# Patient Record
Sex: FEMALE | Race: WHITE | NOT HISPANIC OR LATINO | Employment: OTHER | URBAN - METROPOLITAN AREA
[De-identification: names, ages, dates, MRNs, and addresses within clinical notes are randomized per-mention and may not be internally consistent; named-entity substitution may affect disease eponyms.]

---

## 2017-01-05 ENCOUNTER — OFFICE VISIT (OUTPATIENT)
Dept: OPTOMETRY | Facility: CLINIC | Age: 77
End: 2017-01-05
Payer: MEDICARE

## 2017-01-05 DIAGNOSIS — H54.50: ICD-10-CM

## 2017-01-05 DIAGNOSIS — H52.201 ASTIGMATISM, RIGHT: ICD-10-CM

## 2017-01-05 DIAGNOSIS — Z98.41 S/P CATARACT SURGERY, RIGHT: ICD-10-CM

## 2017-01-05 DIAGNOSIS — H26.491 POSTERIOR CAPSULAR OPACIFICATION NON VISUALLY SIGNIFICANT, RIGHT EYE: ICD-10-CM

## 2017-01-05 DIAGNOSIS — H25.012 CORTICAL SENILE CATARACT, LEFT: ICD-10-CM

## 2017-01-05 DIAGNOSIS — H25.12 NUCLEAR SCLEROTIC CATARACT OF LEFT EYE: Primary | ICD-10-CM

## 2017-01-05 DIAGNOSIS — Z96.1 PSEUDOPHAKIA OF RIGHT EYE: ICD-10-CM

## 2017-01-05 PROCEDURE — 99499 UNLISTED E&M SERVICE: CPT | Mod: S$GLB,,, | Performed by: OPTOMETRIST

## 2017-01-05 PROCEDURE — 99999 PR PBB SHADOW E&M-EST. PATIENT-LVL III: CPT | Mod: PBBFAC,,, | Performed by: OPTOMETRIST

## 2017-01-05 PROCEDURE — 92015 DETERMINE REFRACTIVE STATE: CPT | Mod: S$GLB,,, | Performed by: OPTOMETRIST

## 2017-01-05 PROCEDURE — 92012 INTRM OPH EXAM EST PATIENT: CPT | Mod: S$GLB,,, | Performed by: OPTOMETRIST

## 2017-01-05 NOTE — MR AVS SNAPSHOT
"    [facility]  [address]  [facility] [address]  Phone: [phone]  Fax: [phone]                  Radha Degroot   2017 12:45 PM   Office Visit    Description:  Female : 1940   Provider:  Genaro Johansen OD   Department:  [facility]           Reason for Visit     Concerns About Ocular Health                To Do List           Future Appointments        Provider Department Dept Phone    2017 2:40 PM Iveth Greene MD [facility] [place] [facility] [phone]    2017 3:00 PM Katherine Merida MD [facility] - Internal Medicine [phone]    3/23/2017 8:00 AM Loan Mcnally MD [facility] - Endo/Diab/Metab [phone]      Goals (5 Years of Data)     None      [facility]     [facility] On Call Nurse [facility] -  Assistance  Registered nurses in the [facility] Center provide clinical advisement, health education, appointment booking, and other advisory services.  Call for this free service at [phone].             Medications           Message regarding Medications     Verify the changes and/or additions to your medication regime listed below are the same as discussed with your clinician today.  If any of these changes or additions are incorrect, please notify your healthcare provider.             Verify that the below list of medications is an accurate representation of the medications you are currently taking.  If none reported, the list may be blank. If incorrect, please contact your healthcare provider. Carry this list with you in case of emergency.           Current Medications     albuterol 90 mcg/actuation inhaler Inhale 2 puffs into the lungs every 6 (six) hours as needed for Wheezing or Shortness of Breath.    aspirin 81 MG Chew Take 81 mg by mouth once daily.      BD ULTRA-FINE TIM PEN NEEDLES 32 gauge x 5/32" Ndle USE FOUR TIMES DAILY FOR BLOOD SUGAR TESTING    benzonatate (TESSALON) 100 MG capsule Take 1 capsule (100 mg " total) by mouth 3 (three) times daily as needed for Cough.    blood-glucose meter (FREESTYLE FREEDOM LITE) kit 1 each by Other route 4 (four) times daily. Use as instructed    Ca cmb no.1-O3-G-3-ZF-U63-aloe (VITAMIN D-3 WITH ALOE) 120-1,000-10 mg-unit-mg Tab Take by mouth.    carvedilol (COREG) 12.5 MG tablet Take 0.5 tablets (6.25 mg total) by mouth 2 (two) times daily.    CRESTOR 5 mg tablet TAKE 1 TABLET BY MOUTH EVERY DAY    fluticasone (FLONASE) 50 mcg/actuation nasal spray 2 SPRAYS IN EACH NOSTRIL ONCE DAILY    FREESTYLE LANCETS 28 gauge lancets USE FOUR TIMES DAILY    FREESTYLE LITE STRIPS Strp TEST FOUR TIMES DAILY    furosemide (LASIX) 40 MG tablet Take 1 tablet (40 mg total) by mouth once daily.    insulin glargine (LANTUS SOLOSTAR) 100 unit/mL (3 mL) InPn pen Inject 42 units at bedtime    insulin lispro (HUMALOG KWIKPEN) 100 unit/mL InPn pen Inject 15 units with breakfast and  dinner , 13 units with lunch plus correction scale 150-200 +1, 201-250 +2, 251-300 +3, 301-350 +4, > 350 +5.    ketoconazole (NIZORAL) 2 % shampoo APPLY TO AFFECTED AREA EVERY OTHER DAY AND LET SIT 5 MINUTES PRIOR TO RINSING AS DIRECTED    lisinopril (PRINIVIL,ZESTRIL) 40 MG tablet Take 1 tablet (40 mg total) by mouth once daily.    loratadine (CLARITIN) 10 mg tablet Take 1 tablet (10 mg total) by mouth daily as needed for Allergies (or runny nose).    mirabegron 50 mg Tb24 Take 1 tablet (50 mg total) by mouth once daily.    MULTIVIT-MIN/IRON/FOLIC/LUTEIN (CENTRUM SILVER WOMEN ORAL) Take by mouth.    pantoprazole (PROTONIX) 40 MG tablet Take 1 tablet (40 mg total) by mouth once daily.           Clinical Reference Information           Allergies as of 1/5/2017     Heparin Analogues    Ancef [Cefazolin]    Keflex [Cephalexin]    Oxybutynin      Immunizations Administered on Date of Encounter - 1/5/2017     None      Instructions    S/p cataract surgery in the right eye.  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  Followed by Dr. Cohen in retina clinic.  Mild/moderate fibrosis of intact posterior lens capsule in the right eye.  Monitor.  Brunescent nuclear sclerotic/cortical cataract in the left eye.  Ms. Degroot notes that Dr. Cohen has recommended against cataract surgery in the left eye.  Residual astigmatic distance refractive error in the right eye, with satisfactory correctable vision in that eye.  New spectacle lens Rx issued for use as desired.  Recommend full-time wear.  Follow up with Dr. Cohen as he recommends, and return for recheck of refraction when advised to do so by Dr. Cohen.

## 2017-01-05 NOTE — PROGRESS NOTES
"HPI     Concerns About Ocular Health    Additional comments: Refraction for glasses.   Needs updated spectacle   lens.   Interested in resuming CL wore.  Wore CLs previously, but   discontinued CL wear (why = ?).   Reports "lazy eye" in left eye.  States   that Dr. Cohen did not recommend cataract surgery in the left eye.            Comments   Patient in for progress check.  Patient is in for a refraction for eye glasses per Dr. Cohen     Being followed for (diagnosis):   Macula Edema     Date last seen:  Dr. Cohen     Doctor last seen:  10/20/2016    Prescribed eye medications(s) using:  None     OTC eye medication(s) using:  None    Signs/symptoms of condition resolved/better/stable/worse?:      Allergies/Medications reviewed today?  Yes     PD  68/64  Reading distance = 13"        Last edited by Genaro Johansen, OD on 1/5/2017  1:31 PM. (History)            Assessment /Plan     For exam results, see Encounter Report.    1. Nuclear sclerotic cataract of left eye     2. Cortical senile cataract, left     3. Posterior capsular opacification non visually significant, right eye     4. S/P cataract surgery, right     5. Pseudophakia of right eye     6. Low vision, left eye, normal vision right eye     7. Astigmatism, right                    S/p cataract surgery in the right eye.  Insulin dependant diabetes with history of non-proliferative diabetic retinopathy.  Followed by Dr. Cohen in retina clinic.  Mild/moderate fibrosis of intact posterior lens capsule in the right eye.  Monitor.  Brunescent nuclear sclerotic/cortical cataract in the left eye.  Ms. Degroot notes that Dr. Cohen has recommended against cataract surgery in the left eye.  Residual astigmatic distance refractive error in the right eye, with satisfactory correctable vision in that eye.  New spectacle lens Rx issued for use as desired.  Recommend full-time wear.  Follow up with Dr. Cohen as he recommends, and return for recheck of refraction when " advised to do so by Dr. Cohen.

## 2017-01-05 NOTE — PATIENT INSTRUCTIONS
S/p cataract surgery in the right eye.  Insulin dependant diabetes with history of non-proliferative diabetic retinopathy.  Followed by Dr. Cohen in retina clinic.  Mild/moderate fibrosis of intact posterior lens capsule in the right eye.  Monitor.  Brunescent nuclear sclerotic/cortical cataract in the left eye.  Ms. Degroot notes that Dr. Cohen has recommended against cataract surgery in the left eye.  Residual astigmatic distance refractive error in the right eye, with satisfactory correctable vision in that eye.  New spectacle lens Rx issued for use as desired.  Recommend full-time wear.  Follow up with Dr. Cohen as he recommends, and return for recheck of refraction when advised to do so by Dr. Cohen.

## 2017-01-07 ENCOUNTER — NURSE TRIAGE (OUTPATIENT)
Dept: ADMINISTRATIVE | Facility: CLINIC | Age: 77
End: 2017-01-07

## 2017-01-07 NOTE — TELEPHONE ENCOUNTER
Caller states glucose meter is broken and pharmacy states she needs new prescription for glucose meter.  She is on insulin and needs to measure her glucose this weekend.  Contacted [facility] pharmacy with the prescription for Freestyle glucose meter.

## 2017-01-16 ENCOUNTER — OFFICE VISIT (OUTPATIENT)
Dept: INTERNAL MEDICINE | Facility: CLINIC | Age: 77
End: 2017-01-16
Payer: MEDICARE

## 2017-01-16 VITALS
WEIGHT: 219.56 LBS | HEIGHT: 68 IN | DIASTOLIC BLOOD PRESSURE: 78 MMHG | HEART RATE: 68 BPM | BODY MASS INDEX: 33.28 KG/M2 | SYSTOLIC BLOOD PRESSURE: 135 MMHG

## 2017-01-16 DIAGNOSIS — N25.81 SECONDARY HYPERPARATHYROIDISM (OF RENAL ORIGIN): ICD-10-CM

## 2017-01-16 DIAGNOSIS — N18.4 CHRONIC KIDNEY DISEASE, STAGE IV (SEVERE): Chronic | ICD-10-CM

## 2017-01-16 DIAGNOSIS — Z79.4 TYPE 2 DIABETES MELLITUS WITH CHRONIC KIDNEY DISEASE, WITH LONG-TERM CURRENT USE OF INSULIN, UNSPECIFIED CKD STAGE: ICD-10-CM

## 2017-01-16 DIAGNOSIS — E11.22 TYPE 2 DIABETES MELLITUS WITH CHRONIC KIDNEY DISEASE, WITH LONG-TERM CURRENT USE OF INSULIN, UNSPECIFIED CKD STAGE: ICD-10-CM

## 2017-01-16 DIAGNOSIS — I50.22 CHRONIC SYSTOLIC CHF (CONGESTIVE HEART FAILURE): ICD-10-CM

## 2017-01-16 DIAGNOSIS — E78.5 HYPERLIPIDEMIA, UNSPECIFIED HYPERLIPIDEMIA TYPE: Chronic | ICD-10-CM

## 2017-01-16 DIAGNOSIS — I10 ESSENTIAL HYPERTENSION: Primary | Chronic | ICD-10-CM

## 2017-01-16 PROCEDURE — 99214 OFFICE O/P EST MOD 30 MIN: CPT | Mod: S$GLB,,, | Performed by: INTERNAL MEDICINE

## 2017-01-16 PROCEDURE — 3078F DIAST BP <80 MM HG: CPT | Mod: S$GLB,,, | Performed by: INTERNAL MEDICINE

## 2017-01-16 PROCEDURE — 99499 UNLISTED E&M SERVICE: CPT | Mod: S$GLB,,, | Performed by: INTERNAL MEDICINE

## 2017-01-16 PROCEDURE — 1159F MED LIST DOCD IN RCRD: CPT | Mod: S$GLB,,, | Performed by: INTERNAL MEDICINE

## 2017-01-16 PROCEDURE — 1157F ADVNC CARE PLAN IN RCRD: CPT | Mod: S$GLB,,, | Performed by: INTERNAL MEDICINE

## 2017-01-16 PROCEDURE — 99999 PR PBB SHADOW E&M-EST. PATIENT-LVL IV: CPT | Mod: PBBFAC,,, | Performed by: INTERNAL MEDICINE

## 2017-01-16 PROCEDURE — 3075F SYST BP GE 130 - 139MM HG: CPT | Mod: S$GLB,,, | Performed by: INTERNAL MEDICINE

## 2017-01-16 PROCEDURE — 1160F RVW MEDS BY RX/DR IN RCRD: CPT | Mod: S$GLB,,, | Performed by: INTERNAL MEDICINE

## 2017-01-16 NOTE — MR AVS SNAPSHOT
[facility] - Internal Medicine  [address]  [facility] [address]  Phone: [phone]  Fax: [phone]                  Radha Degroot   2017 3:00 PM   Office Visit    Description:  Female : 1940   Provider:  Katherine Merida MD   Department:  [facility] - Internal Medicine           Reason for Visit     Follow-up           Diagnoses this Visit        Comments    Essential hypertension    -  Primary     Hyperlipidemia, unspecified hyperlipidemia type         Type 2 diabetes mellitus with chronic kidney disease, with long-term current use of insulin, unspecified CKD stage         Chronic systolic CHF (congestive heart failure)         Chronic kidney disease, stage IV (severe)         Secondary hyperparathyroidism (of renal origin)                To Do List           Future Appointments        Provider Department Dept Phone    2017 2:00 PM Iveth Greene MD [facility] - Urology [phone]    3/23/2017 8:00 AM Loan Mcnally MD [facility] - Endo/Diab/Metab [phone]    2017 10:00 AM Katherine Merida MD [facility] - Internal Medicine [phone]      Goals (5 Years of Data)     None      [facility]     [facility] On Call Nurse Care Line -  Assistance  Registered nurses in the [facility] On Call Center provide clinical advisement, health education, appointment booking, and other advisory services.  Call for this free service at [phone].             Medications           Message regarding Medications     Verify the changes and/or additions to your medication regime listed below are the same as discussed with your clinician today.  If any of these changes or additions are incorrect, please notify your healthcare provider.        STOP taking these medications     benzonatate (TESSALON) 100 MG capsule Take 1 capsule (100 mg total) by mouth 3 (three) times daily as needed for Cough.           Verify that the below list of medications is an accurate  "representation of the medications you are currently taking.  If none reported, the list may be blank. If incorrect, please contact your healthcare provider. Carry this list with you in case of emergency.           Current Medications     albuterol 90 mcg/actuation inhaler Inhale 2 puffs into the lungs every 6 (six) hours as needed for Wheezing or Shortness of Breath.    aspirin 81 MG Chew Take 81 mg by mouth once daily.      BD ULTRA-FINE TIM PEN NEEDLES 32 gauge x 5/32" Ndle USE FOUR TIMES DAILY FOR BLOOD SUGAR TESTING    blood-glucose meter (FREESTYLE FREEDOM LITE) kit 1 each by Other route 4 (four) times daily. Use as instructed    Ca cmb no.3-Q4-J-1-CF-L11-aloe (VITAMIN D-3 WITH ALOE) 120-1,000-10 mg-unit-mg Tab Take by mouth.    carvedilol (COREG) 12.5 MG tablet Take 0.5 tablets (6.25 mg total) by mouth 2 (two) times daily.    CRESTOR 5 mg tablet TAKE 1 TABLET BY MOUTH EVERY DAY    fluticasone (FLONASE) 50 mcg/actuation nasal spray 2 SPRAYS IN EACH NOSTRIL ONCE DAILY    FREESTYLE LANCETS 28 gauge lancets USE FOUR TIMES DAILY    FREESTYLE LITE STRIPS Strp TEST FOUR TIMES DAILY    furosemide (LASIX) 40 MG tablet Take 1 tablet (40 mg total) by mouth once daily.    insulin glargine (LANTUS SOLOSTAR) 100 unit/mL (3 mL) InPn pen Inject 42 units at bedtime    insulin lispro (HUMALOG KWIKPEN) 100 unit/mL InPn pen Inject 15 units with breakfast and  dinner , 13 units with lunch plus correction scale 150-200 +1, 201-250 +2, 251-300 +3, 301-350 +4, > 350 +5.    ketoconazole (NIZORAL) 2 % shampoo APPLY TO AFFECTED AREA EVERY OTHER DAY AND LET SIT 5 MINUTES PRIOR TO RINSING AS DIRECTED    lisinopril (PRINIVIL,ZESTRIL) 40 MG tablet Take 1 tablet (40 mg total) by mouth once daily.    loratadine (CLARITIN) 10 mg tablet Take 1 tablet (10 mg total) by mouth daily as needed for Allergies (or runny nose).    mirabegron 50 mg Tb24 Take 1 tablet (50 mg total) by mouth once daily.    MULTIVIT-MIN/IRON/FOLIC/LUTEIN (CENTRUM SILVER " "WOMEN ORAL) Take by mouth.    pantoprazole (PROTONIX) 40 MG tablet Take 1 tablet (40 mg total) by mouth once daily.           Clinical Reference Information           Vital Signs - Last Recorded  Most recent update: 1/16/2017  3:13 PM by Lorraine Bowens MA    BP Pulse Ht Wt BMI    (!) 142/62 68 5' 8" (1.727 m) 99.6 kg (219 lb 9.3 oz) 33.39 kg/m2      Blood Pressure          Most Recent Value    BP  (!)  142/62      Allergies as of 1/16/2017     Heparin Analogues    Ancef [Cefazolin]    Keflex [Cephalexin]    Oxybutynin      Immunizations Administered on Date of Encounter - 1/16/2017     None      Orders Placed During Today's Visit     Future Labs/Procedures Expected by Expires    Lipid panel  1/16/2017 (Approximate) 12/17/2017    TSH  1/16/2017 (Approximate) 12/17/2017      "

## 2017-01-16 NOTE — PROGRESS NOTES
"Subjective:       Patient ID: Radha Degroot is a 76 y.o. female.    Chief Complaint: Follow-up   this is a 76-year-old who presents today for follow-up multiple medical problems.  She has had stable fluid retention overall continues take her medications and denies any increased fluid retention.  She continues to keep baseline fatigue .  She went to see urology due to abnormality on her MRI had some additional testing she continues to follow with urology and also went into see her gynecologist no new concerns were found. she has trouble with her back when she stands for long periods seeing outlying chiropractor did not go to spine enter but has been no worse.  She is following with her endocrinologist and overall is had improvement in her blood sugar readings she is cautious to avoid hypoglycemia  She had a gyn evaluation and urology     HPI  Review of Systems   Respiratory: Negative for chest tightness.    Cardiovascular:        Fluid retension stable    Musculoskeletal: Positive for back pain.        Back pain stable        Objective:     Blood pressure 135/78, pulse 68, height 5' 8" (1.727 m), weight 99.6 kg (219 lb 9.3 oz).    Physical Exam   Constitutional: No distress.   HENT:   Head: Normocephalic.   Mouth/Throat: Oropharynx is clear and moist.   Eyes: No scleral icterus.   Neck: Neck supple.   Cardiovascular: Normal rate, regular rhythm and normal heart sounds.    Pulmonary/Chest: Effort normal and breath sounds normal. No respiratory distress.   Abdominal: Soft. Bowel sounds are normal. She exhibits no mass. There is no tenderness.   Musculoskeletal:   Trace edema    Neurological: She is alert.   Skin: No erythema.   Psychiatric: She has a normal mood and affect.   Vitals reviewed.      Assessment:       1. Essential hypertension    2. Hyperlipidemia, unspecified hyperlipidemia type    3. Type 2 diabetes mellitus with chronic kidney disease, with long-term current use of insulin, unspecified CKD stage  "   4. Chronic systolic CHF (congestive heart failure)    5. Chronic kidney disease, stage IV (severe)    6. Secondary hyperparathyroidism (of renal origin)        Plan:       Radha was seen today for follow-up.    Diagnoses and all orders for this visit:    Essential hypertension  bp acceptable continue current regimin   -     Lipid panel; Future  -     TSH; Future    Hyperlipidemia, unspecified hyperlipidemia type  -     Lipid panel; Future    Type 2 diabetes mellitus with chronic kidney disease, with long-term current use of insulin, unspecified CKD stage  She continues to follow with endocrinology and watches her diet  She just started weight watchers     Chronic systolic CHF (congestive heart failure)  Stable she follows with cardiology   Fluid stable     Chronic kidney disease, stage IV (severe)  Secondary hyperparathyroidism (of renal origin)  neprhology stable recent labs reviwed    Discussed adacel and shingles vaccine rx provided she can take to pharmacy     Add lipids and tsh with next blood draw she will call to add to her endocrine labs when she schedules     Follow up 4 month sooner if concern

## 2017-02-01 ENCOUNTER — TELEPHONE (OUTPATIENT)
Dept: ENDOCRINOLOGY | Facility: CLINIC | Age: 77
End: 2017-02-01

## 2017-02-01 DIAGNOSIS — E11.65 TYPE 2 DIABETES MELLITUS WITH HYPERGLYCEMIA, WITH LONG-TERM CURRENT USE OF INSULIN: Primary | ICD-10-CM

## 2017-02-01 DIAGNOSIS — Z79.4 TYPE 2 DIABETES MELLITUS WITH HYPERGLYCEMIA, WITH LONG-TERM CURRENT USE OF INSULIN: Primary | ICD-10-CM

## 2017-02-01 NOTE — TELEPHONE ENCOUNTER
----- Message from Uyen Bautista LPN sent at 2/1/2017  8:14 AM CST -----  Contact: Patient      ----- Message -----     From: Jailene Lindsey     Sent: 2/1/2017   8:07 AM       To: Uli Cates Staff    Patient is requesting to complete labs at [facility] to 2/2/2017 follow up appointment.    Please call patient once orders are in to provide appointment details at [phone]

## 2017-02-02 NOTE — TELEPHONE ENCOUNTER
----- Message from Jacqueline Calzada sent at 2/1/2017  4:02 PM CST -----  Contact: patient  [phone]  Yajaira   -   Patient called requesting labs for A1C and Cholesterol testing done before seeing the Dr.  Call back number [phone]  Thanks,

## 2017-02-06 RX ORDER — ROSUVASTATIN CALCIUM 5 MG/1
TABLET, COATED ORAL
Qty: 30 TABLET | Refills: 0 | Status: SHIPPED | OUTPATIENT
Start: 2017-02-06 | End: 2017-02-23 | Stop reason: SDUPTHER

## 2017-02-16 ENCOUNTER — TELEPHONE (OUTPATIENT)
Dept: UROLOGY | Facility: CLINIC | Age: 77
End: 2017-02-16

## 2017-02-16 ENCOUNTER — LAB VISIT (OUTPATIENT)
Dept: LAB | Facility: HOSPITAL | Age: 77
End: 2017-02-16
Attending: INTERNAL MEDICINE
Payer: MEDICARE

## 2017-02-16 DIAGNOSIS — Z79.4 TYPE 2 DIABETES MELLITUS WITH HYPERGLYCEMIA, WITH LONG-TERM CURRENT USE OF INSULIN: ICD-10-CM

## 2017-02-16 DIAGNOSIS — E11.65 TYPE 2 DIABETES MELLITUS WITH HYPERGLYCEMIA, WITH LONG-TERM CURRENT USE OF INSULIN: ICD-10-CM

## 2017-02-16 LAB
CHOLEST/HDLC SERPL: 3.1 {RATIO}
HDL/CHOLESTEROL RATIO: 32 %
HDLC SERPL-MCNC: 147 MG/DL
HDLC SERPL-MCNC: 47 MG/DL
LDLC SERPL CALC-MCNC: 64.2 MG/DL
NONHDLC SERPL-MCNC: 100 MG/DL
TRIGL SERPL-MCNC: 179 MG/DL

## 2017-02-16 PROCEDURE — 80061 LIPID PANEL: CPT

## 2017-02-16 PROCEDURE — 83036 HEMOGLOBIN GLYCOSYLATED A1C: CPT

## 2017-02-16 PROCEDURE — 36415 COLL VENOUS BLD VENIPUNCTURE: CPT | Mod: PO

## 2017-02-16 NOTE — TELEPHONE ENCOUNTER
----- Message from Mandi Billings sent at 2/16/2017 12:55 PM CST -----  Contact: Self   Pt called to inform the doctor that she will not be able to make her appt     Pt can be contacted at [phone]

## 2017-02-17 LAB
ESTIMATED AVG GLUCOSE: 177 MG/DL
HBA1C MFR BLD HPLC: 7.8 %

## 2017-02-20 ENCOUNTER — OFFICE VISIT (OUTPATIENT)
Dept: ENDOCRINOLOGY | Facility: CLINIC | Age: 77
End: 2017-02-20
Payer: MEDICARE

## 2017-02-20 VITALS
BODY MASS INDEX: 32.98 KG/M2 | WEIGHT: 217.63 LBS | HEIGHT: 68 IN | SYSTOLIC BLOOD PRESSURE: 140 MMHG | DIASTOLIC BLOOD PRESSURE: 60 MMHG | HEART RATE: 86 BPM

## 2017-02-20 DIAGNOSIS — E11.22 TYPE 2 DIABETES MELLITUS WITH STAGE 4 CHRONIC KIDNEY DISEASE, WITH LONG-TERM CURRENT USE OF INSULIN: ICD-10-CM

## 2017-02-20 DIAGNOSIS — N18.4 TYPE 2 DIABETES MELLITUS WITH STAGE 4 CHRONIC KIDNEY DISEASE, WITH LONG-TERM CURRENT USE OF INSULIN: ICD-10-CM

## 2017-02-20 DIAGNOSIS — E11.649 TYPE 2 DIABETES MELLITUS WITH HYPOGLYCEMIA WITHOUT COMA, WITH LONG-TERM CURRENT USE OF INSULIN: Primary | ICD-10-CM

## 2017-02-20 DIAGNOSIS — E55.9 VITAMIN D DEFICIENCY DISEASE: ICD-10-CM

## 2017-02-20 DIAGNOSIS — Z79.4 TYPE 2 DIABETES MELLITUS WITH HYPOGLYCEMIA WITHOUT COMA, WITH LONG-TERM CURRENT USE OF INSULIN: Primary | ICD-10-CM

## 2017-02-20 DIAGNOSIS — E11.42 TYPE 2 DIABETES MELLITUS WITH DIABETIC POLYNEUROPATHY, WITH LONG-TERM CURRENT USE OF INSULIN: ICD-10-CM

## 2017-02-20 DIAGNOSIS — Z79.4 TYPE 2 DIABETES MELLITUS WITH DIABETIC POLYNEUROPATHY, WITH LONG-TERM CURRENT USE OF INSULIN: ICD-10-CM

## 2017-02-20 DIAGNOSIS — I10 ESSENTIAL HYPERTENSION: Chronic | ICD-10-CM

## 2017-02-20 DIAGNOSIS — Z79.4 TYPE 2 DIABETES MELLITUS WITH STAGE 4 CHRONIC KIDNEY DISEASE, WITH LONG-TERM CURRENT USE OF INSULIN: ICD-10-CM

## 2017-02-20 DIAGNOSIS — E78.2 MIXED HYPERLIPIDEMIA: Chronic | ICD-10-CM

## 2017-02-20 PROCEDURE — 1159F MED LIST DOCD IN RCRD: CPT | Mod: S$GLB,,, | Performed by: INTERNAL MEDICINE

## 2017-02-20 PROCEDURE — 1125F AMNT PAIN NOTED PAIN PRSNT: CPT | Mod: S$GLB,,, | Performed by: INTERNAL MEDICINE

## 2017-02-20 PROCEDURE — 99499 UNLISTED E&M SERVICE: CPT | Mod: S$GLB,,, | Performed by: INTERNAL MEDICINE

## 2017-02-20 PROCEDURE — 1157F ADVNC CARE PLAN IN RCRD: CPT | Mod: S$GLB,,, | Performed by: INTERNAL MEDICINE

## 2017-02-20 PROCEDURE — 3078F DIAST BP <80 MM HG: CPT | Mod: S$GLB,,, | Performed by: INTERNAL MEDICINE

## 2017-02-20 PROCEDURE — 1160F RVW MEDS BY RX/DR IN RCRD: CPT | Mod: S$GLB,,, | Performed by: INTERNAL MEDICINE

## 2017-02-20 PROCEDURE — 99214 OFFICE O/P EST MOD 30 MIN: CPT | Mod: S$GLB,,, | Performed by: INTERNAL MEDICINE

## 2017-02-20 PROCEDURE — 99999 PR PBB SHADOW E&M-EST. PATIENT-LVL III: CPT | Mod: PBBFAC,,, | Performed by: INTERNAL MEDICINE

## 2017-02-20 PROCEDURE — 3077F SYST BP >= 140 MM HG: CPT | Mod: S$GLB,,, | Performed by: INTERNAL MEDICINE

## 2017-02-20 RX ORDER — INSULIN LISPRO 100 [IU]/ML
INJECTION, SOLUTION INTRAVENOUS; SUBCUTANEOUS
Qty: 45 ML | Refills: 6 | Status: SHIPPED | OUTPATIENT
Start: 2017-02-20 | End: 2017-12-04 | Stop reason: CLARIF

## 2017-02-20 NOTE — MR AVS SNAPSHOT
[facility] - Endo/Diab/Metab  [address]  [facility] [address]  Phone: [phone]  Fax: [phone]                  Radha Degroot   2017 2:30 PM   Office Visit    Description:  Female : 1940   Provider:  Teresita Gates MD   Department:  [facility] - Endo/Diab/Metab           Reason for Visit     Diabetes Mellitus     Hypoglycemia           Diagnoses this Visit        Comments    Type 2 diabetes mellitus with hypoglycemia without coma, with long-term current use of insulin    -  Primary     Vitamin D deficiency disease         Essential hypertension         Mixed hyperlipidemia                To Do List           Future Appointments        Provider Department Dept Phone    2017 3:55 PM LAB, APPOINTMENT [place] [facility] [phone]    3/16/2017 3:20 PM Iveth Greene MD [facility] - Urology [phone]    4/3/2017 11:30 AM DIABETES EDUCATOR, INT MED 2 [facility] -  Diabetes Program [phone]    2017 10:30 AM Terri Mcmanus MD [facility] - Nephrology [phone]    2017 2:00 PM Oswaldo Wilkerson MD [facility] - Sleep Clinic [phone]      Goals (5 Years of Data)     None      Follow-Up and Disposition     Return in about 3 months (around 2017).       These Medications        Disp Refills Start End    insulin lispro (HUMALOG KWIKPEN) 100 unit/mL InPn pen 45 mL 6 2017     Inject 15 units with breakfast 17 units with lunch and dinner plus correction scale 150-200 +1, 201-250 +2, 251-300 +3, 301-350 +4, > 350 +5.    [id number] [address] AT [address] Ph #: [phone]       Notes to Pharmacy: **Patient requests 90 days supply**      [facility] On Call     [facility] On Call Nurse Care Line -  Assistance  Registered nurses in the [facility] On Call Center provide clinical advisement, health education, appointment booking, and other advisory  "services.  Call for this free service at [phone].             Medications           Message regarding Medications     Verify the changes and/or additions to your medication regime listed below are the same as discussed with your clinician today.  If any of these changes or additions are incorrect, please notify your healthcare provider.        CHANGE how you are taking these medications     Start Taking Instead of    insulin lispro (HUMALOG KWIKPEN) 100 unit/mL InPn pen insulin lispro (HUMALOG KWIKPEN) 100 unit/mL InPn pen    Dosage:  Inject 15 units with breakfast 17 units with lunch and dinner plus correction scale 150-200 +1, 201-250 +2, 251-300 +3, 301-350 +4, > 350 +5. Dosage:  Inject 15 units with breakfast and  dinner , 13 units with lunch plus correction scale 150-200 +1, 201-250 +2, 251-300 +3, 301-350 +4, > 350 +5.    Reason for Change:  Reorder       STOP taking these medications     albuterol 90 mcg/actuation inhaler Inhale 2 puffs into the lungs every 6 (six) hours as needed for Wheezing or Shortness of Breath.    loratadine (CLARITIN) 10 mg tablet Take 1 tablet (10 mg total) by mouth daily as needed for Allergies (or runny nose).           Verify that the below list of medications is an accurate representation of the medications you are currently taking.  If none reported, the list may be blank. If incorrect, please contact your healthcare provider. Carry this list with you in case of emergency.           Current Medications     ACCU-CHEK MULTICLIX LANCET lancets USE TO TEST BLOOD SUGAR FOUR TIMES DAILY    aspirin 81 MG Chew Take 81 mg by mouth once daily.      BD ULTRA-FINE TIM PEN NEEDLES 32 gauge x 5/32" Ndle USE FOUR TIMES DAILY FOR BLOOD SUGAR TESTING    blood-glucose meter (FREESTYLE FREEDOM LITE) kit 1 each by Other route 4 (four) times daily. Use as instructed    Ca cmb no.7-G4-V-3-OO-R54-aloe (VITAMIN D-3 WITH ALOE) 120-1,000-10 mg-unit-mg Tab Take by mouth.    carvedilol (COREG) 12.5 " "MG tablet Take 0.5 tablets (6.25 mg total) by mouth 2 (two) times daily.    fluticasone (FLONASE) 50 mcg/actuation nasal spray 2 SPRAYS IN EACH NOSTRIL ONCE DAILY    FREESTYLE LANCETS 28 gauge lancets USE FOUR TIMES DAILY    FREESTYLE LITE STRIPS Strp TEST FOUR TIMES DAILY    furosemide (LASIX) 40 MG tablet Take 1 tablet (40 mg total) by mouth once daily.    insulin glargine (LANTUS SOLOSTAR) 100 unit/mL (3 mL) InPn pen Inject 42 units at bedtime    ketoconazole (NIZORAL) 2 % shampoo APPLY TO AFFECTED AREA EVERY OTHER DAY AND LET SIT 5 MINUTES PRIOR TO RINSING AS DIRECTED    lisinopril (PRINIVIL,ZESTRIL) 40 MG tablet Take 1 tablet (40 mg total) by mouth once daily.    mirabegron 50 mg Tb24 Take 1 tablet (50 mg total) by mouth once daily.    MULTIVIT-MIN/IRON/FOLIC/LUTEIN (CENTRUM SILVER WOMEN ORAL) Take by mouth.    pantoprazole (PROTONIX) 40 MG tablet Take 1 tablet (40 mg total) by mouth once daily.    rosuvastatin (CRESTOR) 5 MG tablet TAKE 1 TABLET BY MOUTH EVERY DAY    insulin lispro (HUMALOG KWIKPEN) 100 unit/mL InPn pen Inject 15 units with breakfast 17 units with lunch and dinner plus correction scale 150-200 +1, 201-250 +2, 251-300 +3, 301-350 +4, > 350 +5.           Clinical Reference Information           Your Vitals Were     BP Pulse Height Weight BMI    140/60 86 5' 8" (1.727 m) 98.7 kg (217 lb 9.5 oz) 33.09 kg/m2      Blood Pressure          Most Recent Value    BP  (!)  140/60      Allergies as of 2/20/2017     Heparin Analogues    Ancef [Cefazolin]    Keflex [Cephalexin]    Oxybutynin      Immunizations Administered on Date of Encounter - 2/20/2017     None      Orders Placed During Today's Visit      Normal Orders This Visit    Ambulatory Referral to Diabetes Education     Future Labs/Procedures Expected by Expires    C-PEPTIDE  2/20/2017 4/21/2018    Comprehensive metabolic panel  2/20/2017 2/20/2018    GLUTAMIC ACID DECARBOXYLASE  2/20/2017 4/21/2018    Hemoglobin A1c  2/20/2017 2/20/2018    TSH  " 2/20/2017 2/20/2018      Language Assistance Services     ATTENTION: Language assistance services are available, free of charge. Please call [phone].      ATENCIÓN: Si habasmita renteria, tiene a san disposición servicios gratuitos de asistencia lingüística. Llame al [phone].     CHÚ Ý: N?u b?n nói Ti?ng Vi?t, có các d?ch v? h? tr? ngôn ng? mi?n phí dành cho b?n. G?i s? [phone].         [facility] complies with applicable Federal civil rights laws and does not discriminate on the basis of race, color, national origin, age, disability, or sex.

## 2017-02-20 NOTE — PROGRESS NOTES
Subjective:      Patient ID: Radha Degroot is a 76 y.o. female.    Chief Complaint:  Diabetes Mellitus and Hypoglycemia      History of Present Illness  5 children   11 grandchildren   1 great grandchild     With regards to the diabetes:  Complications: Ckd4, PN, DR   Feet numbness and burning     Current regimen:   Basal insulin : 42 qhs   prandial insulin:  17 tid ac plus correction     Missed doses?  None     # times a day testing - several    Pre breakfast  120-150     Pre lunch  200   Pre dinner   qhs 150-200     Hypoglycemic event? Yes   65 this am - after breakfast  and 70 here         Typical meals: trying to keep to a healthy diet    Education - last visit:  > 1 year ago   Exercise - tried to stay active     + Polyuria polydipsia nocturia       With regards to hypertension:  Tolerating ACEI      With regards to dyslipidemia:  Tolerating statin     Trying to wear  cpap   +cold intolerance   +briitle nails     Review of Systems   Constitutional: Positive for fatigue.   Eyes: Negative for visual disturbance.   Respiratory: Negative for shortness of breath.    Cardiovascular: Negative for chest pain.   Gastrointestinal: Negative for abdominal pain.   Endocrine: Positive for polyuria.   Musculoskeletal: Positive for back pain.   Skin: Negative for wound.   Psychiatric/Behavioral: Positive for sleep disturbance.       Objective:   Physical Exam   Neck: No thyromegaly present.   Cardiovascular: Normal rate.    Pulmonary/Chest: Effort normal.   Abdominal: Soft.   Musculoskeletal: She exhibits no edema.   Vitals reviewed.  injection sites are ok. No lipo hypertropthy or atrophy  Feet no cuts or  scratches  Shoes appropriate  sensation decreased to vibration        Body mass index is 33.09 kg/(m^2).    Lab Review:   Lab Results   Component Value Date    HGBA1C 7.8 (H) 02/16/2017     Lab Results   Component Value Date    CHOL 147 02/16/2017    HDL 47 02/16/2017    LDLCALC 64.2 02/16/2017    TRIG 179 (H) 02/16/2017     CHOLHDL 32.0 02/16/2017     Lab Results   Component Value Date     12/13/2016    K 4.4 12/13/2016     12/13/2016    CO2 21 (L) 12/13/2016    GLU 62 (L) 12/13/2016    BUN 47 (H) 12/13/2016    CREATININE 1.9 (H) 12/13/2016    CALCIUM 9.4 12/13/2016    PROT 7.2 11/11/2016    ALBUMIN 4.0 12/13/2016    BILITOT 0.4 11/11/2016    ALKPHOS 78 11/11/2016    AST 13 11/11/2016    ALT 10 11/11/2016    ANIONGAP 12 12/13/2016    ESTGFRAFRICA 28.9 (A) 12/13/2016    EGFRNONAA 25.1 (A) 12/13/2016    TSH 3.332 02/20/2017       Assessment and Plan     Hypertension  Tolerating ACEI     Controlled   Blood pressure goals discussed with patient        Hyperlipidemia  On statin per ADA recommendations        Type 2 diabetes mellitus with hypoglycemia without coma, with long-term current use of insulin  Our priority is to avoid hypoglycemia in her case  We will accept a higher hba1c to avoid hypoglycemia   Change prandial 15-17-17-0  Assess pancreatic function based on her marked glucose variability  Refer to education   Call if hypos recur and also note timing related to insulin and food      Type 2 diabetes mellitus with stage 4 chronic kidney disease, with long-term current use of insulin  Follow up nephrology       Type 2 diabetes mellitus with diabetic polyneuropathy, with long-term current use of insulin  Reviewed foot care     Vitamin D deficiency disease  recheck    T2DM with ckd4, PN,

## 2017-02-20 NOTE — ASSESSMENT & PLAN NOTE
Our priority is to avoid hypoglycemia in her case  We will accept a higher hba1c to avoid hypoglycemia   Change prandial 15-17-17-0  Assess pancreatic function based on her marked glucose variability  Refer to education   Call if hypos recur and also note timing related to insulin and food

## 2017-02-23 RX ORDER — ROSUVASTATIN CALCIUM 5 MG
TABLET ORAL
Qty: 30 TABLET | Refills: 0 | Status: SHIPPED | OUTPATIENT
Start: 2017-02-23 | End: 2017-03-16 | Stop reason: SDUPTHER

## 2017-02-24 ENCOUNTER — TELEPHONE (OUTPATIENT)
Dept: ENDOCRINOLOGY | Facility: CLINIC | Age: 77
End: 2017-02-24

## 2017-02-24 NOTE — TELEPHONE ENCOUNTER
----- Message from Uyen Bautista LPN sent at 2/24/2017 11:55 AM CST -----  Contact: Rocky luther/ Rogelio  tel: [phone]      ----- Message -----     From: Kallie Mercedes     Sent: 2/24/2017  11:17 AM       To: Uli Cates Staff    Needs to speak to you about ref. No:   [id number]    / 5mgs. Of Crestor/ Needs a prior authorization.   Pls call.

## 2017-03-02 ENCOUNTER — TELEPHONE (OUTPATIENT)
Dept: INTERNAL MEDICINE | Facility: CLINIC | Age: 77
End: 2017-03-02

## 2017-03-02 DIAGNOSIS — M47.816 OSTEOARTHRITIS OF LUMBAR SPINE, UNSPECIFIED SPINAL OSTEOARTHRITIS COMPLICATION STATUS: Primary | ICD-10-CM

## 2017-03-02 NOTE — TELEPHONE ENCOUNTER
----- Message from Jannette Pipo sent at 3/2/2017 10:53 AM CST -----  Contact: Self/[phone] home  Patient would like to know should she see a back and spine specialists or her PCP for her back pain. She would like to speak with someone in the office. Please call and advise.    Thank you!

## 2017-03-03 ENCOUNTER — TELEPHONE (OUTPATIENT)
Dept: ENDOCRINOLOGY | Facility: CLINIC | Age: 77
End: 2017-03-03

## 2017-03-03 NOTE — TELEPHONE ENCOUNTER
Ok to go to back and spine clinic as discussed previously   Referral in assist in you can assist in scheudling

## 2017-03-06 ENCOUNTER — PATIENT MESSAGE (OUTPATIENT)
Dept: ENDOCRINOLOGY | Facility: CLINIC | Age: 77
End: 2017-03-06

## 2017-03-06 DIAGNOSIS — M54.50 LUMBAR SPINE PAIN: Primary | ICD-10-CM

## 2017-03-06 RX ORDER — LISINOPRIL 40 MG/1
TABLET ORAL
Qty: 30 TABLET | Refills: 6 | Status: SHIPPED | OUTPATIENT
Start: 2017-03-06 | End: 2017-06-28

## 2017-03-06 RX ORDER — FUROSEMIDE 40 MG/1
TABLET ORAL
Qty: 30 TABLET | Refills: 6 | Status: SHIPPED | OUTPATIENT
Start: 2017-03-06 | End: 2017-08-24 | Stop reason: SDUPTHER

## 2017-03-07 RX ORDER — LANCETS
1 EACH MISCELLANEOUS 4 TIMES DAILY
Qty: 360 EACH | Refills: 3 | Status: ON HOLD | OUTPATIENT
Start: 2017-03-07 | End: 2017-03-21 | Stop reason: SDUPTHER

## 2017-03-07 RX ORDER — DEXTROSE 4 G
TABLET,CHEWABLE ORAL
Qty: 1 EACH | Refills: 0 | Status: ON HOLD | OUTPATIENT
Start: 2017-03-07 | End: 2017-03-21 | Stop reason: SDUPTHER

## 2017-03-15 ENCOUNTER — TELEPHONE (OUTPATIENT)
Dept: ORTHOPEDICS | Facility: CLINIC | Age: 77
End: 2017-03-15

## 2017-03-15 NOTE — TELEPHONE ENCOUNTER
Spoke to pt regarding appt Monday 3/20/17 at 130p with Alisha Hilario PA-C. Pt was informed to arrive on the 2nd floor at 1p, then up to floor 5 to see Alisha. Pt verbalized understanding. Phone number was provided for any further questions.    Jolene PIERRE

## 2017-03-16 ENCOUNTER — OFFICE VISIT (OUTPATIENT)
Dept: UROLOGY | Facility: CLINIC | Age: 77
End: 2017-03-16
Payer: MEDICARE

## 2017-03-16 VITALS
BODY MASS INDEX: 32.58 KG/M2 | RESPIRATION RATE: 16 BRPM | DIASTOLIC BLOOD PRESSURE: 58 MMHG | HEART RATE: 68 BPM | HEIGHT: 68 IN | SYSTOLIC BLOOD PRESSURE: 112 MMHG | WEIGHT: 215 LBS

## 2017-03-16 DIAGNOSIS — N39.46 MIXED INCONTINENCE URGE AND STRESS: ICD-10-CM

## 2017-03-16 DIAGNOSIS — N28.89 RENAL MASS: Primary | ICD-10-CM

## 2017-03-16 PROCEDURE — 1126F AMNT PAIN NOTED NONE PRSNT: CPT | Mod: S$GLB,,, | Performed by: UROLOGY

## 2017-03-16 PROCEDURE — 1160F RVW MEDS BY RX/DR IN RCRD: CPT | Mod: S$GLB,,, | Performed by: UROLOGY

## 2017-03-16 PROCEDURE — 99999 PR PBB SHADOW E&M-EST. PATIENT-LVL III: CPT | Mod: PBBFAC,,, | Performed by: UROLOGY

## 2017-03-16 PROCEDURE — 99214 OFFICE O/P EST MOD 30 MIN: CPT | Mod: S$GLB,,, | Performed by: UROLOGY

## 2017-03-16 PROCEDURE — 3074F SYST BP LT 130 MM HG: CPT | Mod: S$GLB,,, | Performed by: UROLOGY

## 2017-03-16 PROCEDURE — 1159F MED LIST DOCD IN RCRD: CPT | Mod: S$GLB,,, | Performed by: UROLOGY

## 2017-03-16 PROCEDURE — 3078F DIAST BP <80 MM HG: CPT | Mod: S$GLB,,, | Performed by: UROLOGY

## 2017-03-16 PROCEDURE — 1157F ADVNC CARE PLAN IN RCRD: CPT | Mod: S$GLB,,, | Performed by: UROLOGY

## 2017-03-16 RX ORDER — ROSUVASTATIN CALCIUM 5 MG/1
TABLET, COATED ORAL
Refills: 0 | Status: ON HOLD | COMMUNITY
Start: 2017-03-07 | End: 2017-03-22

## 2017-03-16 NOTE — PROGRESS NOTES
"  Subjective:       Radha Degroot is a 76 y.o. female who is an established patient who was referred by Katherine Merida for evaluation of renal mass and UI.      Renal mass  She underwent SABRINA (9/16) that showed a 1.6cm isoechoic focus in R UP. CT scan was recommended but not done yet. SABRINA was done due to abnormality noted on MRI.     Cr 2.2, most recently 1.9.    No family history of  malignancy. No hematuria. No flank pain.     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       Incontinence  She also reports issues with UI. She was given Ditropan in the past for this but did not tolerate this due to SE. She had good response with LUTS. She also takes diuretic. She wears pads daily. She reports "constant dribbling" and urgency with UUI. Also with SURAJ with cough, sneeze, laugh. Denies RK, straining. Denies UTIs. 2ppd.      The following portions of the patient's history were reviewed and updated as appropriate: allergies, current medications, past family history, past medical history, past social history, past surgical history and problem list.    Review of Systems  Constitutional: no fever or chills  ENT: no nasal congestion or sore throat  Respiratory: no cough or shortness of breath  Cardiovascular: no chest pain or palpitations  Gastrointestinal: no nausea or vomiting, tolerating diet  Genitourinary: as per HPI  Hematologic/Lymphatic: no easy bruising or lymphadenopathy  Musculoskeletal: no arthralgias or myalgias  Skin: no rashes or lesions  Neurological: no seizures or tremors  Behavioral/Psych: no auditory or visual hallucinations       Objective:    Vitals:   BP (!) 112/58  Pulse 68  Resp 16  Ht 5' 8" (1.727 m)  Wt 97.5 kg (215 lb)  BMI 32.69 kg/m2    Physical Exam   General: well developed, well nourished in no acute distress  Head: normocephalic, atraumatic  Neck: supple, trachea midline, no obvious enlargement of thyroid  HEENT: EOMI, mucus membranes moist, sclera anicteric, no hearing " impairment  Lungs: symmetric expansion, non-labored breathing  Cardiovascular: regular rate and rhythm, normal pulses  Abdomen: soft, non tender, non distended, no palpable masses, no hepatosplenomegaly, no hernias, no CVA tenderness  Musculoskeletal: no peripheral edema, normal ROM in bilateral upper and lower extremities  Lymphatics: no cervical or inguinal lymphadenopathy  Skin: no rashes or lesions  Neuro: alert and oriented x 3, no gross deficits  Psych: normal judgment and insight, normal mood/affect and non-anxious  Genitourinary:   patient declined exam      Lab Review   Urine analysis today in clinic shows negative for all components     Lab Results   Component Value Date    WBC 7.23 12/13/2016    HGB 11.8 (L) 12/13/2016    HCT 36.4 (L) 12/13/2016    MCV 88 12/13/2016     12/13/2016     Lab Results   Component Value Date    CREATININE 1.9 (H) 12/13/2016    BUN 47 (H) 12/13/2016       Imaging  Images and reports were personally reviewed by me and discussed with patient       Assessment/Plan:      1. Renal mass    - 1.6cm lesion in R kidney   - Indeterminate at this time. Recommend CT scan for better evaluation. This is limited due to inability to give IV contrast 2/2 elevated Cr.   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 Mixed incontinence urge and stress    - Discussed difference of UUI and SURAJ components. Reviewed etiology and workup of each.   - SURAJ: Kegels, PFPT, bulking agent, MUS.   - UUI: Behavioral changes, PFPT, anticholinergics. Botox/InterStim for refractory UUI.   - Continue Myrbetriq 2/2 intolerance/allergy of Ditropan.    - Also discussed OTC trial of Oxytrol patch   - Discussed Botox. She is not interested.          Follow up in 6 weeks with CT prior

## 2017-03-16 NOTE — MR AVS SNAPSHOT
[facility]  [address]  [address]  [place] [address]  Phone: [phone]                  Radha Degroot   3/16/2017 3:20 PM   Office Visit    Description:  Female : 1940   Provider:  Iveth Greene MD   Department:  [facility]           Reason for Visit     Follow-up           Diagnoses this Visit        Comments    Renal mass    -  Primary     Mixed incontinence urge and stress                To Do List           Future Appointments        Provider Department Dept Phone    3/20/2017 1:00 PM NOM [id number] LIM[facility] [phone]    3/20/2017 1:30 PM Alisha Hilario PA-C [facility] [phone]    4/3/2017 11:30 AM DIABETES EDUCATOR, INT MED 2 [facility] [phone]    2017 10:30 AM Terri Mcmanus MD [facility] Nephrology [phone]    2017 2:00 PM Oswaldo Wilkerson MD [facility] [phone]      Goals (5 Years of Data)     None      Follow-Up and Disposition     Return in about 6 weeks (around 2017) for CT scan follow up.      [facility]     [facility] Care [facility] - 24/7 Assistance  Registered nurses in the [facility] provide clinical advisement, health education, appointment booking, and other advisory services.  Call for this free service at [phone].             Medications           Message regarding Medications     Verify the changes and/or additions to your medication regime listed below are the same as discussed with your clinician today.  If any of these changes or additions are incorrect, please notify your healthcare provider.             Verify that the below list of medications is an accurate representation of the medications you are currently taking.  If none reported, the list may be blank. If incorrect, please contact your healthcare provider. Carry this list with you in case of emergency.           Current Medications      "aspirin 81 MG Chew Take 81 mg by mouth once daily.      BD ULTRA-FINE TIM PEN NEEDLES 32 gauge x 5/32" Ndle USE FOUR TIMES DAILY FOR BLOOD SUGAR TESTING    blood sugar diagnostic (ACCU-CHEK ACTIVE TEST) Strp USE TO TEST BLOOD SUGAR FOUR TIMES DAILY    blood-glucose meter (ACCU-CHEK MELIDA PLUS METER) Misc To check sugars 4 times daily.  Please include control solution    Ca cmb no.3-A7-M-8-WW-C78-aloe (VITAMIN D-3 WITH ALOE) 120-1,000-10 mg-unit-mg Tab Take by mouth.    carvedilol (COREG) 12.5 MG tablet Take 0.5 tablets (6.25 mg total) by mouth 2 (two) times daily.    fluticasone (FLONASE) 50 mcg/actuation nasal spray 2 SPRAYS IN EACH NOSTRIL ONCE DAILY    furosemide (LASIX) 40 MG tablet TAKE 1 TABLET(40 MG) BY MOUTH EVERY DAY    insulin glargine (LANTUS SOLOSTAR) 100 unit/mL (3 mL) InPn pen Inject 42 units at bedtime    insulin lispro (HUMALOG KWIKPEN) 100 unit/mL InPn pen Inject 15 units with breakfast 17 units with lunch and dinner plus correction scale 150-200 +1, 201-250 +2, 251-300 +3, 301-350 +4, > 350 +5.    ketoconazole (NIZORAL) 2 % shampoo APPLY TO AFFECTED AREA EVERY OTHER DAY AND LET SIT 5 MINUTES PRIOR TO RINSING AS DIRECTED    lancets (ACCU-CHEK SOFTCLIX LANCETS) Misc 1 each by Misc.(Non-Drug; Combo Route) route 4 (four) times daily.    lisinopril (PRINIVIL,ZESTRIL) 40 MG tablet TAKE 1 TABLET(40 MG) BY MOUTH EVERY DAY    mirabegron 50 mg Tb24 Take 1 tablet (50 mg total) by mouth once daily.    MULTIVIT-MIN/IRON/FOLIC/LUTEIN (CENTRUM SILVER WOMEN ORAL) Take by mouth.    pantoprazole (PROTONIX) 40 MG tablet Take 1 tablet (40 mg total) by mouth once daily.    rosuvastatin (CRESTOR) 5 MG tablet TK 1 T PO QD           Clinical Reference Information           Your Vitals Were     BP Pulse Resp Height Weight BMI    112/58 68 16 5' 8" (1.727 m) 97.5 kg (215 lb) 32.69 kg/m2      Blood Pressure          Most Recent Value    BP  (!)  112/58      Allergies as of 3/16/2017     Heparin Analogues    Ancef " [Cefazolin]    Keflex [Cephalexin]    Oxybutynin      Immunizations Administered on Date of Encounter - 3/16/2017     None      Orders Placed During Today's Visit     Future Labs/Procedures Expected by Expires    CT Renal Stone Study ABD Pelvis WO  4/13/2017 3/16/2018      Language Assistance Services     ATTENTION: Language assistance services are available, free of charge. Please call [phone].      ATENCIÓN: Si habla español, tiene a san disposición servicios gratuitos de asistencia lingüística. Llame al [phone].     CHÚ Ý: N?u b?n nói Ti?ng Vi?t, có các d?ch v? h? tr? ngôn ng? mi?n phí dành cho b?n. G?i s? [phone].         [facility] complies with applicable Federal civil rights laws and does not discriminate on the basis of race, color, national origin, age, disability, or sex.

## 2017-03-20 ENCOUNTER — OFFICE VISIT (OUTPATIENT)
Dept: INTERNAL MEDICINE | Facility: CLINIC | Age: 77
End: 2017-03-20
Payer: MEDICARE

## 2017-03-20 ENCOUNTER — TELEPHONE (OUTPATIENT)
Dept: INTERNAL MEDICINE | Facility: CLINIC | Age: 77
End: 2017-03-20

## 2017-03-20 ENCOUNTER — HOSPITAL ENCOUNTER (OUTPATIENT)
Facility: HOSPITAL | Age: 77
Discharge: HOME | End: 2017-03-22
Attending: EMERGENCY MEDICINE | Admitting: INTERNAL MEDICINE
Payer: MEDICARE

## 2017-03-20 VITALS
DIASTOLIC BLOOD PRESSURE: 62 MMHG | HEART RATE: 72 BPM | HEIGHT: 68 IN | WEIGHT: 215.81 LBS | BODY MASS INDEX: 32.71 KG/M2 | SYSTOLIC BLOOD PRESSURE: 124 MMHG

## 2017-03-20 DIAGNOSIS — E11.22 TYPE 2 DIABETES MELLITUS WITH STAGE 4 CHRONIC KIDNEY DISEASE, WITH LONG-TERM CURRENT USE OF INSULIN: ICD-10-CM

## 2017-03-20 DIAGNOSIS — Z96.1 PSEUDOPHAKIA OF RIGHT EYE: ICD-10-CM

## 2017-03-20 DIAGNOSIS — Z79.4 TYPE 2 DIABETES MELLITUS WITH DIABETIC POLYNEUROPATHY, WITH LONG-TERM CURRENT USE OF INSULIN: ICD-10-CM

## 2017-03-20 DIAGNOSIS — I10 ESSENTIAL HYPERTENSION: Chronic | ICD-10-CM

## 2017-03-20 DIAGNOSIS — I50.22 CHRONIC SYSTOLIC CHF (CONGESTIVE HEART FAILURE): ICD-10-CM

## 2017-03-20 DIAGNOSIS — E11.3219 TYPE 2 DIABETES MELLITUS WITH MILD NONPROLIFERATIVE DIABETIC RETINOPATHY WITH MACULAR EDEMA: Chronic | ICD-10-CM

## 2017-03-20 DIAGNOSIS — H90.5 SENSORINEURAL HEARING LOSS, UNSPECIFIED LATERALITY: ICD-10-CM

## 2017-03-20 DIAGNOSIS — R94.39 ABNORMAL STRESS ECHOCARDIOGRAM: ICD-10-CM

## 2017-03-20 DIAGNOSIS — D64.9 ANEMIA, UNSPECIFIED TYPE: ICD-10-CM

## 2017-03-20 DIAGNOSIS — H69.90 EUSTACHIAN TUBE DYSFUNCTION, UNSPECIFIED LATERALITY: ICD-10-CM

## 2017-03-20 DIAGNOSIS — R07.9 CHEST PAIN, UNSPECIFIED TYPE: ICD-10-CM

## 2017-03-20 DIAGNOSIS — I16.1 HYPERTENSIVE EMERGENCY: ICD-10-CM

## 2017-03-20 DIAGNOSIS — E11.42 TYPE 2 DIABETES MELLITUS WITH DIABETIC POLYNEUROPATHY, WITH LONG-TERM CURRENT USE OF INSULIN: ICD-10-CM

## 2017-03-20 DIAGNOSIS — H53.002 AMBLYOPIA, LEFT: ICD-10-CM

## 2017-03-20 DIAGNOSIS — M47.816 OSTEOARTHRITIS OF LUMBAR SPINE, UNSPECIFIED SPINAL OSTEOARTHRITIS COMPLICATION STATUS: ICD-10-CM

## 2017-03-20 DIAGNOSIS — E11.649 TYPE 2 DIABETES MELLITUS WITH HYPOGLYCEMIA WITHOUT COMA, WITH LONG-TERM CURRENT USE OF INSULIN: ICD-10-CM

## 2017-03-20 DIAGNOSIS — M62.81 MUSCLE WEAKNESS: ICD-10-CM

## 2017-03-20 DIAGNOSIS — E55.9 VITAMIN D DEFICIENCY DISEASE: ICD-10-CM

## 2017-03-20 DIAGNOSIS — H91.92 HEARING LOSS OF LEFT EAR, UNSPECIFIED HEARING LOSS TYPE: Primary | ICD-10-CM

## 2017-03-20 DIAGNOSIS — H43.813 POSTERIOR VITREOUS DETACHMENT OF BOTH EYES: ICD-10-CM

## 2017-03-20 DIAGNOSIS — Z12.31 OTHER SCREENING MAMMOGRAM: ICD-10-CM

## 2017-03-20 DIAGNOSIS — N28.1 ACQUIRED CYST OF KIDNEY: ICD-10-CM

## 2017-03-20 DIAGNOSIS — M25.60 STIFFNESS OF VERTEBRAL COLUMN: ICD-10-CM

## 2017-03-20 DIAGNOSIS — E78.2 MIXED HYPERLIPIDEMIA: Chronic | ICD-10-CM

## 2017-03-20 DIAGNOSIS — N18.4 CHRONIC KIDNEY DISEASE, STAGE IV (SEVERE): Chronic | ICD-10-CM

## 2017-03-20 DIAGNOSIS — R07.9 CHEST PAIN: ICD-10-CM

## 2017-03-20 DIAGNOSIS — N25.81 SECONDARY HYPERPARATHYROIDISM (OF RENAL ORIGIN): ICD-10-CM

## 2017-03-20 DIAGNOSIS — G47.33 OBSTRUCTIVE SLEEP APNEA: ICD-10-CM

## 2017-03-20 DIAGNOSIS — N28.89 RENAL MASS: ICD-10-CM

## 2017-03-20 DIAGNOSIS — I25.10 CORONARY ARTERY DISEASE DUE TO LIPID RICH PLAQUE: Primary | Chronic | ICD-10-CM

## 2017-03-20 DIAGNOSIS — I25.5 CARDIOMYOPATHY, ISCHEMIC: Chronic | ICD-10-CM

## 2017-03-20 DIAGNOSIS — I21.4 NSTEMI (NON-ST ELEVATED MYOCARDIAL INFARCTION): ICD-10-CM

## 2017-03-20 DIAGNOSIS — N18.4 TYPE 2 DIABETES MELLITUS WITH STAGE 4 CHRONIC KIDNEY DISEASE, WITH LONG-TERM CURRENT USE OF INSULIN: ICD-10-CM

## 2017-03-20 DIAGNOSIS — I25.83 CORONARY ARTERY DISEASE DUE TO LIPID RICH PLAQUE: Primary | Chronic | ICD-10-CM

## 2017-03-20 DIAGNOSIS — N39.46 MIXED INCONTINENCE URGE AND STRESS: ICD-10-CM

## 2017-03-20 DIAGNOSIS — Z79.4 TYPE 2 DIABETES MELLITUS WITH STAGE 4 CHRONIC KIDNEY DISEASE, WITH LONG-TERM CURRENT USE OF INSULIN: ICD-10-CM

## 2017-03-20 DIAGNOSIS — R26.2 DIFFICULTY WALKING: ICD-10-CM

## 2017-03-20 DIAGNOSIS — Z79.4 TYPE 2 DIABETES MELLITUS WITH HYPOGLYCEMIA WITHOUT COMA, WITH LONG-TERM CURRENT USE OF INSULIN: ICD-10-CM

## 2017-03-20 LAB
ALBUMIN SERPL BCP-MCNC: 3.8 G/DL
ALP SERPL-CCNC: 82 U/L
ALT SERPL W/O P-5'-P-CCNC: 11 U/L
ANION GAP SERPL CALC-SCNC: 13 MMOL/L
AST SERPL-CCNC: 18 U/L
BASOPHILS # BLD AUTO: 0.03 K/UL
BASOPHILS NFR BLD: 0.4 %
BILIRUB SERPL-MCNC: 0.4 MG/DL
BNP SERPL-MCNC: 213 PG/ML
BUN SERPL-MCNC: 49 MG/DL
CALCIUM SERPL-MCNC: 9.2 MG/DL
CHLORIDE SERPL-SCNC: 107 MMOL/L
CO2 SERPL-SCNC: 21 MMOL/L
CREAT SERPL-MCNC: 2 MG/DL
DIFFERENTIAL METHOD: ABNORMAL
EOSINOPHIL # BLD AUTO: 0.2 K/UL
EOSINOPHIL NFR BLD: 2.4 %
ERYTHROCYTE [DISTWIDTH] IN BLOOD BY AUTOMATED COUNT: 14.2 %
EST. GFR  (AFRICAN AMERICAN): 27.2 ML/MIN/1.73 M^2
EST. GFR  (NON AFRICAN AMERICAN): 23.6 ML/MIN/1.73 M^2
GLUCOSE SERPL-MCNC: 77 MG/DL
HCT VFR BLD AUTO: 36.6 %
HGB BLD-MCNC: 11.9 G/DL
INR PPP: 1.1
LYMPHOCYTES # BLD AUTO: 1.9 K/UL
LYMPHOCYTES NFR BLD: 22.5 %
MCH RBC QN AUTO: 28.8 PG
MCHC RBC AUTO-ENTMCNC: 32.5 %
MCV RBC AUTO: 89 FL
MONOCYTES # BLD AUTO: 0.4 K/UL
MONOCYTES NFR BLD: 4.9 %
NEUTROPHILS # BLD AUTO: 5.8 K/UL
NEUTROPHILS NFR BLD: 69.6 %
PLATELET # BLD AUTO: 158 K/UL
PMV BLD AUTO: 12.2 FL
POCT GLUCOSE: 136 MG/DL (ref 70–110)
POCT GLUCOSE: 69 MG/DL (ref 70–110)
POTASSIUM SERPL-SCNC: 4.9 MMOL/L
PROT SERPL-MCNC: 7.2 G/DL
PROTHROMBIN TIME: 11.4 SEC
RBC # BLD AUTO: 4.13 M/UL
SODIUM SERPL-SCNC: 141 MMOL/L
TROPONIN I SERPL DL<=0.01 NG/ML-MCNC: 0.01 NG/ML
TROPONIN I SERPL DL<=0.01 NG/ML-MCNC: <0.006 NG/ML
WBC # BLD AUTO: 8.36 K/UL

## 2017-03-20 PROCEDURE — 25000003 PHARM REV CODE 250: Performed by: EMERGENCY MEDICINE

## 2017-03-20 PROCEDURE — 63600175 PHARM REV CODE 636 W HCPCS: Performed by: INTERNAL MEDICINE

## 2017-03-20 PROCEDURE — 85025 COMPLETE CBC W/AUTO DIFF WBC: CPT

## 2017-03-20 PROCEDURE — 3078F DIAST BP <80 MM HG: CPT | Mod: S$GLB,,, | Performed by: INTERNAL MEDICINE

## 2017-03-20 PROCEDURE — 82962 GLUCOSE BLOOD TEST: CPT

## 2017-03-20 PROCEDURE — 1160F RVW MEDS BY RX/DR IN RCRD: CPT | Mod: S$GLB,,, | Performed by: INTERNAL MEDICINE

## 2017-03-20 PROCEDURE — 96361 HYDRATE IV INFUSION ADD-ON: CPT

## 2017-03-20 PROCEDURE — G0378 HOSPITAL OBSERVATION PER HR: HCPCS

## 2017-03-20 PROCEDURE — 99285 EMERGENCY DEPT VISIT HI MDM: CPT | Mod: ,,, | Performed by: EMERGENCY MEDICINE

## 2017-03-20 PROCEDURE — 84484 ASSAY OF TROPONIN QUANT: CPT | Mod: 91

## 2017-03-20 PROCEDURE — 99999 PR PBB SHADOW E&M-EST. PATIENT-LVL IV: CPT | Mod: PBBFAC,,, | Performed by: INTERNAL MEDICINE

## 2017-03-20 PROCEDURE — 1157F ADVNC CARE PLAN IN RCRD: CPT | Mod: S$GLB,,, | Performed by: INTERNAL MEDICINE

## 2017-03-20 PROCEDURE — 1126F AMNT PAIN NOTED NONE PRSNT: CPT | Mod: S$GLB,,, | Performed by: INTERNAL MEDICINE

## 2017-03-20 PROCEDURE — 93010 ELECTROCARDIOGRAM REPORT: CPT | Mod: ,,, | Performed by: INTERNAL MEDICINE

## 2017-03-20 PROCEDURE — 96374 THER/PROPH/DIAG INJ IV PUSH: CPT

## 2017-03-20 PROCEDURE — 1159F MED LIST DOCD IN RCRD: CPT | Mod: S$GLB,,, | Performed by: INTERNAL MEDICINE

## 2017-03-20 PROCEDURE — 93005 ELECTROCARDIOGRAM TRACING: CPT

## 2017-03-20 PROCEDURE — 85610 PROTHROMBIN TIME: CPT

## 2017-03-20 PROCEDURE — 99285 EMERGENCY DEPT VISIT HI MDM: CPT | Mod: 25

## 2017-03-20 PROCEDURE — 99220 PR INITIAL OBSERVATION CARE,LEVL III: CPT | Mod: ,,, | Performed by: INTERNAL MEDICINE

## 2017-03-20 PROCEDURE — 83880 ASSAY OF NATRIURETIC PEPTIDE: CPT

## 2017-03-20 PROCEDURE — 3074F SYST BP LT 130 MM HG: CPT | Mod: S$GLB,,, | Performed by: INTERNAL MEDICINE

## 2017-03-20 PROCEDURE — 80053 COMPREHEN METABOLIC PANEL: CPT

## 2017-03-20 PROCEDURE — 99213 OFFICE O/P EST LOW 20 MIN: CPT | Mod: S$GLB,,, | Performed by: INTERNAL MEDICINE

## 2017-03-20 PROCEDURE — 96375 TX/PRO/DX INJ NEW DRUG ADDON: CPT

## 2017-03-20 RX ORDER — PANTOPRAZOLE SODIUM 40 MG/1
40 TABLET, DELAYED RELEASE ORAL DAILY
Status: DISCONTINUED | OUTPATIENT
Start: 2017-03-21 | End: 2017-03-20

## 2017-03-20 RX ORDER — SODIUM CHLORIDE 9 MG/ML
INJECTION, SOLUTION INTRAVENOUS CONTINUOUS
Status: DISCONTINUED | OUTPATIENT
Start: 2017-03-20 | End: 2017-03-22 | Stop reason: HOSPADM

## 2017-03-20 RX ORDER — HYDROCODONE BITARTRATE AND ACETAMINOPHEN 10; 325 MG/1; MG/1
1 TABLET ORAL EVERY 4 HOURS PRN
Status: DISCONTINUED | OUTPATIENT
Start: 2017-03-20 | End: 2017-03-22 | Stop reason: HOSPADM

## 2017-03-20 RX ORDER — ONDANSETRON 4 MG/1
4 TABLET, ORALLY DISINTEGRATING ORAL EVERY 8 HOURS PRN
Status: DISCONTINUED | OUTPATIENT
Start: 2017-03-20 | End: 2017-03-22 | Stop reason: HOSPADM

## 2017-03-20 RX ORDER — NAPROXEN SODIUM 220 MG/1
81 TABLET, FILM COATED ORAL DAILY
Status: DISCONTINUED | OUTPATIENT
Start: 2017-03-21 | End: 2017-03-22 | Stop reason: HOSPADM

## 2017-03-20 RX ORDER — ACETAMINOPHEN 325 MG/1
650 TABLET ORAL EVERY 6 HOURS PRN
Status: DISCONTINUED | OUTPATIENT
Start: 2017-03-20 | End: 2017-03-22 | Stop reason: HOSPADM

## 2017-03-20 RX ORDER — LISINOPRIL 20 MG/1
40 TABLET ORAL DAILY
Status: DISCONTINUED | OUTPATIENT
Start: 2017-03-21 | End: 2017-03-20

## 2017-03-20 RX ORDER — GLUCAGON 1 MG
1 KIT INJECTION
Status: DISCONTINUED | OUTPATIENT
Start: 2017-03-20 | End: 2017-03-22 | Stop reason: HOSPADM

## 2017-03-20 RX ORDER — IBUPROFEN 200 MG
24 TABLET ORAL
Status: DISCONTINUED | OUTPATIENT
Start: 2017-03-20 | End: 2017-03-22 | Stop reason: HOSPADM

## 2017-03-20 RX ORDER — PANTOPRAZOLE SODIUM 40 MG/1
40 TABLET, DELAYED RELEASE ORAL DAILY
Status: DISCONTINUED | OUTPATIENT
Start: 2017-03-21 | End: 2017-03-22 | Stop reason: HOSPADM

## 2017-03-20 RX ORDER — FUROSEMIDE 40 MG/1
40 TABLET ORAL DAILY
Status: DISCONTINUED | OUTPATIENT
Start: 2017-03-21 | End: 2017-03-22 | Stop reason: HOSPADM

## 2017-03-20 RX ORDER — HYDROCODONE BITARTRATE AND ACETAMINOPHEN 5; 325 MG/1; MG/1
1 TABLET ORAL EVERY 4 HOURS PRN
Status: DISCONTINUED | OUTPATIENT
Start: 2017-03-20 | End: 2017-03-22 | Stop reason: HOSPADM

## 2017-03-20 RX ORDER — METOPROLOL TARTRATE 1 MG/ML
5 INJECTION, SOLUTION INTRAVENOUS
Status: COMPLETED | OUTPATIENT
Start: 2017-03-20 | End: 2017-03-20

## 2017-03-20 RX ORDER — ASPIRIN 325 MG
325 TABLET ORAL
Status: COMPLETED | OUTPATIENT
Start: 2017-03-20 | End: 2017-03-20

## 2017-03-20 RX ORDER — CARVEDILOL 6.25 MG/1
6.25 TABLET ORAL 2 TIMES DAILY
Status: DISCONTINUED | OUTPATIENT
Start: 2017-03-20 | End: 2017-03-21

## 2017-03-20 RX ORDER — HYDRALAZINE HYDROCHLORIDE 25 MG/1
25 TABLET, FILM COATED ORAL EVERY 6 HOURS PRN
Status: DISCONTINUED | OUTPATIENT
Start: 2017-03-20 | End: 2017-03-22 | Stop reason: HOSPADM

## 2017-03-20 RX ORDER — ROSUVASTATIN CALCIUM 5 MG/1
5 TABLET, COATED ORAL DAILY
Status: DISCONTINUED | OUTPATIENT
Start: 2017-03-21 | End: 2017-03-22

## 2017-03-20 RX ORDER — HYDRALAZINE HYDROCHLORIDE 20 MG/ML
10 INJECTION INTRAMUSCULAR; INTRAVENOUS EVERY 6 HOURS PRN
Status: DISCONTINUED | OUTPATIENT
Start: 2017-03-20 | End: 2017-03-20

## 2017-03-20 RX ORDER — INSULIN ASPART 100 [IU]/ML
15 INJECTION, SOLUTION INTRAVENOUS; SUBCUTANEOUS
Status: DISCONTINUED | OUTPATIENT
Start: 2017-03-21 | End: 2017-03-22 | Stop reason: HOSPADM

## 2017-03-20 RX ORDER — INSULIN ASPART 100 [IU]/ML
0-5 INJECTION, SOLUTION INTRAVENOUS; SUBCUTANEOUS
Status: DISCONTINUED | OUTPATIENT
Start: 2017-03-20 | End: 2017-03-22 | Stop reason: HOSPADM

## 2017-03-20 RX ORDER — IBUPROFEN 200 MG
16 TABLET ORAL
Status: DISCONTINUED | OUTPATIENT
Start: 2017-03-20 | End: 2017-03-22 | Stop reason: HOSPADM

## 2017-03-20 RX ORDER — AMOXICILLIN 250 MG
1 CAPSULE ORAL 2 TIMES DAILY
Status: DISCONTINUED | OUTPATIENT
Start: 2017-03-20 | End: 2017-03-22 | Stop reason: HOSPADM

## 2017-03-20 RX ORDER — LISINOPRIL 20 MG/1
40 TABLET ORAL NIGHTLY
Status: DISCONTINUED | OUTPATIENT
Start: 2017-03-20 | End: 2017-03-22 | Stop reason: HOSPADM

## 2017-03-20 RX ADMIN — LISINOPRIL 40 MG: 20 TABLET ORAL at 09:03

## 2017-03-20 RX ADMIN — ASPIRIN 325 MG ORAL TABLET 325 MG: 325 PILL ORAL at 01:03

## 2017-03-20 RX ADMIN — SODIUM CHLORIDE: 0.9 INJECTION, SOLUTION INTRAVENOUS at 05:03

## 2017-03-20 RX ADMIN — STANDARDIZED SENNA CONCENTRATE AND DOCUSATE SODIUM 1 TABLET: 8.6; 5 TABLET, FILM COATED ORAL at 09:03

## 2017-03-20 RX ADMIN — CARVEDILOL 6.25 MG: 6.25 TABLET, FILM COATED ORAL at 09:03

## 2017-03-20 RX ADMIN — METOPROLOL TARTRATE 5 MG: 5 INJECTION INTRAVENOUS at 03:03

## 2017-03-20 RX ADMIN — INSULIN DETEMIR 30 UNITS: 100 INJECTION, SOLUTION SUBCUTANEOUS at 09:03

## 2017-03-20 RX ADMIN — HYDRALAZINE HYDROCHLORIDE 10 MG: 20 INJECTION INTRAMUSCULAR; INTRAVENOUS at 06:03

## 2017-03-20 NOTE — H&P
"[facility] Hospital Medicine  History & Physical    Patient Name: Radha Degroot  MRN: [id number]  Admission Date: 3/20/2017  Attending Physician: Bo Esquivel MD  Primary Care Provider: Katherine Merida MD    [facility] Medicine Team: [facility] MED B Bo Esquivel MD     Patient information was obtained from patient and ER records.     Subjective:     Principal Problem:<principal problem not specified>    Chief Complaint:   Chief Complaint   Patient presents with    Chest Pain     has stents , sent from radiology at [facility]        HPI: This is a 76 y.o. female who presents with complaint of mild chest pain under her right breast which began prior to arrival. She developed this discomfort while she was sitting down waiting for an x-ray. She also indicates associated fatigue and generalized weakness but denies associated dizziness. Denies shortness of breath, nausea, diaphoresis, palpitations. She also complains of left ear pain which felt like she "had cotton in her ears." The patient reports past MI and that she has had 2 stents placed. She was recommended to come to the ER by the X-ray technician/nurse.     Seen by cardiology in ER and Code stemi cancelled , no official; consult.    Hx of CAD. Stents placed in 2006.         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    Hx of colonic polyps     1/9    Hx of colonic polyps     Hyperlipidemia     Hypertension     Myocardial infarction nov 2006 " "   Posterior vitreous detachment of both eyes     Sleep apnea     Type 2 diabetes mellitus with mild nonproliferative diabetic retinopathy with macular edema 2/15/2013       Past Surgical History:   Procedure Laterality Date    APPENDECTOMY      CATARACT EXTRACTION      Right eye    heart stent      X 2    HYSTERECTOMY      Fibroids       Review of patient's allergies indicates:   Allergen Reactions    Heparin analogues Other (See Comments)     thrombocytopenia    Ancef [cefazolin]     Keflex [cephalexin] Rash    Oxybutynin Other (See Comments)     Metallic sloan, chest symptoms        No current facility-administered medications on file prior to encounter.      Current Outpatient Prescriptions on File Prior to Encounter   Medication Sig    aspirin 81 MG Chew Take 81 mg by mouth once daily.      mirabegron 50 mg Tb24 Take 1 tablet (50 mg total) by mouth once daily.    MULTIVIT-MIN/IRON/FOLIC/LUTEIN (CENTRUM SILVER WOMEN ORAL) Take by mouth.    BD ULTRA-FINE TIM PEN NEEDLES 32 gauge x 5/32" Ndle USE FOUR TIMES DAILY FOR BLOOD SUGAR TESTING    blood sugar diagnostic (ACCU-CHEK ACTIVE TEST) Strp USE TO TEST BLOOD SUGAR FOUR TIMES DAILY    blood-glucose meter (ACCU-CHEK MELIDA PLUS METER) Misc To check sugars 4 times daily.  Please include control solution    Ca cmb no.6-T4-E-3-ET-F52-aloe (VITAMIN D-3 WITH ALOE) 120-1,000-10 mg-unit-mg Tab Take by mouth.    carvedilol (COREG) 12.5 MG tablet Take 0.5 tablets (6.25 mg total) by mouth 2 (two) times daily.    fluticasone (FLONASE) 50 mcg/actuation nasal spray 2 SPRAYS IN EACH NOSTRIL ONCE DAILY    furosemide (LASIX) 40 MG tablet TAKE 1 TABLET(40 MG) BY MOUTH EVERY DAY    insulin glargine (LANTUS SOLOSTAR) 100 unit/mL (3 mL) InPn pen Inject 42 units at bedtime    insulin lispro (HUMALOG KWIKPEN) 100 unit/mL InPn pen Inject 15 units with breakfast 17 units with lunch and dinner plus correction scale 150-200 +1, 201-250 +2, 251-300 +3, 301-350 +4, > 350 " +5.    ketoconazole (NIZORAL) 2 % shampoo APPLY TO AFFECTED AREA EVERY OTHER DAY AND LET SIT 5 MINUTES PRIOR TO RINSING AS DIRECTED    lancets (ACCU-CHEK SOFTCLIX LANCETS) Misc 1 each by Misc.(Non-Drug; Combo Route) route 4 (four) times daily.    lisinopril (PRINIVIL,ZESTRIL) 40 MG tablet TAKE 1 TABLET(40 MG) BY MOUTH EVERY DAY    pantoprazole (PROTONIX) 40 MG tablet Take 1 tablet (40 mg total) by mouth once daily.    rosuvastatin (CRESTOR) 5 MG tablet TK 1 T PO QD     Family History     Problem Relation (Age of Onset)    Cancer Maternal Grandfather, Paternal Grandfather    Diabetes Mother, Father    Heart disease Mother    Hypertension Mother    Kidney failure Father    Melanoma Father    Mental illness Sister        Social History Main Topics    Smoking status: Never Smoker    Smokeless tobacco: Never Used    Alcohol use No    Drug use: No    Sexual activity: Not Currently     Review of Systems   Constitutional: Negative.    Eyes: Negative.    Respiratory: Negative.    Endocrine: Negative.    Genitourinary: Negative.    Allergic/Immunologic: Negative.    Neurological: Negative.    Hematological: Negative.    Psychiatric/Behavioral: Negative.      Objective:     Vital Signs (Most Recent):  Temp: 97.7 °F (36.5 °C) (03/20/17 1639)  Pulse: (!) 57 (03/20/17 1639)  Resp: 16 (03/20/17 1639)  BP: (!) 163/74 (03/20/17 1709)  SpO2: 100 % (03/20/17 1639) Vital Signs (24h Range):  Temp:  [97.7 °F (36.5 °C)-98.3 °F (36.8 °C)] 97.7 °F (36.5 °C)  Pulse:  [52-72] 57  Resp:  [16-18] 16  SpO2:  [98 %-100 %] 100 %  BP: (124-200)/(62-78) 163/74     Weight: 96.6 kg (213 lb)  Body mass index is 32.39 kg/(m^2).    Physical Exam   Constitutional: She is oriented to person, place, and time. She appears well-developed and well-nourished.   HENT:   Head: Normocephalic.   Eyes: EOM are normal. Pupils are equal, round, and reactive to light.   Neck: Normal range of motion. Neck supple.   Cardiovascular: Normal rate and regular  rhythm.    Pulmonary/Chest: Effort normal and breath sounds normal.   Neurological: She is alert and oriented to person, place, and time.   Skin: Skin is warm.            Assessment/Plan:     Type 2 diabetes mellitus with stage 4 chronic kidney disease, with long-term current use of insulin  Reduce home insulin dose while in the hospital  Sliding scale      Hypertensive emergency  C/W home meds  Hydralazine PRN      VTE Risk Mitigation         Ordered     Medium Risk of VTE  Once      03/20/17 1653     Place JIL hose  Until discontinued      03/20/17 1653     Place sequential compression device  Until discontinued      03/20/17 1653        Bo Esquivel MD  Department of Hospital Medicine   [facility]

## 2017-03-20 NOTE — IP AVS SNAPSHOT
[facility]  [address]  [facility] [address]  Phone: [phone]           Patient Discharge Instructions     Our goal is to set you up for success. This packet includes information on your condition, medications, and your home care. It will help you to care for yourself so you don't get sicker and need to go back to the hospital.     Please ask your nurse if you have any questions.        There are many details to remember when preparing to leave the hospital. Here is what you will need to do:    1. Take your medicine. If you are prescribed medications, review your Medication List in the following pages. You may have new medications to  at the pharmacy and others that you'll need to stop taking. Review the instructions for how and when to take your medications. Talk with your doctor or nurses if you are unsure of what to do.     2. Go to your follow-up appointments. Specific follow-up information is listed in the following pages. Your may be contacted by a transition nurse or clinical provider about future appointments. Be sure we have all of the phone numbers to reach you, if needed. Please contact your provider's office if you are unable to make an appointment.     3. Watch for warning signs. Your doctor or nurse will give you detailed warning signs to watch for and when to call for assistance. These instructions may also include educational information about your condition. If you experience any of warning signs to your health, call your doctor.               [facility]  Unless otherwise directed by your provider, please contact [facility], our nurse care line that is available for 24/7 assistance.     [phone] (toll-free)    Registered nurses in the [facility] provide clinical advisement, health education, appointment booking, and other advisory services.                    ** Verify the list of medication(s) below is accurate and up  to date. Carry this with you in case of emergency. If your medications have changed, please notify your healthcare provider.             Medication List      START taking these medications        Additional Info    Begin Date AM Noon PM Bedtime    amlodipine 5 MG tablet   Commonly known as:  NORVASC   Quantity:  30 tablet   Refills:  1   Dose:  5 mg    Last time this was given:  5 mg on 3/22/2017  9:50 AM   Instructions:  Take 1 tablet (5 mg total) by mouth once daily.                               clopidogrel 75 mg tablet   Commonly known as:  PLAVIX   Quantity:  30 tablet   Refills:  11   Dose:  75 mg    Last time this was given:  75 mg on 3/22/2017 10:45 AM   Instructions:  Take 1 tablet (75 mg total) by mouth once daily.                               nitroGLYCERIN 0.3 MG SL tablet   Commonly known as:  NITROSTAT   Quantity:  30 tablet   Refills:  3   Dose:  0.3 mg    Instructions:  Place 1 tablet (0.3 mg total) under the tongue every 5 (five) minutes as needed for Chest pain.                              CHANGE how you take these medications        Additional Info    Begin Date AM Noon PM Bedtime    fluticasone 50 mcg/actuation nasal spray   Commonly known as:  FLONASE   Quantity:  16 g   Refills:  6   What changed:  See the new instructions.    Last time this was given:  2 sprays on 3/22/2017  8:57 AM   Instructions:  2 SPRAYS IN EACH NOSTRIL ONCE DAILY                               rosuvastatin 40 MG Tab   Commonly known as:  CRESTOR   Quantity:  30 tablet   Refills:  6   What changed:  See the new instructions.    Last time this was given:  5 mg on 3/22/2017  8:59 AM   Instructions:  TK 1 T PO QD                                 CONTINUE taking these medications        Additional Info    Begin Date AM Noon PM Bedtime    aspirin 81 MG Chew   Refills:  0   Dose:  81 mg    Last time this was given:  81 mg on 3/22/2017  8:59 AM   Instructions:  Take 81 mg by mouth once daily.                               BD  "ULTRA-FINE TIM PEN NEEDLES 32 gauge x 5/32" Ndle   Quantity:  200 each   Refills:  8   Generic drug:  pen needle, diabetic    Instructions:  USE FOUR TIMES DAILY FOR BLOOD SUGAR TESTING                                        blood sugar diagnostic Strp   Commonly known as:  ACCU-CHEK ACTIVE TEST   Quantity:  360 strip   Refills:  3   Comments:  **Patient requests 90 days supply**    Instructions:  USE TO TEST BLOOD SUGAR FOUR TIMES DAILY                                        blood-glucose meter Misc   Commonly known as:  ACCU-CHEK MELIDA PLUS METER   Quantity:  1 each   Refills:  0   Comments:  Please include control solution    Instructions:  To check sugars 4 times daily.  Please include control solution                                        carvedilol 12.5 MG tablet   Commonly known as:  COREG   Quantity:  60 tablet   Refills:  11   Dose:  6.25 mg    Last time this was given:  12.5 mg on 3/22/2017  8:59 AM   Instructions:  Take 0.5 tablets (6.25 mg total) by mouth 2 (two) times daily.                                  CENTRUM SILVER WOMEN ORAL   Refills:  0    Instructions:  Take by mouth.                               furosemide 40 MG tablet   Commonly known as:  LASIX   Quantity:  30 tablet   Refills:  6    Last time this was given:  40 mg on 3/22/2017  8:59 AM   Instructions:  TAKE 1 TABLET(40 MG) BY MOUTH EVERY DAY                               insulin glargine 100 unit/mL (3 mL) Inpn pen   Commonly known as:  LANTUS SOLOSTAR   Quantity:  30 mL   Refills:  6    Instructions:  Inject 42 units at bedtime                               insulin lispro 100 unit/mL Inpn pen   Commonly known as:  HUMALOG KWIKPEN   Quantity:  45 mL   Refills:  6   Comments:  **Patient requests 90 days supply**    Instructions:  Inject 15 units with breakfast 17 units with lunch and dinner plus correction scale 150-200 +1, 201-250 +2, 251-300 +3, 301-350 +4, > 350 +5.                                     ketoconazole 2 % shampoo "   Commonly known as:  NIZORAL   Quantity:  120 mL   Refills:  3    Instructions:  APPLY TO AFFECTED AREA EVERY OTHER DAY AND LET SIT 5 MINUTES PRIOR TO RINSING AS DIRECTED                            lancets Misc   Commonly known as:  ACCU-CHEK SOFTCLIX LANCETS   Quantity:  360 each   Refills:  3   Dose:  1 each    Instructions:  1 each by Misc.(Non-Drug; Combo Route) route 4 (four) times daily.                                        lisinopril 40 MG tablet   Commonly known as:  PRINIVIL,ZESTRIL   Quantity:  30 tablet   Refills:  6    Last time this was given:  40 mg on 3/21/2017  8:12 PM   Instructions:  TAKE 1 TABLET(40 MG) BY MOUTH EVERY DAY                               mirabegron 50 mg Tb24   Quantity:  30 tablet   Refills:  11   Dose:  50 mg    Instructions:  Take 1 tablet (50 mg total) by mouth once daily.                               pantoprazole 40 MG tablet   Commonly known as:  PROTONIX   Quantity:  90 tablet   Refills:  4   Dose:  40 mg   Comments:  **Patient requests 90 days supply**    Last time this was given:  40 mg on 3/22/2017  8:59 AM   Instructions:  Take 1 tablet (40 mg total) by mouth once daily.                               VITAMIN D-3 WITH ALOE 120-1,000-10 mg-unit-mg Tab   Refills:  0   Generic drug:  Ca cmb [id number]    Instructions:  Take by mouth.                                    Where to Get Your Medications      These medications were sent to [facility] - [facility] [address]  [address] [facility] [address]     Phone:  [phone]     blood sugar diagnostic Strp    blood-glucose meter Misc    lancets Misc         [facility]  [address]  [address] [address]    Hours:  24-hours Phone:  [phone]     amlodipine 5 MG tablet    clopidogrel 75 mg tablet    fluticasone 50 mcg/actuation nasal  spray    nitroGLYCERIN 0.3 MG SL tablet    rosuvastatin 40 MG Tab                  Please bring to all follow up appointments:    1. A copy of your discharge instructions.  2. All medicines you are currently taking in their original bottles.  3. Identification and insurance card.    Please arrive 15 minutes ahead of scheduled appointment time.    Please call 24 hours in advance if you must reschedule your appointment and/or time.        Your Scheduled Appointments     Apr 03, 2017 11:30 AM CDT   Diabetes Education with DIABETES EDUCATOR, INT MED 2   [facility] -  Diabetes Program ([facility])    [address]  [place] [address] [phone]            Apr 04, 2017  8:20 AM CDT   Established Patient Visit with Eric Mcnally MD   [facility]-Interventional Card ([facility] )    [address]  [place] [address] [phone]            Apr 17, 2017 10:30 AM CDT   Ct Renal Stone with WB CT1 LIMIT 400 LBS   [facility])    [address] [facility] [id number] [phone]            Apr 27, 2017 11:20 AM CDT   Established Patient Visit with Iveth Greene MD   [facility] - Urology [facility])    [address] [facility] [address]  [facility] [address] [phone]            May 04, 2017 10:30 AM CDT   Established Patient Visit with Terri Mcmanus MD   [facility] - Nephrology ([facility] )    [address]  [place] [address] [phone]              Follow-up Information     Follow up with Terry Hines MD In 2 weeks.    Specialty:  Cardiology    Contact information:    [address]  [place] [address] [phone]          Follow up with Eric Mcnally MD In 2 weeks.    Specialties:  Cardiology, INTERVENTIONAL CARDIOLOGY    Contact information:    [address] [facility] [address] [phone]        Referrals     Future Orders    Ambulatory referral to Cardiology  "        Discharge Instructions     Future Orders    Activity as tolerated     Call MD for:  severe uncontrolled pain     Diet general     Questions:    Total calories:      Fat restriction, if any:      Protein restriction, if any:      Na restriction, if any:      Fluid restriction:      Additional restrictions:          Primary Diagnosis     Your primary diagnosis was:  Chest Pain      Admission Information     Date & Time Provider Department CSN    3/20/2017  1:35 PM Allie Rowley MD [facility] [id number]      Care Providers     Provider Role Specialty Primary office phone    Allie Rowley MD Attending Provider Hospitalist [phone]    Bo Esquivel MD Team Attending  Hospitalist [phone]      Your Vitals Were     BP Pulse Temp Resp Height Weight    147/67 (BP Location: Left arm, Patient Position: Sitting, BP Method: Automatic) 60 97.9 °F (36.6 °C) (Oral) 18 5' 8" (1.727 m) 96.6 kg (213 lb)    SpO2 BMI             1% 32.39 kg/m2         Recent Lab Values        5/19/2015 9/2/2015 12/4/2015 3/2/2016 5/3/2016 7/11/2016 11/11/2016 2/16/2017      9:36 AM  9:05 AM  9:29 AM  9:56 AM  5:22 AM 10:52 AM 11:10 AM  9:04 AM    A1C 8.3 (H) 9.0 (H) 8.9 (H) 10.7 (H) 8.1 (H) 8.7 (H) 8.0 (H) 7.8 (H)    Comment for A1C at 10:52 AM on 7/11/2016:  According to ADA guidelines, hemoglobin A1C <7.0% represents  optimal control in non-pregnant diabetic patients.  Different  metrics may apply to specific populations.   Standards of Medical Care in Diabetes - 2016.  For the purpose of screening for the presence of diabetes:  <5.7%     Consistent with the absence of diabetes  5.7-6.4%  Consistent with increasing risk for diabetes   (prediabetes)  >or=6.5%  Consistent with diabetes  Currently no consensus exists for use of hemoglobin A1C  for diagnosis of diabetes for children.      Comment for A1C at 11:10 AM on 11/11/2016:  According to ADA guidelines, hemoglobin A1C <7.0% represents  optimal control in " non-pregnant diabetic patients.  Different  metrics may apply to specific populations.   Standards of Medical Care in Diabetes - 2016.  For the purpose of screening for the presence of diabetes:  <5.7%     Consistent with the absence of diabetes  5.7-6.4%  Consistent with increasing risk for diabetes   (prediabetes)  >or=6.5%  Consistent with diabetes  Currently no consensus exists for use of hemoglobin A1C  for diagnosis of diabetes for children.      Comment for A1C at  9:04 AM on 2/16/2017:  According to ADA guidelines, hemoglobin A1C <7.0% represents  optimal control in non-pregnant diabetic patients.  Different  metrics may apply to specific populations.   Standards of Medical Care in Diabetes - 2016.  For the purpose of screening for the presence of diabetes:  <5.7%     Consistent with the absence of diabetes  5.7-6.4%  Consistent with increasing risk for diabetes   (prediabetes)  >or=6.5%  Consistent with diabetes  Currently no consensus exists for use of hemoglobin A1C  for diagnosis of diabetes for children.        Allergies as of 3/22/2017        Reactions    Heparin Analogues Other (See Comments)    thrombocytopenia    Ancef [Cefazolin]     Keflex [Cephalexin] Rash    Oxybutynin Other (See Comments)    Metallic sloan, chest symptoms       Advance Directives     An advance directive is a document which, in the event you are no longer able to make decisions for yourself, tells your healthcare team what kind of treatment you do or do not want to receive, or who you would like to make those decisions for you.  If you do not currently have an advance directive, [facility] encourages you to create one.  For more information call:  [phone]), [phone] ([phone]),  or log on to [email or web address].        Language Assistance Services     ATTENTION: Language assistance services are available, free of charge. Please call [phone].      ATENCIÓN: dameon Miller  disposición servicios gratuitos de asistencia lingüística. Jamal reinoso [phone].     [facility] Ý: N?u b?n nói Ti?ng Vi?t, có các d?ch v? h? tr? ngôn ng? mi?n phí dành cho b?n. G?i s? [phone].        Heart Failure Education       Heart Failure: Being Active  You have a condition called heart failure. Being active doesnt mean that you have to wear yourself out. Even a little movement each day helps to strengthen your heart. If you cant get out to exercise, you can do simple stretching and strengthening exercises at home. These are good ways to keep you well-conditioned and prevent you and your heart from becoming excessively weak.    Ideas to get you started  · Add a little movement to things you do now. Walk to mail letters. Park your car at the far end of the parking lot and walk to the store. Walk up a flight of stairs instead of taking the elevator.  · Choose activities you enjoy. You might walk, swim, or ride an exercise bike. Things like gardening and washing the car count, too. Other possibilities include: washing dishes, walking the dog, walking around the mall, and doing aerobic activities with friends.  · Join a group exercise program at a [facility] or [facility], a senior center, or a community center. Or look into a hospital cardiac rehabilitation program. Ask your doctor if you qualify.  Tips to keep you going  · Get up and get dressed each day. Go to a coffee shop and read a newspaper or go somewhere that you'll be in the presence of other active people. Youll feel more like being active.  · Make a plan. Choose one or more activities that you enjoy and that you can easily do. Then plan to do at least one each day. You might write your plan on a calendar.  · Go with a friend or a group if you like company. This can help you feel supported and stay motivated, too.  · Plan social events that you enjoy. This will keep you mentally engaged as well as physically motivated to do things you find pleasure in.  For  your safety  · Talk with your healthcare provider before starting an exercise program.  · Exercise indoors when its too hot or too cold outside, or when the air quality is poor. Try walking at a shopping mall.  · Wear socks and sturdy shoes to maintain your balance and prevent falls.  · Start slowly. Do a few minutes several times a day at first. Increase your time and speed little by little.  · Stop and rest whenever you feel tired or get short of breath.  · Dont push yourself on days when you dont feel well.  Date Last Reviewed: 3/20/2016  © 2677-6365 [employer]. [address] [place] [address], [place] [address]. All rights reserved. This information is not intended as a substitute for professional medical care. Always follow your healthcare professional's instructions.              Heart Failure: Evaluating Your Heart  You have a condition called heart failure. To evaluate your condition, your doctor will examine you, ask questions, and do some tests. Along with looking for signs of heart failure, the doctor looks for any other health problems that may have led to heart failure. The results of your evaluation will help your doctor form a treatment plan.  Health history and physical exam  Your visit will start with a health history. Tell the doctor about any symptoms youve noticed and about all medicines you take. Then youll have a physical exam. This includes listening to your heartbeat and breathing. Youll also be checked for swelling (edema) in your legs and neck. When you have fluid buildup or fluid in the lungs, it may be called congestive heart failure.  Diagnosing heart failure     During an echocardiogram, sound waves bounce off the heart. These are converted into a picture on the screen.   The following may be done to help your doctor form a diagnosis:  · X-rays show the size and shape of your heart. These pictures can also show fluid in your lungs.  · An electrocardiogram (ECG or EKG) shows  the pattern of your heartbeat. Small pads (electrodes) are placed on your chest, arms, and legs. Wires connect the pads to the ECG machine, which records your hearts electrical signals. This can give the doctor information about heart function.  · An echocardiogram uses ultrasound waves to show the structure and movement of your heart muscle. This shows how well the heart pumps. It also shows the thickness of the heart walls, and if the heart is enlarged. It is one of the most useful, non-invasive tests as it provides information about the heart's general function. This helps your doctor make treatment decisions.  · Lab tests evaluate small amounts of blood or urine for signs of problems. A BNP lab test can help diagnose and evaluate heart failure. BNP stands for B-type natriuretic peptide. The ventricles secrete more BNP when heart failure worsens. Lab tests can also provide information about metabolic dysfunction or heart dysfunction.  Your treatment plan  Based on the results of your evaluation and tests, your doctor will develop a treatment plan. This plan is designed to relieve some of your heart failure symptoms and help make you more comfortable. Your treatment plan may include:  · Medicine to help your heart work better and improve your quality of life  · Changes in what you eat and drink to help prevent fluid from backing up in your body  · Daily monitoring of your weight and heart failure symptoms to see how well your treatment plan is working  · Exercise to help you stay healthy  · Help with quitting smoking  · Emotional and psychological support to help adjust to the changes  · Referrals to other specialists to make sure you are being treated comprehensively  Date Last Reviewed: 3/21/2016  © 6598-3293 [employer]. [address] [place] [address], [place] [address]. All rights reserved. This information is not intended as a substitute for professional medical care. Always follow your healthcare  professional's instructions.              Heart Failure: Making Changes to Your Diet  You have a condition called heart failure. When you have heart failure, excess fluid is more likely to build up in your body because your heart isn't working well. This makes the heart work harder to pump blood. Fluid buildup causes symptoms such as shortness of breath and swelling (edema). This is often referred to as congestive heart failure or CHF. Controlling the amount of salt (sodium) you eat may help stop fluid from building up. Your doctor may also tell you to reduce the amount of fluid you drink.  Reading food labels    Your healthcare provider will tell you how much sodium you can eat each day. Read food labels to keep track. Keep in mind that certain foods are high in salt. These include canned, frozen, and processed foods. Check the amount of sodium in each serving. Watch out for high-sodium ingredients. These include MSG (monosodium glutamate), baking soda, and sodium phosphate.   Eating less salt  Give yourself time to get used to eating less salt. It may take a little while. Here are some tips to help:  · Take the saltshaker off the table. Replace it with salt-free herb mixes and spices.  · Eat fresh or plain frozen vegetables. These have much less salt than canned vegetables.  · Choose low-sodium snacks like sodium-free pretzels, crackers, or air-popped popcorn.  · Dont add salt to your food when youre cooking. Instead, season your foods with pepper, lemon, garlic, or onion.  · When you eat out, ask that your food be cooked without added salt.  · Avoid eating fried foods as these often have a great deal of salt.  If youre told to limit fluids  You may need to limit how much fluid you have to help prevent swelling. This includes anything that is liquid at room temperature, such as ice cream and soup. If your doctor tells you to limit fluid, try these tips:  · Measure drinks in a measuring cup before you drink  them. This will help you meet daily goals.  · Chill drinks to make them more refreshing.  · Suck on frozen lemon wedges to quench thirst.  · Only drink when youre thirsty.  · Chew sugarless gum or suck on hard candy to keep your mouth moist.  · Weigh yourself daily to know if your body's fluid content is rising.  My sodium goal  Your healthcare provider may give you a sodium goal to meet each day. This includes sodium found in food as well as salt that you add. My goal is to eat no more than ___________ mg of sodium per day.     When to call your doctor  Call your doctor right away if you have any symptoms of worsening heart failure. These can include:  · Sudden weight gain  · Increased swelling of your legs or ankles  · Trouble breathing when youre resting or at night  · Increase in the number of pillows you have to sleep on  · Chest pain, pressure, discomfort, or pain in the jaw, neck, or back   Date Last Reviewed: 3/21/2016  © 6165-2563 [employer]. [address] [place] [address]. All rights reserved. This information is not intended as a substitute for professional medical care. Always follow your healthcare professional's instructions.              Heart Failure: Medicines to Help Your Heart    You have a condition called heart failure (also known as congestive heart failure, or CHF). Your doctor will likely prescribe medicines for heart failure and any underlying health problems you have. Most heart failure patients take one or more types of medicinen. Your healthcare provider will work to find the combination of medicines that works best for you.  Heart failure medicines  Here are the most common heart failure medicines:  · ACE inhibitors lower blood pressure and decrease strain on the heart. This makes it easier for the heart to pump. Angiotensin receptor blockers have similar effects. These are prescribed for some patients instead of ACE inhibitors.  · Beta-blockers relieve stress  on the heart. They also improve symptoms. They may also improve the heart's pumping action over time.  · Diuretics (also called water pills) help rid your body of excess water. This can help rid your body of swelling (edema). Having less fluid to pump means your heart doesnt have to work as hard. Some diuretics make your body lose a mineral called potassium. Your doctor will tell you if you need to take supplements or eat more foods high in potassium.  · Digoxin helps your heart pump with more strength. This helps your heart pump more blood with each beat. So, more oxygen-rich blood travels to the rest of the body.  · Aldosterone antagonists help alter hormones and decrease strain on the heart.  · Hydralazine and nitrates are two separate medicines used together to treat heart failure. They may come in one combination pill. They lower blood pressure and decrease how hard the heart has to pump.  Medicines for related conditions  Controlling other heart problems helps keep heart failure under control, too. Depending on other heart problems you have, medicines may be prescribed to:  · Lower blood pressure (antihypertensives).  · Lower cholesterol levels (statins).  · Prevent blood clots (anticoagulants or aspirin).  · Keep the heartbeat steady (antiarrhythmics).  Date Last Reviewed: 3/5/2016  © 0057-2480 [employer]. [address] [place] [address], [place] [address]. All rights reserved. This information is not intended as a substitute for professional medical care. Always follow your healthcare professional's instructions.              Heart Failure: Procedures That May Help    The heart is a muscle that pumps oxygen-rich blood to all parts of the body. When you have heart failure, the heart is not able to pump as well as it should. Blood and fluid may back up into the lungs (congestive heart failure), and some parts of the body dont get enough oxygen-rich blood to work normally. These problems lead to the  symptoms of heart failure.     Certain procedures may help the heart pump better in some cases of heart failure. Some procedures are done to treat health problems that may have caused the heart failure such as coronary artery disease or heart rhythm problems. For more serious heart failure, other options are available.  Treating artery and valve problems  If you have coronary artery disease or valve disease, procedures may be done to improve blood flow. This helps the heart pump better, which can improve heart failure symptoms. First, your doctor may do a cardiac catheterization to help detect clogged blood vessels or valve damage. During this procedure, a  thin tube (catheter) in inserted into a blood vessel and guided to the heart. There a dye is injected and a special type of X-ray (angiogram) is taken of the blood vessels. Procedures to open a blocked artery or fix damaged valves can also be done using catheterization.  · Angioplasty uses a balloon-tipped instrument at the end of the catheter. The balloon is inflated to widen the narrowed artery. In many cases, a stent is expanded to further support the narrowed artery. A stent is a metal mesh tube.  · Valve surgery repairs or replacement of faulty valves can also be done during catheterization so blood can flow properly through the chambers of the heart.  Bypass surgery is another option to help treat blocked arteries. It uses a healthy blood vessel from elsewhere in the body. The healthy blood vessel is attached above and below the blocked area so that blood can flow around the blocked artery.  Treating heart rhythm problems  A device may be placed in the chest to help a weak heart maintain a healthy, heartbeat so the heart can pump more effectively:  · Pacemaker. A pacemaker is an implanted device that regulates your heartbeat electronically. It monitors your heart's rhythm and generates a painless electric impulse that helps the heart beat in a regular  rhythm. A pacemaker is programmed to meet your specific heart rhythm needs.  · Biventricular pacing/cardiac resynchronization therapy. A type of pacemaker that paces both pumping chambers of the heart at the same time to coordinate contractions and to improve the heart's function. Some people with heart failure are candidates for this therapy.  · Implantable cardioverter defibrillator. A device similar to a pacemaker that senses when the heart is beating too fast and delivers an electrical shock to convert the fast rhythm to a normal rhythm. This can be a life saving device.  In severe cases  In more serious cases of heart failure when other treatments no longer work, other options may include:  · Ventricular assist devices (VADs). These are mechanical devices used to take over the pumping function for one or both of the heart's ventricles, or pumping chambers. A VAD may be necessary when heart failure progresses to the point that medicines and other treatments no longer help. In some cases, a VAD may be used as a bridge to transplant.  · Heart transplant. This is replacing the diseased heart with a healthy one from a donor. This is an option for a few people who are very sick. A heart transplant is very serious and not an option for all patients. Your doctor can tell you more.  Date Last Reviewed: 3/20/2016  © 0054-5954 [employer]. [address] [place] [address], [place] [address]. All rights reserved. This information is not intended as a substitute for professional medical care. Always follow your healthcare professional's instructions.              Heart Failure: Tracking Your Weight  You have a condition called heart failure. When you have heart failure, a sudden weight gain or a steady rise in weight is a warning sign that your body is retaining too much water and salt. This could mean your heart failure is getting worse. If left untreated, it can cause problems for your lungs and result in shortness of  breath. Weighing yourself each day is the best way to know if youre retaining water. If your weight goes up quickly, call your doctor. You will be given instructions on how to get rid of the excess water. You will likely need medicines and to avoid salt. This will help your heart work better.  Call your doctor if you gain more than 2 pounds in 1 day, more than 5 pounds in 1 week, or whatever weight gain you were told to report by your doctor. This is often a sign of worsening heart failure and needs to be evaluated and treated. Your doctor will tell you what to do next.   Tips for weighing yourself    · Weigh yourself at the same time each morning, wearing the same clothes. Weigh yourself after urinating and before eating.  · Use the same scale each day. Make sure the numbers are easy to read. Put the scale on a flat, hard surface -- not on a rug or carpet.  · Do not stop weighing yourself. If you forget one day, weigh again the next morning.  How to use your weight chart  · Keep your weight chart near the scale. Write your weight on the chart as soon as you get off the scale.  · Fill in the month and the start date on the chart. Then write down your weight each day. Your chart will look like this:    · If you miss a day, leave the space blank. Weigh yourself the next day and write your weight in the next space.  · Take your weight chart with you when you go to see your doctor.  Date Last Reviewed: 3/20/2016  © 1189-9779 [employer]. [address] [place] [address], [place] [address]. All rights reserved. This information is not intended as a substitute for professional medical care. Always follow your healthcare professional's instructions.              Heart Failure: Warning Signs of a Flare-Up  You have a condition called heart failure. Once you have heart failure, flare-ups can happen. Below are signs that can mean your heart failure is getting worse. If you notice any of these warning signs, call your  healthcare provider.  Swelling    · Your feet, ankles, or lower legs get puffier.  · You notice skin changes on your lower legs.  · Your shoes feel too tight.  · Your clothes are tighter in the waist.  · You have trouble getting rings on or off your fingers.  Shortness of breath  · You have to breathe harder even when youre doing your normal activities or when youre resting.  · You are short of breath walking up stairs or even short distances.  · You wake up at night short of breath or coughing.  · You need to use more pillows or sit up to sleep.  · You wake up tired or restless.  Other warning signs  · You feel weaker, dizzy, or more tired.  · You have chest pain or changes in your heartbeat.  · You have a cough that wont go away.  · You cant remember things or dont feel like eating.  Tracking your weight  Gaining weight is often the first warning sign that heart failure is getting worse. Gaining even a few pounds can be a sign that your body is retaining excess water and salt. Weighing yourself each day in the morning after you urinate and before you eat, is the best way to know if you're retaining water. Get a scale that is easy to read and make sure you wear the same clothes and use the same scale every time you weigh. Your healthcare provider will show you how to track your weight. Call your doctor if you gain more than 2 pounds in 1 day, 5 pounds in 1 week, or whatever weight gain you were told to report by your doctor. This is often a sign of worsening heart failure and needs to be evaluated and treated before it compromises your breathing. Your doctor will tell you what to do next.    Date Last Reviewed: 3/15/2016  © 0523-6150 [employer]. [address] [place] [address], [place] [address]. All rights reserved. This information is not intended as a substitute for professional medical care. Always follow your healthcare professional's instructions.              Chronic Kindey Disease Education              Diabetes Discharge Instructions                                    [facility] complies with applicable Federal civil rights laws and does not discriminate on the basis of race, color, national origin, age, disability, or sex.

## 2017-03-20 NOTE — SIGNIFICANT EVENT
Responded to code STEMI.  Reviewed ekg at bedside and subsequently with Dr. Clifford.  Code STEMI canceled.

## 2017-03-20 NOTE — ED TRIAGE NOTES
Patient presents to ER with chest pain that began earlier today when she was sitting down. Patient states pain radiates to right breast. Patient also states she has left ear pain and denies dizziness.

## 2017-03-20 NOTE — TELEPHONE ENCOUNTER
Pt was in radiology getting an xray done when she suddenly began having chest pains, SOB, and fatigue. Pt was transported to ED via security vehicle and nurse. Xrays were not completed.

## 2017-03-20 NOTE — SUBJECTIVE & OBJECTIVE
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    Hx of colonic polyps     1/9    Hx of colonic polyps     Hyperlipidemia     Hypertension     Myocardial infarction nov 2006    Posterior vitreous detachment of both eyes     Sleep apnea     Type 2 diabetes mellitus with mild nonproliferative diabetic retinopathy with macular edema 2/15/2013       Past Surgical History:   Procedure Laterality Date    APPENDECTOMY      CATARACT EXTRACTION      Right eye    heart stent      X 2    HYSTERECTOMY      Fibroids       Review of patient's allergies indicates:   Allergen Reactions    Heparin analogues Other (See Comments)     thrombocytopenia    Ancef [cefazolin]     Keflex [cephalexin] Rash    Oxybutynin Other (See Comments)     Metallic sloan, chest symptoms        No current facility-administered medications on file prior to encounter.      Current Outpatient Prescriptions on File Prior to Encounter   Medication Sig    aspirin 81 MG Chew Take 81 mg by mouth once daily.      mirabegron 50 mg Tb24 Take 1 tablet (50 mg total) by mouth once daily.    MULTIVIT-MIN/IRON/FOLIC/LUTEIN (CENTRUM SILVER WOMEN ORAL) Take by mouth.    BD ULTRA-FINE TIM PEN NEEDLES 32 gauge x 5/32" Ndle USE FOUR TIMES DAILY FOR BLOOD SUGAR TESTING    blood sugar diagnostic (ACCU-CHEK ACTIVE TEST) Strp USE TO TEST BLOOD SUGAR FOUR TIMES DAILY    blood-glucose meter (ACCU-CHEK MELIDA PLUS METER) Misc To check sugars 4 times daily.  Please include " control solution    Ca cmb no.[id number]-[id number] (VITAMIN D-3 WITH ALOE) 120-1,000-10 mg-unit-mg Tab Take by mouth.    carvedilol (COREG) 12.5 MG tablet Take 0.5 tablets (6.25 mg total) by mouth 2 (two) times daily.    fluticasone (FLONASE) 50 mcg/actuation nasal spray 2 SPRAYS IN EACH NOSTRIL ONCE DAILY    furosemide (LASIX) 40 MG tablet TAKE 1 TABLET(40 MG) BY MOUTH EVERY DAY    insulin glargine (LANTUS SOLOSTAR) 100 unit/mL (3 mL) InPn pen Inject 42 units at bedtime    insulin lispro (HUMALOG KWIKPEN) 100 unit/mL InPn pen Inject 15 units with breakfast 17 units with lunch and dinner plus correction scale 150-200 +1, 201-250 +2, 251-300 +3, 301-350 +4, > 350 +5.    ketoconazole (NIZORAL) 2 % shampoo APPLY TO AFFECTED AREA EVERY OTHER DAY AND LET SIT 5 MINUTES PRIOR TO RINSING AS DIRECTED    lancets (ACCU-CHEK SOFTCLIX LANCETS) Misc 1 each by Misc.(Non-Drug; Combo Route) route 4 (four) times daily.    lisinopril (PRINIVIL,ZESTRIL) 40 MG tablet TAKE 1 TABLET(40 MG) BY MOUTH EVERY DAY    pantoprazole (PROTONIX) 40 MG tablet Take 1 tablet (40 mg total) by mouth once daily.    rosuvastatin (CRESTOR) 5 MG tablet TK 1 T PO QD     Family History     Problem Relation (Age of Onset)    Cancer Maternal Grandfather, Paternal Grandfather    Diabetes Mother, Father    Heart disease Mother    Hypertension Mother    Kidney failure Father    Melanoma Father    Mental illness Sister        Social History Main Topics    Smoking status: Never Smoker    Smokeless tobacco: Never Used    Alcohol use No    Drug use: No    Sexual activity: Not Currently     Review of Systems   Constitutional: Negative.    Eyes: Negative.    Respiratory: Negative.    Endocrine: Negative.    Genitourinary: Negative.    Allergic/Immunologic: Negative.    Neurological: Negative.    Hematological: Negative.    Psychiatric/Behavioral: Negative.      Objective:     Vital Signs (Most Recent):  Temp: 97.7 °F (36.5 °C) (03/20/17 1639)  Pulse: (!)  57 (03/20/17 1639)  Resp: 16 (03/20/17 1639)  BP: (!) 163/74 (03/20/17 1709)  SpO2: 100 % (03/20/17 1639) Vital Signs (24h Range):  Temp:  [97.7 °F (36.5 °C)-98.3 °F (36.8 °C)] 97.7 °F (36.5 °C)  Pulse:  [52-72] 57  Resp:  [16-18] 16  SpO2:  [98 %-100 %] 100 %  BP: (124-200)/(62-78) 163/74     Weight: 96.6 kg (213 lb)  Body mass index is 32.39 kg/(m^2).    Physical Exam   Constitutional: She is oriented to person, place, and time. She appears well-developed and well-nourished.   HENT:   Head: Normocephalic.   Eyes: EOM are normal. Pupils are equal, round, and reactive to light.   Neck: Normal range of motion. Neck supple.   Cardiovascular: Normal rate and regular rhythm.    Pulmonary/Chest: Effort normal and breath sounds normal.   Neurological: She is alert and oriented to person, place, and time.   Skin: Skin is warm.

## 2017-03-20 NOTE — ED PROVIDER NOTES
"Encounter Date: 3/20/2017    SCRIBE #1 NOTE: I, Sharron Gautam, am scribing for, and in the presence of, Dr. Friend.       History     Chief Complaint   Patient presents with    Chest Pain     has stents , sent from radiology at  clinic     Review of patient's allergies indicates:   Allergen Reactions    Heparin analogues Other (See Comments)     thrombocytopenia    Ancef [cefazolin]     Keflex [cephalexin] Rash    Oxybutynin Other (See Comments)     Metallic sloan, chest symptoms      HPI Comments: Time seen by provider: 1:40 PM    This is a 76 y.o. female who presents with complaint of mild chest pain under her right breast which began prior to arrival.  She developed this discomfort while she was sitting down waiting for an x-ray.  She also indicates associated fatigue and generalized weakness but denies associated dizziness.  Denies shortness of breath, nausea, diaphoresis, palpitations.  She also complains of left ear pain which felt like she "had cotton in her ears."    The patient reports past MI and that she has had 2 stents placed. She was recommended to come to the ER by the X-ray technician/nurse.      The history is provided by the patient.     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    Hx of colonic polyps     1/9    Hx of colonic polyps     Hyperlipidemia     Hypertension     Myocardial infarction nov 2006    Posterior vitreous detachment of both eyes     Sleep apnea     Type 2 diabetes mellitus " with mild nonproliferative diabetic retinopathy with macular edema 2/15/2013     Past Surgical History:   Procedure Laterality Date    APPENDECTOMY      CATARACT EXTRACTION      Right eye    heart stent      X 2    HYSTERECTOMY      Fibroids     Family History   Problem Relation Age of Onset    Diabetes Mother     Heart disease Mother     Hypertension Mother     Diabetes Father     Melanoma Father     Kidney failure Father     Mental illness Sister      suicide/schizophrenic    Cancer Maternal Grandfather     Cancer Paternal Grandfather      colon    Psoriasis Neg Hx     Lupus Neg Hx     Eczema Neg Hx      Social History   Substance Use Topics    Smoking status: Never Smoker    Smokeless tobacco: Never Used    Alcohol use No     Review of Systems   Constitutional: Positive for fatigue.   HENT: Positive for ear pain (Left ear.).    Eyes: Negative for visual disturbance.   Respiratory: Negative for shortness of breath.    Cardiovascular: Positive for chest pain (Under right breast.).   Gastrointestinal: Negative for abdominal pain.   Genitourinary: Negative for dysuria.   Musculoskeletal: Negative for back pain.   Skin: Negative for pallor.   Neurological: Positive for weakness (Generalized weakness.). Negative for dizziness.       Physical Exam   Initial Vitals   BP Pulse Resp Temp SpO2   03/20/17 1324 03/20/17 1324 03/20/17 1324 03/20/17 1324 03/20/17 1324   150/70 56 18 98.3 °F (36.8 °C) 98 %     Physical Exam    Nursing note and vitals reviewed.  Constitutional: She appears well-developed and well-nourished. No distress.   HENT:   Head: Normocephalic and atraumatic.   Mouth/Throat: Oropharynx is clear and moist.   Air fluid level in left TM.  Some mild obscuration by cerumen on the right side but otherwise right TM appears normal.   Eyes: Conjunctivae and EOM are normal. Pupils are equal, round, and reactive to light.   Neck: Normal range of motion. Neck supple.   Cardiovascular: Normal rate,  regular rhythm, normal heart sounds and intact distal pulses.   Pulmonary/Chest: Breath sounds normal. No respiratory distress. She has no wheezes. She has no rales. She exhibits no tenderness (No reproducible chest wall tendenress.).   Abdominal: Soft. Bowel sounds are normal. There is no tenderness.   The patient has obese abdomen.   Musculoskeletal: Normal range of motion. She exhibits edema (Left>right LE edema). She exhibits no tenderness.   Neurological: She is alert and oriented to person, place, and time.   Skin: Skin is warm and dry.   Psychiatric: She has a normal mood and affect.         ED Course   Procedures  Labs Reviewed   CBC W/ AUTO DIFFERENTIAL - Abnormal; Notable for the following:        Result Value    Hemoglobin 11.9 (*)     Hematocrit 36.6 (*)     All other components within normal limits    Narrative:     PLEASE REVIEW ORDER START TIME BEFORE MARKING SPECIMEN  COLLECTED.   COMPREHENSIVE METABOLIC PANEL - Abnormal; Notable for the following:     CO2 21 (*)     BUN, Bld 49 (*)     Creatinine 2.0 (*)     eGFR if  27.2 (*)     eGFR if non  23.6 (*)     All other components within normal limits    Narrative:     PLEASE REVIEW ORDER START TIME BEFORE MARKING SPECIMEN  COLLECTED.   B-TYPE NATRIURETIC PEPTIDE - Abnormal; Notable for the following:      (*)     All other components within normal limits    Narrative:     PLEASE REVIEW ORDER START TIME BEFORE MARKING SPECIMEN  COLLECTED.   PROTIME-INR    Narrative:     PLEASE REVIEW ORDER START TIME BEFORE MARKING SPECIMEN  COLLECTED.   TROPONIN I    Narrative:     PLEASE REVIEW ORDER START TIME BEFORE MARKING SPECIMEN  COLLECTED.          X-Rays:   Independently Interpreted Readings:   Chest X-Ray: Normal heart size.  No infiltrates.  No acute abnormalities.     Medical Decision Making:   History:   Old Medical Records: I decided to obtain old medical records.  Initial Assessment:   This is an emergent evaluation  of a 76 y.o. female patient with presentation of chest pain. After my evaluation and workup, I believe this patient has significant chest pain, based upon history, physical exam and workup with risk stratification. Patient initial troponin was negative, EKG shows Q-waves without ST or T-wave elevation.  Negative, however, the patient has persistent elevated BP which was treated with IV Metoprolol. BP subsequently improved.  I do not believe the patient has a STEMI at this time.  Aspirin 325mg PO given in the ED.  At this time I believe the patient should be admitted for further evaluation and management.  Patient understands and agrees with plan.    3:45 PM - Case discussed with HM, who agrees and will place the patient under their service. Pt will be admitted to Dr. Esquivel.  Independently Interpreted Test(s):   I have ordered and independently interpreted X-rays - see prior notes.  Clinical Tests:   Lab Tests: Ordered and Reviewed  Radiological Study: Ordered and Reviewed  Medical Tests: Ordered and Reviewed            Scribe Attestation:   Scribe #1: I performed the above scribed service and the documentation accurately describes the services I performed. I attest to the accuracy of the note.    Attending Attestation:           Physician Attestation for Scribe:  Physician Attestation Statement for Scribe #1: I, Dr. Friend, reviewed documentation, as scribed by Sharron Gautam in my presence, and it is both accurate and complete.                 ED Course     Clinical Impression:   The primary encounter diagnosis was Hypertensive emergency. A diagnosis of Chest pain, unspecified type was also pertinent to this visit.    Disposition:   Disposition: Placed in Observation  Condition: Stable       Jose Friend MD  03/20/17 2962

## 2017-03-20 NOTE — PROGRESS NOTES
"Subjective:       Patient ID: Radha Degroot is a 76 y.o. female.    Chief Complaint: Otalgia  76 year old presents with left ear popping and hearing decreased for the last few weeks. She has had some rhinitis and congestion  She denies cp or sob. 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   She has had some stress with changes of her insurance and recent medications changes but in general bs has been better overall and she has been following with endcrinology.  She has been following with urology and additional imaging planned for renal abnormality. She reprots using cpap and tolerating it but wonders if mask is affecting her   Ear popping and hearing. She is not in the need of refills today     HPI  Review of Systems   HENT:        Left ear popping sensation  Hearing loss   Ringing    Respiratory: Negative for chest tightness and shortness of breath.        Objective:     Blood pressure 124/62, pulse 72, height 5' 8" (1.727 m), weight 97.9 kg (215 lb 13.3 oz).    Physical Exam   Constitutional: No distress.   HENT:   Head: Normocephalic.   Mouth/Throat: Oropharynx is clear and moist.   Mild rhinitis  Tm   right mild cerumen  Left clear     Hearing diminished on gross exam left    Eyes: No scleral icterus.   Neck: Neck supple.   Cardiovascular: Normal rate, regular rhythm and normal heart sounds.    Pulmonary/Chest: Effort normal and breath sounds normal. No respiratory distress.   Abdominal: Soft. Bowel sounds are normal.   Neurological: She is alert. No cranial nerve deficit.   Vitals reviewed.      Assessment:       1. Hearing loss of left ear, unspecified hearing loss type    2. Eustachian tube dysfunction, unspecified laterality    3. Other screening mammogram        Plan:       Radha was seen today for otalgia.    Diagnoses and all orders for this visit:    Hearing loss of left ear, unspecified hearing loss type  Eustachian tube " dysfunction, unspecified laterality  Continue saline nasal spray for congestion  She will follow up with sleep clinic as planned   She has started using cpap regularly and relates symtpoms to it at times   -     Ambulatory consult to ENT    Other screening mammogram  She will schedule her annual when due   -     Mammo Digital Screening Bilat with CAD; Future

## 2017-03-20 NOTE — MR AVS SNAPSHOT
[facility]  [address]  [facility] [address]  Phone: [phone]  Fax: [phone]                  Radha Degroot   3/20/2017 11:00 AM   Office Visit    Description:  Female : 1940   Provider:  Katherine Merida MD   Department:  [facility]           Reason for Visit     Otalgia           Diagnoses this Visit        Comments    Hearing loss of left ear, unspecified hearing loss type    -  Primary     Eustachian tube dysfunction, unspecified laterality         Other screening mammogram                To Do List           Future Appointments        Provider Department Dept Phone    3/20/2017 1:30 PM Alisha Hilario PA-C [facility] [phone]    4/3/2017 11:30 AM DIABETES EDUCATOR, INT MED 2 [facility] [phone]    2017 10:30 AM [facility] CT1 LIMIT 400 LBS [facility] [phone]    2017 11:20 AM Iveth Greene MD [facility] - Urology [phone]    2017 10:30 AM Terri Mcmanus MD [facility] Nephrology [phone]      Goals (5 Years of Data)     None      [facility]     [facility] -  Assistance  Registered nurses in the [facility] provide clinical advisement, health education, appointment booking, and other advisory services.  Call for this free service at [phone].             Medications           Message regarding Medications     Verify the changes and/or additions to your medication regime listed below are the same as discussed with your clinician today.  If any of these changes or additions are incorrect, please notify your healthcare provider.             Verify that the below list of medications is an accurate representation of the medications you are currently taking.  If none reported, the list may be blank. If incorrect, please contact your healthcare provider. Carry this list with you in case of emergency.          "  Current Medications     aspirin 81 MG Chew Take 81 mg by mouth once daily.      BD ULTRA-FINE TIM PEN NEEDLES 32 gauge x 5/32" Ndle USE FOUR TIMES DAILY FOR BLOOD SUGAR TESTING    blood sugar diagnostic (ACCU-CHEK ACTIVE TEST) Strp USE TO TEST BLOOD SUGAR FOUR TIMES DAILY    blood-glucose meter (ACCU-CHEK MELIDA PLUS METER) Misc To check sugars 4 times daily.  Please include control solution    Ca cmb no.8-B2-A-3-SL-E28-aloe (VITAMIN D-3 WITH ALOE) 120-1,000-10 mg-unit-mg Tab Take by mouth.    carvedilol (COREG) 12.5 MG tablet Take 0.5 tablets (6.25 mg total) by mouth 2 (two) times daily.    fluticasone (FLONASE) 50 mcg/actuation nasal spray 2 SPRAYS IN EACH NOSTRIL ONCE DAILY    furosemide (LASIX) 40 MG tablet TAKE 1 TABLET(40 MG) BY MOUTH EVERY DAY    insulin glargine (LANTUS SOLOSTAR) 100 unit/mL (3 mL) InPn pen Inject 42 units at bedtime    insulin lispro (HUMALOG KWIKPEN) 100 unit/mL InPn pen Inject 15 units with breakfast 17 units with lunch and dinner plus correction scale 150-200 +1, 201-250 +2, 251-300 +3, 301-350 +4, > 350 +5.    ketoconazole (NIZORAL) 2 % shampoo APPLY TO AFFECTED AREA EVERY OTHER DAY AND LET SIT 5 MINUTES PRIOR TO RINSING AS DIRECTED    lancets (ACCU-CHEK SOFTCLIX LANCETS) Misc 1 each by Misc.(Non-Drug; Combo Route) route 4 (four) times daily.    lisinopril (PRINIVIL,ZESTRIL) 40 MG tablet TAKE 1 TABLET(40 MG) BY MOUTH EVERY DAY    mirabegron 50 mg Tb24 Take 1 tablet (50 mg total) by mouth once daily.    MULTIVIT-MIN/IRON/FOLIC/LUTEIN (CENTRUM SILVER WOMEN ORAL) Take by mouth.    pantoprazole (PROTONIX) 40 MG tablet Take 1 tablet (40 mg total) by mouth once daily.    rosuvastatin (CRESTOR) 5 MG tablet TK 1 T PO QD           Clinical Reference Information           Your Vitals Were     BP Pulse Height Weight BMI    124/62 72 5' 8" (1.727 m) 97.9 kg (215 lb 13.3 oz) 32.82 kg/m2      Blood Pressure          Most Recent Value    BP  124/62      Allergies as of 3/20/2017     Heparin " Analogues    Ancef [Cefazolin]    Keflex [Cephalexin]    Oxybutynin      Immunizations Administered on Date of Encounter - 3/20/2017     None      Orders Placed During Today's Visit      Normal Orders This Visit    Ambulatory consult to ENT     Future Labs/Procedures Expected by Expires    Mammo Digital Screening Bilat with CAD  4/17/2017 5/20/2018      Language Assistance Services     ATTENTION: Language assistance services are available, free of charge. Please call [phone].      ATENCIÓN: Si habla español, tiene a san disposición servicios gratuitos de asistencia lingüística. Llame al [phone].     CHÚ Ý: N?u b?n nói Ti?ng Vi?t, có các d?ch v? h? tr? ngôn ng? mi?n phí dành cho b?n. G?i s? [phone].         [facility] complies with applicable Federal civil rights laws and does not discriminate on the basis of race, color, national origin, age, disability, or sex.

## 2017-03-21 ENCOUNTER — DOCUMENTATION ONLY (OUTPATIENT)
Dept: CARDIOLOGY | Facility: CLINIC | Age: 77
End: 2017-03-21

## 2017-03-21 LAB
DIASTOLIC DYSFUNCTION: YES
ESTIMATED PA SYSTOLIC PRESSURE: 28
POCT GLUCOSE: 141 MG/DL (ref 70–110)
POCT GLUCOSE: 158 MG/DL (ref 70–110)
POCT GLUCOSE: 178 MG/DL (ref 70–110)
POCT GLUCOSE: 226 MG/DL (ref 70–110)
POCT GLUCOSE: 257 MG/DL (ref 70–110)
RETIRED EF AND QEF - SEE NOTES: 55 (ref 55–65)
TRICUSPID VALVE REGURGITATION: ABNORMAL
TROPONIN I SERPL DL<=0.01 NG/ML-MCNC: 0.01 NG/ML
TROPONIN I SERPL DL<=0.01 NG/ML-MCNC: 0.02 NG/ML
TROPONIN I SERPL DL<=0.01 NG/ML-MCNC: <0.006 NG/ML

## 2017-03-21 PROCEDURE — 36415 COLL VENOUS BLD VENIPUNCTURE: CPT

## 2017-03-21 PROCEDURE — 93005 ELECTROCARDIOGRAM TRACING: CPT | Mod: 59

## 2017-03-21 PROCEDURE — 63600175 PHARM REV CODE 636 W HCPCS: Performed by: INTERNAL MEDICINE

## 2017-03-21 PROCEDURE — G0378 HOSPITAL OBSERVATION PER HR: HCPCS

## 2017-03-21 PROCEDURE — 25000003 PHARM REV CODE 250: Performed by: EMERGENCY MEDICINE

## 2017-03-21 PROCEDURE — 25000003 PHARM REV CODE 250: Performed by: HOSPITALIST

## 2017-03-21 PROCEDURE — 25000003 PHARM REV CODE 250: Performed by: INTERNAL MEDICINE

## 2017-03-21 PROCEDURE — 84484 ASSAY OF TROPONIN QUANT: CPT | Mod: 91

## 2017-03-21 PROCEDURE — 25000003 PHARM REV CODE 250: Performed by: PHYSICIAN ASSISTANT

## 2017-03-21 PROCEDURE — 93325 DOPPLER ECHO COLOR FLOW MAPG: CPT | Mod: 26,,, | Performed by: INTERNAL MEDICINE

## 2017-03-21 PROCEDURE — 93010 ELECTROCARDIOGRAM REPORT: CPT | Mod: ,,, | Performed by: INTERNAL MEDICINE

## 2017-03-21 PROCEDURE — 93351 STRESS TTE COMPLETE: CPT | Mod: 26,,, | Performed by: INTERNAL MEDICINE

## 2017-03-21 PROCEDURE — 84484 ASSAY OF TROPONIN QUANT: CPT

## 2017-03-21 PROCEDURE — 93320 DOPPLER ECHO COMPLETE: CPT | Mod: 26,,, | Performed by: INTERNAL MEDICINE

## 2017-03-21 PROCEDURE — 99225 PR SUBSEQUENT OBSERVATION CARE,LEVEL II: CPT | Mod: ,,, | Performed by: HOSPITALIST

## 2017-03-21 RX ORDER — DEXTROSE 4 G
TABLET,CHEWABLE ORAL
Qty: 1 EACH | Refills: 0 | Status: SHIPPED | OUTPATIENT
Start: 2017-03-21 | End: 2022-03-31

## 2017-03-21 RX ORDER — FLUTICASONE PROPIONATE 50 MCG
2 SPRAY, SUSPENSION (ML) NASAL DAILY
Status: DISCONTINUED | OUTPATIENT
Start: 2017-03-21 | End: 2017-03-21

## 2017-03-21 RX ORDER — LANCETS
1 EACH MISCELLANEOUS 4 TIMES DAILY
Qty: 360 EACH | Refills: 3 | Status: SHIPPED | OUTPATIENT
Start: 2017-03-21 | End: 2018-10-08

## 2017-03-21 RX ORDER — CARVEDILOL 6.25 MG/1
12.5 TABLET ORAL 2 TIMES DAILY
Status: DISCONTINUED | OUTPATIENT
Start: 2017-03-21 | End: 2017-03-22 | Stop reason: HOSPADM

## 2017-03-21 RX ORDER — CETIRIZINE HYDROCHLORIDE 5 MG/1
10 TABLET ORAL DAILY
Status: DISCONTINUED | OUTPATIENT
Start: 2017-03-21 | End: 2017-03-22 | Stop reason: HOSPADM

## 2017-03-21 RX ORDER — FLUTICASONE PROPIONATE 50 MCG
2 SPRAY, SUSPENSION (ML) NASAL DAILY
Status: DISCONTINUED | OUTPATIENT
Start: 2017-03-21 | End: 2017-03-22 | Stop reason: HOSPADM

## 2017-03-21 RX ADMIN — LISINOPRIL 40 MG: 20 TABLET ORAL at 08:03

## 2017-03-21 RX ADMIN — ASPIRIN 81 MG CHEWABLE TABLET 81 MG: 81 TABLET CHEWABLE at 08:03

## 2017-03-21 RX ADMIN — FUROSEMIDE 40 MG: 40 TABLET ORAL at 08:03

## 2017-03-21 RX ADMIN — SODIUM CHLORIDE: 0.9 INJECTION, SOLUTION INTRAVENOUS at 05:03

## 2017-03-21 RX ADMIN — INSULIN DETEMIR 30 UNITS: 100 INJECTION, SOLUTION SUBCUTANEOUS at 08:03

## 2017-03-21 RX ADMIN — INSULIN ASPART 2 UNITS: 100 INJECTION, SOLUTION INTRAVENOUS; SUBCUTANEOUS at 12:03

## 2017-03-21 RX ADMIN — STANDARDIZED SENNA CONCENTRATE AND DOCUSATE SODIUM 1 TABLET: 8.6; 5 TABLET, FILM COATED ORAL at 08:03

## 2017-03-21 RX ADMIN — SODIUM CHLORIDE: 0.9 INJECTION, SOLUTION INTRAVENOUS at 08:03

## 2017-03-21 RX ADMIN — CARVEDILOL 12.5 MG: 6.25 TABLET, FILM COATED ORAL at 08:03

## 2017-03-21 RX ADMIN — INSULIN ASPART 15 UNITS: 100 INJECTION, SOLUTION INTRAVENOUS; SUBCUTANEOUS at 05:03

## 2017-03-21 RX ADMIN — INSULIN ASPART 3 UNITS: 100 INJECTION, SOLUTION INTRAVENOUS; SUBCUTANEOUS at 08:03

## 2017-03-21 RX ADMIN — FLUTICASONE PROPIONATE 2 SPRAY: 50 SPRAY, METERED NASAL at 06:03

## 2017-03-21 RX ADMIN — CETIRIZINE HYDROCHLORIDE 10 MG: 5 TABLET, FILM COATED ORAL at 01:03

## 2017-03-21 RX ADMIN — PANTOPRAZOLE SODIUM 40 MG: 40 TABLET, DELAYED RELEASE ORAL at 08:03

## 2017-03-21 RX ADMIN — ROSUVASTATIN CALCIUM 5 MG: 5 TABLET ORAL at 08:03

## 2017-03-21 RX ADMIN — HYDRALAZINE HYDROCHLORIDE 25 MG: 25 TABLET, FILM COATED ORAL at 08:03

## 2017-03-21 NOTE — PROGRESS NOTES
Patient admitted to CSU. Patient arrived to the floor from the ED via stretcher per escort. VSS and NAND. No complaints at this time. Patient oriented to room and unit. Plan of care initiated. Bed in lowest lock position. Side rails up X 2. Call bed in reach. Will Continue to monitor patient.

## 2017-03-21 NOTE — PROGRESS NOTES
Patient verified by 2 identifiers and allergies reviewed.    Denies previous reactions to blood transfusions, and no known holes in heart.  18g IV's in place to Rt Hand & Rt AC.  DSE & Echo Contrast explained to patient, consents obtained & testing completed.  IV flushed w/ 10cc NS pre & post contrast administration.  3cc Optison administered.  Pt tolerated testing well. Post study discharge instructions reviewed with patient, patient verbalized understanding.  Patient transferred back to room via stretcher accompanied by family & transport.

## 2017-03-21 NOTE — PLAN OF CARE
03/21/17 1046   Discharge Assessment   Assessment Type Discharge Planning Assessment   Confirmed/corrected address and phone number on facesheet? Yes   Assessment information obtained from? Patient   Expected Length of Stay (days) 2   Communicated expected length of stay with patient/caregiver yes   Prior to hospitilization cognitive status: Alert/Oriented   Prior to hospitalization functional status: Independent   Current cognitive status: Alert/Oriented   Current Functional Status: Independent   Arrived From home or self-care   Lives With alone   Able to Return to Prior Arrangements yes   Is patient able to care for self after discharge? Yes   Patient currently being followed by outpatient case management? No   Does the patient currently use HME? No   Patient currently receives private duty nursing? N/A   Equipment Currently Used at Home none   Do you have any problems affording any of your prescribed medications? No   Is the patient taking medications as prescribed? no   Do you have any financial concerns preventing you from receiving the healthcare you need? No   Does the patient have transportation to healthcare appointments? No   On Dialysis? No   Does the patient receive services at the Coumadin Clinic? No   Are there any open cases? No   Discharge Plan A Home   Discharge Plan B Home Health   Patient/Family In Agreement With Plan yes   Met with patient. No anticipated d/c needs. Family member will provide ride home.

## 2017-03-21 NOTE — PLAN OF CARE
Problem: Patient Care Overview  Goal: Plan of Care Review  Outcome: Ongoing (interventions implemented as appropriate)  Pt free of falls/trauma/injuries this shift. Normal saline infusing at 125cc/hr per MD orders. Patient tolerating well. Blood pressure slightly elevated throughout shift, Hydralazine 25mg P.O. Given PRN per MD orders. Pt schedule for  stress test today. Pt complains of no chest pain, SOB, or dizziness. Pt has complained of ringing in the ears, notified primary team. Plan of care reviewed with pt at bedside, will continue to monitor.

## 2017-03-21 NOTE — CONSULTS
Electrophysiology Consult Note  Attending Physician: Bo Esquivel MD  Reason for Consult: Chest pain     HPI:   Ms. Degroot is a 77 yo F with a history of CAD s/p PCI to diagonal and LAD in 2006 (PET 2012: D2 area fixed defect), HTN, diastolic dysfunction, HLD, DELFINA, CKD stage IV,DM and previous hospitalization for ADHF.She presented with complaint of mild chest pain under her right breast which began prior to arrival. She developed this discomfort while she was sitting down waiting for an x-ray. She also indicates associated fatigue and generalized weakness STEMI was activated in ED and canceled by ED fellow after discussing with Dr Clifford.     ROS:    Constitution: Negative for fever, chills, weight loss or gain.   HENT: Negative for sore throat, rhinorrhea, or headache.  Eyes: Negative for blurred or double vision.   Cardiovascular: See above  Pulmonary: Positive for SOB   Gastrointestinal: Negative for abdominal pain, nausea, vomiting, or diarrhea.   : Negative for dysuria.   Neurological: Negative for focal weakness or sensory changes.  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    Hx of colonic polyps     1/9    Hx of colonic polyps     Hyperlipidemia     Hypertension     Myocardial infarction nov 2006    Posterior vitreous detachment of both eyes     Sleep apnea     Type 2 diabetes mellitus with mild nonproliferative diabetic retinopathy with macular edema 2/15/2013     Past Surgical  "History:   Procedure Laterality Date    APPENDECTOMY      CATARACT EXTRACTION      Right eye    heart stent      X 2    HYSTERECTOMY      Fibroids     Allergies:     Review of patient's allergies indicates:   Allergen Reactions    Heparin analogues Other (See Comments)     thrombocytopenia    Ancef [cefazolin]     Keflex [cephalexin] Rash    Oxybutynin Other (See Comments)     Metallic sloan, chest symptoms      Medications:     No current facility-administered medications on file prior to encounter.      Current Outpatient Prescriptions on File Prior to Encounter   Medication Sig Dispense Refill    aspirin 81 MG Chew Take 81 mg by mouth once daily.        mirabegron 50 mg Tb24 Take 1 tablet (50 mg total) by mouth once daily. 30 tablet 11    MULTIVIT-MIN/IRON/FOLIC/LUTEIN (CENTRUM SILVER WOMEN ORAL) Take by mouth.      BD ULTRA-FINE TIM PEN NEEDLES 32 gauge x 5/32" Ndle USE FOUR TIMES DAILY FOR BLOOD SUGAR TESTING 200 each 8    blood sugar diagnostic (ACCU-CHEK ACTIVE TEST) Strp USE TO TEST BLOOD SUGAR FOUR TIMES DAILY 360 strip 3    blood-glucose meter (ACCU-CHEK MELIDA PLUS METER) Misc To check sugars 4 times daily.  Please include control solution 1 each 0    Ca cmb no.9-T6-S-6-XS-N71-aloe (VITAMIN D-3 WITH ALOE) 120-1,000-10 mg-unit-mg Tab Take by mouth.      carvedilol (COREG) 12.5 MG tablet Take 0.5 tablets (6.25 mg total) by mouth 2 (two) times daily. 60 tablet 11    fluticasone (FLONASE) 50 mcg/actuation nasal spray 2 SPRAYS IN EACH NOSTRIL ONCE DAILY 16 g 6    furosemide (LASIX) 40 MG tablet TAKE 1 TABLET(40 MG) BY MOUTH EVERY DAY 30 tablet 6    insulin glargine (LANTUS SOLOSTAR) 100 unit/mL (3 mL) InPn pen Inject 42 units at bedtime 30 mL 6    insulin lispro (HUMALOG KWIKPEN) 100 unit/mL InPn pen Inject 15 units with breakfast 17 units with lunch and dinner plus correction scale 150-200 +1, 201-250 +2, 251-300 +3, 301-350 +4, > 350 +5. 45 mL 6    ketoconazole (NIZORAL) 2 % shampoo APPLY " TO AFFECTED AREA EVERY OTHER DAY AND LET SIT 5 MINUTES PRIOR TO RINSING AS DIRECTED 120 mL 3    lancets (ACCU-CHEK SOFTCLIX LANCETS) Misc 1 each by Misc.(Non-Drug; Combo Route) route 4 (four) times daily. 360 each 3    lisinopril (PRINIVIL,ZESTRIL) 40 MG tablet TAKE 1 TABLET(40 MG) BY MOUTH EVERY DAY 30 tablet 6    pantoprazole (PROTONIX) 40 MG tablet Take 1 tablet (40 mg total) by mouth once daily. 90 tablet 4    rosuvastatin (CRESTOR) 5 MG tablet TK 1 T PO QD  0       Inpatient Medications   Continuous Infusions:   sodium chloride 0.9% 125 mL/hr at 03/20/17 1709     Scheduled Meds:   [START ON 3/21/2017] aspirin  81 mg Oral Daily    carvedilol  6.25 mg Oral BID    [START ON 3/21/2017] furosemide  40 mg Oral Daily    [START ON 3/21/2017] insulin aspart  15 Units Subcutaneous TIDWM    insulin detemir  30 Units Subcutaneous QHS    lisinopril  40 mg Oral QHS    [START ON 3/21/2017] pantoprazole  40 mg Oral Daily    [START ON 3/21/2017] rosuvastatin  5 mg Oral Daily    senna-docusate 8.6-50 mg  1 tablet Oral BID     PRN Meds:acetaminophen, dextrose 50%, dextrose 50%, glucagon (human recombinant), glucose, glucose, hydrALAZINE, hydrocodone-acetaminophen 10-325mg, hydrocodone-acetaminophen 5-325mg, insulin aspart, ondansetron     Social History:     Social History   Substance Use Topics    Smoking status: Never Smoker    Smokeless tobacco: Never Used    Alcohol use No     Family History:     Family History   Problem Relation Age of Onset    Diabetes Mother     Heart disease Mother     Hypertension Mother     Diabetes Father     Melanoma Father     Kidney failure Father     Mental illness Sister      suicide/schizophrenic    Cancer Maternal Grandfather     Cancer Paternal Grandfather      colon    Psoriasis Neg Hx     Lupus Neg Hx     Eczema Neg Hx      Physical Exam:     Vitals:  Temp:  [97.7 °F (36.5 °C)-98.6 °F (37 °C)]   Pulse:  [52-72]   Resp:  [16-18]   BP: (124-205)/(62-79)   SpO2:  [98  %-100 %]  on RA I/O's:  No intake or output data in the 24 hours ending 03/20/17 2203     Constitutional: NAD, conversant  HEENT: Sclera anicteric, PERRLA, EOMI  Neck: No JVD, no carotid bruits  CV: RRR, no murmur, normal S1/S2  Pulm: CTAB, no wheezes, rales, or ronchi  GI: Abdomen soft, NTND, +BS  Extremities: No LE edema, warm and well perfused  Skin: No ecchymosis, erythema, or ulcers  Psych: AOx3, appropriate affect  Neuro: CNII-XII intact, no focal deficits    Labs:       Recent Labs  Lab 03/20/17  1352      K 4.9      CO2 21*   BUN 49*   CREATININE 2.0*   ANIONGAP 13       Recent Labs  Lab 03/20/17  1352   AST 18   ALT 11   ALKPHOS 82   BILITOT 0.4   ALBUMIN 3.8       Recent Labs  Lab 03/20/17  1352 03/20/17  1650   TROPONINI 0.007 <0.006   *  --       Recent Labs  Lab 03/20/17  1352   WBC 8.36   HGB 11.9*   HCT 36.6*      GRAN 69.6  5.8       Recent Labs  Lab 03/20/17  1352   INR 1.1     Lab Results   Component Value Date    CHOL 147 02/16/2017    HDL 47 02/16/2017    LDLCALC 64.2 02/16/2017    TRIG 179 (H) 02/16/2017     Lab Results   Component Value Date    HGBA1C 7.8 (H) 02/16/2017        Micro:  Blood Cultures  No results found for: LABBLOO  Urine Cultures  Lab Results   Component Value Date    LABURIN No growth 03/27/2015    LABURIN  11/26/2006     MULTIPLE ORGANISMS ISOLATED. NONE IN PREDOMINANCE REPEAT IF CLINICALLY NECESSARY.       Imaging:         EF   Date Value Ref Range Status   05/03/2016 65 55 - 65        EKG: NSR with less than 1 mm st elevation mainly lead III      Assessment&Plan:   75 yo F with a history of CAD s/p PCI to diagonal and LAD in 2006 (PET 2012: D2 area fixed defect), HTN, diastolic dysfunction, HLD, DELFINA, CKD stage IV,DM and previous hospitalization for ADHF.She presented with complaint of mild chest pain under her right breast which began prior to arrival. Trop's negative X 2    -DSE tomorrow   -NPO after mid night  -Continue aspirin 81 mg po daily    -up titrate BP medications she arrived with /79  -cardiology will follow     Thank you for your consult     Plan was discussed with Dr Manohar Gastelum MD  Cardiology Fellow  Pager: [phone]

## 2017-03-21 NOTE — PLAN OF CARE
Problem: Patient Care Overview  Goal: Plan of Care Review  Outcome: Ongoing (interventions implemented as appropriate)  Pt. Remains free from falls/ injury/trauma. No complaints of CP, SOB, pain/discomfort. Dobutamine stress echo in the AM. Patient has been NPO since midnight. Plan of Care reviewed with patient. VSS and NADN. Will continue to monitor.

## 2017-03-21 NOTE — ASSESSMENT & PLAN NOTE
Still hypertensive this AM to the 190's   - cont lisinopril 40, increase coreg to 12.5 BID   - Hydralazine PRN

## 2017-03-21 NOTE — ASSESSMENT & PLAN NOTE
-Troponin is negative x 3. Cardiology consulted and following. Pt is high risk thus stress today (dm2, known cad, htn, hld).   - f/u DSE

## 2017-03-21 NOTE — PROGRESS NOTES
Progress Note  Cardiology                                                              Team: [facility]    Patient Name: Radha Degroot  YOB: 1940    Admit Date: 3/20/2017                     LOS: 0    SUBJECTIVE:     Reason for Admission:  <principal problem not specified>  See H&P for detailed initial presentation history and ROS.      Interval history: NAEON. Patient denies any further episodes of CP but continues to have some minimal SOB which is her baseline. Denies n/v, changes in urination but feels like her left ankle is slightly more swollen than normal.       OBJECTIVE:     Vital Signs Range (Last 24H):  Temp:  [97.7 °F (36.5 °C)-98.7 °F (37.1 °C)]   Pulse:  [52-72]   Resp:  [16-18]   BP: (124-205)/(57-79)   SpO2:  [96 %-100 %] Body mass index is 32.39 kg/(m^2).  Wt Readings from Last 1 Encounters:   03/20/17 1324 96.6 kg (213 lb)       I & O (Last 24H):  Intake/Output Summary (Last 24 hours) at 03/21/17 0830  Last data filed at 03/21/17 0500   Gross per 24 hour   Intake                0 ml   Output                0 ml   Net                0 ml       Physical Exam:  General: well developed, well nourished  Neck: supple, symmetrical, trachea midline, no JVD  Lungs:  clear to auscultation bilaterally and normal respiratory effort  Cardiovascular: Heart: regular rate and rhythm, S1, S2 normal, no murmur, click, rub or gallop. Chest Wall: right sided chest wall tenderness. Extremities: edema trace RLE, 1+ LLE. Pulses: 2+ and symmetric.  Abdomen/Rectal: Abdomen: soft, non-tender non-distented; bowel sounds normal    Diagnostic Results:  Lab Results   Component Value Date    WBC 8.36 03/20/2017    HGB 11.9 (L) 03/20/2017    HCT 36.6 (L) 03/20/2017    MCV 89 03/20/2017     03/20/2017       Recent Labs  Lab 03/20/17  1352   GLU 77      K 4.9      CO2 21*   BUN 49*   CREATININE 2.0*   CALCIUM 9.2     Lab Results   Component Value Date    INR 1.1 03/20/2017    INR 1.1  05/02/2016    INR 1.0 06/16/2007     Lab Results   Component Value Date    HGBA1C 7.8 (H) 02/16/2017     Recent Labs      03/20/17   1609  03/20/17   1855  03/20/17   2112   POCTGLUCOSE  69*  136*  178*       ASSESSMENT/PLAN:     Current Problems List:  Active Hospital Problems    Diagnosis  POA    Hypertensive emergency [I16.1]  Yes    Chest pain [R07.9]  Unknown     Troponin Q8 x 3  Pain is atypical, but with non acute changes on EKG will consult cards.      Type 2 diabetes mellitus with stage 4 chronic kidney disease, with long-term current use of insulin [E11.22, N18.4, Z79.4]  Not Applicable     Diagnosed with T2DM in 1980s.        Resolved Hospital Problems    Diagnosis Date Resolved POA   No resolved problems to display.       Active Problems, Status & Treatment Plan Addressed Today:    Chest Pain  - Description of pain and reproducibility atypical for ACS, MSK pain more likely  - Trop negative x 3 w/ no EKG changes  - Given multiple co morbidities in this high risk patient, will perform DSE today  - If DSE unremarkable, will need follow up w/ her cardiologist Dr. Hines upon discharge.   - ASA 81 mg daily    Signing Physician:  Buffy Carter MD

## 2017-03-21 NOTE — PROGRESS NOTES
"[facility] Hospital Medicine  Progress Note    Patient Name: Radha Degroot  MRN: [id number]  Patient Class: OP- Observation   Admission Date: 3/20/2017  Length of Stay: 0 days  Attending Physician: Allie Rwoley MD  Primary Care Provider: Katherine Merida MD    [facility] Medicine Team: [facility]OSP MED B Allie Rowley MD    Subjective:     Principal Problem:Chest pain    HPI:  This is a 76 y.o. female who presents with complaint of mild chest pain under her right breast which began prior to arrival. She developed this discomfort while she was sitting down waiting for an x-ray. She also indicates associated fatigue and generalized weakness but denies associated dizziness. Denies shortness of breath, nausea, diaphoresis, palpitations. She also complains of left ear pain which felt like she "had cotton in her ears." The patient reports past MI and that she has had 2 stents placed. She was recommended to come to the ER by the X-ray technician/nurse.     Seen by cardiology in ER and Code stemi cancelled , no official; consult.    Hx of CAD. Stents placed in 2006.         Hospital Course:       Interval History: Complaining of ear fullness. Otherwise no chest pain or SOB or other complaints.     Review of Systems   Constitutional: Negative for activity change, appetite change, chills, fever and unexpected weight change.   HENT: Positive for ear pain. Negative for rhinorrhea and sore throat.    Eyes: Negative for pain and visual disturbance.   Respiratory: Negative for cough and shortness of breath.    Cardiovascular: Negative for chest pain and palpitations.   Gastrointestinal: Negative for abdominal pain, diarrhea and nausea.   Genitourinary: Negative for dysuria, flank pain, hematuria, urgency, vaginal discharge and vaginal pain.   Musculoskeletal: Negative for gait problem and neck stiffness.   Skin: Negative for pallor and rash.   Neurological: Negative for syncope, light-headedness and " headaches.     Objective:     Vital Signs (Most Recent):  Temp: 97.9 °F (36.6 °C) (03/21/17 1600)  Pulse: 72 (03/21/17 1700)  Resp: 18 (03/21/17 1600)  BP: (!) 170/68 (03/21/17 1600)  SpO2: 95 % (03/21/17 1600) Vital Signs (24h Range):  Temp:  [97.9 °F (36.6 °C)-98.7 °F (37.1 °C)] 97.9 °F (36.6 °C)  Pulse:  [60-80] 72  Resp:  [16-18] 18  SpO2:  [94 %-100 %] 95 %  BP: (127-205)/(57-79) 170/68     Weight: 96.6 kg (213 lb)  Body mass index is 32.39 kg/(m^2).    Intake/Output Summary (Last 24 hours) at 03/21/17 1821  Last data filed at 03/21/17 1400   Gross per 24 hour   Intake                0 ml   Output                0 ml   Net                0 ml      Physical Exam   Constitutional: She is oriented to person, place, and time. She appears well-developed and well-nourished.   Cardiovascular: Normal rate, regular rhythm and normal heart sounds.  Exam reveals no gallop and no friction rub.    No murmur heard.  Pulmonary/Chest: Effort normal and breath sounds normal. No respiratory distress. She has no wheezes. She has no rales.   Abdominal: Soft. Bowel sounds are normal. She exhibits no distension. There is no tenderness.   Musculoskeletal: Normal range of motion.   Neurological: She is alert and oriented to person, place, and time. She displays normal reflexes. No cranial nerve deficit. Coordination normal.   Skin: Skin is warm and dry.       Significant Labs:   CBC:   Recent Labs  Lab 03/20/17  1352   WBC 8.36   HGB 11.9*   HCT 36.6*        CMP:   Recent Labs  Lab 03/20/17  1352      K 4.9      CO2 21*   GLU 77   BUN 49*   CREATININE 2.0*   CALCIUM 9.2   PROT 7.2   ALBUMIN 3.8   BILITOT 0.4   ALKPHOS 82   AST 18   ALT 11   ANIONGAP 13   EGFRNONAA 23.6*       Significant Imaging: I have reviewed and interpreted all pertinent imaging results/findings within the past 24 hours.    Assessment/Plan:      * Chest pain  -Troponin is negative x 3. Cardiology consulted and following. Pt is high risk thus  stress today (dm2, known cad, htn, hld).   - f/u DSE       Hypertensive emergency  Still hypertensive this AM to the 190's   - cont lisinopril 40, increase coreg to 12.5 BID   - Hydralazine PRN      Type 2 diabetes mellitus with stage 4 chronic kidney disease, with long-term current use of insulin  Aspart 15 TID qac ; detamir 30 qhs       VTE Risk Mitigation         Ordered     Medium Risk of VTE  Once      03/20/17 1653     Place JIL hose  Until discontinued      03/20/17 1653     Place sequential compression device  Until discontinued      03/20/17 1653          Allie Rowley MD  Department of Hospital Medicine   [facility]

## 2017-03-21 NOTE — SUBJECTIVE & OBJECTIVE
Interval History: Complaining of ear fullness. Otherwise no chest pain or SOB or other complaints.     Review of Systems   Constitutional: Negative for activity change, appetite change, chills, fever and unexpected weight change.   HENT: Positive for ear pain. Negative for rhinorrhea and sore throat.    Eyes: Negative for pain and visual disturbance.   Respiratory: Negative for cough and shortness of breath.    Cardiovascular: Negative for chest pain and palpitations.   Gastrointestinal: Negative for abdominal pain, diarrhea and nausea.   Genitourinary: Negative for dysuria, flank pain, hematuria, urgency, vaginal discharge and vaginal pain.   Musculoskeletal: Negative for gait problem and neck stiffness.   Skin: Negative for pallor and rash.   Neurological: Negative for syncope, light-headedness and headaches.     Objective:     Vital Signs (Most Recent):  Temp: 97.9 °F (36.6 °C) (03/21/17 1600)  Pulse: 72 (03/21/17 1700)  Resp: 18 (03/21/17 1600)  BP: (!) 170/68 (03/21/17 1600)  SpO2: 95 % (03/21/17 1600) Vital Signs (24h Range):  Temp:  [97.9 °F (36.6 °C)-98.7 °F (37.1 °C)] 97.9 °F (36.6 °C)  Pulse:  [60-80] 72  Resp:  [16-18] 18  SpO2:  [94 %-100 %] 95 %  BP: (127-205)/(57-79) 170/68     Weight: 96.6 kg (213 lb)  Body mass index is 32.39 kg/(m^2).    Intake/Output Summary (Last 24 hours) at 03/21/17 1821  Last data filed at 03/21/17 1400   Gross per 24 hour   Intake                0 ml   Output                0 ml   Net                0 ml      Physical Exam   Constitutional: She is oriented to person, place, and time. She appears well-developed and well-nourished.   Cardiovascular: Normal rate, regular rhythm and normal heart sounds.  Exam reveals no gallop and no friction rub.    No murmur heard.  Pulmonary/Chest: Effort normal and breath sounds normal. No respiratory distress. She has no wheezes. She has no rales.   Abdominal: Soft. Bowel sounds are normal. She exhibits no distension. There is no tenderness.    Musculoskeletal: Normal range of motion.   Neurological: She is alert and oriented to person, place, and time. She displays normal reflexes. No cranial nerve deficit. Coordination normal.   Skin: Skin is warm and dry.       Significant Labs:   CBC:   Recent Labs  Lab 03/20/17  1352   WBC 8.36   HGB 11.9*   HCT 36.6*        CMP:   Recent Labs  Lab 03/20/17  1352      K 4.9      CO2 21*   GLU 77   BUN 49*   CREATININE 2.0*   CALCIUM 9.2   PROT 7.2   ALBUMIN 3.8   BILITOT 0.4   ALKPHOS 82   AST 18   ALT 11   ANIONGAP 13   EGFRNONAA 23.6*       Significant Imaging: I have reviewed and interpreted all pertinent imaging results/findings within the past 24 hours.

## 2017-03-22 VITALS
BODY MASS INDEX: 32.28 KG/M2 | SYSTOLIC BLOOD PRESSURE: 147 MMHG | DIASTOLIC BLOOD PRESSURE: 67 MMHG | HEIGHT: 68 IN | OXYGEN SATURATION: 1 % | TEMPERATURE: 98 F | RESPIRATION RATE: 18 BRPM | HEART RATE: 60 BPM | WEIGHT: 213 LBS

## 2017-03-22 PROBLEM — I16.1 HYPERTENSIVE EMERGENCY: Status: RESOLVED | Noted: 2017-03-20 | Resolved: 2017-03-22

## 2017-03-22 PROBLEM — R07.9 CHEST PAIN: Status: RESOLVED | Noted: 2017-03-20 | Resolved: 2017-03-22

## 2017-03-22 PROBLEM — I21.4 NSTEMI (NON-ST ELEVATED MYOCARDIAL INFARCTION): Status: ACTIVE | Noted: 2017-03-22

## 2017-03-22 LAB
ABO + RH BLD: NORMAL
ANION GAP SERPL CALC-SCNC: 10 MMOL/L
APTT BLDCRRT: 24.7 SEC
BASOPHILS # BLD AUTO: 0.07 K/UL
BASOPHILS NFR BLD: 1.1 %
BLD GP AB SCN CELLS X3 SERPL QL: NORMAL
BUN SERPL-MCNC: 43 MG/DL
CALCIUM SERPL-MCNC: 8.5 MG/DL
CHLORIDE SERPL-SCNC: 111 MMOL/L
CO2 SERPL-SCNC: 22 MMOL/L
CREAT SERPL-MCNC: 1.8 MG/DL
DIFFERENTIAL METHOD: ABNORMAL
EOSINOPHIL # BLD AUTO: 0.2 K/UL
EOSINOPHIL NFR BLD: 3.2 %
ERYTHROCYTE [DISTWIDTH] IN BLOOD BY AUTOMATED COUNT: 14.1 %
EST. GFR  (AFRICAN AMERICAN): 30.9 ML/MIN/1.73 M^2
EST. GFR  (NON AFRICAN AMERICAN): 26.8 ML/MIN/1.73 M^2
GLUCOSE SERPL-MCNC: 141 MG/DL
HCT VFR BLD AUTO: 32.5 %
HGB BLD-MCNC: 10.6 G/DL
INR PPP: 1.1
LYMPHOCYTES # BLD AUTO: 1.7 K/UL
LYMPHOCYTES NFR BLD: 25.7 %
MCH RBC QN AUTO: 28.5 PG
MCHC RBC AUTO-ENTMCNC: 32.6 %
MCV RBC AUTO: 87 FL
MONOCYTES # BLD AUTO: 0.4 K/UL
MONOCYTES NFR BLD: 6.1 %
NEUTROPHILS # BLD AUTO: 4.2 K/UL
NEUTROPHILS NFR BLD: 63.7 %
PLATELET # BLD AUTO: 140 K/UL
PMV BLD AUTO: 11.4 FL
POCT GLUCOSE: 163 MG/DL (ref 70–110)
POCT GLUCOSE: 171 MG/DL (ref 70–110)
POTASSIUM SERPL-SCNC: 4.2 MMOL/L
PROTHROMBIN TIME: 11.3 SEC
RBC # BLD AUTO: 3.72 M/UL
SODIUM SERPL-SCNC: 143 MMOL/L
TROPONIN I SERPL DL<=0.01 NG/ML-MCNC: 0.08 NG/ML
TROPONIN I SERPL DL<=0.01 NG/ML-MCNC: 0.13 NG/ML
WBC # BLD AUTO: 6.54 K/UL

## 2017-03-22 PROCEDURE — 86901 BLOOD TYPING SEROLOGIC RH(D): CPT

## 2017-03-22 PROCEDURE — 86900 BLOOD TYPING SEROLOGIC ABO: CPT

## 2017-03-22 PROCEDURE — 93010 ELECTROCARDIOGRAM REPORT: CPT | Mod: ,,, | Performed by: INTERNAL MEDICINE

## 2017-03-22 PROCEDURE — 84484 ASSAY OF TROPONIN QUANT: CPT | Mod: 91

## 2017-03-22 PROCEDURE — 25000003 PHARM REV CODE 250: Performed by: INTERNAL MEDICINE

## 2017-03-22 PROCEDURE — 99218 PR INITIAL OBSERVATION CARE,LEVL I: CPT | Mod: ,,, | Performed by: INTERNAL MEDICINE

## 2017-03-22 PROCEDURE — 93005 ELECTROCARDIOGRAM TRACING: CPT

## 2017-03-22 PROCEDURE — 85610 PROTHROMBIN TIME: CPT

## 2017-03-22 PROCEDURE — 36415 COLL VENOUS BLD VENIPUNCTURE: CPT

## 2017-03-22 PROCEDURE — 84484 ASSAY OF TROPONIN QUANT: CPT

## 2017-03-22 PROCEDURE — 25000003 PHARM REV CODE 250: Performed by: EMERGENCY MEDICINE

## 2017-03-22 PROCEDURE — 85730 THROMBOPLASTIN TIME PARTIAL: CPT

## 2017-03-22 PROCEDURE — 99217 PR OBSERVATION CARE DISCHARGE: CPT | Mod: ,,, | Performed by: HOSPITALIST

## 2017-03-22 PROCEDURE — 80048 BASIC METABOLIC PNL TOTAL CA: CPT

## 2017-03-22 PROCEDURE — 25000003 PHARM REV CODE 250: Performed by: HOSPITALIST

## 2017-03-22 PROCEDURE — G0378 HOSPITAL OBSERVATION PER HR: HCPCS

## 2017-03-22 PROCEDURE — 85025 COMPLETE CBC W/AUTO DIFF WBC: CPT

## 2017-03-22 RX ORDER — AMLODIPINE BESYLATE 5 MG/1
5 TABLET ORAL DAILY
Qty: 30 TABLET | Refills: 1 | Status: SHIPPED | OUTPATIENT
Start: 2017-03-22 | End: 2017-06-28

## 2017-03-22 RX ORDER — FLUTICASONE PROPIONATE 50 MCG
SPRAY, SUSPENSION (ML) NASAL
Qty: 16 G | Refills: 6 | Status: SHIPPED | OUTPATIENT
Start: 2017-03-22 | End: 2017-12-28 | Stop reason: SDUPTHER

## 2017-03-22 RX ORDER — CLOPIDOGREL BISULFATE 75 MG/1
75 TABLET ORAL DAILY
Status: DISCONTINUED | OUTPATIENT
Start: 2017-03-22 | End: 2017-03-22 | Stop reason: HOSPADM

## 2017-03-22 RX ORDER — ROSUVASTATIN CALCIUM 40 MG/1
TABLET, COATED ORAL
Qty: 30 TABLET | Refills: 6 | Status: SHIPPED | OUTPATIENT
Start: 2017-03-22 | End: 2017-12-04 | Stop reason: SDUPTHER

## 2017-03-22 RX ORDER — CLOPIDOGREL BISULFATE 75 MG/1
75 TABLET ORAL DAILY
Qty: 30 TABLET | Refills: 11 | Status: SHIPPED | OUTPATIENT
Start: 2017-03-22 | End: 2017-06-28

## 2017-03-22 RX ORDER — SODIUM CHLORIDE 9 MG/ML
3 INJECTION, SOLUTION INTRAVENOUS CONTINUOUS
Status: ACTIVE | OUTPATIENT
Start: 2017-03-22 | End: 2017-03-22

## 2017-03-22 RX ORDER — ROSUVASTATIN CALCIUM 20 MG/1
40 TABLET, COATED ORAL DAILY
Status: DISCONTINUED | OUTPATIENT
Start: 2017-03-23 | End: 2017-03-22 | Stop reason: HOSPADM

## 2017-03-22 RX ORDER — AMLODIPINE BESYLATE 5 MG/1
5 TABLET ORAL DAILY
Status: DISCONTINUED | OUTPATIENT
Start: 2017-03-22 | End: 2017-03-22 | Stop reason: HOSPADM

## 2017-03-22 RX ORDER — CLOPIDOGREL 300 MG/1
600 TABLET, FILM COATED ORAL ONCE
Status: DISCONTINUED | OUTPATIENT
Start: 2017-03-22 | End: 2017-03-22

## 2017-03-22 RX ORDER — NITROGLYCERIN 0.3 MG/1
0.3 TABLET SUBLINGUAL EVERY 5 MIN PRN
Qty: 30 TABLET | Refills: 3 | Status: SHIPPED | OUTPATIENT
Start: 2017-03-22 | End: 2018-12-06

## 2017-03-22 RX ADMIN — CETIRIZINE HYDROCHLORIDE 10 MG: 5 TABLET, FILM COATED ORAL at 08:03

## 2017-03-22 RX ADMIN — INSULIN ASPART 15 UNITS: 100 INJECTION, SOLUTION INTRAVENOUS; SUBCUTANEOUS at 10:03

## 2017-03-22 RX ADMIN — PANTOPRAZOLE SODIUM 40 MG: 40 TABLET, DELAYED RELEASE ORAL at 08:03

## 2017-03-22 RX ADMIN — CARVEDILOL 12.5 MG: 6.25 TABLET, FILM COATED ORAL at 08:03

## 2017-03-22 RX ADMIN — ASPIRIN 81 MG CHEWABLE TABLET 81 MG: 81 TABLET CHEWABLE at 08:03

## 2017-03-22 RX ADMIN — FUROSEMIDE 40 MG: 40 TABLET ORAL at 08:03

## 2017-03-22 RX ADMIN — CLOPIDOGREL 75 MG: 75 TABLET, FILM COATED ORAL at 10:03

## 2017-03-22 RX ADMIN — FLUTICASONE PROPIONATE 2 SPRAY: 50 SPRAY, METERED NASAL at 08:03

## 2017-03-22 RX ADMIN — AMLODIPINE BESYLATE 5 MG: 5 TABLET ORAL at 09:03

## 2017-03-22 RX ADMIN — ROSUVASTATIN CALCIUM 5 MG: 5 TABLET ORAL at 08:03

## 2017-03-22 RX ADMIN — STANDARDIZED SENNA CONCENTRATE AND DOCUSATE SODIUM 1 TABLET: 8.6; 5 TABLET, FILM COATED ORAL at 08:03

## 2017-03-22 NOTE — PLAN OF CARE
Problem: Patient Care Overview  Goal: Plan of Care Review  Outcome: Ongoing (interventions implemented as appropriate)  Pt. Remains free from falls/ injury/trauma. No complaints of CP, SOB, pain/discomfort. DSE performed. NPO at midnight for possible LHC in the AM. Plan of Care reviewed with patient. VSS and NADN. Will continue to monitor.

## 2017-03-22 NOTE — PROGRESS NOTES
Discussed case at length with patient, very hesitant to have [facility] with incident 10 years ago involving complications. Enforced that this was the best way to rule out obstructive coronary artery disease. She would like to evaluate all potential noninvasive options. Will keep her on the Cath list for tomorrow, explore potential for inpatient pet, and discuss again with her after more is known. Npo for now

## 2017-03-22 NOTE — CONSULTS
Interventional Cardiology Consult Note  Attending Physician: Allie Rowley MD  Reason for Consult: [facility] for abnormal DSE     HPI:   76 y.o. woman with PMH of CAD s/p POBA of D1 (11/24/06; STEMI), PCI with UMU to pLAD and mLAD (12/04/06; Elective), HIT (priorly on warfarin 2007), HTN, HLD, DELFINA on CPAP, CKD 4 (baseline Cr 1.9-2.2), T2DM on insulin, chronic diastolic CHF who presented to the hospital on 03/20/17 after experiencing chest discomfort while awaiting XRAY of spine for chronic back pain.     She reports that she has been experiencing RODRIGUEZ, fatigue, and generalized weakness for the last few months but more prominent in the last 1 month. These symptoms are limiting her ability to carry out her daily activities such as getting dressed, cleaning the kitchen, and attending Denominational events. Her limiting factor is chronic lower back pain. She went to see her PCP, Dr. Merida with c/o left sided hearing issues, fullness sensation and sinus congestion which were attributed to a viral URTI. She also reported chronic lower back pain for which she referred the patient to get an XRAY to assess for underlying OA. While awaiting to get the XRAY, she experienced right sided inframammary chest discomfort. This discomfort lasted about 1 hour. Occurred at rest and resolved spontaneously without any intervention. She was recommended to go to the ER for further evaluation.    In the ER, all her symptoms had resolved. She was found to be hypertensive (/75), pulse 56, saturating well at 99% RA. She had an EKG done which revealed Sinus bradycardia, rate 59, with new TWI in inferior leads with STD in lateral leads. Her troponins were initially 0.006 and peaked at 0.021 yesteday around 8AM. This morning the troponin was bumped to 0.134. . CXR negative for any acute cardiopulmonary disease. She was seen by the cardiology team as a consult and was recommended to get DSE. Her DSE was positive for ischemia on the EKG  (STD 1-2mm in inferolateral leads) and positive for ischemia on imaging showing WMA in mid anteroseptal, mid inferolateral and apical anterior walls. She has a normal LVEF 55-60% with DD, mild LAE, normal RVSF.    She is currently chest pain free, and denies any SOB, nausea, lightheadedness, LOC, palpitations.    ROS:    Constitution: Negative for fever, chills, weight loss or gain.   HENT: Negative for sore throat, rhinorrhea, or headache.  Eyes: Negative for blurred or double vision.   Cardiovascular: See above  Pulmonary: Negative for SOB   Gastrointestinal: Negative for abdominal pain, nausea, vomiting, or diarrhea.   : Negative for dysuria.   Neurological: Negative for focal weakness or sensory changes.  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    Hx of colonic polyps     1/9    Hx of colonic polyps     Hyperlipidemia     Hypertension     Myocardial infarction nov 2006    Posterior vitreous detachment of both eyes     Sleep apnea     Type 2 diabetes mellitus with mild nonproliferative diabetic retinopathy with macular edema 2/15/2013     Past Surgical History:   Procedure Laterality Date    APPENDECTOMY      CATARACT EXTRACTION      Right eye    heart stent      X 2    HYSTERECTOMY      Fibroids     Allergies:     Review of patient's allergies indicates:   Allergen Reactions    Heparin analogues Other (See Comments)     thrombocytopenia    Ancef [cefazolin]     Keflex [cephalexin] Rash     "Oxybutynin Other (See Comments)     Metallic sloan, chest symptoms      Medications:     No current facility-administered medications on file prior to encounter.      Current Outpatient Prescriptions on File Prior to Encounter   Medication Sig Dispense Refill    aspirin 81 MG Chew Take 81 mg by mouth once daily.        mirabegron 50 mg Tb24 Take 1 tablet (50 mg total) by mouth once daily. 30 tablet 11    MULTIVIT-MIN/IRON/FOLIC/LUTEIN (CENTRUM SILVER WOMEN ORAL) Take by mouth.      BD ULTRA-FINE TIM PEN NEEDLES 32 gauge x 5/32" Ndle USE FOUR TIMES DAILY FOR BLOOD SUGAR TESTING 200 each 8    Ca cmb no.2-C8-Q-1-WQ-B63-aloe (VITAMIN D-3 WITH ALOE) 120-1,000-10 mg-unit-mg Tab Take by mouth.      carvedilol (COREG) 12.5 MG tablet Take 0.5 tablets (6.25 mg total) by mouth 2 (two) times daily. 60 tablet 11    fluticasone (FLONASE) 50 mcg/actuation nasal spray 2 SPRAYS IN EACH NOSTRIL ONCE DAILY 16 g 6    furosemide (LASIX) 40 MG tablet TAKE 1 TABLET(40 MG) BY MOUTH EVERY DAY 30 tablet 6    insulin glargine (LANTUS SOLOSTAR) 100 unit/mL (3 mL) InPn pen Inject 42 units at bedtime 30 mL 6    insulin lispro (HUMALOG KWIKPEN) 100 unit/mL InPn pen Inject 15 units with breakfast 17 units with lunch and dinner plus correction scale 150-200 +1, 201-250 +2, 251-300 +3, 301-350 +4, > 350 +5. 45 mL 6    ketoconazole (NIZORAL) 2 % shampoo APPLY TO AFFECTED AREA EVERY OTHER DAY AND LET SIT 5 MINUTES PRIOR TO RINSING AS DIRECTED 120 mL 3    lisinopril (PRINIVIL,ZESTRIL) 40 MG tablet TAKE 1 TABLET(40 MG) BY MOUTH EVERY DAY 30 tablet 6    pantoprazole (PROTONIX) 40 MG tablet Take 1 tablet (40 mg total) by mouth once daily. 90 tablet 4    rosuvastatin (CRESTOR) 5 MG tablet TK 1 T PO QD  0       Inpatient Medications   Continuous Infusions:   sodium chloride 0.9% 125 mL/hr at 03/21/17 1733     Scheduled Meds:   aspirin  81 mg Oral Daily    carvedilol  12.5 mg Oral BID    cetirizine  10 mg Oral Daily    fluticasone  2 spray " "Each Nare Daily    furosemide  40 mg Oral Daily    insulin aspart  15 Units Subcutaneous TIDWM    insulin detemir  30 Units Subcutaneous QHS    lisinopril  40 mg Oral QHS    pantoprazole  40 mg Oral Daily    rosuvastatin  5 mg Oral Daily    senna-docusate 8.6-50 mg  1 tablet Oral BID     PRN Meds:acetaminophen, dextrose 50%, dextrose 50%, glucagon (human recombinant), glucose, glucose, hydrALAZINE, hydrocodone-acetaminophen 10-325mg, hydrocodone-acetaminophen 5-325mg, insulin aspart, ondansetron     Social History:     Social History   Substance Use Topics    Smoking status: Never Smoker    Smokeless tobacco: Never Used    Alcohol use No     Family History:     Family History   Problem Relation Age of Onset    Diabetes Mother     Heart disease Mother     Hypertension Mother     Diabetes Father     Melanoma Father     Kidney failure Father     Mental illness Sister      suicide/schizophrenic    Cancer Maternal Grandfather     Cancer Paternal Grandfather      colon    Psoriasis Neg Hx     Lupus Neg Hx     Eczema Neg Hx      Physical Exam:     Vitals:  Temp:  [97.9 °F (36.6 °C)-98.7 °F (37.1 °C)]   Pulse:  [54-80]   Resp:  [18]   BP: (123-195)/(58-96)   SpO2:  [94 %-96 %]     BP (!) 159/69 (BP Location: Left arm, Patient Position: Lying, BP Method: Automatic)  Pulse 64  Temp 98.5 °F (36.9 °C) (Oral)   Resp 18  Ht 5' 8" (1.727 m)  Wt 96.6 kg (213 lb)  SpO2 (!) 94%  Breastfeeding? No  BMI 32.39 kg/m2  I/O's:    Intake/Output Summary (Last 24 hours) at 03/22/17 0729  Last data filed at 03/21/17 2200   Gross per 24 hour   Intake              180 ml   Output                0 ml   Net              180 ml        Constitutional: NAD, conversant  HEENT: Sclera anicteric, PERRLA, EOMI  Neck: No JVD, no carotid bruits  CV: RRR, no murmur, normal S1/S2, +S4, No Pericardial rub  Pulm: CTAB with no wheezes, rales, or rhonchi  GI: Abdomen soft, NTND, +BS  Extremities: Trace LE edema, warm and well " perfused, No cyanosis, No clubbing  Skin: No ecchymosis, erythema, or ulcers  Psych: AOx3, appropriate affect  Neuro: CNII-XII intact, no focal deficits  Vascular:   LEFT RIGHT   RADIAL 2+ 2+   BRACHIAL 2+ 2+   FEMORAL 2+ 2+   DP 2+ 2+   TP 2+ 2+   Erickson's Test Normal Normal       Labs:       Recent Labs  Lab 03/20/17  1352      K 4.9      CO2 21*   BUN 49*   CREATININE 2.0*   ANIONGAP 13       Recent Labs  Lab 03/20/17  1352   AST 18   ALT 11   ALKPHOS 82   BILITOT 0.4   ALBUMIN 3.8       Recent Labs  Lab 03/20/17  1352  03/21/17  0826 03/21/17  1550 03/22/17  0049   TROPONINI 0.007  < > 0.021 0.006 0.134*   *  --   --   --   --    < > = values in this interval not displayed.   Recent Labs  Lab 03/20/17  1352   WBC 8.36   HGB 11.9*   HCT 36.6*      GRAN 69.6  5.8       Recent Labs  Lab 03/20/17  1352   INR 1.1     Lab Results   Component Value Date    CHOL 147 02/16/2017    HDL 47 02/16/2017    LDLCALC 64.2 02/16/2017    TRIG 179 (H) 02/16/2017     Lab Results   Component Value Date    HGBA1C 7.8 (H) 02/16/2017        Micro:  Blood Cultures  No results found for: LABBLOO  Urine Cultures  Lab Results   Component Value Date    LABURIN No growth 03/27/2015    LABURIN  11/26/2006     MULTIPLE ORGANISMS ISOLATED. NONE IN PREDOMINANCE REPEAT IF CLINICALLY NECESSARY.       Imaging:   DSE (03/21/17):  1 - Normal left ventricular systolic function (EF 55-60%).     2 - Left ventricular diastolic dysfunction.     3 - Mild left atrial enlargement.     4 - The estimated PA systolic pressure is 28 mmHg.     5 - Normal right ventricular systolic function .     6 - Trivial tricuspid regurgitation.     7 - Low central venous pressure.     Positive stress echocardiographic study demonstrating an ischemic response involving the mid anteroseptum, mid inferolateral wall, apical anterior wall.     EF   Date Value Ref Range Status   03/21/2017 55 55 - 65    05/03/2016 65 55 - 65        Assessment:   76 y.o.  woman with PMH of CAD s/p POBA of D1 (11/24/06; STEMI), PCI with UMU to pLAD and mLAD (12/04/06; Elective), HIT (priorly on warfarin 2007), HTN, HLD, DELFINA on CPAP, CKD 4 (baseline Cr 1.9-2.2), T2DM on insulin, chronic diastolic CHF who presented to the hospital on 03/20/17 after experiencing chest discomfort while awaiting XRAY of spine for chronic back pain. Also reports RODRIGUEZ for the last few months.    CCS Angina: Class 0  NYHA Functional Classification: Class II       RISK STRATIFICATION:     John's Renal Risk Score: 15 GEETA risk: 26.1%   Risk of HD: 1.09%  1 Year Mortality Risk: 14%   Prior Contrast Allergy: No  Sedation   Prior Sedation: Yes   History of Obstructive Sleep Apnea/SDB: Yes  Pertinent Allergies:   Latex: No   ASA: No   Heparin-Induced Thrombocytopenia: Yes  ASA Physical Status: 2 - A patient with mild systemic disease    Plan:   RODRIGUEZ - likely angina equivalent - signs to suggest Stable CAD with abnormal stress test  - denies any symptoms at rest; NOT an acute coronary syndrome  - symptoms likely secondary to poor controlled risk factors and currently not on optimal medical therapy  - troponins negative until after stress test - no need to serially check troponins in the absence of symptoms  - agree with ASA 81 mg, coreg 12.5 mg BID, lisinopril 40 mg QD  - increase crestor to 40 mg QD  - start plavix 75 mg QD (no loading necessary)  - start amlodipine 5 mg QD  - start SL NTG PRN  - The patient is stable from interventional cardiology standpoint for discharge; She is to follow up with Dr. Clem Mcnally in clinic and then with me afterwards. Instructions will be provided    Chronic diastolic CHF  - c/w lasix 40 mg QD  - check daily weights    Hypertension - uncontrolled  - c/w coreg 12.5 mg BID, lasix 40 mg QD, Lisinopril 40 mg QD  - add amlodipine 5 mg QD  - advised on 2 gram sodium diet  - check BP daily and keep log of numbers prior to the next clinic visit    Hyperlipidemia  - increase crestor to 40 mg  (given recent stress test findings)    Diabetes mellitus on insulin  - follow strict 2 gram sodium, cardiac, diabetic diet  - c/w insulin; strong glycemic control recommended    Case discussed with Dr. Mcnally and primary team. All questions and concerns were addressed with the patient and her family at bedside.    Signed:  Mily Bellamy MD  Cardiology Fellow  Pager: [phone]  3/22/2017 7:29 AM

## 2017-03-22 NOTE — PLAN OF CARE
Pt. seen by cardiology. Ok for d/c from cardiology standpoint.  Anticipated d/c likely today. No needs identified.     Future Appointments  Date Time Provider Department Center   3/22/2017 12:30 PM [facility] [id number] DR1 485 LB LIMIT [facility] XRAY IP [facility]   4/3/2017 11:30 AM DIABETES EDUCATOR, INT MED 2 [facility]LDIA [facility]   4/4/2017 8:20 AM Eric Mcnally MD [facility] CARDVAL [facility]   4/17/2017 10:30 AM [facility] CT1 LIMIT 400 LBS [facility] CT SCAN [facility]   4/27/2017 11:20 AM Iveth Greene MD [facility] URO [facility]   5/4/2017 10:30 AM Terri Mcmanus MD [facility] NEPHRO [facility]   5/5/2017 2:00 PM Oswaldo Wilkerson MD [facility] PULMSVC [place]   5/11/2017 10:00 AM AUDIOGRAM, AUDIO [facility] AUDIO [facility]   5/11/2017 11:00 AM Kostas Chamberlain III, MD [facility] ENT [facility]   5/16/2017 10:00 AM Katherine Merida MD [facility] IM [facility]   5/22/2017 10:30 AM LAB, [facility] [place]   5/22/2017 2:30 PM BARBIE Abel,ANP-C [facility] ENDODIA [facility]

## 2017-03-22 NOTE — PROGRESS NOTES
Progress Note  Cardiology                                                              Team: [facility] HOSP MED B    Patient Name: Radha Degroot  YOB: 1940    Admit Date: 3/20/2017                     LOS: 0    SUBJECTIVE:     Reason for Admission:  Chest pain  See H&P for detailed initial presentation history and ROS.      Interval history: NAEON. Patient denies any further episodes of CP but continues to have some minimal SOB which is her baseline. Denies n/v, changes in urination.       OBJECTIVE:     Vital Signs Range (Last 24H):  Temp:  [97.9 °F (36.6 °C)-98.7 °F (37.1 °C)]   Pulse:  [54-80]   Resp:  [18]   BP: (123-195)/(58-96)   SpO2:  [94 %-96 %] Body mass index is 32.39 kg/(m^2).  Wt Readings from Last 1 Encounters:   03/20/17 1324 96.6 kg (213 lb)       I & O (Last 24H):    Intake/Output Summary (Last 24 hours) at 03/22/17 0744  Last data filed at 03/21/17 2200   Gross per 24 hour   Intake              180 ml   Output                0 ml   Net              180 ml       Physical Exam:  General: well developed, well nourished  Neck: supple, symmetrical, trachea midline, no JVD  Lungs:  clear to auscultation bilaterally and normal respiratory effort  Cardiovascular: Heart: regular rate and rhythm, S1, S2 normal, no murmur, click, rub or gallop. Chest Wall: right sided chest wall tenderness. Extremities: edema trace RLE, 1+ LLE. Pulses: 2+ and symmetric.  Abdomen/Rectal: Abdomen: soft, non-tender non-distented; bowel sounds normal    Diagnostic Results:  Lab Results   Component Value Date    WBC 8.36 03/20/2017    HGB 11.9 (L) 03/20/2017    HCT 36.6 (L) 03/20/2017    MCV 89 03/20/2017     03/20/2017     No results for input(s): GLU, NA, K, CL, CO2, BUN, CREATININE, CALCIUM, MG in the last 24 hours.  Lab Results   Component Value Date    INR 1.1 03/20/2017    INR 1.1 05/02/2016    INR 1.0 06/16/2007     Lab Results   Component Value Date    HGBA1C 7.8 (H) 02/16/2017     Recent Labs       03/20/17   1609  03/20/17   1855  03/20/17   2112  03/21/17   0843  03/21/17   1216  03/21/17   1727  03/21/17 2012   POCTGLUCOSE  69*  136*  178*  257*  226*  141*  158*       ASSESSMENT/PLAN:     Current Problems List:  Active Hospital Problems    Diagnosis  POA    *Chest pain [R07.9]  Unknown     Troponin Q8 x 3  Pain is atypical, but with non acute changes on EKG will consult cards.      Hypertensive emergency [I16.1]  Yes    Type 2 diabetes mellitus with stage 4 chronic kidney disease, with long-term current use of insulin [E11.22, N18.4, Z79.4]  Not Applicable     Diagnosed with T2DM in 1980s.        Resolved Hospital Problems    Diagnosis Date Resolved POA   No resolved problems to display.       Active Problems, Status & Treatment Plan Addressed Today:    Chest Pain  - Description of pain and reproducibility atypical for ACS  - Trop negative x 3 w/ no EKG changes. Trop mildly elevated today after DSE.  - s/p DSE 3/21/17: DSE abnormal w/ Hypokinetic Apical Anterior wall, Mid inferolateral wall and mid anteroseptum. EKG during stress also w/ ischemic changes at peak HR.   - Given patient's CKD IV, we do not recommend LHC at this time.  - Recommend medical management and agree with addition of amlodipine 5mg daily.  - Will need close follow up w/ Dr. Hines within the next month for further evaluation. She should present sooner if CP worsens or changes prior to cardiology appointment.   - ASA 81 mg daily    Staff attestation to follow.   Thank you for the consult. We will sign off, please call with further questions.    Signing Physician:  Buffy Carter MD

## 2017-03-23 DIAGNOSIS — M54.50 LUMBAR SPINE PAIN: Primary | ICD-10-CM

## 2017-03-29 ENCOUNTER — OFFICE VISIT (OUTPATIENT)
Dept: ORTHOPEDICS | Facility: CLINIC | Age: 77
End: 2017-03-29
Payer: MEDICARE

## 2017-03-29 VITALS
BODY MASS INDEX: 32.61 KG/M2 | HEIGHT: 68 IN | WEIGHT: 215.19 LBS | DIASTOLIC BLOOD PRESSURE: 50 MMHG | HEART RATE: 67 BPM | SYSTOLIC BLOOD PRESSURE: 114 MMHG

## 2017-03-29 DIAGNOSIS — M51.36 DDD (DEGENERATIVE DISC DISEASE), LUMBAR: ICD-10-CM

## 2017-03-29 DIAGNOSIS — M48.00 SPINAL STENOSIS, UNSPECIFIED SPINAL REGION: Primary | ICD-10-CM

## 2017-03-29 DIAGNOSIS — M43.10 SPONDYLOLISTHESIS, UNSPECIFIED SPINAL REGION: ICD-10-CM

## 2017-03-29 PROCEDURE — 3078F DIAST BP <80 MM HG: CPT | Mod: S$GLB,,, | Performed by: PHYSICIAN ASSISTANT

## 2017-03-29 PROCEDURE — 1125F AMNT PAIN NOTED PAIN PRSNT: CPT | Mod: S$GLB,,, | Performed by: PHYSICIAN ASSISTANT

## 2017-03-29 PROCEDURE — 1160F RVW MEDS BY RX/DR IN RCRD: CPT | Mod: S$GLB,,, | Performed by: PHYSICIAN ASSISTANT

## 2017-03-29 PROCEDURE — 1159F MED LIST DOCD IN RCRD: CPT | Mod: S$GLB,,, | Performed by: PHYSICIAN ASSISTANT

## 2017-03-29 PROCEDURE — 99499 UNLISTED E&M SERVICE: CPT | Mod: S$GLB,,, | Performed by: PHYSICIAN ASSISTANT

## 2017-03-29 PROCEDURE — 99999 PR PBB SHADOW E&M-EST. PATIENT-LVL IV: CPT | Mod: PBBFAC,,, | Performed by: PHYSICIAN ASSISTANT

## 2017-03-29 PROCEDURE — 1157F ADVNC CARE PLAN IN RCRD: CPT | Mod: S$GLB,,, | Performed by: PHYSICIAN ASSISTANT

## 2017-03-29 PROCEDURE — 99213 OFFICE O/P EST LOW 20 MIN: CPT | Mod: S$GLB,,, | Performed by: PHYSICIAN ASSISTANT

## 2017-03-29 PROCEDURE — 3074F SYST BP LT 130 MM HG: CPT | Mod: S$GLB,,, | Performed by: PHYSICIAN ASSISTANT

## 2017-03-29 RX ORDER — INSULIN ASPART 100 [IU]/ML
INJECTION, SOLUTION INTRAVENOUS; SUBCUTANEOUS
Refills: 6 | COMMUNITY
Start: 2017-03-10 | End: 2018-01-11 | Stop reason: SDUPTHER

## 2017-03-29 NOTE — PROGRESS NOTES
"DATE: 3/29/2017  PATIENT: Radha Degroot    Attending Physician: Mike Trent M.D.    HISTORY:  Radha Degroot is a 77 y.o. female who returns to me today for follow up of low back pain.  She was last seen by me 4/26/2016.  Today she is doing well but notes she is having worsening right lower back and buttock pain.  She denies leg pain.  The pain is worse walking and improved by sitting.      The Patient denies myelopathic symptoms such as handwriting changes or difficulty with buttons/coins/keys. Denies perineal paresthesias, bowel/bladder dysfunction.    PMH/PSH/FamHx/SocHx:  Unchanged from prior visit    ROS:  REVIEW OF SYSTEMS:  Constitution: Negative. Negative for chills, fever and night sweats.   HENT: Negative for congestion and headaches.    Eyes: Negative for blurred vision, left vision loss and right vision loss.   Cardiovascular: Negative for chest pain and syncope.   Respiratory: Negative for cough and shortness of breath.    Endocrine: Negative for polydipsia, polyphagia and polyuria.   Hematologic/Lymphatic: Negative for bleeding problem. Does not bruise/bleed easily.   Skin: Negative for dry skin, itching and rash.   Musculoskeletal: Negative for falls and muscle weakness.   Gastrointestinal: Negative for abdominal pain and bowel incontinence.   Allergic/Immunologic: Negative for hives and persistent infections.  Genitourinary: Negative for urinary retention/incontinence and nocturia.   Neurological: Negative for disturbances in coordination, no myelopathic symptoms such as handwriting changes or difficulty with buttons, coins, keys or small objects. No loss of balance and seizures.   Psychiatric/Behavioral: Negative for depression. The patient does not have insomnia.   Denies perineal paresthesias, bowel or bladder incontinence    EXAM:  BP (!) 114/50 (BP Location: Left arm, Patient Position: Sitting, BP Method: Automatic)  Pulse 67  Ht 5' 8" (1.727 m)  Wt 97.6 kg (215 lb 2.7 oz)  BMI " 32.72 kg/m2    My physical examination was notable for the following findings:     Normal station and gait, no difficulty with toe or heel walk.   Dorsal lumbar skin negative for rashes, lesions, hairy patches and surgical scars. There is mild left sided lumbar tenderness to palpation.  Lumbar range of motion is acceptable.  Global saggital and coronal spinal balance acceptable, not significant for scoliosis and kyphosis.  No pain with the range of motion of the bilateral hips. No trochanteric tenderness to palpation.  Bilateral lower extremities warm and well perfused, dorsalis pedis pulses 2+ bilaterally.  Normal strength and tone in all major motor groups in the bilateral lower extremities. Normal sensation to light touch in the L2-S1 dermatomes bilaterally.  Deep tendon reflexes symmetric 1+ in the bilateral lower extremities.  Negative Babinski bilaterally. Straight leg raise negative bilaterally.      IMAGING:    Today I personally reviewed AP, Lat and Flex/Ex  upright L-spine that demonstrate grade I anterolisthesis of L3/4.  There is significant L4/5 and L5/S1 disc space narrowing.    MRI lumbar spine from 8/26/2016 demonstrates moderately severe spinal stenosis at L3/4.      ASSESSMENT/PLAN:    Diagnoses and all orders for this visit:    Spinal stenosis, unspecified spinal region    Spondylolisthesis, unspecified spinal region    DDD (degenerative disc disease), lumbar      The patient has spinal stenosis at L3/4.  She cannot take NSAIDs due to CKD.  She has tried PT in the past with little relief.  We discussed ESIs.  She is concerned about her blood sugar being elevated.  I will reach out to her doctor, Dr. Gates to get her input prior to ordering the injection.      Return if symptoms worsen or fail to improve.

## 2017-03-29 NOTE — DISCHARGE SUMMARY
"[facility] Hospital Medicine  Discharge Summary      Patient Name: Radha Degroot  MRN: [id number]  Admission Date: 3/20/2017  Hospital Length of Stay: 0 days  Discharge Date and Time: 3/22/2017  4:53 PM  Attending Physician: No att. providers found   Discharging Provider: Allie Rowley MD  Primary Care Provider: Katherine Merida MD    [facility] Medicine Team: [facility] HOSP MED  Allie Rowley MD    Principal Problem:Chest pain     HPI:  This is a 76 y.o. female who presents with complaint of mild chest pain under her right breast which began prior to arrival. She developed this discomfort while she was sitting down waiting for an x-ray. She also indicates associated fatigue and generalized weakness but denies associated dizziness. Denies shortness of breath, nausea, diaphoresis, palpitations. She also complains of left ear pain which felt like she "had cotton in her ears." The patient reports past MI and that she has had 2 stents placed. She was recommended to come to the ER by the X-ray technician/nurse.      Seen by cardiology in ER and Code stemi cancelled , no official; consult.     Hx of CAD. Stents placed in 2006.        Procedure(s) (LRB):  HEART CATH-LEFT (Left)      Indwelling Lines/Drains at time of discharge:   Lines/Drains/Airways          No matching active lines, drains, or airways        Hospital Course:     Chest pain  Pt was admitted to medicine with cardiology consult following. Pts description of chest pain and reproducibility was atypical for ACS. Trop was negative x 3 w/ no EKG changes. Pt is high risk  (dm2, known cad, htn, hld) thus DSE was done per cardiology recs. DSE on 3/21/17 was abnormal w/ Hypokinetic Apical Anterior wall, Mid inferolateral wall and mid anteroseptum. EKG during stress also w/ ischemic changes at peak HR. Interventional cardiology was then consulted for LHC. Troponin's bumped on 3/22 but this was thought 2/2 to the DSE the day prior. Given pt " denied any symptoms at rest, pts sx were thought NOT 2/2 acute coronary syndrome. Pts RODRIGUEZ - likely angina equivalent - suggested stable CAD with abnormal stress test per cardiology. LHC was deferred as pts symptoms were likely secondary to poorly controlled risk factors. Pt was currently not on optimal medical therapy thus pt's medication was adjusted. Pt was continued on ASA 81 mg, coreg 12.5 mg BID, and lisinopril 40 mg QD. Crestor was increased to 40 mg QD and pt was started on plavix 75 mg QD (not loaded). Pt was also started on SL NTG PRN as well. Pt was discharged to f/u follow up with cardiology - Dr. Clem Mcnally in clinic w/in 2 weeks and then with Dr. Bellamy or Dr. Hines afterwards.      Hypertension - uncontrolled  Pt was hypertensive to the 190s during admission. Pt was continued on coreg 12.5 mg BID, lasix 40 mg QD, Lisinopril 40 mg QD. Amlodipine 5 mg QD was added. Pt was advised on 2 gram sodium diet. She was instructed to check BP daily and keep log of numbers prior to the next clinic visit for further adjustment.      Consults:   Consults         Status Ordering Provider     Inpatient consult to Cardiology  Once     Provider:  (Not yet assigned)    Completed PADMINI GONSALES     Inpatient consult to Interventional Cardiology  Once     Provider:  (Not yet assigned)    Completed GEORGIANA BERNARD          Significant Diagnostic Studies: Cardiac Graphics: Echocardiogram:   2D echo with color flow doppler:   Results for orders placed or performed during the hospital encounter of 05/02/16   2D echo with color flow doppler   Result Value Ref Range    EF 65 55 - 65    Mitral Valve Regurgitation TRIVIAL     Diastolic Dysfunction Yes (A)     Est. PA Systolic Pressure 38     Tricuspid Valve Regurgitation MILD     and     Dobutamine Stress Test: 3/21/17  CONCLUSIONS     1 - Normal left ventricular systolic function (EF 55-60%).     2 - Left ventricular diastolic dysfunction.     3 - Mild left atrial enlargement.      4 - The estimated PA systolic pressure is 28 mmHg.     5 - Normal right ventricular systolic function .     6 - Trivial tricuspid regurgitation.     7 - Low central venous pressure.     Positive stress echocardiographic study demonstrating an ischemic response involving the mid anteroseptum, mid inferolateral wall, apical anterior wall.       Pending Diagnostic Studies:     None        Final Active Diagnoses:    Diagnosis Date Noted POA    Type 2 diabetes mellitus with stage 4 chronic kidney disease, with long-term current use of insulin [E11.22, N18.4, Z79.4] 10/18/2013 Not Applicable      Problems Resolved During this Admission:    Diagnosis Date Noted Date Resolved POA    PRINCIPAL PROBLEM:  Chest pain [R07.9] 03/20/2017 03/22/2017 Unknown    Hypertensive emergency [I16.1] 03/20/2017 03/22/2017 Yes      Discharged Condition: good    Disposition: Home or Self Care    Follow Up:  Follow-up Information     Follow up with Terry Hines MD In 2 weeks.    Specialty:  Cardiology    Contact information:    [address]  [place] [address]  [phone]          Follow up with Mu Nicole MD In 2 weeks.    Specialties:  Cardiology, INTERVENTIONAL CARDIOLOGY    Contact information:    [address] [facility] [address]  [phone]          Patient Instructions:     Ambulatory referral to Cardiology   Referral Priority: Urgent Referral Type: Consultation   Referral Reason: Specialty Services Required    Referred to Provider: MU NICOLE Requested Specialty: Cardiology   Number of Visits Requested: 1      Diet general     Activity as tolerated     Call MD for:  severe uncontrolled pain       Medications:  Reconciled Home Medications:   Discharge Medication List as of 3/22/2017  4:15 PM      START taking these medications    Details   amlodipine (NORVASC) 5 MG tablet Take 1 tablet (5 mg total) by mouth once daily., Starting 3/22/2017, Until Thu 3/22/18, Normal      clopidogrel  "(PLAVIX) 75 mg tablet Take 1 tablet (75 mg total) by mouth once daily., Starting 3/22/2017, Until Thu 3/22/18, Normal      nitroGLYCERIN (NITROSTAT) 0.3 MG SL tablet Place 1 tablet (0.3 mg total) under the tongue every 5 (five) minutes as needed for Chest pain., Starting 3/22/2017, Until Thu 3/22/18, Normal         CONTINUE these medications which have CHANGED    Details   fluticasone (FLONASE) 50 mcg/actuation nasal spray 2 SPRAYS IN EACH NOSTRIL ONCE DAILY, Normal      rosuvastatin (CRESTOR) 40 MG Tab TK 1 T PO QD, Normal         CONTINUE these medications which have NOT CHANGED    Details   aspirin 81 MG Chew Take 81 mg by mouth once daily.  , Until Discontinued, Historical Med      BD ULTRA-FINE TIM PEN NEEDLES 32 gauge x 5/32" Ndle USE FOUR TIMES DAILY FOR BLOOD SUGAR TESTING, Normal      blood sugar diagnostic (ACCU-CHEK ACTIVE TEST) Strp USE TO TEST BLOOD SUGAR FOUR TIMES DAILY, Normal      blood-glucose meter (ACCU-CHEK MELIDA PLUS METER) Misc To check sugars 4 times daily.  Please include control solution, Normal      Ca cmb no.9-L2-E-1-JF-K09-aloe (VITAMIN D-3 WITH ALOE) 120-1,000-10 mg-unit-mg Tab Take by mouth., Until Discontinued, Historical Med      carvedilol (COREG) 12.5 MG tablet Take 0.5 tablets (6.25 mg total) by mouth 2 (two) times daily., Starting 5/18/2016, Until Discontinued, Normal      furosemide (LASIX) 40 MG tablet TAKE 1 TABLET(40 MG) BY MOUTH EVERY DAY, Normal      insulin glargine (LANTUS SOLOSTAR) 100 unit/mL (3 mL) InPn pen Inject 42 units at bedtime, Normal      insulin lispro (HUMALOG KWIKPEN) 100 unit/mL InPn pen Inject 15 units with breakfast 17 units with lunch and dinner plus correction scale 150-200 +1, 201-250 +2, 251-300 +3, 301-350 +4, > 350 +5., Print      ketoconazole (NIZORAL) 2 % shampoo APPLY TO AFFECTED AREA EVERY OTHER DAY AND LET SIT 5 MINUTES PRIOR TO RINSING AS DIRECTED, Normal      lancets (ACCU-CHEK SOFTCLIX LANCETS) Misc 1 each by Misc.(Non-Drug; Combo Route) " route 4 (four) times daily., Starting 3/21/2017, Until Discontinued, Normal      lisinopril (PRINIVIL,ZESTRIL) 40 MG tablet TAKE 1 TABLET(40 MG) BY MOUTH EVERY DAY, Normal      mirabegron 50 mg Tb24 Take 1 tablet (50 mg total) by mouth once daily., Starting 10/14/2016, Until Sat 10/14/17, Normal      MULTIVIT-MIN/IRON/FOLIC/LUTEIN (CENTRUM SILVER WOMEN ORAL) Take by mouth., Until Discontinued, Historical Med      pantoprazole (PROTONIX) 40 MG tablet Take 1 tablet (40 mg total) by mouth once daily., Starting 11/14/2016, Until Discontinued, Normal           Time spent on the discharge of patient: >30 minutes    Allie Rowley MD  Department of Hospital Medicine  [facility]

## 2017-03-31 RX ORDER — PEN NEEDLE, DIABETIC 31 GX5/16"
NEEDLE, DISPOSABLE MISCELLANEOUS
Qty: 200 EACH | Refills: 7 | Status: SHIPPED | OUTPATIENT
Start: 2017-03-31 | End: 2017-06-12 | Stop reason: SDUPTHER

## 2017-03-31 RX ORDER — PEN NEEDLE, DIABETIC 30 GX3/16"
NEEDLE, DISPOSABLE MISCELLANEOUS
Qty: 200 EACH | Refills: 8 | Status: SHIPPED | OUTPATIENT
Start: 2017-03-31 | End: 2019-11-21 | Stop reason: SDUPTHER

## 2017-04-13 ENCOUNTER — OFFICE VISIT (OUTPATIENT)
Dept: CARDIOLOGY | Facility: CLINIC | Age: 77
End: 2017-04-13
Payer: MEDICARE

## 2017-04-13 VITALS
HEIGHT: 68 IN | DIASTOLIC BLOOD PRESSURE: 66 MMHG | SYSTOLIC BLOOD PRESSURE: 142 MMHG | BODY MASS INDEX: 32.71 KG/M2 | WEIGHT: 215.81 LBS | HEART RATE: 62 BPM

## 2017-04-13 DIAGNOSIS — I25.83 CORONARY ARTERY DISEASE DUE TO LIPID RICH PLAQUE: Chronic | ICD-10-CM

## 2017-04-13 DIAGNOSIS — E11.22 TYPE 2 DIABETES MELLITUS WITH STAGE 4 CHRONIC KIDNEY DISEASE, WITH LONG-TERM CURRENT USE OF INSULIN: ICD-10-CM

## 2017-04-13 DIAGNOSIS — E78.2 MIXED HYPERLIPIDEMIA: Chronic | ICD-10-CM

## 2017-04-13 DIAGNOSIS — N18.4 CHRONIC KIDNEY DISEASE, STAGE IV (SEVERE): Chronic | ICD-10-CM

## 2017-04-13 DIAGNOSIS — I25.10 CORONARY ARTERY DISEASE DUE TO LIPID RICH PLAQUE: Chronic | ICD-10-CM

## 2017-04-13 DIAGNOSIS — Z79.4 TYPE 2 DIABETES MELLITUS WITH STAGE 4 CHRONIC KIDNEY DISEASE, WITH LONG-TERM CURRENT USE OF INSULIN: ICD-10-CM

## 2017-04-13 DIAGNOSIS — I10 ESSENTIAL HYPERTENSION: Primary | Chronic | ICD-10-CM

## 2017-04-13 DIAGNOSIS — I25.5 CARDIOMYOPATHY, ISCHEMIC: Chronic | ICD-10-CM

## 2017-04-13 DIAGNOSIS — N18.4 TYPE 2 DIABETES MELLITUS WITH STAGE 4 CHRONIC KIDNEY DISEASE, WITH LONG-TERM CURRENT USE OF INSULIN: ICD-10-CM

## 2017-04-13 PROCEDURE — 3078F DIAST BP <80 MM HG: CPT | Mod: S$GLB,,, | Performed by: INTERNAL MEDICINE

## 2017-04-13 PROCEDURE — 1159F MED LIST DOCD IN RCRD: CPT | Mod: S$GLB,,, | Performed by: INTERNAL MEDICINE

## 2017-04-13 PROCEDURE — 99213 OFFICE O/P EST LOW 20 MIN: CPT | Mod: S$GLB,,, | Performed by: INTERNAL MEDICINE

## 2017-04-13 PROCEDURE — 99999 PR PBB SHADOW E&M-EST. PATIENT-LVL III: CPT | Mod: PBBFAC,,, | Performed by: INTERNAL MEDICINE

## 2017-04-13 PROCEDURE — 1157F ADVNC CARE PLAN IN RCRD: CPT | Mod: S$GLB,,, | Performed by: INTERNAL MEDICINE

## 2017-04-13 PROCEDURE — 99499 UNLISTED E&M SERVICE: CPT | Mod: S$GLB,,, | Performed by: INTERNAL MEDICINE

## 2017-04-13 PROCEDURE — 1160F RVW MEDS BY RX/DR IN RCRD: CPT | Mod: S$GLB,,, | Performed by: INTERNAL MEDICINE

## 2017-04-13 PROCEDURE — 1125F AMNT PAIN NOTED PAIN PRSNT: CPT | Mod: S$GLB,,, | Performed by: INTERNAL MEDICINE

## 2017-04-13 PROCEDURE — 3074F SYST BP LT 130 MM HG: CPT | Mod: S$GLB,,, | Performed by: INTERNAL MEDICINE

## 2017-04-13 NOTE — LETTER
April 15, 2017      Allie Rowley MD  [address]  [facility] [address]           [facility]  [address]  [facility] [address]  Phone: [phone]          Patient: Radha Degroot   MR Number: [id number]   YOB: 1940   Date of Visit: 4/13/2017       Dear Dr. Allie Rowley:    Thank you for referring Radha Degroot to me for evaluation. Attached you will find relevant portions of my assessment and plan of care.    If you have questions, please do not hesitate to call me. I look forward to following Radha Degroot along with you.    Sincerely,    Eric Mcnally MD    Enclosure  CC:  No Recipients    If you would like to receive this communication electronically, please contact [email or web address] or [phone] to request more information on [employer] access.    For providers and/or their staff who would like to refer a patient to [facility], please contact us through our one-stop-shop provider referral line, [facility], at [phone].    If you feel you have received this communication in error or would no longer like to receive these types of communications, please e-mail [email or web address]

## 2017-04-13 NOTE — MR AVS SNAPSHOT
[facility]  [address]  [facility] [address]  Phone: [phone]                  Radha Degroot   2017 2:00 PM   Office Visit    Description:  Female : 1940   Provider:  Eric Mcnally MD   Department:  [facility]           Reason for Visit     Coronary Artery Disease                To Do List           Future Appointments        Provider Department Dept Phone    2017 10:30 AM WB CT1 LIMIT 400 LBS [facility] [phone]    2017 11:30 AM DIABETES EDUCATOR, INT MED 2 [facility] -  Diabetes Program [phone]    2017 11:20 AM Iveth Greene MD [facility] - Urology [phone]    2017 10:30 AM Terri Mcmanus MD [facility] - Nephrology [phone]    2017 2:00 PM Oswaldo Wilkerson MD [place] [phone]      Your Future Surgeries/Procedures     2017   Surgery with Onesimo Posey MD   [facility]    [address]  [facility] [address] [phone]              Goals (5 Years of Data)     None      [facility]     [facility] Care Line -  Assistance  Unless otherwise directed by your provider, please contact [facility], our nurse care line that is available for  assistance.     Registered nurses in the [facility] provide: appointment scheduling, clinical advisement, health education, and other advisory services.  Call: [phone] (toll free)               Medications           Message regarding Medications     Verify the changes and/or additions to your medication regime listed below are the same as discussed with your clinician today.  If any of these changes or additions are incorrect, please notify your healthcare provider.             Verify that the below list of medications is an accurate representation of the medications you are currently taking.  If none  "reported, the list may be blank. If incorrect, please contact your healthcare provider. Carry this list with you in case of emergency.           Current Medications     amlodipine (NORVASC) 5 MG tablet Take 1 tablet (5 mg total) by mouth once daily.    aspirin 81 MG Chew Take 81 mg by mouth once daily.      BD ULTRA-FINE TIM PEN NEEDLES 32 gauge x 5/32" Ndle USE FOUR TIMES DAILY FOR BLOOD SUGAR TESTING    blood sugar diagnostic (ACCU-CHEK ACTIVE TEST) Strp USE TO TEST BLOOD SUGAR FOUR TIMES DAILY    blood-glucose meter (ACCU-CHEK MELIDA PLUS METER) Misc To check sugars 4 times daily.  Please include control solution    Ca cmb no.9-F0-D-7-UK-E70-aloe (VITAMIN D-3 WITH ALOE) 120-1,000-10 mg-unit-mg Tab Take by mouth.    carvedilol (COREG) 12.5 MG tablet Take 0.5 tablets (6.25 mg total) by mouth 2 (two) times daily.    clopidogrel (PLAVIX) 75 mg tablet Take 1 tablet (75 mg total) by mouth once daily.    fluticasone (FLONASE) 50 mcg/actuation nasal spray 2 SPRAYS IN EACH NOSTRIL ONCE DAILY    furosemide (LASIX) 40 MG tablet TAKE 1 TABLET(40 MG) BY MOUTH EVERY DAY    insulin glargine (LANTUS SOLOSTAR) 100 unit/mL (3 mL) InPn pen Inject 42 units at bedtime    insulin lispro (HUMALOG KWIKPEN) 100 unit/mL InPn pen Inject 15 units with breakfast 17 units with lunch and dinner plus correction scale 150-200 +1, 201-250 +2, 251-300 +3, 301-350 +4, > 350 +5.    ketoconazole (NIZORAL) 2 % shampoo APPLY TO AFFECTED AREA EVERY OTHER DAY AND LET SIT 5 MINUTES PRIOR TO RINSING AS DIRECTED    lancets (ACCU-CHEK SOFTCLIX LANCETS) Misc 1 each by Misc.(Non-Drug; Combo Route) route 4 (four) times daily.    lisinopril (PRINIVIL,ZESTRIL) 40 MG tablet TAKE 1 TABLET(40 MG) BY MOUTH EVERY DAY    mirabegron 50 mg Tb24 Take 1 tablet (50 mg total) by mouth once daily.    MULTIVIT-MIN/IRON/FOLIC/LUTEIN (CENTRUM SILVER WOMEN ORAL) Take by mouth.    nitroGLYCERIN (NITROSTAT) 0.3 MG SL tablet Place 1 tablet (0.3 mg total) under the tongue every 5 " "(five) minutes as needed for Chest pain.    NOVOLOG FLEXPEN 100 unit/mL InPn pen     pantoprazole (PROTONIX) 40 MG tablet Take 1 tablet (40 mg total) by mouth once daily.    pen needle, diabetic (BD ULTRA-FINE TIM PEN NEEDLES) 32 gauge x 5/32" Ndle USE FOUR TIMES DAILY FOR BLOOD SUGAR TESTING    rosuvastatin (CRESTOR) 40 MG Tab TK 1 T PO QD           Clinical Reference Information           Your Vitals Were     Height Weight BMI          5' 8" (1.727 m) 97.9 kg (215 lb 13.3 oz) 32.82 kg/m2        Allergies as of 4/13/2017     Heparin Analogues    Ancef [Cefazolin]    Keflex [Cephalexin]    Oxybutynin      Immunizations Administered on Date of Encounter - 4/13/2017     None      Language Assistance Services     ATTENTION: Language assistance services are available, free of charge. Please call [phone].      ATENCIÓN: Si habla español, tiene a san disposición servicios gratuitos de asistencia lingüística. Llame al [phone].     MYRNA Ý: N?u b?n nói Ti?ng Vi?t, có các d?ch v? h? tr? ngôn ng? mi?n phí dành cho b?n. G?i s? [phone].         [facility] complies with applicable Federal civil rights laws and does not discriminate on the basis of race, color, national origin, age, disability, or sex.        "

## 2017-04-15 NOTE — PROGRESS NOTES
Subjective:    Patient ID:  Radha Degroot is a 77 y.o. female who presents for follow-up of Coronary Artery Disease      HPI   Mrs. Degroot is 76 y/o woman with a h/o CAD, HTN, dyslipidemia, h/o HIT, DM2, and CKD4 who presents for follow-up after a hospital admission in March 2017 with an NSTEMI.  At the time of her presentation with typical angina her blood pressure was high. She was placed on GDMT.  She did not undergo early invasive management of her NSTEMI due to her very high risk of GEETA.  Since being placed on GDMT she denies angina.  She denies LE edema, orthopnea, or PND. She denies palpitations, syncope or near syncope.  Mrs. Degroot's only compliant today is her chronic lumbar back pain.  Her back pain is exacerbated by standing still for more than 1 minute without support to lean on or by walking 45-50 feet.  As a consequence of her back pain, she has not been exercising.  However she reports that she has an upcoming appointment in spine clinic.    Past Medical History:   Diagnosis Date    ALLERGIC RHINITIS     Anemia     Cardiomyopathy, ischemic     Carpal tunnel syndrome     Cataract     Left eye    CHF (congestive heart failure)     Chronic kidney disease     Coronary artery disease     hx stent X2    Diabetes mellitus type II     Diabetic neuropathy     Diabetic retinopathy of both eyes     GERD (gastroesophageal reflux disease)     hiatal hernia    Gout, unspecified     h/o LAD and D1 PCI in 2006 6/17/2013    Heart attack 2006    Hiatal hernia     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    Hx of colonic polyps     1/9    Hx of colonic polyps     Hyperlipidemia     Hypertension     Myocardial infarction nov 2006    Posterior vitreous detachment of both eyes     Sleep apnea     Type 2 diabetes mellitus with mild nonproliferative diabetic retinopathy with macular edema 2/15/2013     Past Surgical History:   Procedure Laterality Date    APPENDECTOMY      CATARACT  "EXTRACTION      Right eye    heart stent      X 2    HYSTERECTOMY      Fibroids     Current Outpatient Prescriptions on File Prior to Visit   Medication Sig Dispense Refill    amlodipine (NORVASC) 5 MG tablet Take 1 tablet (5 mg total) by mouth once daily. 30 tablet 1    aspirin 81 MG Chew Take 81 mg by mouth once daily.        BD ULTRA-FINE TIM PEN NEEDLES 32 gauge x 5/32" Ndle USE FOUR TIMES DAILY FOR BLOOD SUGAR TESTING 200 each 7    blood sugar diagnostic (ACCU-CHEK ACTIVE TEST) Strp USE TO TEST BLOOD SUGAR FOUR TIMES DAILY 360 strip 3    blood-glucose meter (ACCU-CHEK MELIDA PLUS METER) Misc To check sugars 4 times daily.  Please include control solution 1 each 0    Ca cmb no.2-[id number]-6-MS-P40-aloe (VITAMIN D-3 WITH ALOE) 120-1,000-10 mg-unit-mg Tab Take by mouth.      carvedilol (COREG) 12.5 MG tablet Take 0.5 tablets (6.25 mg total) by mouth 2 (two) times daily. 60 tablet 11    clopidogrel (PLAVIX) 75 mg tablet Take 1 tablet (75 mg total) by mouth once daily. 30 tablet 11    fluticasone (FLONASE) 50 mcg/actuation nasal spray 2 SPRAYS IN EACH NOSTRIL ONCE DAILY 16 g 6    furosemide (LASIX) 40 MG tablet TAKE 1 TABLET(40 MG) BY MOUTH EVERY DAY 30 tablet 6    insulin glargine (LANTUS SOLOSTAR) 100 unit/mL (3 mL) InPn pen Inject 42 units at bedtime 30 mL 6    insulin lispro (HUMALOG KWIKPEN) 100 unit/mL InPn pen Inject 15 units with breakfast 17 units with lunch and dinner plus correction scale 150-200 +1, 201-250 +2, 251-300 +3, 301-350 +4, > 350 +5. 45 mL 6    ketoconazole (NIZORAL) 2 % shampoo APPLY TO AFFECTED AREA EVERY OTHER DAY AND LET SIT 5 MINUTES PRIOR TO RINSING AS DIRECTED 120 mL 3    lancets (ACCU-CHEK SOFTCLIX LANCETS) Misc 1 each by Misc.(Non-Drug; Combo Route) route 4 (four) times daily. 360 each 3    lisinopril (PRINIVIL,ZESTRIL) 40 MG tablet TAKE 1 TABLET(40 MG) BY MOUTH EVERY DAY 30 tablet 6    mirabegron 50 mg Tb24 Take 1 tablet (50 mg total) by mouth once daily. 30 tablet 11 " "   MULTIVIT-MIN/IRON/FOLIC/LUTEIN (CENTRUM SILVER WOMEN ORAL) Take by mouth.      nitroGLYCERIN (NITROSTAT) 0.3 MG SL tablet Place 1 tablet (0.3 mg total) under the tongue every 5 (five) minutes as needed for Chest pain. 30 tablet 3    NOVOLOG FLEXPEN 100 unit/mL InPn pen   6    pantoprazole (PROTONIX) 40 MG tablet Take 1 tablet (40 mg total) by mouth once daily. 90 tablet 4    pen needle, diabetic (BD ULTRA-FINE TIM PEN NEEDLES) 32 gauge x 5/32" Ndle USE FOUR TIMES DAILY FOR BLOOD SUGAR TESTING 200 each 8    rosuvastatin (CRESTOR) 40 MG Tab TK 1 T PO QD 30 tablet 6     No current facility-administered medications on file prior to visit.      Review of patient's allergies indicates:   Allergen Reactions    Heparin analogues Other (See Comments)     thrombocytopenia    Ancef [cefazolin]     Keflex [cephalexin] Rash    Oxybutynin Other (See Comments)     Metallic sloan, chest symptoms      Social History   Substance Use Topics    Smoking status: Never Smoker    Smokeless tobacco: Never Used    Alcohol use No     Family History   Problem Relation Age of Onset    Diabetes Mother     Heart disease Mother     Hypertension Mother     Diabetes Father     Melanoma Father     Kidney failure Father     Mental illness Sister      suicide/schizophrenic    Cancer Maternal Grandfather     Cancer Paternal Grandfather      colon    Psoriasis Neg Hx     Lupus Neg Hx     Eczema Neg Hx          Review of Systems   Constitution: Negative for diaphoresis, fever, weakness, malaise/fatigue and weight gain.   HENT: Negative for congestion, headaches, hearing loss, nosebleeds and sore throat.    Eyes: Negative for visual disturbance.   Cardiovascular: Negative for chest pain, claudication, dyspnea on exertion, irregular heartbeat, leg swelling, near-syncope, orthopnea, palpitations, paroxysmal nocturnal dyspnea and syncope.   Respiratory: Negative for cough, hemoptysis, shortness of breath and wheezing.    Endocrine: " "Negative for polyuria.   Hematologic/Lymphatic: Negative for bleeding problem. Does not bruise/bleed easily.   Skin: Negative for poor wound healing and rash.   Musculoskeletal: Positive for back pain. Negative for myalgias.   Gastrointestinal: Negative for abdominal pain, change in bowel habit, constipation, diarrhea, dysphagia, heartburn, hematemesis, melena, nausea and vomiting.   Genitourinary: Negative for bladder incontinence and dysuria.   Neurological: Negative for focal weakness.   Psychiatric/Behavioral: Negative for depression.   Allergic/Immunologic: Negative for hives and persistent infections.        Objective:  Vitals:    04/13/17 1402 04/13/17 1411   BP: 137/61 (!) 142/66   Pulse: 62    Weight: 97.9 kg (215 lb 13.3 oz)    Height: 5' 8" (1.727 m)          Physical Exam   Constitutional: She appears well-developed and well-nourished.   HENT:   Head: Normocephalic and atraumatic.   Eyes: EOM are normal. Pupils are equal, round, and reactive to light. Right eye exhibits no discharge. No scleral icterus.   Neck: No JVD present. No thyromegaly present.   Cardiovascular: Normal rate and regular rhythm.  PMI is not displaced.  Exam reveals no gallop.    No murmur heard.  Pulses:       Carotid pulses are 2+ on the right side, and 2+ on the left side.       Radial pulses are 2+ on the right side, and 2+ on the left side.        Femoral pulses are 2+ on the right side, and 2+ on the left side.       Dorsalis pedis pulses are 2+ on the right side, and 2+ on the left side.        Posterior tibial pulses are 2+ on the right side, and 2+ on the left side.   Pulmonary/Chest: Effort normal and breath sounds normal.   Abdominal: Soft. Bowel sounds are normal. There is no hepatosplenomegaly. There is no tenderness.   Lymphadenopathy:     She has no cervical adenopathy.   Neurological: She is alert. Gait normal.   Skin: Skin is warm, dry and intact. No rash noted. She is not diaphoretic. No erythema.   Psychiatric: She " has a normal mood and affect.         Assessment:       1. Essential hypertension    2. Mixed hyperlipidemia    3. Coronary artery disease due to lipid rich plaque    4. Cardiomyopathy, ischemic    5. Type 2 diabetes mellitus with stage 4 chronic kidney disease, with long-term current use of insulin    6. Chronic kidney disease, stage IV (severe)         Plan:       1. Ischemic cardiomyopathy. The patient has a DSE during her March 2017 admission that demonstrated normal LVEF, but diastolic dysfunction.  She is euvolemic by exam today and has not had angina or CHF symtoms since hospital discharge.  -continue Coreg 12.5mg po BID  -continue lisinopril 40mg po qday  -continue current lasix dose  -continue EC ASA 81mg po qday  -continue Plavix 75g po qday X 1 year; patient may temporarily stop Plavix if spine injections are deemed necessary by spine clinic  -rec heart healthy, low sodium diet     2. HTN.  The patient's bood pressure is not adequately controlled in clinic today; however she is having lumbar back pain; if BP remains elevated during subsequent clinic visits, consider increasing Norvasc dose    3. Dyslipidemia. 2/16/17 lipid panel reviewed; continue high intensity statin    4. H/o GERD. asymptomatic. Continue PPI while on DAPT    5. DM2. rec tight glycemic control    The patient would like to follow-up in cardiology fellows' clinic with Dr. Gabe Santoyo who she met during during her recent hospital admission.  Will make referral.  All of the patient's questions were answered.

## 2017-04-19 ENCOUNTER — PATIENT MESSAGE (OUTPATIENT)
Dept: DIABETES | Facility: CLINIC | Age: 77
End: 2017-04-19

## 2017-04-19 DIAGNOSIS — N18.4 CKD (CHRONIC KIDNEY DISEASE), STAGE IV: Primary | ICD-10-CM

## 2017-04-24 ENCOUNTER — TELEPHONE (OUTPATIENT)
Dept: ENDOCRINOLOGY | Facility: CLINIC | Age: 77
End: 2017-04-24

## 2017-04-24 DIAGNOSIS — I10 ESSENTIAL HYPERTENSION: Chronic | ICD-10-CM

## 2017-04-24 RX ORDER — CARVEDILOL 12.5 MG/1
TABLET ORAL
Qty: 180 TABLET | Refills: 1 | Status: SHIPPED | OUTPATIENT
Start: 2017-04-24 | End: 2017-08-07 | Stop reason: SDUPTHER

## 2017-04-24 NOTE — TELEPHONE ENCOUNTER
----- Message from Naina Mancini sent at 4/24/2017  1:48 PM CDT -----  Contact: pt  Friday  Taking back injection         What can she do to offset  It from raises blood sugar??       Take med   Take more med ??    How long does it elevate sugar???    Please call [phone]    Thanks

## 2017-04-24 NOTE — TELEPHONE ENCOUNTER
Patient will start steroid shots for her back on 4/28/17.  Patient instructed to check sugars more often then usual and to fax over logs if they start trending high and will make meds adjustments accordingly.  Provided fax number

## 2017-04-28 ENCOUNTER — SURGERY (OUTPATIENT)
Age: 77
End: 2017-04-28

## 2017-04-28 ENCOUNTER — HOSPITAL ENCOUNTER (OUTPATIENT)
Facility: OTHER | Age: 77
Discharge: HOME | End: 2017-04-28
Attending: ANESTHESIOLOGY | Admitting: ANESTHESIOLOGY
Payer: MEDICARE

## 2017-04-28 VITALS
BODY MASS INDEX: 32.13 KG/M2 | OXYGEN SATURATION: 95 % | HEART RATE: 56 BPM | DIASTOLIC BLOOD PRESSURE: 65 MMHG | SYSTOLIC BLOOD PRESSURE: 148 MMHG | HEIGHT: 68 IN | TEMPERATURE: 99 F | WEIGHT: 212 LBS | RESPIRATION RATE: 18 BRPM

## 2017-04-28 DIAGNOSIS — M54.17 LUMBOSACRAL RADICULOPATHY: ICD-10-CM

## 2017-04-28 DIAGNOSIS — G89.29 CHRONIC PAIN: ICD-10-CM

## 2017-04-28 DIAGNOSIS — M51.36 DDD (DEGENERATIVE DISC DISEASE), LUMBAR: Primary | ICD-10-CM

## 2017-04-28 LAB — POCT GLUCOSE: 168 MG/DL (ref 70–110)

## 2017-04-28 PROCEDURE — 25000003 PHARM REV CODE 250: Performed by: ANESTHESIOLOGY

## 2017-04-28 PROCEDURE — 25500020 PHARM REV CODE 255: Performed by: ANESTHESIOLOGY

## 2017-04-28 PROCEDURE — 99152 MOD SED SAME PHYS/QHP 5/>YRS: CPT | Mod: ,,, | Performed by: ANESTHESIOLOGY

## 2017-04-28 PROCEDURE — 63600175 PHARM REV CODE 636 W HCPCS: Performed by: ANESTHESIOLOGY

## 2017-04-28 PROCEDURE — 64483 NJX AA&/STRD TFRM EPI L/S 1: CPT | Mod: 50 | Performed by: ANESTHESIOLOGY

## 2017-04-28 PROCEDURE — 64483 NJX AA&/STRD TFRM EPI L/S 1: CPT | Mod: 50,,, | Performed by: ANESTHESIOLOGY

## 2017-04-28 RX ORDER — DEXAMETHASONE SODIUM PHOSPHATE 10 MG/ML
INJECTION INTRAMUSCULAR; INTRAVENOUS
Status: DISCONTINUED | OUTPATIENT
Start: 2017-04-28 | End: 2017-04-28 | Stop reason: HOSPADM

## 2017-04-28 RX ORDER — SODIUM CHLORIDE 9 MG/ML
500 INJECTION, SOLUTION INTRAVENOUS CONTINUOUS
Status: DISCONTINUED | OUTPATIENT
Start: 2017-04-28 | End: 2017-04-28 | Stop reason: HOSPADM

## 2017-04-28 RX ORDER — LIDOCAINE HYDROCHLORIDE 10 MG/ML
INJECTION INFILTRATION; PERINEURAL
Status: DISCONTINUED | OUTPATIENT
Start: 2017-04-28 | End: 2017-04-28 | Stop reason: HOSPADM

## 2017-04-28 RX ORDER — FENTANYL CITRATE 50 UG/ML
INJECTION, SOLUTION INTRAMUSCULAR; INTRAVENOUS
Status: DISCONTINUED | OUTPATIENT
Start: 2017-04-28 | End: 2017-04-28 | Stop reason: HOSPADM

## 2017-04-28 RX ORDER — MIDAZOLAM HYDROCHLORIDE 1 MG/ML
INJECTION INTRAMUSCULAR; INTRAVENOUS
Status: DISCONTINUED | OUTPATIENT
Start: 2017-04-28 | End: 2017-04-28 | Stop reason: HOSPADM

## 2017-04-28 RX ORDER — LIDOCAINE HYDROCHLORIDE 5 MG/ML
INJECTION, SOLUTION INFILTRATION; INTRAVENOUS
Status: DISCONTINUED | OUTPATIENT
Start: 2017-04-28 | End: 2017-04-28 | Stop reason: HOSPADM

## 2017-04-28 RX ADMIN — IOHEXOL 5 ML: 300 INJECTION, SOLUTION INTRAVENOUS at 01:04

## 2017-04-28 RX ADMIN — LIDOCAINE HYDROCHLORIDE 10 ML: 10 INJECTION, SOLUTION INFILTRATION; PERINEURAL at 01:04

## 2017-04-28 RX ADMIN — LIDOCAINE HYDROCHLORIDE 10 ML: 5 INJECTION, SOLUTION INFILTRATION; INTRAVENOUS at 01:04

## 2017-04-28 RX ADMIN — SODIUM CHLORIDE 500 ML: 0.9 INJECTION, SOLUTION INTRAVENOUS at 01:04

## 2017-04-28 RX ADMIN — MIDAZOLAM HYDROCHLORIDE 2 MG: 1 INJECTION, SOLUTION INTRAMUSCULAR; INTRAVENOUS at 01:04

## 2017-04-28 RX ADMIN — FENTANYL CITRATE 50 MCG: 50 INJECTION, SOLUTION INTRAMUSCULAR; INTRAVENOUS at 01:04

## 2017-04-28 RX ADMIN — DEXAMETHASONE SODIUM PHOSPHATE 10 MG: 10 INJECTION, SOLUTION INTRAMUSCULAR; INTRAVENOUS at 01:04

## 2017-04-28 NOTE — IP AVS SNAPSHOT
[facility]  [address] [place], [place] [address]  Phone: [phone]           Patient Discharge Instructions   Our goal is to set you up for success. This packet includes information on your condition, medications, and your home care.  It will help you care for yourself to prevent having to return to the hospital.     Please ask your nurse if you have any questions.      There are many details to remember when preparing to leave the hospital. Here is what you will need to do:    1. Take your medicine. If you are prescribed medications, review your Medication List on the following pages. You may have new medications to  at the pharmacy and others that you'll need to stop taking. Review the instructions for how and when to take your medications. Talk with your doctor or nurses if you are unsure of what to do.     2. Go to your follow-up appointments. Specific follow-up information is listed in the following pages. Your may be contacted by a nurse or clinical provider about future appointments. Be sure we have all of the phone numbers to reach you. Please contact your provider's office if you are unable to make an appointment.     3. Watch for warning signs. Your doctor or nurse will give you detailed warning signs to watch for and when to call for assistance. These instructions may also include educational information about your condition. If you experience any of warning signs to your health, call your doctor.           [facility]  Unless otherwise directed by your provider, please   contact [facility], our nurse care line   that is available for 24/7 assistance.     [phone] (toll-free)     Registered nurses in the [facility]   provide: appointment scheduling, clinical advisement, health education, and other advisory services.                  ** Verify the list of medication(s) below is accurate and up to date. Carry this with you in case of  "emergency. If your medications have changed, please notify your healthcare provider.             Medication List      ASK your doctor about these medications        Additional Info                      amlodipine 5 MG tablet   Commonly known as:  NORVASC   Quantity:  30 tablet   Refills:  1   Dose:  5 mg    Instructions:  Take 1 tablet (5 mg total) by mouth once daily.     Begin Date    AM    Noon    PM    Bedtime       aspirin 81 MG Chew   Refills:  0   Dose:  81 mg    Instructions:  Take 81 mg by mouth once daily.     Begin Date    AM    Noon    PM    Bedtime       * BD ULTRA-FINE TIM PEN NEEDLES 32 gauge x 5/32" Ndle   Quantity:  200 each   Refills:  7   Generic drug:  pen needle, diabetic    Instructions:  USE FOUR TIMES DAILY FOR BLOOD SUGAR TESTING     Begin Date    AM    Noon    PM    Bedtime       * pen needle, diabetic 32 gauge x 5/32" Ndle   Commonly known as:  BD ULTRA-FINE TIM PEN NEEDLES   Quantity:  200 each   Refills:  8    Instructions:  USE FOUR TIMES DAILY FOR BLOOD SUGAR TESTING     Begin Date    AM    Noon    PM    Bedtime       blood sugar diagnostic Strp   Commonly known as:  ACCU-CHEK ACTIVE TEST   Quantity:  360 strip   Refills:  3   Comments:  **Patient requests 90 days supply**    Instructions:  USE TO TEST BLOOD SUGAR FOUR TIMES DAILY     Begin Date    AM    Noon    PM    Bedtime       blood-glucose meter Misc   Commonly known as:  ACCU-CHEK MELIDA PLUS METER   Quantity:  1 each   Refills:  0   Comments:  Please include control solution    Instructions:  To check sugars 4 times daily.  Please include control solution     Begin Date    AM    Noon    PM    Bedtime       * carvedilol 12.5 MG tablet   Commonly known as:  COREG   Quantity:  60 tablet   Refills:  11   Dose:  6.25 mg    Instructions:  Take 0.5 tablets (6.25 mg total) by mouth 2 (two) times daily.     Begin Date    AM    Noon    PM    Bedtime       * carvedilol 12.5 MG tablet   Commonly known as:  COREG   Quantity:  180 tablet "   Refills:  1    Instructions:  TAKE 1 TABLET BY MOUTH TWICE DAILY WTH MEALS     Begin Date    AM    Noon    PM    Bedtime       CENTRUM SILVER WOMEN ORAL   Refills:  0    Instructions:  Take by mouth.     Begin Date    AM    Noon    PM    Bedtime       clopidogrel 75 mg tablet   Commonly known as:  PLAVIX   Quantity:  30 tablet   Refills:  11   Dose:  75 mg    Instructions:  Take 1 tablet (75 mg total) by mouth once daily.     Begin Date    AM    Noon    PM    Bedtime       fluticasone 50 mcg/actuation nasal spray   Commonly known as:  FLONASE   Quantity:  16 g   Refills:  6    Instructions:  2 SPRAYS IN EACH NOSTRIL ONCE DAILY     Begin Date    AM    Noon    PM    Bedtime       furosemide 40 MG tablet   Commonly known as:  LASIX   Quantity:  30 tablet   Refills:  6    Instructions:  TAKE 1 TABLET(40 MG) BY MOUTH EVERY DAY     Begin Date    AM    Noon    PM    Bedtime       insulin glargine 100 unit/mL (3 mL) Inpn pen   Commonly known as:  LANTUS SOLOSTAR   Quantity:  30 mL   Refills:  6    Instructions:  Inject 42 units at bedtime     Begin Date    AM    Noon    PM    Bedtime       insulin lispro 100 unit/mL Inpn pen   Commonly known as:  HUMALOG KWIKPEN   Quantity:  45 mL   Refills:  6   Comments:  **Patient requests 90 days supply**    Instructions:  Inject 15 units with breakfast 17 units with lunch and dinner plus correction scale 150-200 +1, 201-250 +2, 251-300 +3, 301-350 +4, > 350 +5.     Begin Date    AM    Noon    PM    Bedtime       ketoconazole 2 % shampoo   Commonly known as:  NIZORAL   Quantity:  120 mL   Refills:  3    Instructions:  APPLY TO AFFECTED AREA EVERY OTHER DAY AND LET SIT 5 MINUTES PRIOR TO RINSING AS DIRECTED     Begin Date    AM    Noon    PM    Bedtime       lancets Misc   Commonly known as:  ACCU-CHEK SOFTCLIX LANCETS   Quantity:  360 each   Refills:  3   Dose:  1 each    Instructions:  1 each by Misc.(Non-Drug; Combo Route) route 4 (four) times daily.     Begin Date    AM    Noon     PM    Bedtime       lisinopril 40 MG tablet   Commonly known as:  PRINIVIL,ZESTRIL   Quantity:  30 tablet   Refills:  6    Instructions:  TAKE 1 TABLET(40 MG) BY MOUTH EVERY DAY     Begin Date    AM    Noon    PM    Bedtime       mirabegron 50 mg Tb24   Quantity:  30 tablet   Refills:  11   Dose:  50 mg    Instructions:  Take 1 tablet (50 mg total) by mouth once daily.     Begin Date    AM    Noon    PM    Bedtime       nitroGLYCERIN 0.3 MG SL tablet   Commonly known as:  NITROSTAT   Quantity:  30 tablet   Refills:  3   Dose:  0.3 mg    Instructions:  Place 1 tablet (0.3 mg total) under the tongue every 5 (five) minutes as needed for Chest pain.     Begin Date    AM    Noon    PM    Bedtime       NOVOLOG FLEXPEN 100 unit/mL Inpn pen   Refills:  6   Generic drug:  insulin aspart      Begin Date    AM    Noon    PM    Bedtime       pantoprazole 40 MG tablet   Commonly known as:  PROTONIX   Quantity:  90 tablet   Refills:  4   Dose:  40 mg   Comments:  **Patient requests 90 days supply**    Instructions:  Take 1 tablet (40 mg total) by mouth once daily.     Begin Date    AM    Noon    PM    Bedtime       rosuvastatin 40 MG Tab   Commonly known as:  CRESTOR   Quantity:  30 tablet   Refills:  6    Instructions:  TK 1 T PO QD     Begin Date    AM    Noon    PM    Bedtime       VITAMIN D-3 WITH ALOE 120-1,000-10 mg-unit-mg Tab   Refills:  0   Generic drug:  Ca cmb [id number]    Instructions:  Take by mouth.     Begin Date    AM    Noon    PM    Bedtime       * Notice:  This list has 4 medication(s) that are the same as other medications prescribed for you. Read the directions carefully, and ask your doctor or other care provider to review them with you.               Please bring to all follow up appointments:    1. A copy of your discharge instructions.  2. All medicines you are currently taking in their original bottles.  3. Identification and insurance card.    Please arrive 15 minutes ahead of scheduled  appointment time.    Please call 24 hours in advance if you must reschedule your appointment and/or time.        Your Scheduled Appointments     May 01, 2017 10:00 AM CDT   Urine with SPECIMEN, SAIDA   [facility]    [address] [place] [address] [phone]            May 01, 2017 10:15 AM CDT   Non-Fasting Lab with LAB, SADIA   [facility]    [address] [place] [address] [phone]            May 04, 2017 10:30 AM CDT   Established Patient Visit with MD Logan Malone - Nephrology ([facility] )    [address]  [place] [address] [phone]            May 11, 2017 10:00 AM CDT   Audiogram with AUDIOGRAM, AUDIO   Logan Arredondo - Audiology ([facility] )    [address]  [place] [address] [phone]            May 11, 2017 11:00 AM CDT   Consult with MD Logan Amado III - Otorhinolaryngology ([facility] )    [address]  [place] [address] [phone]                  Discharge Instructions       Home Care Instructions Pain Management:    1. DIET:   You may resume your normal diet today.   2. BATHING:   You may shower with luke warm water. No soaking in tub.  3. DRESSING:   You may remove your bandage today.   4. ACTIVITY LEVEL:   You may resume your normal activities 24 hrs after your procedure.  5. MEDICATIONS:   You may resume your normal medications today.   6. SPECIAL INSTRUCTIONS:   No heat to the injection site for 24 hrs including, bath or shower, heating pad, moist heat, or hot tubs.    Use ice pack to injection site for any pain or discomfort.  Apply ice packs for 20 minute intervals as needed.   If you have received any sedatives by mouth today you may not drive for 12 hours.    If you have received any sedation through your IV, you may not drive for 24 hrs.     PLEASE CALL YOUR DOCTOR  "IF:  1. Redness or swelling around the injection site.  2. Fever of 101 degrees  3. Drainage (pus) from the injection site.  4. For any continuous bleeding (some dried blood over the incision is normal.)  5. For severe headache that is relieved when lying flat.    FOR EMERGENCIES:   If any unusual problems or difficulties occur during clinic hours, call [phone] or [phone].         Admission Information     Date & Time Provider Department [facility]    4/28/2017 11:43 AM Onesimo Posey MD [facility] [id number]      Care Providers     Provider Role Specialty Primary office phone    Onesimo Posey MD Attending Provider Pain Medicine [phone]    Onesimo Posey MD Surgeon  Pain Medicine [phone]      Your Vitals Were     BP Pulse Temp Resp Height Weight    150/65 53 98.6 °F (37 °C) (Oral) 17 5' 8" (1.727 m) 96.2 kg (212 lb)    SpO2 BMI             96% 32.23 kg/m2         Recent Lab Values        5/19/2015 9/2/2015 12/4/2015 3/2/2016 5/3/2016 7/11/2016 11/11/2016 2/16/2017      9:36 AM  9:05 AM  9:29 AM  9:56 AM  5:22 AM 10:52 AM 11:10 AM  9:04 AM    A1C 8.3 (H) 9.0 (H) 8.9 (H) 10.7 (H) 8.1 (H) 8.7 (H) 8.0 (H) 7.8 (H)    Comment for A1C at 10:52 AM on 7/11/2016:  According to ADA guidelines, hemoglobin A1C <7.0% represents  optimal control in non-pregnant diabetic patients.  Different  metrics may apply to specific populations.   Standards of Medical Care in Diabetes - 2016.  For the purpose of screening for the presence of diabetes:  <5.7%     Consistent with the absence of diabetes  5.7-6.4%  Consistent with increasing risk for diabetes   (prediabetes)  >or=6.5%  Consistent with diabetes  Currently no consensus exists for use of hemoglobin A1C  for diagnosis of diabetes for children.      Comment for A1C at 11:10 AM on 11/11/2016:  According to ADA guidelines, hemoglobin A1C <7.0% represents  optimal control in non-pregnant diabetic patients.  Different  metrics may apply to specific " populations.   Standards of Medical Care in Diabetes - 2016.  For the purpose of screening for the presence of diabetes:  <5.7%     Consistent with the absence of diabetes  5.7-6.4%  Consistent with increasing risk for diabetes   (prediabetes)  >or=6.5%  Consistent with diabetes  Currently no consensus exists for use of hemoglobin A1C  for diagnosis of diabetes for children.      Comment for A1C at  9:04 AM on 2/16/2017:  According to ADA guidelines, hemoglobin A1C <7.0% represents  optimal control in non-pregnant diabetic patients.  Different  metrics may apply to specific populations.   Standards of Medical Care in Diabetes - 2016.  For the purpose of screening for the presence of diabetes:  <5.7%     Consistent with the absence of diabetes  5.7-6.4%  Consistent with increasing risk for diabetes   (prediabetes)  >or=6.5%  Consistent with diabetes  Currently no consensus exists for use of hemoglobin A1C  for diagnosis of diabetes for children.        Allergies as of 4/28/2017        Reactions    Heparin Analogues Other (See Comments)    thrombocytopenia    Ancef [Cefazolin]     Keflex [Cephalexin] Rash    Oxybutynin Other (See Comments)    Metallic sloan, chest symptoms       Advance Directives     An advance directive is a document which, in the event you are no longer able to make decisions for yourself, tells your healthcare team what kind of treatment you do or do not want to receive, or who you would like to make those decisions for you.  If you do not currently have an advance directive, [facility] encourages you to create one.  For more information call:  [phone]), [phone] ([phone]),  or log on to [email or web address].        Language Assistance Services     ATTENTION: Language assistance services are available, free of charge. Please call [phone].      ATENCIÓN: Si habla español, tiene a san disposición servicios gratuitos de asistencia lingüística. Llame al  [phone].     [facility] Ý: N?u b?n nói Ti?ng Vi?t, có các d?ch v? h? tr? ngôn ng? mi?n phí dành cho b?n. G?i s? [phone].        Heart Failure Education       Heart Failure: Being Active  You have a condition called heart failure. Being active doesnt mean that you have to wear yourself out. Even a little movement each day helps to strengthen your heart. If you cant get out to exercise, you can do simple stretching and strengthening exercises at home. These are good ways to keep you well-conditioned and prevent you and your heart from becoming excessively weak.    Ideas to get you started  · Add a little movement to things you do now. Walk to mail letters. Park your car at the far end of the parking lot and walk to the store. Walk up a flight of stairs instead of taking the elevator.  · Choose activities you enjoy. You might walk, swim, or ride an exercise bike. Things like gardening and washing the car count, too. Other possibilities include: washing dishes, walking the dog, walking around the mall, and doing aerobic activities with friends.  · Join a group exercise program at a [facility], a senior center, or a community center. Or look into a hospital cardiac rehabilitation program. Ask your doctor if you qualify.  Tips to keep you going  · Get up and get dressed each day. Go to a coffee shop and read a newspaper or go somewhere that you'll be in the presence of other active people. Youll feel more like being active.  · Make a plan. Choose one or more activities that you enjoy and that you can easily do. Then plan to do at least one each day. You might write your plan on a calendar.  · Go with a friend or a group if you like company. This can help you feel supported and stay motivated, too.  · Plan social events that you enjoy. This will keep you mentally engaged as well as physically motivated to do things you find pleasure in.  For your safety  · Talk with your healthcare provider before starting an  exercise program.  · Exercise indoors when its too hot or too cold outside, or when the air quality is poor. Try walking at a shopping mall.  · Wear socks and sturdy shoes to maintain your balance and prevent falls.  · Start slowly. Do a few minutes several times a day at first. Increase your time and speed little by little.  · Stop and rest whenever you feel tired or get short of breath.  · Dont push yourself on days when you dont feel well.  Date Last Reviewed: 3/20/2016  © 5137-9670 [employer]. [address] [place] [address]. All rights reserved. This information is not intended as a substitute for professional medical care. Always follow your healthcare professional's instructions.              Heart Failure: Evaluating Your Heart  You have a condition called heart failure. To evaluate your condition, your doctor will examine you, ask questions, and do some tests. Along with looking for signs of heart failure, the doctor looks for any other health problems that may have led to heart failure. The results of your evaluation will help your doctor form a treatment plan.  Health history and physical exam  Your visit will start with a health history. Tell the doctor about any symptoms youve noticed and about all medicines you take. Then youll have a physical exam. This includes listening to your heartbeat and breathing. Youll also be checked for swelling (edema) in your legs and neck. When you have fluid buildup or fluid in the lungs, it may be called congestive heart failure.  Diagnosing heart failure     During an echocardiogram, sound waves bounce off the heart. These are converted into a picture on the screen.   The following may be done to help your doctor form a diagnosis:  · X-rays show the size and shape of your heart. These pictures can also show fluid in your lungs.  · An electrocardiogram (ECG or EKG) shows the pattern of your heartbeat. Small pads (electrodes) are placed on  your chest, arms, and legs. Wires connect the pads to the ECG machine, which records your hearts electrical signals. This can give the doctor information about heart function.  · An echocardiogram uses ultrasound waves to show the structure and movement of your heart muscle. This shows how well the heart pumps. It also shows the thickness of the heart walls, and if the heart is enlarged. It is one of the most useful, non-invasive tests as it provides information about the heart's general function. This helps your doctor make treatment decisions.  · Lab tests evaluate small amounts of blood or urine for signs of problems. A BNP lab test can help diagnose and evaluate heart failure. BNP stands for B-type natriuretic peptide. The ventricles secrete more BNP when heart failure worsens. Lab tests can also provide information about metabolic dysfunction or heart dysfunction.  Your treatment plan  Based on the results of your evaluation and tests, your doctor will develop a treatment plan. This plan is designed to relieve some of your heart failure symptoms and help make you more comfortable. Your treatment plan may include:  · Medicine to help your heart work better and improve your quality of life  · Changes in what you eat and drink to help prevent fluid from backing up in your body  · Daily monitoring of your weight and heart failure symptoms to see how well your treatment plan is working  · Exercise to help you stay healthy  · Help with quitting smoking  · Emotional and psychological support to help adjust to the changes  · Referrals to other specialists to make sure you are being treated comprehensively  Date Last Reviewed: 3/21/2016  © 9680-8008 [employer]. [address] [place] [address], [place] [address]. All rights reserved. This information is not intended as a substitute for professional medical care. Always follow your healthcare professional's instructions.              Heart Failure: Making Changes  to Your Diet  You have a condition called heart failure. When you have heart failure, excess fluid is more likely to build up in your body because your heart isn't working well. This makes the heart work harder to pump blood. Fluid buildup causes symptoms such as shortness of breath and swelling (edema). This is often referred to as congestive heart failure or CHF. Controlling the amount of salt (sodium) you eat may help stop fluid from building up. Your doctor may also tell you to reduce the amount of fluid you drink.  Reading food labels    Your healthcare provider will tell you how much sodium you can eat each day. Read food labels to keep track. Keep in mind that certain foods are high in salt. These include canned, frozen, and processed foods. Check the amount of sodium in each serving. Watch out for high-sodium ingredients. These include MSG (monosodium glutamate), baking soda, and sodium phosphate.   Eating less salt  Give yourself time to get used to eating less salt. It may take a little while. Here are some tips to help:  · Take the saltshaker off the table. Replace it with salt-free herb mixes and spices.  · Eat fresh or plain frozen vegetables. These have much less salt than canned vegetables.  · Choose low-sodium snacks like sodium-free pretzels, crackers, or air-popped popcorn.  · Dont add salt to your food when youre cooking. Instead, season your foods with pepper, lemon, garlic, or onion.  · When you eat out, ask that your food be cooked without added salt.  · Avoid eating fried foods as these often have a great deal of salt.  If youre told to limit fluids  You may need to limit how much fluid you have to help prevent swelling. This includes anything that is liquid at room temperature, such as ice cream and soup. If your doctor tells you to limit fluid, try these tips:  · Measure drinks in a measuring cup before you drink them. This will help you meet daily goals.  · Chill drinks to make them more  refreshing.  · Suck on frozen lemon wedges to quench thirst.  · Only drink when youre thirsty.  · Chew sugarless gum or suck on hard candy to keep your mouth moist.  · Weigh yourself daily to know if your body's fluid content is rising.  My sodium goal  Your healthcare provider may give you a sodium goal to meet each day. This includes sodium found in food as well as salt that you add. My goal is to eat no more than ___________ mg of sodium per day.     When to call your doctor  Call your doctor right away if you have any symptoms of worsening heart failure. These can include:  · Sudden weight gain  · Increased swelling of your legs or ankles  · Trouble breathing when youre resting or at night  · Increase in the number of pillows you have to sleep on  · Chest pain, pressure, discomfort, or pain in the jaw, neck, or back   Date Last Reviewed: 3/21/2016  © 3193-3522 [employer]. [address] [place] [address]. All rights reserved. This information is not intended as a substitute for professional medical care. Always follow your healthcare professional's instructions.              Heart Failure: Medicines to Help Your Heart    You have a condition called heart failure (also known as congestive heart failure, or CHF). Your doctor will likely prescribe medicines for heart failure and any underlying health problems you have. Most heart failure patients take one or more types of medicinen. Your healthcare provider will work to find the combination of medicines that works best for you.  Heart failure medicines  Here are the most common heart failure medicines:  · ACE inhibitors lower blood pressure and decrease strain on the heart. This makes it easier for the heart to pump. Angiotensin receptor blockers have similar effects. These are prescribed for some patients instead of ACE inhibitors.  · Beta-blockers relieve stress on the heart. They also improve symptoms. They may also improve the heart's  pumping action over time.  · Diuretics (also called water pills) help rid your body of excess water. This can help rid your body of swelling (edema). Having less fluid to pump means your heart doesnt have to work as hard. Some diuretics make your body lose a mineral called potassium. Your doctor will tell you if you need to take supplements or eat more foods high in potassium.  · Digoxin helps your heart pump with more strength. This helps your heart pump more blood with each beat. So, more oxygen-rich blood travels to the rest of the body.  · Aldosterone antagonists help alter hormones and decrease strain on the heart.  · Hydralazine and nitrates are two separate medicines used together to treat heart failure. They may come in one combination pill. They lower blood pressure and decrease how hard the heart has to pump.  Medicines for related conditions  Controlling other heart problems helps keep heart failure under control, too. Depending on other heart problems you have, medicines may be prescribed to:  · Lower blood pressure (antihypertensives).  · Lower cholesterol levels (statins).  · Prevent blood clots (anticoagulants or aspirin).  · Keep the heartbeat steady (antiarrhythmics).  Date Last Reviewed: 3/5/2016  © 9494-8237 [employer]. [address] [place] [address], [place] [address]. All rights reserved. This information is not intended as a substitute for professional medical care. Always follow your healthcare professional's instructions.              Heart Failure: Procedures That May Help    The heart is a muscle that pumps oxygen-rich blood to all parts of the body. When you have heart failure, the heart is not able to pump as well as it should. Blood and fluid may back up into the lungs (congestive heart failure), and some parts of the body dont get enough oxygen-rich blood to work normally. These problems lead to the symptoms of heart failure.     Certain procedures may help the heart pump  better in some cases of heart failure. Some procedures are done to treat health problems that may have caused the heart failure such as coronary artery disease or heart rhythm problems. For more serious heart failure, other options are available.  Treating artery and valve problems  If you have coronary artery disease or valve disease, procedures may be done to improve blood flow. This helps the heart pump better, which can improve heart failure symptoms. First, your doctor may do a cardiac catheterization to help detect clogged blood vessels or valve damage. During this procedure, a  thin tube (catheter) in inserted into a blood vessel and guided to the heart. There a dye is injected and a special type of X-ray (angiogram) is taken of the blood vessels. Procedures to open a blocked artery or fix damaged valves can also be done using catheterization.  · Angioplasty uses a balloon-tipped instrument at the end of the catheter. The balloon is inflated to widen the narrowed artery. In many cases, a stent is expanded to further support the narrowed artery. A stent is a metal mesh tube.  · Valve surgery repairs or replacement of faulty valves can also be done during catheterization so blood can flow properly through the chambers of the heart.  Bypass surgery is another option to help treat blocked arteries. It uses a healthy blood vessel from elsewhere in the body. The healthy blood vessel is attached above and below the blocked area so that blood can flow around the blocked artery.  Treating heart rhythm problems  A device may be placed in the chest to help a weak heart maintain a healthy, heartbeat so the heart can pump more effectively:  · Pacemaker. A pacemaker is an implanted device that regulates your heartbeat electronically. It monitors your heart's rhythm and generates a painless electric impulse that helps the heart beat in a regular rhythm. A pacemaker is programmed to meet your specific heart rhythm  needs.  · Biventricular pacing/cardiac resynchronization therapy. A type of pacemaker that paces both pumping chambers of the heart at the same time to coordinate contractions and to improve the heart's function. Some people with heart failure are candidates for this therapy.  · Implantable cardioverter defibrillator. A device similar to a pacemaker that senses when the heart is beating too fast and delivers an electrical shock to convert the fast rhythm to a normal rhythm. This can be a life saving device.  In severe cases  In more serious cases of heart failure when other treatments no longer work, other options may include:  · Ventricular assist devices (VADs). These are mechanical devices used to take over the pumping function for one or both of the heart's ventricles, or pumping chambers. A VAD may be necessary when heart failure progresses to the point that medicines and other treatments no longer help. In some cases, a VAD may be used as a bridge to transplant.  · Heart transplant. This is replacing the diseased heart with a healthy one from a donor. This is an option for a few people who are very sick. A heart transplant is very serious and not an option for all patients. Your doctor can tell you more.  Date Last Reviewed: 3/20/2016  © 4620-3672 [employer]. [address] [place] [address], [place] [address]. All rights reserved. This information is not intended as a substitute for professional medical care. Always follow your healthcare professional's instructions.              Heart Failure: Tracking Your Weight  You have a condition called heart failure. When you have heart failure, a sudden weight gain or a steady rise in weight is a warning sign that your body is retaining too much water and salt. This could mean your heart failure is getting worse. If left untreated, it can cause problems for your lungs and result in shortness of breath. Weighing yourself each day is the best way to know if youre  retaining water. If your weight goes up quickly, call your doctor. You will be given instructions on how to get rid of the excess water. You will likely need medicines and to avoid salt. This will help your heart work better.  Call your doctor if you gain more than 2 pounds in 1 day, more than 5 pounds in 1 week, or whatever weight gain you were told to report by your doctor. This is often a sign of worsening heart failure and needs to be evaluated and treated. Your doctor will tell you what to do next.   Tips for weighing yourself    · Weigh yourself at the same time each morning, wearing the same clothes. Weigh yourself after urinating and before eating.  · Use the same scale each day. Make sure the numbers are easy to read. Put the scale on a flat, hard surface -- not on a rug or carpet.  · Do not stop weighing yourself. If you forget one day, weigh again the next morning.  How to use your weight chart  · Keep your weight chart near the scale. Write your weight on the chart as soon as you get off the scale.  · Fill in the month and the start date on the chart. Then write down your weight each day. Your chart will look like this:    · If you miss a day, leave the space blank. Weigh yourself the next day and write your weight in the next space.  · Take your weight chart with you when you go to see your doctor.  Date Last Reviewed: 3/20/2016  © 4312-3971 [employer]. [address] [place] [address], [place] [address]. All rights reserved. This information is not intended as a substitute for professional medical care. Always follow your healthcare professional's instructions.              Heart Failure: Warning Signs of a Flare-Up  You have a condition called heart failure. Once you have heart failure, flare-ups can happen. Below are signs that can mean your heart failure is getting worse. If you notice any of these warning signs, call your healthcare provider.  Swelling    · Your feet, ankles, or lower legs get  puffier.  · You notice skin changes on your lower legs.  · Your shoes feel too tight.  · Your clothes are tighter in the waist.  · You have trouble getting rings on or off your fingers.  Shortness of breath  · You have to breathe harder even when youre doing your normal activities or when youre resting.  · You are short of breath walking up stairs or even short distances.  · You wake up at night short of breath or coughing.  · You need to use more pillows or sit up to sleep.  · You wake up tired or restless.  Other warning signs  · You feel weaker, dizzy, or more tired.  · You have chest pain or changes in your heartbeat.  · You have a cough that wont go away.  · You cant remember things or dont feel like eating.  Tracking your weight  Gaining weight is often the first warning sign that heart failure is getting worse. Gaining even a few pounds can be a sign that your body is retaining excess water and salt. Weighing yourself each day in the morning after you urinate and before you eat, is the best way to know if you're retaining water. Get a scale that is easy to read and make sure you wear the same clothes and use the same scale every time you weigh. Your healthcare provider will show you how to track your weight. Call your doctor if you gain more than 2 pounds in 1 day, 5 pounds in 1 week, or whatever weight gain you were told to report by your doctor. This is often a sign of worsening heart failure and needs to be evaluated and treated before it compromises your breathing. Your doctor will tell you what to do next.    Date Last Reviewed: 3/15/2016  © 6068-1871 [employer]. [address] [place] [address], [place] [address]. All rights reserved. This information is not intended as a substitute for professional medical care. Always follow your healthcare professional's instructions.              Chronic Kindey Disease Education             Diabetes Discharge Instructions                                     [facility] complies with applicable Federal civil rights laws and does not discriminate on the basis of race, color, national origin, age, disability, or sex.

## 2017-04-28 NOTE — OP NOTE
Patient Name: Radha Degroot  MRN: [id number]    INFORMED CONSENT: The procedure, risks, benefits and options were discussed with patient. There are no contraindications to the procedure. The patient expressed understanding and agreed to proceed. The personnel performing the procedure was discussed. I verify that I personally obtained Radha's consent prior to the start of the procedure and the signed consent can be found on the patient's chart.    Procedure Date: 04/28/2017    Anesthesia: Topical    Pre Procedure diagnosis:   1. DDD (degenerative disc disease), lumbar    2. Lumbosacral radiculopathy    3. Chronic pain        Post-Procedure diagnosis: same      Sedation: Yes - Fentanyl 50 mcg and Midazolam 2 mg    PROCEDURE:Bilateral L3-4   TRANSFORAMINAL EPIDURAL STEROID INJECTION        DESCRIPTION OF PROCEDURE: The patient was brought to the procedure room. After performing time out IV access was obtained prior to the procedure. The patient was positioned prone on the fluoroscopy table. Continuous hemodynamic monitoring was initiated including blood pressure, EKG, and pulse oximetry. . The skin was prepped with chlorhexidine three times and draped in a sterile fashion. Skin anesthesia was achieved using 3 mL of lidocaine 1% over the respective injection site.     An oblique fluoroscopic view was obtained, with the superior articular process of the inferior vertebral body aligned with the pedicle. The tip of a 22-gauge 3.5-inch Quincke-type spinal needle was advanced toward the 6 oclock position of the pedicle under intermittent fluoroscopic guidance. Confirmation of proper needle position was made with AP, oblique, and lateral fluoroscopic views. Negative aspiration for blood or CSF was confirmed. 2 mL of Omnipaque 300 was injected. Live fluoroscopic imaging revealed a clear outline of the spinal nerve with proximal spread of agent through the neural foramen into the anterior epidural space. A total combination of  3 mL of Lidocaine 0.5% and 10 mg decadron was injected at each level. Contrast spread was noted from L3 to L5 level. There was no pain on injection. The needle was removed and bleeding was nil.  A sterile dressing was applied. Radha was taken back to the recovery room for further observation.     Blood Loss: Nill  Specimen: None    Onesimo Posey MD

## 2017-04-28 NOTE — DISCHARGE INSTRUCTIONS

## 2017-04-28 NOTE — DISCHARGE SUMMARY
"Discharge Note  Short Stay      SUMMARY     Admit Date: 4/28/2017    Attending Physician: Onesimo Posey      Discharge Physician: Onesimo Posey      Discharge Date: 4/28/2017 1:48 PM    Final Diagnosis:   1. DDD (degenerative disc disease), lumbar    2. Lumbosacral radiculopathy    3. Chronic pain        Disposition: Home or self care    Patient Instructions:   Current Discharge Medication List      CONTINUE these medications which have NOT CHANGED    Details   amlodipine (NORVASC) 5 MG tablet Take 1 tablet (5 mg total) by mouth once daily.  Qty: 30 tablet, Refills: 1      aspirin 81 MG Chew Take 81 mg by mouth once daily.        !! BD ULTRA-FINE TIM PEN NEEDLES 32 gauge x 5/32" Ndle USE FOUR TIMES DAILY FOR BLOOD SUGAR TESTING  Qty: 200 each, Refills: 7      blood sugar diagnostic (ACCU-CHEK ACTIVE TEST) Strp USE TO TEST BLOOD SUGAR FOUR TIMES DAILY  Qty: 360 strip, Refills: 3    Comments: **Patient requests 90 days supply**  Associated Diagnoses: Type II or unspecified type diabetes mellitus without mention of complication, uncontrolled      blood-glucose meter (ACCU-CHEK MELIDA PLUS METER) Misc To check sugars 4 times daily.  Please include control solution  Qty: 1 each, Refills: 0    Comments: Please include control solution  Associated Diagnoses: Type II or unspecified type diabetes mellitus without mention of complication, uncontrolled      Ca cmb no.5-[id number]-aloe (VITAMIN D-3 WITH ALOE) 120-1,000-10 mg-unit-mg Tab Take by mouth.      !! carvedilol (COREG) 12.5 MG tablet Take 0.5 tablets (6.25 mg total) by mouth 2 (two) times daily.  Qty: 60 tablet, Refills: 11    Associated Diagnoses: Essential hypertension      !! carvedilol (COREG) 12.5 MG tablet TAKE 1 TABLET BY MOUTH TWICE DAILY WTH MEALS  Qty: 180 tablet, Refills: 1    Associated Diagnoses: Essential hypertension      fluticasone (FLONASE) 50 mcg/actuation nasal spray 2 SPRAYS IN EACH NOSTRIL ONCE DAILY  Qty: 16 g, Refills: 6      furosemide " "(LASIX) 40 MG tablet TAKE 1 TABLET(40 MG) BY MOUTH EVERY DAY  Qty: 30 tablet, Refills: 6      insulin glargine (LANTUS SOLOSTAR) 100 unit/mL (3 mL) InPn pen Inject 42 units at bedtime  Qty: 30 mL, Refills: 6      insulin lispro (HUMALOG KWIKPEN) 100 unit/mL InPn pen Inject 15 units with breakfast 17 units with lunch and dinner plus correction scale 150-200 +1, 201-250 +2, 251-300 +3, 301-350 +4, > 350 +5.  Qty: 45 mL, Refills: 6    Comments: **Patient requests 90 days supply**      ketoconazole (NIZORAL) 2 % shampoo APPLY TO AFFECTED AREA EVERY OTHER DAY AND LET SIT 5 MINUTES PRIOR TO RINSING AS DIRECTED  Qty: 120 mL, Refills: 3      lancets (ACCU-CHEK SOFTCLIX LANCETS) Misc 1 each by Misc.(Non-Drug; Combo Route) route 4 (four) times daily.  Qty: 360 each, Refills: 3    Associated Diagnoses: Type II or unspecified type diabetes mellitus without mention of complication, uncontrolled      lisinopril (PRINIVIL,ZESTRIL) 40 MG tablet TAKE 1 TABLET(40 MG) BY MOUTH EVERY DAY  Qty: 30 tablet, Refills: 6      mirabegron 50 mg Tb24 Take 1 tablet (50 mg total) by mouth once daily.  Qty: 30 tablet, Refills: 11      MULTIVIT-MIN/IRON/FOLIC/LUTEIN (CENTRUM SILVER WOMEN ORAL) Take by mouth.      pantoprazole (PROTONIX) 40 MG tablet Take 1 tablet (40 mg total) by mouth once daily.  Qty: 90 tablet, Refills: 4    Comments: **Patient requests 90 days supply**      !! pen needle, diabetic (BD ULTRA-FINE TIM PEN NEEDLES) 32 gauge x 5/32" Ndle USE FOUR TIMES DAILY FOR BLOOD SUGAR TESTING  Qty: 200 each, Refills: 8      rosuvastatin (CRESTOR) 40 MG Tab TK 1 T PO QD  Qty: 30 tablet, Refills: 6      clopidogrel (PLAVIX) 75 mg tablet Take 1 tablet (75 mg total) by mouth once daily.  Qty: 30 tablet, Refills: 11      nitroGLYCERIN (NITROSTAT) 0.3 MG SL tablet Place 1 tablet (0.3 mg total) under the tongue every 5 (five) minutes as needed for Chest pain.  Qty: 30 tablet, Refills: 3      NOVOLOG FLEXPEN 100 unit/mL InPn pen Refills: 6       !! " - Potential duplicate medications found. Please discuss with provider.              Discharge Diagnosis: Same as Pre and Post Procedure  Condition on Discharge: Stable.  Diet on Discharge: Same as before.  Activity: as per instruction sheet.  Discharge to: Home with a responsible adult.  Follow up: as per Discharge instructions.

## 2017-05-01 ENCOUNTER — LAB VISIT (OUTPATIENT)
Dept: LAB | Facility: HOSPITAL | Age: 77
End: 2017-05-01
Attending: INTERNAL MEDICINE
Payer: MEDICARE

## 2017-05-01 DIAGNOSIS — N18.4 CKD (CHRONIC KIDNEY DISEASE), STAGE IV: ICD-10-CM

## 2017-05-01 LAB
AMORPH CRY UR QL COMP ASSIST: ABNORMAL
BACTERIA #/AREA URNS AUTO: ABNORMAL /HPF
BILIRUB UR QL STRIP: NEGATIVE
CLARITY UR REFRACT.AUTO: ABNORMAL
COLOR UR AUTO: YELLOW
CREAT UR-MCNC: 91 MG/DL
GLUCOSE UR QL STRIP: NEGATIVE
GRAN CASTS UR QL COMP ASSIST: 2 /LPF
HGB UR QL STRIP: ABNORMAL
HYALINE CASTS UR QL AUTO: 4 /LPF
KETONES UR QL STRIP: NEGATIVE
LEUKOCYTE ESTERASE UR QL STRIP: NEGATIVE
MICROSCOPIC COMMENT: ABNORMAL
NITRITE UR QL STRIP: NEGATIVE
PH UR STRIP: 5 [PH] (ref 5–8)
PROT UR QL STRIP: NEGATIVE
PROT UR-MCNC: 24 MG/DL
PROT/CREAT RATIO, UR: 0.26
RBC #/AREA URNS AUTO: 2 /HPF (ref 0–4)
SP GR UR STRIP: 1.01 (ref 1–1.03)
SQUAMOUS #/AREA URNS AUTO: 3 /HPF
URN SPEC COLLECT METH UR: ABNORMAL
UROBILINOGEN UR STRIP-ACNC: NEGATIVE EU/DL
WBC #/AREA URNS AUTO: 2 /HPF (ref 0–5)

## 2017-05-01 PROCEDURE — 84156 ASSAY OF PROTEIN URINE: CPT

## 2017-05-01 PROCEDURE — 81001 URINALYSIS AUTO W/SCOPE: CPT

## 2017-05-05 RX ORDER — INSULIN GLARGINE 100 [IU]/ML
INJECTION, SOLUTION SUBCUTANEOUS
Qty: 30 ML | Refills: 6 | Status: SHIPPED | OUTPATIENT
Start: 2017-05-05 | End: 2018-01-22 | Stop reason: SDUPTHER

## 2017-05-19 ENCOUNTER — LAB VISIT (OUTPATIENT)
Dept: LAB | Facility: HOSPITAL | Age: 77
End: 2017-05-19
Attending: INTERNAL MEDICINE
Payer: MEDICARE

## 2017-05-19 DIAGNOSIS — Z79.4 TYPE 2 DIABETES MELLITUS WITH HYPOGLYCEMIA WITHOUT COMA, WITH LONG-TERM CURRENT USE OF INSULIN: ICD-10-CM

## 2017-05-19 DIAGNOSIS — E11.649 TYPE 2 DIABETES MELLITUS WITH HYPOGLYCEMIA WITHOUT COMA, WITH LONG-TERM CURRENT USE OF INSULIN: ICD-10-CM

## 2017-05-19 DIAGNOSIS — E78.2 MIXED HYPERLIPIDEMIA: Chronic | ICD-10-CM

## 2017-05-19 DIAGNOSIS — E55.9 VITAMIN D DEFICIENCY DISEASE: ICD-10-CM

## 2017-05-19 DIAGNOSIS — I10 ESSENTIAL HYPERTENSION: Chronic | ICD-10-CM

## 2017-05-19 LAB
ALBUMIN SERPL BCP-MCNC: 3.8 G/DL
ALP SERPL-CCNC: 75 U/L
ALT SERPL W/O P-5'-P-CCNC: 12 U/L
ANION GAP SERPL CALC-SCNC: 12 MMOL/L
AST SERPL-CCNC: 13 U/L
BILIRUB SERPL-MCNC: 0.4 MG/DL
BUN SERPL-MCNC: 62 MG/DL
CALCIUM SERPL-MCNC: 9.5 MG/DL
CHLORIDE SERPL-SCNC: 110 MMOL/L
CO2 SERPL-SCNC: 21 MMOL/L
CREAT SERPL-MCNC: 2.6 MG/DL
EST. GFR  (AFRICAN AMERICAN): 19.6 ML/MIN/1.73 M^2
EST. GFR  (NON AFRICAN AMERICAN): 17 ML/MIN/1.73 M^2
GLUCOSE SERPL-MCNC: 80 MG/DL
POTASSIUM SERPL-SCNC: 4.4 MMOL/L
PROT SERPL-MCNC: 7.1 G/DL
SODIUM SERPL-SCNC: 143 MMOL/L

## 2017-05-19 PROCEDURE — 80053 COMPREHEN METABOLIC PANEL: CPT

## 2017-05-19 PROCEDURE — 36415 COLL VENOUS BLD VENIPUNCTURE: CPT | Mod: PO

## 2017-05-19 PROCEDURE — 84681 ASSAY OF C-PEPTIDE: CPT

## 2017-05-19 PROCEDURE — 83036 HEMOGLOBIN GLYCOSYLATED A1C: CPT

## 2017-05-19 PROCEDURE — 86341 ISLET CELL ANTIBODY: CPT

## 2017-05-20 LAB
ESTIMATED AVG GLUCOSE: 180 MG/DL
HBA1C MFR BLD HPLC: 7.9 %

## 2017-05-22 ENCOUNTER — TELEPHONE (OUTPATIENT)
Dept: PAIN MEDICINE | Facility: CLINIC | Age: 77
End: 2017-05-22

## 2017-05-22 LAB — C PEPTIDE SERPL-MCNC: 1.61 NG/ML

## 2017-05-22 NOTE — TELEPHONE ENCOUNTER
"Contacted and spoke to patient regarding message, she stated " her procedure on 04/28/2017, has not decreased her pain at this time. She would like to know what is her next step?     She was informed by the staff, that she was a direct referral and normally the patient follow up with that referring physician. She does have a appointment with Dr. Posey on 06/15/2017, shew as offered a sooner appointment with the NP, patient declined. She stated she will wait until her appt with Dr. Posey.  "

## 2017-05-22 NOTE — TELEPHONE ENCOUNTER
----- Message from Elisa Polanco sent at 5/22/2017  8:26 AM CDT -----  _  1st Request  _  2nd Request  _  3rd Request        Who: patient    Why: please call pt, she had an injection on 4/28/17 and is still in severe pain.     What Number to Call Back: [phone]    When to Expect a call back: (Before the end of the day)   -- if the call is after 12:00, the call back will be tomorrow.

## 2017-05-23 LAB — GAD65 AB SER-SCNC: 0 NMOL/L

## 2017-05-29 ENCOUNTER — HOSPITAL ENCOUNTER (OUTPATIENT)
Dept: RADIOLOGY | Facility: HOSPITAL | Age: 77
Discharge: HOME | End: 2017-05-29
Attending: UROLOGY
Payer: MEDICARE

## 2017-05-29 DIAGNOSIS — N28.89 RENAL MASS: ICD-10-CM

## 2017-05-29 DIAGNOSIS — N39.46 MIXED INCONTINENCE URGE AND STRESS: ICD-10-CM

## 2017-05-29 PROCEDURE — 74176 CT ABD & PELVIS W/O CONTRAST: CPT | Mod: TC

## 2017-05-29 PROCEDURE — 74176 CT ABD & PELVIS W/O CONTRAST: CPT | Mod: 26,,, | Performed by: RADIOLOGY

## 2017-06-05 ENCOUNTER — OFFICE VISIT (OUTPATIENT)
Dept: UROLOGY | Facility: CLINIC | Age: 77
End: 2017-06-05
Payer: MEDICARE

## 2017-06-05 VITALS
SYSTOLIC BLOOD PRESSURE: 112 MMHG | DIASTOLIC BLOOD PRESSURE: 58 MMHG | HEIGHT: 68 IN | HEART RATE: 68 BPM | BODY MASS INDEX: 32.48 KG/M2 | RESPIRATION RATE: 16 BRPM | WEIGHT: 214.31 LBS

## 2017-06-05 DIAGNOSIS — N39.46 MIXED INCONTINENCE URGE AND STRESS: ICD-10-CM

## 2017-06-05 DIAGNOSIS — D17.71 ANGIOMYOLIPOMA OF RIGHT KIDNEY: ICD-10-CM

## 2017-06-05 DIAGNOSIS — N28.89 RENAL MASS: Primary | ICD-10-CM

## 2017-06-05 PROCEDURE — 1126F AMNT PAIN NOTED NONE PRSNT: CPT | Mod: S$GLB,,, | Performed by: UROLOGY

## 2017-06-05 PROCEDURE — 1159F MED LIST DOCD IN RCRD: CPT | Mod: S$GLB,,, | Performed by: UROLOGY

## 2017-06-05 PROCEDURE — 99214 OFFICE O/P EST MOD 30 MIN: CPT | Mod: S$GLB,,, | Performed by: UROLOGY

## 2017-06-05 PROCEDURE — 99999 PR PBB SHADOW E&M-EST. PATIENT-LVL III: CPT | Mod: PBBFAC,,, | Performed by: UROLOGY

## 2017-06-05 NOTE — PROGRESS NOTES
"  Subjective:       Radha Degroot is a 77 y.o. female who is an established patient who was referred by Katherine Merida for evaluation of renal mass and UI.      Renal mass  She underwent SABRINA (9/16) that showed a 1.6cm isoechoic focus in R UP. CT scan was recommended but not done yet. SABRINA was done due to abnormality noted on MRI.     Cr 2.2, most recently 1.9.    No family history of  malignancy. No hematuria. No flank pain.     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 Repeat CT 5/17 showed stable R AML 1.2cm.       Incontinence  She also reports issues with UI. She was given Ditropan in the past for this but did not tolerate this due to SE. She had good response with LUTS. She also takes diuretic. She wears pads daily. She reports "constant dribbling" and urgency with UUI. Also with SURAJ with cough, sneeze, laugh. Denies RK, straining. Denies UTIs. 2ppd.      The following portions of the patient's history were reviewed and updated as appropriate: allergies, current medications, past family history, past medical history, past social history, past surgical history and problem list.    Review of Systems  Constitutional: no fever or chills  ENT: no nasal congestion or sore throat  Respiratory: no cough or shortness of breath  Cardiovascular: no chest pain or palpitations  Gastrointestinal: no nausea or vomiting, tolerating diet  Genitourinary: as per HPI  Hematologic/Lymphatic: no easy bruising or lymphadenopathy  Musculoskeletal: no arthralgias or myalgias  Skin: no rashes or lesions  Neurological: no seizures or tremors  Behavioral/Psych: no auditory or visual hallucinations       Objective:    Vitals:   BP (!) 112/58   Pulse 68   Resp 16   Ht 5' 8" (1.727 m)   Wt 97.2 kg (214 lb 4.6 oz)   BMI 32.58 kg/m²     Physical Exam   General: well developed, well nourished in no acute distress  Head: normocephalic, atraumatic  Neck: supple, trachea midline, no obvious enlargement of thyroid  HEENT: EOMI, mucus " membranes moist, sclera anicteric, no hearing impairment  Lungs: symmetric expansion, non-labored breathing  Cardiovascular: regular rate and rhythm, normal pulses  Abdomen: soft, non tender, non distended, no palpable masses, no hepatosplenomegaly, no hernias, no CVA tenderness  Musculoskeletal: no peripheral edema, normal ROM in bilateral upper and lower extremities  Lymphatics: no cervical or inguinal lymphadenopathy  Skin: no rashes or lesions  Neuro: alert and oriented x 3, no gross deficits  Psych: normal judgment and insight, normal mood/affect and non-anxious  Genitourinary:   patient declined exam      Lab Review   Urine analysis today in clinic shows negative for all components     Lab Results   Component Value Date    WBC 10.39 05/01/2017    HGB 10.8 (L) 05/01/2017    HCT 31.5 (L) 05/01/2017    MCV 85 05/01/2017     05/01/2017     Lab Results   Component Value Date    CREATININE 2.6 (H) 05/19/2017    BUN 62 (H) 05/19/2017       Imaging  Images and reports were personally reviewed by me and discussed with patient       Assessment/Plan:      1. Renal mass / AML   - Unable to give IV contrast 2/2 elevated Cr.   - CT (noncon) 10/16 - 1.3cm R UP AML   - CT (noncon) 5/17 - stable 1.2cm R UP AML   - Recommend annual surveillance with SABRINA     2. Mixed incontinence urge and stress    - Discussed difference of UUI and SURAJ components. Reviewed etiology and workup of each.   - SURAJ: Kegels, PFPT, bulking agent, MUS.   - UUI: Behavioral changes, PFPT, anticholinergics. Botox/InterStim for refractory UUI.   - Continue Myrbetriq 2/2 intolerance/allergy of Ditropan. Refilled today.   - Also discussed OTC trial of Oxytrol patch   - Discussed Botox. She is not interested.      Remains with back issues that are continuing to be debilitating.       Follow up in 12 months with SABRINA

## 2017-06-10 ENCOUNTER — NURSE TRIAGE (OUTPATIENT)
Dept: ADMINISTRATIVE | Facility: CLINIC | Age: 77
End: 2017-06-10

## 2017-06-12 RX ORDER — PEN NEEDLE, DIABETIC 30 GX3/16"
NEEDLE, DISPOSABLE MISCELLANEOUS
Qty: 480 EACH | Refills: 11 | Status: SHIPPED | OUTPATIENT
Start: 2017-06-12 | End: 2018-12-23 | Stop reason: SDUPTHER

## 2017-06-12 NOTE — TELEPHONE ENCOUNTER
"----- Message from Saad Nieto sent at 6/9/2017  3:51 PM CDT -----  Contact: self/ [phone] home  Type: Rx    Name of medication(s):  BD ULTRA-FINE TIM PEN NEEDLES 32 gauge x 5/32" Ndle    Is this a refill? New rx? Refill    Who prescribed medication? Dr. Merida    Pharmacy Name, Phone, & Location: [facility] on file    Comments: Pt was told to request a 90 day supply  of the needles above or the 120 for the month.  She is only being giving 100 needles every month, which doesn't cover the 4 day testing. She is running low and would like to request that they be sent in today, if possible.   Please call and advise.    Thank you    "

## 2017-06-28 ENCOUNTER — OFFICE VISIT (OUTPATIENT)
Dept: NEPHROLOGY | Facility: CLINIC | Age: 77
End: 2017-06-28
Payer: MEDICARE

## 2017-06-28 VITALS
HEART RATE: 62 BPM | OXYGEN SATURATION: 95 % | SYSTOLIC BLOOD PRESSURE: 92 MMHG | WEIGHT: 210.13 LBS | HEIGHT: 68 IN | DIASTOLIC BLOOD PRESSURE: 60 MMHG | BODY MASS INDEX: 31.85 KG/M2

## 2017-06-28 DIAGNOSIS — R80.9 PROTEINURIA, UNSPECIFIED TYPE: ICD-10-CM

## 2017-06-28 DIAGNOSIS — N25.81 SECONDARY RENAL HYPERPARATHYROIDISM: ICD-10-CM

## 2017-06-28 DIAGNOSIS — D17.71 ANGIOMYOLIPOMA OF RIGHT KIDNEY: ICD-10-CM

## 2017-06-28 DIAGNOSIS — R79.89 AZOTEMIA: ICD-10-CM

## 2017-06-28 DIAGNOSIS — I50.22 CHRONIC SYSTOLIC CHF (CONGESTIVE HEART FAILURE): ICD-10-CM

## 2017-06-28 DIAGNOSIS — N18.4 CHRONIC KIDNEY DISEASE, STAGE IV (SEVERE): Chronic | ICD-10-CM

## 2017-06-28 DIAGNOSIS — D63.8 ANEMIA OF CHRONIC ILLNESS: ICD-10-CM

## 2017-06-28 DIAGNOSIS — I95.2 HYPOTENSION DUE TO DRUGS: ICD-10-CM

## 2017-06-28 DIAGNOSIS — N17.9 AKI (ACUTE KIDNEY INJURY): Primary | ICD-10-CM

## 2017-06-28 PROCEDURE — 1126F AMNT PAIN NOTED NONE PRSNT: CPT | Mod: S$GLB,,, | Performed by: INTERNAL MEDICINE

## 2017-06-28 PROCEDURE — 99999 PR PBB SHADOW E&M-EST. PATIENT-LVL III: CPT | Mod: PBBFAC,,, | Performed by: INTERNAL MEDICINE

## 2017-06-28 PROCEDURE — 99215 OFFICE O/P EST HI 40 MIN: CPT | Mod: S$GLB,,, | Performed by: INTERNAL MEDICINE

## 2017-06-28 PROCEDURE — 1159F MED LIST DOCD IN RCRD: CPT | Mod: S$GLB,,, | Performed by: INTERNAL MEDICINE

## 2017-06-28 PROCEDURE — 99499 UNLISTED E&M SERVICE: CPT | Mod: S$GLB,,, | Performed by: INTERNAL MEDICINE

## 2017-06-28 NOTE — PATIENT INSTRUCTIONS
Follow Blood pressure at home every day, record reading. Send readings over next to weeks to our clinic, you can call the nurse for that. Call urgently if systolic or upper number on BP machine is less than 100 or > 160    Call MD if you continue to feel dizzy/ lightheaded.    Labs in around 2 weeks.    Increase amount of water intake upto 1.5 liters or 1500 ml per day. Inform MD office in case of worsening of symptoms like shortness of breath and/or if you notice swelling around ankles.    Follow low salt less than 2 grams per day diet.    Avoid NSAID like medicines like Ibuprofen, Advil, Motrin, Meloxicam/ Mobic, Naprosyn, Toradol/ Ketorolac, BC powder, Goody powder.    Tylenol upto 2 grams or 2000 mg as a total dose per day is ok for pain relief.    Empty bladder periodically.     Stop taking amlodipine and lisinopril as your blood pressure has been low.

## 2017-07-02 NOTE — PROGRESS NOTES
Subjective:       Patient ID: Radha Degroot is a 77 y.o. White female who presents for follow-up evaluation of Chronic Kidney Disease    HPI     Ms. Degroot was seen in nephrology clinic for follow up on CKD. She was previously being followed by Dr. Mcgill and was last seen by him on 3/9/16. I saw her on 8/24/16, she was to follow with me in 3 months, it is unclear to me as to why her RTC appointment was not set up or if she could not keep it. She has CKD IV, diabetes, hypertension, CAD, DELFINA, diastolic heart failure and several other medical problems. She was taken off aldactone due to hyperkalemia.    Pt denies any recent nausea/ vomiting/ diarrhea/ flank pain. She expresses frustration about having to follow dietary restrictions for her multiple medical problems including diabetes, CAD, CKD etc.     She does not have any symptoms to suggest uremia but she is noted to have progression of CKD IV vs DANDY on CKD. She denies any dysuria/ decreased urine/ cloudy urine. Her diabetes control continues to remain sub optimal. She has longstanding and historically poorly controlled diabetes, at times A1C has remained above 10, she has longstanding proteinuria.     Labs from 8/16/16 noted for Na 141, K 4, bicarbonate 23, BUN 54, creatinine 2.2, eGFR 21, Ca 9.7, phos 4.2, , urine PC ratio normal. Prior lab trend noted for onset of CKD since around 2006 and episodes of DANDY, and progression to CKD IV. She appears to have poorly controlled diabetes over several years.     Labs from 5/19/17 noted for Na 143, K 4.4, bicarbonate 21, BUN 62, creatinine 2.6, eGFR 17, Ca 9.5, albumin 3.8, most recent , Hb 10.1, she has longstanding anemia. Urine studies from 5/17 show urine PC ratio of 0.2, 2 RBC, granular casts.     US kidneys per PCP from 9/16 noted for Kidney size of 11 and 10.7 cm. Per report 1.6 cm isoechoic focus within the superior pole of the right kidney corresponding to abnormality seen on recent MRI which  does not have the characteristics of a simple cyst.  Recommend further evaluation with CT of the abdomen with and without contrast renal mass protocol    CT renal stone from 5/17 noted for  Stable 1.2 cm right angiomyolipoma.  Chronic findings as described above.  No acute abdominal or pelvic abnormality.    Pt has been on lisinopril. She did not bring any BP readings from home but stated her BP has been running low for several days. She admits she did not inform this to any of her physicians. She just discussed that with her friend. Her BP was 92/60 during clinic visit. She denies any syncope but does state feeling dizzy at times.   .  Review of Systems   Constitutional: Negative for appetite change, chills and fever.   HENT: Negative for sneezing and sore throat.    Eyes: Negative for visual disturbance.   Respiratory: Positive for shortness of breath (with exertion). Negative for cough.    Cardiovascular: Positive for leg swelling. Negative for chest pain.   Gastrointestinal: Negative for abdominal pain, diarrhea, nausea and vomiting.   Genitourinary: Negative for decreased urine volume, dysuria, hematuria and urgency.   Musculoskeletal: Positive for back pain and gait problem.   Skin: Negative for rash.   Allergic/Immunologic: Negative for immunocompromised state.   Neurological: Positive for dizziness. Negative for headaches.   Psychiatric/Behavioral: Negative for behavioral problems and confusion.       Objective:      Physical Exam   Constitutional: She is oriented to person, place, and time. She appears well-developed and well-nourished. No distress.   HENT:   Head: Normocephalic and atraumatic.   Eyes: Conjunctivae are normal. Right eye exhibits no discharge. Left eye exhibits no discharge.   Neck: Normal range of motion.   Cardiovascular: Normal rate, regular rhythm and normal heart sounds.    Pulmonary/Chest: Effort normal and breath sounds normal. No respiratory distress. She has no wheezes.   Abdominal:  Soft. There is no tenderness.   Musculoskeletal: She exhibits edema.   Neurological: She is alert and oriented to person, place, and time.   Skin: Skin is warm and dry.   Psychiatric: She has a normal mood and affect. Her behavior is normal.   Vitals reviewed.      Assessment:       1. DANDY (acute kidney injury)    2. Hypotension due to drugs    3. Chronic kidney disease, stage IV (severe)    4. Angiomyolipoma of right kidney    5. Secondary renal hyperparathyroidism    6. Azotemia    7. Proteinuria, unspecified type    8. Anemia of chronic illness    9. Chronic systolic CHF (congestive heart failure)        Plan:     Ms. Degroot has CKD IV which is likely due to diabetic nephropathy and hypertensive glomerulosclerosis. Labs, CKD staging, risk of progression to ESRD was discussed with her in detail. So far available serial labs were explained to her in detail.     She established care with me in 8/16 but for unclear reason she did not have follow up visit arranged until now. She is noted to have DANDY on CKD IV vs progression of CKD. She also has relative hypotension and she confirms she has been noticing low BP for several days at home already. She did not communicate that to any physicians however. She also has azotemia noted on labs. Overall her labs are concerning and I had a very detailed discussion of her worsening renal function with her. Explained to her about all of her serial labs, longstanding CKD IV status already which has now progressed to eGFR of less than 20, her kidneys with poor autoregulation and higher risk of DANDY due to hypotension/ volume depletion/ contrast agents/ nephrotoxins/ NSAID etc and higher risk of progression to CKD V/ ESRD. She expressed she has been feeling overwhelmed but did express understanding of all this. I advised to her increase water intake, stop lisinopril due to risk of hyperkalemia in light of DANDY vs progression of CKD with eGFR < 20, risk of further hypotension if she  takes antihypertensive with SBP less than 110 mm hg. Also cautioned her about her multivitamin with mineral preparation as it will give her additional K, phos and other minerals. She already has mild hyperphosphatemia and will need to see dietician for discussion and counseling about low K, low phos, low salt diet.     - Plan to obtain urine studies with culture to rule out UTI.  - Trend proteinuria  - Trend PTH levels  - Obtain iron studies, consider RITO if Hb less than 10 and IV iron if iron deficiency-  - closely monitor renal panel for electrolytes, acid base status, eGFR  - risk of CKD progression d/w patient  - low salt and low potassium diet, low phos diet  - avoid NSAID/ bactrim/ IV contrast/ gadolinium/ aminoglycoside/ fleet enema where possible  - she does have prerenal azotemia on most labs, diuretic regimen per her cardiology   - decision to continue PPI per primary     She was given following instructions  Follow Blood pressure at home every day, record reading. Send readings over next to weeks to our clinic, you can call the nurse for that. Call urgently if systolic or upper number on BP machine is less than 100 or > 160    Call MD if you continue to feel dizzy/ lightheaded.    Labs in around 2 weeks.    Increase amount of water intake upto 1.5 liters or 1500 ml per day. Inform MD office in case of worsening of symptoms like shortness of breath and/or if you notice swelling around ankles.    Follow low salt less than 2 grams per day diet.    Avoid NSAID like medicines like Ibuprofen, Advil, Motrin, Meloxicam/ Mobic, Naprosyn, Toradol/ Ketorolac, BC powder, Goody powder.    Tylenol upto 2 grams or 2000 mg as a total dose per day is ok for pain relief.    Empty bladder periodically.     Stop taking amlodipine and lisinopril as your blood pressure has been low.      Labs, plan, recommendations were discussed with her in detail. Her questions were answered to her satisfaction.  RTC 4 weeks

## 2017-07-13 ENCOUNTER — LAB VISIT (OUTPATIENT)
Dept: LAB | Facility: HOSPITAL | Age: 77
End: 2017-07-13
Attending: INTERNAL MEDICINE
Payer: MEDICARE

## 2017-07-13 DIAGNOSIS — D63.8 ANEMIA OF CHRONIC ILLNESS: ICD-10-CM

## 2017-07-13 DIAGNOSIS — N17.9 AKI (ACUTE KIDNEY INJURY): ICD-10-CM

## 2017-07-13 LAB
ALBUMIN SERPL BCP-MCNC: 3.8 G/DL
ANION GAP SERPL CALC-SCNC: 10 MMOL/L
BASOPHILS # BLD AUTO: 0.04 K/UL
BASOPHILS NFR BLD: 0.5 %
BUN SERPL-MCNC: 40 MG/DL
CALCIUM SERPL-MCNC: 9.1 MG/DL
CHLORIDE SERPL-SCNC: 104 MMOL/L
CO2 SERPL-SCNC: 23 MMOL/L
CREAT SERPL-MCNC: 2 MG/DL
DIFFERENTIAL METHOD: ABNORMAL
EOSINOPHIL # BLD AUTO: 0.2 K/UL
EOSINOPHIL NFR BLD: 3 %
ERYTHROCYTE [DISTWIDTH] IN BLOOD BY AUTOMATED COUNT: 14.2 %
EST. GFR  (AFRICAN AMERICAN): 27 ML/MIN/1.73 M^2
EST. GFR  (NON AFRICAN AMERICAN): 23.4 ML/MIN/1.73 M^2
FERRITIN SERPL-MCNC: 143 NG/ML
GLUCOSE SERPL-MCNC: 208 MG/DL
HCT VFR BLD AUTO: 34 %
HGB BLD-MCNC: 11.3 G/DL
IRON SERPL-MCNC: 71 UG/DL
LYMPHOCYTES # BLD AUTO: 1.6 K/UL
LYMPHOCYTES NFR BLD: 20.3 %
MCH RBC QN AUTO: 29.4 PG
MCHC RBC AUTO-ENTMCNC: 33.2 %
MCV RBC AUTO: 88 FL
MONOCYTES # BLD AUTO: 0.6 K/UL
MONOCYTES NFR BLD: 7.2 %
NEUTROPHILS # BLD AUTO: 5.4 K/UL
NEUTROPHILS NFR BLD: 68.7 %
PHOSPHATE SERPL-MCNC: 3.1 MG/DL
PLATELET # BLD AUTO: 144 K/UL
PMV BLD AUTO: 12 FL
POTASSIUM SERPL-SCNC: 4.3 MMOL/L
PTH-INTACT SERPL-MCNC: 95 PG/ML
RBC # BLD AUTO: 3.85 M/UL
SATURATED IRON: 21 %
SODIUM SERPL-SCNC: 137 MMOL/L
TOTAL IRON BINDING CAPACITY: 345 UG/DL
TRANSFERRIN SERPL-MCNC: 233 MG/DL
WBC # BLD AUTO: 7.78 K/UL

## 2017-07-13 PROCEDURE — 36415 COLL VENOUS BLD VENIPUNCTURE: CPT | Mod: PO

## 2017-07-13 PROCEDURE — 80069 RENAL FUNCTION PANEL: CPT

## 2017-07-13 PROCEDURE — 85025 COMPLETE CBC W/AUTO DIFF WBC: CPT

## 2017-07-13 PROCEDURE — 83970 ASSAY OF PARATHORMONE: CPT

## 2017-07-13 PROCEDURE — 83540 ASSAY OF IRON: CPT

## 2017-07-13 PROCEDURE — 82728 ASSAY OF FERRITIN: CPT

## 2017-07-14 ENCOUNTER — TELEPHONE (OUTPATIENT)
Dept: NEPHROLOGY | Facility: CLINIC | Age: 77
End: 2017-07-14

## 2017-07-14 NOTE — TELEPHONE ENCOUNTER
Labs from 7/13/17 reviewed, noted slight improvement in creatinine. Electrolytes stable. I called pt and let her know of her most recent labs with slight improvement but c/w still stage IV CKD. She expressed understanding.

## 2017-07-19 ENCOUNTER — OFFICE VISIT (OUTPATIENT)
Dept: PAIN MEDICINE | Facility: CLINIC | Age: 77
End: 2017-07-19
Attending: ANESTHESIOLOGY
Payer: MEDICARE

## 2017-07-19 VITALS
RESPIRATION RATE: 18 BRPM | TEMPERATURE: 97 F | WEIGHT: 211.63 LBS | BODY MASS INDEX: 32.07 KG/M2 | HEIGHT: 68 IN | HEART RATE: 68 BPM | SYSTOLIC BLOOD PRESSURE: 150 MMHG | DIASTOLIC BLOOD PRESSURE: 68 MMHG

## 2017-07-19 DIAGNOSIS — M47.819 ARTHROPATHY OF FACET JOINTS AT MULTIPLE LEVELS: ICD-10-CM

## 2017-07-19 DIAGNOSIS — M51.36 DDD (DEGENERATIVE DISC DISEASE), LUMBAR: ICD-10-CM

## 2017-07-19 DIAGNOSIS — M47.819 SPONDYLOSIS WITHOUT MYELOPATHY: ICD-10-CM

## 2017-07-19 DIAGNOSIS — M47.9 OSTEOARTHRITIS OF SPINE, UNSPECIFIED SPINAL OSTEOARTHRITIS COMPLICATION STATUS, UNSPECIFIED SPINAL REGION: Primary | ICD-10-CM

## 2017-07-19 PROCEDURE — 1159F MED LIST DOCD IN RCRD: CPT | Mod: S$GLB,,, | Performed by: ANESTHESIOLOGY

## 2017-07-19 PROCEDURE — 99499 UNLISTED E&M SERVICE: CPT | Mod: S$GLB,,, | Performed by: ANESTHESIOLOGY

## 2017-07-19 PROCEDURE — 99999 PR PBB SHADOW E&M-EST. PATIENT-LVL III: CPT | Mod: PBBFAC,,, | Performed by: ANESTHESIOLOGY

## 2017-07-19 PROCEDURE — 99214 OFFICE O/P EST MOD 30 MIN: CPT | Mod: GC,S$GLB,, | Performed by: ANESTHESIOLOGY
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
## 2017-07-26 ENCOUNTER — OFFICE VISIT (OUTPATIENT)
Dept: NEPHROLOGY | Facility: CLINIC | Age: 77
End: 2017-07-26
Payer: MEDICARE

## 2017-07-26 ENCOUNTER — TELEPHONE (OUTPATIENT)
Dept: INTERNAL MEDICINE | Facility: CLINIC | Age: 77
End: 2017-07-26

## 2017-07-26 VITALS
WEIGHT: 207.44 LBS | HEIGHT: 68 IN | BODY MASS INDEX: 31.44 KG/M2 | HEART RATE: 73 BPM | DIASTOLIC BLOOD PRESSURE: 66 MMHG | OXYGEN SATURATION: 97 % | SYSTOLIC BLOOD PRESSURE: 136 MMHG

## 2017-07-26 DIAGNOSIS — D17.71 ANGIOMYOLIPOMA OF RIGHT KIDNEY: ICD-10-CM

## 2017-07-26 DIAGNOSIS — I10 ESSENTIAL HYPERTENSION: Chronic | ICD-10-CM

## 2017-07-26 DIAGNOSIS — N18.4 CHRONIC KIDNEY DISEASE, STAGE IV (SEVERE): Primary | Chronic | ICD-10-CM

## 2017-07-26 DIAGNOSIS — N18.4 TYPE 2 DIABETES MELLITUS WITH STAGE 4 CHRONIC KIDNEY DISEASE, WITH LONG-TERM CURRENT USE OF INSULIN: ICD-10-CM

## 2017-07-26 DIAGNOSIS — R80.9 PROTEINURIA, UNSPECIFIED TYPE: ICD-10-CM

## 2017-07-26 DIAGNOSIS — Z79.4 TYPE 2 DIABETES MELLITUS WITH STAGE 4 CHRONIC KIDNEY DISEASE, WITH LONG-TERM CURRENT USE OF INSULIN: ICD-10-CM

## 2017-07-26 DIAGNOSIS — E11.22 TYPE 2 DIABETES MELLITUS WITH STAGE 4 CHRONIC KIDNEY DISEASE, WITH LONG-TERM CURRENT USE OF INSULIN: ICD-10-CM

## 2017-07-26 DIAGNOSIS — N25.81 SECONDARY RENAL HYPERPARATHYROIDISM: ICD-10-CM

## 2017-07-26 PROCEDURE — 99499 UNLISTED E&M SERVICE: CPT | Mod: S$GLB,,, | Performed by: INTERNAL MEDICINE

## 2017-07-26 PROCEDURE — 1159F MED LIST DOCD IN RCRD: CPT | Mod: S$GLB,,, | Performed by: INTERNAL MEDICINE

## 2017-07-26 PROCEDURE — 99215 OFFICE O/P EST HI 40 MIN: CPT | Mod: S$GLB,,, | Performed by: INTERNAL MEDICINE

## 2017-07-26 PROCEDURE — 1126F AMNT PAIN NOTED NONE PRSNT: CPT | Mod: S$GLB,,, | Performed by: INTERNAL MEDICINE

## 2017-07-26 PROCEDURE — 99999 PR PBB SHADOW E&M-EST. PATIENT-LVL III: CPT | Mod: PBBFAC,,, | Performed by: INTERNAL MEDICINE

## 2017-07-26 NOTE — TELEPHONE ENCOUNTER
----- Message from Tali Cabrera MA sent at 7/26/2017 12:58 PM CDT -----  Contact: Keya luther/Rogelio - [phone]   Type: Rx    Name of medication(s):   True Draw Lancing Device   True Plus 30 gauge lancets     Is this a refill? New rx? New Rxs     Who prescribed medication?    Pharmacy Name, Phone, & Location: [facility]    Comments:Patient tests 4 times per day. Please call. Thanks!

## 2017-07-26 NOTE — PROGRESS NOTES
Subjective:       Patient ID: Radha Degroot is a 77 y.o. White female who presents for follow-up evaluation of Chronic Kidney Disease    HPI     Ms. Degroot was seen in nephrology clinic for follow up on CKD. She was previously being followed by Dr. Mcgill and was last seen by him on 3/9/16. I saw her on 8/24/16, she was to follow with me in 3 months, for unclear reason she had next appointment on 6/28/17 when she was noted to have worsening of renal function and DANDY on CKD. She has CKD IV, diabetes with complications like neuropathy, diabetic retinopathy, hypertension, CAD, DELFINA, diastolic heart failure and several other medical problems. In the past she was taken off aldactone due to hyperkalemia.    At the time of her visit on 6/28/17 she was noted to have worsening renal function and hypotension which she confirmed was happening on her home BP readings also and as a result she was feeling tired and dizzy. She was taken off amlodipine and lisinopril at that time. She was advised to follow BP at home while continuing Coreg and lasix. She did bring her home BP readings for review today. Her readings are in the range of 's and occasionally low 140.     Her follow up labs on 7/13/17 noted for improvement in her renal function including creatinine and BUN.     Today she reports she has been doing fine. 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 Pt denies any recent nausea/ vomiting/ diarrhea/ flank pain. She denies any dysuria/ decreased urine/ cloudy urine. Her diabetes control continues to remain sub optimal. She has longstanding and historically poorly controlled diabetes, at times A1C has remained above 10, she has longstanding proteinuria.     Labs from 8/16/16 noted for Na 141, K 4, bicarbonate 23, BUN 54, creatinine 2.2, eGFR 21, Ca 9.7, phos 4.2, , urine PC ratio normal. Prior lab trend noted for onset of CKD since around 2006 and episodes of DANDY, and progression to CKD IV.  She appears to have poorly controlled diabetes over several years.     Labs from 5/19/17 noted for Na 143, K 4.4, bicarbonate 21, BUN 62, creatinine 2.6, eGFR 17, Ca 9.5, albumin 3.8, most recent , Hb 10.1, she has longstanding anemia. Urine studies from 5/17 show urine PC ratio of 0.2, 2 RBC, granular casts.     Follow up labs from 7/13/17 noted for Na 137, K 4.3, bicarbonate 23, BUN 40, creatinine 2, eGFR 23.4, Ca 9.1, phos 3.1, PTH 95, Hb 11.3, urinalysis showed protein, urine PC ratio 0.65, 1 RBC, 1 WBC, urine culture was negative for UTI.     US kidneys per PCP from 9/16 noted for Kidney size of 11 and 10.7 cm. Per report 1.6 cm isoechoic focus within the superior pole of the right kidney corresponding to abnormality seen on recent MRI which does not have the characteristics of a simple cyst.  Recommend further evaluation with CT of the abdomen with and without contrast renal mass protocol. 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 It did not show any mass/ stone/ obstruction.   .  Review of Systems   Constitutional: Negative for appetite change, chills and fever.   HENT: Negative for sneezing and sore throat.    Eyes: Negative for visual disturbance.   Respiratory: Positive for shortness of breath (with exertion). Negative for cough.    Cardiovascular: Negative for chest pain and leg swelling.   Gastrointestinal: Negative for abdominal pain, diarrhea, nausea and vomiting.   Genitourinary: Negative for decreased urine volume, dysuria, hematuria and urgency.   Musculoskeletal: Positive for back pain and gait problem.   Skin: Negative for rash.   Allergic/Immunologic: Negative for immunocompromised state.   Neurological: Negative for dizziness and headaches.   Psychiatric/Behavioral: Negative for behavioral problems and confusion.       Objective:      Physical Exam   Constitutional: She is oriented to person, place, and time. She appears  well-developed and well-nourished. No distress.   HENT:   Head: Normocephalic and atraumatic.   Eyes: Conjunctivae are normal. Right eye exhibits no discharge. Left eye exhibits no discharge.   Neck: Normal range of motion.   Cardiovascular: Normal rate, regular rhythm and normal heart sounds.    Pulmonary/Chest: Effort normal and breath sounds normal. No respiratory distress. She has no wheezes.   Abdominal: Soft. There is no tenderness.   Musculoskeletal: She exhibits edema.   Neurological: She is alert and oriented to person, place, and time.   Skin: Skin is warm and dry.   Psychiatric: She has a normal mood and affect. Her behavior is normal.   Vitals reviewed.      Assessment:       1. Chronic kidney disease, stage IV (severe)    2. Proteinuria, unspecified type    3. Angiomyolipoma of right kidney    4. Essential hypertension    5. Type 2 diabetes mellitus with stage 4 chronic kidney disease, with long-term current use of insulin    6. Secondary renal hyperparathyroidism        Plan:     Ms. Degroot has CKD IV which is likely due to diabetic nephropathy and hypertensive glomerulosclerosis. Labs, CKD staging, risk of progression to ESRD was discussed with her in detail. So far available serial labs were explained to her in detail.     She established care with me in 8/16 but for unclear reason she did not have follow up visit arranged until 6/17 when she was noted to have DANDY vs progression of CKD. That time she was taken off amlodipine and lisinopril due to hypotensive episodes and especially with worsening renal function. Now she comes back for follow up and appear to have slight improvement in renal function. She also has stable BP readings from her home while she is continued on coreg and lasix. She has stable electrolytes on the most recent labs.    Again today explained to her about all of her serial labs, longstanding CKD IV status already which has progressed, her kidneys with poor autoregulation and  higher risk of DANDY due to hypotension/ volume depletion/ contrast agents/ nephrotoxins/ NSAID etc and higher risk of progression to CKD V/ ESRD. Also cautioned her about her multivitamin with mineral preparation as it will give her additional K, phos and other minerals.     Given her advanced CKD and risk of DANDY, will hold off any ACE-I or ARB use or K sparing diuretic due to risk of hyperkalemia. This was again explained to her today.      - repeat renal labs in 2 months for electrolytes, acid base, eGFR  - Trend proteinuria  - Trend PTH levels  - she has long standing anemia and per most recent Hb there is no indication for RITO  - closely monitor renal panel for electrolytes, acid base status, eGFR  - risk of CKD progression d/w patient  - low salt and low potassium diet, low phos diet  - avoid NSAID/ bactrim/ IV contrast/ gadolinium/ aminoglycoside/ fleet enema where possible  - diuretic regimen per her cardiology   - decision to continue PPI per primary     Labs, plan, recommendations were discussed with her in detail. Her questions were answered to her satisfaction.  RTC 3 months

## 2017-08-04 ENCOUNTER — TELEPHONE (OUTPATIENT)
Dept: INTERNAL MEDICINE | Facility: CLINIC | Age: 77
End: 2017-08-04

## 2017-08-04 ENCOUNTER — TELEPHONE (OUTPATIENT)
Dept: ENDOCRINOLOGY | Facility: CLINIC | Age: 77
End: 2017-08-04

## 2017-08-04 NOTE — TELEPHONE ENCOUNTER
Spoke w pt; needed clarification on diabetic supplies. Pt states that she would like to keep using the current supplies she has. Also she did cancel HRA appointment as well.

## 2017-08-07 ENCOUNTER — OFFICE VISIT (OUTPATIENT)
Dept: ENDOCRINOLOGY | Facility: CLINIC | Age: 77
End: 2017-08-07
Payer: MEDICARE

## 2017-08-07 VITALS
BODY MASS INDEX: 32.64 KG/M2 | HEART RATE: 64 BPM | HEIGHT: 68 IN | DIASTOLIC BLOOD PRESSURE: 60 MMHG | SYSTOLIC BLOOD PRESSURE: 140 MMHG | WEIGHT: 215.38 LBS

## 2017-08-07 DIAGNOSIS — N18.4 CKD (CHRONIC KIDNEY DISEASE) STAGE 4, GFR 15-29 ML/MIN: ICD-10-CM

## 2017-08-07 DIAGNOSIS — E78.2 MIXED HYPERLIPIDEMIA: ICD-10-CM

## 2017-08-07 DIAGNOSIS — I10 ESSENTIAL HYPERTENSION: ICD-10-CM

## 2017-08-07 PROCEDURE — 3078F DIAST BP <80 MM HG: CPT | Mod: S$GLB,,, | Performed by: NURSE PRACTITIONER

## 2017-08-07 PROCEDURE — 99214 OFFICE O/P EST MOD 30 MIN: CPT | Mod: S$GLB,,, | Performed by: NURSE PRACTITIONER

## 2017-08-07 PROCEDURE — 3008F BODY MASS INDEX DOCD: CPT | Mod: S$GLB,,, | Performed by: NURSE PRACTITIONER

## 2017-08-07 PROCEDURE — 99999 PR PBB SHADOW E&M-EST. PATIENT-LVL V: CPT | Mod: PBBFAC,,, | Performed by: NURSE PRACTITIONER

## 2017-08-07 PROCEDURE — 1159F MED LIST DOCD IN RCRD: CPT | Mod: S$GLB,,, | Performed by: NURSE PRACTITIONER

## 2017-08-07 PROCEDURE — 1126F AMNT PAIN NOTED NONE PRSNT: CPT | Mod: S$GLB,,, | Performed by: NURSE PRACTITIONER

## 2017-08-07 PROCEDURE — 3077F SYST BP >= 140 MM HG: CPT | Mod: S$GLB,,, | Performed by: NURSE PRACTITIONER

## 2017-08-07 PROCEDURE — 99499 UNLISTED E&M SERVICE: CPT | Mod: S$GLB,,, | Performed by: NURSE PRACTITIONER

## 2017-08-07 NOTE — PATIENT INSTRUCTIONS
Increase Lantus to 48 units every night.   Increase Novolog to 17-19-19 units before meals with the same sliding scale.  Continue to test blood sugar 4x/day.   Message/call with any issues especially if blood sugar is too low < 80.   Return to clinic in 3 months or sooner if needed.   a1c today or at earliest convenient (nonfasting).   Labs again in 3 months prior to the next visit.

## 2017-08-07 NOTE — PROGRESS NOTES
"CC: This 77 y.o. White female  is here for evaluation of  T2DM along with comorbidities indicated in the Visit Diagnosis section of this encounter.    HPI: Radha Degroot was diagnosed with T2DM in 1980s.     Last seen by Dr. Gates in 2/2017 when her breakfast Novolog dose was decreased. New to me. She is establishing care here since this location is more convenient.     Reports overall glucoses have been high. She is unable to walk longer than a few minutes d/t chronic severe back pain and she is sitting around not doing anything. She has found no relief in a previous steroid injection.     PRESCRIBED DIABETES MEDICATIONS: Lantus Solostar 42 units hs, Novolog Flexpen 15-17-17 units w/ ss (Use on premeal readings: 150-200=+1, 201-250=+2, 251-300=+3; 301-350=+4, over 350=+5 units    Misses medication doses - No    DM COMPLICATIONS: nephropathy, retinopathy and peripheral neuropathy    SELF MONITORING BLOOD GLUCOSE: Checks blood glucose at home 4x/day.             HYPOGLYCEMIC EPISODES: denies      CURRENT DIET: 3 meals/day. No overnight snacks. Drinks iced tea with no sugar.     CURRENT EXERCISE: none       BP (!) 140/60 (BP Location: Right arm, Patient Position: Sitting, BP Method: Manual)   Pulse 64   Ht 5' 8" (1.727 m)   Wt 97.7 kg (215 lb 6.2 oz)   BMI 32.75 kg/m²     ROS:   CONSTITUTIONAL: Appetite good, denies fatigue  RESPIRATORY: No shortness of breath. + rao   CARDIAC: No chest pain or palpitations  GI: No nausea, vomiting, or diarrhea  OTHER: n/a        PHYSICAL EXAM:  GENERAL: Well developed, well nourished. No acute distress.   PSYCH: AAOx3, appropriate mood and affect, conversant, well-groomed. Judgement and insight good.   NEURO: Cranial nerves grossly intact. Speech clear, no tremor.   NECK: Trachea midline, no thyromegaly or lymphadenopathy.   CHEST: Respirations even and unlabored. CTA bilaterally.  CARDIOVASCULAR: Regular rate and rhythm. No bruits. No murmur. No edema.   MS: Gait steady. No " clubbing.   SKIN: Normal skin turgor. Skin warm and dry. No areas of breakdown. No acanthosis nigricans.          Hemoglobin A1C   Date Value Ref Range Status   05/19/2017 7.9 (H) 4.5 - 6.2 % Final     Comment:     According to ADA guidelines, hemoglobin A1C <7.0% represents  optimal control in non-pregnant diabetic patients.  Different  metrics may apply to specific populations.   Standards of Medical Care in Diabetes - 2016.  For the purpose of screening for the presence of diabetes:  <5.7%     Consistent with the absence of diabetes  5.7-6.4%  Consistent with increasing risk for diabetes   (prediabetes)  >or=6.5%  Consistent with diabetes  Currently no consensus exists for use of hemoglobin A1C  for diagnosis of diabetes for children.     02/16/2017 7.8 (H) 4.5 - 6.2 % Final     Comment:     According to ADA guidelines, hemoglobin A1C <7.0% represents  optimal control in non-pregnant diabetic patients.  Different  metrics may apply to specific populations.   Standards of Medical Care in Diabetes - 2016.  For the purpose of screening for the presence of diabetes:  <5.7%     Consistent with the absence of diabetes  5.7-6.4%  Consistent with increasing risk for diabetes   (prediabetes)  >or=6.5%  Consistent with diabetes  Currently no consensus exists for use of hemoglobin A1C  for diagnosis of diabetes for children.     11/11/2016 8.0 (H) 4.5 - 6.2 % Final     Comment:     According to ADA guidelines, hemoglobin A1C <7.0% represents  optimal control in non-pregnant diabetic patients.  Different  metrics may apply to specific populations.   Standards of Medical Care in Diabetes - 2016.  For the purpose of screening for the presence of diabetes:  <5.7%     Consistent with the absence of diabetes  5.7-6.4%  Consistent with increasing risk for diabetes   (prediabetes)  >or=6.5%  Consistent with diabetes  Currently no consensus exists for use of hemoglobin A1C  for diagnosis of diabetes for children.              Chemistry        Component Value Date/Time     07/13/2017 1113    K 4.3 07/13/2017 1113     07/13/2017 1113    CO2 23 07/13/2017 1113    BUN 40 (H) 07/13/2017 1113    CREATININE 2.0 (H) 07/13/2017 1113     (H) 07/13/2017 1113        Component Value Date/Time    CALCIUM 9.1 07/13/2017 1113    ALKPHOS 75 05/19/2017 1422    AST 13 05/19/2017 1422    ALT 12 05/19/2017 1422    BILITOT 0.4 05/19/2017 1422    ESTGFRAFRICA 27.0 (A) 07/13/2017 1113    EGFRNONAA 23.4 (A) 07/13/2017 1113          Lab Results   Component Value Date    LDLCALC 64.2 02/16/2017       Lab Results   Component Value Date    MICALBCREAT 29.5 02/11/2015             STANDARDS of CARE:        ASA:               Last eye exam:       ASSESSMENT and PLAN:    A1C GOAL: <8%    1. Uncontrolled type 2 diabetes mellitus with complication, with long-term current use of insulin  Increase Lantus to 48 units every night.   Increase Novolog to 17-19-19 units before meals with the same sliding scale.  Continue to test blood sugar 4x/day.   Message/call with any issues especially if blood sugar is too low < 80.   Return to clinic in 3 months or sooner if needed.   a1c today or at earliest convenient (nonfasting).   Labs again in 3 months prior to the next visit.     Hemoglobin A1c    Basic metabolic panel   2. CKD (chronic kidney disease) stage 4, GFR 15-29 ml/min  Basic metabolic panel  Avoid hypoglycemia.      3. Essential hypertension  Continue current rx   4. Mixed hyperlipidemia  LDL at goal.        Orders Placed This Encounter   Procedures    Hemoglobin A1c     Standing Status:   Standing     Number of Occurrences:   2     Standing Expiration Date:   10/6/2018    Basic metabolic panel     Standing Status:   Future     Standing Expiration Date:   10/6/2018        Return in about 3 months (around 11/7/2017).

## 2017-08-09 ENCOUNTER — LAB VISIT (OUTPATIENT)
Dept: LAB | Facility: HOSPITAL | Age: 77
End: 2017-08-09
Attending: NURSE PRACTITIONER
Payer: MEDICARE

## 2017-08-09 DIAGNOSIS — N18.4 CKD (CHRONIC KIDNEY DISEASE) STAGE 4, GFR 15-29 ML/MIN: ICD-10-CM

## 2017-08-09 LAB
ANION GAP SERPL CALC-SCNC: 11 MMOL/L
BUN SERPL-MCNC: 49 MG/DL
CALCIUM SERPL-MCNC: 9.4 MG/DL
CHLORIDE SERPL-SCNC: 105 MMOL/L
CO2 SERPL-SCNC: 24 MMOL/L
CREAT SERPL-MCNC: 2.2 MG/DL
EST. GFR  (AFRICAN AMERICAN): 24 ML/MIN/1.73 M^2
EST. GFR  (NON AFRICAN AMERICAN): 20.9 ML/MIN/1.73 M^2
GLUCOSE SERPL-MCNC: 221 MG/DL
POTASSIUM SERPL-SCNC: 4.2 MMOL/L
SODIUM SERPL-SCNC: 140 MMOL/L

## 2017-08-09 PROCEDURE — 36415 COLL VENOUS BLD VENIPUNCTURE: CPT | Mod: PO

## 2017-08-09 PROCEDURE — 80048 BASIC METABOLIC PNL TOTAL CA: CPT

## 2017-08-10 ENCOUNTER — OFFICE VISIT (OUTPATIENT)
Dept: PAIN MEDICINE | Facility: CLINIC | Age: 77
End: 2017-08-10
Payer: MEDICARE

## 2017-08-10 VITALS
HEIGHT: 68 IN | HEART RATE: 64 BPM | DIASTOLIC BLOOD PRESSURE: 61 MMHG | BODY MASS INDEX: 31.98 KG/M2 | WEIGHT: 211 LBS | SYSTOLIC BLOOD PRESSURE: 132 MMHG | TEMPERATURE: 98 F

## 2017-08-10 DIAGNOSIS — M48.061 LUMBAR STENOSIS: ICD-10-CM

## 2017-08-10 DIAGNOSIS — M51.36 DDD (DEGENERATIVE DISC DISEASE), LUMBAR: ICD-10-CM

## 2017-08-10 DIAGNOSIS — M47.816 FACET ARTHRITIS, DEGENERATIVE, LUMBAR SPINE: Primary | ICD-10-CM

## 2017-08-10 PROCEDURE — 1125F AMNT PAIN NOTED PAIN PRSNT: CPT | Mod: S$GLB,,, | Performed by: NURSE PRACTITIONER

## 2017-08-10 PROCEDURE — 3078F DIAST BP <80 MM HG: CPT | Mod: S$GLB,,, | Performed by: NURSE PRACTITIONER

## 2017-08-10 PROCEDURE — 99999 PR PBB SHADOW E&M-EST. PATIENT-LVL III: CPT | Mod: PBBFAC,,, | Performed by: NURSE PRACTITIONER

## 2017-08-10 PROCEDURE — 3075F SYST BP GE 130 - 139MM HG: CPT | Mod: S$GLB,,, | Performed by: NURSE PRACTITIONER

## 2017-08-10 PROCEDURE — 1159F MED LIST DOCD IN RCRD: CPT | Mod: S$GLB,,, | Performed by: NURSE PRACTITIONER

## 2017-08-10 PROCEDURE — 99213 OFFICE O/P EST LOW 20 MIN: CPT | Mod: S$GLB,,, | Performed by: NURSE PRACTITIONER

## 2017-08-10 PROCEDURE — 99499 UNLISTED E&M SERVICE: CPT | Mod: S$GLB,,, | Performed by: NURSE PRACTITIONER

## 2017-08-10 NOTE — PROGRESS NOTES
Chronic patient Established Note (Follow up visit)      SUBJECTIVE:    Radha Degroot presents to the clinic for a follow-up appointment for low back pain. She would like to discuss switching physicians today. She reports feeling rushed at her visit with Dr. Posey. She feels like she did not receive explanation of the options of procedures. She reports low back pain that goes across her lower back. She describes it as aching and band-like. She denies any radiating leg pain. She reports that her pain is worse with prolonged standing and walking. She reports that her pain decreases when she sits or rests. She previously had bilateral L3 TF ELISA ordered per orthopedics with no significant relief. She is currently taking Tylenol for pain. She also uses heat intermittently. She denies any other symptoms. She denies any bowel or bladder incontinence. Since the last visit, Radha Degroot states the pain has been persistant. Current pain intensity is 7/10.    Pain Disability Index Review:  Last 3 PDI Scores 8/10/2017 7/19/2017   Pain Disability Index (PDI) 24 33       Pain Medications:  none    Opioid Contract: no     report:  Reviewed and consistent with medication use as prescribed.    Pain Procedures:   4/28/2017- Bilateral L3 TF ELISA    Physical Therapy/Home Exercise: no    Imaging:   X-ray Lumbar Spine 3/22/2017:  Mild to moderate DJD.  There is a grade 1 L3/L4 spondylolisthesis best identified on the flexion view.  The L4/L5 and L5/S1 disc spaces are significantly narrowed.  No fracture or bone destruction identified    MRI Lumbar Spine 8/26/2016:  Findings:    Lumbar spine alignment is within normal limits.  The vertebral body heights are well maintained, with no fracture.  There is no marrow signal abnormality suspicious for infiltrative process.  There is multilevel disc desiccation present at all lumbar levels. There is advanced intervertebral disc space height loss present at L4-L5.  The conus is normal  in appearance and terminates at the L1-2 level.  There is an incidentally noted Tarlov cyst arising from the S3 neural foramen measuring approximately 1.6 x 1.3 cm. Additional smaller Tarlov cyst is noted arising from the S2 neural foramen.    L1-L2: There is a mild circumferential disc bulge.  No significant central canal or neural foraminal narrowing.    L2-L3: There is a circumferential disc bulge, ligamentum flavum thickening, bilateral facet arthropathy.  There is mild spinal canal stenosis and mild left-sided neural foraminal narrowing.    L3-L4: There is a circumferential disc bulge, ligamentum flavum thickening, and advanced bilateral facet arthropathy.  There is resulting moderate to severe spinal canal stenosis and mild bilateral neuroforaminal narrowing, left greater than right.    L4-L5: There is a circumferential disc bulge and advanced bilateral facet arthropathy.  There is moderate spinal canal stenosis and moderate bilateral neuroforaminal narrowing.    L5-S1: There is a circumferential disc bulge and advanced bilateral facet arthropathy, left greater than right.  There is mild/moderate bilateral neuroforaminal narrowing. There is no significant spinal canal stenosis.    Limited views of the abdomen demonstrate a 1.1 cm T1 and T2 hyperintense lesion within the superior pole of the right kidney. This is incompletely characterized and further assessment is recommended with dedicated renal ultrasound. There is additional T1 hypointense and T2 hyperintense lesion at the inferior pole the right kidney likely reflecting a simple cyst, though this could be evaluated at time of retroperitoneal ultrasound. There is a 2.7 cm T2 hyperintense cystic structure within the right adnexa. The bilateral sacroiliac joints are maintained.   Impression         1. Multilevel degenerative change of the lumbar spine most pronounced at the levels of L3-L4 and L4-L5, noting moderate to severe spinal canal stenosis at L3-L4.  Additionally, there is multilevel bilateral neural foraminal narrowing as discussed above. Please see above for additional level by level details.    2. Incidentally noted sacral Tarlov cysts as discussed above.    3. Incompletely characterized 1.1 cm T1 and T2 hyperintense right upper pole renal lesion. Further evaluation is recommended with dedicated retroperitoneal ultrasound. Additionally, there is a 2.7 cm cystic structure within the right adnexa and further evaluation could be performed with pelvic ultrasound given the patient's postmenopausal status.         Allergies:   Review of patient's allergies indicates:   Allergen Reactions    Heparin analogues Other (See Comments)     thrombocytopenia    Ancef [cefazolin]     Keflex [cephalexin] Rash    Oxybutynin Other (See Comments)     Metallic sloan, chest symptoms        Current Medications:   Current Outpatient Prescriptions   Medication Sig Dispense Refill    aspirin 81 MG Chew Take 81 mg by mouth once daily.        blood sugar diagnostic (ACCU-CHEK ACTIVE TEST) Strp USE TO TEST BLOOD SUGAR FOUR TIMES DAILY 360 strip 3    blood-glucose meter (ACCU-CHEK MELIDA PLUS METER) Misc To check sugars 4 times daily.  Please include control solution 1 each 0    Ca cmb no.1-[id number]-[id number] (VITAMIN D-3 WITH ALOE) 120-1,000-10 mg-unit-mg Tab Take by mouth.      carvedilol (COREG) 12.5 MG tablet Take 0.5 tablets (6.25 mg total) by mouth 2 (two) times daily. 60 tablet 11    fluticasone (FLONASE) 50 mcg/actuation nasal spray 2 SPRAYS IN EACH NOSTRIL ONCE DAILY 16 g 6    furosemide (LASIX) 40 MG tablet TAKE 1 TABLET(40 MG) BY MOUTH EVERY DAY 30 tablet 6    insulin glargine (LANTUS SOLOSTAR) 100 unit/mL (3 mL) InPn pen Inject 42 units at bedtime 30 mL 6    insulin lispro (HUMALOG KWIKPEN) 100 unit/mL InPn pen Inject 15 units with breakfast 17 units with lunch and dinner plus correction scale 150-200 +1, 201-250 +2, 251-300 +3, 301-350 +4, > 350 +5. 45 mL 6     "ketoconazole (NIZORAL) 2 % shampoo APPLY TO AFFECTED AREA EVERY OTHER DAY AND LET SIT 5 MINUTES PRIOR TO RINSING AS DIRECTED 120 mL 3    lancets (ACCU-CHEK SOFTCLIX LANCETS) Misc 1 each by Misc.(Non-Drug; Combo Route) route 4 (four) times daily. 360 each 3    mirabegron 50 mg Tb24 Take 1 tablet (50 mg total) by mouth once daily. 90 tablet 3    MULTIVIT-MIN/IRON/FOLIC/LUTEIN (CENTRUM SILVER WOMEN ORAL) Take by mouth.      nitroGLYCERIN (NITROSTAT) 0.3 MG SL tablet Place 1 tablet (0.3 mg total) under the tongue every 5 (five) minutes as needed for Chest pain. 30 tablet 3    NOVOLOG FLEXPEN 100 unit/mL InPn pen   6    pantoprazole (PROTONIX) 40 MG tablet Take 1 tablet (40 mg total) by mouth once daily. 90 tablet 4    pen needle, diabetic (BD ULTRA-FINE TIM PEN NEEDLES) 32 gauge x 5/32" Ndle USE FOUR TIMES DAILY FOR BLOOD SUGAR TESTING 200 each 8    pen needle, diabetic (BD ULTRA-FINE TIM PEN NEEDLES) 32 gauge x 5/32" Ndle Use four times daily for insulin 480 each 11    rosuvastatin (CRESTOR) 40 MG Tab TK 1 T PO QD 30 tablet 6     No current facility-administered medications for this visit.        REVIEW OF SYSTEMS:    GENERAL:  No weight loss, malaise or fevers.  HEENT:  Negative for frequent or significant headaches.  NECK:  Negative for lumps, goiter, pain and significant neck swelling.  RESPIRATORY:  Negative for cough, wheezing or shortness of breath.  CARDIOVASCULAR:  Negative for chest pain, leg swelling or palpitations. HTN, CAD  GI:  Negative for abdominal discomfort, blood in stools or black stools or change in bowel habits.  RENAL: CKD  MUSCULOSKELETAL:  See HPI.  SKIN:  Negative for lesions, rash, and itching.  PSYCH:  Negative for sleep disturbance, mood disorder and recent psychosocial stressors.  HEMATOLOGY/LYMPHOLOGY:  Negative for prolonged bleeding, bruising easily or swollen nodes.  NEURO:   No history of headaches, syncope, paralysis, seizures or tremors.  All other reviewed and negative " other than HPI.    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    Hx of colonic polyps     1/9    Hx of colonic polyps     Hyperlipidemia     Hypertension     Myocardial infarction nov 2006    Posterior vitreous detachment of both eyes     Sleep apnea     Type 2 diabetes mellitus with mild nonproliferative diabetic retinopathy with macular edema 2/15/2013       Past Surgical History:  Past Surgical History:   Procedure Laterality Date    APPENDECTOMY      CATARACT EXTRACTION      Right eye    heart stent      X 2    HYSTERECTOMY      Fibroids       Family History:  Family History   Problem Relation Age of Onset    Diabetes Mother     Heart disease Mother     Hypertension Mother     Diabetes Father     Melanoma Father     Kidney failure Father     Mental illness Sister      suicide/schizophrenic    Cancer Maternal Grandfather     Cancer Paternal Grandfather      colon    Psoriasis Neg Hx     Lupus Neg Hx     Eczema Neg Hx        Social History:  Social History     Social History    Marital status: Single     Spouse name: N/A    Number of children: N/A    Years of education: N/A     Occupational History    Realtor      Social History Main Topics    Smoking status: Never Smoker    Smokeless tobacco: Never Used    Alcohol use No    Drug use: No    Sexual activity: Not Currently     Other Topics Concern    Are You Pregnant Or Think You May  "Be? No    Breast-Feeding No     Social History Narrative    Part time .    Severe back pain.    Retired in            OBJECTIVE:    /61   Pulse 64   Temp 98.1 °F (36.7 °C) (Oral)   Ht 5' 8" (1.727 m)   Wt 95.7 kg (211 lb)   BMI 32.08 kg/m²     PHYSICAL EXAMINATION:    General appearance: Well appearing, in no acute distress, alert and oriented x3.  Psych:  Mood and affect appropriate.  Skin: Skin color, texture, turgor normal, no rashes or lesions, in both upper and lower body.  Head/face:  Atraumatic, normocephalic. No palpable lymph nodes  Neck: No pain to palpation over the cervical paraspinous muscles. Spurling Negative. No pain with neck flexion, extension, or lateral flexion. .  GI: Abdomen soft and non-tender.  Back: Straight leg raising in the sitting position is negative to radicular pain. There is pain with palpation over lumbar spine. Limited ROM with pain on extension. Positive facet loading bilaterally.   Extremities: Peripheral joint ROM is full and pain free without obvious instability or laxity in all four extremities. No deformities, edema, or skin discoloration. Good capillary refill.  Musculoskeletal: Shoulder, hip, sacroiliac and knee provocative maneuvers are negative. Bilateral upper and lower extremity strength is normal and symmetric.  No atrophy or tone abnormalities are noted.  Neuro: Bilateral upper and lower extremity coordination and muscle stretch reflexes are physiologic and symmetric.  Plantar response are downgoing. No loss of sensation is noted.  Gait: Antalgic- ambulates without assistance.     ASSESSMENT: 77 y.o. year old female with low back pain, consistent with the followin. Facet arthritis, degenerative, lumbar spine     2. Lumbar stenosis     3. DDD (degenerative disc disease), lumbar           PLAN:     - The patient would like to switch providers. I do not think this is unreasonable. I will discuss her case with Dr. Peña and have " her assume care.     - Previous imaging was reviewed and discussed with the patient today.    - We discussed medial branch blocks today for the possible treatment of her facet mediated pain. She would like to think about this.     - She may call the schedule bilateral L2,3,4,5 MBB in the future.     - RTC PRN.     - Counseled patient regarding the importance of activity modification and constant sleeping habits.    The above plan and management options were discussed at length with patient. Patient is in agreement with the above and verbalized understanding.    Rosamaria Lopez NP  08/10/2017

## 2017-08-10 NOTE — PATIENT INSTRUCTIONS
"Medial Branch Blocks- test blocks that numb the joints to determine if that is the cause of your pain    Radiofrequency Ablation- "burning" of the nerves inside the joint. This procedure could last 6 months or more.   "

## 2017-08-24 RX ORDER — FUROSEMIDE 40 MG/1
TABLET ORAL
Qty: 90 TABLET | Refills: 6 | Status: SHIPPED | OUTPATIENT
Start: 2017-08-24 | End: 2018-08-20 | Stop reason: SDUPTHER

## 2017-09-24 ENCOUNTER — NURSE TRIAGE (OUTPATIENT)
Dept: ADMINISTRATIVE | Facility: CLINIC | Age: 77
End: 2017-09-24

## 2017-09-24 NOTE — TELEPHONE ENCOUNTER
"    Reason for Disposition   Pain in the big toe joint    Answer Assessment - Initial Assessment Questions  1. ONSET: "When did the pain start?"       Yesterday  2. LOCATION: "Where is the pain located?"       R foot, mostly 1st toe  3. PAIN: "How bad is the pain?"    (Scale 1-10; or mild, moderate, severe)    -  MILD (1-3): doesn't interfere with normal activities     -  MODERATE (4-7): interferes with normal activities (e.g., work or school) or awakens from sleep, limping     -  SEVERE (8-10): excruciating pain, unable to do any normal activities, unable to walk      moderate  4. WORK OR EXERCISE: "Has there been any recent work or exercise that involved this part of the body?"      walking  5. CAUSE: "What do you think is causing the foot pain?"      gout  6. OTHER SYMPTOMS: "Do you have any other symptoms?" (e.g., leg pain, rash, fever, numbness)      no  7. PREGNANCY: "Is there any chance you are pregnant?" "When was your last menstrual period?"      no    Protocols used: ST FOOT PAIN-A-    Pt called regarding R foot pain that feels similar to gout in past.  Pt requesting prescription.  Pt given option of same day appointment at [facility] and refused.  Dr. Navarro called regarding prescription call in and states pt will have to be evaluated for prescription.  Pt instructed that she can go to nearest urgent , but refuses.  Pt given the option again for same day appointment and refused due to being unable to walk a long distance. Pt given option to call 911 for ambulance to ED, and refuses. Message sent to PCP.  "

## 2017-09-25 ENCOUNTER — OFFICE VISIT (OUTPATIENT)
Dept: FAMILY MEDICINE | Facility: CLINIC | Age: 77
End: 2017-09-25
Payer: MEDICARE

## 2017-09-25 VITALS
TEMPERATURE: 98 F | HEART RATE: 97 BPM | SYSTOLIC BLOOD PRESSURE: 140 MMHG | RESPIRATION RATE: 16 BRPM | WEIGHT: 215.63 LBS | DIASTOLIC BLOOD PRESSURE: 86 MMHG | BODY MASS INDEX: 32.68 KG/M2 | HEIGHT: 68 IN | OXYGEN SATURATION: 96 %

## 2017-09-25 DIAGNOSIS — M10.071 ACUTE IDIOPATHIC GOUT INVOLVING TOE OF RIGHT FOOT: Primary | ICD-10-CM

## 2017-09-25 DIAGNOSIS — J01.90 ACUTE SINUSITIS, RECURRENCE NOT SPECIFIED, UNSPECIFIED LOCATION: ICD-10-CM

## 2017-09-25 PROCEDURE — 99213 OFFICE O/P EST LOW 20 MIN: CPT | Mod: S$GLB,,, | Performed by: FAMILY MEDICINE

## 2017-09-25 PROCEDURE — 3079F DIAST BP 80-89 MM HG: CPT | Mod: S$GLB,,, | Performed by: FAMILY MEDICINE

## 2017-09-25 PROCEDURE — 3008F BODY MASS INDEX DOCD: CPT | Mod: S$GLB,,, | Performed by: FAMILY MEDICINE

## 2017-09-25 PROCEDURE — 99499 UNLISTED E&M SERVICE: CPT | Mod: S$GLB,,, | Performed by: FAMILY MEDICINE

## 2017-09-25 PROCEDURE — 99999 PR PBB SHADOW E&M-EST. PATIENT-LVL III: CPT | Mod: PBBFAC,,, | Performed by: FAMILY MEDICINE

## 2017-09-25 PROCEDURE — 1159F MED LIST DOCD IN RCRD: CPT | Mod: S$GLB,,, | Performed by: FAMILY MEDICINE

## 2017-09-25 PROCEDURE — 3077F SYST BP >= 140 MM HG: CPT | Mod: S$GLB,,, | Performed by: FAMILY MEDICINE

## 2017-09-25 PROCEDURE — 1126F AMNT PAIN NOTED NONE PRSNT: CPT | Mod: S$GLB,,, | Performed by: FAMILY MEDICINE

## 2017-09-25 RX ORDER — METHYLPREDNISOLONE 4 MG/1
TABLET ORAL
Qty: 1 PACKAGE | Refills: 0 | Status: SHIPPED | OUTPATIENT
Start: 2017-09-25 | End: 2017-11-06 | Stop reason: ALTCHOICE

## 2017-09-25 NOTE — PROGRESS NOTES
Chief Complaint   Patient presents with    Leg Swelling    Diabetic Foot Exam    Ear Fullness       Radha Degroot is a 77 y.o. female who presents per the Chief Complaint.  Pt is known to me and was last seen by me on 6/25/2015.  All known chronic medical issues have been documented.       Toe Pain    The incident occurred 3 to 5 days ago. The incident occurred at home. There was no injury mechanism. The pain is present in the right toes. The quality of the pain is described as burning and stabbing. The pain is at a severity of 6/10. The pain is moderate. The pain has been constant since onset. Associated symptoms include an inability to bear weight and a loss of motion. Pertinent negatives include no loss of sensation, muscle weakness, numbness or tingling. She reports no foreign bodies present. The symptoms are aggravated by movement, palpation and weight bearing. She has tried non-weight bearing and rest (colchicine) for the symptoms. The treatment provided mild relief.        ROS  Review of Systems   Constitutional: Negative.  Negative for activity change, appetite change, chills, diaphoresis, fatigue, fever and unexpected weight change.   HENT: Negative.  Negative for congestion, ear pain, hearing loss, nosebleeds, postnasal drip, rhinorrhea, sinus pressure, sneezing, sore throat and trouble swallowing.    Eyes: Negative for pain and visual disturbance.   Respiratory: Negative for cough, choking and shortness of breath.    Cardiovascular: Negative for chest pain and leg swelling.   Gastrointestinal: Negative for abdominal pain, constipation, diarrhea, nausea and vomiting.   Genitourinary: Negative for difficulty urinating, dysuria, frequency and urgency.   Musculoskeletal: Positive for gait problem and joint swelling. Negative for arthralgias, back pain and myalgias.   Skin: Negative.    Allergic/Immunologic: Negative for environmental allergies and food allergies.   Neurological: Negative for dizziness,  "tingling, seizures, syncope, weakness, light-headedness, numbness and headaches.   Psychiatric/Behavioral: Negative.  Negative for confusion, decreased concentration, dysphoric mood and sleep disturbance. The patient is not nervous/anxious.        Physical Exam  Vitals:    09/25/17 1147   BP: (!) 140/86   Pulse: 97   Resp: 16   Temp: 98.2 °F (36.8 °C)    Body mass index is 32.78 kg/m².  Weight: 97.8 kg (215 lb 9.8 oz)   Height: 5' 8" (172.7 cm)     Physical Exam   Constitutional: She is oriented to person, place, and time. She appears well-developed and well-nourished. She is active and cooperative.  Non-toxic appearance. She does not have a sickly appearance. She does not appear ill. No distress.   HENT:   Head: Normocephalic and atraumatic.   Right Ear: Hearing and external ear normal. No decreased hearing is noted.   Left Ear: Hearing and external ear normal. No decreased hearing is noted.   Nose: Nose normal. No rhinorrhea or nasal deformity.   Mouth/Throat: Uvula is midline and oropharynx is clear and moist. She does not have dentures. Normal dentition.   Eyes: Conjunctivae, EOM and lids are normal. Pupils are equal, round, and reactive to light. Right eye exhibits no chemosis, no discharge and no exudate. No foreign body present in the right eye. Left eye exhibits no chemosis, no discharge and no exudate. No foreign body present in the left eye. No scleral icterus.   Neck: Normal range of motion and full passive range of motion without pain. Neck supple.   Cardiovascular: Normal rate, regular rhythm, S1 normal, S2 normal and normal heart sounds.  Exam reveals no gallop and no friction rub.    No murmur heard.  Pulmonary/Chest: Effort normal and breath sounds normal. No accessory muscle usage. No respiratory distress. She has no decreased breath sounds. She has no wheezes. She has no rhonchi. She has no rales.   Abdominal: Soft. Normal appearance. She exhibits no distension. There is no hepatosplenomegaly. " There is no tenderness. There is no rigidity, no rebound and no guarding.   Musculoskeletal:        Right foot: There is decreased range of motion, tenderness, bony tenderness and swelling. There is normal capillary refill, no crepitus, no deformity and no laceration.        Feet:    Neurological: She is alert and oriented to person, place, and time. She has normal strength. No cranial nerve deficit or sensory deficit. She exhibits normal muscle tone. She displays no seizure activity. Coordination and gait normal.   Skin: Skin is warm, dry and intact. No rash noted. She is not diaphoretic.   Psychiatric: She has a normal mood and affect. Her speech is normal and behavior is normal. Judgment and thought content normal. Cognition and memory are normal. She is attentive.       Assessment & Plan    1. Acute idiopathic gout involving toe of right foot  Discussed gout and specific triggers including high protein foods; patient with previous episode several years ago.  Discussed treatment options that were safe for her kidney; advised use of colchicine sparingly would be okay, but not more that 6 pills per month.  Short course of oral steroid given for symptom relief.   - methylPREDNISolone (MEDROL DOSEPACK) 4 mg tablet; use as directed  Dispense: 1 Package; Refill: 0    2. Acute sinusitis, recurrence not specified, unspecified location  Patient was advised to remain hydrated throughout illness and to use an antihistamine like loratadine or cetirizine daily.  I advised that a multi-symptom medication like Nyquil or Tylenol Cold and Flu or a sinus decongestant like Sudafed may raise blood pressure and should be used for no more than three days to alleviate symptoms.  If symptoms worsen, patient should follow up for further evaluation.  Advised that oral steroids should offer relief for her sinus congestion as well.      Follow up documented    ACTIVE MEDICAL ISSUES:  Documented in Problem List    PAST MEDICAL  HISTORY  Documented    PAST SURGICAL HISTORY:  Documented    SOCIAL HISTORY:  Documented    FAMILY HISTORY:  Documented    ALLERGIES AND MEDICATIONS: updated and reviewed.  Documented    Health Maintenance       Date Due Completion Date    Zoster Vaccine 03/24/1999 ---    TETANUS VACCINE 10/03/2015 10/3/2005    Foot Exam 07/07/2017 7/7/2016    Override on 6/12/2015: Done    Influenza Vaccine 08/01/2017 12/13/2016    Hemoglobin A1c 11/19/2017 5/19/2017    Eye Exam 01/05/2018 1/5/2017    Lipid Panel 02/16/2018 2/16/2017    DEXA SCAN 03/20/2018 3/20/2014

## 2017-09-25 NOTE — TELEPHONE ENCOUNTER
Spoke w pt; states that she went urgent care in [place] and was prescribed steroid medications. Pt states that she will keep appointment on 10/16/17. FYI

## 2017-09-27 ENCOUNTER — TELEPHONE (OUTPATIENT)
Dept: INTERNAL MEDICINE | Facility: CLINIC | Age: 77
End: 2017-09-27

## 2017-09-27 NOTE — TELEPHONE ENCOUNTER
Spoke with pt reviewed with her   Discussed increased novolog with pt monitor more closely   And bs improving she is weaning down steroid  With resolution of gout   appt as planned call with concern

## 2017-09-27 NOTE — TELEPHONE ENCOUNTER
----- Message from Mandi Quijano sent at 9/27/2017 10:11 AM CDT -----  Contact: Pt [phone]  Pt would like a call back from Dr Merida regarding her blood sugar. She states it shot up to the 300s but this morning it was 236 this morning.

## 2017-10-02 ENCOUNTER — TELEPHONE (OUTPATIENT)
Dept: FAMILY MEDICINE | Facility: CLINIC | Age: 77
End: 2017-10-02
Payer: MEDICARE

## 2017-10-02 ENCOUNTER — LAB VISIT (OUTPATIENT)
Dept: LAB | Facility: HOSPITAL | Age: 77
End: 2017-10-02
Attending: INTERNAL MEDICINE
Payer: MEDICARE

## 2017-10-02 DIAGNOSIS — N25.81 SECONDARY RENAL HYPERPARATHYROIDISM: ICD-10-CM

## 2017-10-02 DIAGNOSIS — E16.2 HYPOGLYCEMIA: Primary | ICD-10-CM

## 2017-10-02 DIAGNOSIS — N18.4 CHRONIC KIDNEY DISEASE, STAGE IV (SEVERE): Chronic | ICD-10-CM

## 2017-10-02 LAB
25(OH)D3+25(OH)D2 SERPL-MCNC: 40 NG/ML
ALBUMIN SERPL BCP-MCNC: 3.4 G/DL
ANION GAP SERPL CALC-SCNC: 9 MMOL/L
BASOPHILS # BLD AUTO: 0.05 K/UL
BASOPHILS NFR BLD: 0.5 %
BUN SERPL-MCNC: 55 MG/DL
CALCIUM SERPL-MCNC: 8.9 MG/DL
CHLORIDE SERPL-SCNC: 106 MMOL/L
CO2 SERPL-SCNC: 26 MMOL/L
CREAT SERPL-MCNC: 2.1 MG/DL
DIFFERENTIAL METHOD: NORMAL
EOSINOPHIL # BLD AUTO: 0.3 K/UL
EOSINOPHIL NFR BLD: 2.6 %
ERYTHROCYTE [DISTWIDTH] IN BLOOD BY AUTOMATED COUNT: 13.6 %
EST. GFR  (AFRICAN AMERICAN): 25.4 ML/MIN/1.73 M^2
EST. GFR  (NON AFRICAN AMERICAN): 22.1 ML/MIN/1.73 M^2
GLUCOSE SERPL-MCNC: 46 MG/DL
HCT VFR BLD AUTO: 38.4 %
HGB BLD-MCNC: 12.5 G/DL
LYMPHOCYTES # BLD AUTO: 2.2 K/UL
LYMPHOCYTES NFR BLD: 21.9 %
MCH RBC QN AUTO: 28.5 PG
MCHC RBC AUTO-ENTMCNC: 32.6 G/DL
MCV RBC AUTO: 88 FL
MONOCYTES # BLD AUTO: 0.8 K/UL
MONOCYTES NFR BLD: 7.6 %
NEUTROPHILS # BLD AUTO: 6.8 K/UL
NEUTROPHILS NFR BLD: 67 %
PHOSPHATE SERPL-MCNC: 3.3 MG/DL
PLATELET # BLD AUTO: 187 K/UL
PMV BLD AUTO: 11.6 FL
POTASSIUM SERPL-SCNC: 4.1 MMOL/L
PTH-INTACT SERPL-MCNC: 213 PG/ML
RBC # BLD AUTO: 4.38 M/UL
SODIUM SERPL-SCNC: 141 MMOL/L
WBC # BLD AUTO: 10.2 K/UL

## 2017-10-02 PROCEDURE — 36415 COLL VENOUS BLD VENIPUNCTURE: CPT | Mod: PO

## 2017-10-02 PROCEDURE — 80069 RENAL FUNCTION PANEL: CPT

## 2017-10-02 PROCEDURE — 82306 VITAMIN D 25 HYDROXY: CPT

## 2017-10-02 PROCEDURE — 85025 COMPLETE CBC W/AUTO DIFF WBC: CPT

## 2017-10-02 PROCEDURE — 82948 REAGENT STRIP/BLOOD GLUCOSE: CPT | Mod: S$GLB,,, | Performed by: INTERNAL MEDICINE

## 2017-10-02 PROCEDURE — 83970 ASSAY OF PARATHORMONE: CPT

## 2017-10-02 NOTE — TELEPHONE ENCOUNTER
Patient arrived to clinic for lab draw, patient complained of being lightheaded and not feeling well, patient checked BS with own machine was 58, patient AAO patient very clamy. Patient given glucose tab per Dr.Lombard with some water patient tolerated, no reactions. Patient rechecked in 20 minutes, BS 78 patient verbalized feeling better and was able to drive herself home.

## 2017-10-03 ENCOUNTER — TELEPHONE (OUTPATIENT)
Dept: NEPHROLOGY | Facility: CLINIC | Age: 77
End: 2017-10-03

## 2017-10-04 LAB — GLUCOSE SERPL-MCNC: 78 MG/DL (ref 70–110)

## 2017-10-06 DIAGNOSIS — N18.4 CKD (CHRONIC KIDNEY DISEASE), STAGE IV: Primary | ICD-10-CM

## 2017-10-19 NOTE — PROGRESS NOTES
Patient, Radha Degroot (MRN #[id number]), presented with a recent Platelet count less than 150 K/uL consistent with the definition of thrombocytopenia (ICD10 - D69.6).    Platelets   Date Value Ref Range Status   10/02/2017 187 150 - 350 K/uL Final     The patient's thrombocytopenia was monitored, evaluated, addressed and/or treated. This addendum to the medical record is made on 10/19/2017.

## 2017-10-20 ENCOUNTER — TELEPHONE (OUTPATIENT)
Dept: PAIN MEDICINE | Facility: CLINIC | Age: 77
End: 2017-10-20

## 2017-10-20 NOTE — TELEPHONE ENCOUNTER
Patient was contacted as per patient she stated that she would like to follow up with another regarding her procedure. Patient was informed that her message will be forwarded to Rosamaria Lopez for status on patient's physician.

## 2017-10-20 NOTE — TELEPHONE ENCOUNTER
----- Message from Camelia Vora sent at 10/20/2017 10:39 AM CDT -----  x 1st Request  _ 2nd Request  _ 3rd Request    Who: pt     Why: pt is requesting to speak to Rosamaria in particular in regards procedure that was cancel back on 8/18/17 for a nerve block , no further details provided. Pt isn't sure if it was Rosamaria NP that she spoke with or manager Taya. Please call and advise.     What Number to Call Back:  Pt will be available after 2:00pm: [phone]     When to Expect a call back: (Before the end of the day)  -- if call after 3:00 call back will be tomorrow.

## 2017-10-28 DIAGNOSIS — I10 ESSENTIAL HYPERTENSION: Chronic | ICD-10-CM

## 2017-10-29 RX ORDER — CARVEDILOL 12.5 MG/1
12.5 TABLET ORAL 2 TIMES DAILY
Qty: 180 TABLET | Refills: 4 | Status: SHIPPED | OUTPATIENT
Start: 2017-10-29 | End: 2018-10-24 | Stop reason: SDUPTHER

## 2017-11-03 ENCOUNTER — TELEPHONE (OUTPATIENT)
Dept: ENDOCRINOLOGY | Facility: CLINIC | Age: 77
End: 2017-11-03

## 2017-11-03 ENCOUNTER — CLINICAL SUPPORT (OUTPATIENT)
Dept: FAMILY MEDICINE | Facility: CLINIC | Age: 77
End: 2017-11-03
Payer: MEDICARE

## 2017-11-03 ENCOUNTER — LAB VISIT (OUTPATIENT)
Dept: LAB | Facility: HOSPITAL | Age: 77
End: 2017-11-03
Attending: INTERNAL MEDICINE
Payer: MEDICARE

## 2017-11-03 DIAGNOSIS — Z23 FLU VACCINE NEED: ICD-10-CM

## 2017-11-03 LAB
ESTIMATED AVG GLUCOSE: 186 MG/DL
HBA1C MFR BLD HPLC: 8.1 %

## 2017-11-03 PROCEDURE — G0008 ADMIN INFLUENZA VIRUS VAC: HCPCS | Mod: S$GLB,,, | Performed by: FAMILY MEDICINE

## 2017-11-03 PROCEDURE — 36415 COLL VENOUS BLD VENIPUNCTURE: CPT | Mod: PO

## 2017-11-03 PROCEDURE — 83036 HEMOGLOBIN GLYCOSYLATED A1C: CPT

## 2017-11-03 PROCEDURE — 90662 IIV NO PRSV INCREASED AG IM: CPT | Mod: S$GLB,,, | Performed by: FAMILY MEDICINE

## 2017-11-03 NOTE — PROGRESS NOTES
Flu vaccine administered IM to left deltoid.VIS form given. Patient tolerated well. Advised to wait 15 min to monitor for adverse reactions.

## 2017-11-06 ENCOUNTER — CLINICAL SUPPORT (OUTPATIENT)
Dept: DIABETES | Facility: CLINIC | Age: 77
End: 2017-11-06
Payer: MEDICARE

## 2017-11-06 ENCOUNTER — OFFICE VISIT (OUTPATIENT)
Dept: ENDOCRINOLOGY | Facility: CLINIC | Age: 77
End: 2017-11-06
Payer: MEDICARE

## 2017-11-06 VITALS
BODY MASS INDEX: 33.75 KG/M2 | HEIGHT: 68 IN | HEART RATE: 82 BPM | WEIGHT: 222.69 LBS | DIASTOLIC BLOOD PRESSURE: 83 MMHG | SYSTOLIC BLOOD PRESSURE: 149 MMHG

## 2017-11-06 VITALS — WEIGHT: 222 LBS | BODY MASS INDEX: 33.75 KG/M2

## 2017-11-06 DIAGNOSIS — E78.5 HYPERLIPIDEMIA LDL GOAL <100: ICD-10-CM

## 2017-11-06 DIAGNOSIS — E66.9 NON MORBID OBESITY, UNSPECIFIED OBESITY TYPE: ICD-10-CM

## 2017-11-06 DIAGNOSIS — E11.42 TYPE 2 DIABETES MELLITUS WITH DIABETIC POLYNEUROPATHY, WITH LONG-TERM CURRENT USE OF INSULIN: Primary | ICD-10-CM

## 2017-11-06 DIAGNOSIS — N18.4 CKD (CHRONIC KIDNEY DISEASE) STAGE 4, GFR 15-29 ML/MIN: ICD-10-CM

## 2017-11-06 DIAGNOSIS — Z79.4 TYPE 2 DIABETES MELLITUS WITH DIABETIC POLYNEUROPATHY, WITH LONG-TERM CURRENT USE OF INSULIN: Primary | ICD-10-CM

## 2017-11-06 DIAGNOSIS — I10 ESSENTIAL HYPERTENSION: ICD-10-CM

## 2017-11-06 PROCEDURE — 99999 PR PBB SHADOW E&M-EST. PATIENT-LVL V: CPT | Mod: PBBFAC,,, | Performed by: NURSE PRACTITIONER

## 2017-11-06 PROCEDURE — 99499 UNLISTED E&M SERVICE: CPT | Mod: S$GLB,,, | Performed by: NURSE PRACTITIONER

## 2017-11-06 PROCEDURE — 99214 OFFICE O/P EST MOD 30 MIN: CPT | Mod: S$GLB,,, | Performed by: NURSE PRACTITIONER

## 2017-11-06 NOTE — PROGRESS NOTES
Diabetes Education  Author: Siria Barnett RD  Date: 11/6/2017    Diabetes Education Visit  Diabetes Education Record Assessment/Progress: Initial  Diabetes Type : Type II  Diabetes Diagnosis: >10 years    Nutrition  Meal Planning: 3 meals per day, artificial sweeteners  What type of sweetener do you use?: Stevia/Truvia  What type of beverages do you drink?: water, diet soda/tea  Meal Plan 24 Hour Recall - Breakfast: 6-8 am:eggs, 1-2 sl ww toast, buttered, coffee w stevia  Meal Plan 24 Hour Recall - Lunch: 12-2pm; roasted chix, smothered cabbage, salad, iced tea  Meal Plan 24 Hour Recall - Dinner: 6-8pm: orange glazed chicken, potato slices, green beans, corn chowder, water  Meal Plan 24 Hour Recall - Snack: fruits, oranges, satsuma    Monitoring   Self Monitoring : SMBG 4x/day  Blood Glucose Logs: Yes    Exercise Type: none    Current Diabetes Treatment : Diet, Insulin    Social History  Preferred Learning Method: Face to Face, Reading Materials  Primary Support: Self, Son  Educational Level: Some College  Occupation: real estate  Smoking Status: Never a Smoker  Alcohol Use: Never    PHQ-2 Total Score: 0   DDS-2 Score  ( > 3 = SIGNIFICANT DISTRESS): 2     Barriers to Change: None  Learning Challenges : None  Readiness to Learn : Acceptance  Cultural Influences: No    Diabetes Education Assessment/Progress  Diabetes Disease Process (diabetes disease process and treatment options):(Reviewed diabetes progression and self-management)    Nutrition (Incorporating nutritional management into one's lifestyle): Discussion, Individual Session, Written Materials Provided, Demonstrates Understanding/Competency (verbalizes/demonstrates), Instructed (Pt stated she had been instucted by boring RDs many times in past and diet recommendations too confusing at times. Diet hx indicates inconsistent CHO intake w some lack of portion control and meals spaced 5 to 8 hr apart. Focus of instruction was CHO counting, label reading and meal  planning. Reviewed eating 3 meals daily (30-45 gCHO/meal), spacing meals 4-5 hours apart, sources of CHO and acceptable serving sizes, label reading and plate method of meal planning.    Physical Activity (incorporating physical activity into one's lifestyle): Discussion, Individual Session, Written Materials Provided, Demonstrates Understanding/Competency (verbalizes/demonstrates) (pt stated limitations due to heart disease and back pain. Discussed benefits of physical activity on BG control and encouraged starting with small manageable goals and working up as pt is able. )    Medications (states correct name, dose, onset, peak, duration, side effects & timing of meds): Individual Session, Demonstrates Understanding/Competency(verbalizes/demonstrates), Discussion, Written Materials Provided (Rec'd pt space meals 4-5 hours apart taking novolg w meals and lantus at 7pm)    Monitoring (monitoring blood glucose/other parameters & using results): Discussion, Individual Session, Written Materials Provided, Instructed, Demonstrates Understanding/Competency (verbalizes/demonstrates) (Reviewed SMBG 4 times daily, goal BG readings, and send log for review)    Acute Complications (preventing, detecting, and treating acute complications): Discussion, Individual Session, Written Materials Provided, Instructed, Demonstrates Understanding/Competency (verbalizes/demonstrates) (Reviewed s/s, causes, and treatment of hyperglycemia and hypoglycemia )    Chronic Complications (preventing, detecting, and treating chronic complications): Discussion, Individual Session, Written Materials Provided, Instructed, Demonstrates Understanding/Competency (verbalizes/demonstrates) (Discussed role DM plays with both kidney and heart disease. Provided edu on K and Na dietaryrestrictions; provided copy of guide on DM and CKD)    Clinical (diabetes and other pertinent medical history): Discussion, Individual Session, Written Materials Provided,  Instructed (Pt wt increased 7 Lb recently, possibly related to  recent inacitivy and increase oral intake of high Na foods and high fat fods.  Rec'd simple wt loss strategies for slow wt loss such as reducing cals by 250 casimiro /day, by reducing amt of fat in food prep, and choosing leaner/fat foods.)     Goals  Patient has selected/evaluated goals during today's session: Yes, selected  Healthy Eating: Set (Eat 3meals (30-46g CHO/meal) spaced 4-5 hrs apart and reduce caloric intake by 250 calories/day to achieve 1/2lb wt loss/week- GOAL wt: 210 by next visit)  Start Date: 11/06/17  Target Date: 02/06/18     Diabetes Care Plan/Intervention  Education Plan/Intervention: Individual Follow-Up DSMT (return 2/6/18)    Diabetes Meal Plan  Restrictions: Restricted Carbohydrate, Low Potassium, Low Sodium  Calories: 1600  Carbohydrate Per Meal: 30-45g  Carbohydrate Per Snack : 7-15g  Fat: 62g  Protein: 60g    Education Units of Time   Time Spent: 60 min      Health Maintenance Topics with due status: Not Due       Topic Last Completion Date    DEXA SCAN 03/20/2014    Eye Exam 01/05/2017    Lipid Panel 02/16/2017    Hemoglobin A1c 11/03/2017     Health Maintenance Due   Topic Date Due    Zoster Vaccine  03/24/1999    TETANUS VACCINE  10/03/2015    Foot Exam  07/07/2017

## 2017-11-06 NOTE — PROGRESS NOTES
CC: This 77 y.o. White female  is here for evaluation of  T2DM along with comorbidities indicated in the Visit Diagnosis section of this encounter.    HPI: Radha Degroot was diagnosed with T2DM in 1980s.     Nephrology - Dr. Mcmanus    Initial visit on 8/7/17  Last seen by Dr. Gates in 2/2017 when her breakfast Novolog dose was decreased. New to me. She is establishing care here since this location is more convenient.   Reports overall glucoses have been high. She is unable to walk longer than a few minutes d/t chronic severe back pain and she is sitting around not doing anything. She has found no relief in a previous steroid injection.   Plan Increase Lantus to 48 units every night.   Increase Novolog to 17-19-19 units before meals with the same sliding scale.  Continue to test blood sugar 4x/day.   Message/call with any issues especially if blood sugar is too low < 80.   Return to clinic in 3 months or sooner if needed.   a1c today or at earliest convenient (nonfasting).   Labs again in 3 months prior to the next visit.     Interval history  a1c up from 7.9 to 8.1%.   She is still having back pain. Has gained more weight 7 lb since lov. Feels a lot of it is fluid - ate more salt than usual yesterday as she went out to eat. Eating more in general as she is more sedentary d/t physical limitations.   Reviewed glucometer. Glucoses are mostly high but some glucoses in the 90s. She states recent bgs are not indicative of usual pattern.        PRESCRIBED DIABETES MEDICATIONS: Lantus Solostar 48 units hs, Novolog Flexpen 17 units w/ ss (Use on premeal readings: 150-200=+1, 201-250=+2, 251-300=+3; 301-350=+4, over 350=+5 units    Misses medication doses - No, but often takes lantus late bc she falls asleep.     DM COMPLICATIONS: nephropathy, retinopathy and peripheral neuropathy    SELF MONITORING BLOOD GLUCOSE: accuchek dorothy meter. Checks blood glucose at home 4x/day. Only brought her most recent bg log. The 2nd log  "is transcribed from meter.           HYPOGLYCEMIC EPISODES: denies      CURRENT DIET: 3 meals/day.  Drinks iced tea with no sugar. No snacks.   Breakfast was 1 slice of bread, 2 eggs, coffee w/ AF. Dinner was leftovers from lunch - steak with salad. Small portion of mac n cheese. satsuma after dinner. Lunch was steak with 2 tortillas, salad.     CURRENT EXERCISE: none       BP (!) 149/83 (BP Location: Right arm, Patient Position: Sitting, BP Method: Large (Automatic))   Pulse 82   Ht 5' 8" (1.727 m)   Wt 101 kg (222 lb 10.6 oz)   BMI 33.86 kg/m²     ROS:   CONSTITUTIONAL: Appetite good, denies fatigue  MS: + back pain   : + urinary incontinence       PHYSICAL EXAM:  GENERAL: Well developed, well nourished. No acute distress.   PSYCH: AAOx3, appropriate mood and affect, conversant, well-groomed. Judgement and insight good.   NEURO: Cranial nerves grossly intact. Speech clear, no tremor.   CHEST: Respirations even and unlabored. CTA bilaterally.  CARDIOVASCULAR: Regular rate and rhythm. No bruits. No murmur. 2 + ble edema.       Hemoglobin A1C   Date Value Ref Range Status   11/03/2017 8.1 (H) 4.0 - 5.6 % Final     Comment:     According to ADA guidelines, hemoglobin A1c <7.0% represents  optimal control in non-pregnant diabetic patients. Different  metrics may apply to specific patient populations.   Standards of Medical Care in Diabetes-2016.  For the purpose of screening for the presence of diabetes:  <5.7%     Consistent with the absence of diabetes  5.7-6.4%  Consistent with increasing risk for diabetes   (prediabetes)  >or=6.5%  Consistent with diabetes  Currently, no consensus exists for use of hemoglobin A1c  for diagnosis of diabetes for children.  This Hemoglobin A1c assay has significant interference with fetal   hemoglobin   (HbF). The results are invalid for patients with abnormal amounts of   HbF,   including those with known Hereditary Persistence   of Fetal Hemoglobin. Heterozygous hemoglobin " variants (HbAS, HbAC,   HbAD, HbAE, HbA2) do not significantly interfere with this assay;   however, presence of multiple variants in a sample may impact the %   interference.     05/19/2017 7.9 (H) 4.5 - 6.2 % Final     Comment:     According to ADA guidelines, hemoglobin A1C <7.0% represents  optimal control in non-pregnant diabetic patients.  Different  metrics may apply to specific populations.   Standards of Medical Care in Diabetes - 2016.  For the purpose of screening for the presence of diabetes:  <5.7%     Consistent with the absence of diabetes  5.7-6.4%  Consistent with increasing risk for diabetes   (prediabetes)  >or=6.5%  Consistent with diabetes  Currently no consensus exists for use of hemoglobin A1C  for diagnosis of diabetes for children.     02/16/2017 7.8 (H) 4.5 - 6.2 % Final     Comment:     According to ADA guidelines, hemoglobin A1C <7.0% represents  optimal control in non-pregnant diabetic patients.  Different  metrics may apply to specific populations.   Standards of Medical Care in Diabetes - 2016.  For the purpose of screening for the presence of diabetes:  <5.7%     Consistent with the absence of diabetes  5.7-6.4%  Consistent with increasing risk for diabetes   (prediabetes)  >or=6.5%  Consistent with diabetes  Currently no consensus exists for use of hemoglobin A1C  for diagnosis of diabetes for children.             Chemistry        Component Value Date/Time     10/02/2017 1142    K 4.1 10/02/2017 1142     10/02/2017 1142    CO2 26 10/02/2017 1142    BUN 55 (H) 10/02/2017 1142    CREATININE 2.1 (H) 10/02/2017 1142    GLU 46 (LL) 10/02/2017 1142        Component Value Date/Time    CALCIUM 8.9 10/02/2017 1142    ALKPHOS 75 05/19/2017 1422    AST 13 05/19/2017 1422    ALT 12 05/19/2017 1422    BILITOT 0.4 05/19/2017 1422    ESTGFRAFRICA 25.4 (A) 10/02/2017 1142    EGFRNONAA 22.1 (A) 10/02/2017 1142          Lab Results   Component Value Date    LDLCALC 64.2 02/16/2017        Lab Results   Component Value Date    MICALBCREAT 29.5 02/11/2015             STANDARDS of CARE:        ASA:               Last eye exam:       ASSESSMENT and PLAN:    A1C GOAL: < 8%    1. Uncontrolled type 2 diabetes mellitus with complication, with long-term current use of insulin  a1c near goal.     Take lantus at 7 pm every night or any other night that you can keep it consistent.   Novolog and Lantus can be taken together if need be. Take Novolog within 15 minutes of eating. Continue same insulin doses.   Send blood sugar log in 2-4 weeks for further recommendations.         Hemoglobin A1c    Ambulatory Referral to Diabetes Education   2. Hyperlipidemia LDL goal <100  Lipid panel   3. CKD (chronic kidney disease) stage 4, GFR 15-29 ml/min  Stable   F/u with Dr. Mcmanus.    4. Essential hypertension  Cont current vitaly   5. Non morbid obesity, unspecified obesity type  Increases insulin resistance.   Weight gain noted   May benefit from MNT       Orders Placed This Encounter   Procedures    Hemoglobin A1c     Standing Status:   Future     Standing Expiration Date:   1/5/2019    Lipid panel     Standing Status:   Future     Standing Expiration Date:   11/6/2018    Ambulatory Referral to Diabetes Education     Referral Priority:   Routine     Referral Type:   Consultation     Referral Reason:   Specialty Services Required     Requested Specialty:   Endocrinology     Number of Visits Requested:   1        Return in about 3 months (around 2/6/2018).

## 2017-11-06 NOTE — PATIENT INSTRUCTIONS
Take lantus at 7 pm every night or any other night that you can keep it consistent.   Novolog and Lantus can be taken together if need be. Take Novolog within 15 minutes of eating. Continue same insulin doses.   Send blood sugar log in 2-4 weeks for further recommendations.

## 2017-11-09 ENCOUNTER — HOSPITAL ENCOUNTER (OUTPATIENT)
Facility: OTHER | Age: 77
Discharge: HOME | End: 2017-11-09
Attending: ANESTHESIOLOGY | Admitting: ANESTHESIOLOGY
Payer: MEDICARE

## 2017-11-09 ENCOUNTER — SURGERY (OUTPATIENT)
Age: 77
End: 2017-11-09

## 2017-11-09 VITALS
BODY MASS INDEX: 31.83 KG/M2 | DIASTOLIC BLOOD PRESSURE: 73 MMHG | HEART RATE: 87 BPM | SYSTOLIC BLOOD PRESSURE: 153 MMHG | OXYGEN SATURATION: 95 % | TEMPERATURE: 98 F | HEIGHT: 68 IN | WEIGHT: 210 LBS | RESPIRATION RATE: 18 BRPM

## 2017-11-09 DIAGNOSIS — M47.816 FACET ARTHRITIS, DEGENERATIVE, LUMBAR SPINE: Primary | ICD-10-CM

## 2017-11-09 LAB
GLUCOSE SERPL-MCNC: 192 MG/DL (ref 70–110)
POCT GLUCOSE: 192 MG/DL (ref 70–110)

## 2017-11-09 PROCEDURE — 64493 INJ PARAVERT F JNT L/S 1 LEV: CPT | Mod: 50 | Performed by: ANESTHESIOLOGY

## 2017-11-09 PROCEDURE — 64494 INJ PARAVERT F JNT L/S 2 LEV: CPT | Mod: 50 | Performed by: ANESTHESIOLOGY

## 2017-11-09 PROCEDURE — S0020 INJECTION, BUPIVICAINE HYDRO: HCPCS | Performed by: ANESTHESIOLOGY

## 2017-11-09 PROCEDURE — 82947 ASSAY GLUCOSE BLOOD QUANT: CPT | Performed by: ANESTHESIOLOGY

## 2017-11-09 PROCEDURE — 64495 INJ PARAVERT F JNT L/S 3 LEV: CPT | Mod: 50 | Performed by: ANESTHESIOLOGY

## 2017-11-09 PROCEDURE — 64493 INJ PARAVERT F JNT L/S 1 LEV: CPT | Mod: 50,,, | Performed by: ANESTHESIOLOGY

## 2017-11-09 PROCEDURE — 64494 INJ PARAVERT F JNT L/S 2 LEV: CPT | Mod: 50,,, | Performed by: ANESTHESIOLOGY

## 2017-11-09 PROCEDURE — 25000003 PHARM REV CODE 250: Performed by: ANESTHESIOLOGY

## 2017-11-09 RX ORDER — LIDOCAINE HYDROCHLORIDE 10 MG/ML
10 INJECTION INFILTRATION; PERINEURAL ONCE
Status: DISCONTINUED | OUTPATIENT
Start: 2017-11-09 | End: 2017-11-09 | Stop reason: HOSPADM

## 2017-11-09 RX ORDER — BUPIVACAINE HYDROCHLORIDE 5 MG/ML
3 INJECTION, SOLUTION EPIDURAL; INTRACAUDAL ONCE
Status: COMPLETED | OUTPATIENT
Start: 2017-11-09 | End: 2017-11-09

## 2017-11-09 RX ORDER — LIDOCAINE HYDROCHLORIDE 20 MG/ML
INJECTION, SOLUTION EPIDURAL; INFILTRATION; INTRACAUDAL; PERINEURAL
Status: DISCONTINUED | OUTPATIENT
Start: 2017-11-09 | End: 2017-11-09 | Stop reason: HOSPADM

## 2017-11-09 RX ADMIN — LIDOCAINE HYDROCHLORIDE 10 ML: 20 INJECTION, SOLUTION EPIDURAL; INFILTRATION; INTRACAUDAL; PERINEURAL at 10:11

## 2017-11-09 RX ADMIN — BUPIVACAINE HYDROCHLORIDE 10 ML: 5 INJECTION, SOLUTION EPIDURAL; INTRACAUDAL; PERINEURAL at 10:11

## 2017-11-09 NOTE — OP NOTE
Date of Procedure: 11/09/2017    Procedure: Bilateral L3-5 Lumbar medial branch blocks    Pre-op diagnosis: Lumbar spondylosis [M47.816]    Post-op diagnosis: Lumbar spondylosis [M47.816]     Physician: Dr. Ирина Peña     Assistant: Dr. Oviedo    Anesthestia: local    EBL: None    Specimens: None    All medications, allergies, and relevant histories were reviewed. No recent antibiotics or infections.  A time-out was taken to verify the correct patient, procedure, laterality, and appropriate medications/allergies.    Lumbar Medial Branch Block:   The procedure risks, benefits, and possible complications were discussed with the patient including nerve damage, spinal headache, bleeding, infection, and failure of pain relief.   Patient was placed in the prone position with the midriff elevated. Oblique view of the spine was obtained with fluoroscopy. Entry sites marked over the skin. The skin was prepped with chlorhexidine x3 and draped. Sterile precautions observed throughout the procedure. Xylocaine 1% was infiltrated locally over the entry site. A 22-gauge spinal needle was introduced at an angle, and the needle was placed onto the junction of the superior articular process and the most supermedial point on the transverse process. Placement was confirmed with a fluoroscopic view. After negative aspiration, 1cc of bupivacaine 0.5% was injected at each level. Needle was restyletted and removed. Procedure performed at the levels indicated: Right L3-5, Left:L3-5.     Patient tolerated the procedure well and there were no complications.   The patient was discharged home with a responsible adult.      Future Management:   If good results, can proceed to RFA.  If no relief, can follow up to discuss options.    I certify that I provided the above services.  I was present for the entire procedure, which was performed by the fellow physician under my supervision.  There were no parts of the procedure that were performed not  by myself or without my direct supervision.

## 2017-11-09 NOTE — DISCHARGE INSTRUCTIONS

## 2017-11-10 ENCOUNTER — TELEPHONE (OUTPATIENT)
Dept: PAIN MEDICINE | Facility: CLINIC | Age: 77
End: 2017-11-10

## 2017-11-10 NOTE — TELEPHONE ENCOUNTER
----- Message from Jayla Smalls sent at 11/10/2017  9:48 AM CST -----  Contact: patient  x_  1st Request  _  2nd Request  _  3rd Request    Who: ALISSA ANDREW [[id number]]    Why: Patient would like to speak with staff in regards to her diary result.    What Number to Call Back:[phone]    When to Expect a call back: (Within 24 hours)    Please return the call at earliest convenience. Thanks!

## 2017-11-10 NOTE — TELEPHONE ENCOUNTER
"Contacted patient regarding her pain diary results.     Patient stated "I didn't go by hour by hour, just did a synopsis of what went on. I have never experienced pain while sitting or laying down only while walking and during activity. Her son told her she when she got off the table  that she stood straight and patient reports having no pain.     When she went to lunch with her son, no pain, she went home to get dress for the grocery store and no pain.   She walked around the grocery store no pain.     About 15 minutes of walking around the grocery store,  she starting feeling having a slight uncomfortable feeling,  by the time they made it to check out, a slight discomfort, by the time she made it home a slight pain, but nothing like she had prior.     She reports today she is still receiving about 50% if pain relief.   "

## 2017-11-14 ENCOUNTER — TELEPHONE (OUTPATIENT)
Dept: PAIN MEDICINE | Facility: CLINIC | Age: 77
End: 2017-11-14

## 2017-11-14 NOTE — TELEPHONE ENCOUNTER
----- Message from Adriano Sanches sent at 11/14/2017  9:28 AM CST -----  X_  1st Request  _  2nd Request  _  3rd Request        Who: ALISSA ANDREW [[id number]]    Why: Pt is following up regarding her nerve procedure. Please call to discuss.    What Number to Call Back:[phone](Please call after 1pm)    When to Expect a call back: (Within 24 hours)    Please return the call at earliest convenience. Thanks!

## 2017-11-14 NOTE — TELEPHONE ENCOUNTER
Contacted and spoke to patient regarding message, she was informed that Dr. Peña had to respond to what was needed for the patient, and that information has been presented to the schedulers today. She was informed that she would receive a call within 24 hours to schedule.     Patient verbalized understanding, she did inquire on rather or not if the staff knew when the procedure would be?     Staff informed her that she did not know.

## 2017-11-14 NOTE — TELEPHONE ENCOUNTER
Ok, that sounds like she was pain free for at least part of the time, which would be a positive block.  She should have received the pain diary.  Did she fill that out?  We can go ahead and schedule her for the RFA, conventional, L3-5, on the side of her choice followed be the other side 1-2 weeks later.  Thanks!

## 2017-11-14 NOTE — TELEPHONE ENCOUNTER
----- Message from Laura Mejía sent at 11/14/2017  4:33 PM CST -----  Contact: Patient   X _1st Request  _  2nd Request  _  3rd Request    Who:ALISSA ANDREW [[id number]]    Why:Patient states she was suppose to receive a call concerning a procedure she is requesting back to have it scheduled     What Number to Call Back:[phone]    When to Expect a call back: (Before the end of the day)   -- if call after 3:00 call back will be tomorrow.

## 2017-11-15 ENCOUNTER — TELEPHONE (OUTPATIENT)
Dept: PAIN MEDICINE | Facility: CLINIC | Age: 77
End: 2017-11-15

## 2017-11-15 NOTE — TELEPHONE ENCOUNTER
Spoke with patient and she stated she had no pain for about the first 3 hours after the block and she stated she felt over the first 12 hours she got at least 50% relief form the block.  Patient did schedule the RFA's the first one on 11-28-17 and the 2nd one on 12-5-17 with Dr. Peña.  She would like a call from Dr. Peña to explain the RFA   Procedure and nay possible complications from the procedure ,Message forward to Dr. Peña,

## 2017-11-15 NOTE — TELEPHONE ENCOUNTER
----- Message from Laura Mejía sent at 11/15/2017  4:27 PM CST -----  Contact: Patient   X _1st Request  _  2nd Request  _  3rd Request    Who:ALISSA ANDREW [[id number]]    Why:Patient states she would like to schedule her injection procedure for tomorrow morning she is requesting a call back     What Number to Call Back:[phone]    When to Expect a call back: (Before the end of the day)   -- if call after 3:00 call back will be tomorrow.

## 2017-11-15 NOTE — TELEPHONE ENCOUNTER
Contacted and spoke to patient, she wanted to change her procedure from the pm to the am.     Patient was transferred to the .

## 2017-11-19 RX ORDER — BLOOD SUGAR DIAGNOSTIC
STRIP MISCELLANEOUS
Qty: 200 STRIP | Refills: 0 | Status: SHIPPED | OUTPATIENT
Start: 2017-11-19 | End: 2018-07-28 | Stop reason: SDUPTHER

## 2017-11-21 ENCOUNTER — OFFICE VISIT (OUTPATIENT)
Dept: FAMILY MEDICINE | Facility: CLINIC | Age: 77
End: 2017-11-21
Payer: MEDICARE

## 2017-11-21 VITALS
TEMPERATURE: 99 F | OXYGEN SATURATION: 94 % | DIASTOLIC BLOOD PRESSURE: 80 MMHG | BODY MASS INDEX: 33.31 KG/M2 | RESPIRATION RATE: 16 BRPM | HEIGHT: 68 IN | WEIGHT: 219.81 LBS | SYSTOLIC BLOOD PRESSURE: 114 MMHG | HEART RATE: 84 BPM

## 2017-11-21 DIAGNOSIS — E11.42 TYPE 2 DIABETES MELLITUS WITH DIABETIC POLYNEUROPATHY, WITH LONG-TERM CURRENT USE OF INSULIN: ICD-10-CM

## 2017-11-21 DIAGNOSIS — Z79.4 TYPE 2 DIABETES MELLITUS WITH DIABETIC POLYNEUROPATHY, WITH LONG-TERM CURRENT USE OF INSULIN: ICD-10-CM

## 2017-11-21 DIAGNOSIS — M10.072 ACUTE IDIOPATHIC GOUT INVOLVING TOE OF LEFT FOOT: Primary | ICD-10-CM

## 2017-11-21 PROCEDURE — 99499 UNLISTED E&M SERVICE: CPT | Mod: S$GLB,,, | Performed by: FAMILY MEDICINE

## 2017-11-21 PROCEDURE — 99214 OFFICE O/P EST MOD 30 MIN: CPT | Mod: S$GLB,,, | Performed by: FAMILY MEDICINE

## 2017-11-21 PROCEDURE — 99999 PR PBB SHADOW E&M-EST. PATIENT-LVL III: CPT | Mod: PBBFAC,,, | Performed by: FAMILY MEDICINE

## 2017-11-21 RX ORDER — METHYLPREDNISOLONE 4 MG/1
TABLET ORAL
Qty: 1 PACKAGE | Refills: 0 | Status: SHIPPED | OUTPATIENT
Start: 2017-11-21 | End: 2017-12-04

## 2017-11-21 NOTE — PROGRESS NOTES
Chief Complaint   Patient presents with    Foot Pain     left foot pain     Diabetic Foot Exam       Radha Degroot is a 77 y.o. female who presents per the Chief Complaint.  Pt is known to me and was last seen by me on 9/25/2017.  All known chronic medical issues have been documented.       Toe Pain    The incident occurred 3 to 5 days ago. The incident occurred at home. There was no injury mechanism. The pain is present in the right toes. The quality of the pain is described as burning and stabbing. The pain is at a severity of 6/10. The pain is moderate. The pain has been constant since onset. Associated symptoms include an inability to bear weight and a loss of motion. Pertinent negatives include no loss of sensation, muscle weakness, numbness or tingling. She reports no foreign bodies present. The symptoms are aggravated by movement, palpation and weight bearing. She has tried non-weight bearing and rest (colchicine) for the symptoms. The treatment provided mild relief.   Diabetes   She presents for her follow-up diabetic visit. She has type 2 diabetes mellitus. Her disease course has been stable. There are no hypoglycemic associated symptoms. Pertinent negatives for hypoglycemia include no confusion, dizziness, headaches, hunger, mood changes, nervousness/anxiousness, pallor, seizures, sleepiness, speech difficulty, sweats or tremors. Associated symptoms include foot paresthesias. Pertinent negatives for diabetes include no blurred vision, no chest pain, no fatigue, no foot ulcerations, no polydipsia, no polyphagia, no polyuria, no visual change, no weakness and no weight loss. There are no hypoglycemic complications. Symptoms are stable. Diabetic complications include peripheral neuropathy. Pertinent negatives for diabetic complications include no autonomic neuropathy, CVA, heart disease, impotence, nephropathy, PVD or retinopathy. Risk factors for coronary artery disease include diabetes mellitus,  obesity, sedentary lifestyle and post-menopausal. Current diabetic treatment includes intensive insulin program. She is compliant with treatment most of the time. She is following a generally healthy diet. When asked about meal planning, she reported none. She has not had a previous visit with a dietitian. She rarely participates in exercise. There is no change in her home blood glucose trend. An ACE inhibitor/angiotensin II receptor blocker is being taken. She does not see a podiatrist.Eye exam is current.        ROS  Review of Systems   Constitutional: Negative.  Negative for activity change, appetite change, chills, diaphoresis, fatigue, fever, unexpected weight change and weight loss.   HENT: Negative.  Negative for congestion, ear pain, hearing loss, nosebleeds, postnasal drip, rhinorrhea, sinus pressure, sneezing, sore throat and trouble swallowing.    Eyes: Negative for blurred vision, pain and visual disturbance.   Respiratory: Negative for cough, choking and shortness of breath.    Cardiovascular: Negative for chest pain and leg swelling.   Gastrointestinal: Negative for abdominal pain, constipation, diarrhea, nausea and vomiting.   Endocrine: Negative for polydipsia, polyphagia and polyuria.   Genitourinary: Negative for difficulty urinating, dysuria, frequency, impotence and urgency.   Musculoskeletal: Positive for arthralgias (left great toe). Negative for back pain, gait problem, joint swelling and myalgias.   Skin: Negative.  Negative for pallor.   Allergic/Immunologic: Negative for environmental allergies and food allergies.   Neurological: Negative.  Negative for dizziness, tingling, tremors, seizures, syncope, speech difficulty, weakness, light-headedness, numbness and headaches.   Psychiatric/Behavioral: Negative.  Negative for confusion, decreased concentration, dysphoric mood and sleep disturbance. The patient is not nervous/anxious.        Physical Exam  Vitals:    11/21/17 1335   BP: 114/80  "  Pulse: 84   Resp: 16   Temp: 98.5 °F (36.9 °C)    Body mass index is 33.42 kg/m².  Weight: 99.7 kg (219 lb 12.8 oz)   Height: 5' 8" (172.7 cm)     Physical Exam   Constitutional: She is oriented to person, place, and time. She appears well-developed and well-nourished. She is active and cooperative.  Non-toxic appearance. She does not have a sickly appearance. She does not appear ill. No distress.   HENT:   Head: Normocephalic and atraumatic.   Right Ear: Hearing and external ear normal. No decreased hearing is noted.   Left Ear: Hearing and external ear normal. No decreased hearing is noted.   Nose: Nose normal. No rhinorrhea or nasal deformity.   Mouth/Throat: Uvula is midline and oropharynx is clear and moist. She does not have dentures. Normal dentition.   Eyes: Conjunctivae, EOM and lids are normal. Pupils are equal, round, and reactive to light. Right eye exhibits no chemosis, no discharge and no exudate. No foreign body present in the right eye. Left eye exhibits no chemosis, no discharge and no exudate. No foreign body present in the left eye. No scleral icterus.   Neck: Normal range of motion and full passive range of motion without pain. Neck supple.   Cardiovascular: Normal rate, regular rhythm, S1 normal, S2 normal and normal heart sounds.  Exam reveals no gallop and no friction rub.    No murmur heard.  Pulses:       Dorsalis pedis pulses are 1+ on the right side, and 1+ on the left side.   Pulmonary/Chest: Effort normal and breath sounds normal. No accessory muscle usage. No respiratory distress. She has no decreased breath sounds. She has no wheezes. She has no rhonchi. She has no rales.   Abdominal: Soft. Normal appearance. She exhibits no distension. There is no hepatosplenomegaly. There is no tenderness. There is no rigidity, no rebound and no guarding.   Musculoskeletal:        Right foot: There is normal range of motion, no tenderness, no bony tenderness, no swelling, normal capillary refill, " no deformity and no laceration.        Left foot: There is decreased range of motion, tenderness, bony tenderness and swelling. There is normal capillary refill, no crepitus, no deformity and no laceration.   Feet:   Right Foot:   Protective Sensation: 7 sites tested. 6 sites sensed.   Skin Integrity: Negative for ulcer, blister, skin breakdown, erythema, warmth, callus or dry skin.   Left Foot:   Protective Sensation: 7 sites tested. 6 sites sensed.   Skin Integrity: Negative for ulcer, blister, skin breakdown, erythema, warmth, callus or dry skin.   Neurological: She is alert and oriented to person, place, and time. She has normal strength. No cranial nerve deficit or sensory deficit. She exhibits normal muscle tone. She displays no seizure activity. Coordination and gait normal.   Skin: Skin is warm, dry and intact. No rash noted. She is not diaphoretic.   Psychiatric: She has a normal mood and affect. Her speech is normal and behavior is normal. Judgment and thought content normal. Cognition and memory are normal. She is attentive.       Assessment & Plan    1. Acute idiopathic gout involving toe of left foot  Will start oral steroid for symptom relief.  Patient is scheduled for Radiofrequency Thermocoagulation for back pain next week and advised use of short term steroids should not interfere with planned procedure.  - methylPREDNISolone (MEDROL DOSEPACK) 4 mg tablet; use as directed  Dispense: 1 Package; Refill: 0    2. Type 2 diabetes mellitus with diabetic polyneuropathy, with long-term current use of insulin  Patient is encouraged to follow a diet low in carbohydrates and simple sugars.  Advised to focus on good food choices and increased physical activity and encouraged to adhere to medication regimen and check glucose as recommended daily and contact office if glucose levels are not improving over time.  Screening blood test is not due at this time.  Foot exam was done at this visit.  Complete diabetic  foot exam was performed at this visit.  Feet were exposed and checked for abnormality and 10 g monofilament testing was performed.  Visual inspection and pedal pulses normal and protective sensation intact.  Patient was advised to use caution with bare feet and to avoid traveling outside without feet being protected.       Follow up documented    ACTIVE MEDICAL ISSUES:  Documented in Problem List    PAST MEDICAL HISTORY  Documented    PAST SURGICAL HISTORY:  Documented    SOCIAL HISTORY:  Documented    FAMILY HISTORY:  Documented    ALLERGIES AND MEDICATIONS: updated and reviewed.  Documented    Health Maintenance       Date Due Completion Date    Zoster Vaccine 03/24/1999 ---    TETANUS VACCINE 10/03/2015 10/3/2005    Foot Exam 07/07/2017 7/7/2016    Override on 6/12/2015: Done    Eye Exam 01/05/2018 1/5/2017    Lipid Panel 02/16/2018 2/16/2017    DEXA SCAN 03/20/2018 3/20/2014    Hemoglobin A1c 05/03/2018 11/3/2017

## 2017-11-24 RX ORDER — ROSUVASTATIN CALCIUM 5 MG/1
TABLET, COATED ORAL
Qty: 30 TABLET | Refills: 0 | OUTPATIENT
Start: 2017-11-24

## 2017-11-27 RX ORDER — PANTOPRAZOLE SODIUM 40 MG/1
TABLET, DELAYED RELEASE ORAL
Qty: 90 TABLET | Refills: 0 | Status: SHIPPED | OUTPATIENT
Start: 2017-11-27 | End: 2018-03-15 | Stop reason: SDUPTHER

## 2017-11-28 ENCOUNTER — SURGERY (OUTPATIENT)
Age: 77
End: 2017-11-28

## 2017-11-28 ENCOUNTER — HOSPITAL ENCOUNTER (OUTPATIENT)
Facility: OTHER | Age: 77
Discharge: HOME | End: 2017-11-28
Attending: ANESTHESIOLOGY | Admitting: ANESTHESIOLOGY
Payer: MEDICARE

## 2017-11-28 VITALS
TEMPERATURE: 99 F | HEIGHT: 68 IN | WEIGHT: 214 LBS | DIASTOLIC BLOOD PRESSURE: 74 MMHG | RESPIRATION RATE: 18 BRPM | BODY MASS INDEX: 32.43 KG/M2 | OXYGEN SATURATION: 96 % | HEART RATE: 91 BPM | SYSTOLIC BLOOD PRESSURE: 140 MMHG

## 2017-11-28 DIAGNOSIS — G89.29 CHRONIC PAIN: ICD-10-CM

## 2017-11-28 DIAGNOSIS — M47.816 FACET ARTHRITIS, DEGENERATIVE, LUMBAR SPINE: Primary | ICD-10-CM

## 2017-11-28 LAB — POCT GLUCOSE: 147 MG/DL (ref 70–110)

## 2017-11-28 PROCEDURE — S0020 INJECTION, BUPIVICAINE HYDRO: HCPCS | Performed by: ANESTHESIOLOGY

## 2017-11-28 PROCEDURE — 25000003 PHARM REV CODE 250: Performed by: ANESTHESIOLOGY

## 2017-11-28 PROCEDURE — 63600175 PHARM REV CODE 636 W HCPCS: Performed by: ANESTHESIOLOGY

## 2017-11-28 PROCEDURE — 64636 DESTROY L/S FACET JNT ADDL: CPT | Performed by: ANESTHESIOLOGY

## 2017-11-28 PROCEDURE — 64635 DESTROY LUMB/SAC FACET JNT: CPT | Mod: RT,,, | Performed by: ANESTHESIOLOGY

## 2017-11-28 PROCEDURE — 64635 DESTROY LUMB/SAC FACET JNT: CPT | Performed by: ANESTHESIOLOGY

## 2017-11-28 PROCEDURE — 25000003 PHARM REV CODE 250: Performed by: PHYSICAL MEDICINE & REHABILITATION

## 2017-11-28 PROCEDURE — 82947 ASSAY GLUCOSE BLOOD QUANT: CPT | Performed by: ANESTHESIOLOGY

## 2017-11-28 PROCEDURE — 64636 DESTROY L/S FACET JNT ADDL: CPT | Mod: RT,,, | Performed by: ANESTHESIOLOGY

## 2017-11-28 PROCEDURE — 99152 MOD SED SAME PHYS/QHP 5/>YRS: CPT | Mod: ,,, | Performed by: ANESTHESIOLOGY

## 2017-11-28 RX ORDER — SODIUM CHLORIDE 9 MG/ML
500 INJECTION, SOLUTION INTRAVENOUS CONTINUOUS
Status: DISCONTINUED | OUTPATIENT
Start: 2017-11-28 | End: 2017-11-28 | Stop reason: HOSPADM

## 2017-11-28 RX ORDER — BUPIVACAINE HYDROCHLORIDE 5 MG/ML
INJECTION, SOLUTION EPIDURAL; INTRACAUDAL
Status: DISCONTINUED | OUTPATIENT
Start: 2017-11-28 | End: 2017-11-28 | Stop reason: HOSPADM

## 2017-11-28 RX ORDER — LIDOCAINE HYDROCHLORIDE 20 MG/ML
INJECTION, SOLUTION INFILTRATION; PERINEURAL
Status: DISCONTINUED | OUTPATIENT
Start: 2017-11-28 | End: 2017-11-28 | Stop reason: HOSPADM

## 2017-11-28 RX ORDER — MIDAZOLAM HYDROCHLORIDE 1 MG/ML
INJECTION INTRAMUSCULAR; INTRAVENOUS
Status: DISCONTINUED | OUTPATIENT
Start: 2017-11-28 | End: 2017-11-28 | Stop reason: HOSPADM

## 2017-11-28 RX ORDER — FENTANYL CITRATE 50 UG/ML
INJECTION, SOLUTION INTRAMUSCULAR; INTRAVENOUS
Status: DISCONTINUED | OUTPATIENT
Start: 2017-11-28 | End: 2017-11-28 | Stop reason: HOSPADM

## 2017-11-28 RX ADMIN — LIDOCAINE HYDROCHLORIDE 10 MG: 20 INJECTION, SOLUTION INFILTRATION; PERINEURAL at 11:11

## 2017-11-28 RX ADMIN — MIDAZOLAM HYDROCHLORIDE 1 MG: 1 INJECTION, SOLUTION INTRAMUSCULAR; INTRAVENOUS at 11:11

## 2017-11-28 RX ADMIN — MIDAZOLAM HYDROCHLORIDE 0.5 MG: 1 INJECTION, SOLUTION INTRAMUSCULAR; INTRAVENOUS at 11:11

## 2017-11-28 RX ADMIN — SODIUM CHLORIDE 500 ML: 0.9 INJECTION, SOLUTION INTRAVENOUS at 09:11

## 2017-11-28 RX ADMIN — BUPIVACAINE HYDROCHLORIDE 10 ML: 5 INJECTION, SOLUTION EPIDURAL; INTRACAUDAL; PERINEURAL at 11:11

## 2017-11-28 RX ADMIN — FENTANYL CITRATE 50 MCG: 50 INJECTION, SOLUTION INTRAMUSCULAR; INTRAVENOUS at 11:11

## 2017-11-28 NOTE — DISCHARGE SUMMARY
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        Ca cmb no.[id number]-[id number] (VITAMIN D-3 WITH ALOE) 120-1,000-10 mg-unit-mg Tab Take by mouth once daily.       carvedilol (COREG) 12.5 MG tablet Take 1 tablet (12.5 mg total) by mouth 2 (two) times daily.  Qty: 180 tablet, Refills: 4    Associated Diagnoses: Essential hypertension      fluticasone (FLONASE) 50 mcg/actuation nasal spray 2 SPRAYS IN EACH NOSTRIL ONCE DAILY  Qty: 16 g, Refills: 6      furosemide (LASIX) 40 MG tablet TAKE 1 TABLET(40 MG) BY MOUTH EVERY DAY  Qty: 90 tablet, Refills: 6    Comments: **Patient requests 90 days supply**      insulin glargine (LANTUS SOLOSTAR) 100 unit/mL (3 mL) InPn pen Inject 42 units at bedtime  Qty: 30 mL, Refills: 6      ketoconazole (NIZORAL) 2 % shampoo APPLY TO AFFECTED AREA EVERY OTHER DAY AND LET SIT 5 MINUTES PRIOR TO RINSING AS DIRECTED  Qty: 120 mL, Refills: 3      methylPREDNISolone (MEDROL DOSEPACK) 4 mg tablet use as directed  Qty: 1 Package, Refills: 0    Associated Diagnoses: Acute idiopathic gout involving toe of left foot      mirabegron 50 mg Tb24 Take 1 tablet (50 mg total) by mouth once daily.  Qty: 90 tablet, Refills: 3      MULTIVIT-MIN/IRON/FOLIC/LUTEIN (CENTRUM SILVER WOMEN ORAL) Take by mouth once daily.       nitroGLYCERIN (NITROSTAT) 0.3 MG SL tablet Place 1 tablet (0.3 mg total) under the tongue every 5 (five) minutes as needed for Chest pain.  Qty: 30 tablet, Refills: 3      NOVOLOG FLEXPEN 100 unit/mL InPn pen Refills: 6      pantoprazole (PROTONIX) 40 MG tablet TAKE 1  "TABLET BY MOUTH DAILY  Qty: 90 tablet, Refills: 0      rosuvastatin (CRESTOR) 40 MG Tab TK 1 T PO QD  Qty: 30 tablet, Refills: 6      !! ACCU-CHEK MELIDA PLUS TEST STRP Strp USE TO TEST BLOOD SUGAR FOUR TIMES DAILY  Qty: 200 strip, Refills: 0      !! blood sugar diagnostic (ACCU-CHEK ACTIVE TEST) Strp USE TO TEST BLOOD SUGAR FOUR TIMES DAILY  Qty: 360 strip, Refills: 3    Comments: **Patient requests 90 days supply**  Associated Diagnoses: Type II or unspecified type diabetes mellitus without mention of complication, uncontrolled      blood-glucose meter (ACCU-CHEK MELIDA PLUS METER) Misc To check sugars 4 times daily.  Please include control solution  Qty: 1 each, Refills: 0    Comments: Please include control solution  Associated Diagnoses: Type II or unspecified type diabetes mellitus without mention of complication, uncontrolled      insulin lispro (HUMALOG KWIKPEN) 100 unit/mL InPn pen Inject 15 units with breakfast 17 units with lunch and dinner plus correction scale 150-200 +1, 201-250 +2, 251-300 +3, 301-350 +4, > 350 +5.  Qty: 45 mL, Refills: 6    Comments: **Patient requests 90 days supply**      lancets (ACCU-CHEK SOFTCLIX LANCETS) Misc 1 each by Misc.(Non-Drug; Combo Route) route 4 (four) times daily.  Qty: 360 each, Refills: 3    Associated Diagnoses: Type II or unspecified type diabetes mellitus without mention of complication, uncontrolled      !! pen needle, diabetic (BD ULTRA-FINE TIM PEN NEEDLES) 32 gauge x 5/32" Ndle USE FOUR TIMES DAILY FOR BLOOD SUGAR TESTING  Qty: 200 each, Refills: 8      !! pen needle, diabetic (BD ULTRA-FINE TIM PEN NEEDLES) 32 gauge x 5/32" Ndle Use four times daily for insulin  Qty: 480 each, Refills: 11       !! - Potential duplicate medications found. Please discuss with provider.          Resume home diet and activity    "

## 2017-11-28 NOTE — INTERVAL H&P NOTE
The patient has been examined and the H&P has been reviewed:    I concur with the findings and no changes have occurred since H&P was written.    No change in the location or quality of the pain since the most recent clinic visit.  No new symptoms.  She wishes to proceed with the procedure today.    PE, unchanged from previous:  CV:  RRR  Resp: unlabored, no wheezing.    NPO since MN.    Anesthesia/Surgery risks, benefits and alternative options discussed and understood by patient/family.          Active Hospital Problems    Diagnosis  POA    Chronic pain [G89.29]  Yes      Resolved Hospital Problems    Diagnosis Date Resolved POA   No resolved problems to display.     I have seen the patient with the fellow physician.  We have come up with the above plan.  The patient is in agreement with our plan.

## 2017-11-28 NOTE — OP NOTE
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 Ирина Peña     Assistant: None    Anesthestia: local/IV sedation:  Versed 1.5 mg and fentanyl 50 mcg IV.  Conscious sedation provided by MD and monitored by RN.  Total sedation time was less than 45 minutes. (See nurse documentation and case log for sedation time)    EBL: None    Specimens: None    All medications, allergies, and relevant histories were reviewed. No recent antibiotics or infections.  A time-out was taken to verify the correct patient, procedure, laterality, and appropriate medications/allergies.    Procedure: Lumbar RFA    Lumbar Medial Branch Block with radiofrequency ablation, levels L3-5     The procedure risks, benefits, and possible complications were discussed with the patient including nerve damage, infection, spinal headache, and paresis.   Patient was placed in the prone position with the midriff elevated. Skin was prepped with CHG and draped. Oblique view of the spine was obtained with fluoroscopy. Entry sites were marked over the skin and Xylocaine 1% was used to anesthetize the skin and subcutaneous tissues.     A 3.5 " Natividad Venom needle was introduced at an angle to parallel the medial branches in the groove between superior articular process and transverse process, and L5 primary dorsal ramus at the junction of the S1 superior articular process and sacral ala.    Multifidus stim elicited at each level.  No distal motor stimulation was elicited at any level at 2V with a frequency of 2Hz.  All impedances were within the acceptable range.    1cc of 2% lidocaine was injected at each level.  Thermal RF was then conducted at each level at 80 degrees, for 90 seconds   1 cc of 0.5% bupivacaine was injected at " each level.    Patient tolerated the procedure well and there were no complications.      Future Management:   If helpful, can repeat as needed.  Follow up with me in 4-6 weeks.      I certify that I provided the above services.  I was present for the entire procedure, which was performed by the fellow physician under my supervision.  There were no parts of the procedure that were performed not by myself or without my direct supervision.

## 2017-11-28 NOTE — DISCHARGE INSTRUCTIONS

## 2017-11-29 RX ORDER — KETOCONAZOLE 20 MG/ML
SHAMPOO, SUSPENSION TOPICAL
Qty: 120 ML | Refills: 2 | Status: SHIPPED | OUTPATIENT
Start: 2017-11-29 | End: 2018-10-29 | Stop reason: SDUPTHER

## 2017-12-04 ENCOUNTER — OFFICE VISIT (OUTPATIENT)
Dept: INTERNAL MEDICINE | Facility: CLINIC | Age: 77
End: 2017-12-04
Payer: MEDICARE

## 2017-12-04 VITALS
BODY MASS INDEX: 33.28 KG/M2 | SYSTOLIC BLOOD PRESSURE: 122 MMHG | HEART RATE: 68 BPM | DIASTOLIC BLOOD PRESSURE: 70 MMHG | HEIGHT: 68 IN | WEIGHT: 219.56 LBS

## 2017-12-04 DIAGNOSIS — E78.5 HYPERLIPIDEMIA, UNSPECIFIED HYPERLIPIDEMIA TYPE: ICD-10-CM

## 2017-12-04 DIAGNOSIS — N18.4 TYPE 2 DIABETES MELLITUS WITH STAGE 4 CHRONIC KIDNEY DISEASE, WITH LONG-TERM CURRENT USE OF INSULIN: ICD-10-CM

## 2017-12-04 DIAGNOSIS — L85.3 DRY SKIN: ICD-10-CM

## 2017-12-04 DIAGNOSIS — I25.10 CORONARY ARTERY DISEASE WITHOUT ANGINA PECTORIS, UNSPECIFIED VESSEL OR LESION TYPE, UNSPECIFIED WHETHER NATIVE OR TRANSPLANTED HEART: Primary | ICD-10-CM

## 2017-12-04 DIAGNOSIS — L98.9 BUMPS ON SKIN: ICD-10-CM

## 2017-12-04 DIAGNOSIS — I50.9 CHRONIC CONGESTIVE HEART FAILURE, UNSPECIFIED CONGESTIVE HEART FAILURE TYPE: ICD-10-CM

## 2017-12-04 DIAGNOSIS — E11.22 TYPE 2 DIABETES MELLITUS WITH STAGE 4 CHRONIC KIDNEY DISEASE, WITH LONG-TERM CURRENT USE OF INSULIN: ICD-10-CM

## 2017-12-04 DIAGNOSIS — Z79.4 TYPE 2 DIABETES MELLITUS WITH STAGE 4 CHRONIC KIDNEY DISEASE, WITH LONG-TERM CURRENT USE OF INSULIN: ICD-10-CM

## 2017-12-04 DIAGNOSIS — I10 ESSENTIAL HYPERTENSION: Chronic | ICD-10-CM

## 2017-12-04 PROCEDURE — 99499 UNLISTED E&M SERVICE: CPT | Mod: S$GLB,,, | Performed by: INTERNAL MEDICINE

## 2017-12-04 PROCEDURE — 99999 PR PBB SHADOW E&M-EST. PATIENT-LVL V: CPT | Mod: PBBFAC,,, | Performed by: INTERNAL MEDICINE

## 2017-12-04 PROCEDURE — 99214 OFFICE O/P EST MOD 30 MIN: CPT | Mod: S$GLB,,, | Performed by: INTERNAL MEDICINE

## 2017-12-04 RX ORDER — ROSUVASTATIN CALCIUM 40 MG/1
40 TABLET, COATED ORAL DAILY
Qty: 30 TABLET | Refills: 6 | Status: SHIPPED | OUTPATIENT
Start: 2017-12-04 | End: 2018-08-09 | Stop reason: SDUPTHER

## 2017-12-05 ENCOUNTER — SURGERY (OUTPATIENT)
Age: 77
End: 2017-12-05

## 2017-12-05 ENCOUNTER — HOSPITAL ENCOUNTER (OUTPATIENT)
Facility: OTHER | Age: 77
Discharge: HOME | End: 2017-12-05
Attending: ANESTHESIOLOGY | Admitting: ANESTHESIOLOGY
Payer: MEDICARE

## 2017-12-05 VITALS
WEIGHT: 212 LBS | RESPIRATION RATE: 17 BRPM | HEIGHT: 68 IN | TEMPERATURE: 98 F | DIASTOLIC BLOOD PRESSURE: 79 MMHG | HEART RATE: 79 BPM | OXYGEN SATURATION: 97 % | SYSTOLIC BLOOD PRESSURE: 154 MMHG | BODY MASS INDEX: 32.13 KG/M2

## 2017-12-05 DIAGNOSIS — M47.816 LUMBAR SPONDYLOSIS: Primary | ICD-10-CM

## 2017-12-05 LAB — POCT GLUCOSE: 237 MG/DL (ref 70–110)

## 2017-12-05 PROCEDURE — 64635 DESTROY LUMB/SAC FACET JNT: CPT | Mod: LT,,, | Performed by: ANESTHESIOLOGY

## 2017-12-05 PROCEDURE — 64635 DESTROY LUMB/SAC FACET JNT: CPT | Performed by: ANESTHESIOLOGY

## 2017-12-05 PROCEDURE — 25500020 PHARM REV CODE 255: Performed by: ANESTHESIOLOGY

## 2017-12-05 PROCEDURE — 63600175 PHARM REV CODE 636 W HCPCS: Performed by: ANESTHESIOLOGY

## 2017-12-05 PROCEDURE — 64636 DESTROY L/S FACET JNT ADDL: CPT | Mod: LT,,, | Performed by: ANESTHESIOLOGY

## 2017-12-05 PROCEDURE — S0020 INJECTION, BUPIVICAINE HYDRO: HCPCS | Performed by: ANESTHESIOLOGY

## 2017-12-05 PROCEDURE — 99152 MOD SED SAME PHYS/QHP 5/>YRS: CPT | Mod: ,,, | Performed by: ANESTHESIOLOGY

## 2017-12-05 PROCEDURE — 64636 DESTROY L/S FACET JNT ADDL: CPT | Performed by: ANESTHESIOLOGY

## 2017-12-05 PROCEDURE — 82947 ASSAY GLUCOSE BLOOD QUANT: CPT | Performed by: ANESTHESIOLOGY

## 2017-12-05 PROCEDURE — 25000003 PHARM REV CODE 250: Performed by: ANESTHESIOLOGY

## 2017-12-05 RX ORDER — BUPIVACAINE HYDROCHLORIDE 5 MG/ML
3 INJECTION, SOLUTION EPIDURAL; INTRACAUDAL ONCE
Status: DISCONTINUED | OUTPATIENT
Start: 2017-12-05 | End: 2017-12-05 | Stop reason: HOSPADM

## 2017-12-05 RX ORDER — DEXAMETHASONE SODIUM PHOSPHATE 4 MG/ML
INJECTION, SOLUTION INTRA-ARTICULAR; INTRALESIONAL; INTRAMUSCULAR; INTRAVENOUS; SOFT TISSUE
Status: DISCONTINUED | OUTPATIENT
Start: 2017-12-05 | End: 2017-12-05 | Stop reason: HOSPADM

## 2017-12-05 RX ORDER — LIDOCAINE HYDROCHLORIDE 20 MG/ML
5 INJECTION, SOLUTION INFILTRATION; PERINEURAL
Status: COMPLETED | OUTPATIENT
Start: 2017-12-05 | End: 2017-12-05

## 2017-12-05 RX ORDER — BUPIVACAINE HYDROCHLORIDE 5 MG/ML
INJECTION, SOLUTION EPIDURAL; INTRACAUDAL
Status: DISCONTINUED | OUTPATIENT
Start: 2017-12-05 | End: 2017-12-05 | Stop reason: HOSPADM

## 2017-12-05 RX ORDER — SODIUM CHLORIDE 9 MG/ML
INJECTION, SOLUTION INTRAVENOUS CONTINUOUS
Status: DISCONTINUED | OUTPATIENT
Start: 2017-12-05 | End: 2017-12-05 | Stop reason: HOSPADM

## 2017-12-05 RX ORDER — FENTANYL CITRATE 50 UG/ML
50 INJECTION, SOLUTION INTRAMUSCULAR; INTRAVENOUS ONCE
Status: COMPLETED | OUTPATIENT
Start: 2017-12-05 | End: 2017-12-05

## 2017-12-05 RX ORDER — MIDAZOLAM HYDROCHLORIDE 1 MG/ML
2 INJECTION, SOLUTION INTRAMUSCULAR; INTRAVENOUS ONCE AS NEEDED
Status: COMPLETED | OUTPATIENT
Start: 2017-12-05 | End: 2017-12-05

## 2017-12-05 RX ORDER — DEXAMETHASONE SODIUM PHOSPHATE 10 MG/ML
10 INJECTION INTRAMUSCULAR; INTRAVENOUS ONCE
Status: DISCONTINUED | OUTPATIENT
Start: 2017-12-05 | End: 2017-12-05 | Stop reason: HOSPADM

## 2017-12-05 RX ADMIN — BUPIVACAINE HYDROCHLORIDE 10 ML: 5 INJECTION, SOLUTION EPIDURAL; INTRACAUDAL; PERINEURAL at 11:12

## 2017-12-05 RX ADMIN — MIDAZOLAM HYDROCHLORIDE 1 MG: 1 INJECTION, SOLUTION INTRAMUSCULAR; INTRAVENOUS at 11:12

## 2017-12-05 RX ADMIN — SODIUM CHLORIDE: 0.9 INJECTION, SOLUTION INTRAVENOUS at 10:12

## 2017-12-05 RX ADMIN — DEXAMETHASONE SODIUM PHOSPHATE 4 MG: 4 INJECTION, SOLUTION INTRAMUSCULAR; INTRAVENOUS at 11:12

## 2017-12-05 RX ADMIN — LIDOCAINE HYDROCHLORIDE 5 ML: 20 INJECTION, SOLUTION INFILTRATION; PERINEURAL at 11:12

## 2017-12-05 RX ADMIN — FENTANYL CITRATE 50 MCG: 50 INJECTION, SOLUTION INTRAMUSCULAR; INTRAVENOUS at 11:12

## 2017-12-05 RX ADMIN — IOHEXOL 5 ML: 300 INJECTION, SOLUTION INTRAVENOUS at 11:12

## 2017-12-05 NOTE — PROGRESS NOTES
"Subjective:       Patient ID: Radha Degroot is a 77 y.o. female.    Chief Complaint: Follow-up   this is a 77-year-old who presents today for follow up.  Since last visit she reports that her nephrologist adjusted some of her blood pressure medications down working on pressure was running low she was taken off of amlodipine and lisinopril.  More recently blood pressure has been acceptable she denies increased fluid retention.  She has had problems with her back and undergoing follow-up with pain management underwent injection and now proceeding with radiofrequency ablation she had improvement on one side already and plans to proceed with the other.  She is requesting a refill of her Crestor.  She continues to follow with endocrinology for her diabetes control is had improved control but does get lows on occasion she tries to avoid meal skipping and uses her insulin with sliding scale as needed.    HPI  Review of Systems   Constitutional: Negative for fever.        Bp was low and meds adjusted by neprholgoy    Respiratory: Negative for cough, shortness of breath and wheezing.    Cardiovascular: Negative for chest pain and palpitations.   Gastrointestinal: Negative for abdominal pain and constipation.   Musculoskeletal:        Back imrpoving with injections  And radiofrequency ablation    Neurological: Negative for dizziness.       Objective:     Blood pressure 122/70, pulse 68, height 5' 8" (1.727 m), weight 99.6 kg (219 lb 9.3 oz).    Physical Exam   Constitutional: No distress.   HENT:   Head: Normocephalic.   Mouth/Throat: Oropharynx is clear and moist.   Eyes: No scleral icterus.   Neck: Neck supple.   Cardiovascular: Normal rate, regular rhythm and normal heart sounds.  Exam reveals no gallop and no friction rub.    No murmur heard.  Pulmonary/Chest: Effort normal and breath sounds normal. No respiratory distress.   Abdominal: Soft. Bowel sounds are normal. She exhibits no mass. There is no tenderness. "   Musculoskeletal: She exhibits no edema.   Neurological: She is alert.   Skin: No erythema.   Psychiatric: She has a normal mood and affect.   Vitals reviewed.      Assessment:       1. Coronary artery disease without angina pectoris, unspecified vessel or lesion type, unspecified whether native or transplanted heart    2. Chronic congestive heart failure, unspecified congestive heart failure type    3. Hyperlipidemia, unspecified hyperlipidemia type    4. Bumps on skin    5. Dry skin    6. Type 2 diabetes mellitus with stage 4 chronic kidney disease, with long-term current use of insulin    7. Essential hypertension        Plan:       Radha was seen today for annual exam.    Diagnoses and all orders for this visit:    Coronary artery disease without angina pectoris, unspecified vessel or lesion type, unspecified whether native or transplanted heart   Chronic congestive heart failure, unspecified congestive heart failure type  She will schedule her cardiology follow-up    -     Ambulatory referral to Cardiology    Hyperlipidemia, unspecified hyperlipidemia type  Continue current regimen refill provided    Bumps on skin  Dry skin  She would like to see a dermatologist discussed moisture lotion for dry skin  -     Ambulatory referral to Dermatology    Other orders  -     rosuvastatin (CRESTOR) 40 MG Tab; Take 1 tablet (40 mg total) by mouth once daily.    Type 2 diabets she has had imrpvoed and contiues to follow with endocrinology  She will montor closely for lows avoid meals skipping    Chronic kidney disease she reports adjustment in her bp medicaitons   By nephrology off ace she conitnues to follow with her neprhologist     She will schedule her annual mammogram    djd lumbar spine she is undergoing injections and radifrequency ablation with some improvement    Recent labs reviewed    Follow up 4 months

## 2017-12-05 NOTE — DISCHARGE INSTRUCTIONS
Home Care Instructions Pain Management:    1. DIET:   You may resume your normal diet today.   2. BATHING:   You may shower with luke warm water. No tub baths or anything that will soak injection sites under water for 24 hours.  3. DRESSING:   You may remove your bandage today.   4. ACTIVITY LEVEL:   You may resume your normal activities 24 hrs after your procedure. Nothing strenuous today.  5. MEDICATIONS:   You may resume your normal medications today. To restart blood thinners, ask your doctor.  6. SPECIAL INSTRUCTIONS:   No heat to the injection site for 24 hrs including, bath or shower, heating pad, moist heat, or hot tubs.    Use ice pack to injection site for any pain or discomfort.  Apply ice packs for 20 minute intervals as needed.     If you have received any sedatives by mouth today you may not drive for 12 hours.    If you have received any sedation through your IV, you may not drive for 24 hrs.     PLEASE CALL YOUR DOCTOR IF:  1. Redness or swelling around the injection site.  2. Fever of 101 degrees or more  3. Drainage (pus) from the injection site.  4. For any continuous bleeding (some dried blood over the incision is normal.)    FOR EMERGENCIES:   If any unusual problems or difficulties occur during clinic hours, call [phone] or 111. Procedural Sedation  Procedural sedation is medicine to ease discomfort, pain, and anxiety during a procedure. The medicine is often given through an intravenous (IV) line in your arm or hand. In some cases, the medicine may be taken by mouth or inhaled. While you are under sedation, you will likely be awake. But you may not remember anything afterward.  Why procedural sedation is used  Sedation is used for many types of procedures. The goal is to reduce pain, anxiety, and stressful memories of a procedure. It can also help your health care provider treat you. For example, having a broken bone fixed may be easier if you feel relaxed.  Procedural sedation is used  only for short, basic procedures. It is not used for complex surgeries. Some procedures that use this type of sedation include:  · Dental surgery  · Breast biopsy, to take a sample of breast tissue  · Endoscopy, to look at gastrointestinal problems  · Bronchoscopy, to check for lung problems  · Bone or joint realignment, to fix a broken bone or dislocated joint  · Minor foot or skin surgery  · Electrical cardioversion, to restore a normal heart rhythm  · Lumbar puncture, to assess neurological disease  Risks of procedural sedation  Procedural sedation has some risks and possible side effects, such as:  · Headache  · Nausea and vomiting  · Unpleasant memory of the procedure  · Lowered rate of breathing  · Changes in heart rate and blood pressure (rare)  · Inhalation of stomach contents into your lungs (rare)  Side effects will likely go away shortly after the procedure. Your health care team will watch your heart rate and breathing during your sedation. This is to help prevent problems.  Your own risks may vary based on your age and your overall health. They also depend on the type of sedation you are given. Talk with your health care provider about the risks that apply most to you.  Getting ready for procedural sedation  Talk with your health care provider how to get ready for your procedure. Tell him or her about all the medicines you take. This includes over-the-counter medicines such as ibuprofen. It also includes vitamins, herbs, and other supplements. You may need to stop taking some medicines before the procedure, such as blood thinners and aspirin. If you smoke, you may need to stop. Talk with your health care provider if you need help to stop smoking.  Tell your health care provider if you:  · Have had any problems in the past with sedation or anesthesia  · Have had any recent changes in your health, such as an infection or fever  · Are pregnant or think you may be pregnant  Also, make sure to:  · Ask a  family member or friend to take you home after the procedure. You cannot drive on the day you receive sedation.  · Not eat or drink after midnight the night before your procedure, if advised.  · Follow all other instructions from your health care provider.  During your procedural sedation  You may have your procedure in a hospital or a medical clinic. Sedation is done by a trained health care provider. In general, you can expect the following:  · You will be given medicine through an IV line in your arm or hand. Or you may receive a shot. The medicine may also be given by mouth. Or you may inhale it through a mask.  · If you receive medicine through an IV, you may feel the effects very quickly. You will start to feel relaxed and drowsy.  · During the procedure, your heart rate, breathing, and blood pressure will be closely watched. Your breathing and blood pressure may decrease a little. But you will likely not need help with your breathing. You may receive a little extra oxygen through a mask.  · You will probably be awake the entire time. If you do fall asleep, you should be easy to wake up, if needed. You should feel little or no pain.  · When your procedure is over, the sedative medicine will be stopped.  After your procedural sedation  You will begin to feel more awake and aware. But you will likely be drowsy for a while afterward. You will be closely watched as you become more alert. You may have a faint memory of the procedure. Or you may not remember it at all.  You should be able to return home within an hour or two after your procedure. Plan to have someone stay with you for a few hours. Side effects such as headache and nausea may go away quickly. Tell your health care provider if they continue.  Dont drive or make any important decisions for at least 24 hours. Be sure to follow all after-care instructions.      When to call your health care provider  Have someone call your health care provider right  away if you have any of these:  · Drowsiness that gets worse  · Weakness or dizziness that gets worse  · Repeated vomiting  · You cant be awakened   Date Last Reviewed: 2/6/2015  © 4687-3791 [employer]. [address] [place] [address], [place] [address]. All rights reserved. This information is not intended as a substitute for professional medical care. Always follow your healthcare professional's instructions.

## 2017-12-05 NOTE — DISCHARGE SUMMARY
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 ACCU-CHEK MELIDA PLUS TEST STRP Strp USE TO TEST BLOOD SUGAR FOUR TIMES DAILY  Qty: 200 strip, Refills: 0      aspirin 81 MG Chew Take 81 mg by mouth once daily.        !! blood sugar diagnostic (ACCU-CHEK ACTIVE TEST) Strp USE TO TEST BLOOD SUGAR FOUR TIMES DAILY  Qty: 360 strip, Refills: 3    Comments: **Patient requests 90 days supply**  Associated Diagnoses: Type II or unspecified type diabetes mellitus without mention of complication, uncontrolled      blood-glucose meter (ACCU-CHEK MELIDA PLUS METER) Misc To check sugars 4 times daily.  Please include control solution  Qty: 1 each, Refills: 0    Comments: Please include control solution  Associated Diagnoses: Type II or unspecified type diabetes mellitus without mention of complication, uncontrolled      Ca cmb no.[id number]-aloe (VITAMIN D-3 WITH ALOE) 120-1,000-10 mg-unit-mg Tab Take by mouth once daily.       carvedilol (COREG) 12.5 MG tablet Take 1 tablet (12.5 mg total) by mouth 2 (two) times daily.  Qty: 180 tablet, Refills: 4    Associated Diagnoses: Essential hypertension      fluticasone (FLONASE) 50 mcg/actuation nasal spray 2 SPRAYS IN EACH NOSTRIL ONCE DAILY  Qty: 16 g, Refills: 6      furosemide (LASIX) 40 MG tablet TAKE 1 TABLET(40 MG) BY MOUTH EVERY DAY  Qty: 90 tablet, Refills: 6    Comments: **Patient requests 90 days supply**      insulin glargine (LANTUS SOLOSTAR) 100 unit/mL (3 mL) InPn pen Inject 42 units at bedtime  Qty: 30 mL, Refills: 6      ketoconazole (NIZORAL) 2 % shampoo APPLY TO AFFECTED AREA EVERY OTHER DAY AND LET SIT 5  "MINUTES PRIOR TO RINSING AS DIRECTED  Qty: 120 mL, Refills: 2      lancets (ACCU-CHEK SOFTCLIX LANCETS) Misc 1 each by Misc.(Non-Drug; Combo Route) route 4 (four) times daily.  Qty: 360 each, Refills: 3    Associated Diagnoses: Type II or unspecified type diabetes mellitus without mention of complication, uncontrolled      mirabegron 50 mg Tb24 Take 1 tablet (50 mg total) by mouth once daily.  Qty: 90 tablet, Refills: 3      MULTIVIT-MIN/IRON/FOLIC/LUTEIN (CENTRUM SILVER WOMEN ORAL) Take by mouth once daily.       nitroGLYCERIN (NITROSTAT) 0.3 MG SL tablet Place 1 tablet (0.3 mg total) under the tongue every 5 (five) minutes as needed for Chest pain.  Qty: 30 tablet, Refills: 3      NOVOLOG FLEXPEN 100 unit/mL InPn pen Refills: 6      pantoprazole (PROTONIX) 40 MG tablet TAKE 1 TABLET BY MOUTH DAILY  Qty: 90 tablet, Refills: 0      !! pen needle, diabetic (BD ULTRA-FINE TIM PEN NEEDLES) 32 gauge x 5/32" Ndle USE FOUR TIMES DAILY FOR BLOOD SUGAR TESTING  Qty: 200 each, Refills: 8      !! pen needle, diabetic (BD ULTRA-FINE TIM PEN NEEDLES) 32 gauge x 5/32" Ndle Use four times daily for insulin  Qty: 480 each, Refills: 11      rosuvastatin (CRESTOR) 40 MG Tab Take 1 tablet (40 mg total) by mouth once daily.  Qty: 30 tablet, Refills: 6       !! - Potential duplicate medications found. Please discuss with provider.          Resume home diet and activity    "

## 2017-12-05 NOTE — OP NOTE
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 Ирина Peña     Assistant: Dr. Horan    Anesthestia: local/IV sedation:  Versed 1 mg and fentanyl 50 mcg IV.  Conscious sedation provided by MD and monitored by RN.  Total sedation time was less than 45 minutes. (See nurse documentation and case log for sedation time)    EBL: None    Specimens: None    All medications, allergies, and relevant histories were reviewed. No recent antibiotics or infections.  A time-out was taken to verify the correct patient, procedure, laterality, and appropriate medications/allergies.    Procedure: Lumbar RFA    Lumbar Medial Branch Block with radiofrequency ablation, levels L3-5     The procedure risks, benefits, and possible complications were discussed with the patient including nerve damage, infection, spinal headache, and paresis.   Patient was placed in the prone position with the midriff elevated. Skin was prepped with CHG and draped. Oblique view of the spine was obtained with fluoroscopy. Entry sites were marked over the skin and Xylocaine 1% was used to anesthetize the skin and subcutaneous tissues.     A 3.5 " Natividad Venom needle was introduced at an angle to parallel the medial branches in the groove between superior articular process and transverse process, and L5 primary dorsal ramus at the junction of the S1 superior articular process and sacral ala.    Multifidus stim elicited at each level.  No distal motor stimulation was elicited at any level at 2V with a frequency of 2Hz.  All impedances were within the acceptable range.    1cc of 2% lidocaine was injected at each level.  Thermal RF was then conducted at each level at 80 degrees, for 90 seconds   1 cc of 0.5% bupivacaine was injected " at each level.    Patient tolerated the procedure well and there were no complications.      Future Management:   If helpful, can repeat as needed.  Follow up with me in 4-6 weeks.      I certify that I provided the above services.  I was present for the entire procedure, which was performed by myself with the assistance of the resident physician.  There were no parts of the procedure that were performed not by myself or without my direct supervision.

## 2017-12-07 ENCOUNTER — TELEPHONE (OUTPATIENT)
Dept: NEPHROLOGY | Facility: CLINIC | Age: 77
End: 2017-12-07

## 2017-12-13 ENCOUNTER — OFFICE VISIT (OUTPATIENT)
Dept: OTOLARYNGOLOGY | Facility: CLINIC | Age: 77
End: 2017-12-13
Payer: MEDICARE

## 2017-12-13 ENCOUNTER — CLINICAL SUPPORT (OUTPATIENT)
Dept: AUDIOLOGY | Facility: CLINIC | Age: 77
End: 2017-12-13
Payer: MEDICARE

## 2017-12-13 VITALS
WEIGHT: 225.75 LBS | SYSTOLIC BLOOD PRESSURE: 101 MMHG | BODY MASS INDEX: 34.21 KG/M2 | HEIGHT: 68 IN | DIASTOLIC BLOOD PRESSURE: 54 MMHG | HEART RATE: 96 BPM | TEMPERATURE: 98 F

## 2017-12-13 DIAGNOSIS — H90.5 SENSORINEURAL HEARING LOSS OF LOW FREQUENCY: ICD-10-CM

## 2017-12-13 DIAGNOSIS — H90.3 HEARING LOSS, SENSORINEURAL, HIGH FREQUENCY, BILATERAL: Primary | ICD-10-CM

## 2017-12-13 DIAGNOSIS — H61.21 IMPACTED CERUMEN OF RIGHT EAR: ICD-10-CM

## 2017-12-13 DIAGNOSIS — Z87.39 HISTORY OF GOUT: ICD-10-CM

## 2017-12-13 PROCEDURE — 92550 TYMPANOMETRY & REFLEX THRESH: CPT | Mod: S$GLB,,, | Performed by: AUDIOLOGIST

## 2017-12-13 PROCEDURE — 99203 OFFICE O/P NEW LOW 30 MIN: CPT | Mod: 25,S$GLB,, | Performed by: OTOLARYNGOLOGY

## 2017-12-13 PROCEDURE — 99499 UNLISTED E&M SERVICE: CPT | Mod: S$GLB,,, | Performed by: OTOLARYNGOLOGY

## 2017-12-13 PROCEDURE — 69210 REMOVE IMPACTED EAR WAX UNI: CPT | Mod: S$GLB,,, | Performed by: OTOLARYNGOLOGY

## 2017-12-13 PROCEDURE — 99999 PR PBB SHADOW E&M-EST. PATIENT-LVL IV: CPT | Mod: PBBFAC,,, | Performed by: OTOLARYNGOLOGY

## 2017-12-13 PROCEDURE — 92557 COMPREHENSIVE HEARING TEST: CPT | Mod: S$GLB,,, | Performed by: AUDIOLOGIST

## 2017-12-13 NOTE — PROGRESS NOTES
"Subjective:       Patient ID: Radha Degroot is a 77 y.o. female.    Chief Complaint: No chief complaint on file.    HPI: Ms. Degroot is a 77 -year-old bespectacled diabetic  female with gout whose handwritten reason for the visit today is "hearing problems".  She indicates that some days her hearing is worse than others.  The audiologist who performed testing today indicates the patient's history of bilateral tinnitus, and off balance feeling sometimes in pressure and fullness feelings in her ears.  She completed 2 courses of steroids for treatment of a gout problem.  The medication did help.  She did not pay attention to her hearing status during treatment specifically.  He does indicate a slow progression of hearing loss more in her left ear over the past 6 months.  She indicates bilateral tinnitus perception.    She indicates feeling off balance sometimes.  She denies madelyn vertigo symptoms.  She denies a history of head trauma.  She is a right-handed individual.  Her mother suffered with tinnitus.  Treatment with oral steroids was discussed with regards to the patient's probable diagnosis of right ear cochlear hydrops; she is not incapacitated at this time and continue with the hearing loss.  His steroid course was not prescribed today.  There is no evidence of head imaging reflected in the EMR dating back to 2010  She was evaluated by her internist 12/4/17.  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  PMH: Heart disease, high blood pressure, high cholesterol, diabetes, arthritis, anemia, hearing loss, kidney disease  PSH: Appendectomy, hysterectomy  Family history: Heart disease, high blood pressure, diabetes, kidney disease  ALLERGIES: Keflex, heparin  Habits: One cup of coffee per day  Occupation: Retired " [employer]  Review of Systems   Ears: Positive for hearing loss and ringing in ear.    Nose:  Positive for postnasal drip and snoring.    Cardiovascular:  Positive for history of high blood pressure.   Respiratory:  Positive for shortness of breath.    Gastrointestinal:  Positive for acid reflux.   Other:  Positive for kidney problem, bladder problem, arthritis and weakness. Negative for rash.     ALLERGIES: Heparin analogs, Ancef, Keflex, oxybutynin  Current Outpatient Prescriptions on File Prior to Visit   Medication Sig Dispense Refill    ACCU-CHEK MELIDA PLUS TEST STRP Strp USE TO TEST BLOOD SUGAR FOUR TIMES DAILY 200 strip 0    aspirin 81 MG Chew Take 81 mg by mouth once daily.        blood sugar diagnostic (ACCU-CHEK ACTIVE TEST) Strp USE TO TEST BLOOD SUGAR FOUR TIMES DAILY 360 strip 3    blood-glucose meter (ACCU-CHEK MELIDA PLUS METER) Misc To check sugars 4 times daily.  Please include control solution 1 each 0    Ca cmb no.9-[id number]-aloe (VITAMIN D-3 WITH ALOE) 120-1,000-10 mg-unit-mg Tab Take by mouth once daily.       carvedilol (COREG) 12.5 MG tablet Take 1 tablet (12.5 mg total) by mouth 2 (two) times daily. 180 tablet 4    fluticasone (FLONASE) 50 mcg/actuation nasal spray 2 SPRAYS IN EACH NOSTRIL ONCE DAILY 16 g 6    furosemide (LASIX) 40 MG tablet TAKE 1 TABLET(40 MG) BY MOUTH EVERY DAY 90 tablet 6    insulin glargine (LANTUS SOLOSTAR) 100 unit/mL (3 mL) InPn pen Inject 42 units at bedtime (Patient taking differently: 48 Units. Inject 42 units at bedtime) 30 mL 6    ketoconazole (NIZORAL) 2 % shampoo APPLY TO AFFECTED AREA EVERY OTHER DAY AND LET SIT 5 MINUTES PRIOR TO RINSING AS DIRECTED 120 mL 2    lancets (ACCU-CHEK SOFTCLIX LANCETS) Misc 1 each by Misc.(Non-Drug; Combo Route) route 4 (four) times daily. 360 each 3    mirabegron 50 mg Tb24 Take 1 tablet (50 mg total) by mouth once daily. 90 tablet 3    MULTIVIT-MIN/IRON/FOLIC/LUTEIN (CENTRUM SILVER WOMEN ORAL) Take by mouth once  "daily.       nitroGLYCERIN (NITROSTAT) 0.3 MG SL tablet Place 1 tablet (0.3 mg total) under the tongue every 5 (five) minutes as needed for Chest pain. 30 tablet 3    NOVOLOG FLEXPEN 100 unit/mL InPn pen   6    pantoprazole (PROTONIX) 40 MG tablet TAKE 1 TABLET BY MOUTH DAILY 90 tablet 0    pen needle, diabetic (BD ULTRA-FINE TIM PEN NEEDLES) 32 gauge x 5/32" Ndle USE FOUR TIMES DAILY FOR BLOOD SUGAR TESTING 200 each 8    pen needle, diabetic (BD ULTRA-FINE TIM PEN NEEDLES) 32 gauge x 5/32" Ndle Use four times daily for insulin 480 each 11    rosuvastatin (CRESTOR) 40 MG Tab Take 1 tablet (40 mg total) by mouth once daily. 30 tablet 6     No current facility-administered medications on file prior to visit.            The patient completed an audiometric study performed by the [facility] audiology service.  The study is  duplicated below and the results are reviewed with the patient  Objective:         Blood pressure 161/54 pulse 96 temperature 97.9 height 5 feet 8 inches weight 225 pounds  Gen.: Alert and oriented lady in no acute distress  Both ears were examined under the microscope in the micro-procedure room.  Physical Exam   Constitutional: She is oriented to person, place, and time. She appears well-developed and well-nourished.   HENT:   Head: Normocephalic.   Right Ear: Tympanic membrane and external ear normal. No drainage. No foreign bodies. No mastoid tenderness. Tympanic membrane is not perforated. No decreased hearing is noted.   Left Ear: Tympanic membrane and external ear normal. No drainage. No foreign bodies. No mastoid tenderness. Tympanic membrane is not perforated. No decreased hearing is noted.   Ears:    Nose: Nose normal. No nasal deformity, septal deviation or nasal septal hematoma. No epistaxis. Right sinus exhibits no maxillary sinus tenderness and no frontal sinus tenderness. Left sinus exhibits no maxillary sinus tenderness and no frontal sinus tenderness. "   Mouth/Throat: Uvula is midline, oropharynx is clear and moist and mucous membranes are normal. No oral lesions. No trismus in the jaw. No uvula swelling. No oropharyngeal exudate or tonsillar abscesses.   Neck: Neck supple. No tracheal deviation present. No thyromegaly present.   Pulmonary/Chest: Effort normal. No stridor.   Lymphadenopathy:     She has no cervical adenopathy.   Neurological: She is alert and oriented to person, place, and time. She displays weakness.   Skin: No rash noted.       Assessment:       1. Hearing loss, sensorineural, high frequency, bilateral    2. Sensorineural hearing loss of low frequency    3. History of gout    4. Impacted cerumen of right ear     5      AD cochlear hydrops   Plan:     Audiometry reviewed  Cerumen impaction removed from deep AD eac  Low sodium diet encouraged; instruction sheets provided  Oral steroid course discussed; no Rx provided today  Consider consultation with Dr. ROGER Leal  pending course; repeat audiometry on return  Meniere's literature provided  Discontinue forceful noseblowing; avoid lifting heavy objects

## 2017-12-13 NOTE — PATIENT INSTRUCTIONS
Audiometry reviewed  Cerumen impaction removed from deep AD eac  Low sodium diet encouraged; instruction sheets provided  Oral steroid course discussed; no Rx provided today  Consider consultation with Dr. ROGER Leal  pending course; repeat audiometry on return  Meniere's literature provided  Discontinue forceful noseblowing; avoid lifting heavy objects

## 2017-12-29 RX ORDER — FLUTICASONE PROPIONATE 50 MCG
SPRAY, SUSPENSION (ML) NASAL
Qty: 16 G | Refills: 6 | Status: SHIPPED | OUTPATIENT
Start: 2017-12-29 | End: 2018-11-25 | Stop reason: SDUPTHER

## 2017-12-29 RX ORDER — FLUTICASONE PROPIONATE 50 MCG
SPRAY, SUSPENSION (ML) NASAL
Qty: 16 G | Refills: 6 | Status: SHIPPED | OUTPATIENT
Start: 2017-12-29 | End: 2018-03-14 | Stop reason: SDUPTHER

## 2018-01-11 ENCOUNTER — TELEPHONE (OUTPATIENT)
Dept: ENDOCRINOLOGY | Facility: CLINIC | Age: 78
End: 2018-01-11

## 2018-01-11 RX ORDER — INSULIN ASPART 100 [IU]/ML
INJECTION, SOLUTION INTRAVENOUS; SUBCUTANEOUS
Qty: 4 BOX | Refills: 1 | Status: SHIPPED | OUTPATIENT
Start: 2018-01-11 | End: 2018-01-22 | Stop reason: SDUPTHER

## 2018-01-11 NOTE — TELEPHONE ENCOUNTER
Left message for patient to return call to clinic to reschedule 2/7/18 appointment per provider being out. Waiting to hear back.

## 2018-01-16 NOTE — TELEPHONE ENCOUNTER
Left second message for patient to return call to clinic to reschedule 2/7/18 appointment. Waiting to hear back.

## 2018-01-22 RX ORDER — INSULIN GLARGINE 100 [IU]/ML
INJECTION, SOLUTION SUBCUTANEOUS
Qty: 3 BOX | Refills: 1 | Status: SHIPPED | OUTPATIENT
Start: 2018-01-22 | End: 2018-06-05 | Stop reason: ALTCHOICE

## 2018-01-22 RX ORDER — INSULIN ASPART 100 [IU]/ML
INJECTION, SOLUTION INTRAVENOUS; SUBCUTANEOUS
Qty: 4 BOX | Refills: 1 | Status: SHIPPED | OUTPATIENT
Start: 2018-01-22 | End: 2019-11-21 | Stop reason: SDUPTHER

## 2018-01-22 NOTE — TELEPHONE ENCOUNTER
Called and rescheduled follow up appointment for Mon 3/5/18 at 10:30a and DM Education at 11:00a per patients request. Patient verbalized understanding. Reminder letters mailed.

## 2018-01-23 ENCOUNTER — LAB VISIT (OUTPATIENT)
Dept: LAB | Facility: HOSPITAL | Age: 78
End: 2018-01-23
Attending: INTERNAL MEDICINE
Payer: MEDICARE

## 2018-01-23 DIAGNOSIS — N18.4 CKD (CHRONIC KIDNEY DISEASE), STAGE IV: ICD-10-CM

## 2018-01-23 LAB
ALBUMIN SERPL BCP-MCNC: 3.6 G/DL
ANION GAP SERPL CALC-SCNC: 11 MMOL/L
BASOPHILS # BLD AUTO: 0.09 K/UL
BASOPHILS NFR BLD: 0.9 %
BUN SERPL-MCNC: 44 MG/DL
CALCIUM SERPL-MCNC: 9.4 MG/DL
CHLORIDE SERPL-SCNC: 104 MMOL/L
CO2 SERPL-SCNC: 26 MMOL/L
CREAT SERPL-MCNC: 2.1 MG/DL
DIFFERENTIAL METHOD: ABNORMAL
EOSINOPHIL # BLD AUTO: 0.2 K/UL
EOSINOPHIL NFR BLD: 1.9 %
ERYTHROCYTE [DISTWIDTH] IN BLOOD BY AUTOMATED COUNT: 15.2 %
EST. GFR  (AFRICAN AMERICAN): 25.4 ML/MIN/1.73 M^2
EST. GFR  (NON AFRICAN AMERICAN): 22.1 ML/MIN/1.73 M^2
GLUCOSE SERPL-MCNC: 166 MG/DL
HCT VFR BLD AUTO: 36.3 %
HGB BLD-MCNC: 11.9 G/DL
IMM GRANULOCYTES # BLD AUTO: 0.04 K/UL
IMM GRANULOCYTES NFR BLD AUTO: 0.4 %
LYMPHOCYTES # BLD AUTO: 1.9 K/UL
LYMPHOCYTES NFR BLD: 19.3 %
MCH RBC QN AUTO: 27.8 PG
MCHC RBC AUTO-ENTMCNC: 32.8 G/DL
MCV RBC AUTO: 85 FL
MONOCYTES # BLD AUTO: 0.7 K/UL
MONOCYTES NFR BLD: 7.1 %
NEUTROPHILS # BLD AUTO: 6.8 K/UL
NEUTROPHILS NFR BLD: 70.4 %
NRBC BLD-RTO: 0 /100 WBC
PHOSPHATE SERPL-MCNC: 3 MG/DL
PLATELET # BLD AUTO: 177 K/UL
PMV BLD AUTO: 13 FL
POTASSIUM SERPL-SCNC: 4 MMOL/L
PTH-INTACT SERPL-MCNC: 153 PG/ML
RBC # BLD AUTO: 4.28 M/UL
SODIUM SERPL-SCNC: 141 MMOL/L
WBC # BLD AUTO: 9.61 K/UL

## 2018-01-23 PROCEDURE — 80069 RENAL FUNCTION PANEL: CPT

## 2018-01-23 PROCEDURE — 85025 COMPLETE CBC W/AUTO DIFF WBC: CPT

## 2018-01-23 PROCEDURE — 83970 ASSAY OF PARATHORMONE: CPT

## 2018-01-23 PROCEDURE — 36415 COLL VENOUS BLD VENIPUNCTURE: CPT | Mod: PO

## 2018-01-24 ENCOUNTER — OFFICE VISIT (OUTPATIENT)
Dept: NEPHROLOGY | Facility: CLINIC | Age: 78
End: 2018-01-24
Payer: MEDICARE

## 2018-01-24 VITALS
DIASTOLIC BLOOD PRESSURE: 72 MMHG | OXYGEN SATURATION: 99 % | WEIGHT: 221.13 LBS | HEIGHT: 68 IN | BODY MASS INDEX: 33.51 KG/M2 | SYSTOLIC BLOOD PRESSURE: 132 MMHG | HEART RATE: 107 BPM

## 2018-01-24 DIAGNOSIS — N18.4 CKD (CHRONIC KIDNEY DISEASE), STAGE IV: Primary | ICD-10-CM

## 2018-01-24 DIAGNOSIS — I10 ESSENTIAL HYPERTENSION: Chronic | ICD-10-CM

## 2018-01-24 DIAGNOSIS — R80.1 PERSISTENT PROTEINURIA: ICD-10-CM

## 2018-01-24 DIAGNOSIS — N18.4 TYPE 2 DIABETES MELLITUS WITH STAGE 4 CHRONIC KIDNEY DISEASE, WITH LONG-TERM CURRENT USE OF INSULIN: ICD-10-CM

## 2018-01-24 DIAGNOSIS — D17.71 ANGIOMYOLIPOMA OF RIGHT KIDNEY: ICD-10-CM

## 2018-01-24 DIAGNOSIS — N18.4 CHRONIC KIDNEY DISEASE, STAGE IV (SEVERE): Chronic | ICD-10-CM

## 2018-01-24 DIAGNOSIS — E11.22 TYPE 2 DIABETES MELLITUS WITH STAGE 4 CHRONIC KIDNEY DISEASE, WITH LONG-TERM CURRENT USE OF INSULIN: ICD-10-CM

## 2018-01-24 DIAGNOSIS — N28.1 ACQUIRED CYST OF KIDNEY: ICD-10-CM

## 2018-01-24 DIAGNOSIS — N25.81 SECONDARY RENAL HYPERPARATHYROIDISM: ICD-10-CM

## 2018-01-24 DIAGNOSIS — Z79.4 TYPE 2 DIABETES MELLITUS WITH STAGE 4 CHRONIC KIDNEY DISEASE, WITH LONG-TERM CURRENT USE OF INSULIN: ICD-10-CM

## 2018-01-24 DIAGNOSIS — E55.9 VITAMIN D DEFICIENCY DISEASE: ICD-10-CM

## 2018-01-24 PROCEDURE — 99214 OFFICE O/P EST MOD 30 MIN: CPT | Mod: S$GLB,,, | Performed by: INTERNAL MEDICINE

## 2018-01-24 PROCEDURE — 99999 PR PBB SHADOW E&M-EST. PATIENT-LVL III: CPT | Mod: PBBFAC,,, | Performed by: INTERNAL MEDICINE

## 2018-01-24 PROCEDURE — 99499 UNLISTED E&M SERVICE: CPT | Mod: S$GLB,,, | Performed by: INTERNAL MEDICINE

## 2018-01-24 NOTE — PROGRESS NOTES
Subjective:       Patient ID: Radha Degroot is a 77 y.o. White female who presents for follow-up evaluation of Chronic Kidney Disease    HPI     Ms. Degroot was seen in nephrology clinic for follow up on CKD. She was previously being followed by Dr. Mcgill and was last seen by him on 3/9/16. I saw her on 8/24/16, she was to follow with me in 3 months, for unclear reason she had next appointment on 6/28/17 when she was noted to have worsening of renal function and DANDY on CKD. She has CKD IV, diabetes with complications like neuropathy, diabetic retinopathy, hypertension, CAD, DELFINA, diastolic heart failure and several other medical problems. In the past she was taken off aldactone due to hyperkalemia.     She was last followed on 7/26/17. She arrived today accompanied by her daughter in law.    At the time of her visit on 6/28/17 she was noted to have worsening renal function and hypotension which she confirmed was happening on her home BP readings also and as a result she was feeling tired and dizzy. She was taken off amlodipine and lisinopril at that time. She was advised to follow BP at home while continuing Coreg and lasix. Her follow up labs on 7/13/17 noted for improvement in her renal function including creatinine and BUN.     She was advised to do renal labs periodically but she remained a no show/ lab cancellation by patient and especially did not have any labs between 10/17 and 1/18, this was despite standing orders. This was brought to her attention.     Today she reports she has been doing fine. Pt denies any recent nausea/ vomiting/ diarrhea/ flank pain. She denies any dysuria/ decreased urine/ cloudy urine. Her diabetes control continues to remain sub optimal. She has longstanding and historically poorly controlled diabetes, at times A1C has remained above 10, she has longstanding proteinuria. Most recent A1C was 8.1.    Prior lab trend noted for onset of CKD since around 2006 and episodes of DANDY, and  progression to CKD IV. US kidneys per PCP from 9/16 noted for Kidney size of 11 and 10.7 cm. Per report 1.6 cm isoechoic focus within the superior pole of the right kidney corresponding to abnormality seen on recent MRI which does not have the characteristics of a simple cyst.  Recommend further evaluation with CT of the abdomen with and without contrast renal mass protocol. 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 It did not show any mass/ stone/ obstruction.     Most recent labs noted  Renal Function:  Lab Results   Component Value Date     (H) 01/23/2018    GLU 46 (LL) 10/02/2017     01/23/2018     10/02/2017    K 4.0 01/23/2018    K 4.1 10/02/2017     01/23/2018     10/02/2017    CO2 26 01/23/2018    CO2 26 10/02/2017    BUN 44 (H) 01/23/2018    BUN 55 (H) 10/02/2017    CALCIUM 9.4 01/23/2018    CALCIUM 8.9 10/02/2017    CREATININE 2.1 (H) 01/23/2018    CREATININE 2.1 (H) 10/02/2017    ALBUMIN 3.6 01/23/2018    ALBUMIN 3.4 (L) 10/02/2017    PHOS 3.0 01/23/2018    PHOS 3.3 10/02/2017    ESTGFRAFRICA 25.4 (A) 01/23/2018    ESTGFRAFRICA 25.4 (A) 10/02/2017    EGFRNONAA 22.1 (A) 01/23/2018    EGFRNONAA 22.1 (A) 10/02/2017       Urinalysis:  Lab Results   Component Value Date    APPEARANCEUA Cloudy (A) 01/23/2018    PHUR 5.0 01/23/2018    SPECGRAV 1.015 01/23/2018    PROTEINUA Negative 01/23/2018    GLUCUA Negative 01/23/2018    OCCULTUA Negative 01/23/2018    NITRITE Negative 01/23/2018    LEUKOCYTESUR Negative 01/23/2018       Protein/Creatinine Ratio:  Lab Results   Component Value Date    PROTEINURINE 21 (H) 01/23/2018    CREATRANDUR 110.0 01/23/2018    UTPCR 0.19 01/23/2018       CBC:  Lab Results   Component Value Date    WBC 9.61 01/23/2018    HGB 11.9 (L) 01/23/2018    HCT 36.3 (L) 01/23/2018         .  Review of Systems   Constitutional: Negative for appetite change, chills and fever.   HENT: Negative for  sneezing and sore throat.    Eyes: Negative for visual disturbance.   Respiratory: Positive for shortness of breath (with exertion). Negative for cough.    Cardiovascular: Negative for chest pain and leg swelling.   Gastrointestinal: Negative for abdominal pain, diarrhea, nausea and vomiting.   Genitourinary: Negative for decreased urine volume, dysuria, hematuria and urgency.   Musculoskeletal: Positive for back pain and gait problem.   Skin: Negative for rash.   Allergic/Immunologic: Negative for immunocompromised state.   Neurological: Negative for dizziness and headaches.   Psychiatric/Behavioral: Negative for behavioral problems and confusion.       Objective:      Physical Exam   Constitutional: She is oriented to person, place, and time. She appears well-developed and well-nourished. No distress.   HENT:   Head: Normocephalic and atraumatic.   Eyes: Conjunctivae are normal. Right eye exhibits no discharge. Left eye exhibits no discharge.   Neck: Normal range of motion.   Cardiovascular: Normal rate, regular rhythm and normal heart sounds.    Pulmonary/Chest: Effort normal and breath sounds normal. No respiratory distress. She has no wheezes.   Abdominal: Soft. There is no tenderness.   Musculoskeletal: She exhibits edema.   Neurological: She is alert and oriented to person, place, and time.   Skin: Skin is warm and dry.   Psychiatric: She has a normal mood and affect. Her behavior is normal.   Vitals reviewed.      Assessment:       1. CKD (chronic kidney disease), stage IV    2. Secondary renal hyperparathyroidism    3. Type 2 diabetes mellitus with stage 4 chronic kidney disease, with long-term current use of insulin    4. Vitamin D deficiency disease    5. Persistent proteinuria    6. Acquired cyst of kidney        Plan:     Ms. Degroot has CKD IV which is clinically felt to be due to diabetic nephropathy and hypertensive glomerulosclerosis. Labs, CKD staging, risk of progression to ESRD was discussed with  her in detail. So far available serial labs were explained to her in detail.     She established care with me in 8/16 but for unclear reason she did not have follow up visit arranged until 6/17 when she was noted to have DANDY vs progression of CKD. That time she was taken off amlodipine and lisinopril due to hypotensive episodes and especially with worsening renal function.     Again today explained to her about all of her serial labs, longstanding CKD IV status already which has progressed, her kidneys with poor autoregulation and higher risk of DANDY due to hypotension/ volume depletion/ contrast agents/ nephrotoxins/ NSAID etc and higher risk of progression to CKD V/ ESRD. Also cautioned her about her multivitamin with mineral preparation as it will give her additional K, phos and other minerals.     Given her advanced CKD and risk of DANDY, will hold off restarting any ACE-I or ARB use or K sparing diuretic due to risk of hyperkalemia. This was again explained to her today.      Stressed that she needs to be compliant with standing orders of renal panel to be done every 4 to 6 weeks.     - repeat renal labs every 6 weeks for electrolytes, acid base, eGFR  - Trend proteinuria  - Trend PTH levels  - she has long standing anemia and per most recent Hb there is no indication for RITO  - closely monitor renal panel for electrolytes, acid base status, eGFR  - risk of CKD progression d/w patient  - low salt and low potassium diet, low phos diet  - avoid NSAID/ bactrim/ IV contrast/ gadolinium/ aminoglycoside/ fleet enema where possible  - diuretic regimen per her cardiology   - decision to continue PPI per primary     Coreg dosing per cardiology, on chart dose bid but she has been taking only once per day    Increase water intake if ok with cardiology    Leg edema less likely from renal cause as level of proteinuria is minimal    Low salt diet    Needs strict lab surveillance    Labs were ordered but was no show once and  cancellation at other times. Pt does not recollect it.    Trend H/H    Calcitriol if PTH > 200, trend corrected Ca, phos, vit D levels.    Needs follow up with urology. It appears she had an US ordered by urology for annual surveillance on angiomyolipoma but pt has not done it so far    Suspect forgetfulness and confusion with her intake of medicines, keeping up with labs and medicine dosing    RTC 2.5 months     Labs, plan, recommendations were discussed with her and her daughter in law in detail. Their questions were answered to their satisfaction.

## 2018-02-01 ENCOUNTER — TELEPHONE (OUTPATIENT)
Dept: ENDOCRINOLOGY | Facility: CLINIC | Age: 78
End: 2018-02-01

## 2018-02-01 NOTE — TELEPHONE ENCOUNTER
Called patient and informed her that fasting labs were rescheduled for Wed 2/28/18 at 9:00a before follow up on Mon 3/5/18 at 10:30a. Patient verbalized understanding. Reminder letters mailed.

## 2018-02-01 NOTE — TELEPHONE ENCOUNTER
----- Message from Rukhsana Montes De Oca sent at 2/1/2018 10:03 AM CST -----  Contact: self  Patient called stating that she always do A1c lab prior to OV. Please contact her at [phone].    Thanks!

## 2018-02-02 ENCOUNTER — TELEPHONE (OUTPATIENT)
Dept: PAIN MEDICINE | Facility: CLINIC | Age: 78
End: 2018-02-02

## 2018-02-02 NOTE — TELEPHONE ENCOUNTER
----- Message from Camelia Vora sent at 2/2/2018  3:59 PM CST -----  Contact: pt   x 1st Request  _ 2nd Request  _ 3rd Request    Who:pt     Why:pt is requesting to speak to nurse in regards to her next step in her plan of care.     What Number to Call Back:[phone]     When to Expect a call back: (Before the end of the day)  -- if call after 3:00 call back will be tomorrow.

## 2018-02-02 NOTE — TELEPHONE ENCOUNTER
Staff contacted the patient to further discuss her message. Staff left a voice message, upon patient not answering, instructing the patient to give our office a call back to further discuss her concerns.

## 2018-02-08 ENCOUNTER — TELEPHONE (OUTPATIENT)
Dept: UROLOGY | Facility: CLINIC | Age: 78
End: 2018-02-08

## 2018-02-08 NOTE — TELEPHONE ENCOUNTER
She does not need repeat imaging of her kidney until May/June. SABRINA at that time as previously planned is fine. She does not need to see me now if she is doing well otherwise.

## 2018-02-08 NOTE — TELEPHONE ENCOUNTER
Spoke to pt advised her per  she is not do for u/s until may/june an if pt is not having any urologic issue she can keep appt than./zuhair

## 2018-02-08 NOTE — TELEPHONE ENCOUNTER
Spoke to pt she states she had a procedure done for her spine an was told to see  for an ultrasound of her kidneys but pt states saw  6 months ago an was told to follow up in a year with an ultrasound. Pt states not having any urologic problems an wants to know should she still come in on Monday or in June as planned with ultrasound. Please advise/zuhair

## 2018-02-28 ENCOUNTER — TELEPHONE (OUTPATIENT)
Dept: CARDIOLOGY | Facility: CLINIC | Age: 78
End: 2018-02-28

## 2018-02-28 ENCOUNTER — LAB VISIT (OUTPATIENT)
Dept: LAB | Facility: HOSPITAL | Age: 78
End: 2018-02-28
Attending: INTERNAL MEDICINE
Payer: MEDICARE

## 2018-02-28 DIAGNOSIS — E78.5 HYPERLIPIDEMIA LDL GOAL <100: ICD-10-CM

## 2018-02-28 DIAGNOSIS — E55.9 VITAMIN D DEFICIENCY DISEASE: ICD-10-CM

## 2018-02-28 DIAGNOSIS — N18.4 CKD (CHRONIC KIDNEY DISEASE), STAGE IV: ICD-10-CM

## 2018-02-28 DIAGNOSIS — N25.81 SECONDARY RENAL HYPERPARATHYROIDISM: ICD-10-CM

## 2018-02-28 LAB
25(OH)D3+25(OH)D2 SERPL-MCNC: 38 NG/ML
ALBUMIN SERPL BCP-MCNC: 3.6 G/DL
ALBUMIN SERPL BCP-MCNC: 3.6 G/DL
ANION GAP SERPL CALC-SCNC: 10 MMOL/L
ANION GAP SERPL CALC-SCNC: 10 MMOL/L
BUN SERPL-MCNC: 40 MG/DL
BUN SERPL-MCNC: 40 MG/DL
CALCIUM SERPL-MCNC: 9.4 MG/DL
CALCIUM SERPL-MCNC: 9.4 MG/DL
CHLORIDE SERPL-SCNC: 104 MMOL/L
CHLORIDE SERPL-SCNC: 104 MMOL/L
CHOLEST SERPL-MCNC: 171 MG/DL
CHOLEST/HDLC SERPL: 3.2 {RATIO}
CO2 SERPL-SCNC: 26 MMOL/L
CO2 SERPL-SCNC: 26 MMOL/L
CREAT SERPL-MCNC: 2.2 MG/DL
CREAT SERPL-MCNC: 2.2 MG/DL
EST. GFR  (AFRICAN AMERICAN): 24 ML/MIN/1.73 M^2
EST. GFR  (AFRICAN AMERICAN): 24 ML/MIN/1.73 M^2
EST. GFR  (NON AFRICAN AMERICAN): 20.9 ML/MIN/1.73 M^2
EST. GFR  (NON AFRICAN AMERICAN): 20.9 ML/MIN/1.73 M^2
ESTIMATED AVG GLUCOSE: 194 MG/DL
GLUCOSE SERPL-MCNC: 265 MG/DL
GLUCOSE SERPL-MCNC: 265 MG/DL
HBA1C MFR BLD HPLC: 8.4 %
HDLC SERPL-MCNC: 54 MG/DL
HDLC SERPL: 31.6 %
LDLC SERPL CALC-MCNC: 86.4 MG/DL
NONHDLC SERPL-MCNC: 117 MG/DL
PHOSPHATE SERPL-MCNC: 2.7 MG/DL
PHOSPHATE SERPL-MCNC: 2.7 MG/DL
POTASSIUM SERPL-SCNC: 4.7 MMOL/L
POTASSIUM SERPL-SCNC: 4.7 MMOL/L
SODIUM SERPL-SCNC: 140 MMOL/L
SODIUM SERPL-SCNC: 140 MMOL/L
TRIGL SERPL-MCNC: 153 MG/DL

## 2018-02-28 PROCEDURE — 36415 COLL VENOUS BLD VENIPUNCTURE: CPT | Mod: PO

## 2018-02-28 PROCEDURE — 82306 VITAMIN D 25 HYDROXY: CPT

## 2018-02-28 PROCEDURE — 83036 HEMOGLOBIN GLYCOSYLATED A1C: CPT

## 2018-02-28 PROCEDURE — 80061 LIPID PANEL: CPT

## 2018-02-28 PROCEDURE — 80069 RENAL FUNCTION PANEL: CPT

## 2018-02-28 NOTE — TELEPHONE ENCOUNTER
----- Message from Carmen Avendano sent at 2/27/2018  1:56 PM CST -----  Contact: Patient  The Pt is calling to state that she has been having swelling in her feet since her last visit but has been worse the last few months. She has an upcoming office visit on 3/14/2018 and is on the waiting list, but would like to know if she can keep her appointment or should she come in sooner. Please call her @ [phone] Thanks, Carmen

## 2018-03-01 ENCOUNTER — TELEPHONE (OUTPATIENT)
Dept: PAIN MEDICINE | Facility: CLINIC | Age: 78
End: 2018-03-01

## 2018-03-01 NOTE — TELEPHONE ENCOUNTER
Spoke with patient who reports no significant relief with RFA. She was not scheduled for follow up after RFA.     Appointment made.

## 2018-03-01 NOTE — TELEPHONE ENCOUNTER
----- Message from Katt Eller MA sent at 3/1/2018 11:25 AM CST -----  Please advise.  Thanks    ----- Message -----  From: Lexus Martinez  Sent: 3/1/2018  11:14 AM  To: John Blank Staff    x_  1st Request  _  2nd Request  _  3rd Request        Who: matthew    Why: pt. Would like to speak with geetha regarding procedure. Please call to discuss    What Number to Call Back:[phone]    When to Expect a call back: (Before the end of the day)   -- if the call is after 12:00, the call back will be tomorrow.

## 2018-03-02 ENCOUNTER — TELEPHONE (OUTPATIENT)
Dept: ENDOCRINOLOGY | Facility: CLINIC | Age: 78
End: 2018-03-02

## 2018-03-05 ENCOUNTER — OFFICE VISIT (OUTPATIENT)
Dept: ENDOCRINOLOGY | Facility: CLINIC | Age: 78
End: 2018-03-05
Payer: MEDICARE

## 2018-03-05 ENCOUNTER — CLINICAL SUPPORT (OUTPATIENT)
Dept: DIABETES | Facility: CLINIC | Age: 78
End: 2018-03-05
Payer: MEDICARE

## 2018-03-05 VITALS
WEIGHT: 222.69 LBS | BODY MASS INDEX: 33.75 KG/M2 | HEIGHT: 68 IN | SYSTOLIC BLOOD PRESSURE: 119 MMHG | DIASTOLIC BLOOD PRESSURE: 52 MMHG | HEART RATE: 90 BPM

## 2018-03-05 DIAGNOSIS — Z79.4 TYPE 2 DIABETES MELLITUS WITH HYPOGLYCEMIA WITHOUT COMA, WITH LONG-TERM CURRENT USE OF INSULIN: ICD-10-CM

## 2018-03-05 DIAGNOSIS — Z79.4 TYPE 2 DIABETES MELLITUS WITH DIABETIC POLYNEUROPATHY, WITH LONG-TERM CURRENT USE OF INSULIN: ICD-10-CM

## 2018-03-05 DIAGNOSIS — N18.4 CKD (CHRONIC KIDNEY DISEASE) STAGE 4, GFR 15-29 ML/MIN: ICD-10-CM

## 2018-03-05 DIAGNOSIS — E11.42 TYPE 2 DIABETES MELLITUS WITH DIABETIC POLYNEUROPATHY, WITH LONG-TERM CURRENT USE OF INSULIN: ICD-10-CM

## 2018-03-05 DIAGNOSIS — I10 ESSENTIAL HYPERTENSION: ICD-10-CM

## 2018-03-05 DIAGNOSIS — E11.22 TYPE 2 DIABETES MELLITUS WITH STAGE 4 CHRONIC KIDNEY DISEASE, WITH LONG-TERM CURRENT USE OF INSULIN: Primary | ICD-10-CM

## 2018-03-05 DIAGNOSIS — E11.649 TYPE 2 DIABETES MELLITUS WITH HYPOGLYCEMIA WITHOUT COMA, WITH LONG-TERM CURRENT USE OF INSULIN: ICD-10-CM

## 2018-03-05 DIAGNOSIS — N18.4 TYPE 2 DIABETES MELLITUS WITH STAGE 4 CHRONIC KIDNEY DISEASE, WITH LONG-TERM CURRENT USE OF INSULIN: Primary | ICD-10-CM

## 2018-03-05 DIAGNOSIS — Z79.4 TYPE 2 DIABETES MELLITUS WITH STAGE 4 CHRONIC KIDNEY DISEASE, WITH LONG-TERM CURRENT USE OF INSULIN: Primary | ICD-10-CM

## 2018-03-05 DIAGNOSIS — E78.5 HYPERLIPIDEMIA LDL GOAL <100: ICD-10-CM

## 2018-03-05 PROCEDURE — 3078F DIAST BP <80 MM HG: CPT | Mod: S$GLB,,, | Performed by: NURSE PRACTITIONER

## 2018-03-05 PROCEDURE — G0108 DIAB MANAGE TRN  PER INDIV: HCPCS | Mod: S$GLB,,, | Performed by: DIETITIAN, REGISTERED

## 2018-03-05 PROCEDURE — 3074F SYST BP LT 130 MM HG: CPT | Mod: S$GLB,,, | Performed by: NURSE PRACTITIONER

## 2018-03-05 PROCEDURE — 99999 PR PBB SHADOW E&M-EST. PATIENT-LVL IV: CPT | Mod: PBBFAC,,, | Performed by: NURSE PRACTITIONER

## 2018-03-05 PROCEDURE — 99214 OFFICE O/P EST MOD 30 MIN: CPT | Mod: S$GLB,,, | Performed by: NURSE PRACTITIONER

## 2018-03-05 PROCEDURE — 99499 UNLISTED E&M SERVICE: CPT | Mod: S$GLB,,, | Performed by: NURSE PRACTITIONER

## 2018-03-05 NOTE — LETTER
March 5, 2018      Katherine Merida MD  [address]  [facility] [address]         Patient: Radha Degroot   MR Number: [id number]   YOB: 1940   Date of Visit: 3/5/2018       Dear Dr. Merida:    Thank you for referring Radha for diabetes self-management education and support. We have reviewed all components of our Diabetes Management Program and she has been provided with resources for developing her Self-Management Support Plan. Below is a summary of her clinical outcomes and goal progress.    Goals  Patient has selected/evaluated goals during today's session: Yes, selected  Healthy Eating: In Progress (pt has not lost any weight but will start exercise to assist with wt loss)  Physical Activity: Set (will start arm-chair aerobics)  Start Date: 03/05/18    Diabetes Self-Management Support Plan  Diabetes Learning: DM apps, DM websites  Exercise/Nutrition: apps, websites, other  Other exercise/nutrition: videos on arm-chair aerobics  Review Status: Patient has selected and agrees to support plan.    Follow up:   · Radha to follow diabetes support plan indicated above  · Radha to attend medical appointments as scheduled  · Radha to update you on her DM education progress as needed      If you have questions, please do not hesitate to call me. I look forward to providing additional education and support as needed.    Sincerely,    Siria Barnett RD

## 2018-03-05 NOTE — PROGRESS NOTES
Diabetes Education  Author: Siria Barnett RD  Date: 3/5/2018    Diabetes Education Visit  Diabetes Education Record Assessment/Progress: Post Program/Follow-up  Pt visit today focused on reviewing diet and exercise changes discussed at prior meetings. Introduced pt to Guide on DM and Chronic Kidney Disease and discussed meal planning, grocery shopping.  Diabetes Type  Diabetes Type : Type II  Diabetes History  Diabetes Diagnosis: >10 years    Nutrition- similar to last visit diet recall  Meal Planning: 3 meals per day  What type of sweetener do you use?: Stevia/Truvia  What type of beverages do you drink?: diet soda/tea  Meal Plan 24 Hour Recall - Breakfast: 1-2 sl whole grain bread, eggs, coffee  Meal Plan 24 Hour Recall - Lunch: steak fajitas w veg, 1 tortilla, hot  unsweet tea  Meal Plan 24 Hour Recall - Dinner: same as lunch    Monitoring   Self Monitoring : 4x/day  Blood Glucose Logs: Yes  In the last month, how often have you had a low blood sugar reaction?: never  Can you tell when your blood sugar is too high?: no    Exercise   Exercise Type: none (back pain)    Current Diabetes Treatment   Current Treatment: Diet, Insulin    Social History  Preferred Learning Method: Face to Face  Primary Support: Self  Educational Level: Some College  Smoking Status: Never a Smoker  Learning Challenges : None   Readiness to Learn : Acceptance  Cultural Influences: No    Diabetes Education Assessment/Progress  Diabetes Disease Process (diabetes disease process and treatment options): Discussion, Individual Session, Demonstrates Understanding/Competency(verbalizes/demonstrates), Instructed  -Provided handouts on DM and CKD, Renal diabetic grocery shopping lists and resources/apps/websites for Renal and DM patients    Nutrition (Incorporating nutritional management into one's lifestyle): Discussion, Individual Session, Demonstrates Understanding/Competency (verbalizes/demonstrates), Written Materials Provided,  Instructed  -diet recall indicates similar intake as in past except Na+food choices seem higher when eating out. Pt denies snacking but does eat out often santi at lunch; a1c slightly higher  -wt has stayed the same since last office visit but had gained wt prior to that visit. Pt feels her lack of regular activity is the reason for no change in weight  -reviewed the dietary restrictions for renal stage IV; grocery shopping list provided. Discussed purchasing groceries online from [employer] and using the renal guide to assist w selections    Physical Activity (incorporating physical activity into one's lifestyle): Discussion, Individual Session, Demonstrates Understanding/Competency (verbalizes/demonstrates), Instructed-  -Rec'd pt start arm-chair aerobics or aqua classes if approved by MD to assist w glycemic control and weight loss    Medications (states correct name, dose, onset, peak, duration, side effects & timing of meds): Discussion, Individual Session  Monitoring (monitoring blood glucose/other parameters & using results): Discussion, Individual Session  Acute Complications (preventing, detecting, and treating acute complications): Discussion, Individual Session, Demonstrates Understanding/Competency (verbalizes/demonstrates), Instructed  Chronic Complications (preventing, detecting, and treating chronic complications): Discussion, Individual Session, Demonstrates Understanding/Competency (verbalizes/demonstrates), Instructed, Written Materials Provided  Clinical (diabetes, other pertinent medical history, and relevant comorbidities reviewed during visit): Discussion, Individual Session  -discussed CKD and reviewed dietay strategies    Cognitive (knowledge of self-management skills, functional health literacy): Discussion, Individual Session  Psychosocial (emotional response to diabetes): Discussion, Individual Session  Diabetes Distress and Support Systems: Discussion, Individual Session  Behavioral (readiness  for change, lifestyle practices, self-care behaviors): Discussion, Individual Session    Goals  Patient has selected/evaluated goals during today's session: Yes, selected  Healthy Eating: In Progress (pt has not lost any weight)  Physical Activity: Set (will start arm-chair aerobics)  Start Date: 03/05/18    Diabetes Self-Management Support Plan  Diabetes Learning: DM apps, DM websites  Exercise/Nutrition: apps, websites, other  Other exercise/nutrition: videos on arm-chair aerobics  Review Status: Patient has selected and agrees to support plan.    Diabetes Care Plan/Intervention  Education Plan/Intervention: Individual Follow-Up DSMT    Diabetes Meal Plan  Restrictions: Restricted Carbohydrate, Low Potassium, Low Sodium  Carbohydrate Per Meal: 30-45g  Carbohydrate Per Snack : 15-20g    Education Units of Time   Time Spent: 30 min    Health Maintenance was reviewed today with patient. Discussed with patient importance of routine eye exams, foot exams/foot care, blood work (i.e.: A1c, microalbumin, and lipid), dental visits, yearly flu vaccine, and pneumonia vaccine as indicated by PCP. Patient verbalized understanding.     Health Maintenance Topics with due status: Not Due       Topic Last Completion Date    Foot Exam 11/21/2017    Lipid Panel 02/28/2018    Hemoglobin A1c 02/28/2018     Health Maintenance Due   Topic Date Due    Zoster Vaccine  03/24/1999    TETANUS VACCINE  10/03/2015    Eye Exam  01/05/2018    DEXA SCAN  03/20/2018

## 2018-03-05 NOTE — PATIENT INSTRUCTIONS
Continue same insulin doses.   Please log all glucoses and reasons for possible low blood sugars.   Test blood sugar 4x/day.   Return to clinic in a month.

## 2018-03-05 NOTE — PROGRESS NOTES
CC: This 77 y.o. White female  is here for evaluation of  T2DM along with comorbidities indicated in the Visit Diagnosis section of this encounter.    HPI: Radha Degroot was diagnosed with T2DM in 1980s.     Nephrology - Dr. Mcmanus    Prior visit on 11/6/17  a1c up from 7.9 to 8.1%.   She is still having back pain. Has gained more weight 7 lb since lov. Feels a lot of it is fluid - ate more salt than usual yesterday as she went out to eat. Eating more in general as she is more sedentary d/t physical limitations.   Reviewed glucometer. Glucoses are mostly high but some glucoses in the 90s. She states recent bgs are not indicative of usual pattern.    Plan a1c near goal.   Take lantus at 7 pm every night or any other night that you can keep it consistent.   Novolog and Lantus can be taken together if need be. Take Novolog within 15 minutes of eating. Continue same insulin doses.   Send blood sugar log in 2-4 weeks for further recommendations.     Interval history  a1c up from 8.1 to 8.4%. Kidney function also mildly lower as well.   She has appt with diabetes education today.       PRESCRIBED DIABETES MEDICATIONS: Lantus Solostar 48 units hs, Novolog Flexpen 17 units w/ ss (Use on premeal readings: 150-200=+1, 201-250=+2, 251-300=+3; 301-350=+4, over 350=+5 units    Misses medication doses - reduces lantus to just 40 units hs if her bg at bedtime is 120s       DM COMPLICATIONS: nephropathy, retinopathy and peripheral neuropathy    SELF MONITORING BLOOD GLUCOSE: accuchek dorothy meter. Checks blood glucose at home 4x/day. bgs are ranging high 100s to low 200s.     HYPOGLYCEMIC EPISODES: recalls her bg has dropped to 40-50s midday; gets confused, sweaty, and vision is blurry when her bg drops.      CURRENT DIET: 3 meals/day.  Drinks iced tea with no sugar. No snacks.       CURRENT EXERCISE: none       BP (!) 119/52 (BP Location: Left arm, Patient Position: Sitting, BP Method: X-Large (Automatic))   Pulse 90   Ht 5'  "8" (1.727 m)   Wt 101 kg (222 lb 10.6 oz)   BMI 33.86 kg/m²     ROS:   CONSTITUTIONAL: Appetite good, + fatigue  MS: + back pain       PHYSICAL EXAM:  GENERAL: Well developed, well nourished. No acute distress.   PSYCH: AAOx3, appropriate mood and affect, conversant, well-groomed. Judgement and insight good.   NEURO: Cranial nerves grossly intact. Speech clear, no tremor.   CHEST: Respirations even and unlabored. CTA bilaterally.        Hemoglobin A1C   Date Value Ref Range Status   02/28/2018 8.4 (H) 4.0 - 5.6 % Final     Comment:     According to ADA guidelines, hemoglobin A1c <7.0% represents  optimal control in non-pregnant diabetic patients. Different  metrics may apply to specific patient populations.   Standards of Medical Care in Diabetes-2016.  For the purpose of screening for the presence of diabetes:  <5.7%     Consistent with the absence of diabetes  5.7-6.4%  Consistent with increasing risk for diabetes   (prediabetes)  >or=6.5%  Consistent with diabetes  Currently, no consensus exists for use of hemoglobin A1c  for diagnosis of diabetes for children.  This Hemoglobin A1c assay has significant interference with fetal   hemoglobin   (HbF). The results are invalid for patients with abnormal amounts of   HbF,   including those with known Hereditary Persistence   of Fetal Hemoglobin. Heterozygous hemoglobin variants (HbAS, HbAC,   HbAD, HbAE, HbA2) do not significantly interfere with this assay;   however, presence of multiple variants in a sample may impact the %   interference.     11/03/2017 8.1 (H) 4.0 - 5.6 % Final     Comment:     According to ADA guidelines, hemoglobin A1c <7.0% represents  optimal control in non-pregnant diabetic patients. Different  metrics may apply to specific patient populations.   Standards of Medical Care in Diabetes-2016.  For the purpose of screening for the presence of diabetes:  <5.7%     Consistent with the absence of diabetes  5.7-6.4%  Consistent with increasing risk " for diabetes   (prediabetes)  >or=6.5%  Consistent with diabetes  Currently, no consensus exists for use of hemoglobin A1c  for diagnosis of diabetes for children.  This Hemoglobin A1c assay has significant interference with fetal   hemoglobin   (HbF). The results are invalid for patients with abnormal amounts of   HbF,   including those with known Hereditary Persistence   of Fetal Hemoglobin. Heterozygous hemoglobin variants (HbAS, HbAC,   HbAD, HbAE, HbA2) do not significantly interfere with this assay;   however, presence of multiple variants in a sample may impact the %   interference.     05/19/2017 7.9 (H) 4.5 - 6.2 % Final     Comment:     According to ADA guidelines, hemoglobin A1C <7.0% represents  optimal control in non-pregnant diabetic patients.  Different  metrics may apply to specific populations.   Standards of Medical Care in Diabetes - 2016.  For the purpose of screening for the presence of diabetes:  <5.7%     Consistent with the absence of diabetes  5.7-6.4%  Consistent with increasing risk for diabetes   (prediabetes)  >or=6.5%  Consistent with diabetes  Currently no consensus exists for use of hemoglobin A1C  for diagnosis of diabetes for children.             Chemistry        Component Value Date/Time     02/28/2018 1018     02/28/2018 1018    K 4.7 02/28/2018 1018    K 4.7 02/28/2018 1018     02/28/2018 1018     02/28/2018 1018    CO2 26 02/28/2018 1018    CO2 26 02/28/2018 1018    BUN 40 (H) 02/28/2018 1018    BUN 40 (H) 02/28/2018 1018    CREATININE 2.2 (H) 02/28/2018 1018    CREATININE 2.2 (H) 02/28/2018 1018     (H) 02/28/2018 1018     (H) 02/28/2018 1018        Component Value Date/Time    CALCIUM 9.4 02/28/2018 1018    CALCIUM 9.4 02/28/2018 1018    ALKPHOS 75 05/19/2017 1422    AST 13 05/19/2017 1422    ALT 12 05/19/2017 1422    BILITOT 0.4 05/19/2017 1422    ESTGFRAFRICA 24.0 (A) 02/28/2018 1018    ESTGFRAFRICA 24.0 (A) 02/28/2018 1018    EGFRNONAA  20.9 (A) 02/28/2018 1018    EGFRNONAA 20.9 (A) 02/28/2018 1018          Lab Results   Component Value Date    LDLCALC 86.4 02/28/2018       Lab Results   Component Value Date    MICALBCREAT 29.5 02/11/2015             STANDARDS of CARE:        ASA:               Last eye exam:       ASSESSMENT and PLAN:    A1C GOAL: < 8%    1. Uncontrolled type 2 diabetes mellitus with complication, with long-term current use of insulin  Needs to take same lantus dose daily even if BG is controlled.   Impossible to titrate insulin doses without BG logs.   Advised -    Continue same insulin doses.   Please log all glucoses and reasons for possible low blood sugars.   Test blood sugar 4x/day.   Return to clinic in a month.     She will see RD today.      2. CKD (chronic kidney disease) stage 4, GFR 15-29 ml/min  eGFR mildly lower. Improve glycemic control.    3. Essential hypertension  Continue current rx    4. Hyperlipidemia LDL goal <100  At goal.          No orders of the defined types were placed in this encounter.       Follow-up in about 4 weeks (around 4/2/2018).

## 2018-03-07 ENCOUNTER — OFFICE VISIT (OUTPATIENT)
Dept: PAIN MEDICINE | Facility: CLINIC | Age: 78
End: 2018-03-07
Attending: ANESTHESIOLOGY
Payer: MEDICARE

## 2018-03-07 VITALS
HEART RATE: 77 BPM | DIASTOLIC BLOOD PRESSURE: 62 MMHG | RESPIRATION RATE: 18 BRPM | HEIGHT: 68 IN | TEMPERATURE: 98 F | SYSTOLIC BLOOD PRESSURE: 138 MMHG

## 2018-03-07 DIAGNOSIS — G89.4 CHRONIC PAIN DISORDER: Primary | ICD-10-CM

## 2018-03-07 DIAGNOSIS — M53.3 SACROILIAC JOINT PAIN: ICD-10-CM

## 2018-03-07 DIAGNOSIS — M48.061 SPINAL STENOSIS OF LUMBAR REGION WITHOUT NEUROGENIC CLAUDICATION: ICD-10-CM

## 2018-03-07 DIAGNOSIS — I25.5 CARDIOMYOPATHY, ISCHEMIC: Chronic | ICD-10-CM

## 2018-03-07 DIAGNOSIS — N18.4 CHRONIC KIDNEY DISEASE, STAGE IV (SEVERE): Chronic | ICD-10-CM

## 2018-03-07 DIAGNOSIS — M62.81 MUSCLE WEAKNESS: ICD-10-CM

## 2018-03-07 DIAGNOSIS — M47.816 LUMBAR SPONDYLOSIS: ICD-10-CM

## 2018-03-07 PROCEDURE — 99214 OFFICE O/P EST MOD 30 MIN: CPT | Mod: GC,S$GLB,, | Performed by: ANESTHESIOLOGY

## 2018-03-07 PROCEDURE — 3075F SYST BP GE 130 - 139MM HG: CPT | Mod: S$GLB,,, | Performed by: ANESTHESIOLOGY

## 2018-03-07 PROCEDURE — 99499 UNLISTED E&M SERVICE: CPT | Mod: S$GLB,,, | Performed by: ANESTHESIOLOGY

## 2018-03-07 PROCEDURE — 3078F DIAST BP <80 MM HG: CPT | Mod: S$GLB,,, | Performed by: ANESTHESIOLOGY

## 2018-03-07 PROCEDURE — 99999 PR PBB SHADOW E&M-EST. PATIENT-LVL III: CPT | Mod: PBBFAC,,, | Performed by: ANESTHESIOLOGY

## 2018-03-07 NOTE — PATIENT INSTRUCTIONS
Recommend trying tramadol twice a day for the next few weeks.  This is designed to help increase function.  This medication may make you sleepy, so do not drive until you know how it will affect you.     We are also going to try a joint injection for your sacroiliac joint.  Below is some information on sacroiliac joint pain  Sacroiliitis  The sacrum is the triangle-shaped bone at the base of the spine. It is linked to the other pelvis bones by the sacroiliac joints, also called SI joints. Sacroiliitis is when one or both SI joints are hurt or inflamed. It can make small movements of the lower back and pelvis very painful.        This condition has been linked to other diseases. They include ankylosing spondylitis, rheumatoid arthritis, psoriasis, and Crohns disease or colitis. Symptoms may include pain or stiffness in the hips, lower back, thighs, or buttocks. Pain occurs most often in the morning or after sitting for long periods of time. The pain may get worse when you walk. The swinging motion of the hips strains the SI joints.  Sacroiliitis is caused by many factors such as:  · Heavy lifting, especially if not done the right way  · Severe injury, such as a fall or car accident  · Osteoarthritis  · Pregnancy  · Infection of the joint  This condition is hard to diagnose. It may be confused with other causes of low back pain. To confirm the diagnosis, you may be given a shot of numbing medicine in the SI joint. Treatment includes rest, physical therapy, and anti-inflammatory medicines. If another health problem is the cause, then that must also be treated. More testing may be needed if your symptoms dont get better.  Home care  · If your healthcare provider has prescribed medicines, take all of them as directed.  · You may use over-the-counter pain medicine to control pain, unless another medicine was prescribed. If prednisone was prescribed, dont use NSAIDs, or nonsteroidal anti-inflammatory drugs, such  as ibuprofen or naproxen. Talk with your provider before using this medicine if you have chronic liver or kidney disease or ever had a stomach ulcer or GI bleeding.  · If you were referred to physical therapy, make an appointment. Be sure to do any prescribed exercises.  · Dont smoke. Smoking reduces blood flow to the inflamed area. This makes it harder to treat.  Follow-up care  Follow up with your healthcare provider, or as advised.  If you had an X-ray or an MRI, you will be notified of any new findings that may affect your care.  When to seek medical advice  Contact your healthcare provider right away if any of these occur:  · Increasing low back pain  · Inflammation of the eyes  · Skin rash or redness  · Weakness or numbness in one or both legs  · Loss of bowel or bladder control  · Numbness in the groin area  Date Last Reviewed: 11/24/2015  © 5026-4288 [employer]. [address] [place] [address], [place] [address]. All rights reserved. This information is not intended as a substitute for professional medical care. Always follow your healthcare professional's instructions.

## 2018-03-07 NOTE — PROGRESS NOTES
"Subjective:     Patient ID: Radha Degroot is a 77 y.o. female.    Chief Complaint: Pain    Consulted by: None     Disclaimer: This note was generated using voice recognition software.  There may be a typographical errors that were missed during proofreading.    She is known to this clinic, but she is never been evaluated by me before.    HPI:    Radha Degroot is a 77 y.o. female who presents today with bilateral lower back pain. This pain is described in detail below.    The pt is known to [facility] and previously being seen by Dr. Posey. The pt underwent bilateral MBB's followed by right and left sided L3-5 MB RFA's in November and December. This is the pt's first time being seen by Dr. Peña in clinic. The pt presents stating she experienced temporary relief following the MBB's but did not experience relief following her RFA's. The pt describes her lower back pain as bilateral but the right is slightly worse than the left. She describes the pain as 0/10 at best and 8/10 at worst. Her pain is described as "spasms and pressure." The pain is improved with rest and worse with standing and walking. She is currently intermittently taking acetaminophen but no other medications. She did perform PT approximately 3 yrs ago but only went to 3-4 sessions. Of note, pt with significant CKD.     Physical Therapy: Yes, 3 yrs ago     Non-pharmacologic Treatment:     · Ice/Heat: No  · TENS: No  · Massage: No  · Chiropractic care: No  · Acupuncture: No  · Other: No         Pain Medications:         · Currently taking: None     · Has tried in the past: None     · Has not tried: Opioids, NSAIDs, Muscle relaxants, TCAs, SNRIs, anticonvulsants, topical creams    Blood thinners: no    Interventional Therapies: none    Relevant Surgeries: none    Affecting sleep? yes    Affecting daily activities? yes    Depressive symptoms? no          · SI/HI? No    Work status: retired      Prescription Monitoring " Program database:  Not applicable    Pain Scales  Best: 0/10  Worst: 8/10  Usually: 8/10  Today: 8/10    Low-back Pain   The current episode started more than 1 year ago. Quality: seizing. The pain is at a severity of 8/10. The symptoms are aggravated by standing (walking). She has tried analgesics for the symptoms.       Last 3 PDI Scores 3/7/2018 8/10/2017 7/19/2017   Pain Disability Index (PDI) 36 24 33   ]    Review of Systems   Constitution: Negative.   HENT: Negative.    Eyes: Negative.    Cardiovascular: Negative.    Respiratory: Negative.    Endocrine: Negative.    Musculoskeletal: Positive for arthritis, back pain, muscle cramps and myalgias.   Gastrointestinal: Negative.    Genitourinary: Negative.    Neurological: Negative.              Past Medical History:   Diagnosis Date    ALLERGIC RHINITIS     Anemia     Cardiomyopathy, ischemic     Carpal tunnel syndrome     Cataract     Left eye    CHF (congestive heart failure)     Chronic kidney disease     Coronary artery disease     hx stent X2    Diabetes mellitus type II     Diabetic neuropathy     Diabetic retinopathy of both eyes     GERD (gastroesophageal reflux disease)     hiatal hernia    Gout, unspecified     h/o LAD and D1 PCI in 2006 6/17/2013    Heart attack 2006    Hiatal hernia     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    Hx of colonic polyps     1/9    Hx of colonic polyps     Hyperlipidemia     Hypertension     Myocardial infarction nov 2006    Posterior vitreous detachment of both eyes     Sleep apnea     Type 2 diabetes mellitus with mild nonproliferative diabetic retinopathy with macular edema 2/15/2013       Past Surgical History:   Procedure Laterality Date    APPENDECTOMY      CATARACT EXTRACTION      Right eye    heart stent      X 2    HYSTERECTOMY      Fibroids       Review of patient's allergies indicates:   Allergen Reactions    Heparin analogues Other (See Comments)     thrombocytopenia     Ancef [cefazolin]     Keflex [cephalexin] Rash    Oxybutynin Other (See Comments)     Metallic sloan, chest symptoms        Current Outpatient Prescriptions   Medication Sig Dispense Refill    ACCU-CHEK MELIDA PLUS TEST STRP Strp USE TO TEST BLOOD SUGAR FOUR TIMES DAILY 200 strip 0    aspirin 81 MG Chew Take 81 mg by mouth once daily.        blood sugar diagnostic (ACCU-CHEK ACTIVE TEST) Strp USE TO TEST BLOOD SUGAR FOUR TIMES DAILY 360 strip 3    blood-glucose meter (ACCU-CHEK MELIDA PLUS METER) Misc To check sugars 4 times daily.  Please include control solution 1 each 0    Ca cmb [id number]-[id number] (VITAMIN D-3 WITH ALOE) 120-1,000-10 mg-unit-mg Tab Take by mouth once daily.       carvedilol (COREG) 12.5 MG tablet Take 1 tablet (12.5 mg total) by mouth 2 (two) times daily. 180 tablet 4    fluticasone (FLONASE) 50 mcg/actuation nasal spray 2 SPRAYS IN EACH NOSTRIL ONCE DAILY 16 g 6    fluticasone (FLONASE) 50 mcg/actuation nasal spray SHAKE LIQUID AND USE 2 SPRAYS IN EACH NOSTRIL EVERY DAY 16 g 6    furosemide (LASIX) 40 MG tablet TAKE 1 TABLET(40 MG) BY MOUTH EVERY DAY 90 tablet 6    insulin aspart (NOVOLOG FLEXPEN) 100 unit/mL InPn pen Inject 17 units before meals with ss 4 Box 1    insulin glargine (LANTUS SOLOSTAR) 100 unit/mL (3 mL) InPn pen Inject 48 units at bedtime 3 Box 1    ketoconazole (NIZORAL) 2 % shampoo APPLY TO AFFECTED AREA EVERY OTHER DAY AND LET SIT 5 MINUTES PRIOR TO RINSING AS DIRECTED 120 mL 2    lancets (ACCU-CHEK SOFTCLIX LANCETS) Misc 1 each by Misc.(Non-Drug; Combo Route) route 4 (four) times daily. 360 each 3    mirabegron 50 mg Tb24 Take 1 tablet (50 mg total) by mouth once daily. 90 tablet 3    MULTIVIT-MIN/IRON/FOLIC/LUTEIN (CENTRUM SILVER WOMEN ORAL) Take by mouth once daily.       nitroGLYCERIN (NITROSTAT) 0.3 MG SL tablet Place 1 tablet (0.3 mg total) under the tongue every 5 (five) minutes as needed for Chest pain. 30 tablet 3    pantoprazole (PROTONIX) 40 MG  "tablet TAKE 1 TABLET BY MOUTH DAILY 90 tablet 0    pen needle, diabetic (BD ULTRA-FINE TIM PEN NEEDLES) 32 gauge x 5/32" Ndle USE FOUR TIMES DAILY FOR BLOOD SUGAR TESTING 200 each 8    pen needle, diabetic (BD ULTRA-FINE TIM PEN NEEDLES) 32 gauge x 5/32" Ndle Use four times daily for insulin 480 each 11    rosuvastatin (CRESTOR) 40 MG Tab Take 1 tablet (40 mg total) by mouth once daily. 30 tablet 6    traMADol (ULTRAM) 50 mg tablet Take 1 tablet (50 mg total) by mouth 2 (two) times daily as needed for Pain. 60 tablet 0     No current facility-administered medications for this visit.        Family History   Problem Relation Age of Onset    Diabetes Mother     Heart disease Mother     Hypertension Mother     Diabetes Father     Melanoma Father     Kidney failure Father     Mental illness Sister      suicide/schizophrenic    Cancer Maternal Grandfather     Cancer Paternal Grandfather      colon    Psoriasis Neg Hx     Lupus Neg Hx     Eczema Neg Hx        Social History     Social History    Marital status: Single     Spouse name: N/A    Number of children: N/A    Years of education: N/A     Occupational History    Realtor      Social History Main Topics    Smoking status: Never Smoker    Smokeless tobacco: Never Used    Alcohol use No    Drug use: No    Sexual activity: Not Currently     Other Topics Concern    Are You Pregnant Or Think You May Be? No    Breast-Feeding No     Social History Narrative    Part time .    Severe back pain.    Retired in 1975           Objective:     Vitals:    03/07/18 1023   BP: 138/62   Pulse: 77   Resp: 18   Temp: 97.7 °F (36.5 °C)   Height: 5' 8" (1.727 m)   PainSc:   8   PainLoc: Back       GEN:  Well developed, well nourished.  No acute distress.   HEENT:  No trauma.  Mucous membranes moist.  Nares patent bilaterally.  PSYCH: Normal affect. Thought content appropriate.  CHEST:  Breathing symmetric.  No audible wheezing.  ABD: Soft, " non-distended.  SKIN:  Warm, pink, dry.  No rash on exposed areas.    EXT:  No cyanosis, clubbing, or edema.  No color change or changes in nail or hair growth.  NEURO/MUSCULOSKELETAL:  Fully alert, oriented, and appropriate. Speech normal silvina. No cranial nerve deficits.   Gait: antalgic   Motor Strength: 5/5 motor strength throughout lower extremities.   Sensory: No sensory deficit in the lower extremities.   Reflexes:  2+ and symmetric throughout.  Downgoing Babinski's bilaterally.  No clonus or spasticity.  L-Spine:  Normal ROM with no pain on flexion/extension. +'ve facet loading bilaterally.  negative SLR bilaterally.    SI Joint/Hip: Positive NICOLA bilaterally.  negative FADIR bilaterally.  positive TTP over bilateral SI joints. Not TTP over lumbar paraspinals, hips, piriformis muscles, or GTB.        Imaging:        The imaging studies listed below were independently reviewed by me, and I agree with the findings as documented below.     Lumbar spine alignment is within normal limits.  The vertebral body heights are well maintained, with no fracture.  There is no marrow signal abnormality suspicious for infiltrative process.  There is multilevel disc desiccation present at all lumbar levels. There is advanced intervertebral disc space height loss present at L4-L5.  The conus is normal in appearance and terminates at the L1-2 level.  There is an incidentally noted Tarlov cyst arising from the S3 neural foramen measuring approximately 1.6 x 1.3 cm. Additional smaller Tarlov cyst is noted arising from the S2 neural foramen.    L1-L2: There is a mild circumferential disc bulge.  No significant central canal or neural foraminal narrowing.    L2-L3: There is a circumferential disc bulge, ligamentum flavum thickening, bilateral facet arthropathy.  There is mild spinal canal stenosis and mild left-sided neural foraminal narrowing.    L3-L4: There is a circumferential disc bulge, ligamentum flavum thickening, and  advanced bilateral facet arthropathy.  There is resulting moderate to severe spinal canal stenosis and mild bilateral neuroforaminal narrowing, left greater than right.    L4-L5: There is a circumferential disc bulge and advanced bilateral facet arthropathy.  There is moderate spinal canal stenosis and moderate bilateral neuroforaminal narrowing.    L5-S1: There is a circumferential disc bulge and advanced bilateral facet arthropathy, left greater than right.  There is mild/moderate bilateral neuroforaminal narrowing. There is no significant spinal canal stenosis.    Limited views of the abdomen demonstrate a 1.1 cm T1 and T2 hyperintense lesion within the superior pole of the right kidney. This is incompletely characterized and further assessment is recommended with dedicated renal ultrasound. There is additional T1 hypointense and T2 hyperintense lesion at the inferior pole the right kidney likely reflecting a simple cyst, though this could be evaluated at time of retroperitoneal ultrasound. There is a 2.7 cm T2 hyperintense cystic structure within the right adnexa. The bilateral sacroiliac joints are maintained.   Impression         1. Multilevel degenerative change of the lumbar spine most pronounced at the levels of L3-L4 and L4-L5, noting moderate to severe spinal canal stenosis at L3-L4. Additionally, there is multilevel bilateral neural foraminal narrowing as discussed above. Please see above for additional level by level details.    2. Incidentally noted sacral Tarlov cysts as discussed above.    3. Incompletely characterized 1.1 cm T1 and T2 hyperintense right upper pole renal lesion. Further evaluation is recommended with dedicated retroperitoneal ultrasound. Additionally, there is a 2.7 cm cystic structure within the right adnexa and further evaluation could be performed with pelvic ultrasound given the patient's postmenopausal status.    This report has been flagged in the [employer]  record.      ______________________________________     Electronically signed by: WANDER PARKER  Date: 08/27/16  Time: 03:10          Assessment:     Encounter Diagnoses   Name Primary?    Chronic pain disorder Yes    Sacroiliac joint pain     Lumbar spondylosis     Spinal stenosis of lumbar region without neurogenic claudication     Chronic kidney disease, stage IV (severe)     Cardiomyopathy, ischemic     Muscle weakness        Plan:     Radha was seen today for back pain.    Diagnoses and all orders for this visit:    Chronic pain disorder  -     traMADol (ULTRAM) 50 mg tablet; Take 1 tablet (50 mg total) by mouth 2 (two) times daily as needed for Pain.  -     Ambulatory consult to Physical Therapy    Sacroiliac joint pain  -     traMADol (ULTRAM) 50 mg tablet; Take 1 tablet (50 mg total) by mouth 2 (two) times daily as needed for Pain.  -     Ambulatory consult to Physical Therapy    Lumbar spondylosis  -     traMADol (ULTRAM) 50 mg tablet; Take 1 tablet (50 mg total) by mouth 2 (two) times daily as needed for Pain.  -     Ambulatory consult to Physical Therapy    Spinal stenosis of lumbar region without neurogenic claudication  -     traMADol (ULTRAM) 50 mg tablet; Take 1 tablet (50 mg total) by mouth 2 (two) times daily as needed for Pain.  -     Ambulatory consult to Physical Therapy    Chronic kidney disease, stage IV (severe)    Cardiomyopathy, ischemic    Muscle weakness         Bilateral lower back pain is consistent with the above.    We discussed the assessment and recommendations.  All available images were reviewed. We discussed the disease process, prognosis, treatment plan, and risks and benefits. The patient is aware of the risks and benefits of the medications being prescribed, common side effects, and proper usage. The following is the plan we agreed on:     1. Pt with significant CKD and pharmacologic treatment options limited. Will prescribe tramadol for symptom improvement. Pending  improvement with interventional procedures and tramadol use discussed with pt that muscle relaxants are another possible pharmacologic therapy that is acceptable to utilize in setting of CKD. Pt wishes to defer muscle relaxant therapy at present time. The [place] Board [employer] Pharm[employer] website for prescription monitoring was consulted today, and it does not suggest any misuse or aberrancy.   2. Referral for physical therapy   3. Schedule for bilateral SI joint injections   4. RTC in 4 weeks or sooner if needed.    Escobar Puentes, CA-3    Thank you for allowing me to participate in the care of this patient.   Please do not hesitate to call me at [phone] with any questions or concerns.    Greater than 25 minutes spent in total in todays visit with the patient, with more than half that time direct face to face counseling and education with the patient today. We discussed the disease process, prognosis, treatment plan, and risks and benefits.    I have seen the patient with the resident physician.  I have performed my own history and physical exam and we have come up with the above plan.  The patient is in agreement with our plan.     The above plan and management options were discussed at length with patient. Patient is in agreement with the above and verbalized understanding. It will be communicated with the referring physician via electronic record, fax, or mail.

## 2018-03-08 RX ORDER — TRAMADOL HYDROCHLORIDE 50 MG/1
50 TABLET ORAL 2 TIMES DAILY PRN
Qty: 60 TABLET | Refills: 0 | Status: SHIPPED | OUTPATIENT
Start: 2018-03-08 | End: 2018-04-07

## 2018-03-09 ENCOUNTER — TELEPHONE (OUTPATIENT)
Dept: RESEARCH | Facility: HOSPITAL | Age: 78
End: 2018-03-09

## 2018-03-12 ENCOUNTER — TELEPHONE (OUTPATIENT)
Dept: PAIN MEDICINE | Facility: CLINIC | Age: 78
End: 2018-03-12

## 2018-03-12 NOTE — TELEPHONE ENCOUNTER
Contacted and spoke to patient regarding message. She reports she was given a brochure for a COOLIEF and was wondering why as she is not getting that procedure per her conversation with Dr. Peña.     Ms. Degroot was informed that she is scheduled for a Steroid injection.     Patient verbalized understanding.

## 2018-03-12 NOTE — TELEPHONE ENCOUNTER
----- Message from Katt Eller MA sent at 3/9/2018  4:11 PM CST -----  LIBERTAD Iverson Staff  Caller: Unspecified (Today,  3:08 PM)         Pt is questioning why she was given info on cooled RFA? She is scheduled for B/L SI jt inj on Tuesday 3/13/18. pls call to discuss

## 2018-03-13 ENCOUNTER — HOSPITAL ENCOUNTER (OUTPATIENT)
Facility: OTHER | Age: 78
Discharge: HOME | End: 2018-03-13
Attending: ANESTHESIOLOGY | Admitting: ANESTHESIOLOGY
Payer: MEDICARE

## 2018-03-13 ENCOUNTER — SURGERY (OUTPATIENT)
Age: 78
End: 2018-03-13

## 2018-03-13 VITALS
WEIGHT: 215 LBS | SYSTOLIC BLOOD PRESSURE: 171 MMHG | BODY MASS INDEX: 32.58 KG/M2 | HEIGHT: 68 IN | HEART RATE: 87 BPM | RESPIRATION RATE: 18 BRPM | DIASTOLIC BLOOD PRESSURE: 76 MMHG | OXYGEN SATURATION: 97 % | TEMPERATURE: 98 F

## 2018-03-13 DIAGNOSIS — M53.3 SACROILIAC DYSFUNCTION: Primary | ICD-10-CM

## 2018-03-13 DIAGNOSIS — G89.29 CHRONIC PAIN: ICD-10-CM

## 2018-03-13 LAB — POCT GLUCOSE: 166 MG/DL (ref 70–110)

## 2018-03-13 PROCEDURE — 27096 INJECT SACROILIAC JOINT: CPT | Mod: 50,,, | Performed by: ANESTHESIOLOGY

## 2018-03-13 PROCEDURE — S0020 INJECTION, BUPIVICAINE HYDRO: HCPCS | Performed by: ANESTHESIOLOGY

## 2018-03-13 PROCEDURE — 25500020 PHARM REV CODE 255: Performed by: ANESTHESIOLOGY

## 2018-03-13 PROCEDURE — 63600175 PHARM REV CODE 636 W HCPCS: Performed by: ANESTHESIOLOGY

## 2018-03-13 PROCEDURE — 27096 INJECT SACROILIAC JOINT: CPT | Mod: 50 | Performed by: ANESTHESIOLOGY

## 2018-03-13 PROCEDURE — 25000003 PHARM REV CODE 250: Performed by: ANESTHESIOLOGY

## 2018-03-13 PROCEDURE — 82947 ASSAY GLUCOSE BLOOD QUANT: CPT | Performed by: ANESTHESIOLOGY

## 2018-03-13 RX ORDER — LIDOCAINE HYDROCHLORIDE 10 MG/ML
INJECTION INFILTRATION; PERINEURAL
Status: DISCONTINUED | OUTPATIENT
Start: 2018-03-13 | End: 2018-03-13 | Stop reason: HOSPADM

## 2018-03-13 RX ORDER — METHYLPREDNISOLONE ACETATE 40 MG/ML
INJECTION, SUSPENSION INTRA-ARTICULAR; INTRALESIONAL; INTRAMUSCULAR; SOFT TISSUE
Status: DISCONTINUED | OUTPATIENT
Start: 2018-03-13 | End: 2018-03-13 | Stop reason: HOSPADM

## 2018-03-13 RX ORDER — SODIUM CHLORIDE 9 MG/ML
500 INJECTION, SOLUTION INTRAVENOUS CONTINUOUS
Status: DISCONTINUED | OUTPATIENT
Start: 2018-03-13 | End: 2018-03-13 | Stop reason: HOSPADM

## 2018-03-13 RX ORDER — BUPIVACAINE HYDROCHLORIDE 5 MG/ML
INJECTION, SOLUTION EPIDURAL; INTRACAUDAL
Status: DISCONTINUED | OUTPATIENT
Start: 2018-03-13 | End: 2018-03-13 | Stop reason: HOSPADM

## 2018-03-13 RX ADMIN — METHYLPREDNISOLONE ACETATE 80 MG: 40 INJECTION, SUSPENSION INTRA-ARTICULAR; INTRALESIONAL; INTRAMUSCULAR; SOFT TISSUE at 03:03

## 2018-03-13 RX ADMIN — SODIUM BICARBONATE 5 ML: 0.2 INJECTION, SOLUTION INTRAVENOUS at 03:03

## 2018-03-13 RX ADMIN — LIDOCAINE HYDROCHLORIDE 10 ML: 10 INJECTION, SOLUTION INFILTRATION; PERINEURAL at 03:03

## 2018-03-13 RX ADMIN — IOHEXOL 5 ML: 300 INJECTION, SOLUTION INTRAVENOUS at 03:03

## 2018-03-13 RX ADMIN — BUPIVACAINE HYDROCHLORIDE 10 ML: 5 INJECTION, SOLUTION EPIDURAL; INTRACAUDAL; PERINEURAL at 03:03

## 2018-03-13 NOTE — DISCHARGE INSTRUCTIONS
Home Care Instructions Pain Management:    1. DIET:   You may resume your normal diet today.   2. BATHING:   You may shower with luke warm water.  3. DRESSING:   You may remove your bandage today.   4. ACTIVITY LEVEL:   You may resume your normal activities 24 hrs after your procedure.  5. MEDICATIONS:   You may resume your normal medications today.   6. SPECIAL INSTRUCTIONS:   No heat to the injection site for 24 hrs including, bath or shower, heating pad, moist heat, or hot tubs.    Use ice pack to injection site for any pain or discomfort.  Apply ice packs for 20 minute intervals as needed.   If you have received any sedatives by mouth today you may not drive for 12 hours.    If you have received any sedation through your IV, you may not drive for 24 hrs.     PLEASE CALL YOUR DOCTOR IF:  1. Redness or swelling around the injection site.  2. Fever of 101 degrees  3. Drainage (pus) from the injection site.  4. For any continuous bleeding (some dried blood over the incision is normal.)    FOR EMERGENCIES:   If any unusual problems or difficulties occur during clinic hours, call [phone] or [phone].     Thank you for allowing us to care for you today. You may receive a survey about the care we provided. Your feedback is valuable and helps us provide excellent care throughout the community.

## 2018-03-13 NOTE — OP NOTE
"Date of Procedure: 03/13/2018    Procedure: Bilateral SI joint injection     Pre-op diagnosis: Bilateral sacroiliitis [M46.1]     Post-op diagnosis: Bilateral sacroiliitis [M46.1]     Physician: Dr. Ирина Peña     Assistant: Dr. Blackmon    Anesthestia: local    All medications, allergies, and relevant histories were reviewed. No recent antibiotics or infections. A time-out was taken to verify the correct patient, procedure, laterality, and appropriate medications/allergies.     Fluoroscopically-Guided, Contrast-Controlled Bilateral SI joint Steroid Injection:   The patient was positioned prone and prepped and draped in the usual sterile fashion. The right SI joint was identified fluoroscopically via an oblique view. The skin was anesthetized via 25-gauge 1.5" needle with approximately 3 cc of bicarbonated 1% lidocaine. At this point, a 22-gauge 3.5" spinal needle was atraumatically introduced and advanced under fluoroscopic guidance to the inferior aspect of the SI joint. Following negative aspiration for blood, approximately 1 cc of Omnipaque 300 was injected without evidence of intravascular spread.  At this point, 3 cc of mixture of 4 cc of 0.5% bupivacaine with 40 mg of Depo-Medrol was injected without complication. Next, the needle was withdrawn to the posterior aspect of the joint space and the remaining 2 cc of the above mixture were injected after negative aspiration. The needle was flushed with 1% lidocaine and withdrawn.     The procedure was then repeated in an identical manner on the left.    The patient was followed post procedure and discharged under their own power in excellent condition in the company of a responsible adult.     Future Management:   If helpful, can repeat as needed.   Follow up with my clinic in 3 weeks or sooner if needed    I certify that I provided the above services.  I was present for the entire procedure, which was performed by the fellow physician under my supervision.  " There were no parts of the procedure that were performed not by myself or without my direct supervision.  .

## 2018-03-14 ENCOUNTER — OFFICE VISIT (OUTPATIENT)
Dept: CARDIOLOGY | Facility: CLINIC | Age: 78
End: 2018-03-14
Payer: MEDICARE

## 2018-03-14 VITALS
HEART RATE: 86 BPM | BODY MASS INDEX: 33.08 KG/M2 | HEIGHT: 68 IN | OXYGEN SATURATION: 95 % | SYSTOLIC BLOOD PRESSURE: 148 MMHG | WEIGHT: 218.25 LBS | DIASTOLIC BLOOD PRESSURE: 73 MMHG

## 2018-03-14 DIAGNOSIS — I48.91 ATRIAL FIBRILLATION, UNSPECIFIED TYPE: Primary | ICD-10-CM

## 2018-03-14 DIAGNOSIS — E11.42 TYPE 2 DIABETES MELLITUS WITH DIABETIC POLYNEUROPATHY, WITH LONG-TERM CURRENT USE OF INSULIN: ICD-10-CM

## 2018-03-14 DIAGNOSIS — I10 ESSENTIAL HYPERTENSION: Chronic | ICD-10-CM

## 2018-03-14 DIAGNOSIS — I25.10 CORONARY ARTERY DISEASE INVOLVING NATIVE CORONARY ARTERY OF NATIVE HEART WITHOUT ANGINA PECTORIS: Chronic | ICD-10-CM

## 2018-03-14 DIAGNOSIS — Z79.4 TYPE 2 DIABETES MELLITUS WITH DIABETIC POLYNEUROPATHY, WITH LONG-TERM CURRENT USE OF INSULIN: ICD-10-CM

## 2018-03-14 DIAGNOSIS — E78.2 MIXED HYPERLIPIDEMIA: Chronic | ICD-10-CM

## 2018-03-14 DIAGNOSIS — I50.32 CHRONIC DIASTOLIC CHF (CONGESTIVE HEART FAILURE): ICD-10-CM

## 2018-03-14 PROBLEM — I95.2 HYPOTENSION DUE TO DRUGS: Status: RESOLVED | Noted: 2017-06-28 | Resolved: 2018-03-14

## 2018-03-14 PROCEDURE — 93000 ELECTROCARDIOGRAM COMPLETE: CPT | Mod: S$GLB,,, | Performed by: INTERNAL MEDICINE

## 2018-03-14 PROCEDURE — 3077F SYST BP >= 140 MM HG: CPT | Mod: CPTII,S$GLB,, | Performed by: INTERNAL MEDICINE

## 2018-03-14 PROCEDURE — 99999 PR PBB SHADOW E&M-EST. PATIENT-LVL III: CPT | Mod: PBBFAC,,, | Performed by: INTERNAL MEDICINE

## 2018-03-14 PROCEDURE — 3078F DIAST BP <80 MM HG: CPT | Mod: CPTII,S$GLB,, | Performed by: INTERNAL MEDICINE

## 2018-03-14 PROCEDURE — 99499 UNLISTED E&M SERVICE: CPT | Mod: S$GLB,,, | Performed by: INTERNAL MEDICINE

## 2018-03-14 PROCEDURE — 99214 OFFICE O/P EST MOD 30 MIN: CPT | Mod: S$GLB,,, | Performed by: INTERNAL MEDICINE

## 2018-03-14 NOTE — PROGRESS NOTES
"Subjective:   Patient ID:  Radha Degroot is a 77 y.o. female who presents for follow-up of Edema (both mainly left x 1-2 months) and Shortness of Breath (with exertion)      HPI:   She has h/o of HTN, HPL, DM, CKD, DELFINA and CAD s/p STEMI with PCI of Diag and LAD in 11-06. She states that she is having difficultly with exertional SOB and ankle swelling.  No orthopnea or PND.  No palps, CP, TIA's, syncope or presyncope.  Her last stress test in 3-12 was a PET with D2 area fixed defect. EF 45%.  She states that she had a dysphagia sensation that at the time of her MI.     Exercise capacity is limited by arthritis and back pain.    Had admit for ADHF in 2016.   4-2017 admit for NSTEMI - h/o HIT  At the time of her presentation with typical angina her blood pressure was high. She was placed on GDMT.  She did not undergo early invasive management of her NSTEMI due to her very high risk of GEETA. DSE at that time:  Positive stress echocardiographic study demonstrating an ischemic response involving the mid anteroseptum, mid inferolateral wall, apical anterior wall.  With LVEF 55-60%    On exam today rhythm irreg irreg - ECG confirmed afib CHADSVASC=7  She is only taking coreg once a day.    Vitals:    03/14/18 1038   BP: (!) 148/73   BP Location: Left arm   Patient Position: Sitting   BP Method: X-Large (Automatic)   Pulse: 86   SpO2: 95%   Weight: 99 kg (218 lb 4.1 oz)   Height: 5' 8" (1.727 m)     Body mass index is 33.19 kg/m².  Estimated Creatinine Clearance: 30 mL/min (A) (based on SCr of 1.9 mg/dL (H)).    Lab Results   Component Value Date     03/14/2018    K 4.4 03/14/2018     03/14/2018    CO2 27 03/14/2018    BUN 42 (H) 03/14/2018    CREATININE 1.9 (H) 03/14/2018    GLU 99 03/14/2018    HGBA1C 8.4 (H) 02/28/2018    MG 1.9 12/24/2008    AST 13 05/19/2017    ALT 12 05/19/2017    ALBUMIN 3.6 02/28/2018    ALBUMIN 3.6 02/28/2018    PROT 7.1 05/19/2017    BILITOT 0.4 05/19/2017    WBC 9.61 01/23/2018    " HGB 11.9 (L) 01/23/2018    HCT 36.3 (L) 01/23/2018    MCV 85 01/23/2018     01/23/2018    INR 1.1 03/22/2017    INR 1.0 01/22/2007    TSH 3.332 02/20/2017    CHOL 171 02/28/2018    HDL 54 02/28/2018    LDLCALC 86.4 02/28/2018    TRIG 153 (H) 02/28/2018       Current Outpatient Prescriptions   Medication Sig    ACCU-CHEK MELIDA PLUS TEST STRP Strp USE TO TEST BLOOD SUGAR FOUR TIMES DAILY    aspirin 81 MG Chew Take 81 mg by mouth once daily.      blood sugar diagnostic (ACCU-CHEK ACTIVE TEST) Strp USE TO TEST BLOOD SUGAR FOUR TIMES DAILY    blood-glucose meter (ACCU-CHEK MELIDA PLUS METER) Misc To check sugars 4 times daily.  Please include control solution    Ca cmb [id number]-[id number] (VITAMIN D-3 WITH ALOE) 120-1,000-10 mg-unit-mg Tab Take by mouth once daily.     carvedilol (COREG) 12.5 MG tablet Take 1 tablet (12.5 mg total) by mouth 2 (two) times daily.    fluticasone (FLONASE) 50 mcg/actuation nasal spray SHAKE LIQUID AND USE 2 SPRAYS IN EACH NOSTRIL EVERY DAY    furosemide (LASIX) 40 MG tablet TAKE 1 TABLET(40 MG) BY MOUTH EVERY DAY    insulin aspart (NOVOLOG FLEXPEN) 100 unit/mL InPn pen Inject 17 units before meals with ss    insulin glargine (LANTUS SOLOSTAR) 100 unit/mL (3 mL) InPn pen Inject 48 units at bedtime    ketoconazole (NIZORAL) 2 % shampoo APPLY TO AFFECTED AREA EVERY OTHER DAY AND LET SIT 5 MINUTES PRIOR TO RINSING AS DIRECTED    lancets (ACCU-CHEK SOFTCLIX LANCETS) Misc 1 each by Misc.(Non-Drug; Combo Route) route 4 (four) times daily.    mirabegron 50 mg Tb24 Take 1 tablet (50 mg total) by mouth once daily.    MULTIVIT-MIN/IRON/FOLIC/LUTEIN (CENTRUM SILVER WOMEN ORAL) Take by mouth once daily.     nitroGLYCERIN (NITROSTAT) 0.3 MG SL tablet Place 1 tablet (0.3 mg total) under the tongue every 5 (five) minutes as needed for Chest pain.    pantoprazole (PROTONIX) 40 MG tablet TAKE 1 TABLET BY MOUTH DAILY    pen needle, diabetic (BD ULTRA-FINE TIM PEN NEEDLES) 32 gauge x  "5/32" Ndle USE FOUR TIMES DAILY FOR BLOOD SUGAR TESTING    pen needle, diabetic (BD ULTRA-FINE TIM PEN NEEDLES) 32 gauge x 5/32" Ndle Use four times daily for insulin    rosuvastatin (CRESTOR) 40 MG Tab Take 1 tablet (40 mg total) by mouth once daily.    traMADol (ULTRAM) 50 mg tablet Take 1 tablet (50 mg total) by mouth 2 (two) times daily as needed for Pain.    apixaban 2.5 mg Tab Take 1 tablet (2.5 mg total) by mouth 2 (two) times daily.     No current facility-administered medications for this visit.        Review of Systems   Constitution: Negative for malaise/fatigue.   Eyes: Negative for visual disturbance.   Cardiovascular: Positive for dyspnea on exertion. Negative for chest pain, claudication, irregular heartbeat, near-syncope, orthopnea and palpitations.   Respiratory: Negative for shortness of breath and sleep disturbances due to breathing.    Musculoskeletal: Positive for back pain. Negative for muscle cramps, muscle weakness and myalgias.   Gastrointestinal: Negative for abdominal pain and heartburn.   Genitourinary: Negative for nocturia.   Neurological: Negative for difficulty with concentration, excessive daytime sleepiness, light-headedness, loss of balance, paresthesias and vertigo.       Objective:   Physical Exam   Constitutional: She is oriented to person, place, and time. Vital signs are normal. She appears well-developed and well-nourished.   HENT:   Head: Normocephalic and atraumatic.   Neck: Neck supple. Normal carotid pulses and no JVD present. Carotid bruit is not present. No edema present.   Cardiovascular: Normal rate, regular rhythm, normal heart sounds and intact distal pulses.  PMI is not displaced.  Exam reveals no gallop and no friction rub.    No murmur heard.  Pulmonary/Chest: Effort normal and breath sounds normal. No respiratory distress. She has no wheezes. She has no rales. She exhibits no tenderness.   Abdominal: Soft. Normal appearance and bowel sounds are normal. " There is no hepatosplenomegaly. There is no tenderness.   Musculoskeletal: Normal range of motion. She exhibits no edema.   Neurological: She is alert and oriented to person, place, and time.   Skin: Skin is warm and dry.   Psychiatric: She has a normal mood and affect. Her behavior is normal. Judgment and thought content normal.       Assessment:     1. Atrial fibrillation, unspecified type    2. Coronary artery disease involving native coronary artery of native heart without angina pectoris    3. Essential hypertension    4. Mixed hyperlipidemia    5. Chronic diastolic CHF (congestive heart failure)    6. Type 2 diabetes mellitus with diabetic polyneuropathy, with long-term current use of insulin        Plan:   New dx of afib is likelycause of her sxs.  Doesn't appear to be retaining a lot of volume.  No JVP and lungs are clear. Will start eliquis @ 2.5 BID (will be 78 next week)  ECHO, holter and EP referral for likely DCCV  RODRIGUEZ and feet swelling - taking 40 mg daily.  Increase BID for ~4-5 days for symptomatic improvement.  Only taking coreg QD - recommend BID for better BP control  And slower rates should improve RODRIGUEZ.  F/u after DCCV

## 2018-03-14 NOTE — LETTER
March 14, 2018      Katherine Merida MD  [address]  [place] [address]           [facility] Cardiology  [address]  [place] [address]  Phone: [phone]          Patient: Radha Degroot   MR Number: [id number]   YOB: 1940   Date of Visit: 3/14/2018       Dear Dr. Katherine Merida:    Thank you for referring Radha Degroot to me for evaluation. Attached you will find relevant portions of my assessment and plan of care.    If you have questions, please do not hesitate to call me. I look forward to following Radha Degroot along with you.    Sincerely,    Salvatore Silveira MD    Enclosure  CC:  No Recipients    If you would like to receive this communication electronically, please contact [email or web address] or [phone] to request more information on [employer] access.    For providers and/or their staff who would like to refer a patient to [facility], please contact us through our one-stop-shop provider referral line, [facility] [place], at [phone].    If you feel you have received this communication in error or would no longer like to receive these types of communications, please e-mail [email or web address]

## 2018-03-15 RX ORDER — PANTOPRAZOLE SODIUM 40 MG/1
TABLET, DELAYED RELEASE ORAL
Qty: 90 TABLET | Refills: 0 | Status: SHIPPED | OUTPATIENT
Start: 2018-03-15 | End: 2018-05-16

## 2018-03-19 ENCOUNTER — CLINICAL SUPPORT (OUTPATIENT)
Dept: REHABILITATION | Facility: HOSPITAL | Age: 78
End: 2018-03-19
Attending: ANESTHESIOLOGY
Payer: MEDICARE

## 2018-03-19 DIAGNOSIS — M62.81 MUSCLE WEAKNESS: ICD-10-CM

## 2018-03-19 DIAGNOSIS — M54.50 CHRONIC MIDLINE LOW BACK PAIN WITHOUT SCIATICA: ICD-10-CM

## 2018-03-19 DIAGNOSIS — R26.2 DIFFICULTY WALKING: Primary | ICD-10-CM

## 2018-03-19 DIAGNOSIS — G89.29 CHRONIC MIDLINE LOW BACK PAIN WITHOUT SCIATICA: ICD-10-CM

## 2018-03-19 PROCEDURE — G8978 MOBILITY CURRENT STATUS: HCPCS | Mod: CK,PN

## 2018-03-19 PROCEDURE — 97110 THERAPEUTIC EXERCISES: CPT | Mod: PN

## 2018-03-19 PROCEDURE — G8979 MOBILITY GOAL STATUS: HCPCS | Mod: CJ,PN

## 2018-03-19 PROCEDURE — 97161 PT EVAL LOW COMPLEX 20 MIN: CPT | Mod: PN

## 2018-03-19 NOTE — PROGRESS NOTES
"  Please See Full Physical Therapy Evaluation in Plan of Care                                                      Physical Therapy Initial Evaluation     Name: Radha Degroot  Clinic Number: [id number]    Diagnosis:   Encounter Diagnoses   Name Primary?    Difficulty walking Yes    Muscle weakness     Chronic midline low back pain without sciatica      Physician: Ирина Peña MD  Treatment Orders: PT Eval and Treat    TREATMENT     Time In: 2:00pm  Time Out: 3:00p    PT Evaluation Completed? Yes  Discussed Plan of Care with patient: Yes    Radha received 10 minutes of therapeutic exercise & instruction including:    Supine HS Str c/ strap 2x30"  Hip Add c/ ball 15x  Hip Abd c/ RTB 15x    Radha received 0 minutes of manual therapy including:    Written Home Exercises Provided: Yes - HS Str, Hip Add/Abd  Radha demo good understanding of the education provided. Patient demo good return demo of skill of exercises.    Assessment     Pt's spiritual, cultural and educational needs considered and pt agreeable to plan of care and goals as stated below:     Short Term GOALS: 4 weeks. Pt agrees with goals set.  1. Patient demonstrates independence with HEP.   2. Patient demonstrates independence with Postural Awareness.   3.Patient demonstrates increased strength BLE's by 1/3 muscle grade or greater to improve tolerance to functional activities pain free.   4. Patient will report pain of 5/10 at worst, on 0-10 pain scale, with all activity  5. Patient demonstrates ability to walk 250 feet, flat surface, no pain provocation, appropriate gait pattern  6. Patient demonstrates ability to stand for 30 minutes, no pain provocation, to improve tolerance to household chores    Long Term GOALS: 8 weeks. Pt agrees with goals set.  1. Patient demonstrates increased thoracolumbar ROM by 10 degrees or greater to improve tolerance to functional activities pain free.   2. Patient demonstrates increased strength BLE's to 4/5 or " greater to improve tolerance to functional activities pain free.   3. Patient demonstrates improved overall function per FOTO Lumbar Survey to 50% Limitation or less.   4. Patient will report pain of 2/10 at worst, on 0-10 pain scale, with all activity  5. Patient demonstrates ability to walk 2 blocks, flat surface, no pain provocation, no LOB  6. Patient demonstrates ability to stand for 60 minutes, no pain provocation, to improve tolerance to household chores    Functional Limitations Reports - G Codes  Category: Mobility  Tool: FOTO Lumbar Survey  Score: 54% Limitation   Modifier  Impairment Limitation Restriction    CH  0 % impaired, limited or restricted    CI  @ least 1% but less than 20% impaired, limited or restricted    CJ  @ least 20%<40% impaired, limited or restricted    CK  @ least 40%<60% impaired, limited or restricted    CL  @ least 60% <80% impaired, limited or restricted    CM  @ least 80%<100% impaired limited or restricted    CN  100% impaired, limited or restricted     Current/: CK = 40-60% Limitation   Goal/ : CJ = 20-40% Limitation    PLAN     Certification Period: 3/19/18 - 6/19/18    Outpatient physical therapy 1-2 times weekly to include: pt ed, hep, therapeutic exercises, neuromuscular re-education/ balance exercises, manual therapy, joint mobilizations, aquatic therapy and modalities prn. Cont PT for  10-12 weeks. Pt may be seen by PTA as part of the rehabilitation team.     Therapist: Reyes Alvarado, PT  3/19/2018    I have seen the patient, reviewed the therapist's plan of care, and I agree with the plan of care.      I certify the need for these services furnished under this plan of treatment and while under my care.     ___________________ ________ Physician/Referring Practitioner            ___________________________ Date of Signature

## 2018-03-19 NOTE — PLAN OF CARE
Physical Therapy Initial Evaluation     Name: Radha Degroot  Clinic Number: [id number]    Diagnosis:   Encounter Diagnoses   Name Primary?    Difficulty walking Yes    Muscle weakness     Chronic midline low back pain without sciatica      Physician: Ирина Peña MD  Treatment Orders: PT Eval and Treat  Past Medical History:   Diagnosis Date    ALLERGIC RHINITIS     Anemia     Cardiomyopathy, ischemic     Carpal tunnel syndrome     Cataract     Left eye    CHF (congestive heart failure)     Chronic kidney disease     Coronary artery disease     hx stent X2    Diabetes mellitus type II     Diabetic neuropathy     Diabetic retinopathy of both eyes     GERD (gastroesophageal reflux disease)     hiatal hernia    Gout, unspecified     h/o LAD and D1 PCI in 2006 6/17/2013    Heart attack 2006    Hiatal hernia     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    aspirin 81 MG Chew Take 81 mg by mouth once daily.      blood sugar diagnostic (ACCU-CHEK ACTIVE TEST) Strp USE TO TEST BLOOD SUGAR FOUR TIMES DAILY    blood-glucose meter (ACCU-CHEK MELIDA PLUS METER) Misc To check sugars 4 times daily.  Please include control solution    Ca cmb [id number] (VITAMIN D-3 WITH ALOE) 120-1,000-10 mg-unit-mg Tab Take by mouth once daily.     carvedilol (COREG) 12.5 MG tablet Take 1 tablet (12.5 mg total) by mouth 2 (two) times  "daily.    fluticasone (FLONASE) 50 mcg/actuation nasal spray SHAKE LIQUID AND USE 2 SPRAYS IN EACH NOSTRIL EVERY DAY    furosemide (LASIX) 40 MG tablet TAKE 1 TABLET(40 MG) BY MOUTH EVERY DAY    insulin aspart (NOVOLOG FLEXPEN) 100 unit/mL InPn pen Inject 17 units before meals with ss    insulin glargine (LANTUS SOLOSTAR) 100 unit/mL (3 mL) InPn pen Inject 48 units at bedtime    ketoconazole (NIZORAL) 2 % shampoo APPLY TO AFFECTED AREA EVERY OTHER DAY AND LET SIT 5 MINUTES PRIOR TO RINSING AS DIRECTED    lancets (ACCU-CHEK SOFTCLIX LANCETS) Misc 1 each by Misc.(Non-Drug; Combo Route) route 4 (four) times daily.    mirabegron 50 mg Tb24 Take 1 tablet (50 mg total) by mouth once daily.    MULTIVIT-MIN/IRON/FOLIC/LUTEIN (CENTRUM SILVER WOMEN ORAL) Take by mouth once daily.     nitroGLYCERIN (NITROSTAT) 0.3 MG SL tablet Place 1 tablet (0.3 mg total) under the tongue every 5 (five) minutes as needed for Chest pain.    pantoprazole (PROTONIX) 40 MG tablet TAKE 1 TABLET BY MOUTH DAILY    pen needle, diabetic (BD ULTRA-FINE TIM PEN NEEDLES) 32 gauge x 5/32" Ndle USE FOUR TIMES DAILY FOR BLOOD SUGAR TESTING    pen needle, diabetic (BD ULTRA-FINE TIM PEN NEEDLES) 32 gauge x 5/32" Ndle Use four times daily for insulin    rosuvastatin (CRESTOR) 40 MG Tab Take 1 tablet (40 mg total) by mouth once daily.    traMADol (ULTRAM) 50 mg tablet Take 1 tablet (50 mg total) by mouth 2 (two) times daily as needed for Pain.     No current facility-administered medications for this visit.      Review of patient's allergies indicates:   Allergen Reactions    Heparin analogues Other (See Comments)     thrombocytopenia    Ancef [cefazolin]     Keflex [cephalexin] Rash    Oxybutynin Other (See Comments)     Metallic sloan, chest symptoms        Subjective     Patient states:  Low back pain for 3 years, which has gotten progressively worse. Initially had low back beginning +10 years ago after an event when she was throwing a " "bag of garbage across her body. Pain had gotten better, however ~3 years ago she began experience low back problems again. Denies pain radiating into either LE. Denies numbness/tingling. Pain goes away when she sits down. Walking, standing, and bending hurts significantly. She had Bilateral SI joint injection by Dr. Peña, as recent as 3/13/18, with relief. She tends to experience temporary relief with injections, however the pain eventually returns. Denies prior lumbar, hip, and knees surgeries. She wasn't prepared for therapy during last episode and didn't perform full amount of sessions. Had to use scooter when traveling in [place] due to pain/difficulty walking.    X-ray 3/22/17  "Mild to moderate DJD.  There is a grade 1 L3/L4 spondylolisthesis best identified on the flexion view.  The L4/L5 and L5/S1 disc spaces are significantly narrowed.  No fracture or bone destruction identified"    Pain Scale: Radha rates pain on a scale of 0-10 to be 8 at worst; 0 currently; 0 at best .  Onset: gradual  Radicular symptoms:  None  Aggravating factors:   Difficulty walking, standing, bending  Easing factors:  Sitting, Massage  Prior Therapy: Yes ~2 years ago,   Home Environment (Steps/Adaptations): 1-story house, 1 step threshold  Functional Deficits Leading to Referral: Difficulty grocery shopping, walking, standing  Prior functional status: Independent  Bowel/Bladder Change: None  DME owned/used: None  Occupation:  Retired -                        Pts goals:  Return to shopping, improve walking, reduce pain, normal function with ADL    Objective     Vital Signs:  Blood Glucose (glucometer): 102 mg/dl    Automatic BP Reading: R arm in sitting  141/73 mm/Hg  78 bpm    Posture Alignment: slouched posture, large abdomen, R PSIS lower in standing, R iliac crest    LUMBAR SPINE AROM:   Flexion: 70/50 - 20   Extension: 15/5 - 10   Left Sidebend: 15   Right Sidebend: 15   Left Rotation: Decreased   Right " "Rotation: Decreased     SEGMENTAL MOBILITY: L3-5 hypomobile, S1-2 empty end-feel due to pain    LOWER EXTREMITY STRENGTH:   Left Right   Quadriceps 4-/5 4-/5   Hamstrings 4-/5 4-/5     Iliopsoas 4/5 4/5   PGM 3+/5 3+/5   Hip IR 4-/5 4-/5   Hip ER 4-/5 4-/5   Hip Ext 3+/5 3+/5   Ankle DF 4-/5 4-/5   Ankle PF 4-/5 4-/5     Dermatomes: Sensation: Light Touch: Intact  Saddle Sensation:  Myotomes: Intact  Flexibility: Dec B HS, Hip Flex, Calves, Gluteal    Leg Length: Supine; increased pain during attempt for long-sitting  R LE 86 cm  L LE 85 cm    Special Tests:   Left Right   Slump Negative Negative   SLR Negative Negative   Crossed SLR Negative Negative   Sacral Thrust Positive Positive   NICOLA Positive (p! SIJ and motion restriction) Positive (severe motion restriction)     GAIT: Radha ambulates with no assistive device with independently.     GAIT DEVIATIONS: Radha displays decreased stance on L LE, B pelvic drop during stance, decreased reciprocal arm swing, decreased gait speed    Pt/family was provided educational information, including: role of PT, goals for PT, scheduling - pt verbalized understanding. Discussed insurance limitations with pt.     TREATMENT     Time In: 2:00pm  Time Out: 3:00p    PT Evaluation Completed? Yes  Discussed Plan of Care with patient: Yes    Radha received 10 minutes of therapeutic exercise & instruction including:    Supine HS Str c/ strap 2x30"  Hip Add c/ ball 15x  Hip Abd c/ RTB 15x    Radha received 0 minutes of manual therapy including:    Written Home Exercises Provided: Yes - HS Str, Hip Add/Abd  Radha demo good understanding of the education provided. Patient demo good return demo of skill of exercises.    Assessment     Patient presents to Physical Therapy Evaluation with diagnosis of Low Back/SIJ Pain, with signs and symptoms including: increased lumbar/SIJ pain, decreased LE strength, decreased core strength, decreased lumbar ROM, difficulty walking, postural " imbalance, soft tissue dysfunction, postural imbalance, and decreased tolerance to functional activities. Pt with pain isolated primarily to SIJ, with exquisite tenderness to palpation at R SIJ greater tenderness than L. Mild postural malalignment with R iliac crest and SIJ lower than L in standing; symmetrical ASIS in supine, mild excess length of R LE compared to L LE in supine; unable to achieve long-sitting position without assistance. Pt with feeling of malaise and light-headedness during evaluation, blood glucose and blood pressure recorded with appropriate readings recorded. Pt with significant deficits in bilateral hip, knee, and ankle musculature with greatest deficit in hip and knee musculature. Pt with positive NICOLA and Sacral Thrust test for SIJ provocation, with no signs of neural tension or radiating symptoms into distal LE; negative Slump/SLR tests. Pt with isolated SIJ pain with possible malalignment from R posteriorly rotated innominate. Pt with good motivation to perform physical activity and responds well to activity.    Pt prognosis is Good.  Pt will benefit from skilled outpatient physical therapy to address the above stated deficits, provide pt/family education and to maximize pt's level of independence.     Medical necessity is demonstrated by the following IMPAIRMENTS/PROBLEMS:  weakness, impaired endurance, impaired self care skills, impaired functional mobility, gait instability, impaired balance, decreased coordination, decreased lower extremity function, decreased safety awareness, pain, decreased ROM, impaired joint extensibility and impaired muscle length      History  Co-morbidities and personal factors that may impact the plan of care Examination  Body Structures and Functions, activity limitations and participation restrictions that may impact the plan of care Clinical Presentation   Decision Making/ Complexity Score   Co-morbidities:       CHF  CKD  Hiatal  Hernia  T2DM      Personal Factors:    Body Regions: BLE, trunk/core     Body Systems: musculoskeletal (ROM, strength, endurance, flexibility, gait); neuromuscular (balance, posture, motor control, coordination)          Activity limitations:  Standing, walking, reaching, bending, lifting, carrying, squatting      Participation Restrictions: shopping, traveling, recreational activity, heavy household activities, exercise, community interaction, social participation         Stable, uncomplicated   Low Complexity    FOTO Lumbar Survey  Score: 54% Limitation     Pt's spiritual, cultural and educational needs considered and pt agreeable to plan of care and goals as stated below:     Short Term GOALS: 4 weeks. Pt agrees with goals set.  1. Patient demonstrates independence with HEP.   2. Patient demonstrates independence with Postural Awareness.   3.Patient demonstrates increased strength BLE's by 1/3 muscle grade or greater to improve tolerance to functional activities pain free.   4. Patient will report pain of 5/10 at worst, on 0-10 pain scale, with all activity  5. Patient demonstrates ability to walk 250 feet, flat surface, no pain provocation, appropriate gait pattern  6. Patient demonstrates ability to stand for 30 minutes, no pain provocation, to improve tolerance to household chores    Long Term GOALS: 8 weeks. Pt agrees with goals set.  1. Patient demonstrates increased thoracolumbar ROM by 10 degrees or greater to improve tolerance to functional activities pain free.   2. Patient demonstrates increased strength BLE's to 4/5 or greater to improve tolerance to functional activities pain free.   3. Patient demonstrates improved overall function per FOTO Lumbar Survey to 50% Limitation or less.   4. Patient will report pain of 2/10 at worst, on 0-10 pain scale, with all activity  5. Patient demonstrates ability to walk 2 blocks, flat surface, no pain provocation, no LOB  6. Patient demonstrates ability to stand  for 60 minutes, no pain provocation, to improve tolerance to household chores    Functional Limitations Reports - G Codes  Category: Mobility  Tool: FOTO Lumbar Survey  Score: 54% Limitation   Modifier  Impairment Limitation Restriction    CH  0 % impaired, limited or restricted    CI  @ least 1% but less than 20% impaired, limited or restricted    CJ  @ least 20%<40% impaired, limited or restricted    CK  @ least 40%<60% impaired, limited or restricted    CL  @ least 60% <80% impaired, limited or restricted    CM  @ least 80%<100% impaired limited or restricted    CN  100% impaired, limited or restricted     Current/: CK = 40-60% Limitation   Goal/ : CJ = 20-40% Limitation    PLAN     Certification Period: 3/19/18 - 6/19/18    Outpatient physical therapy 1-2 times weekly to include: pt ed, hep, therapeutic exercises, neuromuscular re-education/ balance exercises, manual therapy, joint mobilizations, aquatic therapy and modalities prn. Cont PT for  10-12 weeks. Pt may be seen by PTA as part of the rehabilitation team.     Therapist: Reyes Alvarado, PT  3/19/2018    I have seen the patient, reviewed the therapist's plan of care, and I agree with the plan of care.      I certify the need for these services furnished under this plan of treatment and while under my care.     ___________________ ________ Physician/Referring Practitioner            ___________________________ Date of Signature

## 2018-03-22 ENCOUNTER — CLINICAL SUPPORT (OUTPATIENT)
Dept: REHABILITATION | Facility: HOSPITAL | Age: 78
End: 2018-03-22
Attending: ANESTHESIOLOGY
Payer: MEDICARE

## 2018-03-22 DIAGNOSIS — G89.29 CHRONIC MIDLINE LOW BACK PAIN WITHOUT SCIATICA: ICD-10-CM

## 2018-03-22 DIAGNOSIS — M54.50 CHRONIC MIDLINE LOW BACK PAIN WITHOUT SCIATICA: ICD-10-CM

## 2018-03-22 DIAGNOSIS — M62.81 MUSCLE WEAKNESS: ICD-10-CM

## 2018-03-22 DIAGNOSIS — R26.2 DIFFICULTY WALKING: Primary | ICD-10-CM

## 2018-03-22 PROCEDURE — 97110 THERAPEUTIC EXERCISES: CPT | Mod: PN

## 2018-03-22 NOTE — PROGRESS NOTES
"                                                    Physical Therapy Daily Note     Name: Radha Degroot  Clinic Number: [id number]  Diagnosis:   Encounter Diagnoses   Name Primary?    Difficulty walking Yes    Muscle weakness     Chronic midline low back pain without sciatica      Physician: Ирина Peña MD    Visit #: 2 of 20  RODRIGUEZ: 12/31/18    Time In: 2:45m (15 min late)  Time Out: 3:40pm  Total Treatment Time 1:1: 55 min (30 min 1:1 with PT, 10 min heat)    Subjective     Pt reports: She is feeling more pian than last session. Continues to feel malaise, and "feeling off". Relates to possible medication change.  Pain Scale: Radha rates pain on a scale of 0-10 to be 4 currently.    Objective     Radha received individual therapeutic exercises to develop strength, endurance, ROM, flexibility, posture and core stabilization for 60 minutes including:    Supine HS Str c/ strap 3x30"  Supine Hip Flexor Str c/ strap 3x30"  Hip Add c/ ball 3x10  Hip Abd c/ RTB 3x10  Bridges 3x10    TrA Bracing 3x10  Post Pelvic Tilt 3x10    Radha received the following manual therapy techniques: Joint mobilizations and Soft tissue Mobilization were applied to the: SIJ for 0 minutes including:     Written Home Exercises Provided: Reviewed - HS Str, Hip Add/Abd  Pt demo good understanding of the education provided. Radha demonstrated good return demonstration of activities.     PT/PTA face to face conference performed during this session    Assessment     Patient tolerated treatment session very well. Pt demonstrates adequate hip extensor strength to perform appropriate bridging activity for hip clearance, no provocation of lumbar pain. Pt with easily achieved therapeutic effect during hip flexor stretching in supine, mild discomfort in lumbar. Pt prepared to progress with strength, flexibility, and core training in future sessions.    This is a 77 y.o. female referred to outpatient physical therapy and presents with a medical " diagnosis of SIJ/Low Back and demonstrates limitations as described in the problem list. Pt prognosis is Good. Pt will continue to benefit from skilled outpatient physical therapy to address the deficits listed in the problem list, provide pt/family education and to maximize pt's level of independence in the home and community environment.     Goals as follows:    Short Term GOALS: 4 weeks. Pt agrees with goals set.  1. Patient demonstrates independence with HEP.   2. Patient demonstrates independence with Postural Awareness.   3.Patient demonstrates increased strength BLE's by 1/3 muscle grade or greater to improve tolerance to functional activities pain free.   4. Patient will report pain of 5/10 at worst, on 0-10 pain scale, with all activity  5. Patient demonstrates ability to walk 250 feet, flat surface, no pain provocation, appropriate gait pattern  6. Patient demonstrates ability to stand for 30 minutes, no pain provocation, to improve tolerance to household chores     Plan     Certification Period: 3/19/18 - 6/19/18    Continue with established Plan of Care towards PT goals.    Therapist: Reyes Alvarado, PT  3/22/2018

## 2018-04-02 ENCOUNTER — OFFICE VISIT (OUTPATIENT)
Dept: ENDOCRINOLOGY | Facility: CLINIC | Age: 78
End: 2018-04-02
Payer: MEDICARE

## 2018-04-02 VITALS
HEART RATE: 58 BPM | SYSTOLIC BLOOD PRESSURE: 137 MMHG | BODY MASS INDEX: 32.94 KG/M2 | DIASTOLIC BLOOD PRESSURE: 77 MMHG | HEIGHT: 68 IN | WEIGHT: 217.38 LBS

## 2018-04-02 DIAGNOSIS — N18.4 CKD (CHRONIC KIDNEY DISEASE) STAGE 4, GFR 15-29 ML/MIN: ICD-10-CM

## 2018-04-02 DIAGNOSIS — I10 ESSENTIAL HYPERTENSION: ICD-10-CM

## 2018-04-02 DIAGNOSIS — E11.649 HYPOGLYCEMIA ASSOCIATED WITH DIABETES: ICD-10-CM

## 2018-04-02 DIAGNOSIS — E66.9 NON MORBID OBESITY, UNSPECIFIED OBESITY TYPE: ICD-10-CM

## 2018-04-02 PROCEDURE — 3075F SYST BP GE 130 - 139MM HG: CPT | Mod: CPTII,S$GLB,, | Performed by: NURSE PRACTITIONER

## 2018-04-02 PROCEDURE — 99999 PR PBB SHADOW E&M-EST. PATIENT-LVL IV: CPT | Mod: PBBFAC,,, | Performed by: NURSE PRACTITIONER

## 2018-04-02 PROCEDURE — 99499 UNLISTED E&M SERVICE: CPT | Mod: S$GLB,,, | Performed by: NURSE PRACTITIONER

## 2018-04-02 PROCEDURE — 3078F DIAST BP <80 MM HG: CPT | Mod: CPTII,S$GLB,, | Performed by: NURSE PRACTITIONER

## 2018-04-02 PROCEDURE — 99214 OFFICE O/P EST MOD 30 MIN: CPT | Mod: S$GLB,,, | Performed by: NURSE PRACTITIONER

## 2018-04-02 NOTE — PROGRESS NOTES
"CC: This 78 y.o. White female  is here for evaluation of  T2DM along with comorbidities indicated in the Visit Diagnosis section of this encounter.    HPI: Radha Degroot was diagnosed with T2DM in 1980s.     Nephrology - Dr. Mcmanus      Prior visit on 3/5/18  a1c up from 8.1 to 8.4%. Kidney function also mildly lower as well.   She has appt with diabetes education today.   Plan Needs to take same lantus dose daily even if BG is controlled.   Impossible to titrate insulin doses without BG logs.   Advised -    Continue same insulin doses.   Please log all glucoses and reasons for possible low blood sugars.   Test blood sugar 4x/day.   Return to clinic in a month.     Interval history  Newly diagnosed with atrial fibrillation now on Eliquis and followed by Cardiology.   She is trying to follow diet per RD recommendations.   Hast lost 5 lb since lov a  Month ago and having lows. Suspect some of this is d/t low carb meals like salads.     DIABETES EDUCATION: 3/5/18    PMHX: atrial fibrillation, NSTEMI 2017, CAD, CHF     PRESCRIBED DIABETES MEDICATIONS: Lantus Solostar 48 units hs, Novolog Flexpen 17 units w/ ss (Use on premeal readings: 150-200=+1, 201-250=+2, 251-300=+3; 301-350=+4, over 350=+5 units    Misses medication doses - none     DM COMPLICATIONS: nephropathy, retinopathy and peripheral neuropathy    SELF MONITORING BLOOD GLUCOSE: accuchek dorothy meter. Checks blood glucose at home 4x/day.             HYPOGLYCEMIC EPISODES: symptoms start at bgs in the 80s.      CURRENT DIET: 3 meals/day.  Drinks iced tea with no sugar. No snacks.       CURRENT EXERCISE: none       /77 (BP Location: Right arm, Patient Position: Sitting, BP Method: Large (Automatic))   Pulse (!) 58   Ht 5' 8" (1.727 m)   Wt 98.6 kg (217 lb 6 oz)   BMI 33.05 kg/m²     ROS:   CONSTITUTIONAL: Appetite good, + fatigue  MS: + back pain       PHYSICAL EXAM:  GENERAL: Well developed, well nourished. No acute distress.   PSYCH: AAOx3, " appropriate mood and affect, conversant, well-groomed. Judgement and insight good.   NEURO: Cranial nerves grossly intact. Speech clear, no tremor.   CHEST: Respirations even and unlabored.       Hemoglobin A1C   Date Value Ref Range Status   02/28/2018 8.4 (H) 4.0 - 5.6 % Final     Comment:     According to ADA guidelines, hemoglobin A1c <7.0% represents  optimal control in non-pregnant diabetic patients. Different  metrics may apply to specific patient populations.   Standards of Medical Care in Diabetes-2016.  For the purpose of screening for the presence of diabetes:  <5.7%     Consistent with the absence of diabetes  5.7-6.4%  Consistent with increasing risk for diabetes   (prediabetes)  >or=6.5%  Consistent with diabetes  Currently, no consensus exists for use of hemoglobin A1c  for diagnosis of diabetes for children.  This Hemoglobin A1c assay has significant interference with fetal   hemoglobin   (HbF). The results are invalid for patients with abnormal amounts of   HbF,   including those with known Hereditary Persistence   of Fetal Hemoglobin. Heterozygous hemoglobin variants (HbAS, HbAC,   HbAD, HbAE, HbA2) do not significantly interfere with this assay;   however, presence of multiple variants in a sample may impact the %   interference.     11/03/2017 8.1 (H) 4.0 - 5.6 % Final     Comment:     According to ADA guidelines, hemoglobin A1c <7.0% represents  optimal control in non-pregnant diabetic patients. Different  metrics may apply to specific patient populations.   Standards of Medical Care in Diabetes-2016.  For the purpose of screening for the presence of diabetes:  <5.7%     Consistent with the absence of diabetes  5.7-6.4%  Consistent with increasing risk for diabetes   (prediabetes)  >or=6.5%  Consistent with diabetes  Currently, no consensus exists for use of hemoglobin A1c  for diagnosis of diabetes for children.  This Hemoglobin A1c assay has significant interference with fetal   hemoglobin    (HbF). The results are invalid for patients with abnormal amounts of   HbF,   including those with known Hereditary Persistence   of Fetal Hemoglobin. Heterozygous hemoglobin variants (HbAS, HbAC,   HbAD, HbAE, HbA2) do not significantly interfere with this assay;   however, presence of multiple variants in a sample may impact the %   interference.     05/19/2017 7.9 (H) 4.5 - 6.2 % Final     Comment:     According to ADA guidelines, hemoglobin A1C <7.0% represents  optimal control in non-pregnant diabetic patients.  Different  metrics may apply to specific populations.   Standards of Medical Care in Diabetes - 2016.  For the purpose of screening for the presence of diabetes:  <5.7%     Consistent with the absence of diabetes  5.7-6.4%  Consistent with increasing risk for diabetes   (prediabetes)  >or=6.5%  Consistent with diabetes  Currently no consensus exists for use of hemoglobin A1C  for diagnosis of diabetes for children.             Chemistry        Component Value Date/Time     03/14/2018 1318    K 4.4 03/14/2018 1318     03/14/2018 1318    CO2 27 03/14/2018 1318    BUN 42 (H) 03/14/2018 1318    CREATININE 1.9 (H) 03/14/2018 1318    GLU 99 03/14/2018 1318        Component Value Date/Time    CALCIUM 10.2 03/14/2018 1318    ALKPHOS 75 05/19/2017 1422    AST 13 05/19/2017 1422    ALT 12 05/19/2017 1422    BILITOT 0.4 05/19/2017 1422    ESTGFRAFRICA 28.7 (A) 03/14/2018 1318    EGFRNONAA 24.9 (A) 03/14/2018 1318          Lab Results   Component Value Date    LDLCALC 86.4 02/28/2018       Lab Results   Component Value Date    MICALBCREAT 29.5 02/11/2015             STANDARDS of CARE:        ASA:               Last eye exam:       ASSESSMENT and PLAN:    A1C GOAL: < 8%    1. Uncontrolled type 2 diabetes mellitus with complication, with long-term current use of insulin  Reduce Lantus to 40 units every night.   Reduce Novolog to 15 units before breakfast and lunch. Continue 17 units before dinner.skip  Novolog if eating a no carbohydrate meal.   Drop off log in 2 weeks. Call with any issues especially if blood sugars are less than 80.     rtc in 2 mo with labs prior.       Hemoglobin A1c   2. CKD (chronic kidney disease) stage 4, GFR 15-29 ml/min  Avoid hypoglycemia.      3. Essential hypertension  controlled   4. Non morbid obesity, unspecified obesity type  Continue dietary recommendations per RD.    5. Hypoglycemia associated with diabetes  Likely d/t improve diet and weight loss. Reduce insulin as above.        Orders Placed This Encounter   Procedures    Hemoglobin A1c     Standing Status:   Future     Standing Expiration Date:   6/1/2019        Follow-up in about 2 months (around 6/2/2018).

## 2018-04-02 NOTE — PATIENT INSTRUCTIONS
Reduce Lantus to 40 units every night.   Reduce Novolog to 15 units before breakfast and lunch. Continue 17 units before dinner.skip Novolog if eating a no carbohydrate meal.   Drop off log in 2 weeks. Call with any issues especially if blood sugars are less than 80.

## 2018-04-05 ENCOUNTER — NURSE TRIAGE (OUTPATIENT)
Dept: ADMINISTRATIVE | Facility: CLINIC | Age: 78
End: 2018-04-05

## 2018-04-05 NOTE — TELEPHONE ENCOUNTER
Pt reports the following BGs from yesterday.                 Date  Before Breakfast   Before Lunch   Before Dinner        Bedtime    4/4 134, took 15 units 178, took 15 units, ate a burger 85 (6 hours after lunch); didn't take insulin, BG after meal was 191 and took 17 units 150, took lantus 40 units    4/5  3 am felt lightheaded and suspected BG was low but meter wasn't working.                                       Advised pt to reduce novolog to 12 units ac and continue lantus 40 units. Send log in 1-2 weeks. If bg < 90 premeal, skip Novolog. She voiced understanding.

## 2018-04-05 NOTE — TELEPHONE ENCOUNTER
"Pt calling to state that her glucometer is not working. Last worked before bedtime and cbg was 150. Pt takes insulin 4 times daily. Requesting a new glucometer be called in . Pt states has been having trouble with blood glucose being low for past few days. Denies being symptomatic at this time. States usual symptoms are not present. Advised to drink juice, milk eat 1/2 sandwich to prevent low blood glucose. Pt initially was hesistant dut to not being sure and not wanting to result in high blood glucose.  On call provider  Dr. Vela contacted and order given for an accucheck dorothy. Monitor.  called in to pharmacy of choice.   [phone] [facility]    Reason for Disposition   Caller has URGENT medication or insulin pump question and triager unable to answer question    Answer Assessment - Initial Assessment Questions  1. SYMPTOMS: "What symptoms are you concerned about?"      Has been low for a few days none at present time  2. ONSET:  "When did the symptoms start?"      None present  3. BLOOD GLUCOSE: "What is your blood glucose level?"       Not sure  4. USUAL RANGE: "What is your blood glucose level usually?" (e.g., usual fasting morning value, usual evening value)      Has been low  5. TYPE 1 or 2:  "Do you know what type of diabetes you have?"  (e.g., Type 1, Type 2, Gestational; doesn't know)         6. INSULIN: "Do you take insulin?"       Yes 4 times daily  7. DIABETES PILLS: "Do you take any pills for your diabetes?"      no  8. OTHER SYMPTOMS: "Do you have any symptoms?" (e.g., fever, frequent urination, difficulty breathing, vomiting)      denies  9. LOW BLOOD GLUCOSE TREATMENT: "What have you done so far to treat the low blood glucose level?"      peppermint  10. ALONE: "Are you alone right now or is someone with you?"         Alone but can contact ariela  11. PREGNANCY: "Is there any chance you are pregnant?" "When was your last menstrual period?"        no    Protocols used: ST DIABETES - LOW BLOOD " SUGAR-A-AH

## 2018-04-09 ENCOUNTER — CLINICAL SUPPORT (OUTPATIENT)
Dept: CARDIOLOGY | Facility: CLINIC | Age: 78
End: 2018-04-09
Attending: INTERNAL MEDICINE
Payer: MEDICARE

## 2018-04-09 DIAGNOSIS — I48.91 ATRIAL FIBRILLATION, UNSPECIFIED TYPE: ICD-10-CM

## 2018-04-09 DIAGNOSIS — I48.91 ATRIAL FIBRILLATION, UNSPECIFIED TYPE: Primary | ICD-10-CM

## 2018-04-09 LAB
DIASTOLIC DYSFUNCTION: NO
ESTIMATED PA SYSTOLIC PRESSURE: 37.81
RETIRED EF AND QEF - SEE NOTES: 55 (ref 55–65)
TRICUSPID VALVE REGURGITATION: NORMAL

## 2018-04-09 PROCEDURE — 93224 XTRNL ECG REC UP TO 48 HRS: CPT | Mod: S$GLB,,, | Performed by: INTERNAL MEDICINE

## 2018-04-09 PROCEDURE — 93306 TTE W/DOPPLER COMPLETE: CPT | Mod: S$GLB,,, | Performed by: INTERNAL MEDICINE

## 2018-04-10 ENCOUNTER — PATIENT MESSAGE (OUTPATIENT)
Dept: CARDIOLOGY | Facility: CLINIC | Age: 78
End: 2018-04-10

## 2018-04-23 ENCOUNTER — TELEPHONE (OUTPATIENT)
Dept: ELECTROPHYSIOLOGY | Facility: CLINIC | Age: 78
End: 2018-04-23

## 2018-04-24 ENCOUNTER — TELEPHONE (OUTPATIENT)
Dept: INTERNAL MEDICINE | Facility: CLINIC | Age: 78
End: 2018-04-24

## 2018-04-24 NOTE — TELEPHONE ENCOUNTER
----- Message from Lisa Ibrahim sent at 4/20/2018  8:04 AM CDT -----  Contact: self/[phone]  .1 Patient would like to get medical advice.  Symptoms (please be specific): constipation  (medication side effect)  How long has patient had these symptoms: few days  [facility] [address] [place], [place] - [address] [phone] (Phone)  [phone] (Fax)  Any drug allergies:Heparin, Keflex.  Comments: Patient would like to get medical advice.

## 2018-04-25 ENCOUNTER — TELEPHONE (OUTPATIENT)
Dept: ELECTROPHYSIOLOGY | Facility: CLINIC | Age: 78
End: 2018-04-25

## 2018-04-25 ENCOUNTER — INITIAL CONSULT (OUTPATIENT)
Dept: ELECTROPHYSIOLOGY | Facility: CLINIC | Age: 78
End: 2018-04-25
Payer: MEDICARE

## 2018-04-25 ENCOUNTER — HOSPITAL ENCOUNTER (OUTPATIENT)
Dept: CARDIOLOGY | Facility: CLINIC | Age: 78
Discharge: HOME | End: 2018-04-25
Payer: MEDICARE

## 2018-04-25 VITALS
WEIGHT: 211 LBS | HEIGHT: 68 IN | DIASTOLIC BLOOD PRESSURE: 78 MMHG | HEART RATE: 76 BPM | BODY MASS INDEX: 31.98 KG/M2 | SYSTOLIC BLOOD PRESSURE: 136 MMHG

## 2018-04-25 DIAGNOSIS — I10 ESSENTIAL HYPERTENSION: Chronic | ICD-10-CM

## 2018-04-25 DIAGNOSIS — N18.4 TYPE 2 DIABETES MELLITUS WITH STAGE 4 CHRONIC KIDNEY DISEASE, WITH LONG-TERM CURRENT USE OF INSULIN: ICD-10-CM

## 2018-04-25 DIAGNOSIS — E11.22 TYPE 2 DIABETES MELLITUS WITH STAGE 4 CHRONIC KIDNEY DISEASE, WITH LONG-TERM CURRENT USE OF INSULIN: ICD-10-CM

## 2018-04-25 DIAGNOSIS — Z79.4 TYPE 2 DIABETES MELLITUS WITH STAGE 4 CHRONIC KIDNEY DISEASE, WITH LONG-TERM CURRENT USE OF INSULIN: ICD-10-CM

## 2018-04-25 DIAGNOSIS — I50.32 CHRONIC DIASTOLIC CHF (CONGESTIVE HEART FAILURE): ICD-10-CM

## 2018-04-25 DIAGNOSIS — Z79.4 TYPE 2 DIABETES MELLITUS WITH DIABETIC POLYNEUROPATHY, WITH LONG-TERM CURRENT USE OF INSULIN: ICD-10-CM

## 2018-04-25 DIAGNOSIS — I48.91 ATRIAL FIBRILLATION, UNSPECIFIED TYPE: ICD-10-CM

## 2018-04-25 DIAGNOSIS — N18.4 CHRONIC KIDNEY DISEASE, STAGE IV (SEVERE): Chronic | ICD-10-CM

## 2018-04-25 DIAGNOSIS — G47.33 OBSTRUCTIVE SLEEP APNEA: ICD-10-CM

## 2018-04-25 DIAGNOSIS — I48.19 PERSISTENT ATRIAL FIBRILLATION: Primary | ICD-10-CM

## 2018-04-25 DIAGNOSIS — E11.42 TYPE 2 DIABETES MELLITUS WITH DIABETIC POLYNEUROPATHY, WITH LONG-TERM CURRENT USE OF INSULIN: ICD-10-CM

## 2018-04-25 DIAGNOSIS — I25.10 CORONARY ARTERY DISEASE INVOLVING NATIVE CORONARY ARTERY OF NATIVE HEART WITHOUT ANGINA PECTORIS: Chronic | ICD-10-CM

## 2018-04-25 PROCEDURE — 3075F SYST BP GE 130 - 139MM HG: CPT | Mod: CPTII,S$GLB,, | Performed by: INTERNAL MEDICINE

## 2018-04-25 PROCEDURE — 99999 PR PBB SHADOW E&M-EST. PATIENT-LVL III: CPT | Mod: PBBFAC,,, | Performed by: INTERNAL MEDICINE

## 2018-04-25 PROCEDURE — 3078F DIAST BP <80 MM HG: CPT | Mod: CPTII,S$GLB,, | Performed by: INTERNAL MEDICINE

## 2018-04-25 PROCEDURE — 99204 OFFICE O/P NEW MOD 45 MIN: CPT | Mod: S$GLB,,, | Performed by: INTERNAL MEDICINE

## 2018-04-25 PROCEDURE — 93000 ELECTROCARDIOGRAM COMPLETE: CPT | Mod: S$GLB,,, | Performed by: INTERNAL MEDICINE

## 2018-04-25 PROCEDURE — 99499 UNLISTED E&M SERVICE: CPT | Mod: S$GLB,,, | Performed by: INTERNAL MEDICINE

## 2018-04-25 NOTE — LETTER
April 25, 2018      Salvatore Silveira MD  [address]  [place] [address]           [facility]hythm[facility]  [address]  [place] [address]  Phone: [phone]  Fax: [phone]          Patient: Radha Degroot   MR Number: [id number]   YOB: 1940   Date of Visit: 4/25/2018       Dear Dr. Salvatore Silveira:    Thank you for referring Radha Degroot to me for evaluation. Attached you will find relevant portions of my assessment and plan of care.    If you have questions, please do not hesitate to call me. I look forward to following Radha Degroot along with you.    Sincerely,    Mike Alexander MD    Enclosure  CC:  No Recipients    If you would like to receive this communication electronically, please contact [email or web address] or [phone] to request more information on [employer] access.    For providers and/or their staff who would like to refer a patient to [facility], please contact us through our one-stop-shop provider referral line, [facility], at [phone].    If you feel you have received this communication in error or would no longer like to receive these types of communications, please e-mail [email or web address]

## 2018-04-25 NOTE — PATIENT INSTRUCTIONS
Understanding Atrial Fibrillation    An arrhythmia is any problem with the speed or pattern of the heartbeat. Atrial fibrillation (AFib) is a common type of arrhythmia. It causes fast, chaotic electrical signals in the atria. This leads to poor functioning of the heart. It also affects how much blood your heart can pump out to the body.  Afib may occur once in a while and go away on its own. Or it may continue for longer periods and need treatment.  AFib can lead to serious problems, such as stroke. Your healthcare provider will need to monitor and manage it.  What happens during atrial fibrillation?   The heart has an electrical system that sends signals to control the heartbeat. As signals move through the heart, they tell the hearts upper chambers (atria) and lower chambers (ventricles) when to squeeze (contract) and relax. This lets blood move through the heart and out to the body and lungs.  With AFib, the atria receive abnormal signals. This causes them to contract in a fast and irregular way, and out of sync with the ventricles. When this happens, the atria also have a harder time moving blood into the ventricles. Blood may then pool in the atria, which increases the risk for blood clots and stroke. The ventricles also may contract too quickly and irregularly. As a result, they may not pump blood to the body and lungs as well as they should. This can weaken the heart muscle over time and cause heart failure.  What causes atrial fibrillation?  AFib is more common in older adults. It has many possible causes including:  · Coronary artery disease  · Heart valve disease  · Heart attack  · Heart surgery  · High blood pressure  · Thyroid disease  · Diabetes  · Lung disease  · Sleep apnea  · Heavy alcohol use  In some cases of AFib, doctors do not know the cause.  What are the symptoms of atrial fibrillation?  AFib may or may not cause symptoms. If symptoms do occur, they may include:  · A fast, pounding,  irregular heartbeat  · Shortness of breath  · Tiredness  · Dizziness or fainting  · Chest pain  How is atrial fibrillation treated?  Treatments for AFib can include any of the options below.  · Medicines. You may be prescribed:  ¨ Heart rate medicines to help slow down the heartbeat  ¨ Heart rhythm medicines to help the heart beat more regularly  ¨ Anti-clotting medicines to help reduce the risk for blood clots and stroke  · Electrical cardioversion. Your healthcare provider uses special pads or paddles to send one or more brief electrical shocks to the heart. This can help reset the heartbeat to normal.  · Ablation. Long, thin tubes called catheters are threaded through a blood vessel to the heart. There, the catheters send out hot or cold energy to the areas causing the abnormal signals. This energy destroys the problem tissue or cells. This improves the chances that your heart will stay in normal rhythm without using medicines. If your heart rate and rhythm cant be controlled, you may need ablation and a pacemaker. These will help control the heart rate and regularity of the heartbeat.  · Surgery. During surgery, your healthcare provider may use different methods to create scar tissue in the areas of the heart causing the abnormal signals. The scar tissue disrupts the abnormal signals and may stop AFib from occurring.  What are the complications of atrial fibrillation?  These can include:  · Blood clots  · Stroke  · Heart failure. This problem occurs when the heart muscle weakens so much that it can no longer pump blood well.  When should I call my healthcare provider?  Call your healthcare provider right away if you have any of these:  · Symptoms that dont get better with treatment, or get worse  · New symptoms   Date Last Reviewed: 5/1/2016  © 2065-5619 [employer]. [address] [place] [address], [place] [address]. All rights reserved. This information is not intended as a substitute for professional  medical care. Always follow your healthcare professional's instructions.        Cardioversion  Cardioversion is a procedure to restore your hearts normal rhythm from a fast or irregular rhythm (arrhythmia) in the top or bottom chambers of your heart. You may have the procedure in a hospital or surgery center. Its often done on an outpatient (same day) basis. During the procedure, your doctor will give you medication to minimize discomfort. Then the doctor gives you a brief electric shock. This helps your heartbeat become normal again. In most cases, you can go home within hours of the procedure.     During cardioversion, you are fully sedated and wont feel a thing.     Before Your Procedure  · Tell your doctor what over-the-counter and prescription medications, herbs, and supplements you are taking.  · Take medication as directed. Your doctor may prescribe anticoagulants (blood thinners), depending on your situation. They help prevent blood clots from forming. Your doctor may order an ultrasound called transesophageal echocardiography. This will help your doctor figure out if you need a blood thinner.   · Ask your doctor about the risks and benefits of cardioversion.  · Sign your consent form.  · Dont eat or drink anything for 8 to 12 hours before your procedure.  · Follow any other instructions your doctor gives you.   · Arrange for an adult to drive you home after the procedure.  During Your Procedure  · Your health care provider will place small pads (electrodes) on your chest to record your heartbeat at all times.  · Your health care provider will place an intravenous (IV) line in your arm. This gives you medication (sedation) that keeps you free of pain. Youll feel sleepy.  · Your health care provider will give you oxygen through a soft, plastic tube in your nose.  · Pads will be placed on your chest and back. Your health care provider will give you a very brief electric shock through the pads. Remember,  because of sedation, you wont feel the shock.  · Your doctor will watch your heartbeat to make sure your normal rhythm has been restored.   After Your Procedure  · Your health care provider will monitor you until you are fully awake. Then youll be able to sit up, walk, and eat.  · In most cases, youll be able to go home after the sedation wears off. This usually takes a few hours.  · For a few days, the skin on your chest may feel a little sore, like a mild sunburn.  · Dont drive or operate heavy machinery for 24 hours after the procedure.  · The day after your procedure, try to take it easy. Take medication as directed.  · Call your doctor if you notice skipped beats, a rapid heartbeat, or chest tightness. These may be signs that an irregular heartbeat has returned.  Date Last Reviewed: 2/26/2014  © 5325-2859 [employer] [address] [place] [address], [place] [address]. Todos los derechos reservados. Esta información no pretende sustituir la atención médica profesional. Sólo san médico puede diagnosticar y tratar un problema de leandro.

## 2018-04-27 ENCOUNTER — PATIENT MESSAGE (OUTPATIENT)
Dept: ELECTROPHYSIOLOGY | Facility: CLINIC | Age: 78
End: 2018-04-27

## 2018-04-27 DIAGNOSIS — I48.19 PERSISTENT ATRIAL FIBRILLATION: ICD-10-CM

## 2018-04-27 DIAGNOSIS — I48.19 PERSISTENT ATRIAL FIBRILLATION: Primary | ICD-10-CM

## 2018-04-27 RX ORDER — SODIUM CHLORIDE 9 MG/ML
20 INJECTION, SOLUTION INTRAVENOUS CONTINUOUS
Status: CANCELLED | OUTPATIENT
Start: 2018-04-27

## 2018-04-27 NOTE — PROGRESS NOTES
CARDIOVERSION EDUCATION CHECK LIST      5/2/18 @ 11 AM REPORT TO CARDIOLOGY WAITING ROOM ON 3RD FLOOR OF HOSPITAL     DO NOT EAT OR DRINK ANYTHING AFTER: 12 MN ON THE NIGHT BEFORE YOUR PROCEDURE    MEDICATIONS:  ONLY TAKE 1/2 DOSE OF LANTUS THE NIGHT BEFORE PROCEDURE.  HOLD ALL INSULIN (Lantus & Novolog) MORNING OF PROCEDURE.  YOU MAY TAKE YOUR USUAL MORNING MEDICATIONS WITH A SIP OF WATER    DIRECTIONS TO THE CARDIOLOGY WAITING ROOM  If you park in the Parking Garage:  Take elevators to the 2nd floor  Walk up ramp and turn right by Gold Elevators  Take elevator to the 3rd floor  Upon exiting the elevator, turn away from the clinic areas  Walk long davenport around to front of hospital to area with windows overlooking [facility]  Check in at Reception Desk  OR  If family is dropping you off:  Have them drop you off at the front of the Hospital  (Near the ER, where all the flags are hung).  Take the E elevators to the 3rd floor.  Check in at the Reception Desk in the waiting room.      YOU WILL BE GOING HOME THE SAME DAY AS YOUR PROCEDURE  YOU WILL NEED SOMEONE TO DRIVE YOU HOME AFTER YOUR PROCEDURE     YOUR PAIN DURING YOUR PROCEDURE WILL BE MANAGED BY THE ANESTHESIA TEAM    Any need to reschedule or cancel procedures, or any questions regarding your procedure should be addressed directly with the Arrhythmia Department Nurses at the following phone number: [phone]    THE ABOVE INSTRUCTIONS WERE GIVEN TO THE PATIENT VERBALLY AND THEY VERBALIZED UNDERSTANDING. THEY DO NOT REQUIRE ANY SPECIAL NEEDS AND DO NOT HAVE ANY LEARNING BARRIERS.

## 2018-04-30 ENCOUNTER — TELEPHONE (OUTPATIENT)
Dept: ELECTROPHYSIOLOGY | Facility: CLINIC | Age: 78
End: 2018-04-30

## 2018-04-30 NOTE — TELEPHONE ENCOUNTER
Spoke with patient. Her ex-  and she is very upset, she cannot even think about rescheduling right now. I will call her on Friday to discuss rescheduling.

## 2018-04-30 NOTE — TELEPHONE ENCOUNTER
----- Message from Carmen Avendano sent at 4/30/2018 10:56 AM CDT -----  Contact: Patient  The Pt needs to reschedule her procedure because she had a death in the family. Please call her back @ [phone]. Thanks, Carmen

## 2018-04-30 NOTE — TELEPHONE ENCOUNTER
Left message for patient to return call to review pre-op instructions for SHAYY/DCCV on 5/2. Reminded to take 1/2 dose of insulin tomorrow evening and hold it on am of 5/2. Also advised to not miss any doses of Eliquis. She needs to fast after 12mn tomorrow night and be here for 11am on 5/2. Call back # left for her to call and confirm.

## 2018-05-09 ENCOUNTER — PATIENT MESSAGE (OUTPATIENT)
Dept: ELECTROPHYSIOLOGY | Facility: CLINIC | Age: 78
End: 2018-05-09

## 2018-05-09 ENCOUNTER — TELEPHONE (OUTPATIENT)
Dept: ELECTROPHYSIOLOGY | Facility: CLINIC | Age: 78
End: 2018-05-09

## 2018-05-09 DIAGNOSIS — I48.19 PERSISTENT ATRIAL FIBRILLATION: Primary | ICD-10-CM

## 2018-05-09 NOTE — TELEPHONE ENCOUNTER
CARDIOVERSION EDUCATION CHECK LIST   5/15/2018 - Labwork at 9am ([facility])  You do not need to fast     5/18/18 @ 9 AM REPORT TO CARDIOLOGY WAITING ROOM ON 3RD FLOOR OF HOSPITAL      DO NOT EAT OR DRINK ANYTHING AFTER: 12 MN ON THE NIGHT BEFORE YOUR PROCEDURE     MEDICATIONS:  ONLY TAKE 1/2 DOSE OF LANTUS THE NIGHT BEFORE PROCEDURE.  HOLD ALL INSULIN (Lantus & Novolog) MORNING OF PROCEDURE.  YOU MAY TAKE YOUR USUAL MORNING MEDICATIONS WITH A SIP OF WATER     DIRECTIONS TO THE CARDIOLOGY WAITING ROOM  If you park in the Parking Garage:  Take elevators to the 2nd floor  Walk up ramp and turn right by Gold Elevators  Take elevator to the 3rd floor  Upon exiting the elevator, turn away from the clinic areas  Walk long davenport around to front of hospital to area with windows overlooking [facility]  Check in at Reception Desk  OR  If family is dropping you off:  Have them drop you off at the front of the Hospital  (Near the ER, where all the flags are hung).  Take the E elevators to the 3rd floor.  Check in at the Reception Desk in the waiting room.        YOU WILL BE GOING HOME THE SAME DAY AS YOUR PROCEDURE  YOU WILL NEED SOMEONE TO DRIVE YOU HOME AFTER YOUR PROCEDURE      YOUR PAIN DURING YOUR PROCEDURE WILL BE MANAGED BY THE ANESTHESIA TEAM     Any need to reschedule or cancel procedures, or any questions regarding your procedure should be addressed directly with the Arrhythmia Department Nurses at the following phone number: [phone]     THE ABOVE INSTRUCTIONS WERE GIVEN TO THE PATIENT VERBALLY AND THEY VERBALIZED UNDERSTANDING. THEY DO NOT REQUIRE ANY SPECIAL NEEDS AND DO NOT HAVE ANY LEARNING BARRIERS.

## 2018-05-14 ENCOUNTER — TELEPHONE (OUTPATIENT)
Dept: ELECTROPHYSIOLOGY | Facility: CLINIC | Age: 78
End: 2018-05-14

## 2018-05-14 NOTE — TELEPHONE ENCOUNTER
----- Message from Christianne Louise sent at 5/14/2018 10:02 AM CDT -----  Contact: patient  Please call pt at [phone]. Questions regarding future Cardioversion scheduled with Dr Alexander on 05/18/18    Thank you

## 2018-05-14 NOTE — TELEPHONE ENCOUNTER
Spoke with patient and reviewed all pre-op instructions for SHAYY/DCCV on 5/18/18.She verbalizes understanding of all instructions and voices no other questions or concerns.

## 2018-05-15 ENCOUNTER — LAB VISIT (OUTPATIENT)
Dept: LAB | Facility: HOSPITAL | Age: 78
End: 2018-05-15
Attending: INTERNAL MEDICINE
Payer: MEDICARE

## 2018-05-15 DIAGNOSIS — I48.19 PERSISTENT ATRIAL FIBRILLATION: ICD-10-CM

## 2018-05-15 DIAGNOSIS — R80.1 PERSISTENT PROTEINURIA: ICD-10-CM

## 2018-05-15 DIAGNOSIS — N18.4 CKD (CHRONIC KIDNEY DISEASE), STAGE IV: ICD-10-CM

## 2018-05-15 LAB
ALBUMIN SERPL BCP-MCNC: 3.7 G/DL
ANION GAP SERPL CALC-SCNC: 11 MMOL/L
ANION GAP SERPL CALC-SCNC: 12 MMOL/L
APTT BLDCRRT: 26.4 SEC
BASOPHILS # BLD AUTO: 0.09 K/UL
BASOPHILS NFR BLD: 1 %
BILIRUB UR QL STRIP: NEGATIVE
BUN SERPL-MCNC: 33 MG/DL
CALCIUM SERPL-MCNC: 9.2 MG/DL
CALCIUM SERPL-MCNC: 9.4 MG/DL
CHLORIDE SERPL-SCNC: 105 MMOL/L
CHLORIDE SERPL-SCNC: 106 MMOL/L
CLARITY UR REFRACT.AUTO: CLEAR
CO2 SERPL-SCNC: 23 MMOL/L
COLOR UR AUTO: YELLOW
CREAT SERPL-MCNC: 1.8 MG/DL
CREAT UR-MCNC: 47 MG/DL
DIFFERENTIAL METHOD: ABNORMAL
EOSINOPHIL # BLD AUTO: 0.2 K/UL
EOSINOPHIL NFR BLD: 2.6 %
ERYTHROCYTE [DISTWIDTH] IN BLOOD BY AUTOMATED COUNT: 16 %
EST. GFR  (AFRICAN AMERICAN): 30.4 ML/MIN/1.73 M^2
EST. GFR  (NON AFRICAN AMERICAN): 26.4 ML/MIN/1.73 M^2
GLUCOSE SERPL-MCNC: 128 MG/DL
GLUCOSE UR QL STRIP: NEGATIVE
HCT VFR BLD AUTO: 41 %
HGB BLD-MCNC: 13.4 G/DL
HGB UR QL STRIP: NEGATIVE
IMM GRANULOCYTES # BLD AUTO: 0.03 K/UL
IMM GRANULOCYTES NFR BLD AUTO: 0.3 %
INR PPP: 1.1
KETONES UR QL STRIP: NEGATIVE
LEUKOCYTE ESTERASE UR QL STRIP: NEGATIVE
LYMPHOCYTES # BLD AUTO: 2 K/UL
LYMPHOCYTES NFR BLD: 22.4 %
MCH RBC QN AUTO: 28.5 PG
MCHC RBC AUTO-ENTMCNC: 32.7 G/DL
MCV RBC AUTO: 87 FL
MONOCYTES # BLD AUTO: 0.7 K/UL
MONOCYTES NFR BLD: 8.2 %
NEUTROPHILS # BLD AUTO: 5.8 K/UL
NEUTROPHILS NFR BLD: 65.5 %
NITRITE UR QL STRIP: NEGATIVE
NRBC BLD-RTO: 0 /100 WBC
PH UR STRIP: 5 [PH] (ref 5–8)
PHOSPHATE SERPL-MCNC: 3.3 MG/DL
PLATELET # BLD AUTO: 189 K/UL
PMV BLD AUTO: 13.3 FL
POTASSIUM SERPL-SCNC: 4.2 MMOL/L
PROT UR QL STRIP: NEGATIVE
PROT UR-MCNC: <7 MG/DL
PROT/CREAT RATIO, UR: NORMAL
PROTHROMBIN TIME: 11.5 SEC
PTH-INTACT SERPL-MCNC: 151 PG/ML
RBC # BLD AUTO: 4.71 M/UL
SODIUM SERPL-SCNC: 140 MMOL/L
SP GR UR STRIP: 1.01 (ref 1–1.03)
URATE SERPL-MCNC: 8.7 MG/DL
URN SPEC COLLECT METH UR: NORMAL
UROBILINOGEN UR STRIP-ACNC: NEGATIVE EU/DL
WBC # BLD AUTO: 8.92 K/UL

## 2018-05-15 PROCEDURE — 83970 ASSAY OF PARATHORMONE: CPT

## 2018-05-15 PROCEDURE — 80048 BASIC METABOLIC PNL TOTAL CA: CPT

## 2018-05-15 PROCEDURE — 87086 URINE CULTURE/COLONY COUNT: CPT

## 2018-05-15 PROCEDURE — 85610 PROTHROMBIN TIME: CPT

## 2018-05-15 PROCEDURE — 81003 URINALYSIS AUTO W/O SCOPE: CPT

## 2018-05-15 PROCEDURE — 80069 RENAL FUNCTION PANEL: CPT

## 2018-05-15 PROCEDURE — 85025 COMPLETE CBC W/AUTO DIFF WBC: CPT

## 2018-05-15 PROCEDURE — 85730 THROMBOPLASTIN TIME PARTIAL: CPT

## 2018-05-15 PROCEDURE — 82570 ASSAY OF URINE CREATININE: CPT

## 2018-05-15 PROCEDURE — 84550 ASSAY OF BLOOD/URIC ACID: CPT

## 2018-05-16 ENCOUNTER — OFFICE VISIT (OUTPATIENT)
Dept: INTERNAL MEDICINE | Facility: CLINIC | Age: 78
End: 2018-05-16
Payer: MEDICARE

## 2018-05-16 ENCOUNTER — TELEPHONE (OUTPATIENT)
Dept: CARDIOLOGY | Facility: CLINIC | Age: 78
End: 2018-05-16

## 2018-05-16 ENCOUNTER — OFFICE VISIT (OUTPATIENT)
Dept: NEPHROLOGY | Facility: CLINIC | Age: 78
End: 2018-05-16
Payer: MEDICARE

## 2018-05-16 VITALS
OXYGEN SATURATION: 98 % | SYSTOLIC BLOOD PRESSURE: 132 MMHG | WEIGHT: 215.19 LBS | HEART RATE: 83 BPM | HEIGHT: 68 IN | BODY MASS INDEX: 32.61 KG/M2 | DIASTOLIC BLOOD PRESSURE: 72 MMHG

## 2018-05-16 VITALS
WEIGHT: 217.38 LBS | DIASTOLIC BLOOD PRESSURE: 62 MMHG | HEART RATE: 86 BPM | BODY MASS INDEX: 32.94 KG/M2 | HEIGHT: 68 IN | SYSTOLIC BLOOD PRESSURE: 120 MMHG

## 2018-05-16 DIAGNOSIS — D63.8 ANEMIA OF CHRONIC ILLNESS: ICD-10-CM

## 2018-05-16 DIAGNOSIS — I10 ESSENTIAL HYPERTENSION: ICD-10-CM

## 2018-05-16 DIAGNOSIS — M47.816 LUMBAR SPONDYLOSIS: ICD-10-CM

## 2018-05-16 DIAGNOSIS — Z79.4 TYPE 2 DIABETES MELLITUS WITH HYPOGLYCEMIA WITHOUT COMA, WITH LONG-TERM CURRENT USE OF INSULIN: ICD-10-CM

## 2018-05-16 DIAGNOSIS — I10 ESSENTIAL HYPERTENSION: Chronic | ICD-10-CM

## 2018-05-16 DIAGNOSIS — E11.649 TYPE 2 DIABETES MELLITUS WITH HYPOGLYCEMIA WITHOUT COMA, WITH LONG-TERM CURRENT USE OF INSULIN: ICD-10-CM

## 2018-05-16 DIAGNOSIS — E78.2 MIXED HYPERLIPIDEMIA: Chronic | ICD-10-CM

## 2018-05-16 DIAGNOSIS — N25.81 SECONDARY RENAL HYPERPARATHYROIDISM: ICD-10-CM

## 2018-05-16 DIAGNOSIS — N18.4 CHRONIC KIDNEY DISEASE, STAGE IV (SEVERE): Chronic | ICD-10-CM

## 2018-05-16 DIAGNOSIS — N18.4 CKD (CHRONIC KIDNEY DISEASE), STAGE IV: Primary | ICD-10-CM

## 2018-05-16 DIAGNOSIS — E55.9 VITAMIN D DEFICIENCY DISEASE: ICD-10-CM

## 2018-05-16 DIAGNOSIS — I25.10 CORONARY ARTERY DISEASE INVOLVING NATIVE CORONARY ARTERY OF NATIVE HEART WITHOUT ANGINA PECTORIS: Primary | Chronic | ICD-10-CM

## 2018-05-16 DIAGNOSIS — N28.1 ACQUIRED CYST OF KIDNEY: ICD-10-CM

## 2018-05-16 DIAGNOSIS — I48.91 ATRIAL FIBRILLATION, UNSPECIFIED TYPE: ICD-10-CM

## 2018-05-16 PROCEDURE — 3078F DIAST BP <80 MM HG: CPT | Mod: CPTII,S$GLB,, | Performed by: INTERNAL MEDICINE

## 2018-05-16 PROCEDURE — 99214 OFFICE O/P EST MOD 30 MIN: CPT | Mod: S$GLB,,, | Performed by: INTERNAL MEDICINE

## 2018-05-16 PROCEDURE — 99999 PR PBB SHADOW E&M-EST. PATIENT-LVL III: CPT | Mod: PBBFAC,,, | Performed by: INTERNAL MEDICINE

## 2018-05-16 PROCEDURE — 99999 PR PBB SHADOW E&M-EST. PATIENT-LVL IV: CPT | Mod: PBBFAC,,, | Performed by: INTERNAL MEDICINE

## 2018-05-16 PROCEDURE — 3074F SYST BP LT 130 MM HG: CPT | Mod: CPTII,S$GLB,, | Performed by: INTERNAL MEDICINE

## 2018-05-16 PROCEDURE — 99499 UNLISTED E&M SERVICE: CPT | Mod: S$GLB,,, | Performed by: INTERNAL MEDICINE

## 2018-05-16 PROCEDURE — 3075F SYST BP GE 130 - 139MM HG: CPT | Mod: CPTII,S$GLB,, | Performed by: INTERNAL MEDICINE

## 2018-05-16 RX ORDER — APIXABAN 2.5 MG/1
TABLET, FILM COATED ORAL
Refills: 11 | COMMUNITY
Start: 2018-05-11 | End: 2018-05-16 | Stop reason: ALTCHOICE

## 2018-05-16 NOTE — PROGRESS NOTES
Subjective:       Patient ID: Radha Degroot is a 78 y.o. White female who presents for follow-up evaluation of Chronic Kidney Disease    HPI     Ms. Degroot was seen in nephrology clinic for follow up on CKD. She was previously being followed by Dr. Mcgill and was last seen by him on 3/9/16. I saw her on 8/24/16, she was to follow with me in 3 months, for unclear reason she had next appointment on 6/28/17 when she was noted to have worsening of renal function and DANDY on CKD. She has CKD IV, diabetes with complications like neuropathy, diabetic retinopathy, hypertension, CAD, DELFINA, diastolic heart failure and several other medical problems. In the past she was taken off aldactone due to hyperkalemia.     She was last followed on 1/24/18.    She reports she was recently started on Eliquis per her cardiologist. She reports she is scheduled for cardioversion later this week. She reports her ex- recently passed away after fighting cancer for a long time. She is coping up with this loss. 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 She reports most recently she was placed on renal diet and she feels it's extremely stressful and limiting her choice of foods. She has normal K and normal phos. I gave her a list of both low and high phos foods. She can slightly liberalize her phos intake but stressed she has to refer to this list and avoid large portion size of very high phos containing foods. Also stressed she still needs to have labs done every 4 weeks to monitor her K, creatinine, phos very closely. She is in agreement with this plan. She did not bring any home BP readings. Currently she is on coreg alone for hypertension management.     She still has not returned to see her urologist for follow up on right kidney mass, she has not done US also which was ordered by her urologist last  year. She was reminded to do US and keep follow up with urology.    Pt denies any recent nausea/ vomiting/ diarrhea/ flank pain. She denies any dysuria/ decreased urine/ cloudy urine. Her diabetes control continues to remain sub optimal. She has longstanding and historically poorly controlled diabetes, at times A1C has remained above 10, she has longstanding proteinuria. Most recent A1C was 8.4.    At the time of her visit on 6/28/17 she was noted to have worsening renal function and hypotension which she confirmed was happening on her home BP readings also and as a result she was feeling tired and dizzy. She was taken off amlodipine and lisinopril at that time. She was advised to follow BP at home while continuing Coreg and lasix. Her follow up labs on 7/13/17 noted for improvement in her renal function including creatinine and BUN.     She was advised to do renal labs periodically but she remained a no show/ lab cancellation by patient and especially did not have any labs between 10/17 and 1/18, this was despite standing orders. This was brought to her attention.     Prior lab trend noted for onset of CKD since around 2006 and episodes of DANDY, and progression to CKD IV. US kidneys per PCP from 9/16 noted for Kidney size of 11 and 10.7 cm. Per report 1.6 cm isoechoic focus within the superior pole of the right kidney corresponding to abnormality seen on recent MRI which does not have the characteristics of a simple cyst.  Recommend further evaluation with CT of the abdomen with and without contrast renal mass protocol. 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 It did not show any mass/ stone/ obstruction.     Most recent labs noted  Renal Function:  Lab Results   Component Value Date     (H) 05/15/2018     (H) 05/15/2018     (H) 05/15/2018     (H) 05/15/2018     05/15/2018     05/15/2018     05/15/2018      05/15/2018    K 4.2 05/15/2018    K 4.2 05/15/2018    K 4.2 05/15/2018    K 4.2 05/15/2018     05/15/2018     05/15/2018     05/15/2018     05/15/2018    CO2 23 05/15/2018    CO2 23 05/15/2018    CO2 23 05/15/2018    CO2 23 05/15/2018    BUN 33 (H) 05/15/2018    BUN 33 (H) 05/15/2018    BUN 33 (H) 05/15/2018    BUN 33 (H) 05/15/2018    CALCIUM 9.2 05/15/2018    CALCIUM 9.4 05/15/2018    CALCIUM 9.4 05/15/2018    CALCIUM 9.4 05/15/2018    CREATININE 1.8 (H) 05/15/2018    CREATININE 1.8 (H) 05/15/2018    CREATININE 1.8 (H) 05/15/2018    CREATININE 1.8 (H) 05/15/2018    ALBUMIN 3.7 05/15/2018    ALBUMIN 3.7 05/15/2018    ALBUMIN 3.7 05/15/2018    PHOS 3.3 05/15/2018    PHOS 3.3 05/15/2018    PHOS 3.3 05/15/2018    ESTGFRAFRICA 30.4 (A) 05/15/2018    ESTGFRAFRICA 30.4 (A) 05/15/2018    ESTGFRAFRICA 30.4 (A) 05/15/2018    ESTGFRAFRICA 30.4 (A) 05/15/2018    EGFRNONAA 26.4 (A) 05/15/2018    EGFRNONAA 26.4 (A) 05/15/2018    EGFRNONAA 26.4 (A) 05/15/2018    EGFRNONAA 26.4 (A) 05/15/2018       Urinalysis:  Lab Results   Component Value Date    APPEARANCEUA Clear 05/15/2018    PHUR 5.0 05/15/2018    SPECGRAV 1.010 05/15/2018    PROTEINUA Negative 05/15/2018    GLUCUA Negative 05/15/2018    OCCULTUA Negative 05/15/2018    NITRITE Negative 05/15/2018    LEUKOCYTESUR Negative 05/15/2018       Protein/Creatinine Ratio:  Lab Results   Component Value Date    PROTEINURINE <7 05/15/2018    CREATRANDUR 47.0 05/15/2018    UTPCR Unable to calculate 05/15/2018       CBC:  Lab Results   Component Value Date    WBC 8.92 05/15/2018    HGB 13.4 05/15/2018    HCT 41.0 05/15/2018         Last kidney US in 2016, she had one ordered by urologist, Dr. Greene in 6/17 which patient has not done so far   .  Review of Systems   Constitutional: Negative for appetite change, chills and fever.   HENT: Negative for sneezing and sore throat.    Eyes: Negative for visual disturbance.   Respiratory: Negative for cough  and shortness of breath.    Cardiovascular: Negative for chest pain and leg swelling.   Gastrointestinal: Negative for abdominal pain, diarrhea, nausea and vomiting.   Genitourinary: Negative for decreased urine volume, dysuria, hematuria and urgency.   Musculoskeletal: Positive for back pain and gait problem.   Skin: Negative for rash.   Allergic/Immunologic: Negative for immunocompromised state.   Neurological: Negative for dizziness and headaches.   Psychiatric/Behavioral: Negative for behavioral problems and confusion.       Objective:      Physical Exam   Constitutional: She is oriented to person, place, and time. She appears well-developed and well-nourished. No distress.   HENT:   Head: Normocephalic and atraumatic.   Eyes: Conjunctivae are normal. Right eye exhibits no discharge. Left eye exhibits no discharge.   Neck: Normal range of motion.   Cardiovascular: Normal rate, regular rhythm and normal heart sounds.    Pulmonary/Chest: Effort normal and breath sounds normal. No respiratory distress. She has no wheezes.   Abdominal: Soft. There is no tenderness.   Musculoskeletal: She exhibits edema.   Neurological: She is alert and oriented to person, place, and time.   Skin: Skin is warm and dry.   Psychiatric: She has a normal mood and affect. Her behavior is normal.   Vitals reviewed.      Assessment:       1. CKD (chronic kidney disease), stage IV    2. Acquired cyst of kidney    3. Anemia of chronic illness    4. Secondary renal hyperparathyroidism    5. Vitamin D deficiency disease    6. Essential hypertension        Plan:     Ms. Degroot has CKD IV which is clinically felt to be due to diabetic nephropathy and hypertensive glomerulosclerosis. Labs, CKD staging, risk of progression to ESRD was discussed with her in detail. So far available serial labs were explained to her in detail.     She established care with me in 8/16 but for unclear reason she did not have follow up visit arranged until 6/17 when  she was noted to have DANDY vs progression of CKD. That time she was taken off amlodipine and lisinopril due to hypotensive episodes and especially with worsening renal function.     Again today explained to her about all of her serial labs, longstanding CKD IV status already which has progressed, her kidneys with poor autoregulation and higher risk of DANDY due to hypotension/ volume depletion/ contrast agents/ nephrotoxins/ NSAID etc and higher risk of progression to CKD V/ ESRD. Also cautioned her about her multivitamin with mineral preparation as it will give her additional K, phos and other minerals.     Given her advanced CKD and risk of DANDY, will hold off restarting any ACE-I or ARB use or K sparing diuretic due to risk of hyperkalemia. This was again explained to her today.      Stressed that she needs to be compliant with standing orders of renal panel to be done every 4 to 6 weeks.     US kidneys ASAP and return to see urology to follow on right kidney mass which is felt to be an angiomyolipoma. Urine studies (pre clinic) does not show any protein or blood.    - repeat renal labs every 4 to 6 weeks for electrolytes, acid base, eGFR  - Trend proteinuria  - Trend PTH levels  - she has long standing anemia and per most recent Hb there is no indication for RITO  - closely monitor renal panel for electrolytes, acid base status, eGFR  - risk of CKD progression d/w patient  - low salt and low potassium diet, low phos diet  - avoid NSAID/ bactrim/ IV contrast/ gadolinium/ aminoglycoside/ fleet enema where possible  - diuretic regimen per her cardiology   - decision to continue PPI per primary     Coreg dosing per cardiology, on chart dose bid but she has been taking only once per day, will need dose adjustment of Eliquis if her kidney function declines further.    Increase water intake if ok with cardiology    Leg edema less likely from renal cause as level of proteinuria is minimal    Low salt diet    Needs strict lab  surveillance    Labs were ordered but was no show once and cancellation at other times. Pt does not recollect it.    Trend H/H    Calcitriol if PTH > 200, trend corrected Ca, phos, vit D levels.    Needs follow up with urology. It appears she had an US ordered by urology for annual surveillance on angiomyolipoma but pt has not done it so far    Suspect forgetfulness and confusion with her intake of medicines, keeping up with labs and medicine dosing    RTC 2.5 months     Labs, plan, recommendations were discussed with her in detail. Her questions were answered to her satisfaction.

## 2018-05-16 NOTE — PROGRESS NOTES
Call made to pt for stephanie instructions. Instructed on stephanie, npo after midnight except meds, will need someone to drive home as pt will not be able to drive or operate heavy machinery for 24 hours after.  Arrive in [facility] at scheduled time. Pre-sedation assessment done. Pt verbalized understanding and denies any questions. Number given [phone] should pt have any stephanie questions.    Denies swallowing difficulty, home 02 use . has sleep apnea.  Neck moves without limitation.  Blood thinner: eloquis  Denies sedation problems  Ht 68in; wt 210#

## 2018-05-16 NOTE — PROGRESS NOTES
"Subjective:       Patient ID: Radha Degroot is a 78 y.o. female.    Chief Complaint: Follow-up  78 year old presents for follow up. She reports was having a bit more sob and went to see cardiology she was in atrial fibrillation and started on eloquis  She reports has plan for cardioversion later this week. She has had some sob but denies palpiations or associated pain. She has been doing better with her blood sugar  Endocrine has been adjusting her medications. She has been more mindful of a renal diet  And her kidney funciton has improvmed. She also decided to back off of her ppi wanted to try to get off and has been fine rare reflux no dyphagia   She continues to have back trouble has had injections with minimal imrpomvent over time.     HPI  Review of Systems   Constitutional: Negative for fever.   Respiratory: Positive for shortness of breath. Negative for wheezing.    Cardiovascular: Negative for chest pain.        Edema at times    Gastrointestinal: Negative for abdominal pain and constipation.   Musculoskeletal: Positive for back pain.        Arthalgias at times    Neurological: Negative for dizziness.       Objective:     Blood pressure 120/62, pulse 86, height 5' 8" (1.727 m), weight 98.6 kg (217 lb 6 oz).    Physical Exam   Constitutional: No distress.   HENT:   Head: Normocephalic.   Mouth/Throat: Oropharynx is clear and moist.   Eyes: No scleral icterus.   Neck: Neck supple.   Cardiovascular: Exam reveals no gallop and no friction rub.    No murmur heard.  Irregular    Pulmonary/Chest: Effort normal and breath sounds normal. No respiratory distress.   Abdominal: Soft. Bowel sounds are normal. She exhibits no mass. There is no tenderness.   Musculoskeletal: She exhibits no edema.   Neurological: She is alert.   Skin: No erythema.   Psychiatric: She has a normal mood and affect.   Vitals reviewed.      Assessment:       1. Coronary artery disease involving native coronary artery of native heart without " angina pectoris    2. Type 2 diabetes mellitus with hypoglycemia without coma, with long-term current use of insulin    3. Mixed hyperlipidemia    4. Chronic kidney disease, stage IV (severe)    5. Lumbar spondylosis    6. Atrial fibrillation, unspecified type    7. Essential hypertension        Plan:       Radha was seen today for follow-up.    Diagnoses and all orders for this visit:    Coronary artery disease involving native coronary artery of native heart without angina pectoris  Atrial fibrillatoin she continues to follow with cardiology  Currently on apixiban with plan for cardioversion she reports     Mixed hyperlipidemia  She has been not taking her statin recently was having some arthrlgias at times related to medication  Encouraged her to resume consider coq10 while taking   Risk benefits reviewed     Chronic kidney disease, stage IV (severe)  Hx of she is following with neprhology has had some imrpovment over times with improved diet     Type 2 diabetes mellitus with hypoglycemia without coma, with long-term current use of insulin    Lumbar spondylosis  Hx of she is following with pain management  [facility]    She was encouraged to scheudle her annual mammogram orders in    She declines immunizations    Follow up 4 months

## 2018-05-17 ENCOUNTER — TELEPHONE (OUTPATIENT)
Dept: ELECTROPHYSIOLOGY | Facility: CLINIC | Age: 78
End: 2018-05-17

## 2018-05-17 LAB — BACTERIA UR CULT: NORMAL

## 2018-05-17 NOTE — TELEPHONE ENCOUNTER
Spoke with patient to review pre-op instructions. Advised to take 1/2 dose of insulin this evening and no insulin in the am. She will fast after 12mn and be here for 9am. She verbalizes understanding of all instructions and voices no other questions or concerns.

## 2018-05-18 ENCOUNTER — HOSPITAL ENCOUNTER (OUTPATIENT)
Dept: CARDIOLOGY | Facility: CLINIC | Age: 78
Discharge: HOME | End: 2018-05-18
Attending: INTERNAL MEDICINE | Admitting: INTERNAL MEDICINE
Payer: MEDICARE

## 2018-05-18 ENCOUNTER — ANESTHESIA EVENT (OUTPATIENT)
Dept: MEDSURG UNIT | Facility: HOSPITAL | Age: 78
End: 2018-05-18
Payer: MEDICARE

## 2018-05-18 ENCOUNTER — ANESTHESIA (OUTPATIENT)
Dept: MEDSURG UNIT | Facility: HOSPITAL | Age: 78
End: 2018-05-18
Payer: MEDICARE

## 2018-05-18 ENCOUNTER — HOSPITAL ENCOUNTER (OUTPATIENT)
Facility: HOSPITAL | Age: 78
Discharge: HOME | End: 2018-05-18
Attending: INTERNAL MEDICINE | Admitting: INTERNAL MEDICINE
Payer: MEDICARE

## 2018-05-18 VITALS
TEMPERATURE: 97 F | HEIGHT: 68 IN | WEIGHT: 210 LBS | OXYGEN SATURATION: 99 % | DIASTOLIC BLOOD PRESSURE: 59 MMHG | BODY MASS INDEX: 31.83 KG/M2 | HEART RATE: 59 BPM | SYSTOLIC BLOOD PRESSURE: 134 MMHG | RESPIRATION RATE: 14 BRPM

## 2018-05-18 DIAGNOSIS — I48.19 PERSISTENT ATRIAL FIBRILLATION: Primary | ICD-10-CM

## 2018-05-18 DIAGNOSIS — I48.19 ATRIAL FIBRILLATION, PERSISTENT: ICD-10-CM

## 2018-05-18 LAB
AORTIC ATHEROMA: YES
DIASTOLIC DYSFUNCTION: NO
ESTIMATED PA SYSTOLIC PRESSURE: 17.98
MITRAL VALVE MOBILITY: NORMAL
MITRAL VALVE REGURGITATION: ABNORMAL
POCT GLUCOSE: 239 MG/DL (ref 70–110)
RETIRED EF AND QEF - SEE NOTES: 60 (ref 55–65)
TRICUSPID VALVE REGURGITATION: ABNORMAL

## 2018-05-18 PROCEDURE — 93010 ELECTROCARDIOGRAM REPORT: CPT | Mod: ,,, | Performed by: INTERNAL MEDICINE

## 2018-05-18 PROCEDURE — 93005 ELECTROCARDIOGRAM TRACING: CPT | Mod: 59

## 2018-05-18 PROCEDURE — 94761 N-INVAS EAR/PLS OXIMETRY MLT: CPT

## 2018-05-18 PROCEDURE — 25000003 PHARM REV CODE 250: Performed by: NURSE ANESTHETIST, CERTIFIED REGISTERED

## 2018-05-18 PROCEDURE — 27100006 CARDIOVERSION (DCCV)

## 2018-05-18 PROCEDURE — 93010 ELECTROCARDIOGRAM REPORT: CPT | Mod: 76,,, | Performed by: INTERNAL MEDICINE

## 2018-05-18 PROCEDURE — D9220A PRA ANESTHESIA: Mod: CRNA,,, | Performed by: NURSE ANESTHETIST, CERTIFIED REGISTERED

## 2018-05-18 PROCEDURE — 82962 GLUCOSE BLOOD TEST: CPT

## 2018-05-18 PROCEDURE — 37000009 HC ANESTHESIA EA ADD 15 MINS: Performed by: INTERNAL MEDICINE

## 2018-05-18 PROCEDURE — 37000008 HC ANESTHESIA 1ST 15 MINUTES: Performed by: INTERNAL MEDICINE

## 2018-05-18 PROCEDURE — 25000003 PHARM REV CODE 250: Performed by: INTERNAL MEDICINE

## 2018-05-18 PROCEDURE — D9220A PRA ANESTHESIA: Mod: ANES,,, | Performed by: ANESTHESIOLOGY

## 2018-05-18 PROCEDURE — 27000221 HC OXYGEN, UP TO 24 HOURS

## 2018-05-18 PROCEDURE — 92960 CARDIOVERSION ELECTRIC EXT: CPT | Mod: ,,, | Performed by: INTERNAL MEDICINE

## 2018-05-18 PROCEDURE — 63600175 PHARM REV CODE 636 W HCPCS: Performed by: NURSE ANESTHETIST, CERTIFIED REGISTERED

## 2018-05-18 RX ORDER — MIDAZOLAM HYDROCHLORIDE 1 MG/ML
INJECTION, SOLUTION INTRAMUSCULAR; INTRAVENOUS
Status: DISCONTINUED | OUTPATIENT
Start: 2018-05-18 | End: 2018-05-18

## 2018-05-18 RX ORDER — ETOMIDATE 2 MG/ML
INJECTION INTRAVENOUS
Status: DISCONTINUED | OUTPATIENT
Start: 2018-05-18 | End: 2018-05-18

## 2018-05-18 RX ORDER — PROPOFOL 10 MG/ML
VIAL (ML) INTRAVENOUS CONTINUOUS PRN
Status: DISCONTINUED | OUTPATIENT
Start: 2018-05-18 | End: 2018-05-18

## 2018-05-18 RX ORDER — FENTANYL CITRATE 50 UG/ML
INJECTION, SOLUTION INTRAMUSCULAR; INTRAVENOUS
Status: DISCONTINUED | OUTPATIENT
Start: 2018-05-18 | End: 2018-05-18

## 2018-05-18 RX ORDER — SODIUM CHLORIDE 9 MG/ML
INJECTION, SOLUTION INTRAVENOUS CONTINUOUS PRN
Status: DISCONTINUED | OUTPATIENT
Start: 2018-05-18 | End: 2018-05-18

## 2018-05-18 RX ORDER — LIDOCAINE HYDROCHLORIDE 20 MG/ML
SOLUTION OROPHARYNGEAL
Status: DISCONTINUED | OUTPATIENT
Start: 2018-05-18 | End: 2018-05-18

## 2018-05-18 RX ORDER — SODIUM CHLORIDE 9 MG/ML
20 INJECTION, SOLUTION INTRAVENOUS CONTINUOUS
Status: DISCONTINUED | OUTPATIENT
Start: 2018-05-18 | End: 2018-05-18 | Stop reason: HOSPADM

## 2018-05-18 RX ORDER — PROPOFOL 10 MG/ML
VIAL (ML) INTRAVENOUS
Status: DISCONTINUED | OUTPATIENT
Start: 2018-05-18 | End: 2018-05-18

## 2018-05-18 RX ORDER — SODIUM CHLORIDE 0.9 % (FLUSH) 0.9 %
3 SYRINGE (ML) INJECTION
Status: DISCONTINUED | OUTPATIENT
Start: 2018-05-18 | End: 2018-05-18 | Stop reason: HOSPADM

## 2018-05-18 RX ADMIN — ETOMIDATE 4 MG: 2 INJECTION, SOLUTION INTRAVENOUS at 11:05

## 2018-05-18 RX ADMIN — PROPOFOL 20 MG: 10 INJECTION, EMULSION INTRAVENOUS at 11:05

## 2018-05-18 RX ADMIN — MIDAZOLAM 1 MG: 1 INJECTION INTRAMUSCULAR; INTRAVENOUS at 11:05

## 2018-05-18 RX ADMIN — LIDOCAINE HYDROCHLORIDE 15 ML: 20 SOLUTION OROPHARYNGEAL at 11:05

## 2018-05-18 RX ADMIN — PROPOFOL 100 MCG/KG/MIN: 10 INJECTION, EMULSION INTRAVENOUS at 11:05

## 2018-05-18 RX ADMIN — FENTANYL CITRATE 25 MCG: 50 INJECTION, SOLUTION INTRAMUSCULAR; INTRAVENOUS at 11:05

## 2018-05-18 RX ADMIN — SODIUM CHLORIDE: 0.9 INJECTION, SOLUTION INTRAVENOUS at 11:05

## 2018-05-18 RX ADMIN — SODIUM CHLORIDE 1914 ML: 0.9 INJECTION, SOLUTION INTRAVENOUS at 09:05

## 2018-05-18 NOTE — ANESTHESIA RELEASE NOTE
"Anesthesia Release from PACU Note    Patient: Radha Degroot    Procedure(s) Performed: Procedure(s) (LRB):  CARDIOVERSION (N/A)  TRANSESOPHAGEAL ECHOCARDIOGRAM (SHAYY) (N/A)    Anesthesia type: general    Post pain: Adequate analgesia    Post assessment: no apparent anesthetic complications    Last Vitals:   Visit Vitals  BP (!) 126/48   Pulse (!) 56   Temp 36.8 °C (98.3 °F) (Temporal)   Resp 13   Ht 5' 8" (1.727 m)   Wt 95.3 kg (210 lb)   SpO2 99%   Breastfeeding? No   BMI 31.93 kg/m²       Post vital signs: stable    Level of consciousness: awake    Nausea/Vomiting: no nausea/no vomiting    Complications: none    Airway Patency: patent    Respiratory: unassisted    Cardiovascular: stable and blood pressure at baseline    Hydration: euvolemic  "

## 2018-05-18 NOTE — TRANSFER OF CARE
"Anesthesia Transfer of Care Note    Patient: Radha Degroot    Procedure(s) Performed: Procedure(s) (LRB):  CARDIOVERSION (N/A)  TRANSESOPHAGEAL ECHOCARDIOGRAM (SHAYY) (N/A)    Patient location: [facility]    Anesthesia Type: general    Transport from OR: Transported from OR on 6-10 L/min O2 by face mask with adequate spontaneous ventilation    Post pain: adequate analgesia    Post assessment: no apparent anesthetic complications and tolerated procedure well    Post vital signs: stable    Level of consciousness: awake, alert and oriented    Nausea/Vomiting: no nausea/vomiting    Complications: none    Transfer of care protocol was followed      Last vitals:   Visit Vitals  BP (!) 116/45   Pulse (!) 52   Temp 36.8 °C (98.3 °F) (Skin)   Resp 18   Ht 5' 8" (1.727 m)   Wt 95.3 kg (210 lb)   SpO2 100%   Breastfeeding? No   BMI 31.93 kg/m²     "

## 2018-05-18 NOTE — H&P
TRANSESOPHAGEAL ECHOCARDIOGRAPHY   PRE-PROCEDURE NOTE    05/18/2018    HPI:     Radha Degroot is a 78 y.o.F that presents for DCCV for AF. SHAYY requested prior to DCCV to r/o SIENNA thrombus. Last dose of Eliquis this AM.    PMHx  CAD (s/p PCI to LAD/Diag)  HTN  IDDM  DELFINA  CKD IV    Dysphagia or odynophagia:  NO  Liver Disease, esophageal disease, or known varices:  NO  Upper GI Bleeding: NO  Snoring:  YES  +1  Sleep Apnea:  Yes  Prior neck surgery or radiation:  NO  Able to move neck in all directions:  Yes  History of anesthetic difficulties:  NO  Family history of anesthetic difficulties:  NO  Last oral intake:  > 12 hrs    Mallampati Class:  3  ASA Score:  3      Meds:     Scheduled Meds:  PRN Meds:  Continuous Infusions:   sodium chloride 0.9%         Physical Exam:     Vitals:  Temp:  [97.4 °F (36.3 °C)]   Pulse:  [78]   Resp:  [18]   BP: (140-151)/(65)   SpO2:  [96 %]        Constitutional:  NAD, conversant  HEENT:   Sclera anicteric, Uvula midline, EOMI, OP clear  Neck:   No JVD, moves to all direction without any limitations  CV:   Irregularly irregular, no murmurs / rubs / gallops, normal S1/S2  Pulm:   CTAB, no wheezes, rales, or ronchi  GI:   Abdomen soft, NTND, +BS  Extremities:  No LE edema, warm with palpable pulses  Neuro:   AAOX3, no focal motor deficits      Labs:     Recent Results (from the past 336 hour(s))   CBC auto differential    Collection Time: 05/15/18 10:10 AM   Result Value Ref Range    WBC 8.92 3.90 - 12.70 K/uL    Hemoglobin 13.4 12.0 - 16.0 g/dL    Hematocrit 41.0 37.0 - 48.5 %    Platelets 189 150 - 350 K/uL       Recent Results (from the past 336 hour(s))   Basic metabolic panel    Collection Time: 05/15/18 10:10 AM   Result Value Ref Range    Sodium 140 136 - 145 mmol/L    Potassium 4.2 3.5 - 5.1 mmol/L    Chloride 105 95 - 110 mmol/L    CO2 23 23 - 29 mmol/L    BUN, Bld 33 (H) 8 - 23 mg/dL    Creatinine 1.8 (H) 0.5 - 1.4 mg/dL    Calcium 9.2 8.7 - 10.5 mg/dL    Anion Gap 12 8 -  16 mmol/L       Estimated Creatinine Clearance: 30.6 mL/min (A) (based on SCr of 1.8 mg/dL (H)).    EKG: AF with controlled ventricular rates  Date: 5/18/18    Assessment & Plan:     PLAN:  1. SHAYY for evaluation of SIENNA thrombus prior to DCCV    -The risks, benefits & alternatives of the procedure were explained to the patient.    -The risks of transesophageal echo include but are not limited to:  Dental trauma, esophageal trauma/perforation, bleeding, laryngospasm/brochospasm, aspiration, sore throat/hoarseness, & dislodgement of the endotracheal tube/nasogastric tube (where applicable).    -The risks of moderate sedation include hypotension, respiratory depression, arrhythmias, bronchospasm, & death.    -Informed consent was obtained & the patient is agreeable to proceed with the procedure.    I will discuss with the attending physician. Attending addendum is to follow.    Signed:  Darius Ornelas MD  Cardiovascular Disease Fellow, PGY-IV  Pager: [phone]  5/18/2018 11:01 AM    Attending Addendum:

## 2018-05-18 NOTE — NURSING
Pt d/c'd to home via w/c accompanied by family.  Vss.  piv d/c'd intact and without difficulty.  Instructed pt on home medications, post procedure precautions and follow up visits.  Pt and family verbalizes understanding.  Pt In no acute distress and verbalizes no complaints.

## 2018-05-18 NOTE — ANESTHESIA PREPROCEDURE EVALUATION
05/18/2018  Radha Degroot is a 78 y.o., female.    Anesthesia Evaluation    I have reviewed the Patient Summary Reports.     I have reviewed the Medications.     Review of Systems  Anesthesia Hx:  History of prior surgery of interest to airway management or planning:   Hematology/Oncology:        Hematology Comments: HIT+   Cardiovascular:   Hypertension Past MI CAD  CABG/stent  CHF NL BiV fxn   Pulmonary:   Sleep Apnea    Renal/:   Chronic Renal Disease, CRI    Hepatic/GI:   Hiatal Hernia, GERD    Musculoskeletal:   Arthritis     Endocrine:   Diabetes        Physical Exam  General:  Obesity    Airway/Jaw/Neck:  Airway Findings: Mouth Opening: Normal Tongue: Normal  General Airway Assessment: Adult  Mallampati: III  Improves to II with phonation.  TM Distance: Normal, at least 6 cm  Jaw/Neck Findings:  Neck ROM: Normal ROM      Dental:  Dental Findings: Prominent Incisors   Chest/Lungs:  Chest/Lungs Findings: Normal Respiratory Rate     Heart/Vascular:  Heart Findings: Rate: Normal        Mental Status:  Mental Status Findings:  Alert and Oriented         Anesthesia Plan  Type of Anesthesia, risks & benefits discussed:  Anesthesia Type:  general  Patient's Preference: general   Intra-op Monitoring Plan: standard ASA monitors  Intra-op Monitoring Plan Comments:   Post Op Pain Control Plan: IV/PO Opioids PRN  Post Op Pain Control Plan Comments:   Induction:   IV  Beta Blocker:  Patient is not currently on a Beta-Blocker (No further documentation required).       Informed Consent: Patient understands risks and agrees with Anesthesia plan.  Questions answered. Anesthesia consent signed with patient.  ASA Score: 3     Day of Surgery Review of History & Physical:    H&P update referred to the surgeon.     Anesthesia Plan Notes: NPO confirmed.   No history of anesthesia problems.         Ready For Surgery From  Anesthesia Perspective.

## 2018-05-18 NOTE — PLAN OF CARE
Problem: Patient Care Overview  Goal: Plan of Care Review  Outcome: Ongoing (interventions implemented as appropriate)  Pt arrive to unit accompanied by family.  Vss.  Pre op orders and assessment initiated.  Pt remains npo.  Pt in no acute distress and verbalizes no complaints.  Will continue to monitor.  Call bell within reach.

## 2018-05-18 NOTE — DISCHARGE SUMMARY
[facility]  Cardiac Electrophysiology  Discharge Summary      Patient Name: Radha Degroot  MRN: [id number]  Admission Date: 5/18/2018  Hospital Length of Stay: 0 days  Discharge Date and Time:  05/18/2018 2:25 PM  Attending Physician: MD Carin Miguel   Discharging Provider: Charity Owens NP  Primary Care Physician: Katherine Merida MD    HPI:  Ms. Degroot is a pleasant 78 year-old woman with coronary artery disease s/p STEMI in 2006 with PCI to LAD/Diag with preserved LVEF, hypertension, diabetes mellitus on insulin, obstructive sleep apnea, severe lumbar disc disease/back pain, and chronic kidney disease stage IV evaluated by MD Mike Alexander, for newly diagnosed persistent atrial fibrillation.   Given,  this is her first diagnosis recommend trial of restoration of sinus rhythm to see if she feels better. Pt presented for outpatient SHAYY/DCCV     Procedure(s) (LRB):  CARDIOVERSION (N/A)  TRANSESOPHAGEAL ECHOCARDIOGRAM (SHAYY) (N/A)     Hospital Course: SHAYY done showed no SIENNA thrombus, hence proceeded to DCCV which converted patient from AF to sinus bradycardia ( see procedure note for details)   Patient tolerated procedure. No medication changes at present. Continue eliquis.   Pt to follow up with EKG in 1 week,  and 4 weeks with MD Acacia Alexander     Discharge plans/instructions discussed with patient and her son  who verbalized understanding  and agreement of plans of care. No further questions or concerns  voiced at this time.     Consults: Anesthesia     Significant Diagnostic Studies: SHAYY      Final Active Diagnoses:    Diagnosis Date Noted POA    PRINCIPAL PROBLEM:  Persistent atrial fibrillation [I48.1] 04/25/2018 Yes      Problems Resolved During this Admission:    Diagnosis Date Noted Date Resolved POA       Discharged Condition: good    Disposition: Home or Self Care    Follow Up:  Follow-up Information     Mike Alexander MD In 4 weeks.    Specialties:  Cardiology,  Electrophysiology  Why:  EKG in 1 week   Contact information:  [address]  [facility] [address]  [phone]                 Patient Instructions:     Diet Cardiac     Notify your health care provider if you experience any of the following:  temperature >100.4     Notify your health care provider if you experience any of the following:  persistent nausea and vomiting or diarrhea     Notify your health care provider if you experience any of the following:  severe uncontrolled pain     Notify your health care provider if you experience any of the following:  redness, tenderness, or signs of infection (pain, swelling, redness, odor or green/yellow discharge around incision site)     Notify your health care provider if you experience any of the following:  difficulty breathing or increased cough     Notify your health care provider if you experience any of the following:  severe persistent headache     Notify your health care provider if you experience any of the following:  worsening rash     Notify your health care provider if you experience any of the following:  persistent dizziness, light-headedness, or visual disturbances     Notify your health care provider if you experience any of the following:  increased confusion or weakness     Notify your health care provider if you experience any of the following:   Scheduling Instructions: For any concerning medical symptoms     EKG 12-lead   Standing Status: Future  Standing Exp. Date: 05/18/19   Order Specific Question Answer Comments   Diagnosis Atrial fibrillation [427.31.ICD-9-CM]        Medications:  Reconciled Home Medications:      Medication List      CONTINUE taking these medications    apixaban 5 mg Tab  Take 1 tablet (5 mg total) by mouth 2 (two) times daily.     aspirin 81 MG Chew  Take 81 mg by mouth once daily.     * blood sugar diagnostic Strp  Commonly known as:  ACCU-CHEK ACTIVE TEST  USE TO TEST BLOOD SUGAR FOUR TIMES DAILY     * ACCU-CHEK  "MELIDA PLUS TEST STRP Strp  Generic drug:  blood sugar diagnostic  USE TO TEST BLOOD SUGAR FOUR TIMES DAILY     blood-glucose meter Misc  Commonly known as:  ACCU-CHEK MELIDA PLUS METER  To check sugars 4 times daily.  Please include control solution     carvedilol 12.5 MG tablet  Commonly known as:  COREG  Take 1 tablet (12.5 mg total) by mouth 2 (two) times daily.     CENTRUM SILVER WOMEN ORAL  Take by mouth once daily.     fluticasone 50 mcg/actuation nasal spray  Commonly known as:  FLONASE  SHAKE LIQUID AND USE 2 SPRAYS IN EACH NOSTRIL EVERY DAY     furosemide 40 MG tablet  Commonly known as:  LASIX  TAKE 1 TABLET(40 MG) BY MOUTH EVERY DAY     insulin aspart U-100 100 unit/mL Inpn pen  Commonly known as:  NovoLOG Flexpen U-100 Insulin  Inject 17 units before meals with ss     insulin glargine 100 unit/mL (3 mL) Inpn pen  Commonly known as:  LANTUS SOLOSTAR U-100 INSULIN  Inject 48 units at bedtime     ketoconazole 2 % shampoo  Commonly known as:  NIZORAL  APPLY TO AFFECTED AREA EVERY OTHER DAY AND LET SIT 5 MINUTES PRIOR TO RINSING AS DIRECTED     lancets Misc  Commonly known as:  ACCU-CHEK SOFTCLIX LANCETS  1 each by Misc.(Non-Drug; Combo Route) route 4 (four) times daily.     nitroGLYCERIN 0.3 MG SL tablet  Commonly known as:  NITROSTAT  Place 1 tablet (0.3 mg total) under the tongue every 5 (five) minutes as needed for Chest pain.     * pen needle, diabetic 32 gauge x 5/32" Ndle  Commonly known as:  BD ULTRA-FINE TIM PEN NEEDLE  USE FOUR TIMES DAILY FOR BLOOD SUGAR TESTING     * pen needle, diabetic 32 gauge x 5/32" Ndle  Commonly known as:  BD ULTRA-FINE TIM PEN NEEDLE  Use four times daily for insulin     rosuvastatin 40 MG Tab  Commonly known as:  CRESTOR  Take 1 tablet (40 mg total) by mouth once daily.        * This list has 4 medication(s) that are the same as other medications prescribed for you. Read the directions carefully, and ask your doctor or other care provider to review them with you.       "      ASK your doctor about these medications    mirabegron 50 mg Tb24  Take 1 tablet (50 mg total) by mouth once daily.     VITAMIN D-3 WITH ALOE 120-1,000-10 mg-unit-mg Tab  Generic drug:  Ca cmb [id number]  Take by mouth once daily.            Charity Owens NP  Cardiac Electrophysiology  [facility]    STAFF MD Lamont Del Rosario

## 2018-05-18 NOTE — PROGRESS NOTES
Pt returned to unit AAOx4 and denies pain.  VSS.  Family called to bedside.  See full assessment for details.  Will continue to monitor.

## 2018-05-30 ENCOUNTER — LAB VISIT (OUTPATIENT)
Dept: LAB | Facility: HOSPITAL | Age: 78
End: 2018-05-30
Attending: NURSE PRACTITIONER
Payer: MEDICARE

## 2018-05-30 LAB
ESTIMATED AVG GLUCOSE: 183 MG/DL
HBA1C MFR BLD HPLC: 8 %

## 2018-05-30 PROCEDURE — 36415 COLL VENOUS BLD VENIPUNCTURE: CPT | Mod: PO

## 2018-05-30 PROCEDURE — 83036 HEMOGLOBIN GLYCOSYLATED A1C: CPT

## 2018-06-01 ENCOUNTER — TELEPHONE (OUTPATIENT)
Dept: ELECTROPHYSIOLOGY | Facility: CLINIC | Age: 78
End: 2018-06-01

## 2018-06-01 NOTE — TELEPHONE ENCOUNTER
Spoke with patient. She had her one week EKG (post DCCV) done in [place]. She is back in AF. Will discuss with Dr Alexander. She did tell me that she has DELFINA and is not compliant with CPAP, she hates the mask. Advised to call the company and explore other options for masks/nose pieces. She verbalizes understanding. Will call her back with recommendations from Dr Alexander regarding the afib.

## 2018-06-01 NOTE — TELEPHONE ENCOUNTER
----- Message from Mike Alexander MD sent at 6/1/2018  5:13 PM CDT -----  Ok then we leave it alone.    Thanks    ----- Message -----  From: Iveth Kam RN  Sent: 6/1/2018   4:52 PM  To: Mike Alexander MD    She says she felt no difference before and after DCCV. She is always unaware if she is in AF  ----- Message -----  From: Mike Alexander MD  Sent: 6/1/2018   4:22 PM  To: Iveth Kam RN    Did she feel any better after the DCCV?    ----- Message -----  From: Iveth Kam RN  Sent: 6/1/2018   3:13 PM  To: Mike Alexander MD    Please see EKG from 5/30/18 (1 week post DCCV). Looks like she is back in AF. Of note, she has sleep apnea and hates her mask; she has not been compliant at all with it. I did ask her to call the company to explore other masks/nose pieces. Please advise re: AF  Thanks

## 2018-06-01 NOTE — TELEPHONE ENCOUNTER
----- Message from Carmen Avendano sent at 6/1/2018 11:11 AM CDT -----  Contact: Patient  The Pt is calling to see if she can get the results from her procedure. Please call her back @ [phone]. Thanks, Carmen

## 2018-06-04 ENCOUNTER — TELEPHONE (OUTPATIENT)
Dept: ENDOCRINOLOGY | Facility: CLINIC | Age: 78
End: 2018-06-04

## 2018-06-04 ENCOUNTER — TELEPHONE (OUTPATIENT)
Dept: ELECTROPHYSIOLOGY | Facility: CLINIC | Age: 78
End: 2018-06-04

## 2018-06-04 NOTE — TELEPHONE ENCOUNTER
----- Message from Stephani Price sent at 6/4/2018  1:12 PM CDT -----  Contact: Pt called   Pt called regarding blood thinners and she started feeling  light headed for the past couple of days. Please call the pt@ [phone]. Thank you.

## 2018-06-04 NOTE — TELEPHONE ENCOUNTER
Spoke with patient. She feels like she has been lightheaded since she increased Eliquis from 2.5mg bid to 5mg bid. She didn't think about telling me this yesterday when we spoke. It's not severe and it's not stopping her from her daily activities. Advised that I will discuss with Dr Alexander and call her back with recommendations, but probably not until tomorrow. She verbalizes understanding.

## 2018-06-05 ENCOUNTER — TELEPHONE (OUTPATIENT)
Dept: ELECTROPHYSIOLOGY | Facility: CLINIC | Age: 78
End: 2018-06-05

## 2018-06-05 ENCOUNTER — LAB VISIT (OUTPATIENT)
Dept: LAB | Facility: HOSPITAL | Age: 78
End: 2018-06-05
Attending: INTERNAL MEDICINE
Payer: MEDICARE

## 2018-06-05 ENCOUNTER — OFFICE VISIT (OUTPATIENT)
Dept: ENDOCRINOLOGY | Facility: CLINIC | Age: 78
End: 2018-06-05
Payer: MEDICARE

## 2018-06-05 VITALS
HEART RATE: 65 BPM | DIASTOLIC BLOOD PRESSURE: 79 MMHG | SYSTOLIC BLOOD PRESSURE: 136 MMHG | BODY MASS INDEX: 32.45 KG/M2 | WEIGHT: 213.38 LBS

## 2018-06-05 DIAGNOSIS — I10 ESSENTIAL HYPERTENSION: ICD-10-CM

## 2018-06-05 DIAGNOSIS — I48.19 PERSISTENT ATRIAL FIBRILLATION: Primary | ICD-10-CM

## 2018-06-05 DIAGNOSIS — I48.19 PERSISTENT ATRIAL FIBRILLATION: ICD-10-CM

## 2018-06-05 DIAGNOSIS — N18.4 CKD (CHRONIC KIDNEY DISEASE) STAGE 4, GFR 15-29 ML/MIN: ICD-10-CM

## 2018-06-05 DIAGNOSIS — E66.9 NON MORBID OBESITY, UNSPECIFIED OBESITY TYPE: ICD-10-CM

## 2018-06-05 LAB
BASOPHILS # BLD AUTO: 0.1 K/UL
BASOPHILS NFR BLD: 0.9 %
DIFFERENTIAL METHOD: ABNORMAL
EOSINOPHIL # BLD AUTO: 0.2 K/UL
EOSINOPHIL NFR BLD: 1.5 %
ERYTHROCYTE [DISTWIDTH] IN BLOOD BY AUTOMATED COUNT: 15.5 %
HCT VFR BLD AUTO: 42.1 %
HGB BLD-MCNC: 13.6 G/DL
IMM GRANULOCYTES # BLD AUTO: 0.03 K/UL
IMM GRANULOCYTES NFR BLD AUTO: 0.3 %
LYMPHOCYTES # BLD AUTO: 2.4 K/UL
LYMPHOCYTES NFR BLD: 20.5 %
MCH RBC QN AUTO: 28.2 PG
MCHC RBC AUTO-ENTMCNC: 32.3 G/DL
MCV RBC AUTO: 87 FL
MONOCYTES # BLD AUTO: 0.8 K/UL
MONOCYTES NFR BLD: 6.8 %
NEUTROPHILS # BLD AUTO: 8 K/UL
NEUTROPHILS NFR BLD: 70 %
NRBC BLD-RTO: 0 /100 WBC
PLATELET # BLD AUTO: 202 K/UL
PMV BLD AUTO: 13.6 FL
RBC # BLD AUTO: 4.83 M/UL
WBC # BLD AUTO: 11.44 K/UL

## 2018-06-05 PROCEDURE — 99214 OFFICE O/P EST MOD 30 MIN: CPT | Mod: S$GLB,,, | Performed by: NURSE PRACTITIONER

## 2018-06-05 PROCEDURE — 3078F DIAST BP <80 MM HG: CPT | Mod: CPTII,S$GLB,, | Performed by: NURSE PRACTITIONER

## 2018-06-05 PROCEDURE — 3075F SYST BP GE 130 - 139MM HG: CPT | Mod: CPTII,S$GLB,, | Performed by: NURSE PRACTITIONER

## 2018-06-05 PROCEDURE — 99499 UNLISTED E&M SERVICE: CPT | Mod: S$GLB,,, | Performed by: NURSE PRACTITIONER

## 2018-06-05 PROCEDURE — 85025 COMPLETE CBC W/AUTO DIFF WBC: CPT

## 2018-06-05 PROCEDURE — 36415 COLL VENOUS BLD VENIPUNCTURE: CPT | Mod: PO

## 2018-06-05 PROCEDURE — 99999 PR PBB SHADOW E&M-EST. PATIENT-LVL IV: CPT | Mod: PBBFAC,,, | Performed by: NURSE PRACTITIONER

## 2018-06-05 RX ORDER — INSULIN DEGLUDEC 100 U/ML
40 INJECTION, SOLUTION SUBCUTANEOUS DAILY
Qty: 15 SYRINGE | Refills: 0 | Status: SHIPPED | OUTPATIENT
Start: 2018-06-05 | End: 2018-10-02 | Stop reason: SDUPTHER

## 2018-06-05 NOTE — TELEPHONE ENCOUNTER
----- Message from Christianne Louise sent at 6/5/2018  8:45 AM CDT -----  Contact: patient  Please call pt at [phone]. Returning your call    Thank you

## 2018-06-05 NOTE — PROGRESS NOTES
CC: This 78 y.o. White female  is here for evaluation of  T2DM along with comorbidities indicated in the Visit Diagnosis section of this encounter.    HPI: Radha Degroot was diagnosed with T2DM in 1980s.     Nephrology - Dr. Mcmanus      Prior visit on 4/2/18  Newly diagnosed with atrial fibrillation now on Eliquis and followed by Cardiology.   She is trying to follow diet per RD recommendations.   Hast lost 5 lb since lov a  Month ago and having lows. Suspect some of this is d/t low carb meals like salads.   Plan Reduce Lantus to 40 units every night.   Reduce Novolog to 15 units before breakfast and lunch. Continue 17 units before dinner.skip Novolog if eating a no carbohydrate meal.   Drop off log in 2 weeks. Call with any issues especially if blood sugars are less than 80.   rtc in 2 mo with labs prior.     Interval history  a1c down from 8.4 to 8%.     DIABETES EDUCATION: 3/5/18    PMHX: atrial fibrillation, NSTEMI 2017, CAD, CHF, CKD stage 3     PRESCRIBED DIABETES MEDICATIONS: Lantus Solostar 48 units hs, Novolog Flexpen 12  units w/ ss (Use on premeal readings: 150-200=+1, 201-250=+2, 251-300=+3; 301-350=+4, over 350=+5 units    Misses medication doses - none     DM COMPLICATIONS: nephropathy, retinopathy and peripheral neuropathy    SELF MONITORING BLOOD GLUCOSE: accuchek dorothy meter. Checks blood glucose at home 4x/day.                   HYPOGLYCEMIC EPISODES: symptoms start at bgs in the 80s.      CURRENT DIET: 3 meals/day.  Drinks iced tea with no sugar. No snacks.   Diet recall: breakfast was black coffee, 2 slices of white toast, 2 eggs. Dinner was roast beef, carrots, small potatoes, eggplant, cornbread muffin, sprite zero. Lunch was ?     CURRENT EXERCISE: none       /79 (BP Location: Right arm, Patient Position: Sitting, BP Method: Large (Automatic))   Pulse 65   Wt 96.8 kg (213 lb 6.5 oz)   BMI 32.45 kg/m²       ROS:   CONSTITUTIONAL: Appetite good, + fatigue  MS: + back pain        PHYSICAL EXAM:  GENERAL: Well developed, well nourished. No acute distress.   PSYCH: AAOx3, appropriate mood and affect, conversant, well-groomed. Judgement and insight good.   NEURO: Cranial nerves grossly intact. Speech clear, no tremor.   CHEST: Respirations even and unlabored.       Hemoglobin A1C   Date Value Ref Range Status   05/30/2018 8.0 (H) 4.0 - 5.6 % Final     Comment:     ADA Screening Guidelines:  5.7-6.4%  Consistent with prediabetes  >or=6.5%  Consistent with diabetes  High levels of fetal hemoglobin interfere with the HbA1C  assay. Heterozygous hemoglobin variants (HbS, HgC, etc)do  not significantly interfere with this assay.   However, presence of multiple variants may affect accuracy.     02/28/2018 8.4 (H) 4.0 - 5.6 % Final     Comment:     According to ADA guidelines, hemoglobin A1c <7.0% represents  optimal control in non-pregnant diabetic patients. Different  metrics may apply to specific patient populations.   Standards of Medical Care in Diabetes-2016.  For the purpose of screening for the presence of diabetes:  <5.7%     Consistent with the absence of diabetes  5.7-6.4%  Consistent with increasing risk for diabetes   (prediabetes)  >or=6.5%  Consistent with diabetes  Currently, no consensus exists for use of hemoglobin A1c  for diagnosis of diabetes for children.  This Hemoglobin A1c assay has significant interference with fetal   hemoglobin   (HbF). The results are invalid for patients with abnormal amounts of   HbF,   including those with known Hereditary Persistence   of Fetal Hemoglobin. Heterozygous hemoglobin variants (HbAS, HbAC,   HbAD, HbAE, HbA2) do not significantly interfere with this assay;   however, presence of multiple variants in a sample may impact the %   interference.     11/03/2017 8.1 (H) 4.0 - 5.6 % Final     Comment:     According to ADA guidelines, hemoglobin A1c <7.0% represents  optimal control in non-pregnant diabetic patients. Different  metrics may  apply to specific patient populations.   Standards of Medical Care in Diabetes-2016.  For the purpose of screening for the presence of diabetes:  <5.7%     Consistent with the absence of diabetes  5.7-6.4%  Consistent with increasing risk for diabetes   (prediabetes)  >or=6.5%  Consistent with diabetes  Currently, no consensus exists for use of hemoglobin A1c  for diagnosis of diabetes for children.  This Hemoglobin A1c assay has significant interference with fetal   hemoglobin   (HbF). The results are invalid for patients with abnormal amounts of   HbF,   including those with known Hereditary Persistence   of Fetal Hemoglobin. Heterozygous hemoglobin variants (HbAS, HbAC,   HbAD, HbAE, HbA2) do not significantly interfere with this assay;   however, presence of multiple variants in a sample may impact the %   interference.             Chemistry        Component Value Date/Time     05/15/2018 1010     05/15/2018 1010     05/15/2018 1010     05/15/2018 1010    K 4.2 05/15/2018 1010    K 4.2 05/15/2018 1010    K 4.2 05/15/2018 1010    K 4.2 05/15/2018 1010     05/15/2018 1010     05/15/2018 1010     05/15/2018 1010     05/15/2018 1010    CO2 23 05/15/2018 1010    CO2 23 05/15/2018 1010    CO2 23 05/15/2018 1010    CO2 23 05/15/2018 1010    BUN 33 (H) 05/15/2018 1010    BUN 33 (H) 05/15/2018 1010    BUN 33 (H) 05/15/2018 1010    BUN 33 (H) 05/15/2018 1010    CREATININE 1.8 (H) 05/15/2018 1010    CREATININE 1.8 (H) 05/15/2018 1010    CREATININE 1.8 (H) 05/15/2018 1010    CREATININE 1.8 (H) 05/15/2018 1010     (H) 05/15/2018 1010     (H) 05/15/2018 1010     (H) 05/15/2018 1010     (H) 05/15/2018 1010        Component Value Date/Time    CALCIUM 9.2 05/15/2018 1010    CALCIUM 9.4 05/15/2018 1010    CALCIUM 9.4 05/15/2018 1010    CALCIUM 9.4 05/15/2018 1010    ALKPHOS 75 05/19/2017 1422    AST 13 05/19/2017 1422    ALT 12 05/19/2017 1422    BILITOT  0.4 05/19/2017 1422    ESTGFRAFRICA 30.4 (A) 05/15/2018 1010    ESTGFRAFRICA 30.4 (A) 05/15/2018 1010    ESTGFRAFRICA 30.4 (A) 05/15/2018 1010    ESTGFRAFRICA 30.4 (A) 05/15/2018 1010    EGFRNONAA 26.4 (A) 05/15/2018 1010    EGFRNONAA 26.4 (A) 05/15/2018 1010    EGFRNONAA 26.4 (A) 05/15/2018 1010    EGFRNONAA 26.4 (A) 05/15/2018 1010          Lab Results   Component Value Date    LDLCALC 86.4 02/28/2018        Ref. Range 2/28/2018 10:18   Cholesterol Latest Ref Range: 120 - 199 mg/dL 171   HDL Latest Ref Range: 40 - 75 mg/dL 54   LDL Cholesterol Latest Ref Range: 63.0 - 159.0 mg/dL 86.4   Total Cholesterol/HDL Ratio Latest Ref Range: 2.0 - 5.0  3.2   Triglycerides Latest Ref Range: 30 - 150 mg/dL 153 (H)     Lab Results   Component Value Date    MICALBCREAT 29.5 02/11/2015             STANDARDS of CARE:        ASA:               Last eye exam:       ASSESSMENT and PLAN:    A1C GOAL: < 8%    1. Uncontrolled type 2 diabetes mellitus with complication, with long-term current use of insulin  Increase Novolog to 14 units before each meal.   Continue Lantus 40 units at night.   Test blood sugar 4x/day.   Send glucose log in 2 weeks.   Return to clinic in 3 months with labs prior.        2. CKD (chronic kidney disease) stage 4, GFR 15-29 ml/min  Followed by Dr. Mcmanus   3. Essential hypertension  controlled   4. Non morbid obesity, unspecified obesity type  Increases insulin resistance.          No orders of the defined types were placed in this encounter.       Follow-up in about 3 months (around 9/5/2018).

## 2018-06-05 NOTE — PATIENT INSTRUCTIONS
Increase Novolog to 14 units before each meal.   Continue Lantus 40 units at night.   Test blood sugar 4x/day.   Send glucose log in 2 weeks.   Return to clinic in 3 months with labs prior.

## 2018-06-05 NOTE — TELEPHONE ENCOUNTER
----- Message from Mike Alexander MD sent at 6/4/2018  5:28 PM CDT -----  Contact: Pt called   Can we check a cbc?    ----- Message -----  From: Iveth Kam RN  Sent: 6/4/2018   4:03 PM  To: Mike Alexander MD    Patient called today to say that she has felt lightheaded ever since she increased her Eliquis from 2.5mg bid to 5mg bid. She said she was having the same symptom yesterday when we spoke about her being back in AF, but she didn't think about telling me. Please advise.  ----- Message -----  From: Stephani Price  Sent: 6/4/2018   1:12 PM  To: Iveth Kam RN    Pt called regarding blood thinners and she started feeling  light headed for the past couple of days. Please call the pt@ [phone]. Thank you.

## 2018-06-06 ENCOUNTER — TELEPHONE (OUTPATIENT)
Dept: ELECTROPHYSIOLOGY | Facility: CLINIC | Age: 78
End: 2018-06-06

## 2018-06-06 NOTE — TELEPHONE ENCOUNTER
Called patient to discuss her symptoms and notify her her blood count is normal. Will re-attempt tomorrow. No answer.

## 2018-06-06 NOTE — TELEPHONE ENCOUNTER
Left message advising that CBC was normal. Will check with Dr. Alexander tomorrow for further recommendations. Call back # left.

## 2018-06-06 NOTE — TELEPHONE ENCOUNTER
----- Message from Christianne Louise sent at 6/6/2018 12:43 PM CDT -----  Contact: patient   Please call pt at [phone]. Returning MD call today regarding her blood count    Thank you

## 2018-06-07 ENCOUNTER — TELEPHONE (OUTPATIENT)
Dept: ELECTROPHYSIOLOGY | Facility: CLINIC | Age: 78
End: 2018-06-07

## 2018-06-07 DIAGNOSIS — R42 DIZZINESS: Primary | ICD-10-CM

## 2018-06-07 NOTE — TELEPHONE ENCOUNTER
48 hour holter scheduled for 6/29/2018 (next available). Left message advising patient of appointment with call back #.

## 2018-06-07 NOTE — TELEPHONE ENCOUNTER
Discussed CBC with patient. Unclear etiology of intermittent light-headedness. Will check a 48 hour monitor to evaluate for any bradycardia in AF.

## 2018-06-08 ENCOUNTER — TELEPHONE (OUTPATIENT)
Dept: ELECTROPHYSIOLOGY | Facility: CLINIC | Age: 78
End: 2018-06-08

## 2018-06-08 NOTE — TELEPHONE ENCOUNTER
----- Message from Carmen Avendano sent at 6/8/2018 10:50 AM CDT -----  Contact: Patient  The Pt is returning your call and she wanted to let you know that she needs reschedule her monitor appointment because she can't get here that early in the morning. Please call her back @[phone]. Thanks, Carmen

## 2018-06-08 NOTE — TELEPHONE ENCOUNTER
Spoke with Aleyda and confirmed that she can come in for 11am (same day). Left message advising patient to come in for 11am (even though it will still say 8:40 on her [facility]). Call back # left to confirm.

## 2018-07-06 ENCOUNTER — CLINICAL SUPPORT (OUTPATIENT)
Dept: CARDIOLOGY | Facility: CLINIC | Age: 78
End: 2018-07-06
Attending: INTERNAL MEDICINE
Payer: MEDICARE

## 2018-07-06 DIAGNOSIS — R42 DIZZINESS: ICD-10-CM

## 2018-07-06 PROCEDURE — 93224 XTRNL ECG REC UP TO 48 HRS: CPT | Mod: S$GLB,,, | Performed by: INTERNAL MEDICINE

## 2018-07-06 NOTE — PROGRESS NOTES
Verified pt name and .  Pt signed consent stated that the monitor will be returned to 8th floor Cardiology at the [facility].  Pt prepped and EKG leads placed.  Pt to be on holter monitor for 48 hours.  Instructions, diary and extra EKG pads provided.

## 2018-07-11 ENCOUNTER — TELEPHONE (OUTPATIENT)
Dept: ELECTROPHYSIOLOGY | Facility: CLINIC | Age: 78
End: 2018-07-11

## 2018-07-11 NOTE — TELEPHONE ENCOUNTER
----- Message from Mike Alexander MD sent at 7/11/2018  4:52 PM CDT -----  Ms. Degroot is convinced she her fatigue is due to the eliquis. She reports she felt fine in Afib until she started eliquis. She did not feel any better in sinus rhythm after her cardioversion. I told her I was not convinced it was due to eliquis but am okay with a 4-5 day trial off of it. Can you call her Monday and see how she feels? If she states she has more energy off eliquis then we will enroll her in coumadin clinic. If she feels the same then she should resume eliquis. Her heart rate is well controlled currently.

## 2018-07-17 ENCOUNTER — OFFICE VISIT (OUTPATIENT)
Dept: ELECTROPHYSIOLOGY | Facility: CLINIC | Age: 78
End: 2018-07-17
Payer: MEDICARE

## 2018-07-17 ENCOUNTER — TELEPHONE (OUTPATIENT)
Dept: ELECTROPHYSIOLOGY | Facility: CLINIC | Age: 78
End: 2018-07-17

## 2018-07-17 ENCOUNTER — HOSPITAL ENCOUNTER (OUTPATIENT)
Dept: CARDIOLOGY | Facility: CLINIC | Age: 78
Discharge: HOME | End: 2018-07-17
Payer: MEDICARE

## 2018-07-17 VITALS
HEIGHT: 68 IN | DIASTOLIC BLOOD PRESSURE: 68 MMHG | HEART RATE: 74 BPM | SYSTOLIC BLOOD PRESSURE: 140 MMHG | WEIGHT: 214.75 LBS | BODY MASS INDEX: 32.55 KG/M2 | OXYGEN SATURATION: 96 %

## 2018-07-17 DIAGNOSIS — G47.33 OBSTRUCTIVE SLEEP APNEA: ICD-10-CM

## 2018-07-17 DIAGNOSIS — I25.10 CORONARY ARTERY DISEASE INVOLVING NATIVE CORONARY ARTERY OF NATIVE HEART WITHOUT ANGINA PECTORIS: Chronic | ICD-10-CM

## 2018-07-17 DIAGNOSIS — I48.19 PERSISTENT ATRIAL FIBRILLATION: Primary | ICD-10-CM

## 2018-07-17 DIAGNOSIS — Z79.4 TYPE 2 DIABETES MELLITUS WITH BOTH EYES AFFECTED BY MILD NONPROLIFERATIVE RETINOPATHY AND MACULAR EDEMA, WITH LONG-TERM CURRENT USE OF INSULIN: Chronic | ICD-10-CM

## 2018-07-17 DIAGNOSIS — E11.3213 TYPE 2 DIABETES MELLITUS WITH BOTH EYES AFFECTED BY MILD NONPROLIFERATIVE RETINOPATHY AND MACULAR EDEMA, WITH LONG-TERM CURRENT USE OF INSULIN: Chronic | ICD-10-CM

## 2018-07-17 DIAGNOSIS — I48.91 ATRIAL FIBRILLATION, UNSPECIFIED TYPE: ICD-10-CM

## 2018-07-17 DIAGNOSIS — N18.4 CKD (CHRONIC KIDNEY DISEASE), STAGE IV: ICD-10-CM

## 2018-07-17 DIAGNOSIS — I10 ESSENTIAL HYPERTENSION: Chronic | ICD-10-CM

## 2018-07-17 PROCEDURE — 3077F SYST BP >= 140 MM HG: CPT | Mod: CPTII,S$GLB,, | Performed by: INTERNAL MEDICINE

## 2018-07-17 PROCEDURE — 93000 ELECTROCARDIOGRAM COMPLETE: CPT | Mod: S$GLB,,, | Performed by: INTERNAL MEDICINE

## 2018-07-17 PROCEDURE — 3078F DIAST BP <80 MM HG: CPT | Mod: CPTII,S$GLB,, | Performed by: INTERNAL MEDICINE

## 2018-07-17 PROCEDURE — 99999 PR PBB SHADOW E&M-EST. PATIENT-LVL III: CPT | Mod: PBBFAC,,, | Performed by: INTERNAL MEDICINE

## 2018-07-17 PROCEDURE — 99213 OFFICE O/P EST LOW 20 MIN: CPT | Mod: S$GLB,,, | Performed by: INTERNAL MEDICINE

## 2018-07-17 NOTE — PROGRESS NOTES
Subjective:    Patient ID:  Radha Degroot is a 78 y.o. female who presents for follow-up of Atrial Fibrillation    Referring Cardiologist: Salvatore Silveira MD  Primary Care Physician: Katherine Merida MD    HPI  Prior Hx:  I had the pleasure of seeing Ms. Degroot today in our electrophysiology clinic for her atrial fibrillation. As you are aware she is a pleasant 78 year-old woman with coronary artery disease s/p STEMI in 2006 with PCI to LAD/Diag with preserved LVEF, hypertension, diabetes mellitus on insulin, obstructive sleep apnea, severe lumbar disc disease/back pain, and chronic kidney disease stage IV. She was in her usual state of health until the past 1-2 months when she began noting dyspnea on exertion and edema. She denied any palpitations or chest discomfort. She saw Dr. Silveira for this on 3/14/2018. She was noted to be in atrial fibrillation with ventricular rate of 81 bpm. Her previous ECG was from March of 2017 and noted sinus rhythm. She was only taking carvedilol once daily. This was increased to twice daily. Her lasix was increased for only a few days and then returned to her baseline dose of 40mg daily. A 24 hour holter was completed which noted AF with controlled ventricular rates. An echo noted normal LVEF.    ECHO 4/2018 CONCLUSIONS     1 - Normal left ventricular systolic function (EF 55-60%).     2 - Biatrial enlargement.     3 - Normal left ventricular diastolic function.     4 - Normal right ventricular systolic function .     5 - The estimated PA systolic pressure is 38 mmHg.     6 - Mild tricuspid regurgitation.     7 - Intermediate central venous pressure.     I reviewed all electrocardiograms available in EPIC which note sinus rhythm/bradycardia with PACs. Over time her NM has lengthened. In 3/2017 was ~300msec.    Interim Hx:  We performed a SHAYY guided DCCV on 5/18/2018. She did not notice any symptomatic benefit even after on the day of the DCCV. Follow-up ECG noted AF with controlled  ventricular rates. She called noting that she felt like she had a light-headed/dizzy sensation since starting eliquis. CBC was unchanged and holter noted well controlled rate without any significant bradycardia. She felt it was a direct side effect of the eliquis. She has been holding the eliquis for 4 days and notes she thinks her symptoms are less.     My interpretation of today's in clinic electrocardiogram is atrial fibrillation with an average ventricular response rate of 76 bpm.    Review of Systems   Constitution: Negative for fever and weakness.   HENT: Negative for congestion and sore throat.    Eyes: Negative for blurred vision and visual disturbance.   Cardiovascular: Negative for chest pain, dyspnea on exertion, irregular heartbeat, near-syncope, orthopnea, palpitations, paroxysmal nocturnal dyspnea and syncope.   Respiratory: Negative for cough and shortness of breath.    Hematologic/Lymphatic: Negative for bleeding problem. Does not bruise/bleed easily.   Skin: Negative.    Gastrointestinal: Negative for hematochezia and melena.   Neurological: Positive for dizziness and light-headedness.        Objective:    Physical Exam   Constitutional: She is oriented to person, place, and time. She appears well-developed and well-nourished. No distress.   HENT:   Head: Normocephalic and atraumatic.   Eyes: Conjunctivae are normal. Right eye exhibits no discharge. Left eye exhibits no discharge.   Neck: Neck supple.   Cardiovascular: Normal rate.  An irregularly irregular rhythm present. Exam reveals no gallop and no friction rub.    No murmur heard.  Pulmonary/Chest: Effort normal and breath sounds normal. No respiratory distress. She has no wheezes. She has no rales.   Abdominal: Soft. Bowel sounds are normal. She exhibits no distension. There is no tenderness. There is no rebound.   Musculoskeletal: She exhibits no edema.   Neurological: She is alert and oriented to person, place, and time.   Skin: Skin is warm  and dry. She is not diaphoretic.   Psychiatric: She has a normal mood and affect. Her behavior is normal. Judgment and thought content normal.   Vitals reviewed.        Assessment:       1. Persistent atrial fibrillation    2. CKD (chronic kidney disease), stage IV    3. Essential hypertension    4. Coronary artery disease involving native coronary artery of native heart without angina pectoris    5. Type 2 diabetes mellitus with both eyes affected by mild nonproliferative retinopathy and macular edema, with long-term current use of insulin    6. Obstructive sleep apnea         Plan:           In summary, Ms. Degroot is a pleasant 78 year-old woman with coronary artery disease s/p STEMI in 2006 with PCI to LAD/Diag with preserved LVEF, hypertension, diabetes mellitus on insulin, obstructive sleep apnea, severe lumbar disc disease/back pain, and chronic kidney disease stage IV presenting for follow-up for persistent atrial fibrillation. She did not note any symptomatic benefit with DCCV. She is well rate controlled and has a normal EF. Would advocate long-term rhythm control. She would like to resume eliquis and see if her light-headedness returns. If so would change to warfarin with goal INR 2-3.  She will message me if she chooses to switch. She needs lifelong anticoagulation as tolerated.    RTC in 6 months, sooner if needed.     Thank you for allowing me to participate in the care of this patient. Please do not hesitate to call me with any questions or concerns.     Mike Alexander MD, PhD  Cardiac Electrophysiology

## 2018-07-17 NOTE — TELEPHONE ENCOUNTER
----- Message from Stephani Price sent at 7/17/2018  9:28 AM CDT -----  Contact: Pt called   Pt would like a call back regarding blood thinner medication. Please call pt @ [phone]. Thank you.

## 2018-07-19 ENCOUNTER — TELEPHONE (OUTPATIENT)
Dept: UROLOGY | Facility: CLINIC | Age: 78
End: 2018-07-19

## 2018-07-19 RX ORDER — MIRABEGRON 50 MG/1
TABLET, FILM COATED, EXTENDED RELEASE ORAL
Qty: 90 TABLET | Refills: 0 | Status: SHIPPED | OUTPATIENT
Start: 2018-07-19 | End: 2018-08-29

## 2018-07-30 RX ORDER — BLOOD SUGAR DIAGNOSTIC
STRIP MISCELLANEOUS
Qty: 400 STRIP | Refills: 3 | Status: SHIPPED | OUTPATIENT
Start: 2018-07-30 | End: 2018-11-05 | Stop reason: SDUPTHER

## 2018-08-09 RX ORDER — ROSUVASTATIN CALCIUM 40 MG/1
TABLET, COATED ORAL
Qty: 30 TABLET | Refills: 6 | Status: SHIPPED | OUTPATIENT
Start: 2018-08-09 | End: 2019-02-27 | Stop reason: SDUPTHER

## 2018-08-14 ENCOUNTER — LAB VISIT (OUTPATIENT)
Dept: LAB | Facility: HOSPITAL | Age: 78
End: 2018-08-14
Attending: INTERNAL MEDICINE
Payer: MEDICARE

## 2018-08-14 DIAGNOSIS — N18.4 CKD (CHRONIC KIDNEY DISEASE), STAGE IV: ICD-10-CM

## 2018-08-14 LAB
25(OH)D3+25(OH)D2 SERPL-MCNC: 41 NG/ML
ALBUMIN SERPL BCP-MCNC: 3.6 G/DL
ANION GAP SERPL CALC-SCNC: 13 MMOL/L
BASOPHILS # BLD AUTO: 0.1 K/UL
BASOPHILS NFR BLD: 1 %
BUN SERPL-MCNC: 43 MG/DL
CALCIUM SERPL-MCNC: 9.3 MG/DL
CHLORIDE SERPL-SCNC: 102 MMOL/L
CO2 SERPL-SCNC: 25 MMOL/L
CREAT SERPL-MCNC: 2.6 MG/DL
DIFFERENTIAL METHOD: NORMAL
EOSINOPHIL # BLD AUTO: 0.2 K/UL
EOSINOPHIL NFR BLD: 2.3 %
ERYTHROCYTE [DISTWIDTH] IN BLOOD BY AUTOMATED COUNT: 14.2 %
EST. GFR  (AFRICAN AMERICAN): 19.5 ML/MIN/1.73 M^2
EST. GFR  (NON AFRICAN AMERICAN): 16.9 ML/MIN/1.73 M^2
GLUCOSE SERPL-MCNC: 169 MG/DL
HCT VFR BLD AUTO: 41.2 %
HGB BLD-MCNC: 13.4 G/DL
IMM GRANULOCYTES # BLD AUTO: 0.03 K/UL
IMM GRANULOCYTES NFR BLD AUTO: 0.3 %
LYMPHOCYTES # BLD AUTO: 2.3 K/UL
LYMPHOCYTES NFR BLD: 23.5 %
MCH RBC QN AUTO: 28.8 PG
MCHC RBC AUTO-ENTMCNC: 32.5 G/DL
MCV RBC AUTO: 89 FL
MONOCYTES # BLD AUTO: 0.8 K/UL
MONOCYTES NFR BLD: 7.7 %
NEUTROPHILS # BLD AUTO: 6.4 K/UL
NEUTROPHILS NFR BLD: 65.2 %
NRBC BLD-RTO: 0 /100 WBC
PHOSPHATE SERPL-MCNC: 3.5 MG/DL
PLATELET # BLD AUTO: 176 K/UL
PMV BLD AUTO: 12.8 FL
POTASSIUM SERPL-SCNC: 3.4 MMOL/L
PTH-INTACT SERPL-MCNC: 183 PG/ML
RBC # BLD AUTO: 4.65 M/UL
SODIUM SERPL-SCNC: 140 MMOL/L
WBC # BLD AUTO: 9.88 K/UL

## 2018-08-14 PROCEDURE — 85025 COMPLETE CBC W/AUTO DIFF WBC: CPT

## 2018-08-14 PROCEDURE — 82306 VITAMIN D 25 HYDROXY: CPT

## 2018-08-14 PROCEDURE — 36415 COLL VENOUS BLD VENIPUNCTURE: CPT | Mod: PO

## 2018-08-14 PROCEDURE — 83970 ASSAY OF PARATHORMONE: CPT

## 2018-08-14 PROCEDURE — 80069 RENAL FUNCTION PANEL: CPT

## 2018-08-20 ENCOUNTER — TELEPHONE (OUTPATIENT)
Dept: INTERNAL MEDICINE | Facility: CLINIC | Age: 78
End: 2018-08-20

## 2018-08-20 RX ORDER — FUROSEMIDE 40 MG/1
TABLET ORAL
Qty: 90 TABLET | Refills: 2 | Status: SHIPPED | OUTPATIENT
Start: 2018-08-20 | End: 2018-08-21 | Stop reason: SDUPTHER

## 2018-08-20 NOTE — TELEPHONE ENCOUNTER
----- Message from Jana Thomas sent at 8/20/2018  3:22 PM CDT -----  Contact: Patient [phone]  Is this a refill or new RX:  New    RX name and strength: furosemide (LASIX) 40 MG tablet    Directions: Take 2 a day per the Cardiologist Raoul    Is this a 30 day or 90 day RX:  90    Pharmacy name and phone # [id number] - [place] [address] [phone] (Phone) [phone] (Fax)    Comments:  She said her Cardiologist change it to 2 per day

## 2018-08-20 NOTE — TELEPHONE ENCOUNTER
Chart reviewed cardiology note was for one daily  He recommended Extra for a few days only then back to one daily  She can clarify with them if needed  Refill sent

## 2018-08-21 ENCOUNTER — TELEPHONE (OUTPATIENT)
Dept: CARDIOLOGY | Facility: CLINIC | Age: 78
End: 2018-08-21

## 2018-08-21 RX ORDER — FUROSEMIDE 40 MG/1
TABLET ORAL
Qty: 90 TABLET | Refills: 3 | Status: SHIPPED | OUTPATIENT
Start: 2018-08-21 | End: 2018-08-29 | Stop reason: SDUPTHER

## 2018-08-21 NOTE — TELEPHONE ENCOUNTER
,        LOV:3/14/18.The pt said that she has been taking the furosemide 40 mg 1 tablet twice daily since her March visit.She said that she was not aware that she was only suppose to take the extra dose for 4-5 days.She said that she has tried to go back to taking the 40 mg QD but the swelling is still there to her LE's.Reports that she would like the Rx to be changed to furosemide 40 mg BID Vs QD.She said she has been limiting her salt intake.Please advise.Thanks,Gisella

## 2018-08-21 NOTE — TELEPHONE ENCOUNTER
----- Message from Carmen Avendano sent at 8/21/2018  2:42 PM CDT -----  Contact: Patient  The Pt needs a refill on her furosemide (LASIX) 40 MG tablet BID, and it needs to be sent to   [facility] [address] [place] [address] [address] [phone] (Phone) [phone] (Fax).Thanks, Carmen 3/14/2018 Dr. Silveira

## 2018-08-22 ENCOUNTER — PATIENT MESSAGE (OUTPATIENT)
Dept: CARDIOLOGY | Facility: CLINIC | Age: 78
End: 2018-08-22

## 2018-08-22 ENCOUNTER — TELEPHONE (OUTPATIENT)
Dept: NEPHROLOGY | Facility: CLINIC | Age: 78
End: 2018-08-22

## 2018-08-22 ENCOUNTER — TELEPHONE (OUTPATIENT)
Dept: CARDIOLOGY | Facility: CLINIC | Age: 78
End: 2018-08-22

## 2018-08-22 DIAGNOSIS — N18.4 CKD (CHRONIC KIDNEY DISEASE), STAGE IV: Primary | ICD-10-CM

## 2018-08-22 DIAGNOSIS — R82.90 ABNORMAL URINALYSIS: ICD-10-CM

## 2018-08-22 NOTE — TELEPHONE ENCOUNTER
Spoke with the pt - spoke with the pt's pharmacy - says that the pt's insurance will not cover Lasix 40 mg twice a day. Pt is out of her medication - pharmacist says may pay out of pocket $32.09. Pt informed.

## 2018-08-22 NOTE — TELEPHONE ENCOUNTER
----- Message from Carmen Avendano sent at 8/22/2018  3:07 PM CDT -----  Contact: Patient  The Pt is calling to get a refill on her Lasix 40mg  BID. Please send it to [facility] [address] [place] [address] [address] [phone] (Phone) [phone] (Fax). Thanks,Carmen   3/14/2018 Dr. Silveira

## 2018-08-23 ENCOUNTER — LAB VISIT (OUTPATIENT)
Dept: LAB | Facility: HOSPITAL | Age: 78
End: 2018-08-23
Attending: INTERNAL MEDICINE
Payer: MEDICARE

## 2018-08-23 ENCOUNTER — TELEPHONE (OUTPATIENT)
Dept: CARDIOLOGY | Facility: CLINIC | Age: 78
End: 2018-08-23

## 2018-08-23 DIAGNOSIS — R82.90 ABNORMAL URINALYSIS: ICD-10-CM

## 2018-08-23 DIAGNOSIS — N18.4 CKD (CHRONIC KIDNEY DISEASE), STAGE IV: ICD-10-CM

## 2018-08-23 LAB
BACTERIA #/AREA URNS AUTO: NORMAL /HPF
BILIRUB UR QL STRIP: NEGATIVE
CLARITY UR REFRACT.AUTO: ABNORMAL
COLOR UR AUTO: YELLOW
CREAT UR-MCNC: 72 MG/DL
GLUCOSE UR QL STRIP: NEGATIVE
HGB UR QL STRIP: ABNORMAL
HYALINE CASTS UR QL AUTO: 1 /LPF
KETONES UR QL STRIP: NEGATIVE
LEUKOCYTE ESTERASE UR QL STRIP: NEGATIVE
MICROSCOPIC COMMENT: NORMAL
NITRITE UR QL STRIP: NEGATIVE
PH UR STRIP: 6 [PH] (ref 5–8)
PROT UR QL STRIP: ABNORMAL
PROT UR-MCNC: 42 MG/DL
PROT/CREAT UR: 0.58 MG/G{CREAT}
RBC #/AREA URNS AUTO: 1 /HPF (ref 0–4)
SP GR UR STRIP: 1.01 (ref 1–1.03)
SQUAMOUS #/AREA URNS AUTO: 5 /HPF
URN SPEC COLLECT METH UR: ABNORMAL
UROBILINOGEN UR STRIP-ACNC: NEGATIVE EU/DL
WBC #/AREA URNS AUTO: 1 /HPF (ref 0–5)

## 2018-08-23 PROCEDURE — 87077 CULTURE AEROBIC IDENTIFY: CPT

## 2018-08-23 PROCEDURE — 87088 URINE BACTERIA CULTURE: CPT

## 2018-08-23 PROCEDURE — 82570 ASSAY OF URINE CREATININE: CPT

## 2018-08-23 PROCEDURE — 87086 URINE CULTURE/COLONY COUNT: CPT

## 2018-08-23 PROCEDURE — 81001 URINALYSIS AUTO W/SCOPE: CPT

## 2018-08-23 PROCEDURE — 87186 SC STD MICRODIL/AGAR DIL: CPT

## 2018-08-23 NOTE — TELEPHONE ENCOUNTER
Called the pt - scheduled appt for 8-29-18 with Dr. Jordan due to edema and taking additional Lasix.

## 2018-08-24 ENCOUNTER — TELEPHONE (OUTPATIENT)
Dept: CARDIOLOGY | Facility: CLINIC | Age: 78
End: 2018-08-24

## 2018-08-24 NOTE — TELEPHONE ENCOUNTER
Dr. Silveira, The pt called back twice on Wednesday very upset about her Lasix prescription. Was upset that it is for one daily. As you know she mistakingly took one twice a day, and now her insurance won't cover the new Rx - saying it is too early. I spoke with the pharmacist at her pharmacy, and was told that she could pay out of pocket so she wouldn't be totally out of her medication.  Pt says that if she doesn't take med twice she has swelling and is having some sob.  I scheduled her for an appt on 8-30-18 with Dr. Jordan at 4 pm [requested appt on the day she is seeing Dr. Mcmanus in Nephrology] and advised her to take just one tablet of Lasix. Will call her and tell her about compression hose. Please advise. Thanks, Sierra

## 2018-08-24 NOTE — TELEPHONE ENCOUNTER
Spoke with the pt and gave her Dr. Silveira's instruction to wear compression hose - says that just her feet are swollen. She will keep her appt with Dr. Jordan.

## 2018-08-26 LAB — BACTERIA UR CULT: NORMAL

## 2018-08-27 ENCOUNTER — TELEPHONE (OUTPATIENT)
Dept: NEPHROLOGY | Facility: CLINIC | Age: 78
End: 2018-08-27

## 2018-08-27 RX ORDER — CIPROFLOXACIN 250 MG/1
250 TABLET, FILM COATED ORAL 2 TIMES DAILY
Qty: 16 TABLET | Refills: 0 | Status: SHIPPED | OUTPATIENT
Start: 2018-08-27 | End: 2018-08-29 | Stop reason: SINTOL

## 2018-08-28 ENCOUNTER — TELEPHONE (OUTPATIENT)
Dept: CARDIOLOGY | Facility: CLINIC | Age: 78
End: 2018-08-28

## 2018-08-28 DIAGNOSIS — R00.2 PALPITATION: Primary | ICD-10-CM

## 2018-08-29 ENCOUNTER — OFFICE VISIT (OUTPATIENT)
Dept: CARDIOLOGY | Facility: CLINIC | Age: 78
End: 2018-08-29
Payer: MEDICARE

## 2018-08-29 ENCOUNTER — OFFICE VISIT (OUTPATIENT)
Dept: NEPHROLOGY | Facility: CLINIC | Age: 78
End: 2018-08-29
Payer: MEDICARE

## 2018-08-29 VITALS
BODY MASS INDEX: 33.01 KG/M2 | SYSTOLIC BLOOD PRESSURE: 133 MMHG | WEIGHT: 217.81 LBS | OXYGEN SATURATION: 97 % | HEIGHT: 68 IN | HEART RATE: 62 BPM | DIASTOLIC BLOOD PRESSURE: 57 MMHG

## 2018-08-29 VITALS
BODY MASS INDEX: 32.81 KG/M2 | HEART RATE: 74 BPM | SYSTOLIC BLOOD PRESSURE: 140 MMHG | OXYGEN SATURATION: 97 % | DIASTOLIC BLOOD PRESSURE: 80 MMHG | WEIGHT: 216.5 LBS | HEIGHT: 68 IN

## 2018-08-29 DIAGNOSIS — Z79.4 TYPE 2 DIABETES MELLITUS WITH STAGE 4 CHRONIC KIDNEY DISEASE, WITH LONG-TERM CURRENT USE OF INSULIN: ICD-10-CM

## 2018-08-29 DIAGNOSIS — N17.9 AKI (ACUTE KIDNEY INJURY): ICD-10-CM

## 2018-08-29 DIAGNOSIS — E11.22 TYPE 2 DIABETES MELLITUS WITH STAGE 4 CHRONIC KIDNEY DISEASE, WITH LONG-TERM CURRENT USE OF INSULIN: ICD-10-CM

## 2018-08-29 DIAGNOSIS — I50.32 CHRONIC DIASTOLIC CHF (CONGESTIVE HEART FAILURE): ICD-10-CM

## 2018-08-29 DIAGNOSIS — B96.20 E. COLI UTI: ICD-10-CM

## 2018-08-29 DIAGNOSIS — I25.10 CORONARY ARTERY DISEASE INVOLVING NATIVE CORONARY ARTERY OF NATIVE HEART WITHOUT ANGINA PECTORIS: Chronic | ICD-10-CM

## 2018-08-29 DIAGNOSIS — N18.4 HYPERTENSIVE KIDNEY DISEASE WITH STAGE 4 CHRONIC KIDNEY DISEASE: ICD-10-CM

## 2018-08-29 DIAGNOSIS — N18.4 TYPE 2 DIABETES MELLITUS WITH STAGE 4 CHRONIC KIDNEY DISEASE, WITH LONG-TERM CURRENT USE OF INSULIN: ICD-10-CM

## 2018-08-29 DIAGNOSIS — I12.9 HYPERTENSIVE KIDNEY DISEASE WITH STAGE 4 CHRONIC KIDNEY DISEASE: ICD-10-CM

## 2018-08-29 DIAGNOSIS — N39.0 E. COLI UTI: ICD-10-CM

## 2018-08-29 DIAGNOSIS — I48.19 PERSISTENT ATRIAL FIBRILLATION: ICD-10-CM

## 2018-08-29 DIAGNOSIS — I10 ESSENTIAL HYPERTENSION: Chronic | ICD-10-CM

## 2018-08-29 DIAGNOSIS — D17.71 ANGIOMYOLIPOMA OF RIGHT KIDNEY: ICD-10-CM

## 2018-08-29 DIAGNOSIS — N18.4 CKD (CHRONIC KIDNEY DISEASE), STAGE IV: Primary | ICD-10-CM

## 2018-08-29 DIAGNOSIS — E55.9 VITAMIN D DEFICIENCY DISEASE: ICD-10-CM

## 2018-08-29 DIAGNOSIS — N25.81 SECONDARY RENAL HYPERPARATHYROIDISM: ICD-10-CM

## 2018-08-29 DIAGNOSIS — N28.1 ACQUIRED CYST OF KIDNEY: ICD-10-CM

## 2018-08-29 PROCEDURE — 3078F DIAST BP <80 MM HG: CPT | Mod: CPTII,GC,S$GLB, | Performed by: STUDENT IN AN ORGANIZED HEALTH CARE EDUCATION/TRAINING PROGRAM

## 2018-08-29 PROCEDURE — 3079F DIAST BP 80-89 MM HG: CPT | Mod: CPTII,S$GLB,, | Performed by: INTERNAL MEDICINE

## 2018-08-29 PROCEDURE — 99999 PR PBB SHADOW E&M-EST. PATIENT-LVL V: CPT | Mod: PBBFAC,GC,, | Performed by: STUDENT IN AN ORGANIZED HEALTH CARE EDUCATION/TRAINING PROGRAM

## 2018-08-29 PROCEDURE — 99214 OFFICE O/P EST MOD 30 MIN: CPT | Mod: GC,S$GLB,, | Performed by: STUDENT IN AN ORGANIZED HEALTH CARE EDUCATION/TRAINING PROGRAM

## 2018-08-29 PROCEDURE — 3074F SYST BP LT 130 MM HG: CPT | Mod: CPTII,S$GLB,, | Performed by: INTERNAL MEDICINE

## 2018-08-29 PROCEDURE — 99999 PR PBB SHADOW E&M-EST. PATIENT-LVL III: CPT | Mod: PBBFAC,,, | Performed by: INTERNAL MEDICINE

## 2018-08-29 PROCEDURE — 1101F PT FALLS ASSESS-DOCD LE1/YR: CPT | Mod: CPTII,GC,S$GLB, | Performed by: STUDENT IN AN ORGANIZED HEALTH CARE EDUCATION/TRAINING PROGRAM

## 2018-08-29 PROCEDURE — 99215 OFFICE O/P EST HI 40 MIN: CPT | Mod: S$GLB,,, | Performed by: INTERNAL MEDICINE

## 2018-08-29 PROCEDURE — 3075F SYST BP GE 130 - 139MM HG: CPT | Mod: CPTII,GC,S$GLB, | Performed by: STUDENT IN AN ORGANIZED HEALTH CARE EDUCATION/TRAINING PROGRAM

## 2018-08-29 PROCEDURE — 99499 UNLISTED E&M SERVICE: CPT | Mod: HCNC,S$GLB,, | Performed by: INTERNAL MEDICINE

## 2018-08-29 RX ORDER — FUROSEMIDE 40 MG/1
TABLET ORAL
Qty: 90 TABLET | Refills: 3 | Status: SHIPPED | OUTPATIENT
Start: 2018-08-29 | End: 2018-09-12 | Stop reason: SDUPTHER

## 2018-08-29 RX ORDER — CALCITRIOL 0.25 UG/1
0.25 CAPSULE ORAL
Qty: 30 CAPSULE | Refills: 3 | Status: CANCELLED | OUTPATIENT
Start: 2018-08-29

## 2018-08-29 RX ORDER — DOXYCYCLINE HYCLATE 100 MG
100 TABLET ORAL 2 TIMES DAILY
Qty: 10 TABLET | Refills: 0 | Status: SHIPPED | OUTPATIENT
Start: 2018-08-29 | End: 2018-09-06 | Stop reason: ALTCHOICE

## 2018-08-29 NOTE — ASSESSMENT & PLAN NOTE
Overloaded on PE and symptomatic for CHF  Says that nephrologist recommended her against decreasing water intake (currently drinking almost 2 L daily), compliant w salt restriction  -Increase lasix to 40 BID until she gets dry, then decrease to 40 mg daily and additional prn one if edema or puts on 3 lb  -Continue coreg

## 2018-08-29 NOTE — ASSESSMENT & PLAN NOTE
CHDAS VASC 6  Refuses to take warfarin  Cannot tolerate apixaban 5 BID  Almost 78 yo, creatinine 2.6   -Apixaban 2.5 BID instead of 5 daily  -Continue carvedilol

## 2018-08-29 NOTE — PROGRESS NOTES
I have reviewed the fellow's history and physical and discussed the case with the fellow. I have personally spoken with and examined the patient in the clinic. I agree with the findings, assessment, and plan.  Patient's major problem is inability to move due to back pain. Only mildly fluid overloaded.  Discussed with patient and daughter how to use scale to adjust dose of furosemide.

## 2018-08-29 NOTE — PROGRESS NOTES
Katherine Merida MD      Subjective:   Patient ID:  Radha Degroot is a 78 y.o. female who presents for LE edema/SOB. She has a hx of CAD s/p POBA to D1 2/2 STEMI (2006), PCI to pLAD and mLAD (2006), HIT, HTN, DELFINA on CPAP, HLD, CKD4, DM2, HFpEF (55-60%), a fib (CHADS VASC 6) s/p cardioversion and recurrence of a fib.    She had a dobutamine stress TTE that was positive for ischemia but no cath was pursued due to poor renal function.     She has back pain that is a great limitation to her quality of life. She also has dyspnea on mild exertion (walking) and bilateral lower extremity edema.     Denies CP, palpitations, and lightheadedness.    She refuses to use warfarin and apixaban 5 mg BID makes her feels dizzy, so she has been taking apixaban 5 mg daily.      Review of Systems   Constitution: Negative for chills and decreased appetite.   HENT: Negative for congestion, ear discharge and ear pain.    Eyes: Negative for blurred vision and discharge.   Cardiovascular: Positive for dyspnea on exertion and leg swelling. Negative for chest pain, claudication and cyanosis.   Respiratory: Negative for cough and hemoptysis.    Endocrine: Negative for cold intolerance and heat intolerance.   Skin: Negative for color change and nail changes.   Musculoskeletal: Positive for back pain. Negative for arthritis.   Gastrointestinal: Negative for bloating and abdominal pain.   Genitourinary: Negative for bladder incontinence and decreased libido.   Neurological: Negative for aphonia and brief paralysis.       Past Medical History:   Diagnosis Date    ALLERGIC RHINITIS     Anemia     Anticoagulant long-term use     Arthritis     Cardiomyopathy, ischemic     Carpal tunnel syndrome     Cataract     Left eye    CHF (congestive heart failure)     Chronic kidney disease     Coronary artery disease     hx stent X2    Diabetes mellitus type II     Diabetic neuropathy     Diabetic retinopathy of both eyes     Encounter for  blood transfusion     GERD (gastroesophageal reflux disease)     hiatal hernia    Gout, unspecified     h/o LAD and D1 PCI in 2006 6/17/2013    Heart attack 2006    Hiatal hernia     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    Hx of colonic polyps     1/9    Hx of colonic polyps     Hyperlipidemia     Hypertension     Myocardial infarction nov 2006    Persistent atrial fibrillation 4/25/2018    Posterior vitreous detachment of both eyes     Sleep apnea     Type 2 diabetes mellitus with mild nonproliferative diabetic retinopathy with macular edema 2/15/2013       Past Surgical History:   Procedure Laterality Date    APPENDECTOMY      CATARACT EXTRACTION      Right eye    heart stent      X 2    HYSTERECTOMY      Fibroids       Family History   Problem Relation Age of Onset    Diabetes Mother     Heart disease Mother     Hypertension Mother     Diabetes Father     Melanoma Father     Kidney failure Father     Mental illness Sister         suicide/schizophrenic    Cancer Maternal Grandfather     Cancer Paternal Grandfather         colon    Psoriasis Neg Hx     Lupus Neg Hx     Eczema Neg Hx          Current Outpatient Medications:     ACCU-CHEK MELIDA PLUS TEST STRP Strp, USE TO TEST BLOOD SUGAR FOUR TIMES DAILY, Disp: 400 strip, Rfl: 3    apixaban 2.5 mg Tab, Take 1 tablet (2.5 mg total) by mouth 2 (two) times daily., Disp: 60 tablet, Rfl: 11    blood-glucose meter (ACCU-CHEK MELIDA PLUS METER) Misc, To check sugars 4 times daily.  Please include control solution, Disp: 1 each, Rfl: 0    carvedilol (COREG) 12.5 MG tablet, Take 1 tablet (12.5 mg total) by mouth 2 (two) times daily., Disp: 180 tablet, Rfl: 4    doxycycline (VIBRA-TABS) 100 MG tablet, Take 1 tablet (100 mg total) by mouth 2 (two) times daily., Disp: 10 tablet, Rfl: 0    fluticasone (FLONASE) 50 mcg/actuation nasal spray, SHAKE LIQUID AND USE 2 SPRAYS IN EACH NOSTRIL EVERY DAY, Disp: 16 g, Rfl: 6    furosemide  "(LASIX) 40 MG tablet, TAKE 1 TABLET(40 MG) BY MOUTH EVERY DAY, Disp: 90 tablet, Rfl: 3    insulin aspart (NOVOLOG FLEXPEN) 100 unit/mL InPn pen, Inject 17 units before meals with ss (Patient taking differently: Sliding scale inject 14 units), Disp: 4 Box, Rfl: 1    insulin degludec (TRESIBA FLEXTOUCH U-100) 100 unit/mL (3 mL) InPn, Inject 40 Units into the skin once daily., Disp: 15 Syringe, Rfl: 0    ketoconazole (NIZORAL) 2 % shampoo, APPLY TO AFFECTED AREA EVERY OTHER DAY AND LET SIT 5 MINUTES PRIOR TO RINSING AS DIRECTED, Disp: 120 mL, Rfl: 2    lancets (ACCU-CHEK SOFTCLIX LANCETS) Misc, 1 each by Misc.(Non-Drug; Combo Route) route 4 (four) times daily., Disp: 360 each, Rfl: 3    MULTIVIT-MIN/IRON/FOLIC/LUTEIN (CENTRUM SILVER WOMEN ORAL), Take by mouth once daily. , Disp: , Rfl:     pen needle, diabetic (BD ULTRA-FINE TIM PEN NEEDLES) 32 gauge x 5/32" Ndle, USE FOUR TIMES DAILY FOR BLOOD SUGAR TESTING, Disp: 200 each, Rfl: 8    pen needle, diabetic (BD ULTRA-FINE TIM PEN NEEDLES) 32 gauge x 5/32" Ndle, Use four times daily for insulin, Disp: 480 each, Rfl: 11    rosuvastatin (CRESTOR) 40 MG Tab, TAKE 1 TABLET(40 MG) BY MOUTH EVERY DAY, Disp: 30 tablet, Rfl: 6    Ca cmb no.1-[id number]-[id number] (VITAMIN D-3 WITH ALOE) 120-1,000-10 mg-unit-mg Tab, Take by mouth once daily. , Disp: , Rfl:     nitroGLYCERIN (NITROSTAT) 0.3 MG SL tablet, Place 1 tablet (0.3 mg total) under the tongue every 5 (five) minutes as needed for Chest pain., Disp: 30 tablet, Rfl: 3  Objective:   BP (!) 133/57 (BP Location: Left arm, Patient Position: Sitting, BP Method: Large (Automatic))   Pulse 62   Ht 5' 8" (1.727 m)   Wt 98.8 kg (217 lb 13 oz)   SpO2 97%   BMI 33.12 kg/m²      Physical Exam   Constitutional: She is oriented to person, place, and time. She appears well-developed and well-nourished.   HENT:   Head: Normocephalic and atraumatic.   Nose: Nose normal.   Mouth/Throat: No oropharyngeal exudate.   Eyes: Right eye " exhibits no discharge. Left eye exhibits no discharge. No scleral icterus.   Neck: Normal range of motion. Neck supple. No JVD present.   Cardiovascular: Normal rate, regular rhythm, S1 normal and S2 normal. Exam reveals no gallop, no S3, no S4, no distant heart sounds, no friction rub, no midsystolic click and no opening snap.   No murmur heard.  Pulses:       Radial pulses are 2+ on the right side, and 2+ on the left side.        Femoral pulses are 2+ on the right side, and 2+ on the left side.  Pulmonary/Chest: Effort normal and breath sounds normal. No respiratory distress. She has no wheezes. She has no rales. She exhibits no tenderness.   Abdominal: Soft. Bowel sounds are normal. She exhibits no distension. There is no tenderness. There is no rebound.   Musculoskeletal: Normal range of motion. She exhibits edema. She exhibits no tenderness or deformity.   Lymphadenopathy:     She has no cervical adenopathy.   Neurological: She is alert and oriented to person, place, and time. No cranial nerve deficit.   Skin: Skin is warm and dry.   Psychiatric: She has a normal mood and affect. Her behavior is normal.       Lab Results   Component Value Date     08/14/2018    K 3.4 (L) 08/14/2018     08/14/2018    CO2 25 08/14/2018    BUN 43 (H) 08/14/2018    CREATININE 2.6 (H) 08/14/2018    ANIONGAP 13 08/14/2018     Lab Results   Component Value Date    HGBA1C 8.0 (H) 05/30/2018     Lab Results   Component Value Date     (H) 03/14/2018     (H) 03/20/2017     (H) 05/02/2016       Lab Results   Component Value Date    WBC 9.88 08/14/2018    HGB 13.4 08/14/2018    HCT 41.2 08/14/2018     08/14/2018    GRAN 6.4 08/14/2018    GRAN 65.2 08/14/2018     Lab Results   Component Value Date    CHOL 171 02/28/2018    HDL 54 02/28/2018    LDLCALC 86.4 02/28/2018    TRIG 153 (H) 02/28/2018        Assessment:     1. Persistent atrial fibrillation    2. Chronic diastolic CHF (congestive heart  failure)    3. Essential hypertension    4. Coronary artery disease involving native coronary artery of native heart without angina pectoris        Plan:   Chronic diastolic CHF (congestive heart failure)  Overloaded on PE and symptomatic for CHF  Says that nephrologist recommended her against decreasing water intake (currently drinking almost 2 L daily), compliant w salt restriction  -Increase lasix to 40 BID until she gets dry, then decrease to 40 mg daily and additional prn one if edema or puts on 3 lb  -Continue coreg      Essential hypertension  -Continue coreg      Persistent atrial fibrillation  CHDAS VASC 6  Refuses to take warfarin  Cannot tolerate apixaban 5 BID  Almost 78 yo, creatinine 2.6   -Apixaban 2.5 BID instead of 5 daily  -Continue carvedilol    Coronary artery disease  S/p POBA to D1 and PCI to pLAD and mLAD (2006)  -Start ASA  81 mg  -Continue carvedilol, rosuvastatin    Case discussed w Dr. Huggins

## 2018-08-29 NOTE — PROGRESS NOTES
"Subjective:       Patient ID: Radha Degroot is a 78 y.o. White female who presents for follow-up evaluation of CKD.    HPI     Ms. Degroot was seen in nephrology clinic for follow up on CKD. She was previously being followed by Dr. Mcgill and was last seen by him on 3/9/16. I saw her on 8/24/16, she was to follow with me in 3 months, for unclear reason she had next appointment on 6/28/17 when she was noted to have worsening of renal function and DANDY on CKD. She has CKD IV, diabetes with complications like neuropathy, diabetic retinopathy, hypertension, CAD, DELFINA, diastolic heart failure and several other medical problems. In the past she was taken off aldactone due to hyperkalemia.     She was last followed on 5/16/18.    Interim, she had E. Coli UTI for which oral Cipro has been prescribed to her. She has further worsening of her CKD per most recent labs and also has DANDY superimposed on CKD.      Today she arrived in the clinic reporting how "bad" she has been feeling since she started Cipro. She has several vague symptoms but states she has been having anxiety about Cipro and she has been feeling lightheaded and dizzy and with poor energy level and describes her challenge in getting to the clinic from parking lot today. I asked her further if she had any nausea/ poor appetite/ changes to taste sensation/ decreased urine and she denied. She does not seem to be checking her BP at home. She does admit to having worsened edema of legs.     She reports having constipation at times and at times also has difficulty with emptying her bladder.    She still has not returned to see her urologist for follow up on right kidney mass, she has not done US also which was ordered by her urologist last year. She was reminded to do US and keep follow up with urology.    Her diabetes control continues to remain sub optimal. She has longstanding and historically poorly controlled diabetes, at times A1C has remained above 10, she has " longstanding proteinuria. Most recent A1C was 8.0.    At the time of her visit on 6/28/17 she was noted to have worsening renal function and hypotension which she confirmed was happening on her home BP readings also and as a result she was feeling tired and dizzy. She was taken off amlodipine and lisinopril at that time. She was advised to follow BP at home while continuing Coreg and lasix. Her follow up labs on 7/13/17 noted for improvement in her renal function including creatinine and BUN.     She was advised to do renal labs periodically but she remained a no show/ lab cancellation by patient and especially did not have any labs between 10/17 and 1/18, this was despite standing orders. This was brought to her attention.     Most recent labs show worsened renal function as detailed under assessment and plan. She was made aware of that.     Prior lab trend noted for onset of CKD since around 2006 and episodes of DANDY, and progression to CKD IV. US kidneys per PCP from 9/16 noted for Kidney size of 11 and 10.7 cm. Per report 1.6 cm isoechoic focus within the superior pole of the right kidney corresponding to abnormality seen on recent MRI which does not have the characteristics of a simple cyst.  Recommend further evaluation with CT of the abdomen with and without contrast renal mass protocol. 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 It did not show any mass/ stone/ obstruction.     Most recent labs noted  Renal Function:  Lab Results   Component Value Date     (H) 08/14/2018     (H) 05/15/2018     (H) 05/15/2018     (H) 05/15/2018     (H) 05/15/2018     08/14/2018     05/15/2018     05/15/2018     05/15/2018     05/15/2018    K 3.4 (L) 08/14/2018    K 4.2 05/15/2018    K 4.2 05/15/2018    K 4.2 05/15/2018    K 4.2 05/15/2018     08/14/2018     05/15/2018     05/15/2018      05/15/2018     05/15/2018    CO2 25 08/14/2018    CO2 23 05/15/2018    CO2 23 05/15/2018    CO2 23 05/15/2018    CO2 23 05/15/2018    BUN 43 (H) 08/14/2018    BUN 33 (H) 05/15/2018    BUN 33 (H) 05/15/2018    BUN 33 (H) 05/15/2018    BUN 33 (H) 05/15/2018    CALCIUM 9.3 08/14/2018    CALCIUM 9.2 05/15/2018    CALCIUM 9.4 05/15/2018    CALCIUM 9.4 05/15/2018    CALCIUM 9.4 05/15/2018    CREATININE 2.6 (H) 08/14/2018    CREATININE 1.8 (H) 05/15/2018    CREATININE 1.8 (H) 05/15/2018    CREATININE 1.8 (H) 05/15/2018    CREATININE 1.8 (H) 05/15/2018    ALBUMIN 3.6 08/14/2018    ALBUMIN 3.7 05/15/2018    ALBUMIN 3.7 05/15/2018    ALBUMIN 3.7 05/15/2018    PHOS 3.5 08/14/2018    PHOS 3.3 05/15/2018    PHOS 3.3 05/15/2018    PHOS 3.3 05/15/2018    ESTGFRAFRICA 19.5 (A) 08/14/2018    ESTGFRAFRICA 30.4 (A) 05/15/2018    ESTGFRAFRICA 30.4 (A) 05/15/2018    ESTGFRAFRICA 30.4 (A) 05/15/2018    ESTGFRAFRICA 30.4 (A) 05/15/2018    EGFRNONAA 16.9 (A) 08/14/2018    EGFRNONAA 26.4 (A) 05/15/2018    EGFRNONAA 26.4 (A) 05/15/2018    EGFRNONAA 26.4 (A) 05/15/2018    EGFRNONAA 26.4 (A) 05/15/2018       Urinalysis:  Lab Results   Component Value Date    APPEARANCEUA Hazy (A) 08/23/2018    PHUR 6.0 08/23/2018    SPECGRAV 1.010 08/23/2018    PROTEINUA 1+ (A) 08/23/2018    GLUCUA Negative 08/23/2018    OCCULTUA 1+ (A) 08/23/2018    NITRITE Negative 08/23/2018    LEUKOCYTESUR Negative 08/23/2018       Protein/Creatinine Ratio:  Lab Results   Component Value Date    PROTEINURINE 42 (H) 08/23/2018    CREATRANDUR 72.0 08/23/2018    UTPCR 0.58 (H) 08/23/2018       CBC:  Lab Results   Component Value Date    WBC 9.88 08/14/2018    HGB 13.4 08/14/2018    HCT 41.2 08/14/2018         Last kidney US in 2016, she had one ordered by urologist, Dr. Greene in 6/17 which patient has not done so far   US kidney was ordered at the time of visit in may 2018 but for unclear reason she has not done that so far  .  Review of Systems    Constitutional: Positive for activity change. Negative for appetite change, chills and fever.   HENT: Negative for sneezing and sore throat.    Eyes: Negative for visual disturbance.   Respiratory: Negative for cough and shortness of breath.    Cardiovascular: Positive for leg swelling. Negative for chest pain.   Gastrointestinal: Negative for abdominal pain, diarrhea, nausea and vomiting.   Genitourinary: Negative for decreased urine volume, dysuria, hematuria and urgency.   Musculoskeletal: Positive for back pain and gait problem.   Skin: Negative for rash.   Allergic/Immunologic: Negative for immunocompromised state.   Neurological: Positive for dizziness. Negative for headaches.   Psychiatric/Behavioral: Negative for behavioral problems and confusion.       Objective:      Physical Exam   Constitutional: She is oriented to person, place, and time. She appears well-developed and well-nourished. No distress.   HENT:   Head: Normocephalic and atraumatic.   Eyes: Conjunctivae are normal. Right eye exhibits no discharge. Left eye exhibits no discharge.   Neck: Normal range of motion.   Cardiovascular: Normal rate, regular rhythm and normal heart sounds.   Pulmonary/Chest: Effort normal and breath sounds normal. No respiratory distress. She has no wheezes.   Abdominal: Soft. There is no tenderness.   Musculoskeletal: She exhibits edema (2+).   Neurological: She is alert and oriented to person, place, and time.   Skin: Skin is warm and dry.   Psychiatric: She has a normal mood and affect. Her behavior is normal.   Vitals reviewed.      Assessment:       1. CKD (chronic kidney disease), stage IV    2. DANDY (acute kidney injury)    3. E. coli UTI    4. Acquired cyst of kidney    5. Angiomyolipoma of right kidney    6. Secondary renal hyperparathyroidism    7. Type 2 diabetes mellitus with stage 4 chronic kidney disease, with long-term current use of insulin    8. Vitamin D deficiency disease    9. Hypertensive kidney  disease with stage 4 chronic kidney disease        Plan:     Ms. Degroot has CKD IV which is clinically felt to be due to diabetic nephropathy and hypertensive glomerulosclerosis. Labs, CKD staging, risk of progression to ESRD was discussed with her in detail. So far available serial labs were explained to her in detail.     She established care with me in 8/16 but for unclear reason she did not have follow up visit arranged until 6/17 when she was noted to have DANDY vs progression of CKD. That time she was taken off amlodipine and lisinopril due to hypotensive episodes and especially with worsening renal function.     Again today explained to her about all of her serial labs, longstanding CKD IV status already which has progressed, her kidneys with poor autoregulation and higher risk of DANDY due to hypotension/ volume depletion/ contrast agents/ nephrotoxins/ NSAID etc and higher risk of progression to CKD V/ ESRD. Also cautioned her about her multivitamin with mineral preparation as it will give her additional K, phos and other minerals.     Given her advanced CKD and risk of DANDY, will hold off restarting any ACE-I or ARB use or K sparing diuretic due to risk of hyperkalemia. This was again explained to her today.      Stressed that she needs to be compliant with standing orders of renal panel to be done every 4 to 6 weeks.     US kidneys ASAP and return to see urology to follow on right kidney mass which is felt to be an angiomyolipoma. Level of proteinuria or microhematuria has not changed. But has overall worsened kidney function which is due to azotemia, complicated UTI along with progression of already advanced CKD.    TOPS referred for discussion and counseling of treatment options for renal replacement, renal labs every 3 weeks, if further worsening, then referral to vascular for access creation  - Cipro started recently for the treatment for E. Coli UTI but she reports side effects which are somewhat vague  but it can cause anxiety and neurosis in severe cases. Given her perception of this and distress she is describing we will stop it and start Doxy. She has allergic reaction to Keflex from the past. Discussed all this with her in detail.  - repeat renal labs every 1 week for electrolytes, acid base, eGFR and she will be RTC in 2 weeks to closely monitor her new symptoms as if it worsens along with renal function it could simply suggest early uremic prodrome. I did explain that to her.   - Trend proteinuria  - Trend PTH levels, start calcitriol 0.25 mcg MWF after she completes her Doxy and trend PTH, corrected Ca   - she has long standing anemia and per most recent Hb there is no indication for RITO  - closely monitor renal panel for electrolytes, acid base status, eGFR  - risk of CKD progression d/w patient  - low salt and low potassium diet, low phos diet  - avoid NSAID/ bactrim/ IV contrast/ gadolinium/ aminoglycoside/ fleet enema where possible  - diuretic regimen per her cardiology. She has worsened edema.   - decision to continue PPI per primary   - renal dosing of medicines for creatinine clearance less than 30    Coreg and Eliquis, diuretic dosing per cardiology  Increase water intake if ok with cardiology    Leg edema less likely from renal cause as level of proteinuria is minimal    Low salt diet    Needs strict lab surveillance    Labs were ordered but was no show once and cancellation at other times. Pt does not recollect it.    Trend H/H    Needs follow up with urology. It appears she had an US ordered by urology for annual surveillance on angiomyolipoma but pt has not done it so far    Suspect forgetfulness and confusion with her intake of medicines, keeping up with labs and medicine dosing    RTC 2 weeks     Labs, plan, recommendations were discussed with her in detail. Her questions were answered to her satisfaction. She arrived alone for today's visit. Will obtain A1C with next week's labs as she has  appointment to see her diabetes clinic provider next week.

## 2018-08-29 NOTE — ASSESSMENT & PLAN NOTE
S/p POBA to D1 and PCI to pLAD and mLAD (2006)  -Start ASA  81 mg  -Continue carvedilol, rosuvastatin

## 2018-09-05 ENCOUNTER — TELEPHONE (OUTPATIENT)
Dept: NEPHROLOGY | Facility: CLINIC | Age: 78
End: 2018-09-05

## 2018-09-05 ENCOUNTER — HOSPITAL ENCOUNTER (OUTPATIENT)
Dept: RADIOLOGY | Facility: HOSPITAL | Age: 78
Discharge: HOME | End: 2018-09-05
Attending: INTERNAL MEDICINE
Payer: MEDICARE

## 2018-09-05 DIAGNOSIS — N18.4 CKD (CHRONIC KIDNEY DISEASE), STAGE IV: ICD-10-CM

## 2018-09-05 PROCEDURE — 76770 US EXAM ABDO BACK WALL COMP: CPT | Mod: TC

## 2018-09-05 PROCEDURE — 76770 US EXAM ABDO BACK WALL COMP: CPT | Mod: 26,,, | Performed by: RADIOLOGY

## 2018-09-06 ENCOUNTER — TELEPHONE (OUTPATIENT)
Dept: NEPHROLOGY | Facility: CLINIC | Age: 78
End: 2018-09-06

## 2018-09-06 ENCOUNTER — OFFICE VISIT (OUTPATIENT)
Dept: ENDOCRINOLOGY | Facility: CLINIC | Age: 78
End: 2018-09-06
Payer: MEDICARE

## 2018-09-06 ENCOUNTER — TELEPHONE (OUTPATIENT)
Dept: INTERNAL MEDICINE | Facility: CLINIC | Age: 78
End: 2018-09-06

## 2018-09-06 VITALS
BODY MASS INDEX: 32.71 KG/M2 | SYSTOLIC BLOOD PRESSURE: 136 MMHG | HEART RATE: 62 BPM | WEIGHT: 215.81 LBS | HEIGHT: 68 IN | DIASTOLIC BLOOD PRESSURE: 57 MMHG

## 2018-09-06 DIAGNOSIS — N18.4 CKD (CHRONIC KIDNEY DISEASE) STAGE 4, GFR 15-29 ML/MIN: ICD-10-CM

## 2018-09-06 DIAGNOSIS — E11.649 HYPOGLYCEMIA ASSOCIATED WITH DIABETES: ICD-10-CM

## 2018-09-06 DIAGNOSIS — I10 ESSENTIAL HYPERTENSION: ICD-10-CM

## 2018-09-06 PROCEDURE — 3078F DIAST BP <80 MM HG: CPT | Mod: CPTII,,, | Performed by: NURSE PRACTITIONER

## 2018-09-06 PROCEDURE — 3075F SYST BP GE 130 - 139MM HG: CPT | Mod: CPTII,,, | Performed by: NURSE PRACTITIONER

## 2018-09-06 PROCEDURE — 99499 UNLISTED E&M SERVICE: CPT | Mod: HCNC,S$GLB,, | Performed by: NURSE PRACTITIONER

## 2018-09-06 PROCEDURE — 99214 OFFICE O/P EST MOD 30 MIN: CPT | Mod: S$PBB,,, | Performed by: NURSE PRACTITIONER

## 2018-09-06 PROCEDURE — 99214 OFFICE O/P EST MOD 30 MIN: CPT | Mod: PBBFAC,PN | Performed by: NURSE PRACTITIONER

## 2018-09-06 PROCEDURE — 1101F PT FALLS ASSESS-DOCD LE1/YR: CPT | Mod: CPTII,,, | Performed by: NURSE PRACTITIONER

## 2018-09-06 PROCEDURE — 99999 PR PBB SHADOW E&M-EST. PATIENT-LVL IV: CPT | Mod: PBBFAC,,, | Performed by: NURSE PRACTITIONER

## 2018-09-06 NOTE — PROGRESS NOTES
"CC: This 78 y.o. White female  is here for evaluation of  T2DM along with comorbidities indicated in the Visit Diagnosis section of this encounter.    HPI: Radha Degroot was diagnosed with T2DM in 1980s.     Nephrology - Dr. Mcmanus    Prior visit on 6/5/18  a1c down from 8.4 to 8%.   Plan Increase Novolog to 14 units before each meal.   Continue Lantus 40 units at night.   Test blood sugar 4x/day.   Send glucose log in 2 weeks.   Return to clinic in 3 months with labs prior.     Interval history  a1c up a bit from 8 to 8.2%. Switched from lantus to tresiba.   Recently had a UTI and was treated with antibiotics.   Her glucoses do tend to fluctuate. Pt reports diet is very consistent. But she finds her BG will rise if she goes long periods with out eating.         DIABETES EDUCATION: 3/5/18    PMHX: atrial fibrillation, NSTEMI 2017, CAD, CHF, CKD stage 3     PRESCRIBED DIABETES MEDICATIONS: Tresiba 40 units hs, Novolog Flexpen 14 units w/ ss (Use on premeal readings: 150-200=+1, 201-250=+2, 251-300=+3; 301-350=+4, over 350=+5 units    Misses medication doses - none     SIGNIFICANT DM MED HX:       DM COMPLICATIONS: nephropathy, retinopathy and peripheral neuropathy    SELF MONITORING BLOOD GLUCOSE: accuchek dorothy meter. Checks blood glucose at home 4x/day.               HYPOGLYCEMIC EPISODES: symptoms start at bgs in the 80s. About a few times a month.      CURRENT DIET: 3 meals/day.  Drinks iced tea with no sugar. No snacks. Breakfast is toast and eggs.       CURRENT EXERCISE: none       BP (!) 136/57 (BP Location: Left arm, Patient Position: Sitting, BP Method: Large (Automatic))   Pulse 62   Ht 5' 8" (1.727 m)   Wt 97.9 kg (215 lb 13.3 oz)   BMI 32.82 kg/m²       ROS:   CONSTITUTIONAL: Appetite good, + fatigue  MS: + back pain       PHYSICAL EXAM:  GENERAL: Well developed, well nourished. No acute distress.   PSYCH: AAOx3, appropriate mood and affect, conversant, well-groomed. Judgement and insight good. "   NEURO: Cranial nerves grossly intact. Speech clear, no tremor.   CHEST: Respirations even and unlabored.       Hemoglobin A1C   Date Value Ref Range Status   09/05/2018 8.2 (H) 4.0 - 5.6 % Final     Comment:     ADA Screening Guidelines:  5.7-6.4%  Consistent with prediabetes  >or=6.5%  Consistent with diabetes  High levels of fetal hemoglobin interfere with the HbA1C  assay. Heterozygous hemoglobin variants (HbS, HgC, etc)do  not significantly interfere with this assay.   However, presence of multiple variants may affect accuracy.     05/30/2018 8.0 (H) 4.0 - 5.6 % Final     Comment:     ADA Screening Guidelines:  5.7-6.4%  Consistent with prediabetes  >or=6.5%  Consistent with diabetes  High levels of fetal hemoglobin interfere with the HbA1C  assay. Heterozygous hemoglobin variants (HbS, HgC, etc)do  not significantly interfere with this assay.   However, presence of multiple variants may affect accuracy.     02/28/2018 8.4 (H) 4.0 - 5.6 % Final     Comment:     According to ADA guidelines, hemoglobin A1c <7.0% represents  optimal control in non-pregnant diabetic patients. Different  metrics may apply to specific patient populations.   Standards of Medical Care in Diabetes-2016.  For the purpose of screening for the presence of diabetes:  <5.7%     Consistent with the absence of diabetes  5.7-6.4%  Consistent with increasing risk for diabetes   (prediabetes)  >or=6.5%  Consistent with diabetes  Currently, no consensus exists for use of hemoglobin A1c  for diagnosis of diabetes for children.  This Hemoglobin A1c assay has significant interference with fetal   hemoglobin   (HbF). The results are invalid for patients with abnormal amounts of   HbF,   including those with known Hereditary Persistence   of Fetal Hemoglobin. Heterozygous hemoglobin variants (HbAS, HbAC,   HbAD, HbAE, HbA2) do not significantly interfere with this assay;   however, presence of multiple variants in a sample may impact the %    interference.             Chemistry        Component Value Date/Time     09/05/2018 1513    K 3.9 09/05/2018 1513     09/05/2018 1513    CO2 26 09/05/2018 1513    BUN 48 (H) 09/05/2018 1513    CREATININE 2.0 (H) 09/05/2018 1513    GLU 74 09/05/2018 1513        Component Value Date/Time    CALCIUM 9.8 09/05/2018 1513    ALKPHOS 75 05/19/2017 1422    AST 13 05/19/2017 1422    ALT 12 05/19/2017 1422    BILITOT 0.4 05/19/2017 1422    ESTGFRAFRICA 27 (A) 09/05/2018 1513    EGFRNONAA 23 (A) 09/05/2018 1513          Lab Results   Component Value Date    LDLCALC 86.4 02/28/2018        Ref. Range 2/28/2018 10:18   Cholesterol Latest Ref Range: 120 - 199 mg/dL 171   HDL Latest Ref Range: 40 - 75 mg/dL 54   LDL Cholesterol Latest Ref Range: 63.0 - 159.0 mg/dL 86.4   Total Cholesterol/HDL Ratio Latest Ref Range: 2.0 - 5.0  3.2   Triglycerides Latest Ref Range: 30 - 150 mg/dL 153 (H)     Lab Results   Component Value Date    MICALBCREAT 29.5 02/11/2015             STANDARDS of CARE:        ASA:               Last eye exam:       ASSESSMENT and PLAN:    A1C GOAL: < 8%     1. Uncontrolled type 2 diabetes mellitus with complication, with long-term current use of insulin  Continue current regimen. This is likely the best we will be able to achieve in terms of glucose control on daily multiple insulin injections.     She declined Dexcom CGM device. Does not want anything attached to her. She may consider it in the future.     Test bg 4x/day. rtc in 3 mo with a1c prior.     Hemoglobin A1c   2. CKD (chronic kidney disease) stage 4, GFR 15-29 ml/min  Followed by Dr. Mcmanus.    3. Essential hypertension  controlled   4. Hypoglycemia associated with diabetes  Infrequent and fairly mild. She understands to call office if it becomes more frequent or severe.        Orders Placed This Encounter   Procedures    Hemoglobin A1c     Standing Status:   Future     Standing Expiration Date:   11/5/2019        Follow-up in about 3  months (around 12/6/2018).

## 2018-09-11 ENCOUNTER — TELEPHONE (OUTPATIENT)
Dept: CARDIOLOGY | Facility: CLINIC | Age: 78
End: 2018-09-11

## 2018-09-11 DIAGNOSIS — R00.2 PALPITATION: Primary | ICD-10-CM

## 2018-09-11 NOTE — TELEPHONE ENCOUNTER
Pt said that she is still having edema to her feet and just feels bad .She ask to be seen by any of the Cardiologist tomorrow b/c she is coming to [facility] for another doctor's appt at 1 PM.Pt agreed to schedule an appt with  for 11:20 AM tomorrow.

## 2018-09-12 ENCOUNTER — OFFICE VISIT (OUTPATIENT)
Dept: NEPHROLOGY | Facility: CLINIC | Age: 78
End: 2018-09-12
Payer: MEDICARE

## 2018-09-12 ENCOUNTER — OFFICE VISIT (OUTPATIENT)
Dept: CARDIOLOGY | Facility: CLINIC | Age: 78
End: 2018-09-12
Payer: MEDICARE

## 2018-09-12 VITALS
BODY MASS INDEX: 32.81 KG/M2 | HEART RATE: 65 BPM | DIASTOLIC BLOOD PRESSURE: 61 MMHG | WEIGHT: 216.5 LBS | HEIGHT: 68 IN | SYSTOLIC BLOOD PRESSURE: 139 MMHG

## 2018-09-12 VITALS
BODY MASS INDEX: 32.81 KG/M2 | HEIGHT: 68 IN | DIASTOLIC BLOOD PRESSURE: 60 MMHG | HEART RATE: 65 BPM | OXYGEN SATURATION: 98 % | SYSTOLIC BLOOD PRESSURE: 140 MMHG | WEIGHT: 216.5 LBS

## 2018-09-12 DIAGNOSIS — I48.19 PERSISTENT ATRIAL FIBRILLATION: ICD-10-CM

## 2018-09-12 DIAGNOSIS — R07.89 NON-CARDIAC CHEST PAIN: ICD-10-CM

## 2018-09-12 DIAGNOSIS — R60.0 LOWER EXTREMITY EDEMA: Primary | ICD-10-CM

## 2018-09-12 DIAGNOSIS — N18.4 CHRONIC KIDNEY DISEASE, STAGE IV (SEVERE): Primary | Chronic | ICD-10-CM

## 2018-09-12 DIAGNOSIS — E55.9 VITAMIN D DEFICIENCY DISEASE: ICD-10-CM

## 2018-09-12 DIAGNOSIS — I25.10 CORONARY ARTERY DISEASE INVOLVING NATIVE CORONARY ARTERY OF NATIVE HEART WITHOUT ANGINA PECTORIS: Chronic | ICD-10-CM

## 2018-09-12 DIAGNOSIS — Z79.4 TYPE 2 DIABETES MELLITUS WITH STAGE 4 CHRONIC KIDNEY DISEASE, WITH LONG-TERM CURRENT USE OF INSULIN: ICD-10-CM

## 2018-09-12 DIAGNOSIS — R80.9 PROTEINURIA, UNSPECIFIED TYPE: ICD-10-CM

## 2018-09-12 DIAGNOSIS — N18.4 HYPERTENSIVE KIDNEY DISEASE WITH STAGE 4 CHRONIC KIDNEY DISEASE: ICD-10-CM

## 2018-09-12 DIAGNOSIS — D63.8 ANEMIA OF CHRONIC ILLNESS: ICD-10-CM

## 2018-09-12 DIAGNOSIS — N18.4 TYPE 2 DIABETES MELLITUS WITH STAGE 4 CHRONIC KIDNEY DISEASE, WITH LONG-TERM CURRENT USE OF INSULIN: ICD-10-CM

## 2018-09-12 DIAGNOSIS — I50.32 CHRONIC DIASTOLIC CHF (CONGESTIVE HEART FAILURE): ICD-10-CM

## 2018-09-12 DIAGNOSIS — D17.71 ANGIOMYOLIPOMA OF RIGHT KIDNEY: ICD-10-CM

## 2018-09-12 DIAGNOSIS — N18.4 CKD (CHRONIC KIDNEY DISEASE), STAGE IV: Primary | ICD-10-CM

## 2018-09-12 DIAGNOSIS — I12.9 HYPERTENSIVE KIDNEY DISEASE WITH STAGE 4 CHRONIC KIDNEY DISEASE: ICD-10-CM

## 2018-09-12 DIAGNOSIS — I10 ESSENTIAL HYPERTENSION: Chronic | ICD-10-CM

## 2018-09-12 DIAGNOSIS — N28.1 ACQUIRED CYST OF KIDNEY: ICD-10-CM

## 2018-09-12 DIAGNOSIS — E11.22 TYPE 2 DIABETES MELLITUS WITH STAGE 4 CHRONIC KIDNEY DISEASE, WITH LONG-TERM CURRENT USE OF INSULIN: ICD-10-CM

## 2018-09-12 DIAGNOSIS — N25.81 SECONDARY RENAL HYPERPARATHYROIDISM: ICD-10-CM

## 2018-09-12 PROBLEM — N17.9 AKI (ACUTE KIDNEY INJURY): Status: RESOLVED | Noted: 2018-08-29 | Resolved: 2018-09-12

## 2018-09-12 PROBLEM — B96.20 E. COLI UTI: Status: RESOLVED | Noted: 2018-08-29 | Resolved: 2018-09-12

## 2018-09-12 PROBLEM — N39.0 E. COLI UTI: Status: RESOLVED | Noted: 2018-08-29 | Resolved: 2018-09-12

## 2018-09-12 PROCEDURE — 3075F SYST BP GE 130 - 139MM HG: CPT | Mod: CPTII,GC,, | Performed by: INTERNAL MEDICINE

## 2018-09-12 PROCEDURE — 99213 OFFICE O/P EST LOW 20 MIN: CPT | Mod: PBBFAC | Performed by: INTERNAL MEDICINE

## 2018-09-12 PROCEDURE — 99214 OFFICE O/P EST MOD 30 MIN: CPT | Mod: S$PBB,GC,, | Performed by: INTERNAL MEDICINE

## 2018-09-12 PROCEDURE — 1101F PT FALLS ASSESS-DOCD LE1/YR: CPT | Mod: CPTII,,, | Performed by: INTERNAL MEDICINE

## 2018-09-12 PROCEDURE — 99499 UNLISTED E&M SERVICE: CPT | Mod: HCNC,S$GLB,, | Performed by: INTERNAL MEDICINE

## 2018-09-12 PROCEDURE — 3078F DIAST BP <80 MM HG: CPT | Mod: CPTII,,, | Performed by: INTERNAL MEDICINE

## 2018-09-12 PROCEDURE — 99999 PR PBB SHADOW E&M-EST. PATIENT-LVL III: CPT | Mod: PBBFAC,GC,, | Performed by: INTERNAL MEDICINE

## 2018-09-12 PROCEDURE — 99213 OFFICE O/P EST LOW 20 MIN: CPT | Mod: PBBFAC,27 | Performed by: INTERNAL MEDICINE

## 2018-09-12 PROCEDURE — 99999 PR PBB SHADOW E&M-EST. PATIENT-LVL III: CPT | Mod: PBBFAC,,, | Performed by: INTERNAL MEDICINE

## 2018-09-12 PROCEDURE — 3078F DIAST BP <80 MM HG: CPT | Mod: CPTII,GC,, | Performed by: INTERNAL MEDICINE

## 2018-09-12 PROCEDURE — 99214 OFFICE O/P EST MOD 30 MIN: CPT | Mod: S$PBB,,, | Performed by: INTERNAL MEDICINE

## 2018-09-12 PROCEDURE — 1101F PT FALLS ASSESS-DOCD LE1/YR: CPT | Mod: CPTII,GC,, | Performed by: INTERNAL MEDICINE

## 2018-09-12 PROCEDURE — 3074F SYST BP LT 130 MM HG: CPT | Mod: CPTII,,, | Performed by: INTERNAL MEDICINE

## 2018-09-12 RX ORDER — FUROSEMIDE 40 MG/1
TABLET ORAL
Qty: 90 TABLET | Refills: 3 | Status: SHIPPED | OUTPATIENT
Start: 2018-09-12 | End: 2018-09-12 | Stop reason: SDUPTHER

## 2018-09-12 RX ORDER — FUROSEMIDE 40 MG/1
80 TABLET ORAL 2 TIMES DAILY
Qty: 90 TABLET | Refills: 3 | Status: SHIPPED | OUTPATIENT
Start: 2018-09-12 | End: 2018-09-13 | Stop reason: SDUPTHER

## 2018-09-12 NOTE — PROGRESS NOTES
Subjective:       Patient ID: Radha Degroot is a 78 y.o. White female who presents for follow-up evaluation of CKD.    HPI     Ms. Degroot was seen in nephrology clinic for follow up on CKD. She was previously being followed by Dr. Mcgill and was last seen by him on 3/9/16. I saw her on 8/24/16, she was to follow with me in 3 months, for unclear reason she had next appointment on 6/28/17 when she was noted to have worsening of renal function. She has CKD IV, diabetes with complications like neuropathy, diabetic retinopathy, hypertension, CAD, DELFINA, diastolic heart failure and several other medical problems. In the past she was taken off aldactone due to hyperkalemia.     She was last followed on 8/29/18 when she had reported multitude of symptoms and basically was not feeling well. She had perceived side effects from antibiotic and wanted to stop it. She had cardiology follow up since her last visit in our clinic. She had dose adjustment to her lasix and today has another dose increase in lasix. She continues to have worsening leg edema. She states she has been watching her salt intake. She arrived today in a wheelchair as she feels she is having problems with her mobility, unclear if it is all due to worsened leg edema. She also reports having shortness of breath on exertion. She had right sided chest pain due to which she basically saw cardiologist urgently. She was worried about her chest pain but she states she was told it was only musculoskeletal. Today per her cardiology team she is advised to increase lasix to 80 mg twice per day.     She still has not returned to see her urologist for follow up on right kidney mass, she has not done US also which was ordered by her urologist last year. She was reminded to do US and keep follow up with urology.    Her diabetes control continues to remain sub optimal. She has longstanding and historically poorly controlled diabetes, at times A1C has remained above 10, she  has longstanding proteinuria. Most recent A1C was 8.2.    At the time of her visit on 6/28/17 she was noted to have worsening renal function and hypotension which she confirmed was happening on her home BP readings also and as a result she was feeling tired and dizzy. She was taken off amlodipine and lisinopril at that time. She was advised to follow BP at home while continuing Coreg and lasix. Her follow up labs on 7/13/17 noted for improvement in her renal function including creatinine and BUN.     She was advised to do renal labs periodically but she remained a no show/ lab cancellation by patient and especially did not have any labs between 10/17 and 1/18, this was despite standing orders. This was brought to her attention.     Prior lab trend noted for onset of CKD since around 2006 and episodes of DANDY, and progression to CKD IV. US kidneys per PCP from 9/16 noted for Kidney size of 11 and 10.7 cm. Per report 1.6 cm isoechoic focus within the superior pole of the right kidney corresponding to abnormality seen on recent MRI which does not have the characteristics of a simple cyst.  Recommend further evaluation with CT of the abdomen with and without contrast renal mass protocol. 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 It did not show any mass/ stone/ obstruction.     Most recent labs noted  Renal Function:  Lab Results   Component Value Date    GLU 74 09/05/2018     (H) 08/14/2018     09/05/2018     08/14/2018    K 3.9 09/05/2018    K 3.4 (L) 08/14/2018     09/05/2018     08/14/2018    CO2 26 09/05/2018    CO2 25 08/14/2018    BUN 48 (H) 09/05/2018    BUN 43 (H) 08/14/2018    CALCIUM 9.8 09/05/2018    CALCIUM 9.3 08/14/2018    CREATININE 2.0 (H) 09/05/2018    CREATININE 2.6 (H) 08/14/2018    ALBUMIN 3.8 09/05/2018    ALBUMIN 3.6 08/14/2018    PHOS 3.6 09/05/2018    PHOS 3.5 08/14/2018    ESTGFRAFRICA 27 (A)  09/05/2018    ESTGFRAFRICA 19.5 (A) 08/14/2018    EGFRNONAA 23 (A) 09/05/2018    EGFRNONAA 16.9 (A) 08/14/2018       Urinalysis:  Lab Results   Component Value Date    APPEARANCEUA Clear 09/05/2018    PHUR 5.0 09/05/2018    SPECGRAV 1.005 09/05/2018    PROTEINUA Negative 09/05/2018    GLUCUA Negative 09/05/2018    OCCULTUA Negative 09/05/2018    NITRITE Negative 09/05/2018    LEUKOCYTESUR Negative 09/05/2018       Protein/Creatinine Ratio:  Lab Results   Component Value Date    PROTEINURINE 12 09/05/2018    CREATRANDUR 36.2 09/05/2018    UTPCR 0.33 (H) 09/05/2018       CBC:  Lab Results   Component Value Date    WBC 9.88 08/14/2018    HGB 13.4 08/14/2018    HCT 41.2 08/14/2018         Last kidney US in 2016, she had one ordered by urologist, Dr. Greene in 6/17 which patient has not done so far   US kidney was ordered at the time of visit in may 2018 but for unclear reason she has not done that so far    US kidneys 9/2018  11.4 and 10.6 cm kidneys, There is no evidence hydronephrosis. There is a 1.5 cm angiomyolipoma within the superior pole of the right kidney correlating with the fatty lesion seen on CT from May.  .  Review of Systems   Constitutional: Positive for activity change. Negative for appetite change, chills and fever.   HENT: Negative for sneezing and sore throat.    Eyes: Negative for visual disturbance.   Respiratory: Negative for cough and shortness of breath.    Cardiovascular: Positive for leg swelling. Negative for chest pain.   Gastrointestinal: Negative for abdominal pain, diarrhea, nausea and vomiting.   Genitourinary: Negative for decreased urine volume, dysuria, hematuria and urgency.   Musculoskeletal: Positive for back pain and gait problem.   Skin: Negative for rash.   Allergic/Immunologic: Negative for immunocompromised state.   Neurological: Negative for dizziness and headaches.   Psychiatric/Behavioral: Negative for behavioral problems and confusion.       Objective:       Physical Exam   Constitutional: She is oriented to person, place, and time. She appears well-developed and well-nourished. No distress.   HENT:   Head: Normocephalic and atraumatic.   Eyes: Conjunctivae are normal. Right eye exhibits no discharge. Left eye exhibits no discharge.   Neck: Normal range of motion.   Cardiovascular: Normal rate, regular rhythm and normal heart sounds.   Pulmonary/Chest: Effort normal and breath sounds normal. No respiratory distress. She has no wheezes.   Abdominal: Soft. There is no tenderness.   Musculoskeletal: She exhibits edema (2+).   Neurological: She is alert and oriented to person, place, and time.   Skin: Skin is warm and dry.   Psychiatric: She has a normal mood and affect. Her behavior is normal.   Vitals reviewed.      Assessment:       1. Chronic kidney disease, stage IV (severe)    2. Hypertensive kidney disease with stage 4 chronic kidney disease    3. Acquired cyst of kidney    4. Proteinuria, unspecified type    5. Angiomyolipoma of right kidney    6. Type 2 diabetes mellitus with stage 4 chronic kidney disease, with long-term current use of insulin    7. Vitamin D deficiency disease    8. Secondary renal hyperparathyroidism    9. Chronic diastolic CHF (congestive heart failure)    10. Anemia of chronic illness        Plan:     Ms. Degroot has CKD IV which is clinically felt to be due to diabetic nephropathy and hypertensive glomerulosclerosis. Labs, CKD staging, risk of progression to ESRD was discussed with her in detail. So far available serial labs were explained to her in detail.     She established care with me in 8/16 but for unclear reason she did not have follow up visit arranged until 6/17 when she was noted to have DANDY vs progression of CKD. That time she was taken off amlodipine and lisinopril due to hypotensive episodes and especially with worsening renal function.     Again today explained to her about all of her serial labs, longstanding CKD IV status  already which has progressed, her kidneys with poor autoregulation and higher risk of DANDY due to hypotension/ volume depletion/ contrast agents/ nephrotoxins/ NSAID etc and higher risk of progression to CKD V/ ESRD. Also cautioned her about her multivitamin with mineral preparation as it will give her additional K, phos and other minerals.     Given her advanced CKD and risk of DANDY, will hold off restarting any ACE-I or ARB use or K sparing diuretic due to risk of hyperkalemia. This was again explained to her today.      Stressed that she needs to be compliant with standing orders of renal panel to be done every 4 to 6 weeks.     She needs to return to see urology to follow on right kidney mass which is felt to be an angiomyolipoma. Level of proteinuria or microhematuria has not changed. Recently seen DANDY with worsened kidney function was due to azotemia, complicated UTI along with progression of already advanced CKD.    She has slight stability of her kidney function. Repeat urine studies do not suggest UTI. She has worsened edema of LE for which her cardiologist has advised increased lasix dose to 80 mg bid. She will have labs done in a week to monitor her renal function in light of this dose change. Stressed to follow salt and fluid restricted diet given worsened edema.     - TOPS referred for discussion and counseling of treatment options for renal replacement, renal labs every 3 weeks, if further worsening, then referral to vascular for access creation  - Trend proteinuria  - Trend PTH levels, start calcitriol 0.25 mcg MWF after she completes her Doxy and trend PTH, corrected Ca   - she has long standing anemia and per most recent Hb there is no indication for RITO  - closely monitor renal panel for electrolytes, acid base status, eGFR  - risk of CKD progression d/w patient. Her daughter was accompanying her for the first time today, I discussed all serial labs and interpretation of those with her daughter in  detail. Also counseled on what symptoms to look for that could suggest uremia.   - low salt and low potassium diet, low phos diet  - avoid NSAID/ bactrim/ IV contrast/ gadolinium/ aminoglycoside/ fleet enema where possible  - diuretic regimen per her cardiology. She has worsened edema.   - decision to continue PPI per primary   - renal dosing of medicines for creatinine clearance less than 30    Coreg and Eliquis, diuretic dosing per cardiology  Increase water intake if ok with cardiology    Leg edema less likely from renal cause as level of proteinuria is minimal    Low salt diet    Needs strict lab surveillance    Trend H/H    Start calcitriol if PTH levels further rise, monitor corrected Ca    Needs follow up with urology. It appears she had an US ordered by urology for annual surveillance on angiomyolipoma but pt did not do it for a long time. She has been forgetful and it's becoming evident while following her closely.    RTC 2 months  Labs, plan, recommendations were discussed with patient and her daughter in detail. Their questions were answered to their satisfaction.

## 2018-09-12 NOTE — PATIENT INSTRUCTIONS
Take Lasix 80 mg twice a day for the next 3-5 days, if edema doesn't improve, take until next appointment on 9/19. Otherwise, go to 80 mg daily once edema resolves.

## 2018-09-12 NOTE — PROGRESS NOTES
"Cardiology Clinic Note  Reason for Visit: Lower extremity edema, "not feeling well"    HPI:   78 y.o F with a PMHx of CAD (s/p PCI to LAD and diag in 2006), T2DM, CKD IV, HLD, HTN and persistent AF that presents today with 2 week continued LE edema and 3-4 days of chest pain.    She was seen 2 weeks ago by Dr. Diaz for BL LE edema, at that time, her Lasix was increased from 40 mg daily to 40 mg BID. However, patient reports only taking 40 mg BID for the first few days and then decreased to 40 mg in AM and 20 mg in PM. She reports she edema improved initially but then worsened. She denies any worsening of her SOB (which is chronic and with exertion). She also reports she has run out of Lasix since she was told to increase the frequency and was not given a new prescription.     However, her new compliant which has occurred over the last 3-4 days is right sided, chest aching which is constant and does not worsen with any exertion. She reports the only thing that helps with the pain is laying on her side in bed. She denies any chest pain when she had her STEMI back in 2006. Denies any improvement / changes with leaning forward.     Otherwise, denies SOB at rest, palpitations, lightheadedness, syncope.    ROS:    Constitution: Negative for fever or chills. Negative for weight loss or gain.   HENT: Negative for sore throat or headaches. Negative for rhinorrhea.  Eyes: Negative for blurred or double vision.   Cardiovascular: See above  Pulmonary: Negative for SOB. Negative for cough.   Gastrointestinal: Negative for abdominal pain. Negative for nausea/ vomiting. Negative for diarrhea.   : Negative for dysuria.   Neurological: Negative for focal weakness or sensory changes.  PMH:     Past Medical History:   Diagnosis Date    ALLERGIC RHINITIS     Anemia     Anticoagulant long-term use     Arthritis     Cardiomyopathy, ischemic     Carpal tunnel syndrome     Cataract     Left eye    CHF (congestive heart " failure)     Chronic kidney disease     Coronary artery disease     hx stent X2    Diabetes mellitus type II     Diabetic neuropathy     Diabetic retinopathy of both eyes     Encounter for blood transfusion     GERD (gastroesophageal reflux disease)     hiatal hernia    Gout, unspecified     h/o LAD and D1 PCI in 2006 6/17/2013    Heart attack 2006    Hiatal hernia     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    Hx of colonic polyps     1/9    Hx of colonic polyps     Hyperlipidemia     Hypertension     Myocardial infarction nov 2006    Persistent atrial fibrillation 4/25/2018    Posterior vitreous detachment of both eyes     Sleep apnea     Type 2 diabetes mellitus with mild nonproliferative diabetic retinopathy with macular edema 2/15/2013     Past Surgical History:   Procedure Laterality Date    APPENDECTOMY      BLOCK-NERVE-MEDIAL BRANCH-LUMBAR Bilateral 11/9/2017    [facility]    CARDIOVERSION N/A 5/18/2018    Performed by Mike Alexander MD [facility] CATH LAB    CATARACT EXTRACTION      Right eye    COLONOSCOPY N/A 4/30/2014    [facility] ENDO (4TH FLR)    heart stent      X 2    HYSTERECTOMY      Fibroids    INJECTION-JOINT Bilateral 3/13/2018    [facility]    INJECTION-STEROID-EPIDURAL-TRANSFORAMINAL Bilateral 4/28/2017    [facility]y [facility]    RADIOFREQUENCY THERMOCOAGULATION (RFTC)-NERVE-MEDIAN BRANCH-LUMBAR Left 12/5/2017    [facility]    RADIOFREQUENCY THERMOCOAGULATION (RFTC)-NERVE-MEDIAN BRANCH-LUMBAR Right 11/28/2017    [facility]    TRANSESOPHAGEAL ECHOCARDIOGRAM (SHAYY) N/A 5/18/2018    Per[facility] by [facility] CATH LAB     Allergies:     Review of patient's allergies indicates:   Allergen Reactions    Heparin analogues Other (See  "Comments)     thrombocytopenia    Ancef [cefazolin]     Keflex [cephalexin] Rash    Oxybutynin Other (See Comments)     Metallic sloan, chest symptoms      Medications:     Current Outpatient Medications on File Prior to Visit   Medication Sig Dispense Refill    ACCU-CHEK MELIDA PLUS TEST STRP Strp USE TO TEST BLOOD SUGAR FOUR TIMES DAILY 400 strip 3    apixaban 2.5 mg Tab Take 1 tablet (2.5 mg total) by mouth 2 (two) times daily. 60 tablet 11    blood-glucose meter (ACCU-CHEK MELIDA PLUS METER) Misc To check sugars 4 times daily.  Please include control solution 1 each 0    carvedilol (COREG) 12.5 MG tablet Take 1 tablet (12.5 mg total) by mouth 2 (two) times daily. 180 tablet 4    fluticasone (FLONASE) 50 mcg/actuation nasal spray SHAKE LIQUID AND USE 2 SPRAYS IN EACH NOSTRIL EVERY DAY 16 g 6    insulin aspart (NOVOLOG FLEXPEN) 100 unit/mL InPn pen Inject 17 units before meals with ss (Patient taking differently: Sliding scale inject 14 units) 4 Box 1    insulin degludec (TRESIBA FLEXTOUCH U-100) 100 unit/mL (3 mL) InPn Inject 40 Units into the skin once daily. 15 Syringe 0    ketoconazole (NIZORAL) 2 % shampoo APPLY TO AFFECTED AREA EVERY OTHER DAY AND LET SIT 5 MINUTES PRIOR TO RINSING AS DIRECTED 120 mL 2    lancets (ACCU-CHEK SOFTCLIX LANCETS) Misc 1 each by Misc.(Non-Drug; Combo Route) route 4 (four) times daily. 360 each 3    MULTIVIT-MIN/IRON/FOLIC/LUTEIN (CENTRUM SILVER WOMEN ORAL) Take by mouth once daily.       pen needle, diabetic (BD ULTRA-FINE TIM PEN NEEDLES) 32 gauge x 5/32" Ndle USE FOUR TIMES DAILY FOR BLOOD SUGAR TESTING 200 each 8    pen needle, diabetic (BD ULTRA-FINE TIM PEN NEEDLES) 32 gauge x 5/32" Ndle Use four times daily for insulin 480 each 11    rosuvastatin (CRESTOR) 40 MG Tab TAKE 1 TABLET(40 MG) BY MOUTH EVERY DAY 30 tablet 6    [DISCONTINUED] furosemide (LASIX) 40 MG tablet TAKE 1 TABLET(40 MG) BY MOUTH EVERY DAY 90 tablet 3    nitroGLYCERIN (NITROSTAT) 0.3 MG SL " "tablet Place 1 tablet (0.3 mg total) under the tongue every 5 (five) minutes as needed for Chest pain. 30 tablet 3    [DISCONTINUED] Ca cmb no.3-[id number]-[id number]-[id number] (VITAMIN D-3 WITH ALOE) 120-1,000-10 mg-unit-mg Tab Take by mouth once daily.        No current facility-administered medications on file prior to visit.      Social History:     Social History     Tobacco Use    Smoking status: Never Smoker    Smokeless tobacco: Never Used   Substance Use Topics    Alcohol use: No     Alcohol/week: 0.0 oz     Family History:     Family History   Problem Relation Age of Onset    Diabetes Mother     Heart disease Mother     Hypertension Mother     Diabetes Father     Melanoma Father     Kidney failure Father     Mental illness Sister         suicide/schizophrenic    Cancer Maternal Grandfather     Cancer Paternal Grandfather         colon    Psoriasis Neg Hx     Lupus Neg Hx     Eczema Neg Hx      Physical Exam:   /61 (BP Location: Left arm, Patient Position: Sitting, BP Method: X-Large (Automatic))   Pulse 65   Ht 5' 8" (1.727 m)   Wt 98.2 kg (216 lb 7.9 oz)   BMI 32.92 kg/m²      Constitutional: NAD, conversant  HEENT: Sclera anicteric, EOMI, OP clear  Neck: No JVD, no carotid bruits  CV: Irregularly regular, no m/r/g, normal S1/S2. TTP in R chest wall, reproducible.   Pulm: CTAB, no wheezes, rales, or ronchi  GI: Abdomen soft, NTND, +BS  Extremities: 2+ BL LE edema, warm with palpable pulses  Skin: No ecchymosis, erythema, or ulcers  Psych: AOx3, appropriate affect  Neuro: No focal deficits    Labs:     Lab Results   Component Value Date     09/05/2018    K 3.9 09/05/2018     09/05/2018    CO2 26 09/05/2018    BUN 48 (H) 09/05/2018    CREATININE 2.0 (H) 09/05/2018    ANIONGAP 12 09/05/2018     Lab Results   Component Value Date    AST 13 05/19/2017    ALT 12 05/19/2017    ALKPHOS 75 05/19/2017    BILITOT 0.4 05/19/2017    BILIDIR 0.2 11/07/2013    ALBUMIN 3.8 09/05/2018     Lab " Results   Component Value Date    CALCIUM 9.8 09/05/2018    MG 1.9 12/24/2008    PHOS 3.6 09/05/2018     Lab Results   Component Value Date     (H) 03/14/2018     (H) 03/20/2017     (H) 05/02/2016    Lab Results   Component Value Date    WBC 9.88 08/14/2018    HGB 13.4 08/14/2018    HCT 41.2 08/14/2018     08/14/2018    GRAN 6.4 08/14/2018    GRAN 65.2 08/14/2018     Lab Results   Component Value Date    INR 1.1 05/15/2018    INR 1.0 01/22/2007     Lab Results   Component Value Date    CHOL 171 02/28/2018    HDL 54 02/28/2018    LDLCALC 86.4 02/28/2018    TRIG 153 (H) 02/28/2018     Lab Results   Component Value Date    HGBA1C 8.2 (H) 09/05/2018     Lab Results   Component Value Date    TSH 3.332 02/20/2017          Imaging:       EF   Date Value Ref Range Status   05/18/2018 60 55 - 65    04/09/2018 55 55 - 65    03/21/2017 55 55 - 65        Assessment and Plan:    Radha was seen today for chest pain and edema.    Diagnoses and all orders for this visit:    Lower extremity edema - Will increase Lasix to 80 mg BID for the next 3-4 days, instructed to decrease to 80 mg daily if edema resolved by weekend. Otherwise, continue until follow-up appointment in 1 week. Patient shows no signs of respiratory distress or crackles on exam so edema limited mainly to lower extremities. Check CMP prior to visit in 1 week. Prescription givne for increase Lasix dose  -     COMPREHENSIVE METABOLIC PANEL; Future    Non-cardiac chest pain - Pain not consistent with angina. Reproducible upon palpitations to R chest wall and improves with laying on her side. Likely MST, recommended supportive care with tylenol and heat packs.     Coronary artery disease involving native coronary artery of native heart without angina pectoris - Continue statin and beta-blocker.     Essential hypertension - Continue beta blocker and Lasix.     Persistent atrial fibrillation - Well rate controlled, goal HR < 110. Continue  Eliquis 2.5 mg BID for AC / stroke PPx. SCr > 1.5 and age < 80 but she is 78 and reports lightheadedness with 5 mg BID dosing so will continue 2.5 mg BID dosing at this time.    Other orders  -     Discontinue: furosemide (LASIX) 40 MG tablet; TAKE 1 TABLET(40 MG) BY MOUTH EVERY DAY  -     furosemide (LASIX) 40 MG tablet; Take 2 tablets (80 mg total) by mouth 2 (two) times daily.    RTC in 1 week with labs with Dr. Jordan     Patient seen and discussed with Dr. Bess Del Rosario    Signed:  Darius Ornelas MD  Cardiovascular Disease Fellow, PGY-VI  Pager: [phone]  9/12/2018 12:37 PM    Follow-up:     Future Appointments   Date Time Provider Department Center   9/12/2018  1:00 PM Terri Mcmanus MD [facility] NEPHRO [address]   9/17/2018  2:30 PM Katherine Merida MD [facility] IM [address] PCW   9/19/2018  3:00 PM LAB, APPOINTMENT [facility]   9/19/2018  4:00 PM Lencho Pang MD [facility] CARDIO [address]   10/3/2018 10:30 AM Hortencia Cox DPM [facility] POD [place]   10/8/2018  2:20 PM Iveth Greene MD [facility] URO [facility]   11/2/2018 11:30 AM Salvatore Silveira MD [facility]ARD[facility]   11/30/2018 10:00 AM LABNANCY [facility] LAB [place]   12/6/2018 11:00 AM Mattie Alvarado NP [facility] ENDO DI [facility]

## 2018-09-13 ENCOUNTER — TELEPHONE (OUTPATIENT)
Dept: CARDIOLOGY | Facility: CLINIC | Age: 78
End: 2018-09-13

## 2018-09-13 NOTE — TELEPHONE ENCOUNTER
----- Message from Zeina Serrano MA sent at 9/13/2018  3:09 PM CDT -----  Contact: patient called  Please call the patient at [phone] she need to talk you about her medication Furosemide she need clarification on her medication. Last visit was on 9- . Thank you.

## 2018-09-13 NOTE — TELEPHONE ENCOUNTER
Spoke with the pt - says that the prescription for furosemide is not accurate - amount dispensed only 90. Is requesting a 90 day supply.  Called Cristopher with a new prescription. For 90 day supply.

## 2018-09-17 RX ORDER — FUROSEMIDE 40 MG/1
80 TABLET ORAL 2 TIMES DAILY
Qty: 360 TABLET | Refills: 3 | Status: SHIPPED | OUTPATIENT
Start: 2018-09-17 | End: 2020-08-21 | Stop reason: SDUPTHER

## 2018-09-19 ENCOUNTER — LAB VISIT (OUTPATIENT)
Dept: LAB | Facility: HOSPITAL | Age: 78
End: 2018-09-19
Payer: MEDICARE

## 2018-09-19 ENCOUNTER — OFFICE VISIT (OUTPATIENT)
Dept: CARDIOLOGY | Facility: CLINIC | Age: 78
End: 2018-09-19
Payer: MEDICARE

## 2018-09-19 VITALS
SYSTOLIC BLOOD PRESSURE: 109 MMHG | WEIGHT: 214.06 LBS | HEIGHT: 68 IN | HEART RATE: 81 BPM | DIASTOLIC BLOOD PRESSURE: 53 MMHG | BODY MASS INDEX: 32.44 KG/M2

## 2018-09-19 DIAGNOSIS — I87.2 VENOUS INSUFFICIENCY: ICD-10-CM

## 2018-09-19 DIAGNOSIS — I48.19 PERSISTENT ATRIAL FIBRILLATION: ICD-10-CM

## 2018-09-19 DIAGNOSIS — G47.33 OBSTRUCTIVE SLEEP APNEA: ICD-10-CM

## 2018-09-19 DIAGNOSIS — I10 ESSENTIAL HYPERTENSION: Chronic | ICD-10-CM

## 2018-09-19 DIAGNOSIS — R60.0 LOWER EXTREMITY EDEMA: ICD-10-CM

## 2018-09-19 DIAGNOSIS — I50.32 CHRONIC DIASTOLIC CHF (CONGESTIVE HEART FAILURE): Primary | ICD-10-CM

## 2018-09-19 DIAGNOSIS — N18.4 CKD (CHRONIC KIDNEY DISEASE), STAGE IV: ICD-10-CM

## 2018-09-19 LAB
ALBUMIN SERPL BCP-MCNC: 3.7 G/DL
ALBUMIN SERPL BCP-MCNC: 3.7 G/DL
ALP SERPL-CCNC: 91 U/L
ALT SERPL W/O P-5'-P-CCNC: 12 U/L
ANION GAP SERPL CALC-SCNC: 12 MMOL/L
ANION GAP SERPL CALC-SCNC: 12 MMOL/L
AST SERPL-CCNC: 13 U/L
BILIRUB SERPL-MCNC: 0.6 MG/DL
BUN SERPL-MCNC: 45 MG/DL
BUN SERPL-MCNC: 45 MG/DL
CALCIUM SERPL-MCNC: 9.4 MG/DL
CALCIUM SERPL-MCNC: 9.4 MG/DL
CHLORIDE SERPL-SCNC: 105 MMOL/L
CHLORIDE SERPL-SCNC: 105 MMOL/L
CO2 SERPL-SCNC: 25 MMOL/L
CO2 SERPL-SCNC: 25 MMOL/L
CREAT SERPL-MCNC: 2.2 MG/DL
CREAT SERPL-MCNC: 2.2 MG/DL
EST. GFR  (AFRICAN AMERICAN): 23.9 ML/MIN/1.73 M^2
EST. GFR  (AFRICAN AMERICAN): 23.9 ML/MIN/1.73 M^2
EST. GFR  (NON AFRICAN AMERICAN): 20.7 ML/MIN/1.73 M^2
EST. GFR  (NON AFRICAN AMERICAN): 20.7 ML/MIN/1.73 M^2
GLUCOSE SERPL-MCNC: 109 MG/DL
GLUCOSE SERPL-MCNC: 109 MG/DL
PHOSPHATE SERPL-MCNC: 3.8 MG/DL
POTASSIUM SERPL-SCNC: 3.6 MMOL/L
POTASSIUM SERPL-SCNC: 3.6 MMOL/L
PROT SERPL-MCNC: 6.9 G/DL
SODIUM SERPL-SCNC: 142 MMOL/L
SODIUM SERPL-SCNC: 142 MMOL/L

## 2018-09-19 PROCEDURE — 1101F PT FALLS ASSESS-DOCD LE1/YR: CPT | Mod: CPTII,GC,, | Performed by: STUDENT IN AN ORGANIZED HEALTH CARE EDUCATION/TRAINING PROGRAM

## 2018-09-19 PROCEDURE — 3074F SYST BP LT 130 MM HG: CPT | Mod: CPTII,GC,, | Performed by: STUDENT IN AN ORGANIZED HEALTH CARE EDUCATION/TRAINING PROGRAM

## 2018-09-19 PROCEDURE — 99214 OFFICE O/P EST MOD 30 MIN: CPT | Mod: S$PBB,GC,, | Performed by: STUDENT IN AN ORGANIZED HEALTH CARE EDUCATION/TRAINING PROGRAM

## 2018-09-19 PROCEDURE — 36415 COLL VENOUS BLD VENIPUNCTURE: CPT

## 2018-09-19 PROCEDURE — 3078F DIAST BP <80 MM HG: CPT | Mod: CPTII,GC,, | Performed by: STUDENT IN AN ORGANIZED HEALTH CARE EDUCATION/TRAINING PROGRAM

## 2018-09-19 PROCEDURE — 99999 PR PBB SHADOW E&M-EST. PATIENT-LVL V: CPT | Mod: PBBFAC,GC,, | Performed by: STUDENT IN AN ORGANIZED HEALTH CARE EDUCATION/TRAINING PROGRAM

## 2018-09-19 PROCEDURE — 80069 RENAL FUNCTION PANEL: CPT

## 2018-09-19 PROCEDURE — 80053 COMPREHEN METABOLIC PANEL: CPT

## 2018-09-19 PROCEDURE — 99215 OFFICE O/P EST HI 40 MIN: CPT | Mod: PBBFAC | Performed by: STUDENT IN AN ORGANIZED HEALTH CARE EDUCATION/TRAINING PROGRAM

## 2018-09-19 NOTE — PATIENT INSTRUCTIONS
-Take 40 mg of lasix twice a day on a regular basis.  -Increase lasix to 80 mg twice a day if your weight increases by 2-3 lb in two days or 5 lb in a week until your weight goes back to baseline.  -Use compression hoses daily.

## 2018-09-20 NOTE — PROGRESS NOTES
I have personally taken the history and examined this patient and agree with the fellow's note as stated above.

## 2018-09-20 NOTE — ASSESSMENT & PLAN NOTE
CHDAS VASC 6  Refuses to take warfarin  Cannot tolerate apixaban 5 BID  Almost 80 yo, creatinine recently =/> 2.6  -Apixaban 2.5  -Continue carvedilol

## 2018-09-20 NOTE — PROGRESS NOTES
Katherine Merida MD      Subjective:   Patient ID:  Radha Degroot is a 78 y.o. female who presents for LE edema/SOB. She has a hx of CAD s/p POBA to D1 2/2 STEMI (2006), PCI to pLAD and mLAD (2006), HIT, HTN, DELFINA on CPAP, HLD, CKD4, DM2, HFpEF (55-60%), a fib (CHADS VASC 6) s/p cardioversion and recurrence of a fib, currently on apixaban 2.5 BID.    She had a dobutamine stress TTE that was positive for ischemia but no cath was pursued due to poor renal function.     She has back pain that is a great limitation to her quality of life. She also has dyspnea on mild exertion (walking) and bilateral lower extremity edema.     Interim History:  She consulted our clinics recently for LE edema in spite of increasing her lasix from 40 mg BID to 40 + 20 (instead of 40 BID as instructed). She then ran out of lasix and her LE edema worsened. She also had a musculoskeletal right sided CP that has been improving. She was instructed to increase lasix to 80 mg BID for 3-4 days and then decrease it to 80 daily but she says she has been taking it as 40 QAM, 40 QPM and 20 at bedtime. She feels her LE edema is improving but has always been worse on the left side. She spends most of her day sitting down and feels back pain/SOB whenever she tries to walk.    Denies CP, palpitations, syncope, and lightheadedness.    Review of Systems   Constitution: Negative for chills and decreased appetite.   HENT: Negative for congestion, ear discharge and ear pain.    Eyes: Negative for blurred vision and discharge.   Cardiovascular: Positive for dyspnea on exertion and leg swelling. Negative for chest pain, claudication and cyanosis.   Respiratory: Negative for cough and hemoptysis.    Endocrine: Negative for cold intolerance and heat intolerance.   Skin: Negative for color change and nail changes.   Musculoskeletal: Positive for back pain. Negative for arthritis.   Gastrointestinal: Negative for bloating and abdominal pain.   Genitourinary:  Negative for bladder incontinence and decreased libido.   Neurological: Negative for aphonia and brief paralysis.       Past Medical History:   Diagnosis Date    ALLERGIC RHINITIS     Anemia     Anticoagulant long-term use     Arthritis     Cardiomyopathy, ischemic     Carpal tunnel syndrome     Cataract     Left eye    CHF (congestive heart failure)     Chronic kidney disease     Coronary artery disease     hx stent X2    Diabetes mellitus type II     Diabetic neuropathy     Diabetic retinopathy of both eyes     Encounter for blood transfusion     GERD (gastroesophageal reflux disease)     hiatal hernia    Gout, unspecified     h/o LAD and D1 PCI in 2006 6/17/2013    Heart attack 2006    Hiatal hernia     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    Hx of colonic polyps     1/9    Hx of colonic polyps     Hyperlipidemia     Hypertension     Myocardial infarction nov 2006    Persistent atrial fibrillation 4/25/2018    Posterior vitreous detachment of both eyes     Sleep apnea     Type 2 diabetes mellitus with mild nonproliferative diabetic retinopathy with macular edema 2/15/2013       Past Surgical History:   Procedure Laterality Date    APPENDECTOMY      BLOCK-NERVE-MEDIAL BRANCH-LUMBAR Bilateral 11/9/2017    [facility]    CARDIOVERSION N/A 5/18/2018    Performed by Mike Alexander MD at [facility] CATH LAB    CATARACT EXTRACTION      Right eye    COLONOSCOPY N/A 4/30/2014    Performed by [facility]ert Calzada [facility] at [facility] ENDO (4TH FLR)    heart stent      X 2    HYSTERECTOMY      Fibroids    INJECTION-JOINT Bilateral 3/13/2018    [facility] at [facility]    INJECTION-STEROID-EPIDURAL-TRANSFORAMINAL Bilateral 4/28/2017    [facility] by On[facility]imo [facility]osey [facility] at [facility]    RADIOFREQUENCY THERMOCOAGULATION (RFTC)-NERVE-MEDIAN BRANCH-LUMBAR Left 12/5/2017    [facility] at [facility]     RADIOFREQUENCY THERMOCOAGULATION (RFTC)-NERVE-MEDIAN BRANCH-LUMBAR Right 11/28/2017    [facility] at [facility] PAIN T    TRANSESOPHAGEAL ECHOCARDIOGRAM (SHAYY) N/A 5/18/2018    Performed by [facility] at [facility] CATH LAB       Family History   Problem Relation Age of Onset    Diabetes Mother     Heart disease Mother     Hypertension Mother     Diabetes Father     Melanoma Father     Kidney failure Father     Mental illness Sister         suicide/schizophrenic    Cancer Maternal Grandfather     Cancer Paternal Grandfather         colon    Psoriasis Neg Hx     Lupus Neg Hx     Eczema Neg Hx          Current Outpatient Medications:     ACCU-CHEK MELIDA PLUS TEST STRP Strp, USE TO TEST BLOOD SUGAR FOUR TIMES DAILY, Disp: 400 strip, Rfl: 3    apixaban 2.5 mg Tab, Take 1 tablet (2.5 mg total) by mouth 2 (two) times daily., Disp: 60 tablet, Rfl: 11    blood-glucose meter (ACCU-CHEK MELIDA PLUS METER) Misc, To check sugars 4 times daily.  Please include control solution, Disp: 1 each, Rfl: 0    carvedilol (COREG) 12.5 MG tablet, Take 1 tablet (12.5 mg total) by mouth 2 (two) times daily., Disp: 180 tablet, Rfl: 4    fluticasone (FLONASE) 50 mcg/actuation nasal spray, SHAKE LIQUID AND USE 2 SPRAYS IN EACH NOSTRIL EVERY DAY, Disp: 16 g, Rfl: 6    furosemide (LASIX) 40 MG tablet, Take 2 tablets (80 mg total) by mouth 2 (two) times daily., Disp: 360 tablet, Rfl: 3    insulin aspart (NOVOLOG FLEXPEN) 100 unit/mL InPn pen, Inject 17 units before meals with ss (Patient taking differently: Sliding scale inject 14 units), Disp: 4 Box, Rfl: 1    insulin degludec (TRESIBA FLEXTOUCH U-100) 100 unit/mL (3 mL) InPn, Inject 40 Units into the skin once daily., Disp: 15 Syringe, Rfl: 0    ketoconazole (NIZORAL) 2 % shampoo, APPLY TO AFFECTED AREA EVERY OTHER DAY AND LET SIT 5 MINUTES PRIOR TO RINSING AS DIRECTED, Disp: 120 mL, Rfl: 2    lancets (ACCU-CHEK SOFTCLIX LANCETS) Misc, 1 each by  "Misc.(Non-Drug; Combo Route) route 4 (four) times daily., Disp: 360 each, Rfl: 3    MULTIVIT-MIN/IRON/FOLIC/LUTEIN (CENTRUM SILVER WOMEN ORAL), Take by mouth once daily. , Disp: , Rfl:     pen needle, diabetic (BD ULTRA-FINE TIM PEN NEEDLES) 32 gauge x 5/32" Ndle, Use four times daily for insulin, Disp: 480 each, Rfl: 11    rosuvastatin (CRESTOR) 40 MG Tab, TAKE 1 TABLET(40 MG) BY MOUTH EVERY DAY, Disp: 30 tablet, Rfl: 6    nitroGLYCERIN (NITROSTAT) 0.3 MG SL tablet, Place 1 tablet (0.3 mg total) under the tongue every 5 (five) minutes as needed for Chest pain., Disp: 30 tablet, Rfl: 3    pen needle, diabetic (BD ULTRA-FINE TIM PEN NEEDLES) 32 gauge x 5/32" Ndle, USE FOUR TIMES DAILY FOR BLOOD SUGAR TESTING, Disp: 200 each, Rfl: 8  Objective:   BP (!) 109/53   Pulse 81   Ht 5' 8" (1.727 m)   Wt 97.1 kg (214 lb 1.1 oz)   BMI 32.55 kg/m²      Physical Exam   Constitutional: She is oriented to person, place, and time. She appears well-developed and well-nourished.   HENT:   Head: Normocephalic and atraumatic.   Nose: Nose normal.   Mouth/Throat: No oropharyngeal exudate.   Eyes: Right eye exhibits no discharge. Left eye exhibits no discharge. No scleral icterus.   Neck: Normal range of motion. Neck supple. No JVD present.   Cardiovascular: Normal rate, regular rhythm, S1 normal and S2 normal. Exam reveals no gallop, no S3, no S4, no distant heart sounds, no friction rub, no midsystolic click and no opening snap.   No murmur heard.  Pulses:       Radial pulses are 2+ on the right side, and 2+ on the left side.        Femoral pulses are 2+ on the right side, and 2+ on the left side.  Pulmonary/Chest: Effort normal and breath sounds normal. No respiratory distress. She has no wheezes. She has no rales. She exhibits no tenderness.   Abdominal: Soft. Bowel sounds are normal. She exhibits no distension. There is no tenderness. There is no rebound.   Musculoskeletal: Normal range of motion. She exhibits edema " (worse on the L side). She exhibits no tenderness or deformity.   Lymphadenopathy:     She has no cervical adenopathy.   Neurological: She is alert and oriented to person, place, and time. No cranial nerve deficit.   Skin: Skin is warm and dry.   Psychiatric: She has a normal mood and affect. Her behavior is normal.       Lab Results   Component Value Date     09/19/2018     09/19/2018    K 3.6 09/19/2018    K 3.6 09/19/2018     09/19/2018     09/19/2018    CO2 25 09/19/2018    CO2 25 09/19/2018    BUN 45 (H) 09/19/2018    BUN 45 (H) 09/19/2018    CREATININE 2.2 (H) 09/19/2018    CREATININE 2.2 (H) 09/19/2018    ANIONGAP 12 09/19/2018    ANIONGAP 12 09/19/2018     Lab Results   Component Value Date    HGBA1C 8.2 (H) 09/05/2018     Lab Results   Component Value Date     (H) 03/14/2018     (H) 03/20/2017     (H) 05/02/2016       Lab Results   Component Value Date    WBC 9.88 08/14/2018    HGB 13.4 08/14/2018    HCT 41.2 08/14/2018     08/14/2018    GRAN 6.4 08/14/2018    GRAN 65.2 08/14/2018     Lab Results   Component Value Date    CHOL 171 02/28/2018    HDL 54 02/28/2018    LDLCALC 86.4 02/28/2018    TRIG 153 (H) 02/28/2018        Assessment:     1. Chronic diastolic CHF (congestive heart failure)    2. Persistent atrial fibrillation    3. Essential hypertension    4. Venous insufficiency    5. Obstructive sleep apnea        Plan:   Chronic diastolic CHF (congestive heart failure)  Mild R LE edema, more pronounced on the L side  SOB could also be due to deconditioning and overweight  Decreased water intake to 1.5 L daily  -Continue lasix 40 mg BID (keep current 80 BID prescription to have extra for prn use)  -Increase lasix to 80 BID if she puts on 2-3 lb in two days or 5 lb in one week.  -Continue coreg    Persistent atrial fibrillation  CHDAS VASC 6  Refuses to take warfarin  Cannot tolerate apixaban 5 BID  Almost 80 yo, creatinine recently =/> 2.6  -Apixaban  2.5  -Continue carvedilol    Essential hypertension  -Continue coreg and lasix (as per CHF section)    Venous insufficiency  LEs likely BL, more pronounced on the L side  -Compression hoses  -Continue lasix as per CHF section    Obstructive sleep apnea  Non-compliant with CPAP  -Counselled about CPAP  -She will try getting an item to prevent facial skin damage with the CPAP mask    Case discussed w Dr. Huggins

## 2018-09-20 NOTE — ASSESSMENT & PLAN NOTE
Non-compliant with CPAP  -Counselled about CPAP  -She will try getting an item to prevent facial skin damage with the CPAP mask

## 2018-09-20 NOTE — ASSESSMENT & PLAN NOTE
LEs likely BL, more pronounced on the L side  -Compression hoses  -Continue lasix as per CHF section

## 2018-09-20 NOTE — ASSESSMENT & PLAN NOTE
Mild R LE edema, more pronounced on the L side  SOB could also be due to deconditioning and overweight  Decreased water intake to 1.5 L daily  -Continue lasix 40 mg BID (keep current 80 BID prescription to have extra for prn use)  -Increase lasix to 80 BID if she puts on 2-3 lb in two days or 5 lb in one week.  -Continue coreg

## 2018-09-21 ENCOUNTER — TELEPHONE (OUTPATIENT)
Dept: NEPHROLOGY | Facility: CLINIC | Age: 78
End: 2018-09-21

## 2018-09-24 ENCOUNTER — TELEPHONE (OUTPATIENT)
Dept: PAIN MEDICINE | Facility: CLINIC | Age: 78
End: 2018-09-24

## 2018-09-24 NOTE — TELEPHONE ENCOUNTER
Staff returned patient call today regarding the message received. There was no answer, staff left a detailed message informing Ms. Degroot that she will require an office visit to determine her next treatment plan since she hasn't been seen since March.

## 2018-09-24 NOTE — TELEPHONE ENCOUNTER
----- Message from Elisa Polanco sent at 9/24/2018  9:21 AM CDT -----            Name of Who is Calling:ALISSA ANDREW [[id number]]    What is the request in detail: please call pt concerning her next plan of treatment, she has had 2 procedures already     Can the clinic reply by [facility]: no    What [facility] to [facility] Back [facility][phone]

## 2018-10-02 NOTE — TELEPHONE ENCOUNTER
----- Message from Madison Smallwood sent at 10/2/2018  3:10 PM CDT -----  Contact: Self   Med refill:    Pt is requesting for it to be sent today. Please call at [phone].    insulin degludec (TRESIBA FLEXTOUCH U-100) 100 unit[facility]mL (3 [facility]

## 2018-10-03 RX ORDER — INSULIN DEGLUDEC 100 U/ML
INJECTION, SOLUTION SUBCUTANEOUS
Qty: 6 SYRINGE | Refills: 2 | Status: SHIPPED | OUTPATIENT
Start: 2018-10-03 | End: 2019-01-25 | Stop reason: SDUPTHER

## 2018-10-03 NOTE — TELEPHONE ENCOUNTER
Called pt but call would no go through, phone will answer and hang up. Called to inform pt of refill sent to pharmacy.

## 2018-10-03 NOTE — TELEPHONE ENCOUNTER
Lt message to contact office regarding appt./zuhair   

## 2018-10-04 ENCOUNTER — OFFICE VISIT (OUTPATIENT)
Dept: PAIN MEDICINE | Facility: CLINIC | Age: 78
End: 2018-10-04
Payer: MEDICARE

## 2018-10-04 VITALS
WEIGHT: 213.19 LBS | TEMPERATURE: 98 F | SYSTOLIC BLOOD PRESSURE: 114 MMHG | HEIGHT: 68 IN | HEART RATE: 63 BPM | DIASTOLIC BLOOD PRESSURE: 58 MMHG | BODY MASS INDEX: 32.31 KG/M2

## 2018-10-04 DIAGNOSIS — M47.816 LUMBAR SPONDYLOSIS: ICD-10-CM

## 2018-10-04 DIAGNOSIS — M48.061 SPINAL STENOSIS OF LUMBAR REGION WITHOUT NEUROGENIC CLAUDICATION: Primary | ICD-10-CM

## 2018-10-04 DIAGNOSIS — M53.3 SACROILIAC DYSFUNCTION: ICD-10-CM

## 2018-10-04 DIAGNOSIS — N18.4 CHRONIC KIDNEY DISEASE, STAGE IV (SEVERE): ICD-10-CM

## 2018-10-04 DIAGNOSIS — M51.36 DDD (DEGENERATIVE DISC DISEASE), LUMBAR: ICD-10-CM

## 2018-10-04 DIAGNOSIS — G89.4 CHRONIC PAIN DISORDER: ICD-10-CM

## 2018-10-04 DIAGNOSIS — M54.16 LUMBAR RADICULOPATHY: Primary | ICD-10-CM

## 2018-10-04 PROCEDURE — 3074F SYST BP LT 130 MM HG: CPT | Mod: CPTII,,, | Performed by: NURSE PRACTITIONER

## 2018-10-04 PROCEDURE — 99214 OFFICE O/P EST MOD 30 MIN: CPT | Mod: S$PBB,,, | Performed by: NURSE PRACTITIONER

## 2018-10-04 PROCEDURE — 99999 PR PBB SHADOW E&M-EST. PATIENT-LVL III: CPT | Mod: PBBFAC,,, | Performed by: NURSE PRACTITIONER

## 2018-10-04 PROCEDURE — 1101F PT FALLS ASSESS-DOCD LE1/YR: CPT | Mod: CPTII,,, | Performed by: NURSE PRACTITIONER

## 2018-10-04 PROCEDURE — 3078F DIAST BP <80 MM HG: CPT | Mod: CPTII,,, | Performed by: NURSE PRACTITIONER

## 2018-10-04 PROCEDURE — 99213 OFFICE O/P EST LOW 20 MIN: CPT | Mod: PBBFAC | Performed by: NURSE PRACTITIONER

## 2018-10-04 RX ORDER — TRAMADOL HYDROCHLORIDE 50 MG/1
50 TABLET ORAL EVERY 12 HOURS PRN
Qty: 60 TABLET | Refills: 0 | Status: SHIPPED | OUTPATIENT
Start: 2018-10-04 | End: 2018-10-04 | Stop reason: SDUPTHER

## 2018-10-04 RX ORDER — TRAMADOL HYDROCHLORIDE 50 MG/1
50 TABLET ORAL EVERY 12 HOURS PRN
Qty: 60 TABLET | Refills: 0 | Status: SHIPPED | OUTPATIENT
Start: 2018-10-04 | End: 2018-11-03

## 2018-10-04 NOTE — PROGRESS NOTES
"Subjective:     Patient ID: Radha Degroot is a 78 y.o. female.    Chief Complaint: Pain    Consulted by: None     Disclaimer: This note was generated using voice recognition software.  There may be a typographical errors that were missed during proofreading.    She is known to this clinic, but she is never been evaluated by me before.    HPI:    Radha Degroot is a 78 y.o. female who presents today with bilateral lower back pain. This pain is described in detail below.    The pt is known to the [facility] and previously being seen by Dr. Posey. The pt underwent bilateral MBB's followed by right and left sided L3-5 MB RFA's in November and December. This is the pt's first time being seen by Dr. Peña in clinic. The pt presents stating she experienced temporary relief following the MBB's but did not experience relief following her RFA's. The pt describes her lower back pain as bilateral but the right is slightly worse than the left. She describes the pain as 0/10 at best and 8/10 at worst. Her pain is described as "spasms and pressure." The pain is improved with rest and worse with standing and walking. She is currently intermittently taking acetaminophen but no other medications. She did perform PT approximately 3 yrs ago but only went to 3-4 sessions. Of note, pt with significant CKD.     Interval History (10/4/2018):  The patient returns to clinic today for follow up. She presents today with her son, Edwin, who is active in her care. She reports that bilateral SI joint injection on 3/13/2018 provided only a day of relief. She continues to report low back pain that is constant and aching in nature. She reports intermittent "pressure-like" pain in her legs with walking. She is currently taking Tramadol sparingly with benefit. She does report a recent episode of atrial fibrillation. She is now taking Eliquis.     Physical Therapy: Yes, 3 yrs ago     Non-pharmacologic Treatment:     · Ice/Heat: " No  · TENS: No  · Massage: No  · Chiropractic care: No  · Acupuncture: No  · Other: No         Pain Medications:         · Currently taking: None     · Has tried in the past: None     · Has not tried: Opioids, NSAIDs, Muscle relaxants, TCAs, SNRIs, anticonvulsants, topical creams    Blood thinners: no    Interventional Therapies: none    Relevant Surgeries: none    Affecting sleep? yes    Affecting daily activities? yes    Depressive symptoms? no          · SI/HI? No    Work status: retired      Prescription Monitoring Program database:  Not applicable    Pain Scales  Best: 0/10  Worst: 8/10  Usually: 8/10  Today: 9/10    Low-back Pain   The current episode started more than 1 year ago. Quality: seizing. The pain is at a severity of 8/10. The symptoms are aggravated by standing (walking). She has tried analgesics for the symptoms.       Last 3 PDI Scores 10/4/2018 3/7/2018 8/10/2017   Pain Disability Index (PDI) 28 36 24   ]    Review of Systems   Constitution: Negative.   HENT: Negative.    Eyes: Negative.    Cardiovascular: Negative.    Respiratory: Negative.    Endocrine: Negative.    Musculoskeletal: Positive for arthritis, back pain, muscle cramps and myalgias.   Gastrointestinal: Negative.    Genitourinary: Negative.    Neurological: Negative.              Past Medical History:   Diagnosis Date    ALLERGIC RHINITIS     Anemia     Anticoagulant long-term use     Arthritis     Cardiomyopathy, ischemic     Carpal tunnel syndrome     Cataract     Left eye    CHF (congestive heart failure)     Chronic kidney disease     Coronary artery disease     hx stent X2    Diabetes mellitus type II     Diabetic neuropathy     Diabetic retinopathy of both eyes     Encounter for blood transfusion     GERD (gastroesophageal reflux disease)     hiatal hernia    Gout, unspecified     h/o LAD and D1 PCI in 2006 6/17/2013    Heart attack 2006    Hiatal hernia     HIT (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    Hx of colonic polyps     1/9    Hx of colonic polyps     Hyperlipidemia     Hypertension     Myocardial infarction nov 2006    Persistent atrial fibrillation 4/25/2018    Posterior vitreous detachment of both eyes     Sleep apnea     Type 2 diabetes mellitus with mild nonproliferative diabetic retinopathy with macular edema 2/15/2013       Past Surgical History:   Procedure Laterality Date    APPENDECTOMY      BLOCK-NERVE-MEDIAL BRANCH-LUMBAR Bilateral 11/9/2017    [facility] at [facility]    CARDIOVERSION N/A 5/18/2018    Performed by [facility] at [facility] CATH LAB    CATARACT EXTRACTION      Right eye    COLONOSCOPY N/A 4/30/2014    Performed by Mert Calzada MD at [facility] ENDO (4TH FLR)    heart stent      X 2    HYSTERECTOMY      Fibroids    INJECTION-JOINT Bilateral 3/13/2018    [facility] at [facility]    INJECTION-STEROID-EPIDURAL-TRANSFORAMINAL Bilateral 4/28/2017    Per[facility] by Onesimo Posey MD at [facility]    RADIOFREQUENCY THERMOCOAGULATION (RFTC)-NERVE-MEDIAN BRANCH-LUMBAR Left 12/5/2017    [facility] at [facility]    RADIOFREQUENCY THERMOCOAGULATION (RFTC)-NERVE-MEDIAN BRANCH-LUMBAR Right 11/28/2017    [facility] at [facility]    TRANSESOPHAGEAL ECHOCARDIOGRAM (SHAYY) N/A 5/18/2018    Performed by Mike Alexadner MD at [facility] CATH LAB       Review of patient's allergies indicates:   Allergen Reactions    Heparin analogues Other (See Comments)     thrombocytopenia    Ancef [cefazolin]     Keflex [cephalexin] Rash    Oxybutynin Other (See Comments)     Metallic sloan, chest symptoms        Current Outpatient Medications   Medication Sig Dispense Refill    ACCU-CHEK MELIDA PLUS TEST STRP Strp USE TO TEST BLOOD SUGAR FOUR TIMES DAILY 400 strip 3    apixaban 2.5 mg Tab Take 1 tablet (2.5 mg total) by mouth 2 (two) times daily. 60 tablet 11     "blood-glucose meter (ACCU-CHEK MELIDA PLUS METER) Misc To check sugars 4 times daily.  Please include control solution 1 each 0    carvedilol (COREG) 12.5 MG tablet Take 1 tablet (12.5 mg total) by mouth 2 (two) times daily. 180 tablet 4    fluticasone (FLONASE) 50 mcg/actuation nasal spray SHAKE LIQUID AND USE 2 SPRAYS IN EACH NOSTRIL EVERY DAY 16 g 6    furosemide (LASIX) 40 MG tablet Take 2 tablets (80 mg total) by mouth 2 (two) times daily. 360 tablet 3    insulin aspart (NOVOLOG FLEXPEN) 100 unit/mL InPn pen Inject 17 units before meals with ss (Patient taking differently: Sliding scale inject 14 units) 4 Box 1    ketoconazole (NIZORAL) 2 % shampoo APPLY TO AFFECTED AREA EVERY OTHER DAY AND LET SIT 5 MINUTES PRIOR TO RINSING AS DIRECTED 120 mL 2    lancets (ACCU-CHEK SOFTCLIX LANCETS) Misc 1 each by Misc.(Non-Drug; Combo Route) route 4 (four) times daily. 360 each 3    MULTIVIT-MIN/IRON/FOLIC/LUTEIN (CENTRUM SILVER WOMEN ORAL) Take by mouth once daily.       pen needle, diabetic (BD ULTRA-FINE TIM PEN NEEDLES) 32 gauge x 5/32" Ndle USE FOUR TIMES DAILY FOR BLOOD SUGAR TESTING 200 each 8    pen needle, diabetic (BD ULTRA-FINE TIM PEN NEEDLES) 32 gauge x 5/32" Ndle Use four times daily for insulin 480 each 11    rosuvastatin (CRESTOR) 40 MG Tab TAKE 1 TABLET(40 MG) BY MOUTH EVERY DAY 30 tablet 6    TRESIBA FLEXTOUCH U-100 100 unit/mL (3 mL) InPn INJECT 40 UNITS SUBCUTANEOUS ONCE DAILY 6 Syringe 2    nitroGLYCERIN (NITROSTAT) 0.3 MG SL tablet Place 1 tablet (0.3 mg total) under the tongue every 5 (five) minutes as needed for Chest pain. 30 tablet 3     No current facility-administered medications for this visit.        Family History   Problem Relation Age of Onset    Diabetes Mother     Heart disease Mother     Hypertension Mother     Diabetes Father     Melanoma Father     Kidney failure Father     Mental illness Sister         suicide/schizophrenic    Cancer Maternal Grandfather     Cancer " "Paternal Grandfather         colon    Psoriasis Neg Hx     Lupus Neg Hx     Eczema Neg Hx        Social History     Socioeconomic History    Marital status: Single     Spouse name: Not on file    Number of children: Not on file    Years of education: Not on file    Highest education level: Not on file   Social Needs    Financial resource strain: Not on file    Food insecurity - worry: Not on file    Food insecurity - inability: Not on file    Transportation needs - medical: Not on file    Transportation needs - non-medical: Not on file   Occupational History    Occupation: Realtor   Tobacco Use    Smoking status: Never Smoker    Smokeless tobacco: Never Used   Substance and Sexual Activity    Alcohol use: No     Alcohol/week: 0.0 oz    Drug use: No    Sexual activity: Not Currently   Other Topics Concern    Are you pregnant or think you may be? No    Breast-feeding No   Social History Narrative    Part time .    Severe back pain.    Retired in 1975       Objective:     Vitals:    10/04/18 1116   BP: (!) 114/58   Pulse: 63   Temp: 98.1 °F (36.7 °C)   Weight: 96.7 kg (213 lb 3 oz)   Height: 5' 8" (1.727 m)   PainSc:   9   PainLoc: Back       GEN:  Well developed, well nourished.  No acute distress.   HEENT:  No trauma.  Mucous membranes moist.  Nares patent bilaterally.  PSYCH: Normal affect. Thought content appropriate.  CHEST:  Breathing symmetric.  No audible wheezing.  ABD: Soft, non-distended.  SKIN:  Warm, pink, dry.  No rash on exposed areas.    EXT:  No cyanosis, clubbing, or edema.  No color change or changes in nail or hair growth.  NEURO/MUSCULOSKELETAL:  Fully alert, oriented, and appropriate. Speech normal silvina. No cranial nerve deficits.   Gait: antalgic   Motor Strength: 5/5 motor strength throughout lower extremities.   Sensory: No sensory deficit in the lower extremities.   Reflexes:  2+ and symmetric throughout.  Downgoing Babinski's bilaterally.  No clonus or " spasticity.  L-Spine:  Limited ROM with pain on flexion and extension. Positive facet loading bilaterally.  Negative SLR bilaterally.    SI Joint/Hip: Positive NICOLA bilaterally.  negative FADIR bilaterally.  Positive TTP over lumbar spine and bilateral SI joints. Not TTP over lumbar paraspinals, hips, piriformis muscles, or GTB.        Imaging:        The imaging studies listed below were independently reviewed by me, and I agree with the findings as documented below.     Lumbar spine alignment is within normal limits.  The vertebral body heights are well maintained, with no fracture.  There is no marrow signal abnormality suspicious for infiltrative process.  There is multilevel disc desiccation present at all lumbar levels. There is advanced intervertebral disc space height loss present at L4-L5.  The conus is normal in appearance and terminates at the L1-2 level.  There is an incidentally noted Tarlov cyst arising from the S3 neural foramen measuring approximately 1.6 x 1.3 cm. Additional smaller Tarlov cyst is noted arising from the S2 neural foramen.    L1-L2: There is a mild circumferential disc bulge.  No significant central canal or neural foraminal narrowing.    L2-L3: There is a circumferential disc bulge, ligamentum flavum thickening, bilateral facet arthropathy.  There is mild spinal canal stenosis and mild left-sided neural foraminal narrowing.    L3-L4: There is a circumferential disc bulge, ligamentum flavum thickening, and advanced bilateral facet arthropathy.  There is resulting moderate to severe spinal canal stenosis and mild bilateral neuroforaminal narrowing, left greater than right.    L4-L5: There is a circumferential disc bulge and advanced bilateral facet arthropathy.  There is moderate spinal canal stenosis and moderate bilateral neuroforaminal narrowing.    L5-S1: There is a circumferential disc bulge and advanced bilateral facet arthropathy, left greater than right.  There is  mild/moderate bilateral neuroforaminal narrowing. There is no significant spinal canal stenosis.    Limited views of the abdomen demonstrate a 1.1 cm T1 and T2 hyperintense lesion within the superior pole of the right kidney. This is incompletely characterized and further assessment is recommended with dedicated renal ultrasound. There is additional T1 hypointense and T2 hyperintense lesion at the inferior pole the right kidney likely reflecting a simple cyst, though this could be evaluated at time of retroperitoneal ultrasound. There is a 2.7 cm T2 hyperintense cystic structure within the right adnexa. The bilateral sacroiliac joints are maintained.   Impression         1. Multilevel degenerative change of the lumbar spine most pronounced at the levels of L3-L4 and L4-L5, noting moderate to severe spinal canal stenosis at L3-L4. Additionally, there is multilevel bilateral neural foraminal narrowing as discussed above. Please see above for additional level by level details.    2. Incidentally noted sacral Tarlov cysts as discussed above.    3. Incompletely characterized 1.1 cm T1 and T2 hyperintense right upper pole renal lesion. Further evaluation is recommended with dedicated retroperitoneal ultrasound. Additionally, there is a 2.7 cm cystic structure within the right adnexa and further evaluation could be performed with pelvic ultrasound given the patient's postmenopausal status.    This report has been flagged in the [facility] medical record.      ______________________________________     Electronically signed by: WANDER PARKER  Date: 08/27/16  Time: 03:10          Assessment:     Encounter Diagnoses   Name Primary?    Spinal stenosis of lumbar region without neurogenic claudication Yes    Lumbar spondylosis     Sacroiliac dysfunction     DDD (degenerative disc disease), lumbar     Chronic pain disorder     Chronic kidney disease, stage IV (severe)        Plan:     Radha was seen today for  follow-up.    Diagnoses and all orders for this visit:    Spinal stenosis of lumbar region without neurogenic claudication    Lumbar spondylosis    Sacroiliac dysfunction    DDD (degenerative disc disease), lumbar    Chronic pain disorder    Chronic kidney disease, stage IV (severe)         Bilateral lower back pain is consistent with the above.    We discussed the assessment and recommendations.  All available images were reviewed. We discussed the disease process, prognosis, treatment plan, and risks and benefits. The patient is aware of the risks and benefits of the medications being prescribed, common side effects, and proper usage. The following is the plan we agreed on:     1. Schedule for caudal ELISA. The procedure, risks, benefits and options were discussed with patient. There are no contraindications to the procedure. The patient expressed understanding and agreed to proceed.    2. We will obtain permission to hold her anticoagulation.   3. Continue Tramadol 50 mg BID PRN, #90. Refill provided today.  4. Pt with significant CKD and pharmacologic treatment options limited. Pending improvement with interventional procedures and tramadol use discussed with pt that muscle relaxants are another possible pharmacologic therapy that is acceptable to utilize in setting of CKD. Pt wishes to defer muscle relaxant therapy at present time. The [place] Board of Pharmacy website for prescription monitoring was consulted today, and it does not suggest any misuse or aberrancy. Medication management provided by Dr. Peña.   5. In the future, she may be a candidate for the Functional Restoration Program. We did discuss this today.   6. RTC 2 weeks after above procedure.     - Dr. Peña was consulted on the patient and agrees with this plan.    The above plan and management options were discussed at length with patient. Patient is in agreement with the above and verbalized understanding.     Rosamaria Lopez NP  10/04/2018

## 2018-10-08 ENCOUNTER — OFFICE VISIT (OUTPATIENT)
Dept: UROLOGY | Facility: CLINIC | Age: 78
End: 2018-10-08
Payer: MEDICARE

## 2018-10-08 VITALS
SYSTOLIC BLOOD PRESSURE: 130 MMHG | HEART RATE: 72 BPM | HEIGHT: 68 IN | DIASTOLIC BLOOD PRESSURE: 70 MMHG | WEIGHT: 213.63 LBS | BODY MASS INDEX: 32.38 KG/M2

## 2018-10-08 DIAGNOSIS — D17.71 ANGIOMYOLIPOMA OF RIGHT KIDNEY: Primary | ICD-10-CM

## 2018-10-08 DIAGNOSIS — N39.46 MIXED INCONTINENCE URGE AND STRESS: ICD-10-CM

## 2018-10-08 PROCEDURE — 99999 PR PBB SHADOW E&M-EST. PATIENT-LVL III: CPT | Mod: PBBFAC,,, | Performed by: UROLOGY

## 2018-10-08 PROCEDURE — 99213 OFFICE O/P EST LOW 20 MIN: CPT | Mod: PBBFAC | Performed by: UROLOGY

## 2018-10-08 PROCEDURE — 3078F DIAST BP <80 MM HG: CPT | Mod: CPTII,,, | Performed by: UROLOGY

## 2018-10-08 PROCEDURE — 1101F PT FALLS ASSESS-DOCD LE1/YR: CPT | Mod: CPTII,,, | Performed by: UROLOGY

## 2018-10-08 PROCEDURE — 99214 OFFICE O/P EST MOD 30 MIN: CPT | Mod: S$PBB,,, | Performed by: UROLOGY

## 2018-10-08 PROCEDURE — 3075F SYST BP GE 130 - 139MM HG: CPT | Mod: CPTII,,, | Performed by: UROLOGY

## 2018-10-08 NOTE — PATIENT INSTRUCTIONS
For constipation:    Keep well hydrated.     If no bowel movement after 2 days, start adding these medications:   1. Miralax powder daily   2. Dulcolax suppository twice a day as needed  3. Colace (docusate) pill     If no improvement, can add these to encourage bowel movement:  1. Milk of Magnesia orally twice a day  2. Magnesium citrate 1/2-1 bottle orally  3. Fleets enema    All these medications are available over the counter. Okay to use generic versions.

## 2018-10-08 NOTE — LETTER
October 8, 2018      Terri Mcmanus MD  [address]  [facility] [address]           [facility]  [address] [facility] [address]  [place] [address]  Phone: [phone]  Fax: [phone]          Patient: Radha Degroot   MR Number: [id number]   YOB: 1940   Date of Visit: 10/8/2018       Dear Dr. Terri Mcmanus:    Thank you for referring Radha Degroot to me for evaluation. Attached you will find relevant portions of my assessment and plan of care.    If you have questions, please do not hesitate to call me. I look forward to following Radha Degroot along with you.    Sincerely,    Iveth Greene MD    Enclosure  CC:  No Recipients    If you would like to receive this communication electronically, please contact [email or web address] or [phone] to request more information on [employer] Link access.    For providers and/or their staff who would like to refer a patient to [facility], please contact us through our one-stop-shop provider referral line, [facility], at [phone].    If you feel you have received this communication in error or would no longer like to receive these types of communications, please e-mail [email or web address]

## 2018-10-08 NOTE — PROGRESS NOTES
"  Subjective:       Radha Degroot is a 78 y.o. female who is an established patient who was referred by Katherine Merida for evaluation of renal mass and UI.      Renal mass/AML  She underwent SABRINA (9/16) that showed a 1.6cm isoechoic focus in R UP. CT scan was recommended but not done yet. SABRINA was done due to abnormality noted on MRI.     Cr 2.2, most recently 1.9.    No family history of  malignancy. No hematuria. No flank pain.     SABRINA (9/16) - 1.6cm lesion in R UP  CT (non-contrast) (10/16) - 1.3cm AML in R UP  CT (5/17) - stable R AML 1.2cm  SABRINA (9/18) - 1.5cm AML in R UP      Incontinence  She also reports issues with UI. She was given Ditropan in the past for this but did not tolerate this due to SE. She had good response with LUTS. She also takes diuretic. She wears pads daily. She reports "constant dribbling" and urgency with UUI. Also with SURAJ with cough, sneeze, laugh. Denies RK, straining. Denies UTIs. 2ppd.    She reports issues with constipation.       The following portions of the patient's history were reviewed and updated as appropriate: allergies, current medications, past family history, past medical history, past social history, past surgical history and problem list.    Review of Systems  Constitutional: no fever or chills  ENT: no nasal congestion or sore throat  Respiratory: no cough or shortness of breath  Cardiovascular: no chest pain or palpitations  Gastrointestinal: no nausea or vomiting, tolerating diet  Genitourinary: as per HPI  Hematologic/Lymphatic: no easy bruising or lymphadenopathy  Musculoskeletal: no arthralgias or myalgias  Skin: no rashes or lesions  Neurological: no seizures or tremors  Behavioral/Psych: no auditory or visual hallucinations       Objective:    Vitals:   /70   Pulse 72   Ht 5' 8" (1.727 m)   Wt 96.9 kg (213 lb 10 oz)   BMI 32.48 kg/m²     Physical Exam   General: well developed, well nourished in no acute distress  Head: normocephalic, " atraumatic  Neck: supple, trachea midline, no obvious enlargement of thyroid  HEENT: EOMI, mucus membranes moist, sclera anicteric, no hearing impairment  Lungs: symmetric expansion, non-labored breathing  Cardiovascular: regular rate and rhythm, normal pulses  Abdomen: soft, non tender, non distended, no palpable masses, no hepatosplenomegaly, no hernias, no CVA tenderness  Musculoskeletal: no peripheral edema, normal ROM in bilateral upper and lower extremities  Lymphatics: no cervical or inguinal lymphadenopathy  Skin: no rashes or lesions  Neuro: alert and oriented x 3, no gross deficits  Psych: normal judgment and insight, normal mood/affect and non-anxious  Genitourinary:   patient declined exam      Lab Review   Urine analysis today in clinic shows 50 glucose    Lab Results   Component Value Date    WBC 9.88 08/14/2018    HGB 13.4 08/14/2018    HCT 41.2 08/14/2018    MCV 89 08/14/2018     08/14/2018     Lab Results   Component Value Date    CREATININE 2.2 (H) 09/19/2018    CREATININE 2.2 (H) 09/19/2018    BUN 45 (H) 09/19/2018    BUN 45 (H) 09/19/2018     Imaging  Images and reports were personally reviewed by me and discussed with patient       Assessment/Plan:      1. Renal mass / AML   - Unable to give IV contrast 2/2 elevated Cr   - CT (noncon) 10/16 - 1.3cm R UP AML   - CT (noncon) 5/17 - stable 1.2cm R UP AML   - SABRINA 9/18 - stable, 1.5cm AML   - Recommend annual surveillance with SABRINA     2. Mixed incontinence urge and stress    - Discussed difference of UUI and SURAJ components. Reviewed etiology and workup of each.   - SURAJ: Kegels, PFPT, bulking agent, MUS.   - UUI: Behavioral changes, PFPT, anticholinergics. Botox/InterStim for refractory UUI.   - Continue Myrbetriq 25mg 2/2 intolerance/allergy of Ditropan. Refilled today.   - Also discussed OTC trial of Oxytrol patch   - Discussed Botox. She is not interested.          Follow up in 12 months with SABRINA

## 2018-10-15 ENCOUNTER — TELEPHONE (OUTPATIENT)
Dept: UROLOGY | Facility: CLINIC | Age: 78
End: 2018-10-15

## 2018-10-15 ENCOUNTER — TELEPHONE (OUTPATIENT)
Dept: VASCULAR SURGERY | Facility: CLINIC | Age: 78
End: 2018-10-15

## 2018-10-15 NOTE — TELEPHONE ENCOUNTER
----- Message from Brigette Billings sent at 10/15/2018 11:19 AM CDT -----  Contact: Self   Patient says when she picked up her medication she it was half the strength she was given before but she was not aware of a change. Please call at  [phone]      mirabegron (MYRBETRIQ) 25 mg Tb24 ER tablet

## 2018-10-15 NOTE — TELEPHONE ENCOUNTER
Myrbetriq 25mg was in MAR. I refilled what was there. I can change to the 50mg dose now. New rx sent to pharmacy.

## 2018-10-15 NOTE — TELEPHONE ENCOUNTER
Patient states that she was taking myrbetriq 50 mg, but 25 mg was called in. She was not aware of medication change. Please advise. Thank you

## 2018-10-17 ENCOUNTER — PATIENT OUTREACH (OUTPATIENT)
Dept: ADMINISTRATIVE | Facility: HOSPITAL | Age: 78
End: 2018-10-17

## 2018-10-17 DIAGNOSIS — Z13.820 OSTEOPOROSIS SCREENING: ICD-10-CM

## 2018-10-17 DIAGNOSIS — N18.4 TYPE 2 DIABETES MELLITUS WITH STAGE 4 CHRONIC KIDNEY DISEASE, WITH LONG-TERM CURRENT USE OF INSULIN: Primary | ICD-10-CM

## 2018-10-17 DIAGNOSIS — E11.22 TYPE 2 DIABETES MELLITUS WITH STAGE 4 CHRONIC KIDNEY DISEASE, WITH LONG-TERM CURRENT USE OF INSULIN: Primary | ICD-10-CM

## 2018-10-17 DIAGNOSIS — Z78.0 ASYMPTOMATIC MENOPAUSAL STATE: ICD-10-CM

## 2018-10-17 DIAGNOSIS — Z79.4 TYPE 2 DIABETES MELLITUS WITH STAGE 4 CHRONIC KIDNEY DISEASE, WITH LONG-TERM CURRENT USE OF INSULIN: Primary | ICD-10-CM

## 2018-10-17 NOTE — PROGRESS NOTES
10-16-18 Spoke with pt, reminded of upcoming PCP visit 10-24-18, Scheduled her Diabetic Eye Exam for 10-24-18 before her PCP visit and her Dexa Scan is scheduled for 11-2-18. Appts mailed as discussed.

## 2018-10-24 ENCOUNTER — LAB VISIT (OUTPATIENT)
Dept: LAB | Facility: HOSPITAL | Age: 78
End: 2018-10-24
Attending: INTERNAL MEDICINE
Payer: MEDICARE

## 2018-10-24 ENCOUNTER — IMMUNIZATION (OUTPATIENT)
Dept: INTERNAL MEDICINE | Facility: CLINIC | Age: 78
End: 2018-10-24
Payer: MEDICARE

## 2018-10-24 ENCOUNTER — CLINICAL SUPPORT (OUTPATIENT)
Dept: OPTOMETRY | Facility: CLINIC | Age: 78
End: 2018-10-24
Payer: MEDICARE

## 2018-10-24 ENCOUNTER — OFFICE VISIT (OUTPATIENT)
Dept: INTERNAL MEDICINE | Facility: CLINIC | Age: 78
End: 2018-10-24
Payer: MEDICARE

## 2018-10-24 ENCOUNTER — TELEPHONE (OUTPATIENT)
Dept: INTERNAL MEDICINE | Facility: CLINIC | Age: 78
End: 2018-10-24

## 2018-10-24 VITALS
SYSTOLIC BLOOD PRESSURE: 124 MMHG | WEIGHT: 214.5 LBS | DIASTOLIC BLOOD PRESSURE: 64 MMHG | HEIGHT: 68 IN | HEART RATE: 59 BPM | BODY MASS INDEX: 32.51 KG/M2 | OXYGEN SATURATION: 99 %

## 2018-10-24 DIAGNOSIS — M47.816 LUMBAR SPONDYLOSIS: ICD-10-CM

## 2018-10-24 DIAGNOSIS — Z79.4 TYPE 2 DIABETES MELLITUS WITH STAGE 4 CHRONIC KIDNEY DISEASE, WITH LONG-TERM CURRENT USE OF INSULIN: Primary | ICD-10-CM

## 2018-10-24 DIAGNOSIS — I48.19 PERSISTENT ATRIAL FIBRILLATION: ICD-10-CM

## 2018-10-24 DIAGNOSIS — E11.22 TYPE 2 DIABETES MELLITUS WITH STAGE 4 CHRONIC KIDNEY DISEASE, WITH LONG-TERM CURRENT USE OF INSULIN: Primary | ICD-10-CM

## 2018-10-24 DIAGNOSIS — E11.22 TYPE 2 DIABETES MELLITUS WITH STAGE 4 CHRONIC KIDNEY DISEASE, WITH LONG-TERM CURRENT USE OF INSULIN: ICD-10-CM

## 2018-10-24 DIAGNOSIS — H25.12 NUCLEAR SCLEROTIC CATARACT OF LEFT EYE: ICD-10-CM

## 2018-10-24 DIAGNOSIS — N18.4 CKD (CHRONIC KIDNEY DISEASE), STAGE IV: ICD-10-CM

## 2018-10-24 DIAGNOSIS — Z79.4 TYPE 2 DIABETES MELLITUS WITH STAGE 4 CHRONIC KIDNEY DISEASE, WITH LONG-TERM CURRENT USE OF INSULIN: ICD-10-CM

## 2018-10-24 DIAGNOSIS — N18.4 TYPE 2 DIABETES MELLITUS WITH STAGE 4 CHRONIC KIDNEY DISEASE, WITH LONG-TERM CURRENT USE OF INSULIN: Primary | ICD-10-CM

## 2018-10-24 DIAGNOSIS — E11.3393 MODERATE NONPROLIFERATIVE DIABETIC RETINOPATHY OF BOTH EYES WITHOUT MACULAR EDEMA ASSOCIATED WITH TYPE 2 DIABETES MELLITUS: Primary | ICD-10-CM

## 2018-10-24 DIAGNOSIS — I50.32 CHRONIC DIASTOLIC CHF (CONGESTIVE HEART FAILURE): ICD-10-CM

## 2018-10-24 DIAGNOSIS — H31.013 MACULAR SCAR OF BOTH EYES: ICD-10-CM

## 2018-10-24 DIAGNOSIS — N18.4 TYPE 2 DIABETES MELLITUS WITH STAGE 4 CHRONIC KIDNEY DISEASE, WITH LONG-TERM CURRENT USE OF INSULIN: ICD-10-CM

## 2018-10-24 DIAGNOSIS — D17.71 ANGIOMYOLIPOMA OF RIGHT KIDNEY: ICD-10-CM

## 2018-10-24 DIAGNOSIS — M47.816 FACET ARTHRITIS, DEGENERATIVE, LUMBAR SPINE: ICD-10-CM

## 2018-10-24 DIAGNOSIS — I10 ESSENTIAL HYPERTENSION: Chronic | ICD-10-CM

## 2018-10-24 DIAGNOSIS — I70.0 AORTIC ATHEROSCLEROSIS: ICD-10-CM

## 2018-10-24 DIAGNOSIS — I25.10 CORONARY ARTERY DISEASE INVOLVING NATIVE CORONARY ARTERY OF NATIVE HEART WITHOUT ANGINA PECTORIS: Chronic | ICD-10-CM

## 2018-10-24 LAB
ALBUMIN SERPL BCP-MCNC: 3.5 G/DL
ANION GAP SERPL CALC-SCNC: 13 MMOL/L
BUN SERPL-MCNC: 42 MG/DL
CALCIUM SERPL-MCNC: 9.1 MG/DL
CHLORIDE SERPL-SCNC: 106 MMOL/L
CO2 SERPL-SCNC: 23 MMOL/L
CREAT SERPL-MCNC: 2.2 MG/DL
EST. GFR  (AFRICAN AMERICAN): 23.9 ML/MIN/1.73 M^2
EST. GFR  (NON AFRICAN AMERICAN): 20.7 ML/MIN/1.73 M^2
GLUCOSE SERPL-MCNC: 188 MG/DL
PHOSPHATE SERPL-MCNC: 2.7 MG/DL
POTASSIUM SERPL-SCNC: 3.9 MMOL/L
SODIUM SERPL-SCNC: 142 MMOL/L

## 2018-10-24 PROCEDURE — 99999 PR PBB SHADOW E&M-EST. PATIENT-LVL I: CPT | Mod: PBBFAC,,,

## 2018-10-24 PROCEDURE — 99213 OFFICE O/P EST LOW 20 MIN: CPT | Mod: PBBFAC,25 | Performed by: INTERNAL MEDICINE

## 2018-10-24 PROCEDURE — 36415 COLL VENOUS BLD VENIPUNCTURE: CPT

## 2018-10-24 PROCEDURE — 92250 FUNDUS PHOTOGRAPHY W/I&R: CPT | Mod: ,,, | Performed by: OPHTHALMOLOGY

## 2018-10-24 PROCEDURE — 90662 IIV NO PRSV INCREASED AG IM: CPT | Mod: PBBFAC

## 2018-10-24 PROCEDURE — 80069 RENAL FUNCTION PANEL: CPT

## 2018-10-24 PROCEDURE — 3074F SYST BP LT 130 MM HG: CPT | Mod: CPTII,,, | Performed by: INTERNAL MEDICINE

## 2018-10-24 PROCEDURE — 1101F PT FALLS ASSESS-DOCD LE1/YR: CPT | Mod: CPTII,,, | Performed by: INTERNAL MEDICINE

## 2018-10-24 PROCEDURE — G0008 ADMIN INFLUENZA VIRUS VAC: HCPCS | Mod: PBBFAC

## 2018-10-24 PROCEDURE — 99999 PR PBB SHADOW E&M-EST. PATIENT-LVL III: CPT | Mod: PBBFAC,,, | Performed by: INTERNAL MEDICINE

## 2018-10-24 PROCEDURE — 92250 FUNDUS PHOTOGRAPHY W/I&R: CPT | Mod: PBBFAC

## 2018-10-24 PROCEDURE — 3078F DIAST BP <80 MM HG: CPT | Mod: CPTII,,, | Performed by: INTERNAL MEDICINE

## 2018-10-24 PROCEDURE — 99214 OFFICE O/P EST MOD 30 MIN: CPT | Mod: S$PBB,,, | Performed by: INTERNAL MEDICINE

## 2018-10-24 PROCEDURE — 99499 UNLISTED E&M SERVICE: CPT | Mod: HCNC,S$GLB,, | Performed by: INTERNAL MEDICINE

## 2018-10-24 PROCEDURE — 99211 OFF/OP EST MAY X REQ PHY/QHP: CPT | Mod: PBBFAC,25,27

## 2018-10-24 RX ORDER — CARVEDILOL 12.5 MG/1
12.5 TABLET ORAL 2 TIMES DAILY
Qty: 180 TABLET | Refills: 4 | Status: SHIPPED | OUTPATIENT
Start: 2018-10-24 | End: 2019-12-13 | Stop reason: SDUPTHER

## 2018-10-24 NOTE — PROGRESS NOTES
HPI     Diabetic Eye Exam      Additional comments: photos              Comments     Screening photos          Last edited by Alfreda Spencer MA on 10/24/2018  2:41 PM. (History)            Assessment /Plan     For exam results, see Encounter Report.    There are no diagnoses linked to this encounter.  78 y.o. y/o here for screening for Diabetic Renopathy with non-dilated fundus photos per Katherine Merida MD  Pt had little vision in left eye

## 2018-10-25 NOTE — PROGRESS NOTES
"Subjective:       Patient ID: Radha Degroot is a 78 y.o. female.    Chief Complaint: Follow-up (4mo.)  78 year old presents for follow up. She reports continues to stay active overall but frustrated by overall medical issues she has as she has gotten older  She continues to follow with her specialists including endocrinology and bs has been bettter than previous. She is not as sctive as she would like since her back continues to bother her  She is following with pain management has upcoming epidural planned she hopes will help . She would like to get her flu shot today    HPI  Review of Systems   Constitutional: Positive for fatigue.   Cardiovascular: Positive for leg swelling. Negative for palpitations.   Musculoskeletal: Positive for back pain.       Objective:     Blood pressure 124/64, pulse (!) 59, height 5' 8" (1.727 m), weight 97.3 kg (214 lb 8.1 oz), SpO2 99 %.    Physical Exam   Constitutional: No distress.   HENT:   Head: Normocephalic.   Mouth/Throat: Oropharynx is clear and moist.   Eyes: No scleral icterus.   Neck: Neck supple.   Cardiovascular: Normal rate, regular rhythm and normal heart sounds. Exam reveals no gallop and no friction rub.   No murmur heard.  Pulmonary/Chest: Effort normal and breath sounds normal. No respiratory distress.   Abdominal: Soft. Bowel sounds are normal. She exhibits no mass. There is no tenderness.   Musculoskeletal: She exhibits no edema.   Pedal edema    Neurological: She is alert.   Skin: No erythema.   Psychiatric: She has a normal mood and affect.   Vitals reviewed.      Assessment:       1. Type 2 diabetes mellitus with stage 4 chronic kidney disease, with long-term current use of insulin    2. Essential hypertension    3. Angiomyolipoma of right kidney    4. Facet arthritis, degenerative, lumbar spine    5. Lumbar spondylosis    6. Coronary artery disease involving native coronary artery of native heart without angina pectoris    7. Persistent atrial fibrillation "    8. Chronic diastolic CHF (congestive heart failure)    9. Aortic atherosclerosis        Plan:       Radha was seen today for follow-up.    Diagnoses and all orders for this visit:    Type 2 diabetes mellitus with stage 4 chronic kidney disease, with long-term current use of insulin  She has had improvement continues to use her insuin     Essential hypertension  bp has been controlled   Refill as requested   -     carvedilol (COREG) 12.5 MG tablet; Take 1 tablet (12.5 mg total) by mouth 2 (two) times daily.  -     TSH; Future with next blood draw     Angiomyolipoma of right kidney  ckd she continues to follow with urology and nephrology     Facet arthritis, degenerative, lumbar spine  Lumbar spondylosis  She is following with spine center planning upcoming injection     Coronary artery disease involving native coronary artery of native heart without angina pectoris  Persistent atrial fibrillation  Chronic diastolic CHF (congestive heart failure)  Aortic atherosclerosis  She remains on apixaban , crestor  Taking lasxix 40 mg bid and continues to follow with cardiology     Flu shot recommended    Follow up 4 months

## 2018-10-26 ENCOUNTER — TELEPHONE (OUTPATIENT)
Dept: NEPHROLOGY | Facility: CLINIC | Age: 78
End: 2018-10-26

## 2018-10-28 PROBLEM — E11.3393 MODERATE NONPROLIFERATIVE DIABETIC RETINOPATHY OF BOTH EYES WITHOUT MACULAR EDEMA ASSOCIATED WITH TYPE 2 DIABETES MELLITUS: Status: ACTIVE | Noted: 2018-10-28

## 2018-10-28 PROBLEM — H31.013 MACULAR SCAR OF BOTH EYES: Status: ACTIVE | Noted: 2018-10-28

## 2018-10-29 ENCOUNTER — TELEPHONE (OUTPATIENT)
Dept: PAIN MEDICINE | Facility: CLINIC | Age: 78
End: 2018-10-29

## 2018-10-29 DIAGNOSIS — E11.3593 PROLIFERATIVE DIABETIC RETINOPATHY OF BOTH EYES ASSOCIATED WITH TYPE 2 DIABETES MELLITUS, UNSPECIFIED PROLIFERATIVE RETINOPATHY TYPE: Primary | ICD-10-CM

## 2018-10-29 RX ORDER — KETOCONAZOLE 20 MG/ML
SHAMPOO, SUSPENSION TOPICAL
Qty: 120 ML | Refills: 3 | Status: SHIPPED | OUTPATIENT
Start: 2018-10-29 | End: 2023-02-28

## 2018-10-29 NOTE — TELEPHONE ENCOUNTER
Staff contacted and spoke to patient regarding her message. She is asking if she has to go home and lay down after her procedure as she has plans that night.     She was informed that it would be up to her own discretion. As there can be some pain afterwards, but it is recommended patient take it easy but she should be able to determine if she is capable of participating in activities.

## 2018-10-29 NOTE — TELEPHONE ENCOUNTER
----- Message from Kassandra Ngo sent at 10/29/2018  9:05 AM CDT -----  Contact: Pt  Name of Who is Calling:ALISSA ANDREW [[id number]]    What is the request in detail: Patient would like to know would she have any restrictions after procedure Please contact to further discuss and advise    Can the clinic reply by [facility]: No    What Number to Call Back if not in [facility]: [phone]

## 2018-10-31 ENCOUNTER — OFFICE VISIT (OUTPATIENT)
Dept: PODIATRY | Facility: CLINIC | Age: 78
End: 2018-10-31
Payer: MEDICARE

## 2018-10-31 VITALS — WEIGHT: 214 LBS | BODY MASS INDEX: 32.43 KG/M2 | HEIGHT: 68 IN

## 2018-10-31 DIAGNOSIS — B35.1 ONYCHOMYCOSIS DUE TO DERMATOPHYTE: ICD-10-CM

## 2018-10-31 DIAGNOSIS — N18.4 CKD (CHRONIC KIDNEY DISEASE), STAGE IV: ICD-10-CM

## 2018-10-31 DIAGNOSIS — M20.11 HALLUX ABDUCTO VALGUS, RIGHT: ICD-10-CM

## 2018-10-31 DIAGNOSIS — I87.2 VENOUS INSUFFICIENCY: ICD-10-CM

## 2018-10-31 DIAGNOSIS — M20.12 HALLUX ABDUCTO VALGUS, LEFT: ICD-10-CM

## 2018-10-31 DIAGNOSIS — M20.41 HAMMER TOES OF BOTH FEET: ICD-10-CM

## 2018-10-31 DIAGNOSIS — M20.42 HAMMER TOES OF BOTH FEET: ICD-10-CM

## 2018-10-31 DIAGNOSIS — E11.49 TYPE II DIABETES MELLITUS WITH NEUROLOGICAL MANIFESTATIONS: Primary | ICD-10-CM

## 2018-10-31 DIAGNOSIS — L84 CORN OR CALLUS: ICD-10-CM

## 2018-10-31 PROCEDURE — 99499 UNLISTED E&M SERVICE: CPT | Mod: HCNC,S$GLB,, | Performed by: PODIATRIST

## 2018-10-31 PROCEDURE — 99999 PR PBB SHADOW E&M-EST. PATIENT-LVL III: CPT | Mod: PBBFAC,HCNC,, | Performed by: PODIATRIST

## 2018-10-31 PROCEDURE — 11721 DEBRIDE NAIL 6 OR MORE: CPT | Mod: Q9,59,HCNC,S$GLB | Performed by: PODIATRIST

## 2018-10-31 PROCEDURE — 11056 PARNG/CUTG B9 HYPRKR LES 2-4: CPT | Mod: Q9,HCNC,S$GLB, | Performed by: PODIATRIST

## 2018-10-31 NOTE — PROGRESS NOTES
Subjective:      Patient ID: Radha Degroot is a 78 y.o. female.    Chief Complaint: Diabetes Mellitus (Pcp Dr. Merida  10/24/18); Diabetic Foot Exam; and Nail Care    Radha is a 78 y.o. female who presents to the clinic for evaluation and treatment of high risk feet. 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 The patient's chief complaint is long, thick toenails. This patient has documented high risk feet requiring routine maintenance secondary to diabetes mellitis and those secondary complications of diabetes, as mentioned..      PCP: Katherine Merida MD    Date Last Seen by PCP:   Chief Complaint   Patient presents with    Diabetes Mellitus     Pcp Dr. Merida  10/24/18    Diabetic Foot Exam    Nail Care       Current shoe gear:  Casual dress shoes   Hemoglobin A1C   Date Value Ref Range Status   09/05/2018 8.2 (H) 4.0 - 5.6 % Final     Comment:     ADA Screening Guidelines:  5.7-6.4%  Consistent with prediabetes  >or=6.5%  Consistent with diabetes  High levels of fetal hemoglobin interfere with the HbA1C  assay. Heterozygous hemoglobin variants (HbS, HgC, etc)do  not significantly interfere with this assay.    However, presence of multiple variants may affect accuracy.     05/30/2018 8.0 (H) 4.0 - 5.6 % Final     Comment:     ADA Screening Guidelines:  5.7-6.4%  Consistent with prediabetes  >or=6.5%  Consistent with diabetes  High levels of fetal hemoglobin interfere with the HbA1C  assay. Heterozygous hemoglobin variants (HbS, HgC, etc)do  not significantly interfere with this assay.   However, presence of multiple variants may affect accuracy.     02/28/2018 8.4 (H) 4.0 - 5.6 % Final     Comment:     According to ADA guidelines, hemoglobin A1c <7.0% represents  optimal control in non-pregnant diabetic patients. Different  metrics may apply to specific patient populations.   Standards of Medical Care in Diabetes-2016.  For the purpose of screening for the presence of diabetes:  <5.7%     Consistent with the absence of diabetes  5.7-6.4%  Consistent with increasing risk for diabetes   (prediabetes)  >or=6.5%  Consistent with diabetes  Currently, no consensus exists for use of hemoglobin A1c  for diagnosis of diabetes for children.  This Hemoglobin A1c assay has significant interference with fetal   hemoglobin   (HbF). The results are invalid for patients with abnormal amounts of   HbF,   including those with known Hereditary Persistence   of Fetal Hemoglobin. Heterozygous hemoglobin variants (HbAS, HbAC,   HbAD, HbAE, HbA2) do not significantly interfere with this assay;   however, presence of multiple variants in a sample may impact the %   interference.         Patient Active Problem List   Diagnosis    Hyperlipidemia    Coronary artery disease    Anemia of chronic illness    Hearing loss, sensorineural    Chronic kidney disease, stage IV (severe)    Secondary renal hyperparathyroidism    Acquired cyst of kidney    Nuclear sclerotic cataract of left eye    Pseudophakia of right eye    Amblyopia    Posterior vitreous detachment of both eyes    Type 2 diabetes mellitus with mild nonproliferative retinopathy  and macular edema    Type 2 diabetes mellitus with stage 4 chronic kidney disease, with long-term current use of insulin    Type 2 diabetes mellitus with diabetic polyneuropathy, with long-term current use of insulin    Muscle weakness    Stiffness of vertebral column    Chronic midline low back pain without sciatica    Difficulty walking    Degenerative joint disease (DJD) of lumbar spine    Vitamin D deficiency disease    Essential hypertension    Chronic diastolic CHF (congestive heart failure)    Obstructive sleep apnea    Mixed incontinence urge and stress    Type 2 diabetes mellitus with hypoglycemia without coma, with long-term current use of insulin    Chronic pain    Angiomyolipoma of right kidney    Proteinuria    Azotemia    Facet arthritis, degenerative, lumbar spine    Lumbar spondylosis    CKD (chronic kidney disease), stage IV    Persistent atrial fibrillation    Hypertensive kidney disease with stage 4 chronic kidney disease    Venous insufficiency    Moderate nonproliferative diabetic retinopathy of both eyes without macular edema associated with type 2 diabetes mellitus    Macular scar of both eyes       Current Outpatient Medications on File Prior to Visit   Medication Sig Dispense Refill    ACCU-CHEK MELIDA PLUS TEST STRP Strp USE TO TEST BLOOD SUGAR FOUR TIMES DAILY 400 strip 3    apixaban 2.5 mg Tab Take 1 tablet (2.5 mg total) by mouth 2 (two) times daily. 60 tablet 11    blood-glucose meter (ACCU-CHEK MELIDA PLUS METER) Misc To check sugars 4 times daily.  Please include control solution 1 each 0    carvedilol (COREG) 12.5 MG tablet Take 1 tablet (12.5 mg total) by mouth 2 (two) times daily. 180 tablet 4    fluticasone (FLONASE) 50 mcg/actuation nasal spray SHAKE LIQUID AND USE 2 SPRAYS IN EACH NOSTRIL EVERY DAY 16 g 6    furosemide (LASIX) 40 MG tablet Take 2 tablets (80 mg total) by mouth 2 (two) times daily. (Patient taking differently: Take 40 mg by mouth 2  "(two) times daily. ) 360 tablet 3    insulin aspart (NOVOLOG FLEXPEN) 100 unit/mL InPn pen Inject 17 units before meals with ss (Patient taking differently: Sliding scale inject 14 units) 4 Box 1    ketoconazole (NIZORAL) 2 % shampoo APPLY TO AFFECTED AREA EVERY OTHER DAY AND LET SIT 5 MINUTES PRIOR TO RINSING AS DIRECTED 120 mL 3    mirabegron (MYRBETRIQ) 50 mg Tb24 Take 1 tablet (50 mg total) by mouth once daily. 90 tablet 3    MULTIVIT-MIN/IRON/FOLIC/LUTEIN (CENTRUM SILVER WOMEN ORAL) Take by mouth once daily.       pen needle, diabetic (BD ULTRA-FINE TIM PEN NEEDLES) 32 gauge x 5/32" Ndle USE FOUR TIMES DAILY FOR BLOOD SUGAR TESTING 200 each 8    pen needle, diabetic (BD ULTRA-FINE TIM PEN NEEDLES) 32 gauge x 5/32" Ndle Use four times daily for insulin 480 each 11    rosuvastatin (CRESTOR) 40 MG Tab TAKE 1 TABLET(40 MG) BY MOUTH EVERY DAY 30 tablet 6    traMADol (ULTRAM) 50 mg tablet Take 1 tablet (50 mg total) by mouth every 12 (twelve) hours as needed for Pain. 60 tablet 0    TRESIBA FLEXTOUCH U-100 100 unit/mL (3 mL) InPn INJECT 40 UNITS SUBCUTANEOUS ONCE DAILY 6 Syringe 2    nitroGLYCERIN (NITROSTAT) 0.3 MG SL tablet Place 1 tablet (0.3 mg total) under the tongue every 5 (five) minutes as needed for Chest pain. 30 tablet 3     No current facility-administered medications on file prior to visit.        Review of patient's allergies indicates:   Allergen Reactions    Heparin analogues Other (See Comments)     thrombocytopenia    Ancef [cefazolin]     Keflex [cephalexin] Rash    Oxybutynin Other (See Comments)     Metallic sloan, chest symptoms        Past Surgical History:   Procedure Laterality Date    APPENDECTOMY      BLOCK-NERVE-MEDIAL BRANCH-LUMBAR Bilateral 11/9/2017    [facility]IN MGT    CARDIOVERSION N/A 5/18/2018    [facility]ATH [facility]    CATARACT EXTRACTION      Right eye    COLONOSCOPY N/A 4/30/2014    Performed by " [facility]ada [facility] at [facility] ENDO (4TH FLR)    EPIDURAL STEROID INJECTION N/A 11/2/2018    Procedure: Injection, Steroid, Epidural CAUDAL ELISA;  Surgeon: Ирина Peña MD;  Location: [facility];  Service: Pain Management;  Laterality: N/A;    heart stent      X 2    HYSTERECTOMY      Fibroids    Injection, Steroid, Epidural CAUDAL ELISA N/A 11/2/2018    Performed by Ирина Peña [facility] at [facility]    INJECTION-JOINT Bilateral 3/13/2018    Performed by [facility]ña [facility] at [facility]    INJECTION-STEROID-EPIDURAL-TRANSFORAMINAL Bilateral 4/28/2017    Performed by Onesimo Posey MD at [facility]    RADIOFREQUENCY THERMOCOAGULATION (RFTC)-NERVE-MEDIAN BRANCH-LUMBAR Left 12/5/2017    Performed by [facility]ña [facility] at [facility]    RADIOFREQUENCY THERMOCOAGULATION (RFTC)-NERVE-MEDIAN BRANCH-LUMBAR Right 11/28/2017    Performed by [facility] at [facility]    TRANSESOPHAGEAL ECHOCARDIOGRAM (SHAYY) N/A 5/18/2018    Performed by Mike [facility] at [facility] CATH LAB       Family History   Problem Relation Age of Onset    Diabetes Mother     Heart disease Mother     Hypertension Mother     Diabetes Father     Melanoma Father     Kidney failure Father     Mental illness Sister         suicide/schizophrenic    Cancer Maternal Grandfather     Cancer Paternal Grandfather         colon    Psoriasis Neg Hx     Lupus Neg Hx     Eczema Neg Hx        Social History     Socioeconomic History    Marital status: Single     Spouse name: Not on file    Number of children: Not on file    Years of education: Not on file    Highest education level: Not on file   Social Needs    Financial resource strain: Not on file    Food insecurity - worry: Not on file    Food insecurity - inability: Not on file    Transportation needs - medical: Not on file    Transportation needs - non-medical: Not on file   Occupational History    Occupation: Realtor   Tobacco Use    Smoking status: Never  "Smoker    Smokeless tobacco: Never Used   Substance and Sexual Activity    Alcohol use: No     Alcohol/week: 0.0 oz    Drug use: No    Sexual activity: Not Currently   Other Topics Concern    Are you pregnant or think you may be? No    Breast-feeding No   Social History Narrative    Part time .    Severe back pain.    Retired in 1975             Review of Systems   Constitution: Negative for chills, fever and weakness.   Cardiovascular: Negative for claudication and leg swelling.   Respiratory: Negative for cough and shortness of breath.    Skin: Positive for dry skin and nail changes. Negative for itching and rash.   Musculoskeletal: Negative for falls, joint pain, joint swelling and muscle weakness.   Gastrointestinal: Negative for diarrhea, nausea and vomiting.   Neurological: Positive for numbness and paresthesias. Negative for tremors.   Psychiatric/Behavioral: Negative for altered mental status and hallucinations.           Objective:       Vitals:    10/31/18 1459   Weight: 97.1 kg (214 lb)   Height: 5' 8" (1.727 m)   PainSc: 0-No pain       Physical Exam   Constitutional:  Non-toxic appearance. She does not have a sickly appearance. No distress.   Pt. is well-developed, well-nourished, appears stated age, in no acute distress, alert and oriented x 3. No evidence of depression, anxiety, or agitation. Calm, cooperative, and communicative. Appropriate interactions and affect.   Cardiovascular:   Pulses:       Dorsalis pedis pulses are 1+ on the right side, and 1+ on the left side.        Posterior tibial pulses are 1+ on the right side, and 1+ on the left side.   Dorsalis pedis and posterior tibial pulses are diminished Bilaterally. Toes are cool to touch. Feet are warm proximally.There is decreased digital hair. Skin is atrophic,  hyperpigmented   Pulmonary/Chest: No respiratory distress.   Musculoskeletal:        Right ankle: She exhibits swelling. No tenderness. No lateral malleolus, " no medial malleolus, no AITFL, no CF ligament and no posterior TFL tenderness found. Achilles tendon exhibits no pain, no defect and normal Morin's test results.        Left ankle: She exhibits swelling. No tenderness. No lateral malleolus, no medial malleolus, no AITFL, no CF ligament and no posterior TFL tenderness found. Achilles tendon exhibits no pain, no defect and normal Morin's test results.        Right foot: There is no tenderness and no bony tenderness.        Left foot: There is no tenderness and no bony tenderness.   Hypermobile flatfoot bilaterally    Patient has hammertoes of digits 2-5 bilateral partially reducible without symptom today.    Visible and palpable bunion without pain at dorsomedial 1st metatarsal head right and left.  Hallux abducted right and left partially reducible, tracks laterally without being track bound.  No ecchymosis, erythema, edema, or cardinal signs infection or signs of trauma same foot.    Fat pad atrophy to heels and met heads bilateral    There is equinus deformity bilateral. There is limitation of dorsiflexion with knees extended Bilaterally,  adequate with knees flexed.       Lymphadenopathy:   No lymphatic streaking    Negative lymphadenopathy bilateral popliteal fossa and tarsal tunnel.     Neurological:    Honor-Flo 5.07 monofilamant testing is diminished Jonah feet. Decreased/absent vibratory sensation bilateral feet to 128Hz tuning fork.      Paresthesias, and hyperesthesia bilateral feet with no clearly identified trigger or source.           Skin: Skin is warm, dry and intact. No abrasion, no ecchymosis, no laceration, no lesion and no rash noted. She is not diaphoretic. No cyanosis or erythema. No pallor. Nails show no clubbing.   Toenails 1-5 bilaterally are elongated by 2-3 mm, thickened by 2-3 mm, discolored/yellowed, dystrophic, brittle with subungual debris.    Focal hyperkeratotic lesion consisting entirely of hyperkeratotic tissue without  open skin, drainage, pus, fluctuance, malodor, or signs of infection hallux IPJ bilaterally   Psychiatric: Her mood appears not anxious. Her affect is not inappropriate. Her speech is not slurred. She is not combative. She is communicative. She is attentive.   Nursing note reviewed.            Assessment:       Encounter Diagnoses   Name Primary?    Type II diabetes mellitus with neurological manifestations Yes    CKD (chronic kidney disease), stage IV     Venous insufficiency     Hallux abducto valgus, right     Hallux abducto valgus, left     Hammer toes of both feet     Onychomycosis due to dermatophyte     Corn or callus          Plan:       Radha was seen today for diabetes mellitus, diabetic foot exam and nail care.    Diagnoses and all orders for this visit:    Type II diabetes mellitus with neurological manifestations  -     DIABETIC SHOES FOR HOME USE    CKD (chronic kidney disease), stage IV    Venous insufficiency    Hallux abducto valgus, right  -     DIABETIC SHOES FOR HOME USE    Hallux abducto valgus, left  -     DIABETIC SHOES FOR HOME USE    Hammer toes of both feet  -     DIABETIC SHOES FOR HOME USE    Onychomycosis due to dermatophyte    Corn or callus  -     DIABETIC SHOES FOR HOME USE      I counseled the patient on her conditions, their implications and medical management.    Greater than 50% of this visit spent on counseling and coordination of care.    Education about the diabetic foot, neuropathy, and prevention of limb loss.    Shoe inspection. Diabetic Foot Education. Patient reminded of the importance of good nutrition/healthy diet/weight management and blood sugar control to help prevent podiatric complications of diabetes. Patient instructed on proper foot hygeine. Wear comfortable, proper fitting shoes. Wash feet daily. Dry well. After drying, apply moisturizer to feet (no lotion to webspaces). Inspect feet daily for skin breaks, blisters, swelling, or redness. Wear cotton  socks (preferably white)  Change socks every day. Do NOT walk barefoot. Do NOT use heating pads or hot water soaks. We discussed wearing proper shoe gear, daily foot inspections, never walking without protective shoe gear.     Discussed edema control and the importance of daily moisturizer to the feet such as Gold bonds diabetic foot cream    Recommend applying vicks vaporub to thick abnormal toenails daily x 6 months to treat fungal nail infection.    Rx diabetic shoes for protection and support    With patient's permission, nails were aggressively reduced and debrided x 10 to their soft tissue attachment mechanically and with electric , removing all offending nail and debris. Patient relates relief following the procedure.     After cleansing the  area w/ alcohol prep pad the above mentioned hyperkeratosis was trimmed utilizing No 15 scapel, to a smooth base with out incident. Patient tolerated this  well and reported comfort to the area of  Hallux IPJ cheri    She will continue to monitor the areas daily, inspect her feet, wear protective shoe gear when ambulatory, moisturizer to maintain skin integrity and follow in this office in approximately 3-5 months, sooner p.r.n.

## 2018-10-31 NOTE — PATIENT INSTRUCTIONS
Recommend lotions: eucerin, eucerin for diabetics, aquaphor, A&D ointment, gold bond for diabetics, sween, Lyford's Bees all purpose baby ointment,  urea 40 with aloe (found on amazon.com)    Shoe recommendations: (try 6pm.com, zappos.com , nordstromrack.SilMach, or shoes.SilMach for discounted prices) you can visit [employer] in [place]  or [employer] [facility] in the [facility] (there are also several shoe brand outlets in the [facility])    Asics (GT 2000 or gel foundations), new balance stability type shoes (940 series), saucony (stabil c3),  Mead (GTS or Beast or transcend), vionic, propet (tennis shoe)    sofft brand, clarks, crocs, aerosoles, naturalizers, SAS, ecco, born, agata gonsalves, rockports (dress shoes)    Vionic, burkenstocks, fitflops, propet (sandals)  Nike comfort thong sandals, crocs, propet (house shoes)    Nail Home remedy:  Vicks Vapor rub to nails for easier manageability      Diabetes: Inspecting Your Feet  Diabetes increases your chances of developing foot problems. So inspect your feet every day. This helps you find small skin irritations before they become serious infections. If you have trouble seeing the bottoms of your feet, use a mirror or ask a family member or friend to help.     Pressure spots on the bottom of the foot are common areas where problems develop.   How to check your feet  Below are tips to help you look for foot problems. Try to check your feet at the same time each day, such as when you get out of bed in the morning:  · Check the top of each foot. The tops of toes, back of the heel, and outer edge of the foot can get a lot of rubbing from poor-fitting shoes.  · Check the bottom of each foot. Daily wear and tear often leads to problems at pressure spots.  · Check the toes and nails. Fungal infections often occur between toes. Toenail problems can also be a sign of fungal infections or lead to breaks in the skin.  · Check your shoes, too. Loose objects inside a shoe can injure the foot. Use  your hand to feel inside your shoes for things like hugo, loose stitching, or rough areas that could irritate your skin.  Warning signs  Look for any color changes in the foot. Redness with streaks can signal a severe infection, which needs immediate medical attention. Tell your doctor right away if you have any of these problems:  · Swelling, sometimes with color changes, may be a sign of poor blood flow or infection. Symptoms include tenderness and an increase in the size of your foot.  · Warm or hot areas on your feet may be signs of infection. A foot that is cold may not be getting enough blood.  · Sensations such as burning, tingling, or pins and needles can be signs of a problem. Also check for areas that may be numb.  · Hot spots are caused by friction or pressure. Look for hot spots in areas that get a lot of rubbing. Hot spots can turn into blisters, calluses, or sores.  · Cracks and sores are caused by dry or irritated skin. They are a sign that the skin is breaking down, which can lead to infection.  · Toenail problems to watch for include nails growing into the skin (ingrown toenail) and causing redness or pain. Thick, yellow, or discolored nails can signal a fungal infection.  · Drainage and odor can develop from untreated sores and ulcers. Call your doctor right away if you notice white or yellow drainage, bleeding, or unpleasant odor.   © 5854-1079 [employer]. [address] [place] [address]. All rights reserved. This information is not intended as a substitute for professional medical care. Always follow your healthcare professional's instructions.        Step-by-Step:  Inspecting Your Feet (Diabetes)    Date Last Reviewed: 10/1/2016  © 2702-5615 [employer]. [address] [place] [address]. All rights reserved. This information is not intended as a substitute for professional medical care. Always follow your healthcare professional's  instructions.

## 2018-11-02 ENCOUNTER — HOSPITAL ENCOUNTER (OUTPATIENT)
Facility: OTHER | Age: 78
Discharge: HOME | End: 2018-11-02
Attending: ANESTHESIOLOGY | Admitting: ANESTHESIOLOGY
Payer: MEDICARE

## 2018-11-02 VITALS
RESPIRATION RATE: 18 BRPM | TEMPERATURE: 99 F | WEIGHT: 211 LBS | BODY MASS INDEX: 31.98 KG/M2 | DIASTOLIC BLOOD PRESSURE: 76 MMHG | SYSTOLIC BLOOD PRESSURE: 182 MMHG | HEIGHT: 68 IN | OXYGEN SATURATION: 97 % | HEART RATE: 71 BPM

## 2018-11-02 DIAGNOSIS — M51.36 DDD (DEGENERATIVE DISC DISEASE), LUMBAR: Primary | ICD-10-CM

## 2018-11-02 LAB — POCT GLUCOSE: 131 MG/DL (ref 70–110)

## 2018-11-02 PROCEDURE — 62323 NJX INTERLAMINAR LMBR/SAC: CPT | Mod: HCNC | Performed by: ANESTHESIOLOGY

## 2018-11-02 PROCEDURE — 25000003 PHARM REV CODE 250: Mod: HCNC | Performed by: ANESTHESIOLOGY

## 2018-11-02 PROCEDURE — 25500020 PHARM REV CODE 255: Mod: HCNC | Performed by: ANESTHESIOLOGY

## 2018-11-02 PROCEDURE — 62323 NJX INTERLAMINAR LMBR/SAC: CPT | Mod: HCNC,,, | Performed by: ANESTHESIOLOGY

## 2018-11-02 PROCEDURE — 63600175 PHARM REV CODE 636 W HCPCS: Mod: HCNC | Performed by: ANESTHESIOLOGY

## 2018-11-02 RX ORDER — ALPRAZOLAM 0.5 MG/1
0.5 TABLET, ORALLY DISINTEGRATING ORAL NIGHTLY PRN
Status: DISCONTINUED | OUTPATIENT
Start: 2018-11-02 | End: 2018-11-02 | Stop reason: HOSPADM

## 2018-11-02 RX ORDER — LIDOCAINE HYDROCHLORIDE 10 MG/ML
INJECTION INFILTRATION; PERINEURAL
Status: DISCONTINUED | OUTPATIENT
Start: 2018-11-02 | End: 2018-11-02 | Stop reason: HOSPADM

## 2018-11-02 RX ORDER — BUPIVACAINE HYDROCHLORIDE 2.5 MG/ML
INJECTION, SOLUTION EPIDURAL; INFILTRATION; INTRACAUDAL
Status: DISCONTINUED | OUTPATIENT
Start: 2018-11-02 | End: 2018-11-02 | Stop reason: HOSPADM

## 2018-11-02 RX ORDER — METHYLPREDNISOLONE ACETATE 80 MG/ML
INJECTION, SUSPENSION INTRA-ARTICULAR; INTRALESIONAL; INTRAMUSCULAR; SOFT TISSUE
Status: DISCONTINUED | OUTPATIENT
Start: 2018-11-02 | End: 2018-11-02 | Stop reason: HOSPADM

## 2018-11-02 RX ADMIN — ALPRAZOLAM 0.5 MG: 0.5 TABLET, ORALLY DISINTEGRATING ORAL at 08:11

## 2018-11-02 NOTE — OP NOTE
Date of procedure: 11/02/2018    Procedure: Caudal Epidural Steroid Injection/ volumetric lysis of adhesions    Referring Provider: None    Pre-op diagnosis: Lumbar radiculopathy [M54.16]  DDD (degenerative disc disease), lumbar [M51.36]    Post-op diagnosis: Lumbar radiculopathy [M54.16]  DDD (degenerative disc disease), lumbar [M51.36]     Physician: Dr. Ирина Peña     Assistant:  Dr. Carter    Anesthestia: local/niravam    EBL: None    Specimens: None    All medications, allergies, and relevant histories were reviewed. No recent antibiotics or infections.  A time-out was taken to verify the correct patient, procedure, laterality, and appropriate medications/allergies.    Fluoroscopically Guided Caudal Epidural Steroid Injection:   The procedure risks, benefits, and possible complications were discussed including infection, nerve injury, paresis, bleeding, tissue injury, and they agreed to proceed. NIBP, pulse rate, and O2 saturation monitored throughout the procedure.     Patient was placed in the prone position with pelvis elevated. Caudal area was prepped with Chlorhexidine x 3. Sterile drape was placed over the site and sterile precautions observed throughout the procedure. The sacral hiatus was identified using fluoroscopy in the AP position.   Lidocaine 1% was injected at the entry site.  An 20-gauge Tuohy needle was introduced through the sacral hiatus to the caudal epidural space using fluoroscopic guidance in the lateral view.  An Epi-Med catheter was then threaded up to the L4/5 interspace. Aspiration was negative of CSF and blood. 2 cc of Omnipaque 300 demonstrated epidural spread. 2 cc of Omnipaque 300 demonstrated epidural spread. 7cc of 0.125% bupivacaine and 1cc of 80 mg in Depo-Medrol was injected at a intermittent high rate in 1 cc aliqiots rate after negative aspiration. The needle and catheter were then removed. Band-Aid dressing was applied over the site.     Patient tolerated the procedure  well without any complications.     The patient was transferred back to the recovery area and then discharged home with a responsible adult.    Future Management:   If helpful, can repeat as needed.    Follow up with my clinic in 3 weeks or sooner if needed  .

## 2018-11-02 NOTE — DISCHARGE INSTRUCTIONS
Thank you for allowing us to care for you today. You may receive a survey about the care we provided. Your feedback is valuable and helps us provide excellent care throughout the community.     Home Care Instructions for Pain Management:    1. DIET:   You may resume your normal diet today.   2. BATHING:   You may shower with luke warm water. No tub baths or anything that will soak injection sites under water for the next 24 hours.  3. DRESSING:   You may remove your bandage today.   4. ACTIVITY LEVEL:   You may resume your normal activities 24 hrs after your procedure. Nothing strenuous today.  5. MEDICATIONS:   You may resume your normal medications today. Restart APIXABAN TOMORROW.  6. DRIVING    If you have received any sedatives by mouth today, you may not drive for 12 hours.    If you have received any sedation through your IV, you may not drive for 24 hrs.   7. SPECIAL INSTRUCTIONS:   No heat to the injection site for 24 hrs including, hot bath or shower, heating pad, moist heat, or hot tubs.    Use ice pack to injection site for any pain or discomfort.  Apply ice packs for 20 minute intervals as needed.    IF you have diabetes, be sure to monitor your blood sugar more closely. IF your injection contained steroids your blood sugar levels may become higher than normal.    If you are still having pain upon discharge:  Your pain may improve over the next 48 hours. The anesthetic (numbing medication) works immediately to 48 hours. IF your injection contained a steroid (anti-inflammatory medication), it takes approximately 3 days to start feeling relief and 7-10 days to see your greatest results from the medication. It is possible you may need subsequent injections. This would be discussed at your follow up appointment with pain management or your referring doctor.      PLEASE CALL YOUR DOCTOR IF:  1. Redness or swelling around the injection site.  2. Fever of 101 degrees or more  3. Drainage (pus) from the  injection site.  4. For any continuous bleeding (some dried blood over the incision is normal.)    FOR EMERGENCIES:   If any unusual problems or difficulties occur during clinic hours, call [phone] or [phone].

## 2018-11-05 ENCOUNTER — TELEPHONE (OUTPATIENT)
Dept: INTERNAL MEDICINE | Facility: CLINIC | Age: 78
End: 2018-11-05

## 2018-11-05 NOTE — TELEPHONE ENCOUNTER
"----- Message from Mary Barbosa sent at 11/5/2018 10:12 AM CST -----  Contact: call pt at [phone]  RX request - refill or new RX.  Is this a refill or new RX:  Refill   RX name and strength: ACCU-CHEK MELIDA PLUS TEST STRP Strp  Directions:   Is this a 30 day or 90 day RX:  90 day   Local pharmacy or mail order pharmacy:    Pharmacy name and phone # (DON'T enter "on file" or "in chart"): [facility] - [facility] [address] [phone] (Phone)   Comments:        "

## 2018-11-09 ENCOUNTER — TELEPHONE (OUTPATIENT)
Dept: INTERNAL MEDICINE | Facility: CLINIC | Age: 78
End: 2018-11-09

## 2018-11-09 NOTE — TELEPHONE ENCOUNTER
----- Message from Liudmila Wilkerson sent at 11/9/2018 12:07 PM CST -----  Contact: Patient [phone]  Patient is requesting that progress notes for her diabetic shoes be faxed to [facility] at [phone].    Thank You

## 2018-11-12 ENCOUNTER — TELEPHONE (OUTPATIENT)
Dept: PAIN MEDICINE | Facility: CLINIC | Age: 78
End: 2018-11-12

## 2018-11-12 NOTE — TELEPHONE ENCOUNTER
----- Message from Kassandra Ngo sent at 11/12/2018  3:24 PM CST -----  Contact: pT  Name of Who is Calling:ALISSA ANDREW [[id number]]    What is the request in detail: Patient returning a call to the staff Please contact to further discuss and advise    Can the clinic reply by [facility]: No    [facility] [phone]

## 2018-11-12 NOTE — TELEPHONE ENCOUNTER
----- Message from Bunny Carr sent at 11/12/2018 12:07 PM CST -----  Contact: ALISSA ANDREW [[id number]]    Name of Who is Calling: ALISSA ANDREW [[id number]]    What is the request in detail: Patient had to reschedule an appointment.. But wanted to inform NP that she is doing significantly better.

## 2018-11-14 ENCOUNTER — TELEPHONE (OUTPATIENT)
Dept: INTERNAL MEDICINE | Facility: CLINIC | Age: 78
End: 2018-11-14

## 2018-11-26 ENCOUNTER — TELEPHONE (OUTPATIENT)
Dept: OPHTHALMOLOGY | Facility: CLINIC | Age: 78
End: 2018-11-26

## 2018-11-26 RX ORDER — FLUTICASONE PROPIONATE 50 MCG
SPRAY, SUSPENSION (ML) NASAL
Qty: 16 ML | Refills: 0 | Status: SHIPPED | OUTPATIENT
Start: 2018-11-26 | End: 2018-11-26 | Stop reason: SDUPTHER

## 2018-11-26 RX ORDER — FLUTICASONE PROPIONATE 50 MCG
SPRAY, SUSPENSION (ML) NASAL
Qty: 48 ML | Refills: 4 | Status: SHIPPED | OUTPATIENT
Start: 2018-11-26 | End: 2023-10-12

## 2018-11-26 NOTE — TELEPHONE ENCOUNTER
Called patient regarding diabetic eye exam gave patient the results and she also had an appointment on today ask me to reschedule to appointment.Patient only want to see opthalmology.     ----- Message from Kristal Hutchison MA sent at 11/26/2018 10:52 AM CST -----  Please call this pt with results    Kristal  ----- Message -----  From: Darnell Crain MD  Sent: 10/28/2018   8:01 AM  To: Kristal Hutchison MA, Alfreda Spencer MA    Found images under name      ----- Message -----  From: Alfreda Spencer MA  Sent: 10/24/2018   2:41 PM  To: Darnell Crain MD

## 2018-11-28 ENCOUNTER — TELEPHONE (OUTPATIENT)
Dept: NEPHROLOGY | Facility: CLINIC | Age: 78
End: 2018-11-28

## 2018-11-28 NOTE — TELEPHONE ENCOUNTER
Call the patient and let her know as of now we have no appts available  When the schedule open in jan we will give her a call to re schedule her appt

## 2018-11-29 ENCOUNTER — OFFICE VISIT (OUTPATIENT)
Dept: FAMILY MEDICINE | Facility: CLINIC | Age: 78
End: 2018-11-29
Payer: MEDICARE

## 2018-11-29 ENCOUNTER — PATIENT MESSAGE (OUTPATIENT)
Dept: FAMILY MEDICINE | Facility: CLINIC | Age: 78
End: 2018-11-29

## 2018-11-29 VITALS
TEMPERATURE: 98 F | DIASTOLIC BLOOD PRESSURE: 76 MMHG | RESPIRATION RATE: 17 BRPM | HEIGHT: 68 IN | HEART RATE: 78 BPM | OXYGEN SATURATION: 97 % | WEIGHT: 212.94 LBS | BODY MASS INDEX: 32.27 KG/M2 | SYSTOLIC BLOOD PRESSURE: 138 MMHG
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
## 2018-11-29 DIAGNOSIS — M25.561 KNEE PAIN, RIGHT ANTERIOR: ICD-10-CM

## 2018-11-29 DIAGNOSIS — M25.561 PATELLAR PAIN, RIGHT: ICD-10-CM

## 2018-11-29 PROCEDURE — 3078F DIAST BP <80 MM HG: CPT | Mod: CPTII,HCNC,S$GLB, | Performed by: NURSE PRACTITIONER

## 2018-11-29 PROCEDURE — 3075F SYST BP GE 130 - 139MM HG: CPT | Mod: CPTII,HCNC,S$GLB, | Performed by: NURSE PRACTITIONER

## 2018-11-29 PROCEDURE — 1100F PTFALLS ASSESS-DOCD GE2>/YR: CPT | Mod: CPTII,HCNC,S$GLB, | Performed by: NURSE PRACTITIONER

## 2018-11-29 PROCEDURE — 99999 PR PBB SHADOW E&M-EST. PATIENT-LVL IV: CPT | Mod: PBBFAC,HCNC,, | Performed by: NURSE PRACTITIONER

## 2018-11-29 PROCEDURE — 3288F FALL RISK ASSESSMENT DOCD: CPT | Mod: CPTII,HCNC,S$GLB, | Performed by: NURSE PRACTITIONER

## 2018-11-29 PROCEDURE — 99214 OFFICE O/P EST MOD 30 MIN: CPT | Mod: HCNC,S$GLB,, | Performed by: NURSE PRACTITIONER

## 2018-11-29 NOTE — PROGRESS NOTES
Routine Office Visit    Patient Name: Radha Degroot    : 1940  MRN: [id number]    Chief Complaint:  Knee Pain    Subjective:  Radha is a 78 y.o. female who presents today for:    1. Knee Pain and fall - During Thanksgi she was walking in her house and her slipper slipped off underneath her ottoman. She then fell forward and landed on her knees stabilizing herself on her ottoman with her hands. She never hit her head or any other joints. Denies syncope, presyncope, chest pain, palpitations, or SOB during or preceding the fall. She says that the pain is the worst when sitting down or standing up and is located anteriorly. She also has pain with extension of the leg. She has some leftover tramadol from pain management that she took last night with little relief. She does have chronic back pain followed by pain management.     Past Medical History  Past Medical History:   Diagnosis Date    ALLERGIC RHINITIS     Anemia     Anticoagulant long-term use     Arthritis     Cardiomyopathy, ischemic     Carpal tunnel syndrome     Cataract     Left eye    CHF (congestive heart failure)     Chronic kidney disease     Coronary artery disease     hx stent X2    Diabetes mellitus type II     Diabetic neuropathy     Diabetic retinopathy of both eyes     Encounter for blood transfusion     GERD (gastroesophageal reflux disease)     hiatal hernia    Gout, unspecified     h/o LAD and D1 PCI in 2013    Heart attack 2006    Hiatal hernia     HIT (heparin-induced thrombocytopenia) 2013    Hx of colonic polyps         Hx of colonic polyps     Hyperlipidemia     Hypertension     Myocardial infarction 2006    Persistent atrial fibrillation 2018    Posterior vitreous detachment of both eyes     Sleep apnea     Type 2 diabetes mellitus with mild nonproliferative diabetic retinopathy with macular edema 2/15/2013       Past Surgical History  Past Surgical History:   Procedure  Laterality Date    APPENDECTOMY      BLOCK-NERVE-MEDIAL BRANCH-LUMBAR Bilateral 11/9/2017    [facility] at [facility]    CARDIOVERSION N/A 5/18/2018    Performed by Mike Alexander MD at [facility] CATH LAB    CATARACT EXTRACTION      Right eye    COLONOSCOPY N/A 4/30/2014    Performed by Mert Calzada MD at [facility] ENDO (4TH FLR)    EPIDURAL STEROID INJECTION N/A 11/2/2018    Procedure: Injection, Steroid, Epidural CAUDAL ELISA;  Surgeon: Ирина Peña MD;  Location: [facility];  Service: Pain Management;  Laterality: N/A;    heart stent      X 2    HYSTERECTOMY      Fibroids    Injection, Steroid, Epidural CAUDAL ELISA N/A 11/2/2018    [facility] at [facility]    INJECTION-JOINT Bilateral 3/13/2018    [facility] at [facility]    INJECTION-STEROID-EPIDURAL-TRANSFORAMINAL Bilateral 4/28/2017    [facility] by Onesimo Posey MD at [facility]    RADIOFREQUENCY THERMOCOAGULATION (RFTC)-NERVE-MEDIAN BRANCH-LUMBAR Left 12/5/2017    [facility] at [facility]    RADIOFREQUENCY THERMOCOAGULATION (RFTC)-NERVE-MEDIAN BRANCH-LUMBAR Right 11/28/2017    [facility] at [facility]    TRANSESOPHAGEAL ECHOCARDIOGRAM (SHAYY) N/A 5/18/2018    Performed by Mike [facility] CATH LAB       Family History  Family History   Problem Relation Age of Onset    Diabetes Mother     Heart disease Mother     Hypertension Mother     Diabetes Father     Melanoma Father     Kidney failure Father     Mental illness Sister         suicide/schizophrenic    Cancer Maternal Grandfather     Cancer Paternal Grandfather         colon    Psoriasis Neg Hx     Lupus Neg Hx     Eczema Neg Hx        Social History  Social History     Socioeconomic History    Marital status: Single     Spouse name: Not on file    Number of children: Not on file    Years of education: Not on file    Highest education level:  Not on file   Social Needs    Financial resource strain: Not on file    Food insecurity - worry: Not on file    Food insecurity - inability: Not on file    Transportation needs - medical: Not on file    Transportation needs - non-medical: Not on file   Occupational History    Occupation: Realtor   Tobacco Use    Smoking status: Never Smoker    Smokeless tobacco: Never Used   Substance and Sexual Activity    Alcohol use: No     Alcohol/week: 0.0 oz    Drug use: No    Sexual activity: Not Currently   Other Topics Concern    Are you pregnant or think you may be? No    Breast-feeding No   Social History Narrative    Part time .    Severe back pain.    Retired in 1975       Current Medications  Current Outpatient Medications on File Prior to Visit   Medication Sig Dispense Refill    apixaban 2.5 mg Tab Take 1 tablet (2.5 mg total) by mouth 2 (two) times daily. 60 tablet 11    blood sugar diagnostic (ACCU-CHEK MELIDA PLUS TEST STRP) Strp USE TO TEST BLOOD SUGAR FOUR TIMES DAILY 400 strip 3    blood-glucose meter (ACCU-CHEK MELIDA PLUS METER) Misc To check sugars 4 times daily.  Please include control solution 1 each 0    carvedilol (COREG) 12.5 MG tablet Take 1 tablet (12.5 mg total) by mouth 2 (two) times daily. 180 tablet 4    fluticasone (FLONASE) 50 mcg/actuation nasal spray SHAKE LIQUID AND USE 2 SPRAYS IN EACH NOSTRIL EVERY DAY 48 mL 4    furosemide (LASIX) 40 MG tablet Take 2 tablets (80 mg total) by mouth 2 (two) times daily. (Patient taking differently: Take 40 mg by mouth 2 (two) times daily. ) 360 tablet 3    insulin aspart (NOVOLOG FLEXPEN) 100 unit/mL InPn pen Inject 17 units before meals with ss (Patient taking differently: Sliding scale inject 14 units) 4 Box 1    ketoconazole (NIZORAL) 2 % shampoo APPLY TO AFFECTED AREA EVERY OTHER DAY AND LET SIT 5 MINUTES PRIOR TO RINSING AS DIRECTED 120 mL 3    mirabegron (MYRBETRIQ) 50 mg Tb24 Take 1 tablet (50 mg total) by mouth  "once daily. 90 tablet 3    MULTIVIT-MIN/IRON/FOLIC/LUTEIN (CENTRUM SILVER WOMEN ORAL) Take by mouth once daily.       nitroGLYCERIN (NITROSTAT) 0.3 MG SL tablet Place 1 tablet (0.3 mg total) under the tongue every 5 (five) minutes as needed for Chest pain. 30 tablet 3    pen needle, diabetic (BD ULTRA-FINE TIM PEN NEEDLES) 32 gauge x 5/32" Ndle USE FOUR TIMES DAILY FOR BLOOD SUGAR TESTING 200 each 8    pen needle, diabetic (BD ULTRA-FINE TIM PEN NEEDLES) 32 gauge x 5/32" Ndle Use four times daily for insulin 480 each 11    rosuvastatin (CRESTOR) 40 MG Tab TAKE 1 TABLET(40 MG) BY MOUTH EVERY DAY 30 tablet 6    TRESIBA FLEXTOUCH U-100 100 unit/mL (3 mL) InPn INJECT 40 UNITS SUBCUTANEOUS ONCE DAILY 6 Syringe 2     No current facility-administered medications on file prior to visit.        Allergies   Review of patient's allergies indicates:   Allergen Reactions    Heparin analogues Other (See Comments)     thrombocytopenia    Ancef [cefazolin]     Keflex [cephalexin] Rash    Oxybutynin Other (See Comments)     Metallic sloan, chest symptoms        Review of Systems (Pertinent positives)  Review of Systems   Constitutional: Negative for chills, fever, malaise/fatigue and weight loss.   HENT: Negative.    Eyes: Negative for blurred vision and double vision.   Respiratory: Negative for cough, hemoptysis, sputum production, shortness of breath and wheezing.    Cardiovascular: Negative for chest pain, palpitations, orthopnea, claudication, leg swelling and PND.   Gastrointestinal: Negative for abdominal pain, diarrhea, nausea and vomiting.   Genitourinary: Negative.    Musculoskeletal: Positive for back pain, falls and joint pain.   Skin: Negative.    Neurological: Negative for dizziness, tingling, tremors, sensory change, speech change, focal weakness, seizures, loss of consciousness and headaches.   Endo/Heme/Allergies: Negative.    Psychiatric/Behavioral: Negative.      /76 (BP Location: Right arm, " "Patient Position: Sitting, BP Method: Large (Manual))   Pulse 78   Temp 98.1 °F (36.7 °C) (Oral)   Resp 17   Ht 5' 8" (1.727 m)   Wt 96.6 kg (212 lb 15.4 oz)   SpO2 97%   BMI 32.38 kg/m²     Physical Exam   Constitutional: She appears well-developed and well-nourished. No distress.   HENT:   Head: Normocephalic and atraumatic.   Eyes: Pupils are equal, round, and reactive to light.   Neck: Normal range of motion. Neck supple.   Cardiovascular: Normal rate, regular rhythm, normal heart sounds and intact distal pulses.   Pulmonary/Chest: Effort normal and breath sounds normal.   Abdominal: Soft. Bowel sounds are normal.   Musculoskeletal:        Right knee: She exhibits ecchymosis. She exhibits normal range of motion, no swelling, no effusion, no deformity, no LCL laxity and no MCL laxity. Tenderness found. Patellar tendon tenderness noted.        Legs:  Skin: She is not diaphoretic.      Assessment/Plan:  Radha Degroot is a 78 y.o. female who presents today for :    Radha was seen today for knee injury.    Diagnoses and all orders for this visit:    Accidental fall, initial encounter  -     X-Ray Knee 3 View Right; Future    Knee pain, right anterior  -     X-Ray Knee 3 View Right; Future    Patellar pain, right    Will obtain XR to r/o or r/i fracture.   Pain is only upon moving - Patient declining PT/OT, patient does not wish to take any more medication if not needed. She states she can deal with the pain with rest, heat, and elevation.   X-ray negative for acute fracture.  F/U for new worse or concerning symptoms.        My collaborating physician is Dr. Karri Humphrey.   "

## 2018-11-30 ENCOUNTER — LAB VISIT (OUTPATIENT)
Dept: LAB | Facility: HOSPITAL | Age: 78
End: 2018-11-30
Attending: NURSE PRACTITIONER
Payer: MEDICARE

## 2018-11-30 LAB
C PEPTIDE SERPL-MCNC: 1.77 NG/ML
ESTIMATED AVG GLUCOSE: 183 MG/DL
GLUCOSE SERPL-MCNC: 176 MG/DL
HBA1C MFR BLD HPLC: 8 %

## 2018-11-30 PROCEDURE — 83036 HEMOGLOBIN GLYCOSYLATED A1C: CPT | Mod: HCNC

## 2018-11-30 PROCEDURE — 84681 ASSAY OF C-PEPTIDE: CPT | Mod: HCNC

## 2018-11-30 PROCEDURE — 82947 ASSAY GLUCOSE BLOOD QUANT: CPT | Mod: HCNC

## 2018-11-30 PROCEDURE — 36415 COLL VENOUS BLD VENIPUNCTURE: CPT | Mod: HCNC,PO

## 2018-12-06 ENCOUNTER — OFFICE VISIT (OUTPATIENT)
Dept: ENDOCRINOLOGY | Facility: CLINIC | Age: 78
End: 2018-12-06
Payer: MEDICARE

## 2018-12-06 VITALS
DIASTOLIC BLOOD PRESSURE: 73 MMHG | BODY MASS INDEX: 32.31 KG/M2 | WEIGHT: 213.19 LBS | HEIGHT: 68 IN | HEART RATE: 76 BPM | SYSTOLIC BLOOD PRESSURE: 144 MMHG

## 2018-12-06 DIAGNOSIS — N18.4 CKD (CHRONIC KIDNEY DISEASE) STAGE 4, GFR 15-29 ML/MIN: ICD-10-CM

## 2018-12-06 DIAGNOSIS — E11.649 HYPOGLYCEMIA ASSOCIATED WITH DIABETES: ICD-10-CM

## 2018-12-06 DIAGNOSIS — I10 ESSENTIAL HYPERTENSION: ICD-10-CM

## 2018-12-06 PROCEDURE — 99999 PR PBB SHADOW E&M-EST. PATIENT-LVL IV: CPT | Mod: PBBFAC,HCNC,, | Performed by: NURSE PRACTITIONER

## 2018-12-06 PROCEDURE — 3288F FALL RISK ASSESSMENT DOCD: CPT | Mod: CPTII,HCNC,S$GLB, | Performed by: NURSE PRACTITIONER

## 2018-12-06 PROCEDURE — 99214 OFFICE O/P EST MOD 30 MIN: CPT | Mod: HCNC,S$GLB,, | Performed by: NURSE PRACTITIONER

## 2018-12-06 PROCEDURE — 1100F PTFALLS ASSESS-DOCD GE2>/YR: CPT | Mod: CPTII,HCNC,S$GLB, | Performed by: NURSE PRACTITIONER

## 2018-12-06 PROCEDURE — 3077F SYST BP >= 140 MM HG: CPT | Mod: CPTII,HCNC,S$GLB, | Performed by: NURSE PRACTITIONER

## 2018-12-06 PROCEDURE — 3078F DIAST BP <80 MM HG: CPT | Mod: CPTII,HCNC,S$GLB, | Performed by: NURSE PRACTITIONER

## 2018-12-06 NOTE — PATIENT INSTRUCTIONS
Reduce Tresiba to 32 units once daily.   Reduce Humalog to 11 units before breakfast and continue 14 units before lunch and dinner.   If your blood sugar is less than 90 before   Test blood sugar before each meal and bedtime.     Please call if blood sugar is still less than 80 so that we can adjust your insulin doses.       RULE OF 15 FOR TREATMENT OF LOW BLOOD GLUCOSE:    If your blood glucose is < 80 mg/dL or you are experiencing  symptoms of low blood sugar (shaking, sweating, blurred vision)  treat with 15 grams of glucose, wait 15 minutes, recheck and repeat if needed.      Carry foods with you that you can use for quick treatment.  This could include glucose tablets, peppermints or other hard candies (not sugar free), juice packs.    Should note possible cause of hypoglycemia in blood glucose logs so that you may keep these incidents to a minimum.    AFTER your blood sugar is back up to around 90 or 100, follow up with a carbohydrate with protein like 1/2 peanut butter sandwich or cheese and no salt crackers.

## 2018-12-06 NOTE — PROGRESS NOTES
CC: This 78 y.o. White female  is here for evaluation of  T2DM along with comorbidities indicated in the Visit Diagnosis section of this encounter.    HPI: Radha Degroot was diagnosed with T2DM in 1980s.     Nephrology - Dr. Mcmanus    Prior visit on 6/5/18  a1c down from 8.4 to 8%.   Plan Increase Novolog to 14 units before each meal.   Continue Lantus 40 units at night.   Test blood sugar 4x/day.   Send glucose log in 2 weeks.   Return to clinic in 3 months with labs prior.     Prior visit on 9/6/18  a1c up a bit from 8 to 8.2%. Switched from lantus to tresiba.   Recently had a UTI and was treated with antibiotics.   Her glucoses do tend to fluctuate. Pt reports diet is very consistent. But she finds her BG will rise if she goes long periods with out eating.   Plan Continue current regimen. This is likely the best we will be able to achieve in terms of glucose control on daily multiple insulin injections.   She declined Dexcom CGM device. Does not want anything attached to her. She may consider it in the future.   Test bg 4x/day. rtc in 3 mo with a1c prior.     Interval history  a1c down from 8.2 to 8%.   Complains of low blood sugars overnight/morning and before lunch.       DIABETES EDUCATION: 3/5/18    PMHX: atrial fibrillation, NSTEMI 2017, CAD, CHF, CKD stage 3     PRESCRIBED DIABETES MEDICATIONS: Tresiba 40 units hs, Novolog Flexpen 14 units w/ ss (Use on premeal readings: 150-200=+1, 201-250=+2, 251-300=+3; 301-350=+4, over 350=+5 units    Misses medication doses - none     SIGNIFICANT DM MED HX:       DM COMPLICATIONS: nephropathy, retinopathy and peripheral neuropathy    SELF MONITORING BLOOD GLUCOSE: accuchek dorothy meter. Checks blood glucose at home 4x/day.                   HYPOGLYCEMIC EPISODES: symptoms start at bgs in the 80s. About a few times a month. Corrects with kit bautista or glucose tablets. Finds that her BG does not stay elevated with just 1 glucose tablet.      CURRENT DIET: 3 meals/day.   "Drinks iced tea with no sugar. No snacks. Breakfast is toast (1-2 slices) and eggs.       CURRENT EXERCISE: none       BP (!) 144/73 (BP Location: Right arm, Patient Position: Sitting, BP Method: Large (Automatic))   Pulse 76   Ht 5' 8" (1.727 m)   Wt 96.7 kg (213 lb 3 oz)   BMI 32.41 kg/m²       ROS:   CONSTITUTIONAL: Appetite good, + fatigue  : no dysuria; + urinary frequency       PHYSICAL EXAM:  GENERAL: Well developed, well nourished. No acute distress.   PSYCH: AAOx3, appropriate mood and affect, conversant, well-groomed. Judgement and insight good.   NEURO: Cranial nerves grossly intact. Speech clear, no tremor.   CHEST: Respirations even and unlabored.       Hemoglobin A1C   Date Value Ref Range Status   11/30/2018 8.0 (H) 4.0 - 5.6 % Final     Comment:     ADA Screening Guidelines:  5.7-6.4%  Consistent with prediabetes  >or=6.5%  Consistent with diabetes  High levels of fetal hemoglobin interfere with the HbA1C  assay. Heterozygous hemoglobin variants (HbS, HgC, etc)do  not significantly interfere with this assay.   However, presence of multiple variants may affect accuracy.     09/05/2018 8.2 (H) 4.0 - 5.6 % Final     Comment:     ADA Screening Guidelines:  5.7-6.4%  Consistent with prediabetes  >or=6.5%  Consistent with diabetes  High levels of fetal hemoglobin interfere with the HbA1C  assay. Heterozygous hemoglobin variants (HbS, HgC, etc)do  not significantly interfere with this assay.   However, presence of multiple variants may affect accuracy.     05/30/2018 8.0 (H) 4.0 - 5.6 % Final     Comment:     ADA Screening Guidelines:  5.7-6.4%  Consistent with prediabetes  >or=6.5%  Consistent with diabetes  High levels of fetal hemoglobin interfere with the HbA1C  assay. Heterozygous hemoglobin variants (HbS, HgC, etc)do  not significantly interfere with this assay.   However, presence of multiple variants may affect accuracy.             Chemistry        Component Value Date/Time     " 10/24/2018 1447    K 3.9 10/24/2018 1447     10/24/2018 1447    CO2 23 10/24/2018 1447    BUN 42 (H) 10/24/2018 1447    CREATININE 2.2 (H) 10/24/2018 1447     (H) 11/30/2018 1146        Component Value Date/Time    CALCIUM 9.1 10/24/2018 1447    ALKPHOS 91 09/19/2018 1501    AST 13 09/19/2018 1501    ALT 12 09/19/2018 1501    BILITOT 0.6 09/19/2018 1501    ESTGFRAFRICA 23.9 (A) 10/24/2018 1447    EGFRNONAA 20.7 (A) 10/24/2018 1447          Lab Results   Component Value Date    LDLCALC 86.4 02/28/2018        Ref. Range 2/28/2018 10:18   Cholesterol Latest Ref Range: 120 - 199 mg/dL 171   HDL Latest Ref Range: 40 - 75 mg/dL 54   LDL Cholesterol Latest Ref Range: 63.0 - 159.0 mg/dL 86.4   Total Cholesterol/HDL Ratio Latest Ref Range: 2.0 - 5.0  3.2   Triglycerides Latest Ref Range: 30 - 150 mg/dL 153 (H)     Lab Results   Component Value Date    MICALBCREAT 29.5 02/11/2015             STANDARDS of CARE:        ASA:               Last eye exam:       ASSESSMENT and PLAN:    A1C GOAL: < 8%     1. Uncontrolled type 2 diabetes mellitus with complication, with long-term current use of insulin  Reduce Tresiba to 32 units once daily.   Reduce Humalog to 11 units before breakfast and continue 14 units before lunch and dinner.   If your blood sugar is less than 90 before   Test blood sugar before each meal and bedtime.     Please call if blood sugar is still less than 80 so that we can adjust your insulin doses. Reviewed hypoglycemia tx.     rtc in 3 mo with labs prior.     Hemoglobin A1c    Lipid panel   2. CKD (chronic kidney disease) stage 4, GFR 15-29 ml/min  Avoid hypoglycemia.   Followed by Nephrology.    3. Essential hypertension  Continue current tx    4. Hypoglycemia associated with diabetes  As above.      Orders Placed This Encounter   Procedures    Hemoglobin A1c     Standing Status:   Future     Standing Expiration Date:   2/4/2020    Lipid panel     Standing Status:   Future     Standing Expiration  Date:   12/6/2019        Follow-up in about 3 months (around 3/6/2019).

## 2018-12-19 DIAGNOSIS — I10 ESSENTIAL HYPERTENSION: Chronic | ICD-10-CM

## 2018-12-19 RX ORDER — CARVEDILOL 12.5 MG/1
TABLET ORAL
Qty: 180 TABLET | Refills: 3 | Status: SHIPPED | OUTPATIENT
Start: 2018-12-19 | End: 2019-02-14 | Stop reason: SDUPTHER

## 2018-12-23 RX ORDER — PEN NEEDLE, DIABETIC 30 GX3/16"
NEEDLE, DISPOSABLE MISCELLANEOUS
Qty: 400 EACH | Refills: 5 | Status: SHIPPED | OUTPATIENT
Start: 2018-12-23 | End: 2019-11-21 | Stop reason: SDUPTHER

## 2019-01-04 ENCOUNTER — TELEPHONE (OUTPATIENT)
Dept: OTOLARYNGOLOGY | Facility: CLINIC | Age: 79
End: 2019-01-04

## 2019-01-10 ENCOUNTER — CLINICAL SUPPORT (OUTPATIENT)
Dept: DIABETES | Facility: CLINIC | Age: 79
End: 2019-01-10
Payer: MEDICARE

## 2019-01-10 DIAGNOSIS — E11.649 TYPE 2 DIABETES MELLITUS WITH HYPOGLYCEMIA WITHOUT COMA, WITH LONG-TERM CURRENT USE OF INSULIN: ICD-10-CM

## 2019-01-10 DIAGNOSIS — Z79.4 TYPE 2 DIABETES MELLITUS WITH DIABETIC POLYNEUROPATHY, WITH LONG-TERM CURRENT USE OF INSULIN: ICD-10-CM

## 2019-01-10 DIAGNOSIS — N18.4 TYPE 2 DIABETES MELLITUS WITH STAGE 4 CHRONIC KIDNEY DISEASE, WITH LONG-TERM CURRENT USE OF INSULIN: ICD-10-CM

## 2019-01-10 DIAGNOSIS — Z79.4 TYPE 2 DIABETES MELLITUS WITH HYPOGLYCEMIA WITHOUT COMA, WITH LONG-TERM CURRENT USE OF INSULIN: ICD-10-CM

## 2019-01-10 DIAGNOSIS — E11.42 TYPE 2 DIABETES MELLITUS WITH DIABETIC POLYNEUROPATHY, WITH LONG-TERM CURRENT USE OF INSULIN: ICD-10-CM

## 2019-01-10 DIAGNOSIS — E11.22 TYPE 2 DIABETES MELLITUS WITH STAGE 4 CHRONIC KIDNEY DISEASE, WITH LONG-TERM CURRENT USE OF INSULIN: ICD-10-CM

## 2019-01-10 DIAGNOSIS — E11.3213 TYPE 2 DIABETES MELLITUS WITH BOTH EYES AFFECTED BY MILD NONPROLIFERATIVE RETINOPATHY AND MACULAR EDEMA, WITH LONG-TERM CURRENT USE OF INSULIN: Primary | ICD-10-CM

## 2019-01-10 DIAGNOSIS — Z79.4 TYPE 2 DIABETES MELLITUS WITH STAGE 4 CHRONIC KIDNEY DISEASE, WITH LONG-TERM CURRENT USE OF INSULIN: ICD-10-CM

## 2019-01-10 DIAGNOSIS — Z79.4 TYPE 2 DIABETES MELLITUS WITH BOTH EYES AFFECTED BY MILD NONPROLIFERATIVE RETINOPATHY AND MACULAR EDEMA, WITH LONG-TERM CURRENT USE OF INSULIN: Primary | ICD-10-CM

## 2019-01-10 PROCEDURE — G0108 DIAB MANAGE TRN  PER INDIV: HCPCS | Mod: HCNC,S$GLB,, | Performed by: DIETITIAN, REGISTERED

## 2019-01-10 PROCEDURE — G0108 PR DIAB MANAGE TRN  PER INDIV: ICD-10-PCS | Mod: HCNC,S$GLB,, | Performed by: DIETITIAN, REGISTERED

## 2019-01-10 NOTE — PROGRESS NOTES
Diabetes Education  Author: Siria Barnett RD  Date: 1/10/2019    Diabetes Care Management Summary  Follow up visit today primarily to review correct food choices for diabetic renal diet and planning respective menus  Diabetes Education Record Assessment/Progress: Post Program/Follow-up  Current Diabetes Risk Level: Low   Diabetes Type  Diabetes Type : Type II    Diabetes History  Diabetes Diagnosis: >10 years  Current Treatment: Insulin  Reviewed Problem List with Patient: Yes    Health Maintenance was reviewed today with patient. Discussed with patient importance of routine eye exams, foot exams/foot care, blood work (i.e.: A1c, microalbumin, and lipid), dental visits, yearly flu vaccine, and pneumonia vaccine as indicated by PCP. Patient verbalized understanding.     Health Maintenance Topics with due status: Not Due       Topic Last Completion Date    Lipid Panel 02/28/2018    Eye Exam 10/28/2018    Foot Exam 10/31/2018    Hemoglobin A1c 11/30/2018     Health Maintenance Due   Topic Date Due    Zoster Vaccine  03/24/1999    TETANUS VACCINE  10/03/2015    DEXA SCAN  03/20/2018     Monitoring   Self Monitoring : smbg 4x/day  Blood Glucose Logs: Yes  Do you use a personal continuous glucose monitor?: No  In the last month, how often have you had a low blood sugar reaction?: never  Can you tell when your blood sugar is too high?: no    Exercise   Exercise Type: none  Current Diabetes Treatment   Current Treatment: Insulin  Social History  Preferred Learning Method: Face to Face  Primary Support: Self, Family  Educational Level: College Graduate  Smoking Status: Never a Smoker  Alcohol Use: Rarely  Readiness to Learn : Acceptance  Cultural Influences: No    Diabetes Education Assessment/Progress  Diabetes Disease Process (diabetes disease process and treatment options): Discussion  Nutrition (Incorporating nutritional management into one's lifestyle): Individual Session, Discussion, Written Materials Provided,  Instructed  -pt had questions about allowed foods on renal diabetic diet santi w fresh produce. Discussed potassium in fresh produce and tips to reduce K+ by soaking/boiling, cooking items such as potatoes, yams, beans, etc.   -Discussed acceptable amt of sodium ,1500-2000mg/day with limit of 140-300mg/food item, acceptable type and amt K+ foods and keeping CHO intake to 30-45 g/meal.  Additional copy of renal diabetic grocery list and High/Low K+ fruits and vegetables  -Discussed adding 15gCHO snacks in a.m. and evening when BG lower; pt stated she would try snacks if needed  -Reviewed other food choices for Bkfst (pt tired of eggs) such as cereal, cheese or lean meats served with bread or crackers    Monitoring (monitoring blood glucose/other parameters & using results): Individual Session, Discussion, Instructed  -Pt states BG much better since lowering insulin dose with hypo controlled at present    Acute Complications (preventing, detecting, and treating acute complications): Individual Session, Instructed, Discussion  Chronic Complications (preventing, detecting, and treating chronic complications): Individual Session, Instructed, Discussion    Goals  Patient has selected/evaluated goals during today's session: Yes, evaluated  Healthy Eating: In Progress    Diabetes Care Plan/Intervention  Education Plan/Intervention: Individual Follow-Up DSMT(pt will schedule follow up as needed)    Diabetes Meal Plan  Restrictions: Restricted Carbohydrate, Fluid Restriction, Low Sodium, Low Potassium  Carbohydrate Per Meal: 30-45g  Carbohydrate Per Snack : 15-20g    Today's Self-Management Care Plan was developed with the patient's input and is based on barriers identified during today's assessment.    The long and short-term goals in the care plan were written with the patient/caregiver's input. The patient has agreed to work toward these goals to improve her overall diabetes control.      The patient received a copy of  today's self-management plan and verbalized understanding of the care plan, goals, and all of today's instructions.      The patient was encouraged to communicate with her physician and care team regarding her condition(s) and treatment.  I provided the patient with my contact information today and encouraged her to contact me via phone or patient portal as needed.     Education Units of Time   Time Spent: 60 min

## 2019-01-25 RX ORDER — INSULIN DEGLUDEC 100 U/ML
INJECTION, SOLUTION SUBCUTANEOUS
Qty: 6 SYRINGE | Refills: 2 | Status: SHIPPED | OUTPATIENT
Start: 2019-01-25 | End: 2019-06-13 | Stop reason: SDUPTHER

## 2019-02-04 ENCOUNTER — LAB VISIT (OUTPATIENT)
Dept: LAB | Facility: HOSPITAL | Age: 79
End: 2019-02-04
Attending: NURSE PRACTITIONER
Payer: MEDICARE

## 2019-02-04 LAB
CHOLEST SERPL-MCNC: 115 MG/DL
CHOLEST/HDLC SERPL: 2.4 {RATIO}
ESTIMATED AVG GLUCOSE: 189 MG/DL
HBA1C MFR BLD HPLC: 8.2 %
HDLC SERPL-MCNC: 47 MG/DL
HDLC SERPL: 40.9 %
LDLC SERPL CALC-MCNC: 43.4 MG/DL
NONHDLC SERPL-MCNC: 68 MG/DL
TRIGL SERPL-MCNC: 123 MG/DL

## 2019-02-04 PROCEDURE — 83036 HEMOGLOBIN GLYCOSYLATED A1C: CPT | Mod: HCNC

## 2019-02-04 PROCEDURE — 36415 COLL VENOUS BLD VENIPUNCTURE: CPT | Mod: HCNC,PO

## 2019-02-04 PROCEDURE — 80061 LIPID PANEL: CPT | Mod: HCNC

## 2019-02-06 ENCOUNTER — TELEPHONE (OUTPATIENT)
Dept: OPHTHALMOLOGY | Facility: CLINIC | Age: 79
End: 2019-02-06

## 2019-02-06 ENCOUNTER — NURSE TRIAGE (OUTPATIENT)
Dept: ADMINISTRATIVE | Facility: CLINIC | Age: 79
End: 2019-02-06

## 2019-02-06 ENCOUNTER — OFFICE VISIT (OUTPATIENT)
Dept: FAMILY MEDICINE | Facility: CLINIC | Age: 79
End: 2019-02-06
Payer: MEDICARE

## 2019-02-06 VITALS
DIASTOLIC BLOOD PRESSURE: 71 MMHG | BODY MASS INDEX: 32.41 KG/M2 | WEIGHT: 213.88 LBS | HEART RATE: 64 BPM | TEMPERATURE: 99 F | OXYGEN SATURATION: 96 % | HEIGHT: 68 IN | SYSTOLIC BLOOD PRESSURE: 146 MMHG

## 2019-02-06 DIAGNOSIS — E11.3393 MODERATE NONPROLIFERATIVE DIABETIC RETINOPATHY OF BOTH EYES WITHOUT MACULAR EDEMA ASSOCIATED WITH TYPE 2 DIABETES MELLITUS: ICD-10-CM

## 2019-02-06 DIAGNOSIS — H11.32 SUBCONJUNCTIVAL BLEED, LEFT: Primary | ICD-10-CM

## 2019-02-06 PROCEDURE — 99214 OFFICE O/P EST MOD 30 MIN: CPT | Mod: HCNC,S$GLB,, | Performed by: FAMILY MEDICINE

## 2019-02-06 PROCEDURE — 1101F PR PT FALLS ASSESS DOC 0-1 FALLS W/OUT INJ PAST YR: ICD-10-PCS | Mod: HCNC,CPTII,S$GLB, | Performed by: FAMILY MEDICINE

## 2019-02-06 PROCEDURE — 99999 PR PBB SHADOW E&M-EST. PATIENT-LVL III: CPT | Mod: PBBFAC,HCNC,, | Performed by: FAMILY MEDICINE

## 2019-02-06 PROCEDURE — 99999 PR PBB SHADOW E&M-EST. PATIENT-LVL III: ICD-10-PCS | Mod: PBBFAC,HCNC,, | Performed by: FAMILY MEDICINE

## 2019-02-06 PROCEDURE — 3078F DIAST BP <80 MM HG: CPT | Mod: HCNC,CPTII,S$GLB, | Performed by: FAMILY MEDICINE

## 2019-02-06 PROCEDURE — 3077F SYST BP >= 140 MM HG: CPT | Mod: HCNC,CPTII,S$GLB, | Performed by: FAMILY MEDICINE

## 2019-02-06 PROCEDURE — 1101F PT FALLS ASSESS-DOCD LE1/YR: CPT | Mod: HCNC,CPTII,S$GLB, | Performed by: FAMILY MEDICINE

## 2019-02-06 PROCEDURE — 3077F PR MOST RECENT SYSTOLIC BLOOD PRESSURE >= 140 MM HG: ICD-10-PCS | Mod: HCNC,CPTII,S$GLB, | Performed by: FAMILY MEDICINE

## 2019-02-06 PROCEDURE — 3078F PR MOST RECENT DIASTOLIC BLOOD PRESSURE < 80 MM HG: ICD-10-PCS | Mod: HCNC,CPTII,S$GLB, | Performed by: FAMILY MEDICINE

## 2019-02-06 PROCEDURE — 99214 PR OFFICE/OUTPT VISIT, EST, LEVL IV, 30-39 MIN: ICD-10-PCS | Mod: HCNC,S$GLB,, | Performed by: FAMILY MEDICINE

## 2019-02-06 RX ORDER — MIRABEGRON 25 MG/1
TABLET, FILM COATED, EXTENDED RELEASE ORAL
Refills: 3 | COMMUNITY
Start: 2019-01-11 | End: 2019-08-24 | Stop reason: SDUPTHER

## 2019-02-06 NOTE — TELEPHONE ENCOUNTER
----- Message from Nanda Alexander sent at 2/6/2019  4:19 PM CST -----  Contact: Radha  Needs Advice    Reason for call: pt stated her left eye is blood shot red. Pt stated this started on yesterday. Pt started she have been taking blood thinners since last year. Pt stated she needed to be seen soon. Pt stated she seen and eye doctor on yesterday and he informed her that she needed to seen an Ophthalmologist as soon as possible.         Communication Preference: [phone]    Additional Information:

## 2019-02-06 NOTE — PROGRESS NOTES
Chief Complaint   Patient presents with    Eye Problem       HPI    Radha Degroot is 78 y.o. female. The primary encounter diagnosis was Subconjunctival bleed, left. A diagnosis of Moderate nonproliferative diabetic retinopathy of both eyes without macular edema associated with type 2 diabetes mellitus was also pertinent to this visit.    78 year old female with Diabetes comes to clinic with complaint of red eye.  Patient reports waking on the previous morning with the left eye affected.  The right eye is completely unaffected.  She denies recent or remote injury to the eye.  She does note a history of retinal issues caused by Diabetes.  She denies pain and vision changes.  She expresses concern because of anticoagulant for her atrial fibrillation.        Review of Systems   Constitutional: Negative for activity change.   Eyes: Positive for redness. Negative for photophobia, pain, discharge and visual disturbance.   Respiratory: Negative for shortness of breath.    Cardiovascular: Negative for chest pain.   Musculoskeletal: Negative for gait problem.   Psychiatric/Behavioral: Negative for suicidal ideas.           Current Outpatient Medications:     apixaban 2.5 mg Tab, Take 1 tablet (2.5 mg total) by mouth 2 (two) times daily., Disp: 60 tablet, Rfl: 11    blood sugar diagnostic (ACCU-CHEK MELIDA PLUS TEST STRP) Strp, USE TO TEST BLOOD SUGAR FOUR TIMES DAILY, Disp: 400 strip, Rfl: 3    blood-glucose meter (ACCU-CHEK MELIDA PLUS METER) Misc, To check sugars 4 times daily.  Please include control solution, Disp: 1 each, Rfl: 0    carvedilol (COREG) 12.5 MG tablet, Take 1 tablet (12.5 mg total) by mouth 2 (two) times daily., Disp: 180 tablet, Rfl: 4    fluticasone (FLONASE) 50 mcg/actuation nasal spray, SHAKE LIQUID AND USE 2 SPRAYS IN EACH NOSTRIL EVERY DAY, Disp: 48 mL, Rfl: 4    furosemide (LASIX) 40 MG tablet, Take 2 tablets (80 mg total) by mouth 2 (two) times daily. (Patient taking differently: Take 40 mg  "by mouth once daily. ), Disp: 360 tablet, Rfl: 3    insulin aspart (NOVOLOG FLEXPEN) 100 unit/mL InPn pen, Inject 17 units before meals with ss (Patient taking differently: Sliding scale inject 14 units), Disp: 4 Box, Rfl: 1    ketoconazole (NIZORAL) 2 % shampoo, APPLY TO AFFECTED AREA EVERY OTHER DAY AND LET SIT 5 MINUTES PRIOR TO RINSING AS DIRECTED, Disp: 120 mL, Rfl: 3    mirabegron (MYRBETRIQ) 50 mg Tb24, Take 1 tablet (50 mg total) by mouth once daily., Disp: 90 tablet, Rfl: 3    MULTIVIT-MIN/IRON/FOLIC/LUTEIN (CENTRUM SILVER WOMEN ORAL), Take by mouth once daily. , Disp: , Rfl:     pen needle, diabetic (BD ULTRA-FINE TIM PEN NEEDLE) 32 gauge x 5/32" Ndle, USE FOUR TIMES DAILY, Disp: 400 each, Rfl: 5    pen needle, diabetic (BD ULTRA-FINE TIM PEN NEEDLES) 32 gauge x 5/32" Ndle, USE FOUR TIMES DAILY FOR BLOOD SUGAR TESTING, Disp: 200 each, Rfl: 8    rosuvastatin (CRESTOR) 40 MG Tab, TAKE 1 TABLET(40 MG) BY MOUTH EVERY DAY, Disp: 30 tablet, Rfl: 6    TRESIBA FLEXTOUCH U-100 100 unit/mL (3 mL) InPn, INJECT 40 UNITS SUBCUTANEOUS ONCE DAILY (Patient taking differently: INJECT 32 UNITS SUBCUTANEOUS ONCE DAILY), Disp: 6 Syringe, Rfl: 2    carvedilol (COREG) 12.5 MG tablet, TAKE 1 TABLET(12.5 MG) BY MOUTH TWICE DAILY, Disp: 180 tablet, Rfl: 3    MYRBETRIQ 25 mg Tb24 ER tablet, , Disp: , Rfl: 3      Blood pressure (!) 146/71, pulse 64, temperature 98.6 °F (37 °C), temperature source Oral, height 5' 8" (1.727 m), weight 97 kg (213 lb 13.5 oz), SpO2 96 %.    Physical Exam   Constitutional: Vital signs are normal. She appears well-developed and well-nourished. She does not appear ill. No distress.   Eyes: EOM and lids are normal. Pupils are equal, round, and reactive to light. Lids are everted and swept, no foreign bodies found. Right conjunctiva is not injected. Right conjunctiva has no hemorrhage. Left conjunctiva has a hemorrhage.       Lab Visit on 02/04/2019   Component Date Value Ref Range Status    " Hemoglobin A1C 02/04/2019 8.2* 4.0 - 5.6 % Final    Estimated Avg Glucose 02/04/2019 189* 68 - 131 mg/dL Final    Cholesterol 02/04/2019 115* 120 - 199 mg/dL Final    Triglycerides 02/04/2019 123  30 - 150 mg/dL Final    HDL 02/04/2019 47  40 - 75 mg/dL Final    LDL Cholesterol 02/04/2019 43.4* 63.0 - 159.0 mg/dL Final    HDL/Chol Ratio 02/04/2019 40.9  20.0 - 50.0 % Final    Total Cholesterol/HDL Ratio 02/04/2019 2.4  2.0 - 5.0 Final    Non-HDL Cholesterol 02/04/2019 68  mg/dL Final   Lab Visit on 11/30/2018   Component Date Value Ref Range Status    Hemoglobin A1C 11/30/2018 8.0* 4.0 - 5.6 % Final    Estimated Avg Glucose 11/30/2018 183* 68 - 131 mg/dL Final    Glucose 11/30/2018 176* 70 - 110 mg/dL Final    C-Peptide 11/30/2018 1.77  0.78 - 5.19 ng/mL Final   ]    Assessment:    1. Subconjunctival bleed, left    2. Moderate nonproliferative diabetic retinopathy of both eyes without macular edema associated with type 2 diabetes mellitus          Radha was seen today for eye problem.    Diagnoses and all orders for this visit:    Subconjunctival bleed, left   -New problem. Patient counseled on underlying etiology.  Symptoms will take 1-2 weeks to resolve.    Moderate nonproliferative diabetic retinopathy of both eyes without macular edema associated with type 2 diabetes mellitus  -     Ambulatory referral to Ophthalmology  - New problem.  Referral to Ophthalmology due to pre-existing eye disease.           FOLLOW UP: Follow-up in about 1 week (around 2/13/2019), or if symptoms worsen or fail to improve.

## 2019-02-06 NOTE — TELEPHONE ENCOUNTER
Got up yesterday and her left eye is bloody.  This morning it is still there.  She is on apixaban.  Her friend told her it will go away in a few days.  Unable to find an appt convenient to where pt is located, transferred caller to scheduling desk for further assistance.    Reason for Disposition   Bleeding on white of the eye and is taking Coumadin or known bleeding disorder (e.g., thrombocytopenia)    Protocols used: ST EYE - RED WITHOUT PUS-A-OH

## 2019-02-06 NOTE — TELEPHONE ENCOUNTER
Explained sub conj hemm to pt.   Explained that since she is on blood thinner, it may take longer to resolve.   Advised that she may use artificial tears for soothing if needed.   Pt verbalized understanding.

## 2019-02-06 NOTE — PATIENT INSTRUCTIONS
Subconjunctival Hemorrhage    A subconjunctival hemorrhage is when a blood vessel breaks open in the white of the eye. It causes a bright red patch in the white of the eye. It is similar to a bruise on the skin. This type of hemorrhage is common. It can look quite alarming, but it is usually harmless.  Understanding the conjunctiva  The conjunctiva is the thin layer that covers the inside of the eyelids and the surface of the eye. It has many tiny blood vessels that bring oxygen and nutrients to the eye. The sclera is the white part of the eye that lies beneath the conjunctiva. Sometimes a blood vessel in the conjunctiva breaks open and bleeds. The blood then collects under the conjunctiva and turns part of the eye red. Over several weeks, your body then absorbs the blood.  What causes subconjunctival hemorrhage?  In many cases the cause isnt known. But some health conditions may make it more likely. These include:  · Eye injury  · Eye surgery  · High blood pressure  · Inflammation of the conjunctiva  · Contact lens use  · Diabetes  · Arteriosclerosis  · Tumor of the conjunctiva  · Diseases that affect blood clotting  · Violent sneezing, coughing, or vomiting  · Certain medicines that can increase bleeding, such as aspirin  · Pushing hard during childbirth  · Straining during constipation  Symptoms of subconjunctival hemorrhage  The main symptom is a red patch on the eye. You may notice it after waking up in the morning. In most cases just one eye will have a hemorrhage. It usually happens once and then goes away. But some health conditions may cause repeat hemorrhages. You may feel like you have something in your eye, but this is not common. The hemorrhage shouldnt affect your eyesight or cause any pain. If you do have pain, you may have another type of problem with your eye.  Diagnosing subconjunctival hemorrhage  Your healthcare provider will ask about your health history. You may have a physical exam. This  includes a basic eye exam. Your provider will make sure you dont have other causes of red eye that may need other treatment.  You will need to see an eye doctor (ophthalmologist) if you have had an eye injury. This doctor might use a special lighted microscope to look closely at your eye. This helps show the doctor if the injury hurt the eye itself and not just its outer layer.  If this is not your first subconjunctival hemorrhage, your doctor may need to find the cause. For example, you may need blood tests to check for a blood clotting disorder.  Treatment for subconjunctival hemorrhage  In most cases you will not need treatment. The red patch will usually go away on its own in a few weeks. It will turn from red to brown and then yellow. There are no treatments to speed up this process. Your doctor may suggest you use a warm compress and artificial tears eye drops to help relieve some of the redness.  If your subconjunctival hemorrhage was caused by a health condition, that condition will be treated. For example, you may need a blood pressure medicine to treat high blood pressure.  When to call your healthcare provider  Call your healthcare provider right away if you have any of these:  · Hemorrhage that doesnt go away in 2 to 3 weeks  · Eye pain  · Loss of eyesight  · Another subconjunctival hemorrhage    Date Last Reviewed: 2/1/2017  © 2718-0367 [employer]. [address] [place] [address], [place] [address]. All rights reserved. This information is not intended as a substitute for professional medical care. Always follow your healthcare professional's instructions.

## 2019-02-08 ENCOUNTER — OFFICE VISIT (OUTPATIENT)
Dept: CARDIOLOGY | Facility: CLINIC | Age: 79
End: 2019-02-08
Payer: MEDICARE

## 2019-02-08 ENCOUNTER — HOSPITAL ENCOUNTER (OUTPATIENT)
Dept: RADIOLOGY | Facility: CLINIC | Age: 79
Discharge: HOME | End: 2019-02-08
Attending: INTERNAL MEDICINE
Payer: MEDICARE

## 2019-02-08 VITALS
HEIGHT: 68 IN | DIASTOLIC BLOOD PRESSURE: 60 MMHG | SYSTOLIC BLOOD PRESSURE: 125 MMHG | HEART RATE: 61 BPM | BODY MASS INDEX: 32.41 KG/M2 | WEIGHT: 213.88 LBS

## 2019-02-08 DIAGNOSIS — E78.2 MIXED HYPERLIPIDEMIA: Chronic | ICD-10-CM

## 2019-02-08 DIAGNOSIS — I25.10 CORONARY ARTERY DISEASE INVOLVING NATIVE CORONARY ARTERY OF NATIVE HEART WITHOUT ANGINA PECTORIS: Primary | Chronic | ICD-10-CM

## 2019-02-08 DIAGNOSIS — I10 ESSENTIAL HYPERTENSION: Chronic | ICD-10-CM

## 2019-02-08 DIAGNOSIS — I12.9 HYPERTENSIVE KIDNEY DISEASE WITH STAGE 4 CHRONIC KIDNEY DISEASE: ICD-10-CM

## 2019-02-08 DIAGNOSIS — N18.4 CHRONIC KIDNEY DISEASE, STAGE IV (SEVERE): Chronic | ICD-10-CM

## 2019-02-08 DIAGNOSIS — I48.21 PERMANENT ATRIAL FIBRILLATION: ICD-10-CM

## 2019-02-08 DIAGNOSIS — N18.4 HYPERTENSIVE KIDNEY DISEASE WITH STAGE 4 CHRONIC KIDNEY DISEASE: ICD-10-CM

## 2019-02-08 DIAGNOSIS — Z13.820 OSTEOPOROSIS SCREENING: ICD-10-CM

## 2019-02-08 DIAGNOSIS — G47.33 OBSTRUCTIVE SLEEP APNEA: ICD-10-CM

## 2019-02-08 DIAGNOSIS — Z78.0 ASYMPTOMATIC MENOPAUSAL STATE: ICD-10-CM

## 2019-02-08 DIAGNOSIS — I50.32 CHRONIC DIASTOLIC CHF (CONGESTIVE HEART FAILURE): ICD-10-CM

## 2019-02-08 PROCEDURE — 77080 DXA BONE DENSITY AXIAL: CPT | Mod: TC,HCNC

## 2019-02-08 PROCEDURE — 99214 PR OFFICE/OUTPT VISIT, EST, LEVL IV, 30-39 MIN: ICD-10-PCS | Mod: HCNC,S$GLB,, | Performed by: INTERNAL MEDICINE

## 2019-02-08 PROCEDURE — 77080 DEXA BONE DENSITY SPINE HIP: ICD-10-PCS | Mod: 26,HCNC,, | Performed by: INTERNAL MEDICINE

## 2019-02-08 PROCEDURE — 99214 OFFICE O/P EST MOD 30 MIN: CPT | Mod: HCNC,S$GLB,, | Performed by: INTERNAL MEDICINE

## 2019-02-08 PROCEDURE — 3078F DIAST BP <80 MM HG: CPT | Mod: HCNC,CPTII,S$GLB, | Performed by: INTERNAL MEDICINE

## 2019-02-08 PROCEDURE — 3074F SYST BP LT 130 MM HG: CPT | Mod: HCNC,CPTII,S$GLB, | Performed by: INTERNAL MEDICINE

## 2019-02-08 PROCEDURE — 99499 RISK ADDL DX/OHS AUDIT: ICD-10-PCS | Mod: HCNC,S$GLB,, | Performed by: INTERNAL MEDICINE

## 2019-02-08 PROCEDURE — 1101F PR PT FALLS ASSESS DOC 0-1 FALLS W/OUT INJ PAST YR: ICD-10-PCS | Mod: HCNC,CPTII,S$GLB, | Performed by: INTERNAL MEDICINE

## 2019-02-08 PROCEDURE — 3074F PR MOST RECENT SYSTOLIC BLOOD PRESSURE < 130 MM HG: ICD-10-PCS | Mod: HCNC,CPTII,S$GLB, | Performed by: INTERNAL MEDICINE

## 2019-02-08 PROCEDURE — 1101F PT FALLS ASSESS-DOCD LE1/YR: CPT | Mod: HCNC,CPTII,S$GLB, | Performed by: INTERNAL MEDICINE

## 2019-02-08 PROCEDURE — 99499 UNLISTED E&M SERVICE: CPT | Mod: HCNC,S$GLB,, | Performed by: INTERNAL MEDICINE

## 2019-02-08 PROCEDURE — 99999 PR PBB SHADOW E&M-EST. PATIENT-LVL III: ICD-10-PCS | Mod: PBBFAC,HCNC,, | Performed by: INTERNAL MEDICINE

## 2019-02-08 PROCEDURE — 77080 DXA BONE DENSITY AXIAL: CPT | Mod: 26,HCNC,, | Performed by: INTERNAL MEDICINE

## 2019-02-08 PROCEDURE — 3078F PR MOST RECENT DIASTOLIC BLOOD PRESSURE < 80 MM HG: ICD-10-PCS | Mod: HCNC,CPTII,S$GLB, | Performed by: INTERNAL MEDICINE

## 2019-02-08 PROCEDURE — 99999 PR PBB SHADOW E&M-EST. PATIENT-LVL III: CPT | Mod: PBBFAC,HCNC,, | Performed by: INTERNAL MEDICINE

## 2019-02-08 NOTE — PROGRESS NOTES
"Subjective:   Patient ID:  Radha Degroot is a 78 y.o. female who presents for follow-up of Congestive Heart Failure (4 month f/u )      HPI:   She has h/o of HTN, HPL, DM, perm AF, CKD, DELFINA and CAD s/p STEMI with PCI of Diag and LAD in 11-06. At our last visit she was having sxs of NILTON and SOB.  ECG was noted to be afib.  She was seen by Dr. Alexander and attempts at restoration to sinus failed.  She is now rate controlled and on AC.    Her only sxs at this time is fatigue. She doesn't use CPAP bc she can't tolerate. Exercise capacity is limited by arthritis and back pain.        4-2017 admit for NSTEMI - h/o HIT  At the time of her presentation with typical angina her blood pressure was high. She was placed on GDMT.  She did not undergo early invasive management of her NSTEMI due to her very high risk of GEETA. DSE at that time:  Positive stress echocardiographic study demonstrating an ischemic response involving the mid anteroseptum, mid inferolateral wall, apical anterior wall.  With LVEF 55-60%.  LHC not performed due to renal function.  PET in 3-12 with D2 area fixed defect. EF 45%.      Vitals:    02/08/19 0929   BP: 125/60   BP Location: Left arm   Patient Position: Sitting   BP Method: Large (Automatic)   Pulse: 61   Weight: 97 kg (213 lb 13.5 oz)   Height: 5' 8" (1.727 m)     Body mass index is 32.52 kg/m².  CrCl cannot be calculated (Patient's most recent lab result is older than the maximum 7 days allowed.).    Lab Results   Component Value Date     10/24/2018    K 3.9 10/24/2018     10/24/2018    CO2 23 10/24/2018    BUN 42 (H) 10/24/2018    CREATININE 2.2 (H) 10/24/2018     (H) 11/30/2018    HGBA1C 8.2 (H) 02/04/2019    MG 1.9 12/24/2008    AST 13 09/19/2018    ALT 12 09/19/2018    ALBUMIN 3.5 10/24/2018    PROT 6.9 09/19/2018    BILITOT 0.6 09/19/2018    WBC 9.88 08/14/2018    HGB 13.4 08/14/2018    HCT 41.2 08/14/2018    MCV 89 08/14/2018     08/14/2018    INR 1.1 05/15/2018    " "INR 1.0 01/22/2007    TSH 3.332 02/20/2017    CHOL 115 (L) 02/04/2019    HDL 47 02/04/2019    LDLCALC 43.4 (L) 02/04/2019    TRIG 123 02/04/2019       Current Outpatient Medications   Medication Sig    apixaban 2.5 mg Tab Take 1 tablet (2.5 mg total) by mouth 2 (two) times daily.    blood sugar diagnostic (ACCU-CHEK MELIDA PLUS TEST STRP) Strp USE TO TEST BLOOD SUGAR FOUR TIMES DAILY    blood-glucose meter (ACCU-CHEK MELIDA PLUS METER) Misc To check sugars 4 times daily.  Please include control solution    carvedilol (COREG) 12.5 MG tablet Take 1 tablet (12.5 mg total) by mouth 2 (two) times daily.    carvedilol (COREG) 12.5 MG tablet TAKE 1 TABLET(12.5 MG) BY MOUTH TWICE DAILY    fluticasone (FLONASE) 50 mcg/actuation nasal spray SHAKE LIQUID AND USE 2 SPRAYS IN EACH NOSTRIL EVERY DAY    furosemide (LASIX) 40 MG tablet Take 2 tablets (80 mg total) by mouth 2 (two) times daily. (Patient taking differently: Take 40 mg by mouth once daily. )    insulin aspart (NOVOLOG FLEXPEN) 100 unit/mL InPn pen Inject 17 units before meals with ss (Patient taking differently: Sliding scale inject 14 units)    ketoconazole (NIZORAL) 2 % shampoo APPLY TO AFFECTED AREA EVERY OTHER DAY AND LET SIT 5 MINUTES PRIOR TO RINSING AS DIRECTED    MULTIVIT-MIN/IRON/FOLIC/LUTEIN (CENTRUM SILVER WOMEN ORAL) Take by mouth once daily.     pen needle, diabetic (BD ULTRA-FINE TIM PEN NEEDLE) 32 gauge x 5/32" Ndle USE FOUR TIMES DAILY    pen needle, diabetic (BD ULTRA-FINE TIM PEN NEEDLES) 32 gauge x 5/32" Ndle USE FOUR TIMES DAILY FOR BLOOD SUGAR TESTING    rosuvastatin (CRESTOR) 40 MG Tab TAKE 1 TABLET(40 MG) BY MOUTH EVERY DAY    TRESIBA FLEXTOUCH U-100 100 unit/mL (3 mL) InPn INJECT 40 UNITS SUBCUTANEOUS ONCE DAILY (Patient taking differently: INJECT 32 UNITS SUBCUTANEOUS ONCE DAILY)    mirabegron (MYRBETRIQ) 50 mg Tb24 Take 1 tablet (50 mg total) by mouth once daily.    MYRBETRIQ 25 mg Tb24 ER tablet      No current " facility-administered medications for this visit.        Review of Systems   Constitution: Negative for malaise/fatigue.   Eyes: Negative for visual disturbance.   Cardiovascular: Negative for chest pain, claudication, dyspnea on exertion, irregular heartbeat, near-syncope, orthopnea and palpitations.   Respiratory: Negative for shortness of breath and sleep disturbances due to breathing.    Musculoskeletal: Negative for muscle cramps, muscle weakness and myalgias.   Gastrointestinal: Negative for abdominal pain and heartburn.   Genitourinary: Negative for nocturia.   Neurological: Negative for difficulty with concentration, excessive daytime sleepiness, light-headedness, loss of balance, paresthesias and vertigo.       Objective:   Physical Exam   Constitutional: She is oriented to person, place, and time. Vital signs are normal. She appears well-developed and well-nourished.   HENT:   Head: Normocephalic and atraumatic.   Neck: Neck supple. Normal carotid pulses and no JVD present. Carotid bruit is not present. No edema present.   Cardiovascular: Normal rate, regular rhythm, normal heart sounds and intact distal pulses. PMI is not displaced. Exam reveals no gallop and no friction rub.   No murmur heard.  Pulmonary/Chest: Effort normal and breath sounds normal. No respiratory distress. She has no wheezes. She has no rales. She exhibits no tenderness.   Abdominal: Soft. Normal appearance and bowel sounds are normal. There is no hepatosplenomegaly. There is no tenderness.   Musculoskeletal: Normal range of motion. She exhibits no edema.   Neurological: She is alert and oriented to person, place, and time.   Skin: Skin is warm and dry.   Psychiatric: She has a normal mood and affect. Her behavior is normal. Judgment and thought content normal.       Assessment:     1. Coronary artery disease involving native coronary artery of native heart without angina pectoris    2. Chronic diastolic CHF (congestive heart failure)     3. Essential hypertension    4. Mixed hyperlipidemia    5. Permanent atrial fibrillation    6. Chronic kidney disease, stage IV (severe)    7. Hypertensive kidney disease with stage 4 chronic kidney disease    8. Obstructive sleep apnea        Plan:   From a cardiac standpoint, pt is doing well and is clinically stable. Pts lipid profile and BP's are at goal. Pt does not require any cardiac testing at this time.  Advised pt to attempt to work on using and tolerating CPAP.  Currently euvolemic.      No orders of the defined types were placed in this encounter.

## 2019-02-11 ENCOUNTER — TELEPHONE (OUTPATIENT)
Dept: INTERNAL MEDICINE | Facility: CLINIC | Age: 79
End: 2019-02-11

## 2019-02-11 NOTE — TELEPHONE ENCOUNTER
She can take plain antihistamine  Such as claritin or zyrtec (no decongestants)  Use her flonase/ and   She can use saline nasal spray  Would recommend urgent appt if concerns/fever/flu like symptoms  For further evaluation

## 2019-02-11 NOTE — TELEPHONE ENCOUNTER
Spoke w pt; informed that she may have to come in for an appointment. Pt states that she is unable to.    Please advise.

## 2019-02-11 NOTE — TELEPHONE ENCOUNTER
----- Message from Wilton Lagunas sent at 2/11/2019  9:24 AM CST -----  Contact: Patient [phone]  Patient calling stating is having running nose, cough, wants to know what patient can take to help,    Is on blood thinners and insulin, stating please send any thing but with Codeine in it, please.    [facility] [address] [place] [address] [address] [phone] (Phone) [phone] (Fax)    Please call an advise  Thank you

## 2019-02-14 ENCOUNTER — NURSE TRIAGE (OUTPATIENT)
Dept: ADMINISTRATIVE | Facility: CLINIC | Age: 79
End: 2019-02-14

## 2019-02-14 ENCOUNTER — OFFICE VISIT (OUTPATIENT)
Dept: FAMILY MEDICINE | Facility: CLINIC | Age: 79
End: 2019-02-14
Payer: MEDICARE

## 2019-02-14 VITALS
WEIGHT: 211.19 LBS | BODY MASS INDEX: 32.01 KG/M2 | RESPIRATION RATE: 16 BRPM | SYSTOLIC BLOOD PRESSURE: 120 MMHG | OXYGEN SATURATION: 91 % | HEART RATE: 76 BPM | DIASTOLIC BLOOD PRESSURE: 64 MMHG | HEIGHT: 68 IN | TEMPERATURE: 98 F

## 2019-02-14 DIAGNOSIS — J18.9 COMMUNITY ACQUIRED PNEUMONIA OF LEFT LOWER LOBE OF LUNG: Primary | ICD-10-CM

## 2019-02-14 PROCEDURE — 3078F PR MOST RECENT DIASTOLIC BLOOD PRESSURE < 80 MM HG: ICD-10-PCS | Mod: HCNC,CPTII,S$GLB, | Performed by: PHYSICIAN ASSISTANT

## 2019-02-14 PROCEDURE — 99214 OFFICE O/P EST MOD 30 MIN: CPT | Mod: HCNC,S$GLB,, | Performed by: PHYSICIAN ASSISTANT

## 2019-02-14 PROCEDURE — 3074F PR MOST RECENT SYSTOLIC BLOOD PRESSURE < 130 MM HG: ICD-10-PCS | Mod: HCNC,CPTII,S$GLB, | Performed by: PHYSICIAN ASSISTANT

## 2019-02-14 PROCEDURE — 99999 PR PBB SHADOW E&M-EST. PATIENT-LVL V: CPT | Mod: PBBFAC,HCNC,, | Performed by: PHYSICIAN ASSISTANT

## 2019-02-14 PROCEDURE — 99214 PR OFFICE/OUTPT VISIT, EST, LEVL IV, 30-39 MIN: ICD-10-PCS | Mod: HCNC,S$GLB,, | Performed by: PHYSICIAN ASSISTANT

## 2019-02-14 PROCEDURE — 3074F SYST BP LT 130 MM HG: CPT | Mod: HCNC,CPTII,S$GLB, | Performed by: PHYSICIAN ASSISTANT

## 2019-02-14 PROCEDURE — 1101F PR PT FALLS ASSESS DOC 0-1 FALLS W/OUT INJ PAST YR: ICD-10-PCS | Mod: HCNC,CPTII,S$GLB, | Performed by: PHYSICIAN ASSISTANT

## 2019-02-14 PROCEDURE — 99499 UNLISTED E&M SERVICE: CPT | Mod: HCNC,S$GLB,, | Performed by: PHYSICIAN ASSISTANT

## 2019-02-14 PROCEDURE — 99999 PR PBB SHADOW E&M-EST. PATIENT-LVL V: ICD-10-PCS | Mod: PBBFAC,HCNC,, | Performed by: PHYSICIAN ASSISTANT

## 2019-02-14 PROCEDURE — 1101F PT FALLS ASSESS-DOCD LE1/YR: CPT | Mod: HCNC,CPTII,S$GLB, | Performed by: PHYSICIAN ASSISTANT

## 2019-02-14 PROCEDURE — 3078F DIAST BP <80 MM HG: CPT | Mod: HCNC,CPTII,S$GLB, | Performed by: PHYSICIAN ASSISTANT

## 2019-02-14 PROCEDURE — 99499 RISK ADDL DX/OHS AUDIT: ICD-10-PCS | Mod: HCNC,S$GLB,, | Performed by: PHYSICIAN ASSISTANT

## 2019-02-14 RX ORDER — DOXYCYCLINE HYCLATE 100 MG
100 TABLET ORAL 2 TIMES DAILY
Qty: 20 TABLET | Refills: 0 | Status: SHIPPED | OUTPATIENT
Start: 2019-02-14 | End: 2019-02-24

## 2019-02-14 RX ORDER — BENZONATATE 100 MG/1
100 CAPSULE ORAL 3 TIMES DAILY PRN
Qty: 30 CAPSULE | Refills: 0 | Status: SHIPPED | OUTPATIENT
Start: 2019-02-14 | End: 2019-02-27 | Stop reason: SDUPTHER

## 2019-02-14 RX ORDER — ALBUTEROL SULFATE 90 UG/1
2 AEROSOL, METERED RESPIRATORY (INHALATION) EVERY 6 HOURS PRN
Qty: 1 EACH | Refills: 2 | Status: SHIPPED | OUTPATIENT
Start: 2019-02-14 | End: 2019-11-21

## 2019-02-14 NOTE — Clinical Note
Dr. Yañez had the pleasure of seeing your patient. This is just an FYI of her visit. Sincerely,Korin Bourgeois PA-C (Trisha)

## 2019-02-14 NOTE — PATIENT INSTRUCTIONS
Treating Pneumonia  Pneumonia is an infection of one or both of the lungs. Pneumonia:  · Is usually caused by either a virus or a bacteria  · Can be very serious, especially in infants, young children, and older adults. Its also serious for those with other long-term health problems or weakened immune systems.  · Is sometimes treated at home and sometimes in the hospital    Antibiotic medicines  Antibiotics may be prescribed for pneumonia caused by bacteria. They may be pills (oral medicines), or shots (injections). Or they may be given by IV (intravenously) into a vein. If you are taking oral medicines at home:  · Fill your prescription and start taking your medicine as soon as you can.  · You will likely start to feel better in a day or 2, but dont stop taking the antibiotic.  · Use a pill organizer to help you remember to take your medicine.  · Let your healthcare provider know if you have side effects.  · Take your medicine exactly as directed on the label. Talk to your provider or pharmacist if you have any questions.  Antiviral medicines  Antiviral medicine may be prescribed for pneumonia caused by a virus. For example, antiviral medicine may be prescribed for pneumonia caused by the flu virus. Antibiotics do not work against viruses. If you are taking antiviral medicine at home:  · Fill your prescription and start taking your medicine as soon as you can.  · Talk with your provider or pharmacist about possible side effects.  · Take the medicine exactly as instructed.  To relieve symptoms  There are many medicines that can help relieve symptoms of pneumonia. Some are prescription and some are over-the-counter.  Your healthcare provider may recommend:  · Acetaminophen or ibuprofen to lower your fever and to lessen headache or other pain  · Cough medicine to loosen mucus or to reduce coughing  Make sure you check with your healthcare provider or pharmacist before taking any over-the-counter  medicines.  Special treatments  If you are hospitalized for pneumonia, you may have other therapies, including:  · Inhaled medicines to help with breathing or chest congestion  · Supplemental oxygen to increase low oxygen levels  Drink fluids and eat healthy  You should eat healthy to help your body fight the infection. Drinking a lot of fluids helps to replace fluids lost from fever and to loosen mucus in your chest.  · Diet. Make healthy food choices, including fruits and vegetables, lean meats and other proteins, 100% whole grain and low- or no-fat dairy products.  · Fluids. Drink at least 6 to 8 tall glasses a day. Water and 100% fruit or vegetable juice are best.  Get plenty of rest and sleep  You may be more tired than usual for a while. It is important to get enough sleep at night. Its also important to rest during the day. Talk with your healthcare provider if coughing or other symptoms are interfering with your sleep.  Preventing the spread of germs  The best thing you can do to prevent spreading germs is to wash your hands often. You should:  · Rub your hand with soap and water for 20 to 30 seconds.  · Clean in between your fingers, the backs of your hands, and around your nails.  · Dry your hands on a separate towel or use paper towels.  You should also:  · Keep alcohol-based hand  nearby.  · Make sure you also clean surfaces that you touch. Use a product that kills all types of germs.  · Stay away from others until you are feeling better.  When to call your healthcare provider  Call your healthcare provider if you have any of the following:  · Symptoms get worse  · Fever continues  · Shortness of breath gets worse  · Increased mucus or mucus that is darker in color  · Coughing gets worse  · Lips or fingers are bluish in color  · Side effects from your medicine   Date Last Reviewed: 12/1/2016  © 1736-1199 [employer]. [address] [place] [address], [place] [address]. All rights reserved.  This information is not intended as a substitute for professional medical care. Always follow your healthcare professional's instructions.

## 2019-02-14 NOTE — TELEPHONE ENCOUNTER
Reason for Disposition   Cold with no complications  (all triage questions negative)    Protocols used: ST COLDS-A-AH    Symptoms since Sunday. Nasal congestion, chest making noise, intermittent fever. Requesting appt. appt made.

## 2019-02-23 ENCOUNTER — NURSE TRIAGE (OUTPATIENT)
Dept: ADMINISTRATIVE | Facility: CLINIC | Age: 79
End: 2019-02-23

## 2019-02-23 NOTE — TELEPHONE ENCOUNTER
"  Reason for Disposition   Caller has URGENT question and triager unable to answer question    Answer Assessment - Initial Assessment Questions  1. SYMPTOM: "What's the main symptom you're concerned about?" (e.g., breathing difficulty, fever, weakness)      Coughing up green, afeb. Coughing. On abx for almost 10 ten   2. ONSET: "When did the  ________  start?"     OV 2/14 , caller not with pt  3. BETTER-SAME-WORSE: "Are you getting better, staying the same, or getting worse compared to the day you were discharged?"     Better. Using IS q couple hours.   4. HOSPITALIZATION: "How long were you hospitalized?" (e.g., days)     N/a   5. DISCHARGE DATE: "What date were you discharged from the hospital?"     N/a   6. DISCHARGE DOCTOR: "Who is the main doctor taking care of you now?"    N/a   7. DISCHARGE APPOINTMENT: "Have you scheduled a follow-up discharge appointment with your doctor?"     Just ate, got 4 hours sleep last pm   8. DISCHARGE MEDICATIONS: "Did the physician who discharged you order any new medications for you to use? If yes, have you filled the prescription and started taking the medication?"      On abx   9. DISCHARGE ANTIBIOTICS: "Are you taking any antibiotic medication now?"      On abx   10. BREATHING DIFFICULTY: "Are you having any difficulty breathing?" If so, ask "How bad is it?"  (e.g., none, mild, moderate, severe)    - MILD: No SOB at rest, mild SOB with walking, speaks normally in sentences, can lay down, no retractions, pulse < 100.     - MODERATE: SOB at rest, SOB with minimal exertion and prefers to sit, cannot lie down flat, speaks in phrases, mild retractions, audible wheezing, pulse 100-120.     - SEVERE: Very SOB at rest, speaks in single words, struggling to breathe, sitting hunched forward, retractions, pulse > 120       Some SOB   11. FEVER: "Do you have a fever?" If so, ask: "What is it, how was it measured and when did it start?"     No chills   12. OTHER SYMPTOMS: "Do you have any " "other symptoms?" (e.g., weakness, confusion, pain)      achiness    Protocols used: [facility] PNEUMONIA ON ANTIBIOTIC POST-HOSPITALIZATION FOLLOW-UP CALL-A-AH  Using inhaler q 4-6 hours, needs refill.   Pharm : [facility]    spoke with dr reed - cont therapy. Re- eval lungs at  today or in am. Family notified. Call back with questions.   "

## 2019-02-25 ENCOUNTER — TELEPHONE (OUTPATIENT)
Dept: FAMILY MEDICINE | Facility: CLINIC | Age: 79
End: 2019-02-25

## 2019-02-25 NOTE — TELEPHONE ENCOUNTER
Its been over a week so she should follow up with her pcp or another provider if she is not improved.  She has kidney disease so regarding the original message for headache and stuff sinus only Flonase

## 2019-02-25 NOTE — TELEPHONE ENCOUNTER
Patient stated she is feeling better, but the Flonase is not doing anything. Please advised. Thanks

## 2019-02-25 NOTE — TELEPHONE ENCOUNTER
She has flonase from her medicine list I see. She can use that. She has fills if she doesn't have it

## 2019-02-25 NOTE — TELEPHONE ENCOUNTER
Pt stated she has been using Flonase and it has not been working. Pt states that she is still having wheezing, cough, and chest congestion. Pt would like to know what else she can do besides using Flonase.

## 2019-02-27 ENCOUNTER — OFFICE VISIT (OUTPATIENT)
Dept: INTERNAL MEDICINE | Facility: CLINIC | Age: 79
End: 2019-02-27
Payer: MEDICARE

## 2019-02-27 ENCOUNTER — HOSPITAL ENCOUNTER (OUTPATIENT)
Dept: RADIOLOGY | Facility: HOSPITAL | Age: 79
Discharge: HOME | End: 2019-02-27
Attending: INTERNAL MEDICINE
Payer: MEDICARE

## 2019-02-27 ENCOUNTER — TELEPHONE (OUTPATIENT)
Dept: NEPHROLOGY | Facility: CLINIC | Age: 79
End: 2019-02-27

## 2019-02-27 VITALS
SYSTOLIC BLOOD PRESSURE: 124 MMHG | BODY MASS INDEX: 32.11 KG/M2 | DIASTOLIC BLOOD PRESSURE: 62 MMHG | TEMPERATURE: 99 F | HEART RATE: 72 BPM | HEIGHT: 68 IN

## 2019-02-27 DIAGNOSIS — I70.0 AORTIC ATHEROSCLEROSIS: ICD-10-CM

## 2019-02-27 DIAGNOSIS — E11.649 TYPE 2 DIABETES MELLITUS WITH HYPOGLYCEMIA WITHOUT COMA, WITH LONG-TERM CURRENT USE OF INSULIN: ICD-10-CM

## 2019-02-27 DIAGNOSIS — R05.9 COUGH: ICD-10-CM

## 2019-02-27 DIAGNOSIS — I10 ESSENTIAL HYPERTENSION: Primary | ICD-10-CM

## 2019-02-27 DIAGNOSIS — J06.9 UPPER RESPIRATORY TRACT INFECTION, UNSPECIFIED TYPE: ICD-10-CM

## 2019-02-27 DIAGNOSIS — N18.9 CHRONIC KIDNEY DISEASE, UNSPECIFIED CKD STAGE: ICD-10-CM

## 2019-02-27 DIAGNOSIS — Z79.4 TYPE 2 DIABETES MELLITUS WITH HYPOGLYCEMIA WITHOUT COMA, WITH LONG-TERM CURRENT USE OF INSULIN: ICD-10-CM

## 2019-02-27 DIAGNOSIS — E78.5 HYPERLIPIDEMIA, UNSPECIFIED HYPERLIPIDEMIA TYPE: ICD-10-CM

## 2019-02-27 DIAGNOSIS — I50.32 CHRONIC DIASTOLIC CHF (CONGESTIVE HEART FAILURE): ICD-10-CM

## 2019-02-27 DIAGNOSIS — Z12.31 ENCOUNTER FOR SCREENING MAMMOGRAM FOR BREAST CANCER: ICD-10-CM

## 2019-02-27 LAB
INFLUENZA A, MOLECULAR: NEGATIVE
INFLUENZA B, MOLECULAR: NEGATIVE
SPECIMEN SOURCE: NORMAL

## 2019-02-27 PROCEDURE — 87502 INFLUENZA DNA AMP PROBE: CPT | Mod: HCNC

## 2019-02-27 PROCEDURE — 1101F PR PT FALLS ASSESS DOC 0-1 FALLS W/OUT INJ PAST YR: ICD-10-PCS | Mod: HCNC,CPTII,S$GLB, | Performed by: INTERNAL MEDICINE

## 2019-02-27 PROCEDURE — 1101F PT FALLS ASSESS-DOCD LE1/YR: CPT | Mod: HCNC,CPTII,S$GLB, | Performed by: INTERNAL MEDICINE

## 2019-02-27 PROCEDURE — 71046 X-RAY EXAM CHEST 2 VIEWS: CPT | Mod: 26,HCNC,, | Performed by: RADIOLOGY

## 2019-02-27 PROCEDURE — 99214 PR OFFICE/OUTPT VISIT, EST, LEVL IV, 30-39 MIN: ICD-10-PCS | Mod: HCNC,S$GLB,, | Performed by: INTERNAL MEDICINE

## 2019-02-27 PROCEDURE — 99999 PR PBB SHADOW E&M-EST. PATIENT-LVL IV: ICD-10-PCS | Mod: PBBFAC,HCNC,, | Performed by: INTERNAL MEDICINE

## 2019-02-27 PROCEDURE — 71046 XR CHEST PA AND LATERAL: ICD-10-PCS | Mod: 26,HCNC,, | Performed by: RADIOLOGY

## 2019-02-27 PROCEDURE — 3074F PR MOST RECENT SYSTOLIC BLOOD PRESSURE < 130 MM HG: ICD-10-PCS | Mod: HCNC,CPTII,S$GLB, | Performed by: INTERNAL MEDICINE

## 2019-02-27 PROCEDURE — 99999 PR PBB SHADOW E&M-EST. PATIENT-LVL IV: CPT | Mod: PBBFAC,HCNC,, | Performed by: INTERNAL MEDICINE

## 2019-02-27 PROCEDURE — 3078F PR MOST RECENT DIASTOLIC BLOOD PRESSURE < 80 MM HG: ICD-10-PCS | Mod: HCNC,CPTII,S$GLB, | Performed by: INTERNAL MEDICINE

## 2019-02-27 PROCEDURE — 3078F DIAST BP <80 MM HG: CPT | Mod: HCNC,CPTII,S$GLB, | Performed by: INTERNAL MEDICINE

## 2019-02-27 PROCEDURE — 99499 UNLISTED E&M SERVICE: CPT | Mod: HCNC,S$GLB,, | Performed by: INTERNAL MEDICINE

## 2019-02-27 PROCEDURE — 3074F SYST BP LT 130 MM HG: CPT | Mod: HCNC,CPTII,S$GLB, | Performed by: INTERNAL MEDICINE

## 2019-02-27 PROCEDURE — 99499 RISK ADDL DX/OHS AUDIT: ICD-10-PCS | Mod: HCNC,S$GLB,, | Performed by: INTERNAL MEDICINE

## 2019-02-27 PROCEDURE — 99214 OFFICE O/P EST MOD 30 MIN: CPT | Mod: HCNC,S$GLB,, | Performed by: INTERNAL MEDICINE

## 2019-02-27 PROCEDURE — 71046 X-RAY EXAM CHEST 2 VIEWS: CPT | Mod: TC,HCNC

## 2019-02-27 RX ORDER — ROSUVASTATIN CALCIUM 40 MG/1
TABLET, COATED ORAL
Qty: 30 TABLET | Refills: 6 | Status: SHIPPED | OUTPATIENT
Start: 2019-02-27 | End: 2019-10-09 | Stop reason: SDUPTHER

## 2019-02-27 RX ORDER — BENZONATATE 100 MG/1
100 CAPSULE ORAL 3 TIMES DAILY PRN
Qty: 30 CAPSULE | Refills: 1 | Status: SHIPPED | OUTPATIENT
Start: 2019-02-27 | End: 2019-03-29

## 2019-02-27 NOTE — TELEPHONE ENCOUNTER
Spoke to patient she has appointment with pcp today, she is better but think she is getting a sinus infection

## 2019-02-27 NOTE — PROGRESS NOTES
"Subjective:       Patient ID: Radha Degroot is a 78 y.o. female.    Chief Complaint: Follow-up   this is a 78-year-old who presents today for follow-up she has been having problems with low upper respiratory infection recently.  She went to urgent care and was told she has pneumonia no chest x-ray was done but she was treated with doxycycline at that time was having cough sinus congestion postnasal drip and a lot of and a lot of mucus.  Is patient reports that she has had problems with postnasal drip and sinus congestion and has been using Flonase She was also taking Tessalon Perles which helped with her cough.  Patient feels that she has been improving although she continues to feel fatigued she has had some sinus congestion drainage seasonal allergies as well.  She runs low-grade fevers at times and had several family members who had the flu earlier this year.  She did get the flu shot is.  She had some hoarseness and postnasal drip  She has also seen ent for tinnitis/hearing loss has ent appt planned     HPI  Review of Systems   Constitutional: Positive for fatigue and fever.   HENT: Positive for congestion, postnasal drip and sinus pressure.    Respiratory: Positive for cough.        Objective:     Blood pressure 124/62, pulse 72, temperature 99 °F (37.2 °C), height 5' 8" (1.727 m).    Physical Exam   Constitutional: No distress.   HENT:   Head: Normocephalic.   Mouth/Throat: Oropharynx is clear and moist.   rhintis   Tm clear    Eyes: No scleral icterus.   Neck: Neck supple.   Cardiovascular: Normal rate, regular rhythm and normal heart sounds. Exam reveals no gallop and no friction rub.   No murmur heard.  Pulmonary/Chest: Effort normal and breath sounds normal. No respiratory distress.   Abdominal: Soft. Bowel sounds are normal. She exhibits no mass. There is no tenderness.   Musculoskeletal: She exhibits no edema.   Stable edema    Neurological: She is alert.   Skin: No erythema.   Vitals reviewed.    "   Assessment:       1. Essential hypertension    2. Hyperlipidemia, unspecified hyperlipidemia type    3. Upper respiratory tract infection, unspecified type    4. Type 2 diabetes mellitus with hypoglycemia without coma, with long-term current use of insulin    5. Chronic diastolic CHF (congestive heart failure)    6. Encounter for screening mammogram for breast cancer    7. Cough    8. Aortic atherosclerosis    9. Chronic kidney disease, unspecified CKD stage        Plan:       Radha was seen today for follow-up.    Diagnoses and all orders for this visit:    Upper respiratory tract infection, unspecified type  Is seems to be improving she still has some low-grade fever postnasal drip who recommend continue Flonase she can use saline spray she will monitor for signs of secondary infection  -     Influenza A & B by Molecular  -     X-Ray Chest PA And Lateral; Future    Essential hypertension  Blood pressure acceptable    Hyperlipidemia, unspecified hyperlipidemia type  Continue crestor tolerating refill sent today   Recent lipids reviewed  -     TSH; Future  -     benzonatate (TESSALON) 100 MG capsule; Take 1 capsule (100 mg total) by mouth 3 (three) times daily as needed for Cough.    Type 2 diabetes mellitus with hypoglycemia without coma, with long-term current use of insulin  History of she continues to follow with Endocrinology    Chronic diastolic CHF (congestive heart failure)  Atrial fibrillation remains on apixaban and carvedilol   Has recent cardiology appt     Encounter for screening mammogram for breast cancer  -     Mammo Digital Screening Bil[facility] w/ [facility]; Future    Follow up 4 months

## 2019-03-08 ENCOUNTER — LAB VISIT (OUTPATIENT)
Dept: LAB | Facility: HOSPITAL | Age: 79
End: 2019-03-08
Attending: INTERNAL MEDICINE
Payer: MEDICARE

## 2019-03-08 DIAGNOSIS — I10 ESSENTIAL HYPERTENSION: Chronic | ICD-10-CM

## 2019-03-08 DIAGNOSIS — N18.4 CHRONIC KIDNEY DISEASE, STAGE IV (SEVERE): Chronic | ICD-10-CM

## 2019-03-08 DIAGNOSIS — E78.5 HYPERLIPIDEMIA, UNSPECIFIED HYPERLIPIDEMIA TYPE: Chronic | ICD-10-CM

## 2019-03-08 LAB
ALBUMIN SERPL BCP-MCNC: 3.6 G/DL
ANION GAP SERPL CALC-SCNC: 12 MMOL/L
BUN SERPL-MCNC: 42 MG/DL
CALCIUM SERPL-MCNC: 9.6 MG/DL
CHLORIDE SERPL-SCNC: 104 MMOL/L
CHOLEST SERPL-MCNC: 124 MG/DL
CHOLEST/HDLC SERPL: 2.4 {RATIO}
CO2 SERPL-SCNC: 26 MMOL/L
CREAT SERPL-MCNC: 2 MG/DL
EST. GFR  (AFRICAN AMERICAN): 26.8 ML/MIN/1.73 M^2
EST. GFR  (NON AFRICAN AMERICAN): 23.2 ML/MIN/1.73 M^2
GLUCOSE SERPL-MCNC: 178 MG/DL
HDLC SERPL-MCNC: 51 MG/DL
HDLC SERPL: 41.1 %
LDLC SERPL CALC-MCNC: 38.4 MG/DL
NONHDLC SERPL-MCNC: 73 MG/DL
PHOSPHATE SERPL-MCNC: 3.5 MG/DL
POTASSIUM SERPL-SCNC: 4.2 MMOL/L
PTH-INTACT SERPL-MCNC: 138 PG/ML
SODIUM SERPL-SCNC: 142 MMOL/L
TRIGL SERPL-MCNC: 173 MG/DL
TSH SERPL DL<=0.005 MIU/L-ACNC: 3.21 UIU/ML

## 2019-03-08 PROCEDURE — 80061 LIPID PANEL: CPT | Mod: HCNC

## 2019-03-08 PROCEDURE — 36415 COLL VENOUS BLD VENIPUNCTURE: CPT | Mod: HCNC,PO

## 2019-03-08 PROCEDURE — 80069 RENAL FUNCTION PANEL: CPT | Mod: HCNC

## 2019-03-08 PROCEDURE — 83970 ASSAY OF PARATHORMONE: CPT | Mod: HCNC

## 2019-03-08 PROCEDURE — 84443 ASSAY THYROID STIM HORMONE: CPT | Mod: HCNC

## 2019-03-22 ENCOUNTER — TELEPHONE (OUTPATIENT)
Dept: NEPHROLOGY | Facility: CLINIC | Age: 79
End: 2019-03-22

## 2019-04-25 ENCOUNTER — PES CALL (OUTPATIENT)
Dept: ADMINISTRATIVE | Facility: CLINIC | Age: 79
End: 2019-04-25

## 2019-04-26 ENCOUNTER — TELEPHONE (OUTPATIENT)
Dept: NEPHROLOGY | Facility: CLINIC | Age: 79
End: 2019-04-26

## 2019-04-29 ENCOUNTER — TELEPHONE (OUTPATIENT)
Dept: NEPHROLOGY | Facility: CLINIC | Age: 79
End: 2019-04-29

## 2019-04-29 ENCOUNTER — LAB VISIT (OUTPATIENT)
Dept: LAB | Facility: HOSPITAL | Age: 79
End: 2019-04-29
Attending: INTERNAL MEDICINE
Payer: MEDICARE

## 2019-04-29 DIAGNOSIS — N18.4 STAGE 4 CHRONIC KIDNEY DISEASE: Primary | ICD-10-CM

## 2019-04-29 DIAGNOSIS — N18.4 STAGE 4 CHRONIC KIDNEY DISEASE: ICD-10-CM

## 2019-04-29 LAB
BACTERIA #/AREA URNS AUTO: ABNORMAL /HPF
BILIRUB UR QL STRIP: NEGATIVE
CLARITY UR REFRACT.AUTO: ABNORMAL
COLOR UR AUTO: YELLOW
CREAT UR-MCNC: 116 MG/DL (ref 15–325)
GLUCOSE UR QL STRIP: NEGATIVE
HGB UR QL STRIP: NEGATIVE
HYALINE CASTS UR QL AUTO: 31 /LPF
KETONES UR QL STRIP: NEGATIVE
LEUKOCYTE ESTERASE UR QL STRIP: NEGATIVE
MICROSCOPIC COMMENT: ABNORMAL
NITRITE UR QL STRIP: NEGATIVE
PH UR STRIP: 5 [PH] (ref 5–8)
PROT UR QL STRIP: ABNORMAL
PROT UR-MCNC: 84 MG/DL (ref 0–15)
PROT/CREAT UR: 0.72 MG/G{CREAT} (ref 0–0.2)
RBC #/AREA URNS AUTO: 0 /HPF (ref 0–4)
SP GR UR STRIP: 1.02 (ref 1–1.03)
SQUAMOUS #/AREA URNS AUTO: 2 /HPF
URN SPEC COLLECT METH UR: ABNORMAL
WBC #/AREA URNS AUTO: 4 /HPF (ref 0–5)

## 2019-04-29 PROCEDURE — 82570 ASSAY OF URINE CREATININE: CPT | Mod: HCNC

## 2019-04-29 PROCEDURE — 81001 URINALYSIS AUTO W/SCOPE: CPT | Mod: HCNC

## 2019-04-29 NOTE — TELEPHONE ENCOUNTER
Urine ordered as Message   Received: Today   Message Contents   IGNACIO Adames RN   Caller: Unspecified (Today,  8:21 AM)             Per dr smith urine only please      requested.

## 2019-04-29 NOTE — TELEPHONE ENCOUNTER
----- Message from Julia Carr MA sent at 4/29/2019  8:21 AM CDT -----  Per dr sarah teresa only please

## 2019-04-29 NOTE — TELEPHONE ENCOUNTER
Put order in for pt urine   ----- Message from Kallie Mercedes sent at 4/29/2019  8:12 AM CDT -----  Contact: Radha  tel:  [phone]  PT.says  She is leaving in 5 mins to come do her labs today.    Coming to [facility] this a.m.    Asking if you can put an order in for URINE  Before she gets to the lab.     Pls call asap, has some questions. .

## 2019-04-30 ENCOUNTER — OFFICE VISIT (OUTPATIENT)
Dept: NEPHROLOGY | Facility: CLINIC | Age: 79
End: 2019-04-30
Payer: MEDICARE

## 2019-04-30 VITALS
SYSTOLIC BLOOD PRESSURE: 140 MMHG | HEIGHT: 68 IN | DIASTOLIC BLOOD PRESSURE: 70 MMHG | BODY MASS INDEX: 31.94 KG/M2 | HEART RATE: 69 BPM | WEIGHT: 210.75 LBS | OXYGEN SATURATION: 96 %

## 2019-04-30 DIAGNOSIS — Z79.4 TYPE 2 DIABETES MELLITUS WITH STAGE 4 CHRONIC KIDNEY DISEASE, WITH LONG-TERM CURRENT USE OF INSULIN: Primary | ICD-10-CM

## 2019-04-30 DIAGNOSIS — N25.81 SECONDARY RENAL HYPERPARATHYROIDISM: ICD-10-CM

## 2019-04-30 DIAGNOSIS — E55.9 VITAMIN D DEFICIENCY DISEASE: ICD-10-CM

## 2019-04-30 DIAGNOSIS — I12.9 HYPERTENSIVE KIDNEY DISEASE WITH STAGE 4 CHRONIC KIDNEY DISEASE: ICD-10-CM

## 2019-04-30 DIAGNOSIS — D63.8 ANEMIA OF CHRONIC ILLNESS: ICD-10-CM

## 2019-04-30 DIAGNOSIS — R80.1 PERSISTENT PROTEINURIA: ICD-10-CM

## 2019-04-30 DIAGNOSIS — N18.4 HYPERTENSIVE KIDNEY DISEASE WITH STAGE 4 CHRONIC KIDNEY DISEASE: ICD-10-CM

## 2019-04-30 DIAGNOSIS — N18.4 TYPE 2 DIABETES MELLITUS WITH STAGE 4 CHRONIC KIDNEY DISEASE, WITH LONG-TERM CURRENT USE OF INSULIN: Primary | ICD-10-CM

## 2019-04-30 DIAGNOSIS — E11.22 TYPE 2 DIABETES MELLITUS WITH STAGE 4 CHRONIC KIDNEY DISEASE, WITH LONG-TERM CURRENT USE OF INSULIN: Primary | ICD-10-CM

## 2019-04-30 DIAGNOSIS — N18.4 CKD (CHRONIC KIDNEY DISEASE), STAGE IV: ICD-10-CM

## 2019-04-30 PROCEDURE — 1101F PR PT FALLS ASSESS DOC 0-1 FALLS W/OUT INJ PAST YR: ICD-10-PCS | Mod: HCNC,CPTII,S$GLB, | Performed by: INTERNAL MEDICINE

## 2019-04-30 PROCEDURE — 3077F SYST BP >= 140 MM HG: CPT | Mod: HCNC,CPTII,S$GLB, | Performed by: INTERNAL MEDICINE

## 2019-04-30 PROCEDURE — 1101F PT FALLS ASSESS-DOCD LE1/YR: CPT | Mod: HCNC,CPTII,S$GLB, | Performed by: INTERNAL MEDICINE

## 2019-04-30 PROCEDURE — 3077F PR MOST RECENT SYSTOLIC BLOOD PRESSURE >= 140 MM HG: ICD-10-PCS | Mod: HCNC,CPTII,S$GLB, | Performed by: INTERNAL MEDICINE

## 2019-04-30 PROCEDURE — 3078F PR MOST RECENT DIASTOLIC BLOOD PRESSURE < 80 MM HG: ICD-10-PCS | Mod: HCNC,CPTII,S$GLB, | Performed by: INTERNAL MEDICINE

## 2019-04-30 PROCEDURE — 99499 UNLISTED E&M SERVICE: CPT | Mod: HCNC,S$GLB,, | Performed by: INTERNAL MEDICINE

## 2019-04-30 PROCEDURE — 99214 OFFICE O/P EST MOD 30 MIN: CPT | Mod: HCNC,S$GLB,, | Performed by: INTERNAL MEDICINE

## 2019-04-30 PROCEDURE — 99499 RISK ADDL DX/OHS AUDIT: ICD-10-PCS | Mod: HCNC,S$GLB,, | Performed by: INTERNAL MEDICINE

## 2019-04-30 PROCEDURE — 99214 PR OFFICE/OUTPT VISIT, EST, LEVL IV, 30-39 MIN: ICD-10-PCS | Mod: HCNC,S$GLB,, | Performed by: INTERNAL MEDICINE

## 2019-04-30 PROCEDURE — 99999 PR PBB SHADOW E&M-EST. PATIENT-LVL III: ICD-10-PCS | Mod: PBBFAC,HCNC,, | Performed by: INTERNAL MEDICINE

## 2019-04-30 PROCEDURE — 3078F DIAST BP <80 MM HG: CPT | Mod: HCNC,CPTII,S$GLB, | Performed by: INTERNAL MEDICINE

## 2019-04-30 PROCEDURE — 99999 PR PBB SHADOW E&M-EST. PATIENT-LVL III: CPT | Mod: PBBFAC,HCNC,, | Performed by: INTERNAL MEDICINE

## 2019-05-06 NOTE — PROGRESS NOTES
Subjective:       Patient ID: Radha Degroot is a 79 y.o. White female who presents for follow-up evaluation of CKD.    HPI     Ms. Degroot was seen in nephrology clinic for follow up on CKD. She was previously being followed by Dr. Mcgill and was last seen by him on 3/9/16. I saw her on 8/24/16, she was to follow with me in 3 months, for unclear reason she had next appointment on 6/28/17 when she was noted to have worsening of renal function. She was last followed on 9/12/18.    She has CKD IV, diabetes with complications like neuropathy, diabetic retinopathy, hypertension, CAD, DELFINA, diastolic heart failure and several other medical problems. In the past she was taken off aldactone due to hyperkalemia.     She arrived for this visit alone. She reported she has been doing around the same and denied any worsening of any of her chronic symptoms like RODRIGUEZ, leg edema. Her diuretic regimen is adjusted by her cardiologist. She finally did see urologist after multiple reminders, she has a renal mass seen on imaging. 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     Her diabetes control continues to remain sub optimal. She has longstanding and historically poorly controlled diabetes, at times A1C has remained above 10, she has longstanding proteinuria. Most recent A1C was 8.2.    At the time of her visit on 6/28/17 she was noted to have worsening renal function and hypotension which she confirmed was happening on her home BP readings also and as a result she was feeling tired and dizzy. She was taken off amlodipine and lisinopril at that time. She was advised to follow BP at home while continuing Coreg and lasix. Her follow up labs on 7/13/17 noted for improvement in her renal function including creatinine and BUN. She was advised to do renal labs periodically but she remained a no show/ lab cancellation by  patient and especially did not have any labs between 10/17 and 1/18, this was despite standing orders. This was brought to her attention.     Prior lab trend noted for onset of CKD since around 2006 and episodes of DANDY, and progression to CKD IV. US kidneys per PCP from 9/16 noted for Kidney size of 11 and 10.7 cm. Per report 1.6 cm isoechoic focus within the superior pole of the right kidney corresponding to abnormality seen on recent MRI which does not have the characteristics of a simple cyst.  Recommend further evaluation with CT of the abdomen with and without contrast renal mass protocol. 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 It did not show any mass/ stone/ obstruction.     Most recent labs noted  Renal Function:  Lab Results   Component Value Date     04/29/2019     (H) 03/08/2019     04/29/2019     03/08/2019    K 4.1 04/29/2019    K 4.2 03/08/2019     04/29/2019     03/08/2019    CO2 23 04/29/2019    CO2 26 03/08/2019    BUN 42 (H) 04/29/2019    BUN 42 (H) 03/08/2019    CALCIUM 9.9 04/29/2019    CALCIUM 9.6 03/08/2019    CREATININE 2.0 (H) 04/29/2019    CREATININE 2.0 (H) 03/08/2019    ALBUMIN 3.9 04/29/2019    ALBUMIN 3.6 03/08/2019    PHOS 3.0 04/29/2019    PHOS 3.5 03/08/2019    ESTGFRAFRICA 26.6 (A) 04/29/2019    ESTGFRAFRICA 26.8 (A) 03/08/2019    EGFRNONAA 23.1 (A) 04/29/2019    EGFRNONAA 23.2 (A) 03/08/2019       Urinalysis:  Lab Results   Component Value Date    APPEARANCEUA Hazy (A) 04/29/2019    PHUR 5.0 04/29/2019    SPECGRAV 1.020 04/29/2019    PROTEINUA 1+ (A) 04/29/2019    GLUCUA Negative 04/29/2019    OCCULTUA Negative 04/29/2019    NITRITE Negative 04/29/2019    LEUKOCYTESUR Negative 04/29/2019       Protein/Creatinine Ratio:  Lab Results   Component Value Date    PROTEINURINE 84 (H) 04/29/2019    CREATRANDUR 116.0 04/29/2019    UTPCR 0.72 (H) 04/29/2019       CBC:  Lab Results    Component Value Date    WBC 9.88 08/14/2018    HGB 13.4 08/14/2018    HCT 41.2 08/14/2018         Vit D 41  kidney US in 2016, she had one ordered by urologist, Dr. Greene in 6/17 which patient has not done so far   US kidney was ordered at the time of visit in may 2018 but for unclear reason she has not done that so far    US kidneys 9/2018  11.4 and 10.6 cm kidneys, There is no evidence hydronephrosis. There is a 1.5 cm angiomyolipoma within the superior pole of the right kidney correlating with the fatty lesion seen on CT from May.  .  Review of Systems   Constitutional: Positive for activity change. Negative for appetite change, chills and fever.   HENT: Negative for sneezing and sore throat.    Eyes: Negative for visual disturbance.   Respiratory: Negative for cough and shortness of breath.    Cardiovascular: Positive for leg swelling. Negative for chest pain.   Gastrointestinal: Negative for abdominal pain, diarrhea, nausea and vomiting.   Genitourinary: Negative for decreased urine volume, dysuria, hematuria and urgency.   Musculoskeletal: Positive for back pain and gait problem.   Skin: Negative for rash.   Allergic/Immunologic: Negative for immunocompromised state.   Neurological: Negative for dizziness and headaches.   Psychiatric/Behavioral: Negative for behavioral problems and confusion.       Objective:      Physical Exam   Constitutional: She is oriented to person, place, and time. She appears well-developed and well-nourished. No distress.   HENT:   Head: Normocephalic and atraumatic.   Eyes: Conjunctivae are normal. Right eye exhibits no discharge. Left eye exhibits no discharge.   Neck: Normal range of motion.   Cardiovascular: Normal rate, regular rhythm and normal heart sounds.   Pulmonary/Chest: Effort normal and breath sounds normal. No respiratory distress. She has no wheezes.   Abdominal: Soft. There is no tenderness.   Musculoskeletal: She exhibits edema (2+).   Neurological: She is  alert and oriented to person, place, and time.   Skin: Skin is warm and dry.   Psychiatric: She has a normal mood and affect. Her behavior is normal.   Vitals reviewed.      Assessment:       1. Type 2 diabetes mellitus with stage 4 chronic kidney disease, with long-term current use of insulin    2. Secondary renal hyperparathyroidism    3. Vitamin D deficiency disease    4. Anemia of chronic illness    5. CKD (chronic kidney disease), stage IV    6. Hypertensive kidney disease with stage 4 chronic kidney disease    7. Persistent proteinuria        Plan:     Ms. Degroot has CKD IV with sub nephrotic proteinuria which is clinically felt to be due to diabetic nephropathy and hypertensive glomerulosclerosis. Labs, CKD staging, risk of progression to ESRD was discussed with her in detail. So far available serial labs were explained to her in detail.     She established care with me in 8/16 but for unclear reason she did not have follow up visit arranged until 6/17 when she was noted to have DANDY vs progression of CKD. That time she was taken off amlodipine and lisinopril due to hypotensive episodes and especially with worsening renal function.     Again today explained to her about all of her serial labs, longstanding CKD IV status already which has progressed, her kidneys with poor autoregulation and higher risk of DANDY due to hypotension/ volume depletion/ contrast agents/ nephrotoxins/ NSAID etc and higher risk of progression to CKD V/ ESRD. Also cautioned her about her multivitamin with mineral preparation as it will give her additional K, phos and other minerals.     Given her advanced CKD and risk of DANDY, will hold off restarting any ACE-I or ARB use or K sparing diuretic due to risk of hyperkalemia. This was again explained to her today.      Stressed that she needs to be compliant with standing orders of renal panel to be done every 4 to 6 weeks.     Continue to follow with urology for right kidney AML and annual  US kidney for surveillance.     She has slight stability of her kidney function. Stressed to follow salt and fluid restricted diet given worsened edema. Diuretic dosing per cardiology.    - TOPS referred for discussion and counseling of treatment options for renal replacement, renal labs every 4 to 8  weeks, if further worsening, then referral to vascular for access creation  - Trend proteinuria  - Trend PTH levels, for now PTH < 200, hold off adding calcitriol   - trend H/H, RITO if Hb < 10  - closely monitor renal panel for electrolytes, acid base status, eGFR  - risk of CKD progression d/w patient. Previously details of what symptoms to look for which could suggest uremia, were d/w her daughter  - low salt and low potassium diet, low phos diet  - avoid NSAID/ bactrim/ IV contrast/ gadolinium/ aminoglycoside/ fleet enema where possible   - decision to continue PPI per primary   - renal dosing of medicines for creatinine clearance less than 30      RTC 3 months  Labs, plan, recommendations were discussed with patient in detail. Heir questions were answered to her satisfaction.

## 2019-05-15 RX ORDER — INSULIN ASPART 100 [IU]/ML
INJECTION, SOLUTION INTRAVENOUS; SUBCUTANEOUS
Qty: 60 ML | Refills: 0 | Status: SHIPPED | OUTPATIENT
Start: 2019-05-15 | End: 2019-05-23 | Stop reason: SDUPTHER

## 2019-05-23 RX ORDER — INSULIN ASPART 100 [IU]/ML
INJECTION, SOLUTION INTRAVENOUS; SUBCUTANEOUS
Qty: 60 ML | Refills: 0 | Status: SHIPPED | OUTPATIENT
Start: 2019-05-23 | End: 2019-11-21 | Stop reason: SDUPTHER

## 2019-06-13 RX ORDER — INSULIN DEGLUDEC 100 U/ML
INJECTION, SOLUTION SUBCUTANEOUS
Qty: 10 SYRINGE | Refills: 1 | Status: SHIPPED | OUTPATIENT
Start: 2019-06-13 | End: 2019-11-21 | Stop reason: SDUPTHER

## 2019-06-25 ENCOUNTER — LAB VISIT (OUTPATIENT)
Dept: LAB | Facility: HOSPITAL | Age: 79
End: 2019-06-25
Attending: INTERNAL MEDICINE
Payer: MEDICARE

## 2019-06-25 DIAGNOSIS — N18.4 TYPE 2 DIABETES MELLITUS WITH STAGE 4 CHRONIC KIDNEY DISEASE, WITH LONG-TERM CURRENT USE OF INSULIN: ICD-10-CM

## 2019-06-25 DIAGNOSIS — Z79.4 TYPE 2 DIABETES MELLITUS WITH STAGE 4 CHRONIC KIDNEY DISEASE, WITH LONG-TERM CURRENT USE OF INSULIN: ICD-10-CM

## 2019-06-25 DIAGNOSIS — N17.9 AKI (ACUTE KIDNEY INJURY): ICD-10-CM

## 2019-06-25 DIAGNOSIS — N18.4 CKD (CHRONIC KIDNEY DISEASE), STAGE IV: ICD-10-CM

## 2019-06-25 DIAGNOSIS — E55.9 VITAMIN D DEFICIENCY DISEASE: ICD-10-CM

## 2019-06-25 DIAGNOSIS — N18.4 CHRONIC KIDNEY DISEASE, STAGE IV (SEVERE): Chronic | ICD-10-CM

## 2019-06-25 DIAGNOSIS — N25.81 SECONDARY RENAL HYPERPARATHYROIDISM: ICD-10-CM

## 2019-06-25 DIAGNOSIS — N18.4 HYPERTENSIVE KIDNEY DISEASE WITH STAGE 4 CHRONIC KIDNEY DISEASE: ICD-10-CM

## 2019-06-25 DIAGNOSIS — D63.8 ANEMIA OF CHRONIC ILLNESS: ICD-10-CM

## 2019-06-25 DIAGNOSIS — E11.22 TYPE 2 DIABETES MELLITUS WITH STAGE 4 CHRONIC KIDNEY DISEASE, WITH LONG-TERM CURRENT USE OF INSULIN: ICD-10-CM

## 2019-06-25 DIAGNOSIS — I12.9 HYPERTENSIVE KIDNEY DISEASE WITH STAGE 4 CHRONIC KIDNEY DISEASE: ICD-10-CM

## 2019-06-25 LAB
25(OH)D3+25(OH)D2 SERPL-MCNC: 60 NG/ML (ref 30–96)
ALBUMIN SERPL BCP-MCNC: 3.8 G/DL (ref 3.5–5.2)
ANION GAP SERPL CALC-SCNC: 12 MMOL/L (ref 8–16)
BASOPHILS # BLD AUTO: 0.08 K/UL (ref 0–0.2)
BASOPHILS NFR BLD: 0.9 % (ref 0–1.9)
BUN SERPL-MCNC: 33 MG/DL (ref 8–23)
CALCIUM SERPL-MCNC: 9.6 MG/DL (ref 8.7–10.5)
CHLORIDE SERPL-SCNC: 101 MMOL/L (ref 95–110)
CO2 SERPL-SCNC: 27 MMOL/L (ref 23–29)
CREAT SERPL-MCNC: 2.1 MG/DL (ref 0.5–1.4)
DIFFERENTIAL METHOD: ABNORMAL
EOSINOPHIL # BLD AUTO: 0.2 K/UL (ref 0–0.5)
EOSINOPHIL NFR BLD: 2.4 % (ref 0–8)
ERYTHROCYTE [DISTWIDTH] IN BLOOD BY AUTOMATED COUNT: 14.3 % (ref 11.5–14.5)
EST. GFR  (AFRICAN AMERICAN): 25.1 ML/MIN/1.73 M^2
EST. GFR  (NON AFRICAN AMERICAN): 21.8 ML/MIN/1.73 M^2
FERRITIN SERPL-MCNC: 77 NG/ML (ref 20–300)
GLUCOSE SERPL-MCNC: 235 MG/DL (ref 70–110)
HCT VFR BLD AUTO: 39.4 % (ref 37–48.5)
HGB BLD-MCNC: 12.5 G/DL (ref 12–16)
IMM GRANULOCYTES # BLD AUTO: 0.02 K/UL (ref 0–0.04)
IMM GRANULOCYTES NFR BLD AUTO: 0.2 % (ref 0–0.5)
IRON SERPL-MCNC: 67 UG/DL (ref 30–160)
LYMPHOCYTES # BLD AUTO: 1.9 K/UL (ref 1–4.8)
LYMPHOCYTES NFR BLD: 22.2 % (ref 18–48)
MCH RBC QN AUTO: 28.6 PG (ref 27–31)
MCHC RBC AUTO-ENTMCNC: 31.7 G/DL (ref 32–36)
MCV RBC AUTO: 90 FL (ref 82–98)
MONOCYTES # BLD AUTO: 0.7 K/UL (ref 0.3–1)
MONOCYTES NFR BLD: 8.7 % (ref 4–15)
NEUTROPHILS # BLD AUTO: 5.6 K/UL (ref 1.8–7.7)
NEUTROPHILS NFR BLD: 65.6 % (ref 38–73)
NRBC BLD-RTO: 0 /100 WBC
PHOSPHATE SERPL-MCNC: 3.2 MG/DL (ref 2.7–4.5)
PLATELET # BLD AUTO: 133 K/UL (ref 150–350)
PMV BLD AUTO: 13.7 FL (ref 9.2–12.9)
POTASSIUM SERPL-SCNC: 4 MMOL/L (ref 3.5–5.1)
PTH-INTACT SERPL-MCNC: 132 PG/ML (ref 9–77)
RBC # BLD AUTO: 4.37 M/UL (ref 4–5.4)
SATURATED IRON: 19 % (ref 20–50)
SODIUM SERPL-SCNC: 140 MMOL/L (ref 136–145)
TOTAL IRON BINDING CAPACITY: 354 UG/DL (ref 250–450)
TRANSFERRIN SERPL-MCNC: 239 MG/DL (ref 200–375)
WBC # BLD AUTO: 8.47 K/UL (ref 3.9–12.7)

## 2019-06-25 PROCEDURE — 82728 ASSAY OF FERRITIN: CPT | Mod: HCNC

## 2019-06-25 PROCEDURE — 83540 ASSAY OF IRON: CPT | Mod: HCNC

## 2019-06-25 PROCEDURE — 36415 COLL VENOUS BLD VENIPUNCTURE: CPT | Mod: HCNC,PO

## 2019-06-25 PROCEDURE — 83970 ASSAY OF PARATHORMONE: CPT | Mod: HCNC

## 2019-06-25 PROCEDURE — 80069 RENAL FUNCTION PANEL: CPT | Mod: HCNC

## 2019-06-25 PROCEDURE — 82306 VITAMIN D 25 HYDROXY: CPT | Mod: HCNC

## 2019-06-25 PROCEDURE — 85025 COMPLETE CBC W/AUTO DIFF WBC: CPT | Mod: HCNC

## 2019-06-27 ENCOUNTER — TELEPHONE (OUTPATIENT)
Dept: ENDOCRINOLOGY | Facility: CLINIC | Age: 79
End: 2019-06-27

## 2019-07-03 ENCOUNTER — PATIENT OUTREACH (OUTPATIENT)
Dept: ADMINISTRATIVE | Facility: OTHER | Age: 79
End: 2019-07-03

## 2019-07-09 ENCOUNTER — TELEPHONE (OUTPATIENT)
Dept: NEPHROLOGY | Facility: CLINIC | Age: 79
End: 2019-07-09

## 2019-07-09 NOTE — TELEPHONE ENCOUNTER
I called pt to inform her dr smith next available is in sept but thiose slots is for other pt whose been waiting to get in . I will have to send a message to dr smith to see when she wants to see her back. Pt didn't answer left message  ----- Message from Michell Billings sent at 7/9/2019  2:13 PM CDT -----  Contact: self  Pt is calling to inform you that she will not be able to make it to the appt today.  She does need to reschedule.    Pt can be reached at [phone]

## 2019-07-16 DIAGNOSIS — N18.4 STAGE 4 CHRONIC KIDNEY DISEASE: Primary | ICD-10-CM

## 2019-07-18 ENCOUNTER — OFFICE VISIT (OUTPATIENT)
Dept: OPTOMETRY | Facility: CLINIC | Age: 79
End: 2019-07-18
Payer: MEDICARE

## 2019-07-18 DIAGNOSIS — Z79.4 TYPE 2 DIABETES MELLITUS WITH STAGE 4 CHRONIC KIDNEY DISEASE, WITH LONG-TERM CURRENT USE OF INSULIN: ICD-10-CM

## 2019-07-18 DIAGNOSIS — N18.4 TYPE 2 DIABETES MELLITUS WITH STAGE 4 CHRONIC KIDNEY DISEASE, WITH LONG-TERM CURRENT USE OF INSULIN: ICD-10-CM

## 2019-07-18 DIAGNOSIS — H25.042 POSTERIOR SUBCAPSULAR AGE-RELATED CATARACT, LEFT EYE: ICD-10-CM

## 2019-07-18 DIAGNOSIS — E11.3393 MODERATE NONPROLIFERATIVE DIABETIC RETINOPATHY OF BOTH EYES WITHOUT MACULAR EDEMA ASSOCIATED WITH TYPE 2 DIABETES MELLITUS: ICD-10-CM

## 2019-07-18 DIAGNOSIS — H52.201 ASTIGMATISM OF RIGHT EYE, UNSPECIFIED TYPE: ICD-10-CM

## 2019-07-18 DIAGNOSIS — H35.61 MACULAR HEMORRHAGE OF RIGHT EYE: ICD-10-CM

## 2019-07-18 DIAGNOSIS — H25.12 NUCLEAR SCLEROTIC CATARACT OF LEFT EYE: ICD-10-CM

## 2019-07-18 DIAGNOSIS — Z96.1 PSEUDOPHAKIA OF RIGHT EYE: ICD-10-CM

## 2019-07-18 DIAGNOSIS — E11.22 TYPE 2 DIABETES MELLITUS WITH STAGE 4 CHRONIC KIDNEY DISEASE, WITH LONG-TERM CURRENT USE OF INSULIN: ICD-10-CM

## 2019-07-18 DIAGNOSIS — H53.9 TRANSIENT VISION DISTURBANCE OF BOTH EYES: Primary | ICD-10-CM

## 2019-07-18 DIAGNOSIS — H31.011 MACULAR SCAR OF RIGHT EYE: ICD-10-CM

## 2019-07-18 DIAGNOSIS — Z98.41 S/P CATARACT SURGERY, RIGHT: ICD-10-CM

## 2019-07-18 DIAGNOSIS — H54.50 LOW VISION OF LEFT EYE WITH NORMAL VISION IN CONTRALATERAL EYE: ICD-10-CM

## 2019-07-18 PROCEDURE — 99999 PR PBB SHADOW E&M-EST. PATIENT-LVL III: ICD-10-PCS | Mod: PBBFAC,HCNC,, | Performed by: OPTOMETRIST

## 2019-07-18 PROCEDURE — 92014 PR EYE EXAM, EST PATIENT,COMPREHESV: ICD-10-PCS | Mod: HCNC,S$GLB,, | Performed by: OPTOMETRIST

## 2019-07-18 PROCEDURE — 92015 DETERMINE REFRACTIVE STATE: CPT | Mod: HCNC,S$GLB,, | Performed by: OPTOMETRIST

## 2019-07-18 PROCEDURE — 92015 PR REFRACTION: ICD-10-PCS | Mod: HCNC,S$GLB,, | Performed by: OPTOMETRIST

## 2019-07-18 PROCEDURE — 99999 PR PBB SHADOW E&M-EST. PATIENT-LVL III: CPT | Mod: PBBFAC,HCNC,, | Performed by: OPTOMETRIST

## 2019-07-18 PROCEDURE — 92014 COMPRE OPH EXAM EST PT 1/>: CPT | Mod: HCNC,S$GLB,, | Performed by: OPTOMETRIST

## 2019-07-18 NOTE — PATIENT INSTRUCTIONS
Ms. Degroot in today with report of recent  transient bilateral vision disturbance lasting 10- 15 minutes.  No eye pain and no headache associated.  Order blood work to rule out giant cell arteritis/temporal arteritis.     S/P cataract surgery in the right eye, with posterior chamber intraocular lens.   Residual slight astigmatic distance refractive error in the right eye, with very satisfactory best-corrected VA in that eye.    Nuclear sclerotic/axial posterior subcapsular cataract in the left eye.  Poor VA in the left eye, not correctable with spectacle lens corrrection.  Ms. Degroot previously stated that she was advised by Dr. Cohen not to have cataract surgery done in the left eye (ever?), but she is unaware of why.     Type 2 diabetes, with evidence of background diabetic retinal changes in both eyes.  Bilateral scattered superficial retinal hemorrhages.    S/p focal laser treatment to the posterior pole of the right eye.  Small central macular hemmorrhage in the right eye.     Large optic nerve head cupping in the right eye. Note asymmetry in C/D ratios  (OD > OS).  Intraocular pressures well within normal range (and equal) in both eyes.    Suggest consult with Dr. Ruiz regarding bilateral retinal changes.    Reviewed results of blood work ordered today    ESR = 18 mm / hr  (normal)    C - Reactive Protein = 2.3 mg/L  (normal)    No clinical evidence of giant cell arteritis/temporal arteritis.    Will call Ms. Degroot to advise her of the above.  Visual symptoms she experienced a few days ago suggest of migrainous visual symptoms, but suggest carotid artery ultrasound studies, as a precaution.    Will advise Ms. Degroot to consult her PCP (Dr. HERACLIO Merida) regarding that.     Send copy of notes to Dr. Merida for her review.     Furthermore, suggest Ms. Degroot have a Smith visual field test (24-2 FILIPPO Standard) to rule out low tension glaucoma in the right eye.  Defer to a later date, following  consult with Dr. Vasquez.      Spectacle lens Rx issued for use as desired.     Recheck refraction in one year - or following cataract surgery in the left eye, if done.

## 2019-07-24 ENCOUNTER — TELEPHONE (OUTPATIENT)
Dept: INTERNAL MEDICINE | Facility: CLINIC | Age: 79
End: 2019-07-24

## 2019-07-24 DIAGNOSIS — E11.8 TYPE 2 DIABETES MELLITUS WITH COMPLICATION, WITH LONG-TERM CURRENT USE OF INSULIN: ICD-10-CM

## 2019-07-24 DIAGNOSIS — I25.10 CORONARY ARTERY DISEASE INVOLVING NATIVE CORONARY ARTERY OF NATIVE HEART WITHOUT ANGINA PECTORIS: ICD-10-CM

## 2019-07-24 DIAGNOSIS — H53.9 VISION CHANGES: Primary | ICD-10-CM

## 2019-07-24 DIAGNOSIS — Z79.4 TYPE 2 DIABETES MELLITUS WITH COMPLICATION, WITH LONG-TERM CURRENT USE OF INSULIN: ICD-10-CM

## 2019-07-24 DIAGNOSIS — E78.5 HYPERLIPIDEMIA, UNSPECIFIED HYPERLIPIDEMIA TYPE: ICD-10-CM

## 2019-07-24 NOTE — TELEPHONE ENCOUNTER
Referral placed as requested reviewed optom recs   She already has an appt   optho /retinal recommendation update    Carotid ultrasound  order in for katherine  Assist pt in [place]

## 2019-07-24 NOTE — TELEPHONE ENCOUNTER
----- Message from Riky Billings sent at 7/24/2019 11:37 AM CDT -----  Contact: Pt  Pt would like to be called back  regarding a referral for a retina specialist .    Pt can be reached at [phone].    Thanks

## 2019-08-08 ENCOUNTER — OFFICE VISIT (OUTPATIENT)
Dept: OPHTHALMOLOGY | Facility: CLINIC | Age: 79
End: 2019-08-08
Payer: MEDICARE

## 2019-08-08 VITALS — DIASTOLIC BLOOD PRESSURE: 63 MMHG | SYSTOLIC BLOOD PRESSURE: 116 MMHG | HEART RATE: 64 BPM

## 2019-08-08 DIAGNOSIS — H53.002 AMBLYOPIA, LEFT: ICD-10-CM

## 2019-08-08 DIAGNOSIS — H25.812 MIXED TYPE AGE-RELATED CATARACT, LEFT EYE: ICD-10-CM

## 2019-08-08 DIAGNOSIS — H43.822 VITREOMACULAR TRACTION, LEFT: ICD-10-CM

## 2019-08-08 DIAGNOSIS — E11.3293 MILD NONPROLIFERATIVE DIABETIC RETINOPATHY OF BOTH EYES WITHOUT MACULAR EDEMA ASSOCIATED WITH TYPE 2 DIABETES MELLITUS: ICD-10-CM

## 2019-08-08 DIAGNOSIS — H31.013 MACULAR SCAR OF BOTH EYES: ICD-10-CM

## 2019-08-08 DIAGNOSIS — G43.109 OPHTHALMIC MIGRAINE: Primary | ICD-10-CM

## 2019-08-08 PROCEDURE — 92134 CPTRZ OPH DX IMG PST SGM RTA: CPT | Mod: HCNC,S$GLB,, | Performed by: OPHTHALMOLOGY

## 2019-08-08 PROCEDURE — 92134 POSTERIOR SEGMENT OCT RETINA (OCULAR COHERENCE TOMOGRAPHY)-BOTH EYES: ICD-10-PCS | Mod: HCNC,S$GLB,, | Performed by: OPHTHALMOLOGY

## 2019-08-08 PROCEDURE — 92014 PR EYE EXAM, EST PATIENT,COMPREHESV: ICD-10-PCS | Mod: HCNC,S$GLB,, | Performed by: OPHTHALMOLOGY

## 2019-08-08 PROCEDURE — 99999 PR PBB SHADOW E&M-EST. PATIENT-LVL III: CPT | Mod: PBBFAC,HCNC,, | Performed by: OPHTHALMOLOGY

## 2019-08-08 PROCEDURE — 92014 COMPRE OPH EXAM EST PT 1/>: CPT | Mod: HCNC,S$GLB,, | Performed by: OPHTHALMOLOGY

## 2019-08-08 PROCEDURE — 99999 PR PBB SHADOW E&M-EST. PATIENT-LVL III: ICD-10-PCS | Mod: PBBFAC,HCNC,, | Performed by: OPHTHALMOLOGY

## 2019-08-08 NOTE — PROGRESS NOTES
Subjective:       Patient ID: Radha Degroot is a 79 y.o. female      Chief Complaint   Patient presents with    Diabetic Eye Exam     flashes of zigzag lights ou, floaters- Ha-    Spots and/or Floaters     History of Present Illness  HPI     Diabetic Eye Exam      Additional comments: flashes of zigzag lights ou, floaters- Ha-              Comments     Pt referred by Dr. Johansen:  Eval :  Transient vision disturbance OU,   Pt complaint of 4 episodes of flashes of zig zag lights ou X 2 month.    Also had episode of circling lights.  Had 3-4 episodes  No blackout or amaurosis-type symptom  Pt states yesterday she experience Bright yellow flash of lights in OS   lasting 5 minutes.  Inbetween episodes, has usual state of vision: OD good, OS poor  Pt does note that OS is dimmer and worsening compared to past years  Pt had ESR/CRP/Plateletts drawn, all normal.  Denies headache, jaw pain, limb-girdle weakness/pain, fever, chills,   unintentional weight-loss.           Last edited by Trevor Vasquez MD on 8/8/2019  5:29 PM. (History)        Imaging:    See report    Assessment/Plan:     1. Mild nonproliferative diabetic retinopathy of both eyes without macular edema associated with type 2 diabetes mellitus  Stable.  Observe  Diabetic Retinopathy discussed in detail, all questions answered  Stressed importance of good BS/BP/Chol Control  RTC immediately PRN any vision changes, santi blurry vision, missing vision, floaters, distortions, etc    - Posterior Segment OCT Retina-Both eyes    2. Macular scar of both eyes  Appears c/w focal.  Observe    3. Mixed type age-related cataract, left eye  Symptomatic with worsening and dimming of vision  Pt understands that acuity likely will not improve much but wants to have CE/IOL OS.  Will refer    4. Amblyopia, left    5. Vitreomacular traction, left  Observe.  Likely not VS.  Stable    - Posterior Segment OCT Retina-Both eyes    6. Ophthalmic migraine  New symptoms c/w  migraine.  No amaurosis-type symptom.  Having w/u for amaurosis as well    Follow up in about 1 year (around 8/8/2020), or if symptoms worsen or fail to improve, for Comprehensive Examination, OCT Mac.

## 2019-08-08 NOTE — LETTER
August 8, 2019      Genaro Johansen, OD  [address]  [place] [address]           [facility]hthal[facility]  [address]  [place] [address]  Phone: [phone]  Fax: [phone]          Patient: Radha Degroot   MR Number: [id number]   YOB: 1940   Date of Visit: 8/8/2019       Dear Dr. Genaro Johansen:    Thank you for referring Radha Degroot to me for evaluation. Attached you will find relevant portions of my assessment and plan of care.    If you have questions, please do not hesitate to call me. I look forward to following Radha Degroot along with you.    Sincerely,    Trevor Vasquez MD    Enclosure  CC:  No Recipients    If you would like to receive this communication electronically, please contact [email or web address] or [phone] to request more information on [email or web address] Link access.    For providers and/or their staff who would like to refer a patient to [facility], please contact us through our one-stop-shop provider referral line, [facility], at [phone].    If you feel you have received this communication in error or would no longer like to receive these types of communications, please e-mail [email or web address]

## 2019-08-15 ENCOUNTER — LAB VISIT (OUTPATIENT)
Dept: LAB | Facility: HOSPITAL | Age: 79
End: 2019-08-15
Attending: INTERNAL MEDICINE
Payer: MEDICARE

## 2019-08-15 DIAGNOSIS — E11.22 TYPE 2 DIABETES MELLITUS WITH STAGE 4 CHRONIC KIDNEY DISEASE, WITH LONG-TERM CURRENT USE OF INSULIN: ICD-10-CM

## 2019-08-15 DIAGNOSIS — N25.81 SECONDARY RENAL HYPERPARATHYROIDISM: ICD-10-CM

## 2019-08-15 DIAGNOSIS — Z79.4 TYPE 2 DIABETES MELLITUS WITH STAGE 4 CHRONIC KIDNEY DISEASE, WITH LONG-TERM CURRENT USE OF INSULIN: ICD-10-CM

## 2019-08-15 DIAGNOSIS — N18.4 TYPE 2 DIABETES MELLITUS WITH STAGE 4 CHRONIC KIDNEY DISEASE, WITH LONG-TERM CURRENT USE OF INSULIN: ICD-10-CM

## 2019-08-15 DIAGNOSIS — N18.4 STAGE 4 CHRONIC KIDNEY DISEASE: ICD-10-CM

## 2019-08-15 LAB
ALBUMIN SERPL BCP-MCNC: 3.9 G/DL (ref 3.5–5.2)
ANION GAP SERPL CALC-SCNC: 11 MMOL/L (ref 8–16)
BUN SERPL-MCNC: 46 MG/DL (ref 8–23)
CALCIUM SERPL-MCNC: 9.6 MG/DL (ref 8.7–10.5)
CHLORIDE SERPL-SCNC: 105 MMOL/L (ref 95–110)
CO2 SERPL-SCNC: 24 MMOL/L (ref 23–29)
CREAT SERPL-MCNC: 2.2 MG/DL (ref 0.5–1.4)
EST. GFR  (AFRICAN AMERICAN): 23.7 ML/MIN/1.73 M^2
EST. GFR  (NON AFRICAN AMERICAN): 20.6 ML/MIN/1.73 M^2
ESTIMATED AVG GLUCOSE: 177 MG/DL (ref 68–131)
GLUCOSE SERPL-MCNC: 162 MG/DL (ref 70–110)
HBA1C MFR BLD HPLC: 7.8 % (ref 4–5.6)
PHOSPHATE SERPL-MCNC: 4.1 MG/DL (ref 2.7–4.5)
POTASSIUM SERPL-SCNC: 4.7 MMOL/L (ref 3.5–5.1)
PTH-INTACT SERPL-MCNC: 129 PG/ML (ref 9–77)
SODIUM SERPL-SCNC: 140 MMOL/L (ref 136–145)

## 2019-08-15 PROCEDURE — 80069 RENAL FUNCTION PANEL: CPT | Mod: HCNC

## 2019-08-15 PROCEDURE — 83036 HEMOGLOBIN GLYCOSYLATED A1C: CPT | Mod: HCNC

## 2019-08-15 PROCEDURE — 36415 COLL VENOUS BLD VENIPUNCTURE: CPT | Mod: HCNC,PO

## 2019-08-15 PROCEDURE — 83970 ASSAY OF PARATHORMONE: CPT | Mod: HCNC

## 2019-08-19 ENCOUNTER — PATIENT OUTREACH (OUTPATIENT)
Dept: ADMINISTRATIVE | Facility: OTHER | Age: 79
End: 2019-08-19

## 2019-08-21 ENCOUNTER — OFFICE VISIT (OUTPATIENT)
Dept: ENDOCRINOLOGY | Facility: CLINIC | Age: 79
End: 2019-08-21
Payer: MEDICARE

## 2019-08-21 VITALS
DIASTOLIC BLOOD PRESSURE: 62 MMHG | HEART RATE: 61 BPM | SYSTOLIC BLOOD PRESSURE: 112 MMHG | WEIGHT: 211 LBS | BODY MASS INDEX: 32.08 KG/M2

## 2019-08-21 DIAGNOSIS — N18.4 CKD (CHRONIC KIDNEY DISEASE) STAGE 4, GFR 15-29 ML/MIN: ICD-10-CM

## 2019-08-21 DIAGNOSIS — I10 ESSENTIAL HYPERTENSION: ICD-10-CM

## 2019-08-21 PROCEDURE — 99214 OFFICE O/P EST MOD 30 MIN: CPT | Mod: HCNC,S$GLB,, | Performed by: NURSE PRACTITIONER

## 2019-08-21 PROCEDURE — 3078F DIAST BP <80 MM HG: CPT | Mod: HCNC,CPTII,S$GLB, | Performed by: NURSE PRACTITIONER

## 2019-08-21 PROCEDURE — 1101F PR PT FALLS ASSESS DOC 0-1 FALLS W/OUT INJ PAST YR: ICD-10-PCS | Mod: HCNC,CPTII,S$GLB, | Performed by: NURSE PRACTITIONER

## 2019-08-21 PROCEDURE — 99214 PR OFFICE/OUTPT VISIT, EST, LEVL IV, 30-39 MIN: ICD-10-PCS | Mod: HCNC,S$GLB,, | Performed by: NURSE PRACTITIONER

## 2019-08-21 PROCEDURE — 3074F PR MOST RECENT SYSTOLIC BLOOD PRESSURE < 130 MM HG: ICD-10-PCS | Mod: HCNC,CPTII,S$GLB, | Performed by: NURSE PRACTITIONER

## 2019-08-21 PROCEDURE — 1101F PT FALLS ASSESS-DOCD LE1/YR: CPT | Mod: HCNC,CPTII,S$GLB, | Performed by: NURSE PRACTITIONER

## 2019-08-21 PROCEDURE — 99999 PR PBB SHADOW E&M-EST. PATIENT-LVL IV: CPT | Mod: PBBFAC,HCNC,, | Performed by: NURSE PRACTITIONER

## 2019-08-21 PROCEDURE — 3074F SYST BP LT 130 MM HG: CPT | Mod: HCNC,CPTII,S$GLB, | Performed by: NURSE PRACTITIONER

## 2019-08-21 PROCEDURE — 99999 PR PBB SHADOW E&M-EST. PATIENT-LVL IV: ICD-10-PCS | Mod: PBBFAC,HCNC,, | Performed by: NURSE PRACTITIONER

## 2019-08-21 PROCEDURE — 3078F PR MOST RECENT DIASTOLIC BLOOD PRESSURE < 80 MM HG: ICD-10-PCS | Mod: HCNC,CPTII,S$GLB, | Performed by: NURSE PRACTITIONER

## 2019-08-21 NOTE — PROGRESS NOTES
CC: This 79 y.o. White female  is here for evaluation of  T2DM along with comorbidities indicated in the Visit Diagnosis section of this encounter.    HPI: Radha Degroot was diagnosed with T2DM in 1980s.     Nephrology - Dr. Mcmanus      Prior visit 12/2018  a1c down from 8.2 to 8%.   Complains of low blood sugars overnight/morning and before lunch.   Plan Reduce Tresiba to 32 units once daily.   Reduce Humalog to 11 units before breakfast and continue 14 units before lunch and dinner.   If your blood sugar is less than 90 before   Test blood sugar before each meal and bedtime.   Please call if blood sugar is still less than 80 so that we can adjust your insulin doses. Reviewed hypoglycemia tx.   rtc in 3 mo with labs prior.     Interval history  a1c down to 7.8%. Patient states her prelunch BGs have been high. She has reduced Novolog from 14 to 11 units d/t hypoglycemia at lov but denies any hypoglycemia now.       DIABETES EDUCATION: 3/5/18    PMHX: atrial fibrillation, NSTEMI 2017, CAD, CHF, CKD stage 3     PRESCRIBED DIABETES MEDICATIONS: Tresiba 32 units hs, Novolog Flexpen 11-14-14 units w/ ss (Use on premeal readings: 150-200=+1, 201-250=+2, 251-300=+3; 301-350=+4, over 350=+5 units    Misses medication doses - none     SIGNIFICANT DM MED HX:       DM COMPLICATIONS: nephropathy, retinopathy and peripheral neuropathy    SELF MONITORING BLOOD GLUCOSE: accuchek dorothy meter. Checks blood glucose at home 4x/day.   She declined Dexcom CGM device. Does not want anything attached to her.  Her glucoses do tend to fluctuate. Pt reports diet is very consistent. But she finds her BG will rise if she goes long periods with out eating.               HYPOGLYCEMIC EPISODES: symptoms start at bgs in the 80s.  None recent      CURRENT DIET: 3 meals/day.  Drinks iced tea with no sugar. No snacks. Breakfast is toast (1-2 slices) and eggs.       CURRENT EXERCISE: none       /62   Pulse 61   Wt 95.7 kg (211 lb)   BMI  32.08 kg/m²       ROS:   CONSTITUTIONAL: Appetite good, + fatigue  RESPIRATORY: chronic dyspnea, no worse  CV: no chest pain       PHYSICAL EXAM:  GENERAL: Well developed, well nourished. No acute distress.   PSYCH: AAOx3, appropriate mood and affect, conversant, well-groomed. Judgement and insight good.   NEURO: Cranial nerves grossly intact. Speech clear, no tremor.   CHEST: Respirations even and unlabored.       Hemoglobin A1C   Date Value Ref Range Status   08/15/2019 7.8 (H) 4.0 - 5.6 % Final     Comment:     ADA Screening Guidelines:  5.7-6.4%  Consistent with prediabetes  >or=6.5%  Consistent with diabetes  High levels of fetal hemoglobin interfere with the HbA1C  assay. Heterozygous hemoglobin variants (HbS, HgC, etc)do  not significantly interfere with this assay.   However, presence of multiple variants may affect accuracy.     02/04/2019 8.2 (H) 4.0 - 5.6 % Final     Comment:     ADA Screening Guidelines:  5.7-6.4%  Consistent with prediabetes  >or=6.5%  Consistent with diabetes  High levels of fetal hemoglobin interfere with the HbA1C  assay. Heterozygous hemoglobin variants (HbS, HgC, etc)do  not significantly interfere with this assay.   However, presence of multiple variants may affect accuracy.     11/30/2018 8.0 (H) 4.0 - 5.6 % Final     Comment:     ADA Screening Guidelines:  5.7-6.4%  Consistent with prediabetes  >or=6.5%  Consistent with diabetes  High levels of fetal hemoglobin interfere with the HbA1C  assay. Heterozygous hemoglobin variants (HbS, HgC, etc)do  not significantly interfere with this assay.   However, presence of multiple variants may affect accuracy.             Chemistry        Component Value Date/Time     08/15/2019 1028    K 4.7 08/15/2019 1028     08/15/2019 1028    CO2 24 08/15/2019 1028    BUN 46 (H) 08/15/2019 1028    CREATININE 2.2 (H) 08/15/2019 1028     (H) 08/15/2019 1028        Component Value Date/Time    CALCIUM 9.6 08/15/2019 1028    ALKPHOS 91  09/19/2018 1501    AST 13 09/19/2018 1501    ALT 12 09/19/2018 1501    BILITOT 0.6 09/19/2018 1501    ESTGFRAFRICA 23.7 (A) 08/15/2019 1028    EGFRNONAA 20.6 (A) 08/15/2019 1028          Lab Results   Component Value Date    LDLCALC 38.4 (L) 03/08/2019        Ref. Range 2/28/2018 10:18   Cholesterol Latest Ref Range: 120 - 199 mg/dL 171   HDL Latest Ref Range: 40 - 75 mg/dL 54   LDL Cholesterol Latest Ref Range: 63.0 - 159.0 mg/dL 86.4   Total Cholesterol/HDL Ratio Latest Ref Range: 2.0 - 5.0  3.2   Triglycerides Latest Ref Range: 30 - 150 mg/dL 153 (H)     Lab Results   Component Value Date    MICALBCREAT 29.5 02/11/2015             STANDARDS of CARE:        ASA:               Last eye exam:       ASSESSMENT and PLAN:    A1C GOAL: < 8%     1. Uncontrolled type 2 diabetes mellitus with complication, with long-term current use of insulin  Increase Novolog back up to 14 units before breakfast. No other changes.   Monitor glucose 4x/day.   Return to clinic in 3 months with labs prior. Call with any issues especially if blood sugars are less than 80.       Hemoglobin A1c   2. CKD (chronic kidney disease) stage 4, GFR 15-29 ml/min  Avoid hypoglycemia.   Followed by Dr. Mcmanus.    3. Essential hypertension  Controlled        Orders Placed This Encounter   Procedures    Hemoglobin A1c     Standing Status:   Future     Standing Expiration Date:   10/19/2020        Follow up in about 3 months (around 11/21/2019).

## 2019-08-21 NOTE — PATIENT INSTRUCTIONS
Increase Novolog back up to 14 units before breakfast. No other changes.   Monitor glucose 4x/day.   Return to clinic in 3 months with labs prior. Call with any issues especially if blood sugars are less than 80.

## 2019-08-24 RX ORDER — MIRABEGRON 50 MG/1
TABLET, FILM COATED, EXTENDED RELEASE ORAL
Qty: 90 TABLET | Refills: 0 | Status: SHIPPED | OUTPATIENT
Start: 2019-08-24 | End: 2020-01-15

## 2019-08-27 ENCOUNTER — TELEPHONE (OUTPATIENT)
Dept: CARDIOLOGY | Facility: CLINIC | Age: 79
End: 2019-08-27

## 2019-08-27 NOTE — TELEPHONE ENCOUNTER
----- Message from Carmen Avendano sent at 8/27/2019  4:55 PM CDT -----  Contact: Patient  The Pt is calling to report that she has pressure in the thigh, and she is worried that she has a blood clot. Please call her back @ [phone]. Thanks, Carmen   2/8/2019 Dr. Silveira

## 2019-08-27 NOTE — TELEPHONE ENCOUNTER
"Left thigh from top to inside "tender to touch and pressure" "soreness" started 2-3 days ago, denies heat or discoloration, denies edema, denies fever or chills, unresolved, related 1-2/10. Takes Tylenol regularly twice daily, unsure if helped or not did not notice. States does not believe hurt or hit leg on anything. Advised to go to urgent care if notices worsening in pain or development of s/s of infection or DVT. Message routed to Dr. Merida in interim for advisement. Message routed to Dr. Silveira as FYI.   "

## 2019-08-28 ENCOUNTER — TELEPHONE (OUTPATIENT)
Dept: OPHTHALMOLOGY | Facility: CLINIC | Age: 79
End: 2019-08-28

## 2019-08-28 NOTE — TELEPHONE ENCOUNTER
----- Message from Shari Calzada sent at 8/28/2019  8:41 AM CDT -----  Contact: Radha  Pt canceled cataract eval for today because she is not feeling well.  She wants to know before rescheduling if she will be dilated.  I could not be sure I said most likely not, but wanted to be sure.  She can be reached at [phone]

## 2019-08-29 NOTE — TELEPHONE ENCOUNTER
Spoke w pt; states that sHe will decline appointment. Pt states that it is not red or swollen just tender and she is taking blood thinners. HUMBERTO

## 2019-09-04 ENCOUNTER — TELEPHONE (OUTPATIENT)
Dept: NEPHROLOGY | Facility: CLINIC | Age: 79
End: 2019-09-04

## 2019-09-04 NOTE — TELEPHONE ENCOUNTER
Return pt called reschedule her appt to the 17 and I inform pt ill see if dr smith need more labs after she review her labs she did for todays appt she cancel. Pt wants me to inform her on more details for labs  \----- Message from Naina Mancini sent at 9/4/2019  3:20 PM CDT -----  Contact: pt  Pt returning your call appt appt     Ph  [phone]  Thank

## 2019-09-04 NOTE — TELEPHONE ENCOUNTER
I called pt no answer left message  ----- Message from Dee Dee Frederick sent at 9/4/2019 10:00 AM CDT -----  Contact: Pt  Pt called to speak to the nurse to reschedule her canceled appt and would like a call back today at [phone]

## 2019-09-12 ENCOUNTER — PES CALL (OUTPATIENT)
Dept: ADMINISTRATIVE | Facility: CLINIC | Age: 79
End: 2019-09-12

## 2019-09-16 DIAGNOSIS — N18.4 CKD (CHRONIC KIDNEY DISEASE), STAGE IV: Primary | ICD-10-CM

## 2019-09-17 ENCOUNTER — LAB VISIT (OUTPATIENT)
Dept: LAB | Facility: HOSPITAL | Age: 79
End: 2019-09-17
Attending: INTERNAL MEDICINE
Payer: MEDICARE

## 2019-09-17 ENCOUNTER — OFFICE VISIT (OUTPATIENT)
Dept: NEPHROLOGY | Facility: CLINIC | Age: 79
End: 2019-09-17
Payer: MEDICARE

## 2019-09-17 VITALS
SYSTOLIC BLOOD PRESSURE: 114 MMHG | HEIGHT: 68 IN | HEART RATE: 76 BPM | DIASTOLIC BLOOD PRESSURE: 68 MMHG | WEIGHT: 210.56 LBS | BODY MASS INDEX: 31.91 KG/M2 | OXYGEN SATURATION: 95 %

## 2019-09-17 DIAGNOSIS — N18.4 TYPE 2 DIABETES MELLITUS WITH STAGE 4 CHRONIC KIDNEY DISEASE, WITH LONG-TERM CURRENT USE OF INSULIN: ICD-10-CM

## 2019-09-17 DIAGNOSIS — E11.22 TYPE 2 DIABETES MELLITUS WITH STAGE 4 CHRONIC KIDNEY DISEASE, WITH LONG-TERM CURRENT USE OF INSULIN: ICD-10-CM

## 2019-09-17 DIAGNOSIS — N18.4 CKD (CHRONIC KIDNEY DISEASE), STAGE IV: ICD-10-CM

## 2019-09-17 DIAGNOSIS — R60.0 LOCALIZED EDEMA: ICD-10-CM

## 2019-09-17 DIAGNOSIS — N25.81 SECONDARY RENAL HYPERPARATHYROIDISM: ICD-10-CM

## 2019-09-17 DIAGNOSIS — R80.1 PERSISTENT PROTEINURIA: ICD-10-CM

## 2019-09-17 DIAGNOSIS — D63.8 ANEMIA OF CHRONIC ILLNESS: ICD-10-CM

## 2019-09-17 DIAGNOSIS — Z79.4 TYPE 2 DIABETES MELLITUS WITH STAGE 4 CHRONIC KIDNEY DISEASE, WITH LONG-TERM CURRENT USE OF INSULIN: ICD-10-CM

## 2019-09-17 DIAGNOSIS — I12.9 HYPERTENSIVE KIDNEY DISEASE WITH STAGE 4 CHRONIC KIDNEY DISEASE: ICD-10-CM

## 2019-09-17 DIAGNOSIS — N18.4 CHRONIC KIDNEY DISEASE, STAGE IV (SEVERE): Primary | Chronic | ICD-10-CM

## 2019-09-17 DIAGNOSIS — E55.9 VITAMIN D DEFICIENCY DISEASE: ICD-10-CM

## 2019-09-17 DIAGNOSIS — N18.4 HYPERTENSIVE KIDNEY DISEASE WITH STAGE 4 CHRONIC KIDNEY DISEASE: ICD-10-CM

## 2019-09-17 PROBLEM — R80.9 PROTEINURIA: Status: RESOLVED | Noted: 2017-06-28 | Resolved: 2019-09-17

## 2019-09-17 LAB
ALBUMIN SERPL BCP-MCNC: 4 G/DL (ref 3.5–5.2)
ANION GAP SERPL CALC-SCNC: 13 MMOL/L (ref 8–16)
BUN SERPL-MCNC: 33 MG/DL (ref 8–23)
CALCIUM SERPL-MCNC: 9.7 MG/DL (ref 8.7–10.5)
CHLORIDE SERPL-SCNC: 103 MMOL/L (ref 95–110)
CO2 SERPL-SCNC: 25 MMOL/L (ref 23–29)
CREAT SERPL-MCNC: 1.9 MG/DL (ref 0.5–1.4)
EST. GFR  (AFRICAN AMERICAN): 28.3 ML/MIN/1.73 M^2
EST. GFR  (NON AFRICAN AMERICAN): 24.5 ML/MIN/1.73 M^2
GLUCOSE SERPL-MCNC: 105 MG/DL (ref 70–110)
PHOSPHATE SERPL-MCNC: 3.6 MG/DL (ref 2.7–4.5)
POTASSIUM SERPL-SCNC: 3.9 MMOL/L (ref 3.5–5.1)
SODIUM SERPL-SCNC: 141 MMOL/L (ref 136–145)

## 2019-09-17 PROCEDURE — 3078F PR MOST RECENT DIASTOLIC BLOOD PRESSURE < 80 MM HG: ICD-10-PCS | Mod: HCNC,CPTII,S$GLB, | Performed by: INTERNAL MEDICINE

## 2019-09-17 PROCEDURE — 3078F DIAST BP <80 MM HG: CPT | Mod: HCNC,CPTII,S$GLB, | Performed by: INTERNAL MEDICINE

## 2019-09-17 PROCEDURE — 99215 OFFICE O/P EST HI 40 MIN: CPT | Mod: HCNC,S$GLB,, | Performed by: INTERNAL MEDICINE

## 2019-09-17 PROCEDURE — 99499 RISK ADDL DX/OHS AUDIT: ICD-10-PCS | Mod: HCNC,S$GLB,, | Performed by: INTERNAL MEDICINE

## 2019-09-17 PROCEDURE — 1101F PT FALLS ASSESS-DOCD LE1/YR: CPT | Mod: HCNC,CPTII,S$GLB, | Performed by: INTERNAL MEDICINE

## 2019-09-17 PROCEDURE — 99999 PR PBB SHADOW E&M-EST. PATIENT-LVL III: ICD-10-PCS | Mod: PBBFAC,HCNC,, | Performed by: INTERNAL MEDICINE

## 2019-09-17 PROCEDURE — 1101F PR PT FALLS ASSESS DOC 0-1 FALLS W/OUT INJ PAST YR: ICD-10-PCS | Mod: HCNC,CPTII,S$GLB, | Performed by: INTERNAL MEDICINE

## 2019-09-17 PROCEDURE — 3074F PR MOST RECENT SYSTOLIC BLOOD PRESSURE < 130 MM HG: ICD-10-PCS | Mod: HCNC,CPTII,S$GLB, | Performed by: INTERNAL MEDICINE

## 2019-09-17 PROCEDURE — 99499 UNLISTED E&M SERVICE: CPT | Mod: HCNC,S$GLB,, | Performed by: INTERNAL MEDICINE

## 2019-09-17 PROCEDURE — 80069 RENAL FUNCTION PANEL: CPT | Mod: HCNC

## 2019-09-17 PROCEDURE — 3074F SYST BP LT 130 MM HG: CPT | Mod: HCNC,CPTII,S$GLB, | Performed by: INTERNAL MEDICINE

## 2019-09-17 PROCEDURE — 36415 COLL VENOUS BLD VENIPUNCTURE: CPT | Mod: HCNC

## 2019-09-17 PROCEDURE — 99999 PR PBB SHADOW E&M-EST. PATIENT-LVL III: CPT | Mod: PBBFAC,HCNC,, | Performed by: INTERNAL MEDICINE

## 2019-09-17 PROCEDURE — 99215 PR OFFICE/OUTPT VISIT, EST, LEVL V, 40-54 MIN: ICD-10-PCS | Mod: HCNC,S$GLB,, | Performed by: INTERNAL MEDICINE

## 2019-09-17 NOTE — PROGRESS NOTES
Subjective:       Patient ID: Radha Degroot is a 79 y.o. White female who presents for follow-up evaluation of CKD.    HPI     Ms. Degroot was seen in nephrology clinic for follow up on CKD. She was previously being followed by Dr. Mcgill and was last seen by him on 3/9/16. I saw her on 8/24/16, she was to follow with me in 3 months, for unclear reason she had next appointment on 6/28/17 when she was noted to have worsening of renal function. She was last followed on 4/30/19.    She has CKD IV, diabetes with complications like neuropathy, diabetic retinopathy, hypertension, CAD, DELFINA, diastolic heart failure and several other medical problems. In the past she was taken off aldactone due to hyperkalemia.     She arrived for this visit alone. She reported she has been doing around the same and denied any worsening of any of her chronic symptoms like RODRIGUEZ. She does notice aching pain on off in left thigh. She has left leg edema > right. Her diuretic regimen is adjusted by her cardiologist. Of note she has been taking lasix 40 mg daily dose only. She does not have any symptoms to suggest uremia.    Per previous notes:  She finally did see urologist after multiple reminders, she has a renal mass seen on imaging. 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     Her diabetes control continues to remain sub optimal. She has longstanding and historically poorly controlled diabetes, at times A1C has remained above 10, she has longstanding proteinuria. Most recent A1C was 8.2.    At the time of her visit on 6/28/17 she was noted to have worsening renal function and hypotension which she confirmed was happening on her home BP readings also and as a result she was feeling tired and dizzy. She was taken off amlodipine and lisinopril at that time. She was advised to follow BP at home while continuing Coreg and lasix.  Her follow up labs on 7/13/17 noted for improvement in her renal function including creatinine and BUN. She was advised to do renal labs periodically but she remained a no show/ lab cancellation by patient and especially did not have any labs between 10/17 and 1/18, this was despite standing orders. This was brought to her attention.     Prior lab trend noted for onset of CKD since around 2006 and episodes of DANDY, and progression to CKD IV. US kidneys per PCP from 9/16 noted for Kidney size of 11 and 10.7 cm. Per report 1.6 cm isoechoic focus within the superior pole of the right kidney corresponding to abnormality seen on recent MRI which does not have the characteristics of a simple cyst.  Recommend further evaluation with CT of the abdomen with and without contrast renal mass protocol. 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 It did not show any mass/ stone/ obstruction.     Most recent labs noted  Renal Function:  Lab Results   Component Value Date     (H) 08/15/2019     (H) 06/25/2019     (H) 06/25/2019     (H) 06/25/2019     08/15/2019     06/25/2019     06/25/2019     06/25/2019    K 4.7 08/15/2019    K 4.0 06/25/2019    K 4.0 06/25/2019    K 4.0 06/25/2019     08/15/2019     06/25/2019     06/25/2019     06/25/2019    CO2 24 08/15/2019    CO2 27 06/25/2019    CO2 27 06/25/2019    CO2 27 06/25/2019    BUN 46 (H) 08/15/2019    BUN 33 (H) 06/25/2019    BUN 33 (H) 06/25/2019    BUN 33 (H) 06/25/2019    CALCIUM 9.6 08/15/2019    CALCIUM 9.6 06/25/2019    CALCIUM 9.6 06/25/2019    CALCIUM 9.6 06/25/2019    CREATININE 2.2 (H) 08/15/2019    CREATININE 2.1 (H) 06/25/2019    CREATININE 2.1 (H) 06/25/2019    CREATININE 2.1 (H) 06/25/2019    ALBUMIN 3.9 08/15/2019    ALBUMIN 3.8 06/25/2019    ALBUMIN 3.8 06/25/2019    ALBUMIN 3.8 06/25/2019    PHOS 4.1 08/15/2019    PHOS 3.2  06/25/2019    PHOS 3.2 06/25/2019    PHOS 3.2 06/25/2019    ESTGFRAFRICA 23.7 (A) 08/15/2019    ESTGFRAFRICA 25.1 (A) 06/25/2019    ESTGFRAFRICA 25.1 (A) 06/25/2019    ESTGFRAFRICA 25.1 (A) 06/25/2019    EGFRNONAA 20.6 (A) 08/15/2019    EGFRNONAA 21.8 (A) 06/25/2019    EGFRNONAA 21.8 (A) 06/25/2019    EGFRNONAA 21.8 (A) 06/25/2019       Urinalysis:  Lab Results   Component Value Date    APPEARANCEUA Cloudy (A) 08/15/2019    PHUR 5.0 08/15/2019    SPECGRAV 1.020 08/15/2019    PROTEINUA 2+ (A) 08/15/2019    GLUCUA Negative 08/15/2019    OCCULTUA Negative 08/15/2019    NITRITE Negative 08/15/2019    LEUKOCYTESUR Trace (A) 08/15/2019       Protein/Creatinine Ratio:  Lab Results   Component Value Date    PROTEINURINE 94 (H) 08/15/2019    CREATRANDUR 156.0 08/15/2019    UTPCR 0.60 (H) 08/15/2019       CBC:  Lab Results   Component Value Date    WBC 8.39 07/18/2019    HGB 12.9 07/18/2019    HCT 40.8 07/18/2019         Vit D 60  kidney US in 2016, she had one ordered by urologist, Dr. Greene in 6/17 which patient has not done so far   US kidney was ordered at the time of visit in may 2018 but for unclear reason she has not done that so far    US kidneys 9/2018  11.4 and 10.6 cm kidneys, There is no evidence hydronephrosis. There is a 1.5 cm angiomyolipoma within the superior pole of the right kidney correlating with the fatty lesion seen on CT from May.  .  Review of Systems   Constitutional: Positive for activity change. Negative for appetite change, chills and fever.   HENT: Negative for sneezing and sore throat.    Eyes: Negative for visual disturbance.   Respiratory: Negative for cough and shortness of breath.    Cardiovascular: Positive for leg swelling. Negative for chest pain.   Gastrointestinal: Negative for abdominal pain, diarrhea, nausea and vomiting.   Genitourinary: Negative for decreased urine volume, dysuria, hematuria and urgency.   Musculoskeletal: Positive for back pain and gait problem.   Skin:  Negative for rash.   Allergic/Immunologic: Negative for immunocompromised state.   Neurological: Negative for dizziness and headaches.   Psychiatric/Behavioral: Negative for behavioral problems and confusion.       Objective:      Physical Exam   Constitutional: She is oriented to person, place, and time. She appears well-developed and well-nourished. No distress.   HENT:   Head: Normocephalic and atraumatic.   Eyes: Conjunctivae are normal. Right eye exhibits no discharge. Left eye exhibits no discharge.   Neck: Normal range of motion.   Cardiovascular: Normal rate, regular rhythm and normal heart sounds.   Pulmonary/Chest: Effort normal and breath sounds normal. No respiratory distress. She has no wheezes.   Abdominal: Soft. There is no tenderness.   Musculoskeletal: She exhibits edema (2+ left > right leg ).   Neurological: She is alert and oriented to person, place, and time.   Skin: Skin is warm and dry.   Psychiatric: She has a normal mood and affect. Her behavior is normal.   Vitals reviewed.      Assessment:       1. Chronic kidney disease, stage IV (severe)    2. Hypertensive kidney disease with stage 4 chronic kidney disease    3. Persistent proteinuria    4. Anemia of chronic illness    5. Secondary renal hyperparathyroidism    6. Type 2 diabetes mellitus with stage 4 chronic kidney disease, with long-term current use of insulin    7. Vitamin D deficiency disease        Plan:     Ms. Degroot has CKD IV with sub nephrotic proteinuria which is clinically felt to be due to diabetic nephropathy and hypertensive glomerulosclerosis. Labs, CKD staging, risk of progression to ESRD was discussed with her in detail. So far available serial labs were explained to her in detail.     She established care with me in 8/16 but for unclear reason she did not have follow up visit arranged until 6/17 when she was noted to have DANDY vs progression of CKD. That time she was taken off amlodipine and lisinopril due to  hypotensive episodes and especially with worsening renal function.     Again today explained to her about all of her serial labs, longstanding CKD IV status already which has progressed, her kidneys with poor autoregulation and higher risk of DANDY due to hypotension/ volume depletion/ contrast agents/ nephrotoxins/ NSAID etc and higher risk of progression to CKD V/ ESRD. Also cautioned her about her multivitamin with mineral preparation as it will give her additional K, phos and other minerals.     Given her advanced CKD and risk of DANDY, will hold off restarting any ACE-I or ARB use or K sparing diuretic due to risk of hyperkalemia. This was again explained to her today.      Stressed that she needs to be compliant with standing orders of renal panel to be done every 4 to 6 weeks.     Continue to follow with urology for right kidney AML and annual US kidney for surveillance.     She has slight stability of her kidney function. Stressed to follow salt and fluid restricted diet given worsened edema. Diuretic dosing per cardiology. However she has not followed with cardiology for several months. Will have her increase dose to 40 mg in the morning and 20 mg in the afternoon, further dose titration per her symptoms. Pt advised to report promptly of any new symptoms. Re-establish care with cardiology.    Obtain LE doppler US to rule out DVT given unequal distribution of her edema. Given her anticoagulated state, less suspicion but will rule out this diagnosis. Pt made aware.    renal labs every 4 to 8  weeks, if further worsening, then referral to vascular for access creation  - Trend proteinuria  - Trend PTH levels, for now PTH < 200, hold off adding calcitriol   - trend H/H, RITO if Hb < 10  - closely monitor renal panel for electrolytes, acid base status, eGFR  - risk of CKD progression d/w patient. Previously details of what symptoms to look for which could suggest uremia, were d/w her daughter  - low salt and low  potassium diet, low phos diet  - avoid NSAID/ bactrim/ IV contrast/ gadolinium/ aminoglycoside/ fleet enema where possible   - decision to continue PPI per primary   - renal dosing of medicines for creatinine clearance less than 30      RTC 3 months  Labs, plan, recommendations were discussed with patient in detail. Heir questions were answered to her satisfaction.

## 2019-09-18 ENCOUNTER — TELEPHONE (OUTPATIENT)
Dept: NEPHROLOGY | Facility: CLINIC | Age: 79
End: 2019-09-18

## 2019-09-18 ENCOUNTER — HOSPITAL ENCOUNTER (OUTPATIENT)
Dept: RADIOLOGY | Facility: HOSPITAL | Age: 79
Discharge: HOME | End: 2019-09-18
Attending: INTERNAL MEDICINE
Payer: MEDICARE

## 2019-09-18 DIAGNOSIS — R60.0 LOCALIZED EDEMA: ICD-10-CM

## 2019-09-18 PROCEDURE — 93970 US LOWER EXTREMITY VEINS BILATERAL: ICD-10-PCS | Mod: 26,HCNC,, | Performed by: RADIOLOGY

## 2019-09-18 PROCEDURE — 93970 EXTREMITY STUDY: CPT | Mod: 26,HCNC,, | Performed by: RADIOLOGY

## 2019-09-18 PROCEDURE — 93970 EXTREMITY STUDY: CPT | Mod: TC,HCNC

## 2019-09-18 NOTE — TELEPHONE ENCOUNTER
Per dr smith - please inform patient kidney function has slightly improved per labs from today, filtration is around 24. Continue with current medicines and plan.

## 2019-09-19 ENCOUNTER — TELEPHONE (OUTPATIENT)
Dept: NEPHROLOGY | Facility: CLINIC | Age: 79
End: 2019-09-19

## 2019-09-24 NOTE — PATIENT INSTRUCTIONS
Rest, and elevation  Will call you with xray results   See scanned remote loop recorder report in chart. Chargeable visit.

## 2019-09-30 ENCOUNTER — PATIENT OUTREACH (OUTPATIENT)
Dept: ADMINISTRATIVE | Facility: OTHER | Age: 79
End: 2019-09-30

## 2019-10-01 ENCOUNTER — OFFICE VISIT (OUTPATIENT)
Dept: CARDIOLOGY | Facility: CLINIC | Age: 79
End: 2019-10-01
Payer: MEDICARE

## 2019-10-01 ENCOUNTER — HOSPITAL ENCOUNTER (OUTPATIENT)
Dept: CARDIOLOGY | Facility: CLINIC | Age: 79
Discharge: HOME | End: 2019-10-01
Payer: MEDICARE

## 2019-10-01 VITALS
OXYGEN SATURATION: 94 % | DIASTOLIC BLOOD PRESSURE: 72 MMHG | HEART RATE: 72 BPM | BODY MASS INDEX: 32.44 KG/M2 | SYSTOLIC BLOOD PRESSURE: 160 MMHG | WEIGHT: 214.06 LBS | HEIGHT: 68 IN

## 2019-10-01 DIAGNOSIS — I48.0 PAF (PAROXYSMAL ATRIAL FIBRILLATION): Primary | ICD-10-CM

## 2019-10-01 DIAGNOSIS — G47.33 OBSTRUCTIVE SLEEP APNEA: ICD-10-CM

## 2019-10-01 DIAGNOSIS — I10 ESSENTIAL HYPERTENSION: ICD-10-CM

## 2019-10-01 DIAGNOSIS — I48.21 PERMANENT ATRIAL FIBRILLATION: ICD-10-CM

## 2019-10-01 DIAGNOSIS — N18.4 HYPERTENSIVE KIDNEY DISEASE WITH STAGE 4 CHRONIC KIDNEY DISEASE: ICD-10-CM

## 2019-10-01 DIAGNOSIS — N18.4 CHRONIC KIDNEY DISEASE, STAGE IV (SEVERE): ICD-10-CM

## 2019-10-01 DIAGNOSIS — I12.9 HYPERTENSIVE KIDNEY DISEASE WITH STAGE 4 CHRONIC KIDNEY DISEASE: ICD-10-CM

## 2019-10-01 DIAGNOSIS — E78.2 MIXED HYPERLIPIDEMIA: ICD-10-CM

## 2019-10-01 DIAGNOSIS — I87.2 VENOUS INSUFFICIENCY: ICD-10-CM

## 2019-10-01 DIAGNOSIS — I48.19 PERSISTENT ATRIAL FIBRILLATION: ICD-10-CM

## 2019-10-01 DIAGNOSIS — R60.0 LOWER EXTREMITY EDEMA: ICD-10-CM

## 2019-10-01 DIAGNOSIS — I50.32 CHRONIC DIASTOLIC CHF (CONGESTIVE HEART FAILURE): ICD-10-CM

## 2019-10-01 DIAGNOSIS — I25.10 CORONARY ARTERY DISEASE INVOLVING NATIVE CORONARY ARTERY OF NATIVE HEART WITHOUT ANGINA PECTORIS: ICD-10-CM

## 2019-10-01 PROCEDURE — 99499 UNLISTED E&M SERVICE: CPT | Mod: HCNC,S$GLB,, | Performed by: INTERNAL MEDICINE

## 2019-10-01 PROCEDURE — 1101F PR PT FALLS ASSESS DOC 0-1 FALLS W/OUT INJ PAST YR: ICD-10-PCS | Mod: HCNC,CPTII,S$GLB, | Performed by: INTERNAL MEDICINE

## 2019-10-01 PROCEDURE — 99214 OFFICE O/P EST MOD 30 MIN: CPT | Mod: HCNC,S$GLB,, | Performed by: INTERNAL MEDICINE

## 2019-10-01 PROCEDURE — 99999 PR PBB SHADOW E&M-EST. PATIENT-LVL III: ICD-10-PCS | Mod: PBBFAC,HCNC,, | Performed by: INTERNAL MEDICINE

## 2019-10-01 PROCEDURE — 1101F PT FALLS ASSESS-DOCD LE1/YR: CPT | Mod: HCNC,CPTII,S$GLB, | Performed by: INTERNAL MEDICINE

## 2019-10-01 PROCEDURE — 3078F DIAST BP <80 MM HG: CPT | Mod: HCNC,CPTII,S$GLB, | Performed by: INTERNAL MEDICINE

## 2019-10-01 PROCEDURE — 99214 PR OFFICE/OUTPT VISIT, EST, LEVL IV, 30-39 MIN: ICD-10-PCS | Mod: HCNC,S$GLB,, | Performed by: INTERNAL MEDICINE

## 2019-10-01 PROCEDURE — 3078F PR MOST RECENT DIASTOLIC BLOOD PRESSURE < 80 MM HG: ICD-10-PCS | Mod: HCNC,CPTII,S$GLB, | Performed by: INTERNAL MEDICINE

## 2019-10-01 PROCEDURE — 93000 ELECTROCARDIOGRAM COMPLETE: CPT | Mod: HCNC,S$GLB,, | Performed by: INTERNAL MEDICINE

## 2019-10-01 PROCEDURE — 3077F SYST BP >= 140 MM HG: CPT | Mod: HCNC,CPTII,S$GLB, | Performed by: INTERNAL MEDICINE

## 2019-10-01 PROCEDURE — 99999 PR PBB SHADOW E&M-EST. PATIENT-LVL III: CPT | Mod: PBBFAC,HCNC,, | Performed by: INTERNAL MEDICINE

## 2019-10-01 PROCEDURE — 99499 RISK ADDL DX/OHS AUDIT: ICD-10-PCS | Mod: HCNC,S$GLB,, | Performed by: INTERNAL MEDICINE

## 2019-10-01 PROCEDURE — 3077F PR MOST RECENT SYSTOLIC BLOOD PRESSURE >= 140 MM HG: ICD-10-PCS | Mod: HCNC,CPTII,S$GLB, | Performed by: INTERNAL MEDICINE

## 2019-10-01 PROCEDURE — 93000 EKG 12-LEAD: ICD-10-PCS | Mod: HCNC,S$GLB,, | Performed by: INTERNAL MEDICINE

## 2019-10-01 NOTE — PROGRESS NOTES
Subjective:   Patient ID:  Radha Degroot is a 79 y.o. female who presents for follow-up of Coronary Artery Disease  Radha Degroot is a 78 y.o. female who presents for follow-up of Congestive Heart Failure (4 month f/u )  Patient of Dr. Silveira, Dr. Alexander      HPI:   She has h/o of HTN, HPL, DM, perm AF, CKD, DELFINA and CAD s/p STEMI with PCI of Diag and LAD in . At our last visit she was having sxs of NILTON and SOB.  ECG was noted to be afib.  She was seen by Dr. Alexander and attempts at restoration to sinus failed.  She is now rate controlled and on AC.   Her only sxs at this time is fatigue. She doesn't use CPAP bc she can't tolerate. Exercise capacity is limited by arthritis and back pain.       admit for NSTEMI - h/o HIT  At the time of her presentation with typical angina her blood pressure was high. She was placed on GDMT.  She did not undergo early invasive management of her NSTEMI due to her very high risk of GEETA. DSE at that time:  Positive stress echocardiographic study demonstrating an ischemic response involving the mid anteroseptum, mid inferolateral wall, apical anterior wall.  With LVEF 55-60%.  LHC not performed due to renal function.  PET in 3-12 with D2 area fixed defect. EF 45%.      2018   1 - Normal left ventricular systolic function (EF 55-60%).     2 - Biatrial enlargement.     3 - Normal left ventricular diastolic function.     4 - Normal right ventricular systolic function .     5 - The estimated PA systolic pressure is 38 mmHg.     6 - Mild tricuspid regurgitation.     7 - Intermediate central venous pressure.      HPI:   Patient is active at home but does not exercise.    No chest pain, Orthopnea, PND of heart failure symptoms.   RODRIGUEZ on moderate exertion  Denies palpitations or fluttering in the chest  Non smoker  Mother  of MI at 71.     Patient Active Problem List   Diagnosis    Hyperlipidemia    Coronary artery disease    Anemia of chronic illness    Hearing loss,  "sensorineural    Chronic kidney disease, stage IV (severe)    Secondary renal hyperparathyroidism    Acquired cyst of kidney    Nuclear sclerotic cataract of left eye    Pseudophakia of right eye    Amblyopia    Posterior vitreous detachment of both eyes    Type 2 diabetes mellitus with mild nonproliferative retinopathy and macular edema    Type 2 diabetes mellitus with stage 4 chronic kidney disease, with long-term current use of insulin    Type 2 diabetes mellitus with diabetic polyneuropathy, with long-term current use of insulin    Muscle weakness    Stiffness of vertebral column    Chronic midline low back pain without sciatica    Difficulty walking    Degenerative joint disease (DJD) of lumbar spine    Vitamin D deficiency disease    Essential hypertension    Chronic diastolic CHF (congestive heart failure)    Obstructive sleep apnea    Mixed incontinence urge and stress    Type 2 diabetes mellitus with hypoglycemia without coma, with long-term current use of insulin    Chronic pain    Angiomyolipoma of right kidney    Azotemia    Facet arthritis, degenerative, lumbar spine    Lumbar spondylosis    Permanent atrial fibrillation    Hypertensive kidney disease with stage 4 chronic kidney disease    Venous insufficiency    Moderate nonproliferative diabetic retinopathy of both eyes without macular edema associated with type 2 diabetes mellitus    Macular scar of both eyes    Persistent proteinuria     BP (!) 160/72 (BP Location: Left arm, Patient Position: Sitting, BP Method: X-Large (Automatic))   Pulse 72   Ht 5' 8" (1.727 m)   Wt 97.1 kg (214 lb 1.1 oz)   SpO2 (!) 94%   BMI 32.55 kg/m²   Body mass index is 32.55 kg/m².  CrCl cannot be calculated (Patient's most recent lab result is older than the maximum 7 days allowed.).    Lab Results   Component Value Date     09/17/2019    K 3.9 09/17/2019     09/17/2019    CO2 25 09/17/2019    BUN 33 (H) 09/17/2019    " "CREATININE 1.9 (H) 09/17/2019     09/17/2019    HGBA1C 7.8 (H) 08/15/2019    MG 1.9 12/24/2008    AST 13 09/19/2018    ALT 12 09/19/2018    ALBUMIN 4.0 09/17/2019    PROT 6.9 09/19/2018    BILITOT 0.6 09/19/2018    WBC 8.39 07/18/2019    HGB 12.9 07/18/2019    HCT 40.8 07/18/2019    MCV 91 07/18/2019     (L) 07/18/2019    INR 1.1 05/15/2018    INR 1.0 01/22/2007    TSH 3.213 03/08/2019    CHOL 124 03/08/2019    HDL 51 03/08/2019    LDLCALC 38.4 (L) 03/08/2019    TRIG 173 (H) 03/08/2019       Current Outpatient Medications   Medication Sig    apixaban (ELIQUIS) 2.5 mg Tab Take 1 tablet (2.5 mg total) by mouth 2 (two) times daily.    blood sugar diagnostic (ACCU-CHEK MELIDA PLUS TEST STRP) Strp USE TO TEST BLOOD SUGAR FOUR TIMES DAILY    blood-glucose meter (ACCU-CHEK MELIDA PLUS METER) Misc To check sugars 4 times daily.  Please include control solution    carvedilol (COREG) 12.5 MG tablet Take 1 tablet (12.5 mg total) by mouth 2 (two) times daily.    ergocalciferol, vitamin D2, (VITAMIN D ORAL) Take by mouth once daily.    fluticasone (FLONASE) 50 mcg/actuation nasal spray SHAKE LIQUID AND USE 2 SPRAYS IN EACH NOSTRIL EVERY DAY    furosemide (LASIX) 40 MG tablet Take 2 tablets (80 mg total) by mouth 2 (two) times daily. (Patient taking differently: Take 40 mg by mouth once daily. )    insulin aspart (NOVOLOG FLEXPEN) 100 unit/mL InPn pen Inject 17 units before meals with ss (Patient taking differently: Sliding scale inject 14 units)    insulin degludec (TRESIBA FLEXTOUCH U-100) 100 unit/mL (3 mL) InPn INJECT 32 UNITS SUBCUTANEOUS ONCE DAILY    ketoconazole (NIZORAL) 2 % shampoo APPLY TO AFFECTED AREA EVERY OTHER DAY AND LET SIT 5 MINUTES PRIOR TO RINSING AS DIRECTED    MULTIVIT-MIN/IRON/FOLIC/LUTEIN (CENTRUM SILVER WOMEN ORAL) Take by mouth once daily.     MYRBETRIQ 50 mg Tb24 TAKE 1 TABLET(50 MG) BY MOUTH EVERY DAY    pen needle, diabetic (BD ULTRA-FINE TIM PEN NEEDLE) 32 gauge x 5/32" " "Ndle USE FOUR TIMES DAILY    pen needle, diabetic (BD ULTRA-FINE TIM PEN NEEDLES) 32 gauge x 5/32" Ndle USE FOUR TIMES DAILY FOR BLOOD SUGAR TESTING    rosuvastatin (CRESTOR) 40 MG Tab TAKE 1 TABLET(40 MG) BY MOUTH EVERY DAY    vitamin B complex (B COMPLEX VITAMINS ORAL) Take by mouth once daily.    VITAMIN E, BULK, MISC by Misc.(Non-Drug; Combo Route) route once daily.    albuterol (PROVENTIL/VENTOLIN HFA) 90 mcg/actuation inhaler Inhale 2 puffs into the lungs every 6 (six) hours as needed for Wheezing. (Patient not taking: Reported on 10/1/2019)    insulin aspart U-100 (NOVOLOG FLEXPEN U-100 INSULIN) 100 unit/mL (3 mL) InPn pen INJECT 17 UNITS UNDER THE SKIN BEFORE MEALS WITH SLIDING SCALE( MAXIMUM DAILY DOSE IS 66 UNITS) (Patient not taking: Reported on 10/1/2019)     No current facility-administered medications for this visit.        Review of Systems   Constitution: Negative for chills, decreased appetite, malaise/fatigue, night sweats, weight gain and weight loss.   Eyes: Negative for blurred vision, double vision, visual disturbance and visual halos.   Cardiovascular: Positive for dyspnea on exertion. Negative for chest pain, claudication, cyanosis, irregular heartbeat, leg swelling, near-syncope, orthopnea, palpitations, paroxysmal nocturnal dyspnea and syncope.   Respiratory: Negative for cough, hemoptysis, snoring, sputum production and wheezing.    Endocrine: Negative for cold intolerance, heat intolerance, polydipsia and polyphagia.   Hematologic/Lymphatic: Negative for adenopathy and bleeding problem. Does not bruise/bleed easily.   Skin: Negative for flushing, itching, poor wound healing and rash.   Musculoskeletal: Negative for arthritis, back pain, falls, gout, joint pain, joint swelling, muscle cramps, muscle weakness, myalgias, neck pain and stiffness.   Gastrointestinal: Negative for bloating, abdominal pain, anorexia, diarrhea, dysphagia, excessive appetite, flatus, hematemesis, jaundice, " melena and nausea.   Genitourinary: Negative for hesitancy and incomplete emptying.   Neurological: Negative for aphonia, brief paralysis, difficulty with concentration, disturbances in coordination, excessive daytime sleepiness, dizziness, focal weakness, light-headedness, loss of balance and weakness.   Psychiatric/Behavioral: Negative for altered mental status, depression, hallucinations, hypervigilance, memory loss, substance abuse and suicidal ideas. The patient does not have insomnia and is not nervous/anxious.        Objective:   Physical Exam   Constitutional: She is oriented to person, place, and time. She appears well-developed and well-nourished. No distress.   HENT:   Head: Normocephalic and atraumatic.   Nose: Nose normal.   Mouth/Throat: Oropharynx is clear and moist. No oropharyngeal exudate.   Eyes: Pupils are equal, round, and reactive to light. Conjunctivae and EOM are normal. Right eye exhibits no discharge. Left eye exhibits no discharge. No scleral icterus.   Neck: Normal range of motion. Neck supple. No JVD present. No tracheal deviation present. No thyromegaly present.   Cardiovascular: Normal rate, regular rhythm, normal heart sounds and intact distal pulses. Exam reveals no gallop and no friction rub.   No murmur heard.  Pulmonary/Chest: Effort normal and breath sounds normal. No stridor. No respiratory distress. She has no wheezes. She has no rales. She exhibits no tenderness.   Abdominal: Soft. Bowel sounds are normal. She exhibits no distension and no mass. There is no tenderness. There is no rebound and no guarding.   Musculoskeletal: Normal range of motion. She exhibits no edema or tenderness.   Lymphadenopathy:     She has no cervical adenopathy.   Neurological: She is alert and oriented to person, place, and time. She has normal reflexes. No cranial nerve deficit. She exhibits normal muscle tone. Coordination normal.   Skin: Skin is warm. No rash noted. She is not diaphoretic. No  erythema. No pallor.   Psychiatric: She has a normal mood and affect. Her behavior is normal. Judgment and thought content normal.       Assessment:     1. PAF (paroxysmal atrial fibrillation)    2. Persistent atrial fibrillation    3. Essential hypertension    4. Chronic kidney disease, stage IV (severe)    5. Coronary artery disease involving native coronary artery of native heart without angina pectoris    6. Permanent atrial fibrillation    7. Hypertensive kidney disease with stage 4 chronic kidney disease    8. Obstructive sleep apnea    9. Venous insufficiency    10. Lower extremity edema    11. Chronic diastolic CHF (congestive heart failure)    12. Mixed hyperlipidemia        Plan:   Discussed exercise to improve muscle strength. Continue all meds.    Radha was seen today for coronary artery disease.    Diagnoses and all orders for this visit:    PAF (paroxysmal atrial fibrillation)  -     IN OFFICE EKG 12-LEAD (to Muse)    Persistent atrial fibrillation  -     apixaban (ELIQUIS) 2.5 mg Tab; Take 1 tablet (2.5 mg total) by mouth 2 (two) times daily.    Essential hypertension    Chronic kidney disease, stage IV (severe)    Coronary artery disease involving native coronary artery of native heart without angina pectoris    Permanent atrial fibrillation    Hypertensive kidney disease with stage 4 chronic kidney disease    Obstructive sleep apnea    Venous insufficiency    Lower extremity edema    Chronic diastolic CHF (congestive heart failure)    Mixed hyperlipidemia      RTC 1 yr with Dr. Silveira

## 2019-10-02 ENCOUNTER — IMMUNIZATION (OUTPATIENT)
Dept: PHARMACY | Facility: CLINIC | Age: 79
End: 2019-10-02
Payer: MEDICARE

## 2019-10-07 NOTE — TELEPHONE ENCOUNTER
H&P Update: 
Tha Oreilly was seen and examined. History and physical has been reviewed. The patient has been examined.  There have been no significant clinical changes since the completion of the originally dated History and Physical. 
 
 
 Labs scheduled and mailed to patient

## 2019-10-09 RX ORDER — ROSUVASTATIN CALCIUM 40 MG/1
TABLET, COATED ORAL
Qty: 90 TABLET | Refills: 2 | Status: SHIPPED | OUTPATIENT
Start: 2019-10-09 | End: 2019-12-13 | Stop reason: SDUPTHER

## 2019-10-30 ENCOUNTER — TELEPHONE (OUTPATIENT)
Dept: CARDIOLOGY | Facility: CLINIC | Age: 79
End: 2019-10-30

## 2019-10-30 NOTE — TELEPHONE ENCOUNTER
----- Message from Fadumo Zamora MD sent at 10/30/2019  3:27 PM CDT -----  Contact: pt  Her EKG shows atrial fibrillation and is unchanged from prior  ----- Message -----  From: Rhianna Resendiz MA  Sent: 10/3/2019   2:54 PM CDT  To: Fadumo Zamora MD        ----- Message -----  From: Katt Casillas  Sent: 10/3/2019  12:21 PM CDT  To: [facility] Staff    Pt would like a call to discuss her EKG results.    Thanks

## 2019-11-04 ENCOUNTER — TELEPHONE (OUTPATIENT)
Dept: INTERNAL MEDICINE | Facility: CLINIC | Age: 79
End: 2019-11-04

## 2019-11-04 NOTE — TELEPHONE ENCOUNTER
----- Message from Kelley Lilly sent at 11/4/2019  1:36 PM CST -----  Contact: self / [phone]  Needs to reschedule her appointment. Please advise

## 2019-11-14 ENCOUNTER — LAB VISIT (OUTPATIENT)
Dept: LAB | Facility: HOSPITAL | Age: 79
End: 2019-11-14
Attending: NURSE PRACTITIONER
Payer: MEDICARE

## 2019-11-14 LAB
ESTIMATED AVG GLUCOSE: 166 MG/DL (ref 68–131)
HBA1C MFR BLD HPLC: 7.4 % (ref 4–5.6)

## 2019-11-14 PROCEDURE — 83036 HEMOGLOBIN GLYCOSYLATED A1C: CPT | Mod: HCNC

## 2019-11-14 PROCEDURE — 36415 COLL VENOUS BLD VENIPUNCTURE: CPT | Mod: HCNC,PO

## 2019-11-17 ENCOUNTER — PATIENT OUTREACH (OUTPATIENT)
Dept: ADMINISTRATIVE | Facility: OTHER | Age: 79
End: 2019-11-17

## 2019-11-21 ENCOUNTER — OFFICE VISIT (OUTPATIENT)
Dept: ENDOCRINOLOGY | Facility: CLINIC | Age: 79
End: 2019-11-21
Payer: MEDICARE

## 2019-11-21 VITALS
WEIGHT: 216.19 LBS | HEART RATE: 62 BPM | BODY MASS INDEX: 32.87 KG/M2 | SYSTOLIC BLOOD PRESSURE: 162 MMHG | DIASTOLIC BLOOD PRESSURE: 62 MMHG

## 2019-11-21 DIAGNOSIS — I10 ESSENTIAL HYPERTENSION: ICD-10-CM

## 2019-11-21 DIAGNOSIS — Z79.4 CONTROLLED TYPE 2 DIABETES MELLITUS WITH COMPLICATION, WITH LONG-TERM CURRENT USE OF INSULIN: Primary | ICD-10-CM

## 2019-11-21 DIAGNOSIS — E11.8 CONTROLLED TYPE 2 DIABETES MELLITUS WITH COMPLICATION, WITH LONG-TERM CURRENT USE OF INSULIN: Primary | ICD-10-CM

## 2019-11-21 DIAGNOSIS — N18.4 CKD (CHRONIC KIDNEY DISEASE) STAGE 4, GFR 15-29 ML/MIN: ICD-10-CM

## 2019-11-21 DIAGNOSIS — E78.5 HYPERLIPIDEMIA LDL GOAL <100: ICD-10-CM

## 2019-11-21 PROCEDURE — 1159F PR MEDICATION LIST DOCUMENTED IN MEDICAL RECORD: ICD-10-PCS | Mod: HCNC,S$GLB,, | Performed by: NURSE PRACTITIONER

## 2019-11-21 PROCEDURE — 1126F AMNT PAIN NOTED NONE PRSNT: CPT | Mod: HCNC,S$GLB,, | Performed by: NURSE PRACTITIONER

## 2019-11-21 PROCEDURE — 99999 PR PBB SHADOW E&M-EST. PATIENT-LVL IV: CPT | Mod: PBBFAC,HCNC,, | Performed by: NURSE PRACTITIONER

## 2019-11-21 PROCEDURE — 3077F SYST BP >= 140 MM HG: CPT | Mod: HCNC,CPTII,S$GLB, | Performed by: NURSE PRACTITIONER

## 2019-11-21 PROCEDURE — 1126F PR PAIN SEVERITY QUANTIFIED, NO PAIN PRESENT: ICD-10-PCS | Mod: HCNC,S$GLB,, | Performed by: NURSE PRACTITIONER

## 2019-11-21 PROCEDURE — 99214 PR OFFICE/OUTPT VISIT, EST, LEVL IV, 30-39 MIN: ICD-10-PCS | Mod: HCNC,S$GLB,, | Performed by: NURSE PRACTITIONER

## 2019-11-21 PROCEDURE — 99214 OFFICE O/P EST MOD 30 MIN: CPT | Mod: HCNC,S$GLB,, | Performed by: NURSE PRACTITIONER

## 2019-11-21 PROCEDURE — 1159F MED LIST DOCD IN RCRD: CPT | Mod: HCNC,S$GLB,, | Performed by: NURSE PRACTITIONER

## 2019-11-21 PROCEDURE — 3078F PR MOST RECENT DIASTOLIC BLOOD PRESSURE < 80 MM HG: ICD-10-PCS | Mod: HCNC,CPTII,S$GLB, | Performed by: NURSE PRACTITIONER

## 2019-11-21 PROCEDURE — 3077F PR MOST RECENT SYSTOLIC BLOOD PRESSURE >= 140 MM HG: ICD-10-PCS | Mod: HCNC,CPTII,S$GLB, | Performed by: NURSE PRACTITIONER

## 2019-11-21 PROCEDURE — 1101F PT FALLS ASSESS-DOCD LE1/YR: CPT | Mod: HCNC,CPTII,S$GLB, | Performed by: NURSE PRACTITIONER

## 2019-11-21 PROCEDURE — 3078F DIAST BP <80 MM HG: CPT | Mod: HCNC,CPTII,S$GLB, | Performed by: NURSE PRACTITIONER

## 2019-11-21 PROCEDURE — 1101F PR PT FALLS ASSESS DOC 0-1 FALLS W/OUT INJ PAST YR: ICD-10-PCS | Mod: HCNC,CPTII,S$GLB, | Performed by: NURSE PRACTITIONER

## 2019-11-21 PROCEDURE — 99999 PR PBB SHADOW E&M-EST. PATIENT-LVL IV: ICD-10-PCS | Mod: PBBFAC,HCNC,, | Performed by: NURSE PRACTITIONER

## 2019-11-21 RX ORDER — PEN NEEDLE, DIABETIC 30 GX3/16"
NEEDLE, DISPOSABLE MISCELLANEOUS
Qty: 400 EACH | Refills: 3 | Status: SHIPPED | OUTPATIENT
Start: 2019-11-21 | End: 2020-12-31 | Stop reason: SDUPTHER

## 2019-11-21 RX ORDER — INSULIN DEGLUDEC 100 U/ML
INJECTION, SOLUTION SUBCUTANEOUS
Qty: 10 SYRINGE | Refills: 2 | Status: SHIPPED | OUTPATIENT
Start: 2019-11-21 | End: 2020-06-24 | Stop reason: SDUPTHER

## 2019-11-21 RX ORDER — INSULIN ASPART 100 [IU]/ML
INJECTION, SOLUTION INTRAVENOUS; SUBCUTANEOUS
Qty: 60 ML | Refills: 2 | Status: SHIPPED | OUTPATIENT
Start: 2019-11-21 | End: 2020-06-24 | Stop reason: SDUPTHER

## 2019-11-21 NOTE — PATIENT INSTRUCTIONS
Continue current treatment. Make sure to inject Novolog prior to each meal including lunch.   Test glucose 4x/day.   Return to clinic in 4 months with labs prior.     Recommend checking blood pressure a few times a week and keep a log. Ideally it should be less than 140/90. If higher than this consistently, follow up with either cardiologist or kidney doctor.

## 2019-11-21 NOTE — PROGRESS NOTES
CC: This 79 y.o. White female  is here for evaluation of  T2DM along with comorbidities indicated in the Visit Diagnosis section of this encounter.    HPI: Radha Degroot was diagnosed with T2DM in 1980s.     Nephrology - Dr. Mcamnus  Cardiology - Dr. Zamora      Prior visit 12/2018  a1c down from 8.2 to 8%.   Complains of low blood sugars overnight/morning and before lunch.   Plan Reduce Tresiba to 32 units once daily.   Reduce Humalog to 11 units before breakfast and continue 14 units before lunch and dinner.   If your blood sugar is less than 90 before   Test blood sugar before each meal and bedtime.   Please call if blood sugar is still less than 80 so that we can adjust your insulin doses. Reviewed hypoglycemia tx.   rtc in 3 mo with labs prior.       Prior visit 8/21/19  a1c down to 7.8%. Patient states her prelunch BGs have been high. She has reduced Novolog from 14 to 11 units d/t hypoglycemia at lov but denies any hypoglycemia now.   Plan Increase Novolog back up to 14 units before breakfast. No other changes.   Monitor glucose 4x/day.   Return to clinic in 3 months with labs prior. Call with any issues especially if blood sugars are less than 80.       Interval history  a1c down from 7.8 to 7.4%. This is the lowest it's been since 2012.         DIABETES EDUCATION: 3/5/18    PMHX: atrial fibrillation, NSTEMI 2017, CAD, CHF, CKD stage 3     PRESCRIBED DIABETES MEDICATIONS: Tresiba 32 units hs, Novolog Flexpen 14 units w/ ss (Use on premeal readings: 150-200=+1, 201-250=+2, 251-300=+3; 301-350=+4, over 350=+5 units    Misses medication doses - forgets to take Novolog prior to lunch or sometimes doesn't feel comfortable injecting it in public.     DM COMPLICATIONS: nephropathy, retinopathy and peripheral neuropathy    SELF MONITORING BLOOD GLUCOSE: accuchek dorothy meter. Checks blood glucose at home 4x/day.   She declined Dexcom CGM device. Does not want anything attached to her.  Her glucoses do tend to  "fluctuate. Pt reports diet is very consistent. But she finds her BG will rise if she goes long periods with out eating.               HYPOGLYCEMIC EPISODES: symptoms start at bgs in the 80s.  None recent      CURRENT DIET: 3 meals/day.  Drinks iced tea with no sugar. No snacks. Breakfast is toast (1-2 slices) and eggs.       CURRENT EXERCISE: none       BP (!) 155/67 (BP Location: Left arm, Patient Position: Sitting, BP Method: Large (Automatic)) Comment: didn't take bp meds/no HA,chestpain/has SOB "since forever"  Pulse 62   Wt 98.1 kg (216 lb 3.2 oz)   BMI 32.87 kg/m²       ROS:   CONSTITUTIONAL: Appetite good, + fatigue  CV: no chest pain   : no dysuria       PHYSICAL EXAM:  GENERAL: Well developed, well nourished. No acute distress.   PSYCH: AAOx3, appropriate mood and affect, conversant, well-groomed. Judgement and insight good.   NEURO: Cranial nerves grossly intact. Speech clear, no tremor.   CHEST: Respirations even and unlabored.   Foot exam: patient declined.       Hemoglobin A1C   Date Value Ref Range Status   11/14/2019 7.4 (H) 4.0 - 5.6 % Final     Comment:     ADA Screening Guidelines:  5.7-6.4%  Consistent with prediabetes  >or=6.5%  Consistent with diabetes  High levels of fetal hemoglobin interfere with the HbA1C  assay. Heterozygous hemoglobin variants (HbS, HgC, etc)do  not significantly interfere with this assay.   However, presence of multiple variants may affect accuracy.     08/15/2019 7.8 (H) 4.0 - 5.6 % Final     Comment:     ADA Screening Guidelines:  5.7-6.4%  Consistent with prediabetes  >or=6.5%  Consistent with diabetes  High levels of fetal hemoglobin interfere with the HbA1C  assay. Heterozygous hemoglobin variants (HbS, HgC, etc)do  not significantly interfere with this assay.   However, presence of multiple variants may affect accuracy.     02/04/2019 8.2 (H) 4.0 - 5.6 % Final     Comment:     ADA Screening Guidelines:  5.7-6.4%  Consistent with prediabetes  >or=6.5%  " Consistent with diabetes  High levels of fetal hemoglobin interfere with the HbA1C  assay. Heterozygous hemoglobin variants (HbS, HgC, etc)do  not significantly interfere with this assay.   However, presence of multiple variants may affect accuracy.             Chemistry        Component Value Date/Time     09/17/2019 1320    K 3.9 09/17/2019 1320     09/17/2019 1320    CO2 25 09/17/2019 1320    BUN 33 (H) 09/17/2019 1320    CREATININE 1.9 (H) 09/17/2019 1320     09/17/2019 1320        Component Value Date/Time    CALCIUM 9.7 09/17/2019 1320    ALKPHOS 91 09/19/2018 1501    AST 13 09/19/2018 1501    ALT 12 09/19/2018 1501    BILITOT 0.6 09/19/2018 1501    ESTGFRAFRICA 28.3 (A) 09/17/2019 1320    EGFRNONAA 24.5 (A) 09/17/2019 1320          Lab Results   Component Value Date    LDLCALC 38.4 (L) 03/08/2019         Lab Results   Component Value Date    MICALBCREAT 29.5 02/11/2015             STANDARDS of CARE:        ASA:               Last eye exam:       ASSESSMENT and PLAN:    A1C GOAL: < 8%     1. Controlled type 2 diabetes mellitus with complication, with long-term current use of insulin  Continue current treatment. Make sure to inject Novolog prior to each meal including lunch.   Test glucose 4x/day.   Return to clinic in 4 months with labs prior.       Hemoglobin A1c   2. CKD (chronic kidney disease) stage 4, GFR 15-29 ml/min  Maintain glucose control.  Followed by Dr. Mcmanus.      3. Essential hypertension  Recommend checking blood pressure a few times a week and keep a log. Ideally it should be less than 140/90. If higher than this consistently, follow up with either cardiologist or kidney doctor.    4. Hyperlipidemia LDL goal <100  Lipid panel           Orders Placed This Encounter   Procedures    Hemoglobin A1c     Standing Status:   Future     Standing Expiration Date:   1/19/2021    Lipid panel     Standing Status:   Future     Standing Expiration Date:   11/20/2020        Follow up in  about 4 months (around 3/21/2020).

## 2019-12-12 ENCOUNTER — HOSPITAL ENCOUNTER (OUTPATIENT)
Dept: RADIOLOGY | Facility: HOSPITAL | Age: 79
Discharge: HOME | End: 2019-12-12
Attending: INTERNAL MEDICINE
Payer: MEDICARE

## 2019-12-12 VITALS — HEIGHT: 68 IN | BODY MASS INDEX: 32.74 KG/M2 | WEIGHT: 216 LBS

## 2019-12-12 DIAGNOSIS — Z12.31 ENCOUNTER FOR SCREENING MAMMOGRAM FOR BREAST CANCER: ICD-10-CM

## 2019-12-12 PROCEDURE — 77067 MAMMO DIGITAL SCREENING BILAT WITH TOMOSYNTHESIS_CAD: ICD-10-PCS | Mod: 26,HCNC,, | Performed by: RADIOLOGY

## 2019-12-12 PROCEDURE — 77063 BREAST TOMOSYNTHESIS BI: CPT | Mod: 26,HCNC,, | Performed by: RADIOLOGY

## 2019-12-12 PROCEDURE — 77067 SCR MAMMO BI INCL CAD: CPT | Mod: TC,HCNC

## 2019-12-12 PROCEDURE — 77063 MAMMO DIGITAL SCREENING BILAT WITH TOMOSYNTHESIS_CAD: ICD-10-PCS | Mod: 26,HCNC,, | Performed by: RADIOLOGY

## 2019-12-12 PROCEDURE — 77067 SCR MAMMO BI INCL CAD: CPT | Mod: 26,HCNC,, | Performed by: RADIOLOGY

## 2019-12-13 DIAGNOSIS — I10 ESSENTIAL HYPERTENSION: Chronic | ICD-10-CM

## 2019-12-13 RX ORDER — ROSUVASTATIN CALCIUM 40 MG/1
TABLET, COATED ORAL
Qty: 90 TABLET | Refills: 2 | Status: SHIPPED | OUTPATIENT
Start: 2019-12-13 | End: 2020-10-21 | Stop reason: SDUPTHER

## 2019-12-13 RX ORDER — CARVEDILOL 12.5 MG/1
12.5 TABLET ORAL 2 TIMES DAILY
Qty: 60 TABLET | Refills: 2 | Status: SHIPPED | OUTPATIENT
Start: 2019-12-13 | End: 2020-03-13

## 2019-12-16 ENCOUNTER — TELEPHONE (OUTPATIENT)
Dept: INTERNAL MEDICINE | Facility: CLINIC | Age: 79
End: 2019-12-16

## 2019-12-16 NOTE — TELEPHONE ENCOUNTER
----- Message from Caryn Perez sent at 12/16/2019  9:08 AM CST -----  Contact: patient [phone]  Patient needs a copy of mammogram report sent to her insurance company. Can you fax a copy of the report for [employer] program. Fax to [phone] [facility] please add patient's name, date of mammogram 12/13/19 included with the fax

## 2020-01-08 RX ORDER — BLOOD SUGAR DIAGNOSTIC
STRIP MISCELLANEOUS
Qty: 400 STRIP | Refills: 3 | Status: SHIPPED | OUTPATIENT
Start: 2020-01-08 | End: 2020-10-30 | Stop reason: SDUPTHER

## 2020-01-14 ENCOUNTER — PATIENT OUTREACH (OUTPATIENT)
Dept: ADMINISTRATIVE | Facility: OTHER | Age: 80
End: 2020-01-14

## 2020-01-15 RX ORDER — MIRABEGRON 50 MG/1
TABLET, FILM COATED, EXTENDED RELEASE ORAL
Qty: 90 TABLET | Refills: 1 | Status: SHIPPED | OUTPATIENT
Start: 2020-01-15 | End: 2021-06-21

## 2020-01-17 ENCOUNTER — TELEPHONE (OUTPATIENT)
Dept: ENDOCRINOLOGY | Facility: CLINIC | Age: 80
End: 2020-01-17

## 2020-01-17 NOTE — TELEPHONE ENCOUNTER
----- Message from Laura Mejía sent at 1/17/2020 11:30 AM CST -----  Contact: ALISSA ANDREW   Name of Who is Calling: ALISSA ANDREW       What is the request in detail:Patient is requesting a call back concerning her  insulin degludec (TRESIBA FLEXTOUCH U-100) 100 unit/mL (3 mL) InPn medication she states [employer] will send her a message to change her medic ation to be changed from Tier 3 to a Tier 2      Can the clinic reply by [facility]: no      What Number to Call Back if not in [facility]: [phone]

## 2020-01-17 NOTE — TELEPHONE ENCOUNTER
Pt states she went to [facility] last night to  a refill on her Tresiba usually she pays $16 but the medication is now $141. Also says Novolog was also over $100. She states that after talking with Rogelio they stated that if Mattie changes the Tresiba from a Tier 3 to a Tier 2 the price will be lowered to $60 instead of $140 and she can afford that price.

## 2020-01-21 ENCOUNTER — TELEPHONE (OUTPATIENT)
Dept: ENDOCRINOLOGY | Facility: CLINIC | Age: 80
End: 2020-01-21

## 2020-01-21 NOTE — TELEPHONE ENCOUNTER
----- Message from Yue Milan sent at 1/21/2020  2:01 PM CST -----  Contact: Patient ph [phone]  Type: Patient Call Back    Who called:Patient    What is the request in detail: Need to speak to someone in regards to a medication request that has been going back and forth with [employer]. To request Insulins she's taking, insulin degludec (TRESIBA FLEXTOUCH U-100) 100 unit/mL (3 mL) InPn andinsulin aspart U-100 (NOVOLOG FLEXPEN U-100 INSULIN) 100 unit/mL (3 mL) InPn pen. Need to be switched from Number 3 tier to Number 2 tier, for it to be a little less expensive.  Please call.    Would the patient rather a call back or a response via [facility]? Call back    Best call back number: [phone]

## 2020-01-21 NOTE — TELEPHONE ENCOUNTER
Spoke with Cheryl at the [facility] Clinical [facility] Review and was able to try and do a Tier change. It will take 48-72 hours to hear about the approval. Also spoke with Kareen at [facility] to see if there was any other alternate medications that would have a lower copay for Ms. Degroot. She stated that for the Novolog the pt can switch to Siasp flex touch pen and the copay would be $47. The Tresiba all the alternative medications the copay was still $500-$1000.

## 2020-01-22 ENCOUNTER — TELEPHONE (OUTPATIENT)
Dept: ENDOCRINOLOGY | Facility: CLINIC | Age: 80
End: 2020-01-22

## 2020-01-22 NOTE — TELEPHONE ENCOUNTER
Spoke with pt and informed her that the Tier drop for the Tresiba has been denied but that there is an alternative medication for the Novolog called Fiasp and the copay is $47.

## 2020-01-22 NOTE — TELEPHONE ENCOUNTER
Tried contacting pt to give her an update on her medication problem. The pt did not answer LVM to return my call.

## 2020-02-07 ENCOUNTER — OFFICE VISIT (OUTPATIENT)
Dept: INTERNAL MEDICINE | Facility: CLINIC | Age: 80
End: 2020-02-07
Payer: MEDICARE

## 2020-02-07 VITALS
HEIGHT: 68 IN | BODY MASS INDEX: 32.98 KG/M2 | HEART RATE: 70 BPM | DIASTOLIC BLOOD PRESSURE: 68 MMHG | SYSTOLIC BLOOD PRESSURE: 130 MMHG | OXYGEN SATURATION: 97 % | WEIGHT: 217.63 LBS

## 2020-02-07 DIAGNOSIS — E78.5 HYPERLIPIDEMIA, UNSPECIFIED HYPERLIPIDEMIA TYPE: ICD-10-CM

## 2020-02-07 DIAGNOSIS — Z00.00 ANNUAL PHYSICAL EXAM: Primary | ICD-10-CM

## 2020-02-07 DIAGNOSIS — N25.81 SECONDARY RENAL HYPERPARATHYROIDISM: ICD-10-CM

## 2020-02-07 DIAGNOSIS — J06.9 UPPER RESPIRATORY TRACT INFECTION, UNSPECIFIED TYPE: ICD-10-CM

## 2020-02-07 DIAGNOSIS — Z79.4 TYPE 2 DIABETES MELLITUS WITH COMPLICATION, WITH LONG-TERM CURRENT USE OF INSULIN: ICD-10-CM

## 2020-02-07 DIAGNOSIS — M46.1 SACROILIITIS, NOT ELSEWHERE CLASSIFIED: ICD-10-CM

## 2020-02-07 DIAGNOSIS — I10 ESSENTIAL HYPERTENSION: ICD-10-CM

## 2020-02-07 DIAGNOSIS — E11.8 TYPE 2 DIABETES MELLITUS WITH COMPLICATION, WITH LONG-TERM CURRENT USE OF INSULIN: ICD-10-CM

## 2020-02-07 DIAGNOSIS — I48.0 PAF (PAROXYSMAL ATRIAL FIBRILLATION): ICD-10-CM

## 2020-02-07 DIAGNOSIS — D69.6 THROMBOCYTOPENIA, UNSPECIFIED: ICD-10-CM

## 2020-02-07 DIAGNOSIS — I70.0 AORTIC ATHEROSCLEROSIS: ICD-10-CM

## 2020-02-07 DIAGNOSIS — N18.4 CKD (CHRONIC KIDNEY DISEASE) STAGE 4, GFR 15-29 ML/MIN: ICD-10-CM

## 2020-02-07 DIAGNOSIS — I50.32 CHRONIC DIASTOLIC CHF (CONGESTIVE HEART FAILURE): ICD-10-CM

## 2020-02-07 PROCEDURE — 99397 PER PM REEVAL EST PAT 65+ YR: CPT | Mod: HCNC,S$GLB,, | Performed by: INTERNAL MEDICINE

## 2020-02-07 PROCEDURE — 3078F DIAST BP <80 MM HG: CPT | Mod: HCNC,CPTII,S$GLB, | Performed by: INTERNAL MEDICINE

## 2020-02-07 PROCEDURE — 3078F PR MOST RECENT DIASTOLIC BLOOD PRESSURE < 80 MM HG: ICD-10-PCS | Mod: HCNC,CPTII,S$GLB, | Performed by: INTERNAL MEDICINE

## 2020-02-07 PROCEDURE — 3051F PR MOST RECENT HEMOGLOBIN A1C LEVEL 7.0 - < 8.0%: ICD-10-PCS | Mod: HCNC,CPTII,S$GLB, | Performed by: INTERNAL MEDICINE

## 2020-02-07 PROCEDURE — 99999 PR PBB SHADOW E&M-EST. PATIENT-LVL IV: CPT | Mod: PBBFAC,HCNC,, | Performed by: INTERNAL MEDICINE

## 2020-02-07 PROCEDURE — 3075F SYST BP GE 130 - 139MM HG: CPT | Mod: HCNC,CPTII,S$GLB, | Performed by: INTERNAL MEDICINE

## 2020-02-07 PROCEDURE — 99499 RISK ADDL DX/OHS AUDIT: ICD-10-PCS | Mod: HCNC,S$GLB,, | Performed by: INTERNAL MEDICINE

## 2020-02-07 PROCEDURE — 99999 PR PBB SHADOW E&M-EST. PATIENT-LVL IV: ICD-10-PCS | Mod: PBBFAC,HCNC,, | Performed by: INTERNAL MEDICINE

## 2020-02-07 PROCEDURE — 3075F PR MOST RECENT SYSTOLIC BLOOD PRESS GE 130-139MM HG: ICD-10-PCS | Mod: HCNC,CPTII,S$GLB, | Performed by: INTERNAL MEDICINE

## 2020-02-07 PROCEDURE — 99397 PR PREVENTIVE VISIT,EST,65 & OVER: ICD-10-PCS | Mod: HCNC,S$GLB,, | Performed by: INTERNAL MEDICINE

## 2020-02-07 PROCEDURE — 3051F HG A1C>EQUAL 7.0%<8.0%: CPT | Mod: HCNC,CPTII,S$GLB, | Performed by: INTERNAL MEDICINE

## 2020-02-07 PROCEDURE — 99499 UNLISTED E&M SERVICE: CPT | Mod: HCNC,S$GLB,, | Performed by: INTERNAL MEDICINE

## 2020-02-07 NOTE — PROGRESS NOTES
"Subjective:       Patient ID: Radha Degroot is a 79 y.o. female.    Chief Complaint: Annual Exam   This is an 79 year presents today patient she has been doing recent she has using she denies fevers increased of she continues to diabetes and adjustments in insulin has had some constraint .  She that recently and some money she no for some she previously did and is finding medicines are too expensive she is working with Endocrinology to some cheaper insulin options.  She also was taking blood bladder medicines which seem to be more expensive  She is brought in by today having child this summer.  She denies increased chest pain or shortness of recent respiratory symptoms do seem  She had been doing medicine but now concerned financial constraints she reports her blood sugar was fluctuant bit when she was sick but overall has been better contorolled     HPI  Review of Systems   Constitutional: Negative for fever.   HENT:        Recent uri resolving    Respiratory: Negative for cough, shortness of breath and wheezing.    Cardiovascular: Negative for chest pain and palpitations.   Gastrointestinal: Negative for abdominal pain and constipation.   Neurological: Negative for dizziness.       Objective:     Blood pressure 130/68, pulse 70, height 5' 8" (1.727 m), weight 98.7 kg (217 lb 9.5 oz), SpO2 97 %.    Physical Exam   Constitutional: No distress.   HENT:   Head: Normocephalic.   Mouth/Throat: Oropharynx is clear and moist.   Eyes: No scleral icterus.   Neck: Neck supple.   Cardiovascular: Normal rate, regular rhythm and normal heart sounds. Exam reveals no gallop and no friction rub.   No murmur heard.  Pulses:       Dorsalis pedis pulses are 1+ on the right side, and 1+ on the left side.        Posterior tibial pulses are 1+ on the right side, and 1+ on the left side.   Pulmonary/Chest: Effort normal and breath sounds normal. No respiratory distress.   Breast : normal no masses or tenderness    Abdominal: Soft. " Bowel sounds are normal. She exhibits no mass. There is no tenderness.   Musculoskeletal: She exhibits no edema.   Feet:   Right Foot:   Protective Sensation: 5 sites tested. 6 sites sensed.   Left Foot:   Protective Sensation: 5 sites tested. 6 sites sensed.   Neurological: She is alert.   Skin: No erythema.   Psychiatric: She has a normal mood and affect.   Vitals reviewed.      Assessment:       1. Annual physical exam    2. Essential hypertension    3. Hyperlipidemia, unspecified hyperlipidemia type    4. Type 2 diabetes mellitus with complication, with long-term current use of insulin    5. Aortic atherosclerosis    6. Chronic diastolic CHF (congestive heart failure)    7. Secondary renal hyperparathyroidism    8. Sacroiliitis, not elsewhere classified    9. Thrombocytopenia, unspecified    10. PAF (paroxysmal atrial fibrillation)    11. CKD (chronic kidney disease) stage 4, GFR 15-29 ml/min    12. Upper respiratory tract infection, unspecified type        Plan:       Radha was seen today for annual exam.    Diagnoses and all orders for this visit:    Annual physical exam    Essential hypertension  Had some fluctuations pressure  -     Basic metabolic panel; Future  -     CBC auto differential; Future  -     Hepatic function panel; Future  -     TSH; Future  -     Ambulatory referral/consult to Pharmacy Assistance; Future    Hyperlipidemia, unspecified hyperlipidemia type  -     Ambulatory referral/consult to Pharmacy Assistance; Future    Type 2 diabetes mellitus with complication, with long-term current use of insulin  Patient is working with Endocrinology insulin may be she currently a new prescription  Alternate to novolog and plans to check with her insruance and endocrinology  Regarding her opotions   With her next lab draw   -     Basic metabolic panel; Future  -     CBC auto differential; Future  -     Hepatic function panel; Future  -     TSH; Future  -     Ambulatory referral/consult to Pharmacy  Assistance; Future    Aortic atherosclerosis  Remains on statin       Sacroiliitis, not elsewhere classified  djd lumbar spine stable     Thrombocytopenia, unspecified  Has been stable over blood    PAF (paroxysmal atrial fibrillation)  Chronic diastolic chf   She remains on Eliquis but also some financial constraints referral to the patient assistance and she discuss with  Cardiology   -     Ambulatory referral/consult to Pharmacy Assistance; Future    CKD (chronic kidney disease) stage 4, GFR 15-29 ml/min  Secondary renal hyperaprthyrodism  Stable  She continues to follow with her neprhologist     Upper respiratory tract infection, unspecified type  Seems to be resolving no fever continue cosnervative measures     Patient was referred to pharmacy assistance for she may get she is also working with her endocrinologist closely     Discussed teatnus/pertussis vaccine   She had her flu shot

## 2020-03-12 DIAGNOSIS — I10 ESSENTIAL HYPERTENSION: Chronic | ICD-10-CM

## 2020-03-13 RX ORDER — CARVEDILOL 12.5 MG/1
TABLET ORAL
Qty: 60 TABLET | Refills: 6 | Status: SHIPPED | OUTPATIENT
Start: 2020-03-13 | End: 2020-10-27 | Stop reason: SDUPTHER

## 2020-03-15 ENCOUNTER — PATIENT MESSAGE (OUTPATIENT)
Dept: LAB | Facility: HOSPITAL | Age: 80
End: 2020-03-15

## 2020-03-16 ENCOUNTER — TELEPHONE (OUTPATIENT)
Dept: LAB | Facility: HOSPITAL | Age: 80
End: 2020-03-16

## 2020-03-20 ENCOUNTER — TELEPHONE (OUTPATIENT)
Dept: ENDOCRINOLOGY | Facility: CLINIC | Age: 80
End: 2020-03-20

## 2020-04-09 ENCOUNTER — TELEPHONE (OUTPATIENT)
Dept: INTERNAL MEDICINE | Facility: CLINIC | Age: 80
End: 2020-04-09

## 2020-04-09 NOTE — TELEPHONE ENCOUNTER
Called and spoke with pt  Discussed with her health conditions  covid precuations and social distancing   she is home and family  Assisting with her groceries  Has been home for 30 days   And reorts feeling well and blood sugar well controlled  She has no new symptoms or concerns

## 2020-06-22 ENCOUNTER — PATIENT OUTREACH (OUTPATIENT)
Dept: ADMINISTRATIVE | Facility: OTHER | Age: 80
End: 2020-06-22

## 2020-06-24 ENCOUNTER — OFFICE VISIT (OUTPATIENT)
Dept: ENDOCRINOLOGY | Facility: CLINIC | Age: 80
End: 2020-06-24
Payer: MEDICARE

## 2020-06-24 ENCOUNTER — TELEPHONE (OUTPATIENT)
Dept: ENDOCRINOLOGY | Facility: CLINIC | Age: 80
End: 2020-06-24

## 2020-06-24 DIAGNOSIS — E11.8 CONTROLLED TYPE 2 DIABETES MELLITUS WITH COMPLICATION, WITH LONG-TERM CURRENT USE OF INSULIN: Primary | ICD-10-CM

## 2020-06-24 DIAGNOSIS — Z79.4 CONTROLLED TYPE 2 DIABETES MELLITUS WITH COMPLICATION, WITH LONG-TERM CURRENT USE OF INSULIN: Primary | ICD-10-CM

## 2020-06-24 PROCEDURE — 99441 PR PHYSICIAN TELEPHONE EVALUATION 5-10 MIN: CPT | Mod: HCNC,95,, | Performed by: NURSE PRACTITIONER

## 2020-06-24 PROCEDURE — 99441 PR PHYSICIAN TELEPHONE EVALUATION 5-10 MIN: ICD-10-PCS | Mod: HCNC,95,, | Performed by: NURSE PRACTITIONER

## 2020-06-24 RX ORDER — INSULIN DEGLUDEC 100 U/ML
INJECTION, SOLUTION SUBCUTANEOUS
Qty: 10 SYRINGE | Refills: 2 | Status: SHIPPED | OUTPATIENT
Start: 2020-06-24 | End: 2021-04-07

## 2020-06-24 RX ORDER — INSULIN ASPART 100 [IU]/ML
INJECTION, SOLUTION INTRAVENOUS; SUBCUTANEOUS
Qty: 60 ML | Refills: 2 | Status: SHIPPED | OUTPATIENT
Start: 2020-06-24 | End: 2021-07-29

## 2020-06-24 NOTE — PROGRESS NOTES
Established Patient - Audio Only Telehealth Visit     The patient location is: home  The chief complaint leading to consultation is: type 2 diabetes  Visit type: Virtual visit with audio only (telephone)  Total time spent with patient: 8 minutes        The reason for the audio only service rather than synchronous audio and video virtual visit was related to technical difficulties or patient preference/necessity.     Each patient to whom I provide medical services by telemedicine is:  (1) informed of the relationship between the physician and patient and the respective role of any other health care provider with respect to management of the patient; and (2) notified that they may decline to receive medical services by telemedicine and may withdraw from such care at any time. Patient verbally consented to receive this service via voice-only telephone call.       CC: This 80 y.o. White female  is here for evaluation of  T2DM along with comorbidities indicated in the Visit Diagnosis section of this encounter.    HPI: Radha Degroot was diagnosed with T2DM in 1980s.     Nephrology - Dr. Mcmanus  Cardiology - Dr. Zamora        Prior visit 11/2019  a1c down from 7.8 to 7.4%. This is the lowest it's been since 2012.   Plan Continue current treatment. Make sure to inject Novolog prior to each meal including lunch.   Test glucose 4x/day.   Return to clinic in 4 months with labs prior.       Interval history  No recent labs available.   She has been staying home because of pandemic.         DIABETES EDUCATION: 3/5/18    PMHX: atrial fibrillation, NSTEMI 2017, CAD, CHF, CKD stage 3     PRESCRIBED DIABETES MEDICATIONS: Tresiba 32 units hs, Novolog Flexpen 14 units w/ ss (Use on premeal readings: 150-200=+1, 201-250=+2, 251-300=+3; 301-350=+4, over 350=+5 units    Misses medication doses - forgets to take Novolog prior to lunch or sometimes doesn't feel comfortable injecting it in public.     DM COMPLICATIONS: nephropathy,  retinopathy and peripheral neuropathy    SELF MONITORING BLOOD GLUCOSE: accuchek dorothy meter. Checks blood glucose at home 4x/day.   She declined Dexcom CGM device. Does not want anything attached to her.  Highest glucose was 298 and 300 because she forgot to take her insulin. But mostly BGs are in the 100s.       HYPOGLYCEMIC EPISODES: symptoms start at bgs in the 80s.  None recent      CURRENT DIET: 3 meals/day.  Drinks coffee in the AM and iced tea with no sugar. No snacks. Breakfast is toast (1-2 slices) and eggs.       CURRENT EXERCISE: none       There were no vitals taken for this visit.      ROS:   CV: no chest pain   : no dysuria       PHYSICAL EXAM:      Hemoglobin A1C   Date Value Ref Range Status   11/14/2019 7.4 (H) 4.0 - 5.6 % Final     Comment:     ADA Screening Guidelines:  5.7-6.4%  Consistent with prediabetes  >or=6.5%  Consistent with diabetes  High levels of fetal hemoglobin interfere with the HbA1C  assay. Heterozygous hemoglobin variants (HbS, HgC, etc)do  not significantly interfere with this assay.   However, presence of multiple variants may affect accuracy.     08/15/2019 7.8 (H) 4.0 - 5.6 % Final     Comment:     ADA Screening Guidelines:  5.7-6.4%  Consistent with prediabetes  >or=6.5%  Consistent with diabetes  High levels of fetal hemoglobin interfere with the HbA1C  assay. Heterozygous hemoglobin variants (HbS, HgC, etc)do  not significantly interfere with this assay.   However, presence of multiple variants may affect accuracy.     02/04/2019 8.2 (H) 4.0 - 5.6 % Final     Comment:     ADA Screening Guidelines:  5.7-6.4%  Consistent with prediabetes  >or=6.5%  Consistent with diabetes  High levels of fetal hemoglobin interfere with the HbA1C  assay. Heterozygous hemoglobin variants (HbS, HgC, etc)do  not significantly interfere with this assay.   However, presence of multiple variants may affect accuracy.             Chemistry        Component Value Date/Time     09/17/2019  1320    K 3.9 09/17/2019 1320     09/17/2019 1320    CO2 25 09/17/2019 1320    BUN 33 (H) 09/17/2019 1320    CREATININE 1.9 (H) 09/17/2019 1320     09/17/2019 1320        Component Value Date/Time    CALCIUM 9.7 09/17/2019 1320    ALKPHOS 91 09/19/2018 1501    AST 13 09/19/2018 1501    ALT 12 09/19/2018 1501    BILITOT 0.6 09/19/2018 1501    ESTGFRAFRICA 28.3 (A) 09/17/2019 1320    EGFRNONAA 24.5 (A) 09/17/2019 1320          Lab Results   Component Value Date    LDLCALC 38.4 (L) 03/08/2019         Lab Results   Component Value Date    MICALBCREAT 29.5 02/11/2015             STANDARDS of CARE:        ASA:               Last eye exam:       ASSESSMENT and PLAN:    A1C GOAL: < 8%     1. Controlled type 2 diabetes mellitus with complication, with long-term current use of insulin  Continue current treatment.   Test glucose 4x/day.   Return to clinic in 3 months with labs prior.     insulin aspart U-100 (NOVOLOG FLEXPEN U-100 INSULIN) 100 unit/mL (3 mL) InPn pen    insulin degludec (TRESIBA FLEXTOUCH U-100) 100 unit/mL (3 mL) InPn           No orders of the defined types were placed in this encounter.       No follow-ups on file.          This service was not originating from a related E/M service provided within the previous 7 days nor will  to an E/M service or procedure within the next 24 hours or my soonest available appointment.  Prevailing standard of care was able to be met in this audio-only visit.

## 2020-06-24 NOTE — TELEPHONE ENCOUNTER
Tried calling the pt to schedule her 3 month follow up appointment for September. Also to schedule her labs the week prior. The pt did not answer LVM to return call.

## 2020-06-24 NOTE — TELEPHONE ENCOUNTER
----- Message from Mattie Alvarado NP sent at 6/24/2020 11:46 AM CDT -----  Schedule 3 mo f/u visit with labs prior.

## 2020-07-08 ENCOUNTER — PATIENT OUTREACH (OUTPATIENT)
Dept: ADMINISTRATIVE | Facility: OTHER | Age: 80
End: 2020-07-08

## 2020-07-08 NOTE — PROGRESS NOTES
Requested updates within Care Everywhere.  Patient's chart was reviewed for overdue OMA topics.  Immunizations reconciled.    Orders placed:n/a  Tasked appts:n/a  Labs Linked:n/a

## 2020-08-25 NOTE — PROGRESS NOTES
Subjective:    Patient ID:  Radha Degroot is a 78 y.o. female who presents for evaluation of Atrial Fibrillation    Referring Cardiologist: Salvatore Silveira MD  Primary Care Physician: Katherine Merida MD    HPI   I had the pleasure of seeing Ms. Degroot today in our electrophysiology clinic for her newly diagnosed atrial fibrillation. As you are aware she is a pleasant 78 year-old woman with coronary artery disease s/p STEMI in 2006 with PCI to LAD/Diag with preserved LVEF, hypertension, diabetes mellitus on insulin, obstructive sleep apnea, severe lumbar disc disease/back pain, and chronic kidney disease stage IV. She was in her usual state of health until the past 1-2 months when she began noting dyspnea on exertion and edema. She denied any palpitations or chest discomfort. She saw Dr. Silveira for this on 3/14/2018. She was noted to be in atrial fibrillation with ventricular rate of 81 bpm. Her previous ECG was from March of 2017 and noted sinus rhythm. She was only taking carvedilol once daily. This was increased to twice daily. Her lasix was increased for only a few days and then returned to her baseline dose of 40mg daily. She was started on eliquis 2.5mg bid. She noted symptomatic improvement with these medication changes. A 24 hour holter was completed which noted AF with controlled ventricular rates. An echo noted normal LVEF.    ECHO 4/2018 CONCLUSIONS     1 - Normal left ventricular systolic function (EF 55-60%).     2 - Biatrial enlargement.     3 - Normal left ventricular diastolic function.     4 - Normal right ventricular systolic function .     5 - The estimated PA systolic pressure is 38 mmHg.     6 - Mild tricuspid regurgitation.     7 - Intermediate central venous pressure.    I reviewed all electrocardiograms available in EPIC which note sinus rhythm/bradycardia with PACs. Over time her VT has lengthened. In 3/2017 was ~300msec.    My interpretation of today's in clinic electrocardiogram is atrial  fibrillation with an average ventricular response rate of 76 bpm.    Review of Systems   Constitution: Negative for fever, weakness and malaise/fatigue.   HENT: Negative for congestion.    Eyes: Negative for blurred vision and visual disturbance.   Cardiovascular: Negative for chest pain, dyspnea on exertion, irregular heartbeat, leg swelling, near-syncope, orthopnea, palpitations and paroxysmal nocturnal dyspnea.   Respiratory: Negative for cough and shortness of breath.    Hematologic/Lymphatic: Negative for bleeding problem. Does not bruise/bleed easily.   Skin: Negative.    Musculoskeletal: Positive for arthritis and back pain.   Gastrointestinal: Negative for hematochezia and melena.   Neurological: Negative for dizziness and focal weakness.        Objective:    Physical Exam   Constitutional: She is oriented to person, place, and time. She appears well-developed and well-nourished. No distress.   HENT:   Head: Normocephalic and atraumatic.   Eyes: Conjunctivae are normal. Right eye exhibits no discharge. Left eye exhibits no discharge.   Neck: Neck supple. No JVD present.   Cardiovascular: Normal rate.  An irregularly irregular rhythm present. Exam reveals no gallop and no friction rub.    No murmur heard.  Pulmonary/Chest: Effort normal and breath sounds normal. No respiratory distress. She has no wheezes. She has no rales.   Abdominal: Soft. Bowel sounds are normal. She exhibits no distension. There is no tenderness.   Musculoskeletal: She exhibits no edema.   Neurological: She is alert and oriented to person, place, and time.   Skin: Skin is warm and dry. She is not diaphoretic.   Psychiatric: She has a normal mood and affect. Her behavior is normal. Thought content normal.   Vitals reviewed.        Assessment:       1. Persistent atrial fibrillation    2. Chronic diastolic CHF (congestive heart failure)    3. Coronary artery disease involving native coronary artery of native heart without angina pectoris     4. Essential hypertension    5. Chronic kidney disease, stage IV (severe)    6. Type 2 diabetes mellitus with diabetic polyneuropathy, with long-term current use of insulin    7. Type 2 diabetes mellitus with stage 4 chronic kidney disease, with long-term current use of insulin    8. Obstructive sleep apnea         Plan:       In summary, Ms. Degroot is a pleasant 78 year-old woman with coronary artery disease s/p STEMI in 2006 with PCI to LAD/Diag with preserved LVEF, hypertension, diabetes mellitus on insulin, obstructive sleep apnea, severe lumbar disc disease/back pain, and chronic kidney disease stage IV presenting for evaluation of newly diagnosed persistent atrial fibrillation. I had a long discussion with the patient about the pathophysiology and risks of atrial fibrillation and its basic pathophysiology, including its health implications and treatment options with the patient. Specifically, I addressed the need for CVA (stroke) prophylaxis with aspirin versus oral anticoagulation (warfarin vs DOACs, discussed bleeding risks, irreversibility of DOACs vs Vitamin K/plasma reversal of warfarin, and need to come to the ER for any head trauma for CT scanning even if asymptomatic).  She was on warfarin therapy after her MI. She does not desire to be on that again. She is agreeable to continue eliquis. Recommend increasing her dosage to 5mg bid (reduced dosage is recommend for 2 of 3 of Age 80 and over, weight<60kg, Creatinine >1.5). Since this is her first diagnosis recommend trial of restoration of sinus rhythm to see if she feels better. She already feels better with medication adjustment by Dr. Silveira. I am concerned about tolerability of an anti-arrhythmic if needed however due to long baseline OK in sinus rhythm and intermittent sinus bradycardia in the past.    Plan  SHAYY guided DCCV  Increase eliquis to 5mg bid    Thank you for allowing me to participate in the care of this patient. Please do not hesitate  to call me with any questions or concerns.    Mike Alexander MD, PhD  Cardiac Electrophysiology                   ,

## 2020-08-26 ENCOUNTER — PATIENT OUTREACH (OUTPATIENT)
Dept: ADMINISTRATIVE | Facility: OTHER | Age: 80
End: 2020-08-26

## 2020-09-29 ENCOUNTER — PATIENT MESSAGE (OUTPATIENT)
Dept: OTHER | Facility: OTHER | Age: 80
End: 2020-09-29

## 2020-10-21 ENCOUNTER — PES CALL (OUTPATIENT)
Dept: ADMINISTRATIVE | Facility: CLINIC | Age: 80
End: 2020-10-21

## 2020-10-21 RX ORDER — ROSUVASTATIN CALCIUM 40 MG/1
TABLET, COATED ORAL
Qty: 90 TABLET | Refills: 2 | Status: SHIPPED | OUTPATIENT
Start: 2020-10-21 | End: 2021-04-30 | Stop reason: SDUPTHER

## 2020-10-27 DIAGNOSIS — I10 ESSENTIAL HYPERTENSION: Chronic | ICD-10-CM

## 2020-10-27 RX ORDER — CARVEDILOL 12.5 MG/1
12.5 TABLET ORAL 2 TIMES DAILY
Qty: 60 TABLET | Refills: 6 | Status: SHIPPED | OUTPATIENT
Start: 2020-10-27 | End: 2020-11-02 | Stop reason: SDUPTHER

## 2020-10-27 NOTE — TELEPHONE ENCOUNTER
----- Message from Azeb Rios sent at 10/27/2020  3:32 PM CDT -----  Regarding: refill request  Contact: patient  [phone]  Requesting an RX refill or new RX.  Is this a refill or new RX: refill  RX name and strength:carvediloL (COREG) 12.5 MG tablet  Is this a 30 day or 90 day RX: 90  Pharmacy name and phone # .[id number] - [place], [place] - [address] AT [address] [phone] (Phone)  [phone] (Fax)

## 2020-10-30 NOTE — TELEPHONE ENCOUNTER
----- Message from Yumiko Bush sent at 10/30/2020  4:50 PM CDT -----  Contact: self   Diabetic or Medical Supplies.  What supplies are needed: test strips  What is the brand name of the supplies: ACCU-CHEK MELIDA PLUS TEST STRP Strp  Is this a refill or new prescription:  new  Who prescribed the supplies:  Katherine Merida  Pharmacy or company name, phone # and location:  [id number] [place] [address] AT [address] [phone] (Phone)  [phone] (Fax)  Comments:

## 2020-10-31 ENCOUNTER — TELEPHONE (OUTPATIENT)
Dept: ENDOCRINOLOGY | Facility: CLINIC | Age: 80
End: 2020-10-31

## 2020-10-31 RX ORDER — BLOOD SUGAR DIAGNOSTIC
STRIP MISCELLANEOUS
Qty: 400 STRIP | Refills: 3 | Status: SHIPPED | OUTPATIENT
Start: 2020-10-31 | End: 2021-02-08 | Stop reason: SDUPTHER

## 2020-10-31 NOTE — TELEPHONE ENCOUNTER
----- Message from Savi Manning sent at 10/30/2020 11:40 AM CDT -----  Regarding: needs a new prescription for test strips  Contact: [phone]  Requesting an RX refill or new RX. RX Refill  Is this a refill or new RX: Refill   RX name and strength: test strips   Is this a 30 day or 90 day RX: 90 day   Pharmacy name and phone # (copy/paste from chart):  [facility] #[id number] [place] [address] AT [address] [phone] (Phone)  [phone] (Fax)      Comments:

## 2020-11-02 DIAGNOSIS — I10 ESSENTIAL HYPERTENSION: Chronic | ICD-10-CM

## 2020-11-02 RX ORDER — CARVEDILOL 12.5 MG/1
12.5 TABLET ORAL 2 TIMES DAILY
Qty: 60 TABLET | Refills: 6 | Status: SHIPPED | OUTPATIENT
Start: 2020-11-02 | End: 2021-04-30 | Stop reason: SDUPTHER

## 2020-11-13 ENCOUNTER — TELEPHONE (OUTPATIENT)
Dept: INTERNAL MEDICINE | Facility: CLINIC | Age: 80
End: 2020-11-13

## 2020-11-13 RX ORDER — CIPROFLOXACIN 250 MG/1
250 TABLET, FILM COATED ORAL 2 TIMES DAILY
Qty: 6 TABLET | Refills: 0 | Status: SHIPPED | OUTPATIENT
Start: 2020-11-13 | End: 2020-11-16

## 2020-11-13 NOTE — TELEPHONE ENCOUNTER
----- Message from Josefina Paredes sent at 11/13/2020 10:13 AM CST -----  Contact: Patient [phone]  Would like to get medical advice  Symptoms: possible bladder infection/low grade fever/burning when urinating  How long has patient had these symptoms: yesterday  Pharmacy name and phone #:   [id number] [place] [address] [place] [address]  [facility] [place] [address]  Phone: [phone] Fax: [phone]    Comments:    Please call and advise.    Thank you

## 2020-11-13 NOTE — TELEPHONE ENCOUNTER
Spoke to patient. She stated she lives too far and hasn't left the house in weeks. She said she does not feel well enough to go anywhere and would rather not come in. She is asking for PCP to send in a rx for UTI and stated if rx does not help, then she will schedule a appointment to come in. Please send script.

## 2020-11-16 NOTE — TELEPHONE ENCOUNTER
Patient stated she is feeling a lot better and thing her issues were cause by a infected nail that just cleared up from home remedies. She said she did not bother taking rx because it makes her nauseous.

## 2020-11-17 ENCOUNTER — PATIENT OUTREACH (OUTPATIENT)
Dept: ADMINISTRATIVE | Facility: OTHER | Age: 80
End: 2020-11-17

## 2020-11-17 ENCOUNTER — TELEPHONE (OUTPATIENT)
Dept: PODIATRY | Facility: CLINIC | Age: 80
End: 2020-11-17

## 2020-11-17 NOTE — TELEPHONE ENCOUNTER
----- Message from Clementina Eisenberg sent at 11/17/2020  9:40 AM CST -----  Regarding: Sooner Appointment Request  Contact: Patient  Type:  Sooner Appointment Request    Patient is requesting a sooner appointment.  Patient declined first available appointment listed as well as another facility and provider .  Patient will not accept being placed on the waitlist and is requesting a message be sent to doctor.    Name of Caller: Patient     When is the first available appointment? 12/7/2020    Symptoms: sore toe     Would the patient rather a call back or a response via [facility]? Call back     Best Call Back Number: [phone]

## 2020-11-18 ENCOUNTER — OFFICE VISIT (OUTPATIENT)
Dept: PODIATRY | Facility: CLINIC | Age: 80
End: 2020-11-18
Payer: MEDICARE

## 2020-11-18 ENCOUNTER — HOSPITAL ENCOUNTER (OUTPATIENT)
Dept: RADIOLOGY | Facility: HOSPITAL | Age: 80
Discharge: HOME | End: 2020-11-18
Attending: PODIATRIST
Payer: MEDICARE

## 2020-11-18 VITALS
SYSTOLIC BLOOD PRESSURE: 146 MMHG | WEIGHT: 217 LBS | HEIGHT: 68 IN | DIASTOLIC BLOOD PRESSURE: 69 MMHG | BODY MASS INDEX: 32.89 KG/M2

## 2020-11-18 DIAGNOSIS — E11.49 TYPE II DIABETES MELLITUS WITH NEUROLOGICAL MANIFESTATIONS: Primary | ICD-10-CM

## 2020-11-18 DIAGNOSIS — E08.621 DIABETIC ULCER OF TOE OF LEFT FOOT ASSOCIATED WITH DIABETES MELLITUS DUE TO UNDERLYING CONDITION, WITH FAT LAYER EXPOSED: ICD-10-CM

## 2020-11-18 DIAGNOSIS — E11.49 TYPE II DIABETES MELLITUS WITH NEUROLOGICAL MANIFESTATIONS: ICD-10-CM

## 2020-11-18 DIAGNOSIS — L97.522 DIABETIC ULCER OF TOE OF LEFT FOOT ASSOCIATED WITH DIABETES MELLITUS DUE TO UNDERLYING CONDITION, WITH FAT LAYER EXPOSED: ICD-10-CM

## 2020-11-18 PROCEDURE — 3078F PR MOST RECENT DIASTOLIC BLOOD PRESSURE < 80 MM HG: ICD-10-PCS | Mod: HCNC,CPTII,S$GLB, | Performed by: PODIATRIST

## 2020-11-18 PROCEDURE — 1159F PR MEDICATION LIST DOCUMENTED IN MEDICAL RECORD: ICD-10-PCS | Mod: HCNC,S$GLB,, | Performed by: PODIATRIST

## 2020-11-18 PROCEDURE — 3288F FALL RISK ASSESSMENT DOCD: CPT | Mod: HCNC,CPTII,S$GLB, | Performed by: PODIATRIST

## 2020-11-18 PROCEDURE — 3077F SYST BP >= 140 MM HG: CPT | Mod: HCNC,CPTII,S$GLB, | Performed by: PODIATRIST

## 2020-11-18 PROCEDURE — 99214 PR OFFICE/OUTPT VISIT, EST, LEVL IV, 30-39 MIN: ICD-10-PCS | Mod: 25,HCNC,S$GLB, | Performed by: PODIATRIST

## 2020-11-18 PROCEDURE — 87075 CULTR BACTERIA EXCEPT BLOOD: CPT | Mod: HCNC

## 2020-11-18 PROCEDURE — 99999 PR PBB SHADOW E&M-EST. PATIENT-LVL V: CPT | Mod: PBBFAC,HCNC,, | Performed by: PODIATRIST

## 2020-11-18 PROCEDURE — 1125F PR PAIN SEVERITY QUANTIFIED, PAIN PRESENT: ICD-10-PCS | Mod: HCNC,S$GLB,, | Performed by: PODIATRIST

## 2020-11-18 PROCEDURE — 11042 WOUND DEBRIDEMENT: ICD-10-PCS | Mod: HCNC,S$GLB,, | Performed by: PODIATRIST

## 2020-11-18 PROCEDURE — 73630 X-RAY EXAM OF FOOT: CPT | Mod: 26,HCNC,LT, | Performed by: RADIOLOGY

## 2020-11-18 PROCEDURE — 99999 PR PBB SHADOW E&M-EST. PATIENT-LVL V: ICD-10-PCS | Mod: PBBFAC,HCNC,, | Performed by: PODIATRIST

## 2020-11-18 PROCEDURE — 11042 DBRDMT SUBQ TIS 1ST 20SQCM/<: CPT | Mod: HCNC,S$GLB,, | Performed by: PODIATRIST

## 2020-11-18 PROCEDURE — 1159F MED LIST DOCD IN RCRD: CPT | Mod: HCNC,S$GLB,, | Performed by: PODIATRIST

## 2020-11-18 PROCEDURE — 99499 RISK ADDL DX/OHS AUDIT: ICD-10-PCS | Mod: S$GLB,,, | Performed by: PODIATRIST

## 2020-11-18 PROCEDURE — 3078F DIAST BP <80 MM HG: CPT | Mod: HCNC,CPTII,S$GLB, | Performed by: PODIATRIST

## 2020-11-18 PROCEDURE — 3077F PR MOST RECENT SYSTOLIC BLOOD PRESSURE >= 140 MM HG: ICD-10-PCS | Mod: HCNC,CPTII,S$GLB, | Performed by: PODIATRIST

## 2020-11-18 PROCEDURE — 1101F PR PT FALLS ASSESS DOC 0-1 FALLS W/OUT INJ PAST YR: ICD-10-PCS | Mod: HCNC,CPTII,S$GLB, | Performed by: PODIATRIST

## 2020-11-18 PROCEDURE — 3288F PR FALLS RISK ASSESSMENT DOCUMENTED: ICD-10-PCS | Mod: HCNC,CPTII,S$GLB, | Performed by: PODIATRIST

## 2020-11-18 PROCEDURE — 99499 UNLISTED E&M SERVICE: CPT | Mod: S$GLB,,, | Performed by: PODIATRIST

## 2020-11-18 PROCEDURE — 87070 CULTURE OTHR SPECIMN AEROBIC: CPT | Mod: HCNC

## 2020-11-18 PROCEDURE — 87077 CULTURE AEROBIC IDENTIFY: CPT | Mod: HCNC

## 2020-11-18 PROCEDURE — 99214 OFFICE O/P EST MOD 30 MIN: CPT | Mod: 25,HCNC,S$GLB, | Performed by: PODIATRIST

## 2020-11-18 PROCEDURE — 73630 X-RAY EXAM OF FOOT: CPT | Mod: TC,HCNC,FY,LT

## 2020-11-18 PROCEDURE — 1125F AMNT PAIN NOTED PAIN PRSNT: CPT | Mod: HCNC,S$GLB,, | Performed by: PODIATRIST

## 2020-11-18 PROCEDURE — 73630 XR FOOT COMPLETE 3 VIEW LEFT: ICD-10-PCS | Mod: 26,HCNC,LT, | Performed by: RADIOLOGY

## 2020-11-18 PROCEDURE — 87186 SC STD MICRODIL/AGAR DIL: CPT | Mod: HCNC

## 2020-11-18 PROCEDURE — 1101F PT FALLS ASSESS-DOCD LE1/YR: CPT | Mod: HCNC,CPTII,S$GLB, | Performed by: PODIATRIST

## 2020-11-18 RX ORDER — DOXYCYCLINE HYCLATE 100 MG
100 TABLET ORAL 2 TIMES DAILY
Qty: 20 TABLET | Refills: 0 | Status: SHIPPED | OUTPATIENT
Start: 2020-11-18 | End: 2021-02-26

## 2020-11-18 NOTE — PATIENT INSTRUCTIONS
Please keep football dressing clean, dry, and intact.  If dressing gets wet please contact our office.    Wear special shoe every time foot is placed on the floor.    Elevate affected foot as much as possible    Stay hydrated.      Nutrition and MyPlate: Protein Foods  This group includes foods that are high in protein. Protein helps the body build new cells and keeps tissues healthy. Most Americans get enough protein without even trying. It can be harder for vegetarians, but plenty of non-meat foods are rich in protein, too. Its best to get protein from a variety of sources.    Nutrient-Rich Choices  Theres a lot more to this food group than just meat and beans. It also includes nuts, seeds, and eggs. There are all sorts of nutrient-rich choices:  · Chicken and turkey with the skin removed  · Fish and shellfish  · Lean beef, pork, or lamb (without visible fat)  · Soy products, such as tofu, soybeans (edamame), tempeh, or soymilk  · Black beans, kidney beans, light beans, chickpeas (garbanzo beans), and lentils (Note: beans and peas count as both a protein and a vegetable)  · Peanuts, almonds, walnuts, sesame seeds, and sunflower  seeds, as well as foods made from these (such as peanut butter or tahini)  · Eggs and foods made with eggs (such as quiche or frittata)  What Makes Meat and Beans Less Healthy?  · Fatty meat is not healthy. Before you cook meat, trim off all the fat you can see. Chicken and turkey skin is also high in fat, and should be removed before cooking.  · Breading and frying make food less healthy. This includes dishes like fried chicken, fried fish, and refried beans.  · Sausage and lunch meats tend to be high in fat and salt. Buy low-fat, low-sodium versions.  One Small Change  Make a meal that includes a non-meat source of protein (such as tofu, lentils, or any other food listed above). Have a better idea? Write it here:  _____________________________________________________________  ©  9372-1520 [employer]. [address] [place] [address], [place] [address]. All rights reserved. This information is not intended as a substitute for professional medical care. Always follow your healthcare professional's instructions.

## 2020-11-18 NOTE — PROGRESS NOTES
Subjective:      Patient ID: Radha Degroot is a 80 y.o. female.    Chief Complaint: Diabetes Mellitus (PCP  02/07/2020), Foot Pain, and Foot Problem    Radha Degroot is a 80 y.o. female with  has a past medical history of ALLERGIC RHINITIS, Anemia, Anticoagulant long-term use, Arthritis, Cardiomyopathy, ischemic, Carpal tunnel syndrome, Cataract, CHF (congestive heart failure), Chronic kidney disease, Coronary artery disease, Diabetes mellitus type II, Diabetic neuropathy, Diabetic retinopathy of both eyes, Encounter for blood transfusion, GERD (gastroesophageal reflux disease), Gout, unspecified, h/o LAD and D1 PCI in 2006, Heart attack, Hiatal hernia, HIT (heparin-induced thrombocytopenia), colonic polyps, colonic polyps, Hyperlipidemia, Hypertension, Myocardial infarction, Persistent atrial fibrillation, Posterior vitreous detachment of both eyes, Sleep apnea, and Type 2 diabetes mellitus with mild nonproliferative diabetic retinopathy with macular edema. presents to the podiatry clinic for care of  left foot ulcer.   Location: second toe. Onset of the symptoms was yesterday. Precipitating event: patient relates that she self injured with nail nipper.  History of injury: direct trauma Current symptoms include: redness and swelling. Signs of infection she denies nausea, vomiting, fever, chills.   Symptoms have progressed to a point and plateaued. Patient has had no prior history of wounds. Evaluation to date: none. Treatment to date: none. Patients rates pain 4/10 on pain scale.    Shoe gear: Casual shoes    Chief Complaint   Patient presents with    Diabetes Mellitus     PCP  02/07/2020    Foot Pain    Foot Problem       Hemoglobin A1C   Date Value Ref Range Status   11/14/2019 7.4 (H) 4.0 - 5.6 % Final     Comment:     ADA Screening Guidelines:  5.7-6.4%  Consistent with prediabetes  >or=6.5%  Consistent with diabetes  High levels of fetal hemoglobin interfere with the HbA1C  assay.  Heterozygous hemoglobin variants (HbS, HgC, etc)do  not significantly interfere with this assay.   However, presence of multiple variants may affect accuracy.     08/15/2019 7.8 (H) 4.0 - 5.6 % Final     Comment:     ADA Screening Guidelines:  5.7-6.4%  Consistent with prediabetes  >or=6.5%  Consistent with diabetes  High levels of fetal hemoglobin interfere with the HbA1C  assay. Heterozygous hemoglobin variants (HbS, HgC, etc)do  not significantly interfere with this assay.   However, presence of multiple variants may affect accuracy.     02/04/2019 8.2 (H) 4.0 - 5.6 % Final     Comment:     ADA Screening Guidelines:  5.7-6.4%  Consistent with prediabetes  >or=6.5%  Consistent with diabetes  High levels of fetal hemoglobin interfere with the HbA1C  assay. Heterozygous hemoglobin variants (HbS, HgC, etc)do  not significantly interfere with this assay.   However, presence of multiple variants may affect accuracy.           Patient Active Problem List   Diagnosis    Hyperlipidemia    Coronary artery disease    Anemia of chronic illness    Hearing loss, sensorineural    Chronic kidney disease, stage IV (severe)    Secondary renal hyperparathyroidism    Acquired cyst of kidney    Nuclear sclerotic cataract of left eye    Pseudophakia of right eye    Amblyopia    Posterior vitreous detachment of both eyes    Type 2 diabetes mellitus with mild nonproliferative retinopathy and macular edema    Type 2 diabetes mellitus with stage 4 chronic kidney disease, with long-term current use of insulin    Type 2 diabetes mellitus with diabetic polyneuropathy, with long-term current use of insulin    Muscle weakness    Stiffness of vertebral column    Chronic midline low back pain without sciatica    Difficulty walking    Degenerative joint disease (DJD) of lumbar spine    Vitamin D deficiency disease    Essential hypertension    Chronic diastolic CHF (congestive heart failure)    Obstructive sleep apnea     Mixed incontinence urge and stress    Type 2 diabetes mellitus with hypoglycemia without coma, with long-term current use of insulin    Chronic pain    Angiomyolipoma of right kidney    Azotemia    Facet arthritis, degenerative, lumbar spine    Lumbar spondylosis    Permanent atrial fibrillation    Hypertensive kidney disease with stage 4 chronic kidney disease    Venous insufficiency    Moderate nonproliferative diabetic retinopathy of both eyes without macular edema associated with type 2 diabetes mellitus    Macular scar of both eyes    Persistent proteinuria    Sacroiliitis, not elsewhere classified    Thrombocytopenia, unspecified       Current Outpatient Medications on File Prior to Visit   Medication Sig Dispense Refill    blood sugar diagnostic (ACCU-CHEK MELIDA PLUS TEST STRP) Strp TEST BLOOD SUGAR FOUR TIMES DAILY 400 strip 3    blood-glucose meter (ACCU-CHEK MELIDA PLUS METER) Misc To check sugars 4 times daily.  Please include control solution 1 each 0    carvediloL (COREG) 12.5 MG tablet Take 1 tablet (12.5 mg total) by mouth 2 (two) times a day. 60 tablet 6    ELIQUIS 2.5 mg Tab TAKE 1 TABLET(2.5 MG) BY MOUTH TWICE DAILY 120 tablet 3    ergocalciferol, vitamin D2, (VITAMIN D ORAL) Take by mouth once daily.      fluticasone (FLONASE) 50 mcg/actuation nasal spray SHAKE LIQUID AND USE 2 SPRAYS IN EACH NOSTRIL EVERY DAY 48 mL 4    furosemide (LASIX) 40 MG tablet Take 2 tablets (80 mg total) by mouth 2 (two) times daily. 360 tablet 3    insulin aspart U-100 (NOVOLOG FLEXPEN U-100 INSULIN) 100 unit/mL (3 mL) InPn pen INJECT 14 UNITS UNDER THE SKIN BEFORE MEALS WITH SLIDING SCALE( MAXIMUM DAILY DOSE IS 60 UNITS) 60 mL 2    insulin degludec (TRESIBA FLEXTOUCH U-100) 100 unit/mL (3 mL) InPn INJECT 32 UNITS SUBCUTANEOUS ONCE DAILY 10 Syringe 2    ketoconazole (NIZORAL) 2 % shampoo APPLY TO AFFECTED AREA EVERY OTHER DAY AND LET SIT 5 MINUTES PRIOR TO RINSING AS DIRECTED 120 mL 3     "MULTIVIT-MIN/IRON/FOLIC/LUTEIN (CENTRUM SILVER WOMEN ORAL) Take by mouth once daily.       MYRBETRIQ 50 mg Tb24 TAKE 1 TABLET(50 MG) BY MOUTH EVERY DAY 90 tablet 1    pen needle, diabetic (BD TIM 2ND GEN PEN NEEDLE) 32 gauge x 5/32" Ndle Use 4x/day 400 each 3    rosuvastatin (CRESTOR) 40 MG Tab TAKE 1 TABLET(40 MG) BY MOUTH EVERY DAY 90 tablet 2    vitamin B complex (B COMPLEX VITAMINS ORAL) Take by mouth once daily.      VITAMIN E, BULK, MISC by Misc.(Non-Drug; Combo Route) route once daily.       No current facility-administered medications on file prior to visit.        Review of patient's allergies indicates:   Allergen Reactions    Heparin analogues Other (See Comments)     thrombocytopenia    Ancef [cefazolin]     Keflex [cephalexin] Rash    Oxybutynin Other (See Comments)     Metallic taste       Past Surgical History:   Procedure Laterality Date    APPENDECTOMY      CATARACT EXTRACTION      Right eye    EPIDURAL STEROID INJECTION N/A 11/2/2018    Procedure: Injection, Steroid, Epidural CAUDAL ELISA;  Surgeon: Ирина Peña MD;  Location: [facility];  Service: Pain Management;  Laterality: N/A;    heart stent      X 2    HYSTERECTOMY      Fibroids       Family History   Problem Relation Age of Onset    Diabetes Mother     Heart disease Mother     Hypertension Mother     Diabetes Father     Melanoma Father     Kidney failure Father     Mental illness Sister         suicide/schizophrenic    Cancer Maternal Grandfather     Cancer Paternal Grandfather         colon    Psoriasis Neg Hx     Lupus Neg Hx     Eczema Neg Hx        Social History     Socioeconomic History    Marital status: Single     Spouse name: Not on file    Number of children: Not on file    Years of education: Not on file    Highest education level: Not on file   Occupational History    Occupation: Realtor   Social Needs    Financial resource strain: Not on file    Food insecurity     Worry: Not on file     " "Inability: Not on file    Transportation needs     Medical: Not on file     Non-medical: Not on file   Tobacco Use    Smoking status: Never Smoker    Smokeless tobacco: Never Used   Substance and Sexual Activity    Alcohol use: No     Alcohol/week: 0.0 standard drinks    Drug use: No    Sexual activity: Not Currently   Lifestyle    Physical activity     Days per week: Not on file     Minutes per session: Not on file    Stress: Not on file   Relationships    Social connections     Talks on phone: Not on file     Gets together: Not on file     Attends Denominational service: Not on file     Active member of club or organization: Not on file     Attends meetings of clubs or organizations: Not on file     Relationship status: Not on file   Other Topics Concern    Are you pregnant or think you may be? No    Breast-feeding No   Social History Narrative    Part time .    Severe back pain.    Retired in 1975       Review of Systems   Constitution: Negative for chills and fever.   Cardiovascular: Negative for claudication and leg swelling.   Respiratory: Negative for cough and shortness of breath.    Skin: Positive for dry skin and nail changes. Negative for itching and rash.   Musculoskeletal: Positive for joint pain and myalgias. Negative for falls, joint swelling and muscle weakness.   Gastrointestinal: Negative for diarrhea, nausea and vomiting.   Neurological: Positive for numbness and paresthesias. Negative for tremors and weakness.   Psychiatric/Behavioral: Negative for altered mental status and hallucinations.           Objective:      Vitals:    11/18/20 1151   BP: (!) 146/69   Weight: 98.4 kg (217 lb)   Height: 5' 8" (1.727 m)   PainSc:   4   PainLoc: Toe       Physical Exam  Nursing note reviewed.   Constitutional:       General: She is not in acute distress.     Appearance: She is not toxic-appearing or diaphoretic.      Comments: Pt. is well-developed, well-nourished, appears stated age, " in no acute distress, alert and oriented x 3. No evidence of depression, anxiety, or agitation. Calm, cooperative, and communicative. Appropriate interactions and affect.   Cardiovascular:      Pulses:           Dorsalis pedis pulses are 1+ on the right side and 1+ on the left side.        Posterior tibial pulses are 1+ on the right side and 1+ on the left side.      Comments: Dorsalis pedis and posterior tibial pulses are diminished Bilaterally. Toes are cool to touch. Feet are warm proximally.There is decreased digital hair. Skin is atrophic,  hyperpigmented  Pulmonary:      Effort: No respiratory distress.   Musculoskeletal:      Right ankle: She exhibits swelling. No tenderness. No lateral malleolus, no medial malleolus, no AITFL, no CF ligament and no posterior TFL tenderness found. Achilles tendon exhibits no pain, no defect and normal Morin's test results.      Left ankle: She exhibits swelling. No tenderness. No lateral malleolus, no medial malleolus, no AITFL, no CF ligament and no posterior TFL tenderness found. Achilles tendon exhibits no pain, no defect and normal Morin's test results.      Right foot: No tenderness or bony tenderness.      Left foot: No tenderness or bony tenderness.      Comments: Hypermobile flatfoot bilaterally    Patient has hammertoes of digits 2-5 bilateral partially reducible without symptom today.    Visible and palpable bunion without pain at dorsomedial 1st metatarsal head right and left.  Hallux abducted right and left partially reducible, tracks laterally without being track bound.  No ecchymosis, erythema, edema, or cardinal signs infection or signs of trauma same foot.    Fat pad atrophy to heels and met heads bilateral    There is equinus deformity bilateral. There is limitation of dorsiflexion with knees extended Bilaterally,  adequate with knees flexed.       Lymphadenopathy:      Comments: No lymphatic streaking    Negative lymphadenopathy bilateral popliteal  fossa and tarsal tunnel.     Skin:     General: Skin is warm and dry.      Coloration: Skin is not pale.      Findings: Erythema and wound present. No ecchymosis, laceration or rash.      Nails: There is no clubbing.        Comments: Ulcer location: distal medial left 2nd digit  Measurements : post debridement 0.3x0.6x0.2 cm   Signs of infection: local edema and erythema  Drainage: Sero-Sanguinous  Purulence: no  Crepitus/fluctuance: no  Periwound: Reddened, Macerated, Calloused  Base: Granular/Healthy  Depth: subcutaneous tissue  Probe to bone: no      Toenails 1-5 bilaterally are elongated by 2-3 mm, thickened by 2-3 mm, discolored/yellowed, dystrophic, brittle with subungual debris.    Focal hyperkeratotic lesion consisting entirely of hyperkeratotic tissue without open skin, drainage, pus, fluctuance, malodor, or signs of infection hallux IPJ bilaterally   Neurological:      Comments:  [place]-Flo 5.07 monofilamant testing is diminished Jonah feet. Decreased/absent vibratory sensation bilateral feet to 128Hz tuning fork.      Paresthesias, and hyperesthesia bilateral feet with no clearly identified trigger or source.           Psychiatric:         Attention and Perception: She is attentive.         Mood and Affect: Mood is not anxious. Affect is not inappropriate.         Speech: She is communicative. Speech is not slurred.         Behavior: Behavior is not combative.         11/18/2020                  Assessment:       Encounter Diagnoses   Name Primary?    Type II diabetes mellitus with neurological manifestations Yes    Diabetic ulcer of toe of left foot associated with diabetes mellitus due to underlying condition, with fat layer exposed          Plan:     Problem List Items Addressed This Visit     None      Visit Diagnoses     Type II diabetes mellitus with neurological manifestations    -  Primary    Relevant Orders    Aerobic culture (Completed)    Culture, Anaerobic (Completed)    X-Ray Foot  Complete Left (Completed)    Diabetic ulcer of toe of left foot associated with diabetes mellitus due to underlying condition, with fat layer exposed        Relevant Orders    Aerobic culture (Completed)    Culture, Anaerobic (Completed)    X-Ray Foot Complete Left (Completed)    Wound Debridement         I counseled the patient on her conditions, their implications and medical management.      Greater than 50% of this visit spent on counseling and coordination of care.    Education about the diabetic foot, neuropathy, and prevention of limb loss.    Discussed wound healing cycle, skin integrity, ways to care for skin.Counseled patient on the effects of blood glucose on healing. She and her son verbalize understanding that it can increase the chances of delayed healing and this prolonged exposure leads to infection or progression of infection which subsequently can result in loss of limb.    Adequate vitamin supplementation, protein intake, and hydration - discussed with patient    Imaging ordered to rule out gas in the soft tissue or bony involvement    The wound is cleansed of foreign material as much as possible and the base inspected for bone or abscess. Base is granular and without bone nor joint exposure. Aerobic and anerobic cultures swabs taken    Debridement: procedure note at end  Dressings: endoform and iodosorb  Offloading: football and darco shoe    Follow-up: 1 week but should call [facility] immediately if any signs of infection, such as fever, chills, sweats, increased redness or pain.    Short-term goals include maintaining good offloading and minimizing bioburden, promoting granulation and epithelialization to healing.  Long-term goals include keeping the wound healed by good offloading and medical management under the direction of internist.    Shoe inspection. Diabetic Foot Education. Patient reminded of the importance of good nutrition and blood sugar control to help prevent podiatric complications  "of diabetes. Patient instructed on proper foot hygeine. We discussed wearing proper shoe gear, daily foot inspections, never walking without protective shoe gear, never putting sharp instruments to feet.          Wound Debridement    Date/Time: 11/18/2020 10:45 AM  Performed by: Hortencia Cox DPM  Authorized by: Hortencia Cox DPM     Time out: Immediately prior to procedure a "time out" was called to verify the correct patient, procedure, equipment, support staff and site/side marked as required.    Consent Done?:  Yes (Verbal)    Preparation: Patient was prepped and draped in usual sterile fashion    Local anesthesia used?: No      Wound Details:    Location:  Left foot    Location:  Left 2nd Toe    Type of Debridement:  Excisional       Length (cm):  0.3       Area (sq cm):  0.18       Width (cm):  0.6       Percent Debrided (%):  100       Depth (cm):  0.2       Total Area Debrided (sq cm):  0.18    Depth of debridement:  Subcutaneous tissue    Tissue debrided:  Epidermis, Dermis and Subcutaneous    Devitalized tissue debrided:  Callus and Fibrin    Instruments:  Curette    Bleeding:  Minimal  Hemostasis Achieved: Yes    Method Used:  Pressure  Patient tolerance:  Patient tolerated the procedure well with no immediate complications                  "

## 2020-11-20 LAB — BACTERIA SPEC AEROBE CULT: ABNORMAL

## 2020-11-23 LAB — BACTERIA SPEC ANAEROBE CULT: NORMAL

## 2020-11-25 ENCOUNTER — OFFICE VISIT (OUTPATIENT)
Dept: PODIATRY | Facility: CLINIC | Age: 80
End: 2020-11-25
Payer: MEDICARE

## 2020-11-25 VITALS
SYSTOLIC BLOOD PRESSURE: 126 MMHG | HEIGHT: 68 IN | BODY MASS INDEX: 32.89 KG/M2 | WEIGHT: 217 LBS | DIASTOLIC BLOOD PRESSURE: 87 MMHG

## 2020-11-25 DIAGNOSIS — L97.522 DIABETIC ULCER OF TOE OF LEFT FOOT ASSOCIATED WITH DIABETES MELLITUS DUE TO UNDERLYING CONDITION, WITH FAT LAYER EXPOSED: ICD-10-CM

## 2020-11-25 DIAGNOSIS — E11.49 TYPE II DIABETES MELLITUS WITH NEUROLOGICAL MANIFESTATIONS: Primary | ICD-10-CM

## 2020-11-25 DIAGNOSIS — E08.621 DIABETIC ULCER OF TOE OF LEFT FOOT ASSOCIATED WITH DIABETES MELLITUS DUE TO UNDERLYING CONDITION, WITH FAT LAYER EXPOSED: ICD-10-CM

## 2020-11-25 PROCEDURE — 3074F PR MOST RECENT SYSTOLIC BLOOD PRESSURE < 130 MM HG: ICD-10-PCS | Mod: HCNC,CPTII,S$GLB, | Performed by: PODIATRIST

## 2020-11-25 PROCEDURE — 3288F FALL RISK ASSESSMENT DOCD: CPT | Mod: HCNC,CPTII,S$GLB, | Performed by: PODIATRIST

## 2020-11-25 PROCEDURE — 1101F PT FALLS ASSESS-DOCD LE1/YR: CPT | Mod: HCNC,CPTII,S$GLB, | Performed by: PODIATRIST

## 2020-11-25 PROCEDURE — 1126F PR PAIN SEVERITY QUANTIFIED, NO PAIN PRESENT: ICD-10-PCS | Mod: HCNC,S$GLB,, | Performed by: PODIATRIST

## 2020-11-25 PROCEDURE — 99213 OFFICE O/P EST LOW 20 MIN: CPT | Mod: HCNC,S$GLB,, | Performed by: PODIATRIST

## 2020-11-25 PROCEDURE — 3079F DIAST BP 80-89 MM HG: CPT | Mod: HCNC,CPTII,S$GLB, | Performed by: PODIATRIST

## 2020-11-25 PROCEDURE — 3079F PR MOST RECENT DIASTOLIC BLOOD PRESSURE 80-89 MM HG: ICD-10-PCS | Mod: HCNC,CPTII,S$GLB, | Performed by: PODIATRIST

## 2020-11-25 PROCEDURE — 99999 PR PBB SHADOW E&M-EST. PATIENT-LVL III: ICD-10-PCS | Mod: PBBFAC,HCNC,, | Performed by: PODIATRIST

## 2020-11-25 PROCEDURE — 3288F PR FALLS RISK ASSESSMENT DOCUMENTED: ICD-10-PCS | Mod: HCNC,CPTII,S$GLB, | Performed by: PODIATRIST

## 2020-11-25 PROCEDURE — 1159F PR MEDICATION LIST DOCUMENTED IN MEDICAL RECORD: ICD-10-PCS | Mod: HCNC,S$GLB,, | Performed by: PODIATRIST

## 2020-11-25 PROCEDURE — 1159F MED LIST DOCD IN RCRD: CPT | Mod: HCNC,S$GLB,, | Performed by: PODIATRIST

## 2020-11-25 PROCEDURE — 99213 PR OFFICE/OUTPT VISIT, EST, LEVL III, 20-29 MIN: ICD-10-PCS | Mod: HCNC,S$GLB,, | Performed by: PODIATRIST

## 2020-11-25 PROCEDURE — 1126F AMNT PAIN NOTED NONE PRSNT: CPT | Mod: HCNC,S$GLB,, | Performed by: PODIATRIST

## 2020-11-25 PROCEDURE — 99999 PR PBB SHADOW E&M-EST. PATIENT-LVL III: CPT | Mod: PBBFAC,HCNC,, | Performed by: PODIATRIST

## 2020-11-25 PROCEDURE — 3074F SYST BP LT 130 MM HG: CPT | Mod: HCNC,CPTII,S$GLB, | Performed by: PODIATRIST

## 2020-11-25 PROCEDURE — 1101F PR PT FALLS ASSESS DOC 0-1 FALLS W/OUT INJ PAST YR: ICD-10-PCS | Mod: HCNC,CPTII,S$GLB, | Performed by: PODIATRIST

## 2020-11-25 NOTE — PATIENT INSTRUCTIONS
Please keep football dressing clean, dry, and intact.  If dressing gets wet please contact our office.    Wear special shoe every time foot is placed on the floor.    Elevate affected foot as much as possible    Stay hydrated.      Nutrition and MyPlate: Protein Foods  This group includes foods that are high in protein. Protein helps the body build new cells and keeps tissues healthy. Most Americans get enough protein without even trying. It can be harder for vegetarians, but plenty of non-meat foods are rich in protein, too. Its best to get protein from a variety of sources.    Nutrient-Rich Choices  Theres a lot more to this food group than just meat and beans. It also includes nuts, seeds, and eggs. There are all sorts of nutrient-rich choices:  · Chicken and turkey with the skin removed  · Fish and shellfish  · Lean beef, pork, or lamb (without visible fat)  · Soy products, such as tofu, soybeans (edamame), tempeh, or soymilk  · Black beans, kidney beans, light beans, chickpeas (garbanzo beans), and lentils (Note: beans and peas count as both a protein and a vegetable)  · Peanuts, almonds, walnuts, sesame seeds, and sunflower  seeds, as well as foods made from these (such as peanut butter or tahini)  · Eggs and foods made with eggs (such as quiche or frittata)  What Makes Meat and Beans Less Healthy?  · Fatty meat is not healthy. Before you cook meat, trim off all the fat you can see. Chicken and turkey skin is also high in fat, and should be removed before cooking.  · Breading and frying make food less healthy. This includes dishes like fried chicken, fried fish, and refried beans.  · Sausage and lunch meats tend to be high in fat and salt. Buy low-fat, low-sodium versions.  One Small Change  Make a meal that includes a non-meat source of protein (such as tofu, lentils, or any other food listed above). Have a better idea? Write it here:  _____________________________________________________________  ©  6774-3043 [employer]. [address] [place] [address], [place] [address]. All rights reserved. This information is not intended as a substitute for professional medical care. Always follow your healthcare professional's instructions.

## 2020-11-25 NOTE — PROGRESS NOTES
Subjective:      Patient ID: Radha Degroot is a 80 y.o. female.    Chief Complaint: Diabetes Mellitus (PCP  02/07/2020) and Wound Check    Radha Degroot is a 80 y.o. female with  has a past medical history of ALLERGIC RHINITIS, Anemia, Anticoagulant long-term use, Arthritis, Cardiomyopathy, ischemic, Carpal tunnel syndrome, Cataract, CHF (congestive heart failure), Chronic kidney disease, Coronary artery disease, Diabetes mellitus type II, Diabetic neuropathy, Diabetic retinopathy of both eyes, Encounter for blood transfusion, GERD (gastroesophageal reflux disease), Gout, unspecified, h/o LAD and D1 PCI in 2006, Heart attack, Hiatal hernia, HIT (heparin-induced thrombocytopenia), colonic polyps, colonic polyps, Hyperlipidemia, Hypertension, Myocardial infarction, Persistent atrial fibrillation, Posterior vitreous detachment of both eyes, Sleep apnea, and Type 2 diabetes mellitus with mild nonproliferative diabetic retinopathy with macular edema. presents to the podiatry clinic for care of  left foot ulcer.   Location: second toe. Onset of the symptoms was yesterday. Precipitating event: patient relates that she self injured with nail nipper.  History of injury: direct trauma Current symptoms include: redness and swelling. Signs of infection she denies nausea, vomiting, fever, chills.   Symptoms have progressed to a point and plateaued. Patient has had no prior history of wounds. Evaluation to date: none. Treatment to date: none. Patients rates pain 4/10 on pain scale.    11/25/2020 Radha Degroot is a 80 y.o. female returns to clinic for follow up of  left foot ulcers.  Patient has left football dressing clean, dry, intact.  Patient denies pain.  She has been taking antibiotics as prescribed without known side effect.  Patient relates that she had difficulty finding shoes that would help her maintain balance to wear to the right foot, she inadvertently wore a shoe that was too tight and caused  redness and abrasion to the right 1st metatarsophalangeal joint.  She has since found issue that gives her more room in the toe box.  She presents today in a ballet flat to the right foot and Darco shoe to the left.      Chief Complaint   Patient presents with    Diabetes Mellitus     PCP  02/07/2020    Wound Check       Hemoglobin A1C   Date Value Ref Range Status   11/14/2019 7.4 (H) 4.0 - 5.6 % Final     Comment:     ADA Screening Guidelines:  5.7-6.4%  Consistent with prediabetes  >or=6.5%  Consistent with diabetes  High levels of fetal hemoglobin interfere with the HbA1C  assay. Heterozygous hemoglobin variants (HbS, HgC, etc)do  not significantly interfere with this assay.   However, presence of multiple variants may affect accuracy.     08/15/2019 7.8 (H) 4.0 - 5.6 % Final     Comment:     ADA Screening Guidelines:  5.7-6.4%  Consistent with prediabetes  >or=6.5%  Consistent with diabetes  High levels of fetal hemoglobin interfere with the HbA1C  assay. Heterozygous hemoglobin variants (HbS, HgC, etc)do  not significantly interfere with this assay.   However, presence of multiple variants may affect accuracy.     02/04/2019 8.2 (H) 4.0 - 5.6 % Final     Comment:     ADA Screening Guidelines:  5.7-6.4%  Consistent with prediabetes  >or=6.5%  Consistent with diabetes  High levels of fetal hemoglobin interfere with the HbA1C  assay. Heterozygous hemoglobin variants (HbS, HgC, etc)do  not significantly interfere with this assay.   However, presence of multiple variants may affect accuracy.           Patient Active Problem List   Diagnosis    Hyperlipidemia    Coronary artery disease    Anemia of chronic illness    Hearing loss, sensorineural    Chronic kidney disease, stage IV (severe)    Secondary renal hyperparathyroidism    Acquired cyst of kidney    Nuclear sclerotic cataract of left eye    Pseudophakia of right eye    Amblyopia    Posterior vitreous detachment of both eyes    Type 2  diabetes mellitus with mild nonproliferative retinopathy and macular edema    Type 2 diabetes mellitus with stage 4 chronic kidney disease, with long-term current use of insulin    Type 2 diabetes mellitus with diabetic polyneuropathy, with long-term current use of insulin    Muscle weakness    Stiffness of vertebral column    Chronic midline low back pain without sciatica    Difficulty walking    Degenerative joint disease (DJD) of lumbar spine    Vitamin D deficiency disease    Essential hypertension    Chronic diastolic CHF (congestive heart failure)    Obstructive sleep apnea    Mixed incontinence urge and stress    Type 2 diabetes mellitus with hypoglycemia without coma, with long-term current use of insulin    Chronic pain    Angiomyolipoma of right kidney    Azotemia    Facet arthritis, degenerative, lumbar spine    Lumbar spondylosis    Permanent atrial fibrillation    Hypertensive kidney disease with stage 4 chronic kidney disease    Venous insufficiency    Moderate nonproliferative diabetic retinopathy of both eyes without macular edema associated with type 2 diabetes mellitus    Macular scar of both eyes    Persistent proteinuria    Sacroiliitis, not elsewhere classified    Thrombocytopenia, unspecified       Current Outpatient Medications on File Prior to Visit   Medication Sig Dispense Refill    blood sugar diagnostic (ACCU-CHEK MELIDA PLUS TEST STRP) Strp TEST BLOOD SUGAR FOUR TIMES DAILY 400 strip 3    blood-glucose meter (ACCU-CHEK MELIDA PLUS METER) Misc To check sugars 4 times daily.  Please include control solution 1 each 0    carvediloL (COREG) 12.5 MG tablet Take 1 tablet (12.5 mg total) by mouth 2 (two) times a day. 60 tablet 6    doxycycline (VIBRA-TABS) 100 MG tablet Take 1 tablet (100 mg total) by mouth 2 (two) times daily. 20 tablet 0    ELIQUIS 2.5 mg Tab TAKE 1 TABLET(2.5 MG) BY MOUTH TWICE DAILY 120 tablet 3    ergocalciferol, vitamin D2, (VITAMIN D ORAL)  "Take by mouth once daily.      fluticasone (FLONASE) 50 mcg/actuation nasal spray SHAKE LIQUID AND USE 2 SPRAYS IN EACH NOSTRIL EVERY DAY 48 mL 4    furosemide (LASIX) 40 MG tablet Take 2 tablets (80 mg total) by mouth 2 (two) times daily. 360 tablet 3    insulin aspart U-100 (NOVOLOG FLEXPEN U-100 INSULIN) 100 unit/mL (3 mL) InPn pen INJECT 14 UNITS UNDER THE SKIN BEFORE MEALS WITH SLIDING SCALE( MAXIMUM DAILY DOSE IS 60 UNITS) 60 mL 2    insulin degludec (TRESIBA FLEXTOUCH U-100) 100 unit/mL (3 mL) InPn INJECT 32 UNITS SUBCUTANEOUS ONCE DAILY 10 Syringe 2    ketoconazole (NIZORAL) 2 % shampoo APPLY TO AFFECTED AREA EVERY OTHER DAY AND LET SIT 5 MINUTES PRIOR TO RINSING AS DIRECTED 120 mL 3    MULTIVIT-MIN/IRON/FOLIC/LUTEIN (CENTRUM SILVER WOMEN ORAL) Take by mouth once daily.       MYRBETRIQ 50 mg Tb24 TAKE 1 TABLET(50 MG) BY MOUTH EVERY DAY 90 tablet 1    pen needle, diabetic (BD TIM 2ND GEN PEN NEEDLE) 32 gauge x 5/32" Ndle Use 4x/day 400 each 3    rosuvastatin (CRESTOR) 40 MG Tab TAKE 1 TABLET(40 MG) BY MOUTH EVERY DAY 90 tablet 2    vitamin B complex (B COMPLEX VITAMINS ORAL) Take by mouth once daily.      VITAMIN E, BULK, MISC by Misc.(Non-Drug; Combo Route) route once daily.       No current facility-administered medications on file prior to visit.        Review of patient's allergies indicates:   Allergen Reactions    Heparin analogues Other (See Comments)     thrombocytopenia    Ancef [cefazolin]     Keflex [cephalexin] Rash    Oxybutynin Other (See Comments)     Metallic taste       Past Surgical History:   Procedure Laterality Date    APPENDECTOMY      CATARACT EXTRACTION      Right eye    EPIDURAL STEROID INJECTION N/A 11/2/2018    Procedure: Injection, Steroid, Epidural CAUDAL ELISA;  Surgeon: Ирина Peña MD;  Location: [facility];  Service: Pain Management;  Laterality: N/A;    heart stent      X 2    HYSTERECTOMY      Fibroids       Family History   Problem Relation Age of " Onset    Diabetes Mother     Heart disease Mother     Hypertension Mother     Diabetes Father     Melanoma Father     Kidney failure Father     Mental illness Sister         suicide/schizophrenic    Cancer Maternal Grandfather     Cancer Paternal Grandfather         colon    Psoriasis Neg Hx     Lupus Neg Hx     Eczema Neg Hx        Social History     Socioeconomic History    Marital status: Single     Spouse name: Not on file    Number of children: Not on file    Years of education: Not on file    Highest education level: Not on file   Occupational History    Occupation: Realtor   Social Needs    Financial resource strain: Not on file    Food insecurity     Worry: Not on file     Inability: Not on file    Transportation needs     Medical: Not on file     Non-medical: Not on file   Tobacco Use    Smoking status: Never Smoker    Smokeless tobacco: Never Used   Substance and Sexual Activity    Alcohol use: No     Alcohol/week: 0.0 standard drinks    Drug use: No    Sexual activity: Not Currently   Lifestyle    Physical activity     Days per week: Not on file     Minutes per session: Not on file    Stress: Not on file   Relationships    Social connections     Talks on phone: Not on file     Gets together: Not on file     Attends Yazidi service: Not on file     Active member of club or organization: Not on file     Attends meetings of clubs or organizations: Not on file     Relationship status: Not on file   Other Topics Concern    Are you pregnant or think you may be? No    Breast-feeding No   Social History Narrative    Part time .    Severe back pain.    Retired in 1975       Review of Systems   Constitution: Negative for chills and fever.   Cardiovascular: Negative for claudication and leg swelling.   Respiratory: Negative for cough and shortness of breath.    Skin: Positive for dry skin and nail changes. Negative for itching and rash.   Musculoskeletal: Positive for  "joint pain and myalgias. Negative for falls, joint swelling and muscle weakness.   Gastrointestinal: Negative for diarrhea, nausea and vomiting.   Neurological: Positive for numbness and paresthesias. Negative for tremors and weakness.   Psychiatric/Behavioral: Negative for altered mental status and hallucinations.           Objective:      Vitals:    11/25/20 1443   BP: 126/87   Weight: 98.4 kg (217 lb)   Height: 5' 8" (1.727 m)   PainSc: 0-No pain       Physical Exam  Nursing note reviewed.   Constitutional:       General: She is not in acute distress.     Appearance: She is not toxic-appearing or diaphoretic.      Comments: Pt. is well-developed, well-nourished, appears stated age, in no acute distress, alert and oriented x 3. No evidence of depression, anxiety, or agitation. Calm, cooperative, and communicative. Appropriate interactions and affect.   Cardiovascular:      Pulses:           Dorsalis pedis pulses are 1+ on the right side and 1+ on the left side.        Posterior tibial pulses are 1+ on the right side and 1+ on the left side.      Comments: Dorsalis pedis and posterior tibial pulses are diminished Bilaterally. Toes are cool to touch. Feet are warm proximally.There is decreased digital hair. Skin is atrophic,  hyperpigmented  Pulmonary:      Effort: No respiratory distress.   Musculoskeletal:      Right ankle: She exhibits swelling. No tenderness. No lateral malleolus, no medial malleolus, no AITFL, no CF ligament and no posterior TFL tenderness found. Achilles tendon exhibits no pain, no defect and normal Morin's test results.      Left ankle: She exhibits swelling. No tenderness. No lateral malleolus, no medial malleolus, no AITFL, no CF ligament and no posterior TFL tenderness found. Achilles tendon exhibits no pain, no defect and normal Morin's test results.      Right foot: No tenderness or bony tenderness.      Left foot: No tenderness or bony tenderness.      Comments: Hypermobile " flatfoot bilaterally    Patient has hammertoes of digits 2-5 bilateral partially reducible without symptom today.    Visible and palpable bunion without pain at dorsomedial 1st metatarsal head right and left.  Hallux abducted right and left partially reducible, tracks laterally without being track bound.  No ecchymosis, erythema, edema, or cardinal signs infection or signs of trauma same foot.    Fat pad atrophy to heels and met heads bilateral    There is equinus deformity bilateral. There is limitation of dorsiflexion with knees extended Bilaterally,  adequate with knees flexed.       Lymphadenopathy:      Comments: No lymphatic streaking    Negative lymphadenopathy bilateral popliteal fossa and tarsal tunnel.     Skin:     General: Skin is warm and dry.      Coloration: Skin is not pale.      Findings: Abrasion (Dorsal 1st metatarsophalangeal joint of the right foot) and wound present. No ecchymosis, laceration or rash.      Nails: There is no clubbing.        Comments: Ulcer location: distal medial left 2nd digit  Measurements : post debridement 0.2x0.2x0.1 cm   Signs of infection: local edema and erythema  Drainage: Sanguinous  Purulence: no  Crepitus/fluctuance: no  Periwound: Calloused  Base: Granular/Healthy  Depth: subcutaneous tissue  Probe to bone: no      Toenails 1-5 bilaterally are elongated by 2-3 mm, thickened by 2-3 mm, discolored/yellowed, dystrophic, brittle with subungual debris.    Focal hyperkeratotic lesion consisting entirely of hyperkeratotic tissue without open skin, drainage, pus, fluctuance, malodor, or signs of infection hallux IPJ bilaterally   Neurological:      Comments:  Dalzell-Flo 5.07 monofilamant testing is diminished Jonah feet. Decreased/absent vibratory sensation bilateral feet to 128Hz tuning fork.      Paresthesias, and hyperesthesia bilateral feet with no clearly identified trigger or source.           Psychiatric:         Attention and Perception: She is attentive.          Mood and Affect: Mood is not anxious. Affect is not inappropriate.         Speech: She is communicative. Speech is not slurred.         Behavior: Behavior is not combative.         11/25/2020 11/18/2020                  Assessment:       Encounter Diagnoses   Name Primary?    Type II diabetes mellitus with neurological manifestations Yes    Diabetic ulcer of toe of left foot associated with diabetes mellitus due to underlying condition, with fat layer exposed          Plan:     Problem List Items Addressed This Visit     None      Visit Diagnoses     Type II diabetes mellitus with neurological manifestations    -  Primary    Diabetic ulcer of toe of left foot associated with diabetes mellitus due to underlying condition, with fat layer exposed             I counseled the patient on her conditions, their implications and medical management.      Greater than 50% of this visit spent on counseling and coordination of care.    Education about the diabetic foot, neuropathy, and prevention of limb loss.    Discussed wound healing cycle, skin integrity, ways to care for skin.Counseled patient on the effects of blood glucose on healing. She and her son verbalize understanding that it can increase the chances of delayed healing and this prolonged exposure leads to infection or progression of infection which subsequently can result in loss of limb.    Adequate vitamin supplementation, protein intake, and hydration - discussed with patient    Imaging ordered to rule out gas in the soft tissue or bony involvement    The wound is cleansed of foreign material as much as possible and the base inspected for bone or abscess. Base is granular and without bone nor joint exposure.      Improvement noted    Dressings:  Mepilex Ag  Offloading: football and darco shoe    Betadine and a Mepilex border applied to the abrasion to the right 1st metatarsophalangeal joint.  Patient instructed to paint the site with Betadine in  to wear shoes with adequate room daily.     Follow-up: 1 week but should call [facility] immediately if any signs of infection, such as fever, chills, sweats, increased redness or pain.    Short-term goals include maintaining good offloading and minimizing bioburden, promoting granulation and epithelialization to healing.  Long-term goals include keeping the wound healed by good offloading and medical management under the direction of internist.    Shoe inspection. Diabetic Foot Education. Patient reminded of the importance of good nutrition and blood sugar control to help prevent podiatric complications of diabetes. Patient instructed on proper foot hygeine. We discussed wearing proper shoe gear, daily foot inspections, never walking without protective shoe gear, never putting sharp instruments to feet.          Procedures

## 2020-12-03 ENCOUNTER — OFFICE VISIT (OUTPATIENT)
Dept: PODIATRY | Facility: CLINIC | Age: 80
End: 2020-12-03
Payer: MEDICARE

## 2020-12-03 VITALS — WEIGHT: 216.94 LBS | BODY MASS INDEX: 32.88 KG/M2 | HEIGHT: 68 IN

## 2020-12-03 DIAGNOSIS — E11.49 TYPE II DIABETES MELLITUS WITH NEUROLOGICAL MANIFESTATIONS: Primary | ICD-10-CM

## 2020-12-03 DIAGNOSIS — E08.621 DIABETIC ULCER OF TOE OF LEFT FOOT ASSOCIATED WITH DIABETES MELLITUS DUE TO UNDERLYING CONDITION, WITH FAT LAYER EXPOSED: ICD-10-CM

## 2020-12-03 DIAGNOSIS — L97.522 DIABETIC ULCER OF TOE OF LEFT FOOT ASSOCIATED WITH DIABETES MELLITUS DUE TO UNDERLYING CONDITION, WITH FAT LAYER EXPOSED: ICD-10-CM

## 2020-12-03 PROCEDURE — 1101F PT FALLS ASSESS-DOCD LE1/YR: CPT | Mod: HCNC,CPTII,S$GLB, | Performed by: PODIATRIST

## 2020-12-03 PROCEDURE — 99213 OFFICE O/P EST LOW 20 MIN: CPT | Mod: HCNC,S$GLB,, | Performed by: PODIATRIST

## 2020-12-03 PROCEDURE — 99213 PR OFFICE/OUTPT VISIT, EST, LEVL III, 20-29 MIN: ICD-10-PCS | Mod: HCNC,S$GLB,, | Performed by: PODIATRIST

## 2020-12-03 PROCEDURE — 1159F MED LIST DOCD IN RCRD: CPT | Mod: HCNC,S$GLB,, | Performed by: PODIATRIST

## 2020-12-03 PROCEDURE — 1101F PR PT FALLS ASSESS DOC 0-1 FALLS W/OUT INJ PAST YR: ICD-10-PCS | Mod: HCNC,CPTII,S$GLB, | Performed by: PODIATRIST

## 2020-12-03 PROCEDURE — 1126F PR PAIN SEVERITY QUANTIFIED, NO PAIN PRESENT: ICD-10-PCS | Mod: HCNC,S$GLB,, | Performed by: PODIATRIST

## 2020-12-03 PROCEDURE — 99999 PR PBB SHADOW E&M-EST. PATIENT-LVL IV: CPT | Mod: PBBFAC,HCNC,, | Performed by: PODIATRIST

## 2020-12-03 PROCEDURE — 3288F FALL RISK ASSESSMENT DOCD: CPT | Mod: HCNC,CPTII,S$GLB, | Performed by: PODIATRIST

## 2020-12-03 PROCEDURE — 3288F PR FALLS RISK ASSESSMENT DOCUMENTED: ICD-10-PCS | Mod: HCNC,CPTII,S$GLB, | Performed by: PODIATRIST

## 2020-12-03 PROCEDURE — 1126F AMNT PAIN NOTED NONE PRSNT: CPT | Mod: HCNC,S$GLB,, | Performed by: PODIATRIST

## 2020-12-03 PROCEDURE — 99999 PR PBB SHADOW E&M-EST. PATIENT-LVL IV: ICD-10-PCS | Mod: PBBFAC,HCNC,, | Performed by: PODIATRIST

## 2020-12-03 PROCEDURE — 1159F PR MEDICATION LIST DOCUMENTED IN MEDICAL RECORD: ICD-10-PCS | Mod: HCNC,S$GLB,, | Performed by: PODIATRIST

## 2020-12-03 NOTE — PATIENT INSTRUCTIONS
Wound has healed well with no signs of continued skin breakdown noted   Ok to transition into normal shoe gear at this time. Check feet daily for signs of drainage or lesion re-opening   Use of daily foot moisturizer to feet, avoiding the webspace's  Limit showers/bathing to roughly 15 minutes during the 1st few weeks after wound has healed to prevent skin breakdown   Betadine paint to wound site daily      Recommend lotions: eucerin, eucerin for diabetics, aquaphor, A&D ointment, gold bond for diabetics, sween, Steuben's Bees all purpose baby ointment,  urea 40 with aloe (found on amazon.com)    Shoe recommendations: (try 6pm.Kingmaker, zappos.[email or web address] , [email or web address] Maid, or shoes.Kingmaker for discounted prices) you can visit [employer] shoes in [place]  or [employer] in the [facility] (there are also several shoe brand outlets in the [facility])    Asics (GT 2000 or gel foundations), new balance stability type shoes, saucony (stabil c3),  Mead (GTS or Beast or transcend), propet (tennis shoe)    Sofft Brand or axxiom (women) Patrizia&Jay Jay (men), clarks, crocs, aerosoles, naturalizers, SAS, ecco, born, agata gonsalves, leonardo (dress shoes)    Vionic, burkenstocks, fitflops, propet (sandals)  Nike comfort thong sandals, crocs, propet (house shoes)    Nail Home remedy:  Vicks Vapor rub to nails for easier manageability    Diabetes: Inspecting Your Feet  Diabetes increases your chances of developing foot problems. So inspect your feet every day. This helps you find small skin irritations before they become serious infections. If you have trouble seeing the bottoms of your feet, use a mirror or ask a family member or friend to help.     Pressure spots on the bottom of the foot are common areas where problems develop.   How to check your feet  Below are tips to help you look for foot problems. Try to check your feet at the same time each day, such as when you get out of bed in the morning:  · Check the top of each foot. The tops of toes,  back of the heel, and outer edge of the foot can get a lot of rubbing from poor-fitting shoes.  · Check the bottom of each foot. Daily wear and tear often leads to problems at pressure spots.  · Check the toes and nails. Fungal infections often occur between toes. Toenail problems can also be a sign of fungal infections or lead to breaks in the skin.  · Check your shoes, too. Loose objects inside a shoe can injure the foot. Use your hand to feel inside your shoes for things like hugo, loose stitching, or rough areas that could irritate your skin.  Warning signs  Look for any color changes in the foot. Redness with streaks can signal a severe infection, which needs immediate medical attention. Tell your doctor right away if you have any of these problems:  · Swelling, sometimes with color changes, may be a sign of poor blood flow or infection. Symptoms include tenderness and an increase in the size of your foot.  · Warm or hot areas on your feet may be signs of infection. A foot that is cold may not be getting enough blood.  · Sensations such as burning, tingling, or pins and needles can be signs of a problem. Also check for areas that may be numb.  · Hot spots are caused by friction or pressure. Look for hot spots in areas that get a lot of rubbing. Hot spots can turn into blisters, calluses, or sores.  · Cracks and sores are caused by dry or irritated skin. They are a sign that the skin is breaking down, which can lead to infection.  · Toenail problems to watch for include nails growing into the skin (ingrown toenail) and causing redness or pain. Thick, yellow, or discolored nails can signal a fungal infection.  · Drainage and odor can develop from untreated sores and ulcers. Call your doctor right away if you notice white or yellow drainage, bleeding, or unpleasant odor.   © 5744-7250 [facility]. [address] [place] [address], [place] [address]. All rights reserved. This information is not intended as  a substitute for professional medical care. Always follow your healthcare professional's instructions.        Step-by-Step:  Inspecting Your Feet (Diabetes)    Date Last Reviewed: 10/1/2016  © 1403-7807 [employer]. [address] [place] [address], [place] [address]. All rights reserved. This information is not intended as a substitute for professional medical care. Always follow your healthcare professional's instructions.

## 2020-12-08 NOTE — PROGRESS NOTES
Subjective:      Patient ID: Radha Degroot is a 80 y.o. female.    Chief Complaint: Diabetes Mellitus (Dr Merida PCP 2/7/20) and Foot Ulcer    Radha Degroot is a 80 y.o. female with  has a past medical history of ALLERGIC RHINITIS, Anemia, Anticoagulant long-term use, Arthritis, Cardiomyopathy, ischemic, Carpal tunnel syndrome, Cataract, CHF (congestive heart failure), Chronic kidney disease, Coronary artery disease, Diabetes mellitus type II, Diabetic neuropathy, Diabetic retinopathy of both eyes, Encounter for blood transfusion, GERD (gastroesophageal reflux disease), Gout, unspecified, h/o LAD and D1 PCI in 2006, Heart attack, Hiatal hernia, HIT (heparin-induced thrombocytopenia), colonic polyps, colonic polyps, Hyperlipidemia, Hypertension, Myocardial infarction, Persistent atrial fibrillation, Posterior vitreous detachment of both eyes, Sleep apnea, and Type 2 diabetes mellitus with mild nonproliferative diabetic retinopathy with macular edema. presents to the podiatry clinic for care of  left foot ulcer.   Location: second toe. Onset of the symptoms was yesterday. Precipitating event: patient relates that she self injured with nail nipper.  History of injury: direct trauma Current symptoms include: redness and swelling. Signs of infection she denies nausea, vomiting, fever, chills.   Symptoms have progressed to a point and plateaued. Patient has had no prior history of wounds. Evaluation to date: none. Treatment to date: none. Patients rates pain 4/10 on pain scale.    11/25/2020 Radha Degroot is a 80 y.o. female returns to clinic for follow up of  left foot ulcers.  Patient has left football dressing clean, dry, intact.  Patient denies pain.  She has been taking antibiotics as prescribed without known side effect.  Patient relates that she had difficulty finding shoes that would help her maintain balance to wear to the right foot, she inadvertently wore a shoe that was too tight and caused redness  and abrasion to the right 1st metatarsophalangeal joint.  She has since found issue that gives her more room in the toe box.  She presents today in a ballet flat to the right foot and Darco shoe to the left.    12/3/2020 Patient has left football dressing clean, dry, intact.  Patient denies pain.   No new pedal complaints      Chief Complaint   Patient presents with    Diabetes Mellitus     Dr Merida PCP 2/7/20    Foot Ulcer       Hemoglobin A1C   Date Value Ref Range Status   11/14/2019 7.4 (H) 4.0 - 5.6 % Final     Comment:     ADA Screening Guidelines:  5.7-6.4%  Consistent with prediabetes  >or=6.5%  Consistent with diabetes  High levels of fetal hemoglobin interfere with the HbA1C  assay. Heterozygous hemoglobin variants (HbS, HgC, etc)do  not significantly interfere with this assay.   However, presence of multiple variants may affect accuracy.     08/15/2019 7.8 (H) 4.0 - 5.6 % Final     Comment:     ADA Screening Guidelines:  5.7-6.4%  Consistent with prediabetes  >or=6.5%  Consistent with diabetes  High levels of fetal hemoglobin interfere with the HbA1C  assay. Heterozygous hemoglobin variants (HbS, HgC, etc)do  not significantly interfere with this assay.   However, presence of multiple variants may affect accuracy.     02/04/2019 8.2 (H) 4.0 - 5.6 % Final     Comment:     ADA Screening Guidelines:  5.7-6.4%  Consistent with prediabetes  >or=6.5%  Consistent with diabetes  High levels of fetal hemoglobin interfere with the HbA1C  assay. Heterozygous hemoglobin variants (HbS, HgC, etc)do  not significantly interfere with this assay.   However, presence of multiple variants may affect accuracy.           Patient Active Problem List   Diagnosis    Hyperlipidemia    Coronary artery disease    Anemia of chronic illness    Hearing loss, sensorineural    Chronic kidney disease, stage IV (severe)    Secondary renal hyperparathyroidism    Acquired cyst of kidney    Nuclear sclerotic cataract of left eye     Pseudophakia of right eye    Amblyopia    Posterior vitreous detachment of both eyes    Type 2 diabetes mellitus with mild nonproliferative retinopathy and macular edema    Type 2 diabetes mellitus with stage 4 chronic kidney disease, with long-term current use of insulin    Type 2 diabetes mellitus with diabetic polyneuropathy, with long-term current use of insulin    Muscle weakness    Stiffness of vertebral column    Chronic midline low back pain without sciatica    Difficulty walking    Degenerative joint disease (DJD) of lumbar spine    Vitamin D deficiency disease    Essential hypertension    Chronic diastolic CHF (congestive heart failure)    Obstructive sleep apnea    Mixed incontinence urge and stress    Type 2 diabetes mellitus with hypoglycemia without coma, with long-term current use of insulin    Chronic pain    Angiomyolipoma of right kidney    Azotemia    Facet arthritis, degenerative, lumbar spine    Lumbar spondylosis    Permanent atrial fibrillation    Hypertensive kidney disease with stage 4 chronic kidney disease    Venous insufficiency    Moderate nonproliferative diabetic retinopathy of both eyes without macular edema associated with type 2 diabetes mellitus    Macular scar of both eyes    Persistent proteinuria    Sacroiliitis, not elsewhere classified    Thrombocytopenia, unspecified       Current Outpatient Medications on File Prior to Visit   Medication Sig Dispense Refill    blood sugar diagnostic (ACCU-CHEK MELIDA PLUS TEST STRP) Strp TEST BLOOD SUGAR FOUR TIMES DAILY 400 strip 3    blood-glucose meter (ACCU-CHEK MELIDA PLUS METER) Misc To check sugars 4 times daily.  Please include control solution 1 each 0    carvediloL (COREG) 12.5 MG tablet Take 1 tablet (12.5 mg total) by mouth 2 (two) times a day. 60 tablet 6    doxycycline (VIBRA-TABS) 100 MG tablet Take 1 tablet (100 mg total) by mouth 2 (two) times daily. 20 tablet 0    ELIQUIS 2.5 mg Tab TAKE  "1 TABLET(2.5 MG) BY MOUTH TWICE DAILY 120 tablet 3    ergocalciferol, vitamin D2, (VITAMIN D ORAL) Take by mouth once daily.      fluticasone (FLONASE) 50 mcg/actuation nasal spray SHAKE LIQUID AND USE 2 SPRAYS IN EACH NOSTRIL EVERY DAY 48 mL 4    furosemide (LASIX) 40 MG tablet Take 2 tablets (80 mg total) by mouth 2 (two) times daily. 360 tablet 3    insulin aspart U-100 (NOVOLOG FLEXPEN U-100 INSULIN) 100 unit/mL (3 mL) InPn pen INJECT 14 UNITS UNDER THE SKIN BEFORE MEALS WITH SLIDING SCALE( MAXIMUM DAILY DOSE IS 60 UNITS) 60 mL 2    insulin degludec (TRESIBA FLEXTOUCH U-100) 100 unit/mL (3 mL) InPn INJECT 32 UNITS SUBCUTANEOUS ONCE DAILY 10 Syringe 2    ketoconazole (NIZORAL) 2 % shampoo APPLY TO AFFECTED AREA EVERY OTHER DAY AND LET SIT 5 MINUTES PRIOR TO RINSING AS DIRECTED 120 mL 3    MULTIVIT-MIN/IRON/FOLIC/LUTEIN (CENTRUM SILVER WOMEN ORAL) Take by mouth once daily.       MYRBETRIQ 50 mg Tb24 TAKE 1 TABLET(50 MG) BY MOUTH EVERY DAY 90 tablet 1    pen needle, diabetic (BD TIM 2ND GEN PEN NEEDLE) 32 gauge x 5/32" Ndle Use 4x/day 400 each 3    rosuvastatin (CRESTOR) 40 MG Tab TAKE 1 TABLET(40 MG) BY MOUTH EVERY DAY 90 tablet 2    vitamin B complex (B COMPLEX VITAMINS ORAL) Take by mouth once daily.      VITAMIN E, BULK, MISC by Misc.(Non-Drug; Combo Route) route once daily.       No current facility-administered medications on file prior to visit.        Review of patient's allergies indicates:   Allergen Reactions    Heparin analogues Other (See Comments)     thrombocytopenia    Ancef [cefazolin]     Keflex [cephalexin] Rash    Oxybutynin Other (See Comments)     Metallic taste       Past Surgical History:   Procedure Laterality Date    APPENDECTOMY      CATARACT EXTRACTION      Right eye    EPIDURAL STEROID INJECTION N/A 11/2/2018    Procedure: Injection, Steroid, Epidural CAUDAL ELISA;  Surgeon: Ирина Peña MD;  Location: [facility];  Service: Pain Management;  Laterality: N/A;    " heart stent      X 2    HYSTERECTOMY      Fibroids       Family History   Problem Relation Age of Onset    Diabetes Mother     Heart disease Mother     Hypertension Mother     Diabetes Father     Melanoma Father     Kidney failure Father     Mental illness Sister         suicide/schizophrenic    Cancer Maternal Grandfather     Cancer Paternal Grandfather         colon    Psoriasis Neg Hx     Lupus Neg Hx     Eczema Neg Hx        Social History     Socioeconomic History    Marital status: Single     Spouse name: Not on file    Number of children: Not on file    Years of education: Not on file    Highest education level: Not on file   Occupational History    Occupation: Realtor   Social Needs    Financial resource strain: Not on file    Food insecurity     Worry: Not on file     Inability: Not on file    Transportation needs     Medical: Not on file     Non-medical: Not on file   Tobacco Use    Smoking status: Never Smoker    Smokeless tobacco: Never Used   Substance and Sexual Activity    Alcohol use: No     Alcohol/week: 0.0 standard drinks    Drug use: No    Sexual activity: Not Currently   Lifestyle    Physical activity     Days per week: Not on file     Minutes per session: Not on file    Stress: Not on file   Relationships    Social connections     Talks on phone: Not on file     Gets together: Not on file     Attends Jainism service: Not on file     Active member of club or organization: Not on file     Attends meetings of clubs or organizations: Not on file     Relationship status: Not on file   Other Topics Concern    Are you pregnant or think you may be? No    Breast-feeding No   Social History Narrative    Part time .    Severe back pain.    Retired in 1975       Review of Systems   Constitution: Negative for chills and fever.   Cardiovascular: Negative for claudication and leg swelling.   Respiratory: Negative for cough and shortness of breath.    Skin:  "Positive for dry skin and nail changes. Negative for itching and rash.   Musculoskeletal: Positive for joint pain and myalgias. Negative for falls, joint swelling and muscle weakness.   Gastrointestinal: Negative for diarrhea, nausea and vomiting.   Neurological: Positive for numbness and paresthesias. Negative for tremors and weakness.   Psychiatric/Behavioral: Negative for altered mental status and hallucinations.           Objective:      Vitals:    12/03/20 1415   Weight: 98.4 kg (216 lb 14.9 oz)   Height: 5' 8" (1.727 m)   PainSc: 0-No pain       Physical Exam  Nursing note reviewed.   Constitutional:       General: She is not in acute distress.     Appearance: She is not toxic-appearing or diaphoretic.      Comments: Pt. is well-developed, well-nourished, appears stated age, in no acute distress, alert and oriented x 3. No evidence of depression, anxiety, or agitation. Calm, cooperative, and communicative. Appropriate interactions and affect.   Cardiovascular:      Pulses:           Dorsalis pedis pulses are 1+ on the right side and 1+ on the left side.        Posterior tibial pulses are 1+ on the right side and 1+ on the left side.      Comments: Dorsalis pedis and posterior tibial pulses are diminished Bilaterally. Toes are cool to touch. Feet are warm proximally.There is decreased digital hair. Skin is atrophic,  hyperpigmented  Pulmonary:      Effort: No respiratory distress.   Musculoskeletal:      Right ankle: She exhibits swelling. No tenderness. No lateral malleolus, no medial malleolus, no AITFL, no CF ligament and no posterior TFL tenderness found. Achilles tendon exhibits no pain, no defect and normal Morin's test results.      Left ankle: She exhibits swelling. No tenderness. No lateral malleolus, no medial malleolus, no AITFL, no CF ligament and no posterior TFL tenderness found. Achilles tendon exhibits no pain, no defect and normal Morin's test results.      Right foot: No tenderness or " bony tenderness.      Left foot: No tenderness or bony tenderness.      Comments: Hypermobile flatfoot bilaterally    Patient has hammertoes of digits 2-5 bilateral partially reducible without symptom today.    Visible and palpable bunion without pain at dorsomedial 1st metatarsal head right and left.  Hallux abducted right and left partially reducible, tracks laterally without being track bound.  No ecchymosis, erythema, edema, or cardinal signs infection or signs of trauma same foot.    Fat pad atrophy to heels and met heads bilateral    There is equinus deformity bilateral. There is limitation of dorsiflexion with knees extended Bilaterally,  adequate with knees flexed.       Lymphadenopathy:      Comments: No lymphatic streaking    Negative lymphadenopathy bilateral popliteal fossa and tarsal tunnel.     Skin:     General: Skin is warm and dry.      Coloration: Skin is not pale.      Findings: Wound present. No ecchymosis, laceration or rash.      Nails: There is no clubbing.        Comments: Ulcer location: distal medial left 2nd digit  Measurements : 0.0x0.0x0.0 cm   Signs of infection: local edema and erythema  Drainage: none  Purulence: no  Crepitus/fluctuance: no  Periwound: Calloused  Base: thin epithelial tissue      Toenails 1-5 bilaterally are  thickened by 2-3 mm, discolored/yellowed, dystrophic, brittle with subungual debris.    Focal hyperkeratotic lesion consisting entirely of hyperkeratotic tissue without open skin, drainage, pus, fluctuance, malodor, or signs of infection hallux IPJ bilaterally   Neurological:      Comments:  [place]-Flo 5.07 monofilamant testing is diminished Jonah feet. Decreased/absent vibratory sensation bilateral feet to 128Hz tuning fork.      Paresthesias, and hyperesthesia bilateral feet with no clearly identified trigger or source.           Psychiatric:         Attention and Perception: She is attentive.         Mood and Affect: Mood is not anxious. Affect is not  inappropriate.         Speech: She is communicative. Speech is not slurred.         Behavior: Behavior is not combative.         12/3/2020          11/25/2020          11/18/2020                  Assessment:       Encounter Diagnoses   Name Primary?    Type II diabetes mellitus with neurological manifestations Yes    Diabetic ulcer of toe of left foot associated with diabetes mellitus due to underlying condition, with fat layer exposed          Plan:     Problem List Items Addressed This Visit     None      Visit Diagnoses     Type II diabetes mellitus with neurological manifestations    -  Primary    Diabetic ulcer of toe of left foot associated with diabetes mellitus due to underlying condition, with fat layer exposed             I counseled the patient on her conditions, their implications and medical management.      Greater than 50% of this visit spent on counseling and coordination of care.    Education about the diabetic foot, neuropathy, and prevention of limb loss.    Discussed wound healing cycle, skin integrity, ways to care for skin.Counseled patient on the effects of blood glucose on healing. She and her son verbalize understanding that it can increase the chances of delayed healing and this prolonged exposure leads to infection or progression of infection which subsequently can result in loss of limb.    Adequate vitamin supplementation, protein intake, and hydration - discussed with patient    The wound is cleansed of foreign material as much as possible and the base inspected for bone or abscess.      Wound has healed well with no signs of continued skin breakdown noted. Ok to convert to normal shoe gear. Skin is still delicate therefore patient must be diligent in avoiding excessive pressure and making sure there is adequate support and padding in shoe gear.     Follow-up: 2-4 weeks but should call [facility] immediately if any signs of infection, such as fever, chills, sweats, increased redness or  pain.    Short-term goals include maintaining good offloading and minimizing bioburden, promoting granulation and epithelialization to healing.  Long-term goals include keeping the wound healed by good offloading and medical management under the direction of internist.    Shoe inspection. Diabetic Foot Education. Patient reminded of the importance of good nutrition and blood sugar control to help prevent podiatric complications of diabetes. Patient instructed on proper foot hygeine. We discussed wearing proper shoe gear, daily foot inspections, never walking without protective shoe gear, never putting sharp instruments to feet.          Procedures

## 2020-12-11 ENCOUNTER — PATIENT MESSAGE (OUTPATIENT)
Dept: OTHER | Facility: OTHER | Age: 80
End: 2020-12-11

## 2020-12-11 NOTE — PROGRESS NOTES
HPI     Concerns About Ocular Health      Additional comments: One day had episode of vision disturbance (in both   eyes per patient) which lasted 5 -10 minutes.    Noted similar symptoms two days ago.  Similar symptoms two days ago.               Comments     Patient is here today for an Urgent visit for the flashes of light in OU   on Tuesday morning and last night   Patient's age: 79 y.o. WM  Occupation: retired   Approximate date of last eye examination:  01/05/2017  Name of last eye doctor seen:    City/State: [facility]   Wears glasses? yes     If yes, wears  Full-time or part-time?  Full-time  Approximate age of present glasses:  2 years    Got new glasses following last exam, or subsequently?:  yes   Any problem with VA with glasses?  no  Headaches?  no  Eye pain/discomfort?  no                                                                                     Flashes?  Yes Tuesday morning and last night   Floaters?  Yes often  Diplopia/Double vision?  no  Patient's Ocular History:         Any eye surgeries? Cataract right eye          Any eye injury?  no         Any treatment for eye disease?  no  Family history of eye disease?  no  Significant patient medical history:         1. Diabetes?  Yes        If yes, IDDM or NIDDM?  IDDM   2. HBP?  Yes controlled with medication                 ! OTC eyedrops currently using:  no   ! Prescription eye meds currently using:  no   ! Any history of allergy/adverse reaction to any eye meds used   previously?  no    ! Any history of allergy/adverse reaction to eyedrops used during prior   eye exam(s)? no    ! Any history of allergy/adverse reaction to Novacaine or similar meds?   no   ! Any history of allergy/adverse reaction to Epinephrine or similar meds?   no    ! Patient okay with use of anesthetic eyedrops to check eye pressure?    Yes         ! Patient okay with use of eyedrops to dilate pupils today?  yes   !  Allergies/Medications/Medical History/Family  vss tolerated procedure well   "History reviewed today?    Yes       PD =   68/64  Desired reading distance =  13"                                                                      Last edited by Genaro Johansen, OD on 7/18/2019  4:16 PM. (History)            Assessment /Plan     For exam results, see Encounter Report.    1. Transient vision disturbance of both eyes  Sedimentation rate, manual    C-reactive protein    CBC auto differential   2. Macular hemorrhage of right eye  Ambulatory Referral to Ophthalmology   3. Type 2 diabetes mellitus with stage 4 chronic kidney disease, with long-term current use of insulin     4. Moderate nonproliferative diabetic retinopathy of both eyes without macular edema associated with type 2 diabetes mellitus  Ambulatory Referral to Ophthalmology   5. Nuclear sclerotic cataract of left eye     6. Macular scar of right eye     7. Posterior subcapsular age-related cataract, left eye     8. S/P cataract surgery, right     9. Pseudophakia of right eye     10. Astigmatism of right eye, unspecified type     11. Low vision of left eye with normal vision in contralateral eye                    Ms. Degroot in today with report of recent  transient bilateral vision disturbance lasting 10- 15 minutes.  No eye pain and no headache associated.  Order blood work to rule out giant cell arteritis/temporal arteritis.     S/P cataract surgery in the right eye, with posterior chamber intraocular lens.   Residual slight astigmatic distance refractive error in the right eye, with very satisfactory best-corrected VA in that eye.    Nuclear sclerotic/axial posterior subcapsular cataract in the left eye.  Poor VA in the left eye, not correctable with spectacle lens corrrection.  Ms. Degroot previously stated that she was advised by Dr. Cohen not to have cataract surgery done in the left eye (ever?), but she is unaware of why.     Type 2 diabetes, with evidence of background diabetic retinal changes in both eyes.  Bilateral " scattered superficial retinal hemorrhages.    S/p focal laser treatment to the posterior pole of the right eye.  Small central macular hemmorrhage in the right eye.     Large optic nerve head cupping in the right eye. Note asymmetry in C/D ratios  (OD > OS).  Intraocular pressures well within normal range (and equal) in both eyes.    Suggest consult with Dr. Ruiz regarding bilateral retinal changes.    Reviewed results of blood work ordered today    ESR = 18 mm / hr  (normal)    C - Reactive Protein = 2.3 mg/L  (normal)    No clinical evidence of giant cell arteritis/temporal arteritis.    Will call Ms. Degroot to advise her of the above.  Visual symptoms she experienced a few days ago suggest of migrainous visual symptoms, but suggest carotid artery ultrasound studies, as a precaution.    Will advise Ms. Degroot to consult her PCP (Dr. HERACLIO Merida) regarding that.     Send copy of notes to Dr. Merida for her review.     Furthermore, suggest Ms. Degroot have a Smith visual field test (24-2 FILIPPO Standard) to rule out low tension glaucoma in the right eye.  Defer to a later date, following consult with Dr. Vasquez.      Spectacle lens Rx issued for use as desired.     Recheck refraction in one year - or following cataract surgery in the left eye, if done.

## 2020-12-31 RX ORDER — PEN NEEDLE, DIABETIC 30 GX3/16"
NEEDLE, DISPOSABLE MISCELLANEOUS
Qty: 400 EACH | Refills: 3 | Status: SHIPPED | OUTPATIENT
Start: 2020-12-31 | End: 2021-11-11 | Stop reason: SDUPTHER

## 2021-01-10 ENCOUNTER — IMMUNIZATION (OUTPATIENT)
Dept: OBSTETRICS AND GYNECOLOGY | Facility: CLINIC | Age: 81
End: 2021-01-10
Payer: MEDICARE

## 2021-01-10 DIAGNOSIS — Z23 NEED FOR VACCINATION: ICD-10-CM

## 2021-01-10 PROCEDURE — 91300 COVID-19, MRNA, LNP-S, PF, 30 MCG/0.3 ML DOSE VACCINE: CPT | Mod: PBBFAC | Performed by: FAMILY MEDICINE

## 2021-01-29 ENCOUNTER — TELEPHONE (OUTPATIENT)
Dept: INTERNAL MEDICINE | Facility: CLINIC | Age: 81
End: 2021-01-29

## 2021-01-29 DIAGNOSIS — N34.3 DYSURIA-FREQUENCY SYNDROME: Primary | ICD-10-CM

## 2021-01-31 ENCOUNTER — IMMUNIZATION (OUTPATIENT)
Dept: OBSTETRICS AND GYNECOLOGY | Facility: CLINIC | Age: 81
End: 2021-01-31
Payer: MEDICARE

## 2021-01-31 DIAGNOSIS — Z23 NEED FOR VACCINATION: Primary | ICD-10-CM

## 2021-01-31 PROCEDURE — 0002A COVID-19, MRNA, LNP-S, PF, 30 MCG/0.3 ML DOSE VACCINE: CPT | Mod: PBBFAC | Performed by: FAMILY MEDICINE

## 2021-01-31 PROCEDURE — 91300 COVID-19, MRNA, LNP-S, PF, 30 MCG/0.3 ML DOSE VACCINE: CPT | Mod: PBBFAC | Performed by: FAMILY MEDICINE

## 2021-02-01 ENCOUNTER — LAB VISIT (OUTPATIENT)
Dept: LAB | Facility: HOSPITAL | Age: 81
End: 2021-02-01
Attending: INTERNAL MEDICINE
Payer: MEDICARE

## 2021-02-01 DIAGNOSIS — N34.3 DYSURIA-FREQUENCY SYNDROME: ICD-10-CM

## 2021-02-01 LAB
BACTERIA #/AREA URNS AUTO: ABNORMAL /HPF
BILIRUB UR QL STRIP: NEGATIVE
CLARITY UR REFRACT.AUTO: ABNORMAL
COLOR UR AUTO: YELLOW
GLUCOSE UR QL STRIP: ABNORMAL
HGB UR QL STRIP: NEGATIVE
HYALINE CASTS UR QL AUTO: 1 /LPF
KETONES UR QL STRIP: NEGATIVE
LEUKOCYTE ESTERASE UR QL STRIP: NEGATIVE
MICROSCOPIC COMMENT: ABNORMAL
NITRITE UR QL STRIP: NEGATIVE
PH UR STRIP: 6 [PH] (ref 5–8)
PROT UR QL STRIP: ABNORMAL
RBC #/AREA URNS AUTO: 1 /HPF (ref 0–4)
SP GR UR STRIP: 1.01 (ref 1–1.03)
SQUAMOUS #/AREA URNS AUTO: 1 /HPF
URN SPEC COLLECT METH UR: ABNORMAL
WBC #/AREA URNS AUTO: 1 /HPF (ref 0–5)

## 2021-02-01 PROCEDURE — 87086 URINE CULTURE/COLONY COUNT: CPT

## 2021-02-01 PROCEDURE — 81001 URINALYSIS AUTO W/SCOPE: CPT

## 2021-02-02 ENCOUNTER — TELEPHONE (OUTPATIENT)
Dept: INTERNAL MEDICINE | Facility: CLINIC | Age: 81
End: 2021-02-02

## 2021-02-02 LAB
BACTERIA UR CULT: NORMAL
BACTERIA UR CULT: NORMAL

## 2021-02-03 DIAGNOSIS — N34.3 DYSURIA-FREQUENCY SYNDROME: Primary | ICD-10-CM

## 2021-02-08 DIAGNOSIS — E11.8 CONTROLLED TYPE 2 DIABETES MELLITUS WITH COMPLICATION, WITH LONG-TERM CURRENT USE OF INSULIN: Primary | ICD-10-CM

## 2021-02-08 DIAGNOSIS — Z79.4 CONTROLLED TYPE 2 DIABETES MELLITUS WITH COMPLICATION, WITH LONG-TERM CURRENT USE OF INSULIN: Primary | ICD-10-CM

## 2021-02-08 RX ORDER — LANCETS
EACH MISCELLANEOUS
Qty: 400 EACH | Refills: 3 | Status: SHIPPED | OUTPATIENT
Start: 2021-02-08 | End: 2021-04-13

## 2021-02-08 RX ORDER — BLOOD SUGAR DIAGNOSTIC
STRIP MISCELLANEOUS
Qty: 400 STRIP | Refills: 3 | Status: SHIPPED | OUTPATIENT
Start: 2021-02-08 | End: 2021-04-15 | Stop reason: SDUPTHER

## 2021-02-09 ENCOUNTER — TELEPHONE (OUTPATIENT)
Dept: ENDOCRINOLOGY | Facility: CLINIC | Age: 81
End: 2021-02-09

## 2021-02-17 ENCOUNTER — PATIENT OUTREACH (OUTPATIENT)
Dept: ADMINISTRATIVE | Facility: OTHER | Age: 81
End: 2021-02-17

## 2021-02-23 ENCOUNTER — TELEPHONE (OUTPATIENT)
Dept: UROLOGY | Facility: CLINIC | Age: 81
End: 2021-02-23

## 2021-02-25 DIAGNOSIS — I48.19 PERSISTENT ATRIAL FIBRILLATION: ICD-10-CM

## 2021-02-26 ENCOUNTER — OFFICE VISIT (OUTPATIENT)
Dept: UROLOGY | Facility: CLINIC | Age: 81
End: 2021-02-26
Payer: MEDICARE

## 2021-02-26 VITALS — HEIGHT: 68 IN | BODY MASS INDEX: 32.74 KG/M2 | WEIGHT: 216.06 LBS

## 2021-02-26 DIAGNOSIS — N30.01 ACUTE CYSTITIS WITH HEMATURIA: ICD-10-CM

## 2021-02-26 PROCEDURE — 87086 URINE CULTURE/COLONY COUNT: CPT

## 2021-02-26 PROCEDURE — 1126F PR PAIN SEVERITY QUANTIFIED, NO PAIN PRESENT: ICD-10-PCS | Mod: S$GLB,,, | Performed by: STUDENT IN AN ORGANIZED HEALTH CARE EDUCATION/TRAINING PROGRAM

## 2021-02-26 PROCEDURE — 99213 PR OFFICE/OUTPT VISIT, EST, LEVL III, 20-29 MIN: ICD-10-PCS | Mod: S$GLB,,, | Performed by: STUDENT IN AN ORGANIZED HEALTH CARE EDUCATION/TRAINING PROGRAM

## 2021-02-26 PROCEDURE — 1101F PT FALLS ASSESS-DOCD LE1/YR: CPT | Mod: CPTII,S$GLB,, | Performed by: STUDENT IN AN ORGANIZED HEALTH CARE EDUCATION/TRAINING PROGRAM

## 2021-02-26 PROCEDURE — 3288F PR FALLS RISK ASSESSMENT DOCUMENTED: ICD-10-PCS | Mod: CPTII,S$GLB,, | Performed by: STUDENT IN AN ORGANIZED HEALTH CARE EDUCATION/TRAINING PROGRAM

## 2021-02-26 PROCEDURE — 3288F FALL RISK ASSESSMENT DOCD: CPT | Mod: CPTII,S$GLB,, | Performed by: STUDENT IN AN ORGANIZED HEALTH CARE EDUCATION/TRAINING PROGRAM

## 2021-02-26 PROCEDURE — 99213 OFFICE O/P EST LOW 20 MIN: CPT | Mod: S$GLB,,, | Performed by: STUDENT IN AN ORGANIZED HEALTH CARE EDUCATION/TRAINING PROGRAM

## 2021-02-26 PROCEDURE — 1159F MED LIST DOCD IN RCRD: CPT | Mod: S$GLB,,, | Performed by: STUDENT IN AN ORGANIZED HEALTH CARE EDUCATION/TRAINING PROGRAM

## 2021-02-26 PROCEDURE — 1126F AMNT PAIN NOTED NONE PRSNT: CPT | Mod: S$GLB,,, | Performed by: STUDENT IN AN ORGANIZED HEALTH CARE EDUCATION/TRAINING PROGRAM

## 2021-02-26 PROCEDURE — 99999 PR PBB SHADOW E&M-EST. PATIENT-LVL IV: CPT | Mod: PBBFAC,,, | Performed by: STUDENT IN AN ORGANIZED HEALTH CARE EDUCATION/TRAINING PROGRAM

## 2021-02-26 PROCEDURE — 1159F PR MEDICATION LIST DOCUMENTED IN MEDICAL RECORD: ICD-10-PCS | Mod: S$GLB,,, | Performed by: STUDENT IN AN ORGANIZED HEALTH CARE EDUCATION/TRAINING PROGRAM

## 2021-02-26 PROCEDURE — 1101F PR PT FALLS ASSESS DOC 0-1 FALLS W/OUT INJ PAST YR: ICD-10-PCS | Mod: CPTII,S$GLB,, | Performed by: STUDENT IN AN ORGANIZED HEALTH CARE EDUCATION/TRAINING PROGRAM

## 2021-02-26 PROCEDURE — 99999 PR PBB SHADOW E&M-EST. PATIENT-LVL IV: ICD-10-PCS | Mod: PBBFAC,,, | Performed by: STUDENT IN AN ORGANIZED HEALTH CARE EDUCATION/TRAINING PROGRAM

## 2021-02-26 RX ORDER — HYOSCYAMINE SULFATE 0.125 MG
125 TABLET ORAL EVERY 6 HOURS PRN
Qty: 8 TABLET | Refills: 0 | Status: SHIPPED | OUTPATIENT
Start: 2021-02-26 | End: 2021-06-21

## 2021-02-28 LAB — BACTERIA UR CULT: NORMAL

## 2021-03-01 ENCOUNTER — LAB VISIT (OUTPATIENT)
Dept: LAB | Facility: HOSPITAL | Age: 81
End: 2021-03-01
Attending: NURSE PRACTITIONER
Payer: MEDICARE

## 2021-03-01 DIAGNOSIS — Z79.4 CONTROLLED TYPE 2 DIABETES MELLITUS WITH COMPLICATION, WITH LONG-TERM CURRENT USE OF INSULIN: ICD-10-CM

## 2021-03-01 DIAGNOSIS — E11.8 CONTROLLED TYPE 2 DIABETES MELLITUS WITH COMPLICATION, WITH LONG-TERM CURRENT USE OF INSULIN: ICD-10-CM

## 2021-03-01 LAB
CHOLEST SERPL-MCNC: 129 MG/DL (ref 120–199)
CHOLEST/HDLC SERPL: 2.3 {RATIO} (ref 2–5)
ESTIMATED AVG GLUCOSE: 200 MG/DL (ref 68–131)
HBA1C MFR BLD: 8.6 % (ref 4–5.6)
HDLC SERPL-MCNC: 56 MG/DL (ref 40–75)
HDLC SERPL: 43.4 % (ref 20–50)
LDLC SERPL CALC-MCNC: 46.4 MG/DL (ref 63–159)
NONHDLC SERPL-MCNC: 73 MG/DL
TRIGL SERPL-MCNC: 133 MG/DL (ref 30–150)

## 2021-03-01 PROCEDURE — 80061 LIPID PANEL: CPT

## 2021-03-01 PROCEDURE — 83036 HEMOGLOBIN GLYCOSYLATED A1C: CPT

## 2021-03-01 PROCEDURE — 36415 COLL VENOUS BLD VENIPUNCTURE: CPT | Mod: PO

## 2021-03-03 ENCOUNTER — OFFICE VISIT (OUTPATIENT)
Dept: ENDOCRINOLOGY | Facility: CLINIC | Age: 81
End: 2021-03-03
Payer: MEDICARE

## 2021-03-03 VITALS
TEMPERATURE: 98 F | DIASTOLIC BLOOD PRESSURE: 64 MMHG | HEART RATE: 63 BPM | BODY MASS INDEX: 33.06 KG/M2 | SYSTOLIC BLOOD PRESSURE: 140 MMHG | WEIGHT: 217.38 LBS

## 2021-03-03 DIAGNOSIS — E78.5 HYPERLIPIDEMIA, UNSPECIFIED HYPERLIPIDEMIA TYPE: ICD-10-CM

## 2021-03-03 DIAGNOSIS — N18.4 CKD (CHRONIC KIDNEY DISEASE) STAGE 4, GFR 15-29 ML/MIN: ICD-10-CM

## 2021-03-03 PROCEDURE — 3078F DIAST BP <80 MM HG: CPT | Mod: CPTII,S$GLB,, | Performed by: NURSE PRACTITIONER

## 2021-03-03 PROCEDURE — 3077F SYST BP >= 140 MM HG: CPT | Mod: CPTII,S$GLB,, | Performed by: NURSE PRACTITIONER

## 2021-03-03 PROCEDURE — 99499 RISK ADDL DX/OHS AUDIT: ICD-10-PCS | Mod: S$GLB,,, | Performed by: NURSE PRACTITIONER

## 2021-03-03 PROCEDURE — 3078F PR MOST RECENT DIASTOLIC BLOOD PRESSURE < 80 MM HG: ICD-10-PCS | Mod: CPTII,S$GLB,, | Performed by: NURSE PRACTITIONER

## 2021-03-03 PROCEDURE — 1126F PR PAIN SEVERITY QUANTIFIED, NO PAIN PRESENT: ICD-10-PCS | Mod: S$GLB,,, | Performed by: NURSE PRACTITIONER

## 2021-03-03 PROCEDURE — 3052F PR MOST RECENT HEMOGLOBIN A1C LEVEL 8.0 - < 9.0%: ICD-10-PCS | Mod: CPTII,S$GLB,, | Performed by: NURSE PRACTITIONER

## 2021-03-03 PROCEDURE — 1126F AMNT PAIN NOTED NONE PRSNT: CPT | Mod: S$GLB,,, | Performed by: NURSE PRACTITIONER

## 2021-03-03 PROCEDURE — 1159F PR MEDICATION LIST DOCUMENTED IN MEDICAL RECORD: ICD-10-PCS | Mod: S$GLB,,, | Performed by: NURSE PRACTITIONER

## 2021-03-03 PROCEDURE — 99214 OFFICE O/P EST MOD 30 MIN: CPT | Mod: S$GLB,,, | Performed by: NURSE PRACTITIONER

## 2021-03-03 PROCEDURE — 99214 PR OFFICE/OUTPT VISIT, EST, LEVL IV, 30-39 MIN: ICD-10-PCS | Mod: S$GLB,,, | Performed by: NURSE PRACTITIONER

## 2021-03-03 PROCEDURE — 3077F PR MOST RECENT SYSTOLIC BLOOD PRESSURE >= 140 MM HG: ICD-10-PCS | Mod: CPTII,S$GLB,, | Performed by: NURSE PRACTITIONER

## 2021-03-03 PROCEDURE — 99999 PR PBB SHADOW E&M-EST. PATIENT-LVL IV: ICD-10-PCS | Mod: PBBFAC,,, | Performed by: NURSE PRACTITIONER

## 2021-03-03 PROCEDURE — 1159F MED LIST DOCD IN RCRD: CPT | Mod: S$GLB,,, | Performed by: NURSE PRACTITIONER

## 2021-03-03 PROCEDURE — 99499 UNLISTED E&M SERVICE: CPT | Mod: S$GLB,,, | Performed by: NURSE PRACTITIONER

## 2021-03-03 PROCEDURE — 3052F HG A1C>EQUAL 8.0%<EQUAL 9.0%: CPT | Mod: CPTII,S$GLB,, | Performed by: NURSE PRACTITIONER

## 2021-03-03 PROCEDURE — 99999 PR PBB SHADOW E&M-EST. PATIENT-LVL IV: CPT | Mod: PBBFAC,,, | Performed by: NURSE PRACTITIONER

## 2021-03-05 ENCOUNTER — PATIENT MESSAGE (OUTPATIENT)
Dept: UROLOGY | Facility: CLINIC | Age: 81
End: 2021-03-05

## 2021-03-05 ENCOUNTER — TELEPHONE (OUTPATIENT)
Dept: UROLOGY | Facility: CLINIC | Age: 81
End: 2021-03-05

## 2021-03-05 DIAGNOSIS — M62.89 PELVIC FLOOR DYSFUNCTION: Primary | ICD-10-CM

## 2021-04-05 ENCOUNTER — PATIENT MESSAGE (OUTPATIENT)
Dept: ADMINISTRATIVE | Facility: HOSPITAL | Age: 81
End: 2021-04-05

## 2021-04-12 ENCOUNTER — TELEPHONE (OUTPATIENT)
Dept: ENDOCRINOLOGY | Facility: CLINIC | Age: 81
End: 2021-04-12

## 2021-04-12 DIAGNOSIS — Z79.4 CONTROLLED TYPE 2 DIABETES MELLITUS WITH COMPLICATION, WITH LONG-TERM CURRENT USE OF INSULIN: ICD-10-CM

## 2021-04-12 DIAGNOSIS — E11.8 CONTROLLED TYPE 2 DIABETES MELLITUS WITH COMPLICATION, WITH LONG-TERM CURRENT USE OF INSULIN: ICD-10-CM

## 2021-04-13 RX ORDER — LANCETS
EACH MISCELLANEOUS
Qty: 400 EACH | Refills: 3 | Status: SHIPPED | OUTPATIENT
Start: 2021-04-13 | End: 2022-03-10 | Stop reason: SDUPTHER

## 2021-04-15 RX ORDER — BLOOD SUGAR DIAGNOSTIC
STRIP MISCELLANEOUS
Qty: 400 STRIP | Refills: 3 | Status: SHIPPED | OUTPATIENT
Start: 2021-04-15 | End: 2021-04-20 | Stop reason: SDUPTHER

## 2021-04-20 RX ORDER — BLOOD SUGAR DIAGNOSTIC
STRIP MISCELLANEOUS
Qty: 400 STRIP | Refills: 3 | Status: SHIPPED | OUTPATIENT
Start: 2021-04-20 | End: 2021-11-11 | Stop reason: SDUPTHER

## 2021-04-21 ENCOUNTER — DOCUMENTATION ONLY (OUTPATIENT)
Dept: REHABILITATION | Facility: HOSPITAL | Age: 81
End: 2021-04-21

## 2021-04-30 ENCOUNTER — TELEPHONE (OUTPATIENT)
Dept: NEPHROLOGY | Facility: CLINIC | Age: 81
End: 2021-04-30

## 2021-04-30 ENCOUNTER — OFFICE VISIT (OUTPATIENT)
Dept: CARDIOLOGY | Facility: CLINIC | Age: 81
End: 2021-04-30
Payer: MEDICARE

## 2021-04-30 VITALS
HEART RATE: 55 BPM | HEIGHT: 68 IN | DIASTOLIC BLOOD PRESSURE: 70 MMHG | SYSTOLIC BLOOD PRESSURE: 155 MMHG | BODY MASS INDEX: 33.01 KG/M2 | WEIGHT: 217.81 LBS | OXYGEN SATURATION: 94 %

## 2021-04-30 DIAGNOSIS — I25.10 CORONARY ARTERY DISEASE INVOLVING NATIVE CORONARY ARTERY OF NATIVE HEART WITHOUT ANGINA PECTORIS: Primary | Chronic | ICD-10-CM

## 2021-04-30 DIAGNOSIS — G47.33 OBSTRUCTIVE SLEEP APNEA: ICD-10-CM

## 2021-04-30 DIAGNOSIS — I50.32 CHRONIC DIASTOLIC CHF (CONGESTIVE HEART FAILURE): ICD-10-CM

## 2021-04-30 DIAGNOSIS — E11.22 TYPE 2 DIABETES MELLITUS WITH STAGE 4 CHRONIC KIDNEY DISEASE, WITH LONG-TERM CURRENT USE OF INSULIN: ICD-10-CM

## 2021-04-30 DIAGNOSIS — Z79.4 TYPE 2 DIABETES MELLITUS WITH STAGE 4 CHRONIC KIDNEY DISEASE, WITH LONG-TERM CURRENT USE OF INSULIN: ICD-10-CM

## 2021-04-30 DIAGNOSIS — I48.19 PERSISTENT ATRIAL FIBRILLATION: ICD-10-CM

## 2021-04-30 DIAGNOSIS — R06.02 SOB (SHORTNESS OF BREATH): ICD-10-CM

## 2021-04-30 DIAGNOSIS — N18.4 CHRONIC KIDNEY DISEASE, STAGE IV (SEVERE): Chronic | ICD-10-CM

## 2021-04-30 DIAGNOSIS — I48.21 PERMANENT ATRIAL FIBRILLATION: ICD-10-CM

## 2021-04-30 DIAGNOSIS — N18.4 TYPE 2 DIABETES MELLITUS WITH STAGE 4 CHRONIC KIDNEY DISEASE, WITH LONG-TERM CURRENT USE OF INSULIN: ICD-10-CM

## 2021-04-30 DIAGNOSIS — E78.00 PURE HYPERCHOLESTEROLEMIA: Chronic | ICD-10-CM

## 2021-04-30 DIAGNOSIS — I10 ESSENTIAL HYPERTENSION: Chronic | ICD-10-CM

## 2021-04-30 DIAGNOSIS — I87.2 VENOUS INSUFFICIENCY: ICD-10-CM

## 2021-04-30 PROCEDURE — 3288F PR FALLS RISK ASSESSMENT DOCUMENTED: ICD-10-PCS | Mod: CPTII,S$GLB,, | Performed by: PHYSICIAN ASSISTANT

## 2021-04-30 PROCEDURE — 3052F HG A1C>EQUAL 8.0%<EQUAL 9.0%: CPT | Mod: CPTII,S$GLB,, | Performed by: PHYSICIAN ASSISTANT

## 2021-04-30 PROCEDURE — 3052F PR MOST RECENT HEMOGLOBIN A1C LEVEL 8.0 - < 9.0%: ICD-10-PCS | Mod: CPTII,S$GLB,, | Performed by: PHYSICIAN ASSISTANT

## 2021-04-30 PROCEDURE — 99214 PR OFFICE/OUTPT VISIT, EST, LEVL IV, 30-39 MIN: ICD-10-PCS | Mod: S$GLB,,, | Performed by: PHYSICIAN ASSISTANT

## 2021-04-30 PROCEDURE — 93000 EKG 12-LEAD: ICD-10-PCS | Mod: S$GLB,,, | Performed by: INTERNAL MEDICINE

## 2021-04-30 PROCEDURE — 99499 RISK ADDL DX/OHS AUDIT: ICD-10-PCS | Mod: S$GLB,,, | Performed by: PHYSICIAN ASSISTANT

## 2021-04-30 PROCEDURE — 3288F FALL RISK ASSESSMENT DOCD: CPT | Mod: CPTII,S$GLB,, | Performed by: PHYSICIAN ASSISTANT

## 2021-04-30 PROCEDURE — 99999 PR PBB SHADOW E&M-EST. PATIENT-LVL V: ICD-10-PCS | Mod: PBBFAC,,, | Performed by: PHYSICIAN ASSISTANT

## 2021-04-30 PROCEDURE — 99499 UNLISTED E&M SERVICE: CPT | Mod: S$GLB,,, | Performed by: PHYSICIAN ASSISTANT

## 2021-04-30 PROCEDURE — 99999 PR PBB SHADOW E&M-EST. PATIENT-LVL V: CPT | Mod: PBBFAC,,, | Performed by: PHYSICIAN ASSISTANT

## 2021-04-30 PROCEDURE — 1159F PR MEDICATION LIST DOCUMENTED IN MEDICAL RECORD: ICD-10-PCS | Mod: S$GLB,,, | Performed by: PHYSICIAN ASSISTANT

## 2021-04-30 PROCEDURE — 1101F PT FALLS ASSESS-DOCD LE1/YR: CPT | Mod: CPTII,S$GLB,, | Performed by: PHYSICIAN ASSISTANT

## 2021-04-30 PROCEDURE — 1126F PR PAIN SEVERITY QUANTIFIED, NO PAIN PRESENT: ICD-10-PCS | Mod: S$GLB,,, | Performed by: PHYSICIAN ASSISTANT

## 2021-04-30 PROCEDURE — 99214 OFFICE O/P EST MOD 30 MIN: CPT | Mod: S$GLB,,, | Performed by: PHYSICIAN ASSISTANT

## 2021-04-30 PROCEDURE — 1159F MED LIST DOCD IN RCRD: CPT | Mod: S$GLB,,, | Performed by: PHYSICIAN ASSISTANT

## 2021-04-30 PROCEDURE — 1126F AMNT PAIN NOTED NONE PRSNT: CPT | Mod: S$GLB,,, | Performed by: PHYSICIAN ASSISTANT

## 2021-04-30 PROCEDURE — 93000 ELECTROCARDIOGRAM COMPLETE: CPT | Mod: S$GLB,,, | Performed by: INTERNAL MEDICINE

## 2021-04-30 PROCEDURE — 1101F PR PT FALLS ASSESS DOC 0-1 FALLS W/OUT INJ PAST YR: ICD-10-PCS | Mod: CPTII,S$GLB,, | Performed by: PHYSICIAN ASSISTANT

## 2021-04-30 RX ORDER — ROSUVASTATIN CALCIUM 40 MG/1
TABLET, COATED ORAL
Qty: 90 TABLET | Refills: 3 | Status: SHIPPED | OUTPATIENT
Start: 2021-04-30 | End: 2021-12-31 | Stop reason: SDUPTHER

## 2021-04-30 RX ORDER — CARVEDILOL 12.5 MG/1
12.5 TABLET ORAL 2 TIMES DAILY
Qty: 180 TABLET | Refills: 3 | Status: SHIPPED | OUTPATIENT
Start: 2021-04-30 | End: 2022-07-18 | Stop reason: SDUPTHER

## 2021-04-30 RX ORDER — FUROSEMIDE 40 MG/1
40 TABLET ORAL DAILY
Qty: 180 TABLET | Refills: 3 | Status: SHIPPED | OUTPATIENT
Start: 2021-04-30 | End: 2021-08-16

## 2021-04-30 RX ORDER — CARVEDILOL 12.5 MG/1
12.5 TABLET ORAL 2 TIMES DAILY
Qty: 60 TABLET | Refills: 6 | Status: SHIPPED | OUTPATIENT
Start: 2021-04-30 | End: 2021-04-30

## 2021-05-05 DIAGNOSIS — N39.0 UTI (URINARY TRACT INFECTION), UNCOMPLICATED: ICD-10-CM

## 2021-05-05 DIAGNOSIS — N18.4 CHRONIC KIDNEY DISEASE, STAGE IV (SEVERE): Primary | ICD-10-CM

## 2021-05-13 ENCOUNTER — LAB VISIT (OUTPATIENT)
Dept: LAB | Facility: HOSPITAL | Age: 81
End: 2021-05-13
Attending: INTERNAL MEDICINE
Payer: MEDICARE

## 2021-05-13 DIAGNOSIS — N18.4 CHRONIC KIDNEY DISEASE, STAGE IV (SEVERE): ICD-10-CM

## 2021-05-13 LAB
BASOPHILS # BLD AUTO: 0.05 K/UL (ref 0–0.2)
BASOPHILS NFR BLD: 0.5 % (ref 0–1.9)
DIFFERENTIAL METHOD: ABNORMAL
EOSINOPHIL # BLD AUTO: 0.2 K/UL (ref 0–0.5)
EOSINOPHIL NFR BLD: 1.6 % (ref 0–8)
ERYTHROCYTE [DISTWIDTH] IN BLOOD BY AUTOMATED COUNT: 14.5 % (ref 11.5–14.5)
HCT VFR BLD AUTO: 41.4 % (ref 37–48.5)
HGB BLD-MCNC: 13.4 G/DL (ref 12–16)
IMM GRANULOCYTES # BLD AUTO: 0.03 K/UL (ref 0–0.04)
IMM GRANULOCYTES NFR BLD AUTO: 0.3 % (ref 0–0.5)
LYMPHOCYTES # BLD AUTO: 2.2 K/UL (ref 1–4.8)
LYMPHOCYTES NFR BLD: 23.5 % (ref 18–48)
MCH RBC QN AUTO: 28.8 PG (ref 27–31)
MCHC RBC AUTO-ENTMCNC: 32.4 G/DL (ref 32–36)
MCV RBC AUTO: 89 FL (ref 82–98)
MONOCYTES # BLD AUTO: 0.8 K/UL (ref 0.3–1)
MONOCYTES NFR BLD: 9 % (ref 4–15)
NEUTROPHILS # BLD AUTO: 6 K/UL (ref 1.8–7.7)
NEUTROPHILS NFR BLD: 65.1 % (ref 38–73)
NRBC BLD-RTO: 0 /100 WBC
PLATELET # BLD AUTO: 178 K/UL (ref 150–450)
PMV BLD AUTO: 13.9 FL (ref 9.2–12.9)
RBC # BLD AUTO: 4.66 M/UL (ref 4–5.4)
WBC # BLD AUTO: 9.23 K/UL (ref 3.9–12.7)

## 2021-05-13 PROCEDURE — 36415 COLL VENOUS BLD VENIPUNCTURE: CPT | Mod: PO | Performed by: INTERNAL MEDICINE

## 2021-05-13 PROCEDURE — 83970 ASSAY OF PARATHORMONE: CPT | Performed by: INTERNAL MEDICINE

## 2021-05-13 PROCEDURE — 85025 COMPLETE CBC W/AUTO DIFF WBC: CPT | Performed by: INTERNAL MEDICINE

## 2021-05-13 PROCEDURE — 82306 VITAMIN D 25 HYDROXY: CPT | Performed by: INTERNAL MEDICINE

## 2021-05-13 PROCEDURE — 80069 RENAL FUNCTION PANEL: CPT | Performed by: INTERNAL MEDICINE

## 2021-05-14 LAB
25(OH)D3+25(OH)D2 SERPL-MCNC: 36 NG/ML (ref 30–96)
ALBUMIN SERPL BCP-MCNC: 3.8 G/DL (ref 3.5–5.2)
ANION GAP SERPL CALC-SCNC: 12 MMOL/L (ref 8–16)
BUN SERPL-MCNC: 50 MG/DL (ref 8–23)
CALCIUM SERPL-MCNC: 9.6 MG/DL (ref 8.7–10.5)
CHLORIDE SERPL-SCNC: 103 MMOL/L (ref 95–110)
CO2 SERPL-SCNC: 24 MMOL/L (ref 23–29)
CREAT SERPL-MCNC: 2.3 MG/DL (ref 0.5–1.4)
EST. GFR  (AFRICAN AMERICAN): 22.1 ML/MIN/1.73 M^2
EST. GFR  (NON AFRICAN AMERICAN): 19.2 ML/MIN/1.73 M^2
GLUCOSE SERPL-MCNC: 130 MG/DL (ref 70–110)
PHOSPHATE SERPL-MCNC: 2.9 MG/DL (ref 2.7–4.5)
POTASSIUM SERPL-SCNC: 4.2 MMOL/L (ref 3.5–5.1)
PTH-INTACT SERPL-MCNC: 138 PG/ML (ref 9–77)
SODIUM SERPL-SCNC: 139 MMOL/L (ref 136–145)

## 2021-05-21 ENCOUNTER — TELEPHONE (OUTPATIENT)
Dept: NEPHROLOGY | Facility: CLINIC | Age: 81
End: 2021-05-21

## 2021-06-02 ENCOUNTER — LAB VISIT (OUTPATIENT)
Dept: LAB | Facility: HOSPITAL | Age: 81
End: 2021-06-02
Attending: NURSE PRACTITIONER
Payer: MEDICARE

## 2021-06-02 LAB
ESTIMATED AVG GLUCOSE: 212 MG/DL (ref 68–131)
HBA1C MFR BLD: 9 % (ref 4–5.6)

## 2021-06-02 PROCEDURE — 83036 HEMOGLOBIN GLYCOSYLATED A1C: CPT | Performed by: NURSE PRACTITIONER

## 2021-06-02 PROCEDURE — 36415 COLL VENOUS BLD VENIPUNCTURE: CPT | Mod: PO | Performed by: NURSE PRACTITIONER

## 2021-06-04 ENCOUNTER — TELEPHONE (OUTPATIENT)
Dept: INTERNAL MEDICINE | Facility: CLINIC | Age: 81
End: 2021-06-04

## 2021-06-16 ENCOUNTER — TELEPHONE (OUTPATIENT)
Dept: NEPHROLOGY | Facility: CLINIC | Age: 81
End: 2021-06-16

## 2021-06-21 ENCOUNTER — HOSPITAL ENCOUNTER (OUTPATIENT)
Dept: CARDIOLOGY | Facility: HOSPITAL | Age: 81
Discharge: HOME | End: 2021-06-21
Attending: PHYSICIAN ASSISTANT
Payer: MEDICARE

## 2021-06-21 ENCOUNTER — OFFICE VISIT (OUTPATIENT)
Dept: INTERNAL MEDICINE | Facility: CLINIC | Age: 81
End: 2021-06-21
Payer: MEDICARE

## 2021-06-21 VITALS
BODY MASS INDEX: 32.99 KG/M2 | DIASTOLIC BLOOD PRESSURE: 60 MMHG | OXYGEN SATURATION: 98 % | SYSTOLIC BLOOD PRESSURE: 126 MMHG | HEIGHT: 68 IN | HEART RATE: 60 BPM

## 2021-06-21 VITALS
DIASTOLIC BLOOD PRESSURE: 60 MMHG | HEART RATE: 64 BPM | BODY MASS INDEX: 32.89 KG/M2 | SYSTOLIC BLOOD PRESSURE: 110 MMHG | WEIGHT: 217 LBS | HEIGHT: 68 IN

## 2021-06-21 DIAGNOSIS — I48.91 ATRIAL FIBRILLATION, UNSPECIFIED TYPE: ICD-10-CM

## 2021-06-21 DIAGNOSIS — Z79.4 TYPE 2 DIABETES MELLITUS WITH STAGE 4 CHRONIC KIDNEY DISEASE, WITH LONG-TERM CURRENT USE OF INSULIN: ICD-10-CM

## 2021-06-21 DIAGNOSIS — Z12.31 ENCOUNTER FOR SCREENING MAMMOGRAM FOR BREAST CANCER: ICD-10-CM

## 2021-06-21 DIAGNOSIS — N18.4 TYPE 2 DIABETES MELLITUS WITH STAGE 4 CHRONIC KIDNEY DISEASE, WITH LONG-TERM CURRENT USE OF INSULIN: ICD-10-CM

## 2021-06-21 DIAGNOSIS — E11.22 TYPE 2 DIABETES MELLITUS WITH STAGE 4 CHRONIC KIDNEY DISEASE, WITH LONG-TERM CURRENT USE OF INSULIN: ICD-10-CM

## 2021-06-21 DIAGNOSIS — R06.02 SOB (SHORTNESS OF BREATH): ICD-10-CM

## 2021-06-21 DIAGNOSIS — Z00.00 ANNUAL PHYSICAL EXAM: Primary | ICD-10-CM

## 2021-06-21 DIAGNOSIS — I10 ESSENTIAL HYPERTENSION: ICD-10-CM

## 2021-06-21 DIAGNOSIS — M47.816 LUMBAR SPONDYLOSIS: ICD-10-CM

## 2021-06-21 LAB
ASCENDING AORTA: 3.25 CM
AV INDEX (PROSTH): 0.58
AV MEAN GRADIENT: 6 MMHG
AV PEAK GRADIENT: 14 MMHG
AV VALVE AREA: 2.13 CM2
AV VELOCITY RATIO: 0.58
BSA FOR ECHO PROCEDURE: 2.17 M2
CV ECHO LV RWT: 0.5 CM
DOP CALC AO PEAK VEL: 1.85 M/S
DOP CALC AO VTI: 45.62 CM
DOP CALC LVOT AREA: 3.7 CM2
DOP CALC LVOT DIAMETER: 2.16 CM
DOP CALC LVOT PEAK VEL: 1.07 M/S
DOP CALC LVOT STROKE VOLUME: 97.09 CM3
DOP CALCLVOT PEAK VEL VTI: 26.51 CM
E WAVE DECELERATION TIME: 259.99 MSEC
E/A RATIO: 2
E/E' RATIO: 12.5 M/S
ECHO LV POSTERIOR WALL: 1.04 CM (ref 0.6–1.1)
EJECTION FRACTION: 60 %
FRACTIONAL SHORTENING: 29 % (ref 28–44)
INTERVENTRICULAR SEPTUM: 1.01 CM (ref 0.6–1.1)
LA MAJOR: 6.1 CM
LA MINOR: 5.81 CM
LA WIDTH: 4.73 CM
LEFT ATRIUM SIZE: 4.34 CM
LEFT ATRIUM VOLUME INDEX MOD: 44.6 ML/M2
LEFT ATRIUM VOLUME INDEX: 49 ML/M2
LEFT ATRIUM VOLUME MOD: 94.6 CM3
LEFT ATRIUM VOLUME: 103.85 CM3
LEFT INTERNAL DIMENSION IN SYSTOLE: 2.92 CM (ref 2.1–4)
LEFT VENTRICLE DIASTOLIC VOLUME INDEX: 35.55 ML/M2
LEFT VENTRICLE DIASTOLIC VOLUME: 75.36 ML
LEFT VENTRICLE MASS INDEX: 65 G/M2
LEFT VENTRICLE SYSTOLIC VOLUME INDEX: 15.5 ML/M2
LEFT VENTRICLE SYSTOLIC VOLUME: 32.8 ML
LEFT VENTRICULAR INTERNAL DIMENSION IN DIASTOLE: 4.13 CM (ref 3.5–6)
LEFT VENTRICULAR MASS: 138.37 G
LV LATERAL E/E' RATIO: 11.11 M/S
LV SEPTAL E/E' RATIO: 14.29 M/S
MV PEAK A VEL: 0.5 M/S
MV PEAK E VEL: 1 M/S
MV STENOSIS PRESSURE HALF TIME: 75.4 MS
MV VALVE AREA P 1/2 METHOD: 2.92 CM2
PISA TR MAX VEL: 3.11 M/S
RA MAJOR: 5.86 CM
RA PRESSURE: 3 MMHG
RA WIDTH: 3.89 CM
RIGHT VENTRICULAR END-DIASTOLIC DIMENSION: 4.1 CM
RV TISSUE DOPPLER FREE WALL SYSTOLIC VELOCITY 1 (APICAL 4 CHAMBER VIEW): 9 CM/S
SINUS: 3.21 CM
STJ: 2.63 CM
TDI LATERAL: 0.09 M/S
TDI SEPTAL: 0.07 M/S
TDI: 0.08 M/S
TR MAX PG: 39 MMHG
TRICUSPID ANNULAR PLANE SYSTOLIC EXCURSION: 1.5 CM
TV REST PULMONARY ARTERY PRESSURE: 42 MMHG

## 2021-06-21 PROCEDURE — 93306 TTE W/DOPPLER COMPLETE: CPT

## 2021-06-21 PROCEDURE — 3288F FALL RISK ASSESSMENT DOCD: CPT | Mod: CPTII,S$GLB,, | Performed by: INTERNAL MEDICINE

## 2021-06-21 PROCEDURE — 1126F PR PAIN SEVERITY QUANTIFIED, NO PAIN PRESENT: ICD-10-PCS | Mod: S$GLB,,, | Performed by: INTERNAL MEDICINE

## 2021-06-21 PROCEDURE — 99999 PR PBB SHADOW E&M-EST. PATIENT-LVL IV: ICD-10-PCS | Mod: PBBFAC,,, | Performed by: INTERNAL MEDICINE

## 2021-06-21 PROCEDURE — 3052F HG A1C>EQUAL 8.0%<EQUAL 9.0%: CPT | Mod: CPTII,S$GLB,, | Performed by: INTERNAL MEDICINE

## 2021-06-21 PROCEDURE — 99397 PR PREVENTIVE VISIT,EST,65 & OVER: ICD-10-PCS | Mod: S$GLB,,, | Performed by: INTERNAL MEDICINE

## 2021-06-21 PROCEDURE — 99397 PER PM REEVAL EST PAT 65+ YR: CPT | Mod: S$GLB,,, | Performed by: INTERNAL MEDICINE

## 2021-06-21 PROCEDURE — 3052F PR MOST RECENT HEMOGLOBIN A1C LEVEL 8.0 - < 9.0%: ICD-10-PCS | Mod: CPTII,S$GLB,, | Performed by: INTERNAL MEDICINE

## 2021-06-21 PROCEDURE — 1101F PT FALLS ASSESS-DOCD LE1/YR: CPT | Mod: CPTII,S$GLB,, | Performed by: INTERNAL MEDICINE

## 2021-06-21 PROCEDURE — 93306 ECHO (CUPID ONLY): ICD-10-PCS | Mod: 26,,, | Performed by: INTERNAL MEDICINE

## 2021-06-21 PROCEDURE — 99999 PR PBB SHADOW E&M-EST. PATIENT-LVL IV: CPT | Mod: PBBFAC,,, | Performed by: INTERNAL MEDICINE

## 2021-06-21 PROCEDURE — 93306 TTE W/DOPPLER COMPLETE: CPT | Mod: 26,,, | Performed by: INTERNAL MEDICINE

## 2021-06-21 PROCEDURE — 3288F PR FALLS RISK ASSESSMENT DOCUMENTED: ICD-10-PCS | Mod: CPTII,S$GLB,, | Performed by: INTERNAL MEDICINE

## 2021-06-21 PROCEDURE — 1101F PR PT FALLS ASSESS DOC 0-1 FALLS W/OUT INJ PAST YR: ICD-10-PCS | Mod: CPTII,S$GLB,, | Performed by: INTERNAL MEDICINE

## 2021-06-21 PROCEDURE — 1126F AMNT PAIN NOTED NONE PRSNT: CPT | Mod: S$GLB,,, | Performed by: INTERNAL MEDICINE

## 2021-06-28 ENCOUNTER — PATIENT OUTREACH (OUTPATIENT)
Dept: ADMINISTRATIVE | Facility: OTHER | Age: 81
End: 2021-06-28

## 2021-07-08 ENCOUNTER — NURSE TRIAGE (OUTPATIENT)
Dept: ADMINISTRATIVE | Facility: CLINIC | Age: 81
End: 2021-07-08

## 2021-07-28 DIAGNOSIS — E11.8 CONTROLLED TYPE 2 DIABETES MELLITUS WITH COMPLICATION, WITH LONG-TERM CURRENT USE OF INSULIN: ICD-10-CM

## 2021-07-28 DIAGNOSIS — Z79.4 CONTROLLED TYPE 2 DIABETES MELLITUS WITH COMPLICATION, WITH LONG-TERM CURRENT USE OF INSULIN: ICD-10-CM

## 2021-07-29 RX ORDER — INSULIN ASPART 100 [IU]/ML
INJECTION, SOLUTION INTRAVENOUS; SUBCUTANEOUS
Qty: 30 ML | Refills: 0 | Status: SHIPPED | OUTPATIENT
Start: 2021-07-29 | End: 2021-07-29 | Stop reason: SDUPTHER

## 2021-07-30 DIAGNOSIS — N18.4 CHRONIC KIDNEY DISEASE, STAGE IV (SEVERE): Primary | ICD-10-CM

## 2021-07-30 RX ORDER — INSULIN DEGLUDEC 100 U/ML
INJECTION, SOLUTION SUBCUTANEOUS
Qty: 5 PEN | Refills: 5 | Status: SHIPPED | OUTPATIENT
Start: 2021-07-30 | End: 2021-11-22 | Stop reason: SDUPTHER

## 2021-07-30 RX ORDER — INSULIN ASPART 100 [IU]/ML
INJECTION, SOLUTION INTRAVENOUS; SUBCUTANEOUS
Qty: 30 ML | Refills: 0 | Status: SHIPPED | OUTPATIENT
Start: 2021-07-30 | End: 2021-11-19

## 2021-08-11 ENCOUNTER — LAB VISIT (OUTPATIENT)
Dept: LAB | Facility: HOSPITAL | Age: 81
End: 2021-08-11
Attending: INTERNAL MEDICINE
Payer: MEDICARE

## 2021-08-11 DIAGNOSIS — N18.4 CHRONIC KIDNEY DISEASE, STAGE IV (SEVERE): ICD-10-CM

## 2021-08-11 LAB
25(OH)D3+25(OH)D2 SERPL-MCNC: 39 NG/ML (ref 30–96)
ALBUMIN SERPL BCP-MCNC: 3.7 G/DL (ref 3.5–5.2)
ANION GAP SERPL CALC-SCNC: 10 MMOL/L (ref 8–16)
BASOPHILS # BLD AUTO: 0.06 K/UL (ref 0–0.2)
BASOPHILS NFR BLD: 0.7 % (ref 0–1.9)
BUN SERPL-MCNC: 43 MG/DL (ref 8–23)
CALCIUM SERPL-MCNC: 9.3 MG/DL (ref 8.7–10.5)
CHLORIDE SERPL-SCNC: 109 MMOL/L (ref 95–110)
CO2 SERPL-SCNC: 23 MMOL/L (ref 23–29)
CREAT SERPL-MCNC: 2.1 MG/DL (ref 0.5–1.4)
DIFFERENTIAL METHOD: ABNORMAL
EOSINOPHIL # BLD AUTO: 0.2 K/UL (ref 0–0.5)
EOSINOPHIL NFR BLD: 1.9 % (ref 0–8)
ERYTHROCYTE [DISTWIDTH] IN BLOOD BY AUTOMATED COUNT: 14.4 % (ref 11.5–14.5)
EST. GFR  (AFRICAN AMERICAN): 25 ML/MIN/1.73 M^2
EST. GFR  (NON AFRICAN AMERICAN): 21 ML/MIN/1.73 M^2
GLUCOSE SERPL-MCNC: 108 MG/DL (ref 70–110)
HCT VFR BLD AUTO: 40.1 % (ref 37–48.5)
HGB BLD-MCNC: 12.9 G/DL (ref 12–16)
IMM GRANULOCYTES # BLD AUTO: 0.02 K/UL (ref 0–0.04)
IMM GRANULOCYTES NFR BLD AUTO: 0.2 % (ref 0–0.5)
LYMPHOCYTES # BLD AUTO: 1.9 K/UL (ref 1–4.8)
LYMPHOCYTES NFR BLD: 23.9 % (ref 18–48)
MCH RBC QN AUTO: 28.5 PG (ref 27–31)
MCHC RBC AUTO-ENTMCNC: 32.2 G/DL (ref 32–36)
MCV RBC AUTO: 89 FL (ref 82–98)
MONOCYTES # BLD AUTO: 0.7 K/UL (ref 0.3–1)
MONOCYTES NFR BLD: 8.3 % (ref 4–15)
NEUTROPHILS # BLD AUTO: 5.2 K/UL (ref 1.8–7.7)
NEUTROPHILS NFR BLD: 65 % (ref 38–73)
NRBC BLD-RTO: 0 /100 WBC
PHOSPHATE SERPL-MCNC: 3.6 MG/DL (ref 2.7–4.5)
PLATELET # BLD AUTO: 160 K/UL (ref 150–450)
PMV BLD AUTO: 13.2 FL (ref 9.2–12.9)
POTASSIUM SERPL-SCNC: 4.1 MMOL/L (ref 3.5–5.1)
PTH-INTACT SERPL-MCNC: 155.3 PG/ML (ref 9–77)
RBC # BLD AUTO: 4.52 M/UL (ref 4–5.4)
SODIUM SERPL-SCNC: 142 MMOL/L (ref 136–145)
WBC # BLD AUTO: 8.05 K/UL (ref 3.9–12.7)

## 2021-08-11 PROCEDURE — 82306 VITAMIN D 25 HYDROXY: CPT | Performed by: INTERNAL MEDICINE

## 2021-08-11 PROCEDURE — 83970 ASSAY OF PARATHORMONE: CPT | Performed by: INTERNAL MEDICINE

## 2021-08-11 PROCEDURE — 80069 RENAL FUNCTION PANEL: CPT | Performed by: INTERNAL MEDICINE

## 2021-08-11 PROCEDURE — 85025 COMPLETE CBC W/AUTO DIFF WBC: CPT | Performed by: INTERNAL MEDICINE

## 2021-08-11 PROCEDURE — 36415 COLL VENOUS BLD VENIPUNCTURE: CPT | Mod: PO | Performed by: INTERNAL MEDICINE

## 2021-08-12 ENCOUNTER — PATIENT OUTREACH (OUTPATIENT)
Dept: ADMINISTRATIVE | Facility: OTHER | Age: 81
End: 2021-08-12

## 2021-08-16 ENCOUNTER — OFFICE VISIT (OUTPATIENT)
Dept: NEPHROLOGY | Facility: CLINIC | Age: 81
End: 2021-08-16
Payer: MEDICARE

## 2021-08-16 VITALS
SYSTOLIC BLOOD PRESSURE: 134 MMHG | WEIGHT: 222 LBS | DIASTOLIC BLOOD PRESSURE: 61 MMHG | OXYGEN SATURATION: 96 % | BODY MASS INDEX: 33.65 KG/M2 | HEART RATE: 65 BPM | HEIGHT: 68 IN

## 2021-08-16 DIAGNOSIS — I12.9 HYPERTENSIVE KIDNEY DISEASE WITH STAGE 4 CHRONIC KIDNEY DISEASE: ICD-10-CM

## 2021-08-16 DIAGNOSIS — N18.4 HYPERTENSIVE KIDNEY DISEASE WITH STAGE 4 CHRONIC KIDNEY DISEASE: ICD-10-CM

## 2021-08-16 DIAGNOSIS — E55.9 VITAMIN D DEFICIENCY DISEASE: ICD-10-CM

## 2021-08-16 DIAGNOSIS — I50.32 CHRONIC DIASTOLIC CHF (CONGESTIVE HEART FAILURE): ICD-10-CM

## 2021-08-16 DIAGNOSIS — D63.8 ANEMIA OF CHRONIC ILLNESS: ICD-10-CM

## 2021-08-16 DIAGNOSIS — D17.71 ANGIOMYOLIPOMA OF RIGHT KIDNEY: ICD-10-CM

## 2021-08-16 DIAGNOSIS — N25.81 SECONDARY RENAL HYPERPARATHYROIDISM: ICD-10-CM

## 2021-08-16 DIAGNOSIS — Z79.4 TYPE 2 DIABETES MELLITUS WITH STAGE 4 CHRONIC KIDNEY DISEASE, WITH LONG-TERM CURRENT USE OF INSULIN: ICD-10-CM

## 2021-08-16 DIAGNOSIS — N18.4 CHRONIC KIDNEY DISEASE, STAGE IV (SEVERE): Primary | Chronic | ICD-10-CM

## 2021-08-16 DIAGNOSIS — R80.1 PERSISTENT PROTEINURIA: ICD-10-CM

## 2021-08-16 DIAGNOSIS — E11.22 TYPE 2 DIABETES MELLITUS WITH STAGE 4 CHRONIC KIDNEY DISEASE, WITH LONG-TERM CURRENT USE OF INSULIN: ICD-10-CM

## 2021-08-16 DIAGNOSIS — N28.1 ACQUIRED CYST OF KIDNEY: ICD-10-CM

## 2021-08-16 DIAGNOSIS — N18.4 TYPE 2 DIABETES MELLITUS WITH STAGE 4 CHRONIC KIDNEY DISEASE, WITH LONG-TERM CURRENT USE OF INSULIN: ICD-10-CM

## 2021-08-16 PROCEDURE — 1126F AMNT PAIN NOTED NONE PRSNT: CPT | Mod: CPTII,S$GLB,, | Performed by: INTERNAL MEDICINE

## 2021-08-16 PROCEDURE — 3288F PR FALLS RISK ASSESSMENT DOCUMENTED: ICD-10-PCS | Mod: CPTII,S$GLB,, | Performed by: INTERNAL MEDICINE

## 2021-08-16 PROCEDURE — 3052F HG A1C>EQUAL 8.0%<EQUAL 9.0%: CPT | Mod: CPTII,S$GLB,, | Performed by: INTERNAL MEDICINE

## 2021-08-16 PROCEDURE — 99999 PR PBB SHADOW E&M-EST. PATIENT-LVL V: CPT | Mod: PBBFAC,,, | Performed by: INTERNAL MEDICINE

## 2021-08-16 PROCEDURE — 1159F PR MEDICATION LIST DOCUMENTED IN MEDICAL RECORD: ICD-10-PCS | Mod: CPTII,S$GLB,, | Performed by: INTERNAL MEDICINE

## 2021-08-16 PROCEDURE — 1159F MED LIST DOCD IN RCRD: CPT | Mod: CPTII,S$GLB,, | Performed by: INTERNAL MEDICINE

## 2021-08-16 PROCEDURE — 1160F PR REVIEW ALL MEDS BY PRESCRIBER/CLIN PHARMACIST DOCUMENTED: ICD-10-PCS | Mod: CPTII,S$GLB,, | Performed by: INTERNAL MEDICINE

## 2021-08-16 PROCEDURE — 99214 OFFICE O/P EST MOD 30 MIN: CPT | Mod: S$GLB,,, | Performed by: INTERNAL MEDICINE

## 2021-08-16 PROCEDURE — 1126F PR PAIN SEVERITY QUANTIFIED, NO PAIN PRESENT: ICD-10-PCS | Mod: CPTII,S$GLB,, | Performed by: INTERNAL MEDICINE

## 2021-08-16 PROCEDURE — 1160F RVW MEDS BY RX/DR IN RCRD: CPT | Mod: CPTII,S$GLB,, | Performed by: INTERNAL MEDICINE

## 2021-08-16 PROCEDURE — 3078F PR MOST RECENT DIASTOLIC BLOOD PRESSURE < 80 MM HG: ICD-10-PCS | Mod: CPTII,S$GLB,, | Performed by: INTERNAL MEDICINE

## 2021-08-16 PROCEDURE — 1101F PR PT FALLS ASSESS DOC 0-1 FALLS W/OUT INJ PAST YR: ICD-10-PCS | Mod: CPTII,S$GLB,, | Performed by: INTERNAL MEDICINE

## 2021-08-16 PROCEDURE — 3078F DIAST BP <80 MM HG: CPT | Mod: CPTII,S$GLB,, | Performed by: INTERNAL MEDICINE

## 2021-08-16 PROCEDURE — 3288F FALL RISK ASSESSMENT DOCD: CPT | Mod: CPTII,S$GLB,, | Performed by: INTERNAL MEDICINE

## 2021-08-16 PROCEDURE — 99214 PR OFFICE/OUTPT VISIT, EST, LEVL IV, 30-39 MIN: ICD-10-PCS | Mod: S$GLB,,, | Performed by: INTERNAL MEDICINE

## 2021-08-16 PROCEDURE — 1101F PT FALLS ASSESS-DOCD LE1/YR: CPT | Mod: CPTII,S$GLB,, | Performed by: INTERNAL MEDICINE

## 2021-08-16 PROCEDURE — 3052F PR MOST RECENT HEMOGLOBIN A1C LEVEL 8.0 - < 9.0%: ICD-10-PCS | Mod: CPTII,S$GLB,, | Performed by: INTERNAL MEDICINE

## 2021-08-16 PROCEDURE — 3075F SYST BP GE 130 - 139MM HG: CPT | Mod: CPTII,S$GLB,, | Performed by: INTERNAL MEDICINE

## 2021-08-16 PROCEDURE — 3075F PR MOST RECENT SYSTOLIC BLOOD PRESS GE 130-139MM HG: ICD-10-PCS | Mod: CPTII,S$GLB,, | Performed by: INTERNAL MEDICINE

## 2021-08-16 PROCEDURE — 99999 PR PBB SHADOW E&M-EST. PATIENT-LVL V: ICD-10-PCS | Mod: PBBFAC,,, | Performed by: INTERNAL MEDICINE

## 2021-08-16 RX ORDER — FUROSEMIDE 20 MG/1
TABLET ORAL
Qty: 270 TABLET | Refills: 3 | Status: SHIPPED | OUTPATIENT
Start: 2021-08-16 | End: 2022-06-30 | Stop reason: SDUPTHER

## 2021-08-17 ENCOUNTER — TELEPHONE (OUTPATIENT)
Dept: UROLOGY | Facility: CLINIC | Age: 81
End: 2021-08-17

## 2021-10-02 NOTE — PLAN OF CARE
Dr. Peña informed of heart rate of 108. Patient denies any irregular hear rhythms, denies any cp or sob. Dr. Peña also informed that the patient had a gout attack with a steroid dose pack that was completed two days ago. Dr. Peña stated that she will continue with the procedure but will not use steroids.  
Pt tolerated procedure well. Reports 0 /10 pain after procedure. Pt assisted up for first time, steady on feet. D/c instructions given.   
05-Oct-2021

## 2021-10-07 ENCOUNTER — TELEPHONE (OUTPATIENT)
Dept: UROLOGY | Facility: CLINIC | Age: 81
End: 2021-10-07

## 2021-10-22 DIAGNOSIS — I48.19 PERSISTENT ATRIAL FIBRILLATION: ICD-10-CM

## 2021-11-11 ENCOUNTER — TELEPHONE (OUTPATIENT)
Dept: INTERNAL MEDICINE | Facility: CLINIC | Age: 81
End: 2021-11-11
Payer: MEDICARE

## 2021-11-11 DIAGNOSIS — Z79.4 CONTROLLED TYPE 2 DIABETES MELLITUS WITH COMPLICATION, WITH LONG-TERM CURRENT USE OF INSULIN: Primary | ICD-10-CM

## 2021-11-11 DIAGNOSIS — E11.8 CONTROLLED TYPE 2 DIABETES MELLITUS WITH COMPLICATION, WITH LONG-TERM CURRENT USE OF INSULIN: Primary | ICD-10-CM

## 2021-11-11 RX ORDER — PEN NEEDLE, DIABETIC 30 GX3/16"
NEEDLE, DISPOSABLE MISCELLANEOUS
Qty: 400 EACH | Refills: 3 | Status: SHIPPED | OUTPATIENT
Start: 2021-11-11 | End: 2022-03-10 | Stop reason: SDUPTHER

## 2021-11-12 ENCOUNTER — LAB VISIT (OUTPATIENT)
Dept: LAB | Facility: HOSPITAL | Age: 81
End: 2021-11-12
Attending: NURSE PRACTITIONER
Payer: MEDICARE

## 2021-11-12 ENCOUNTER — OFFICE VISIT (OUTPATIENT)
Dept: CARDIOLOGY | Facility: CLINIC | Age: 81
End: 2021-11-12
Payer: MEDICARE

## 2021-11-12 VITALS
DIASTOLIC BLOOD PRESSURE: 56 MMHG | OXYGEN SATURATION: 91 % | HEART RATE: 68 BPM | HEIGHT: 68 IN | SYSTOLIC BLOOD PRESSURE: 126 MMHG | BODY MASS INDEX: 33.65 KG/M2 | WEIGHT: 222 LBS

## 2021-11-12 DIAGNOSIS — R06.02 SOB (SHORTNESS OF BREATH): ICD-10-CM

## 2021-11-12 DIAGNOSIS — E11.69 HYPERLIPIDEMIA ASSOCIATED WITH TYPE 2 DIABETES MELLITUS: ICD-10-CM

## 2021-11-12 DIAGNOSIS — E11.22 TYPE 2 DIABETES MELLITUS WITH STAGE 4 CHRONIC KIDNEY DISEASE, WITH LONG-TERM CURRENT USE OF INSULIN: ICD-10-CM

## 2021-11-12 DIAGNOSIS — E66.2 CLASS 1 OBESITY WITH ALVEOLAR HYPOVENTILATION, SERIOUS COMORBIDITY, AND BODY MASS INDEX (BMI) OF 33.0 TO 33.9 IN ADULT: ICD-10-CM

## 2021-11-12 DIAGNOSIS — M47.816 LUMBAR SPONDYLOSIS: ICD-10-CM

## 2021-11-12 DIAGNOSIS — N18.4 TYPE 2 DIABETES MELLITUS WITH STAGE 4 CHRONIC KIDNEY DISEASE, WITH LONG-TERM CURRENT USE OF INSULIN: ICD-10-CM

## 2021-11-12 DIAGNOSIS — R60.0 LOCALIZED EDEMA: ICD-10-CM

## 2021-11-12 DIAGNOSIS — N18.4 CHRONIC KIDNEY DISEASE, STAGE IV (SEVERE): ICD-10-CM

## 2021-11-12 DIAGNOSIS — I25.10 CORONARY ARTERY DISEASE INVOLVING NATIVE CORONARY ARTERY OF NATIVE HEART WITHOUT ANGINA PECTORIS: Primary | ICD-10-CM

## 2021-11-12 DIAGNOSIS — I48.21 PERMANENT ATRIAL FIBRILLATION: ICD-10-CM

## 2021-11-12 DIAGNOSIS — I50.32 CHRONIC DIASTOLIC CHF (CONGESTIVE HEART FAILURE): ICD-10-CM

## 2021-11-12 DIAGNOSIS — Z79.01 CHRONIC ANTICOAGULATION: ICD-10-CM

## 2021-11-12 DIAGNOSIS — G47.33 OBSTRUCTIVE SLEEP APNEA: ICD-10-CM

## 2021-11-12 DIAGNOSIS — E78.5 HYPERLIPIDEMIA ASSOCIATED WITH TYPE 2 DIABETES MELLITUS: ICD-10-CM

## 2021-11-12 DIAGNOSIS — Z79.4 TYPE 2 DIABETES MELLITUS WITH STAGE 4 CHRONIC KIDNEY DISEASE, WITH LONG-TERM CURRENT USE OF INSULIN: ICD-10-CM

## 2021-11-12 DIAGNOSIS — I10 ESSENTIAL HYPERTENSION: ICD-10-CM

## 2021-11-12 DIAGNOSIS — E11.8 CONTROLLED TYPE 2 DIABETES MELLITUS WITH COMPLICATION, WITH LONG-TERM CURRENT USE OF INSULIN: ICD-10-CM

## 2021-11-12 DIAGNOSIS — Z79.4 CONTROLLED TYPE 2 DIABETES MELLITUS WITH COMPLICATION, WITH LONG-TERM CURRENT USE OF INSULIN: ICD-10-CM

## 2021-11-12 LAB
ESTIMATED AVG GLUCOSE: 192 MG/DL (ref 68–131)
HBA1C MFR BLD: 8.3 % (ref 4–5.6)

## 2021-11-12 PROCEDURE — 36415 COLL VENOUS BLD VENIPUNCTURE: CPT | Mod: HCNC,PO | Performed by: NURSE PRACTITIONER

## 2021-11-12 PROCEDURE — 83036 HEMOGLOBIN GLYCOSYLATED A1C: CPT | Mod: HCNC | Performed by: NURSE PRACTITIONER

## 2021-11-12 PROCEDURE — 3074F SYST BP LT 130 MM HG: CPT | Mod: HCNC,CPTII,S$GLB, | Performed by: INTERNAL MEDICINE

## 2021-11-12 PROCEDURE — 3288F PR FALLS RISK ASSESSMENT DOCUMENTED: ICD-10-PCS | Mod: HCNC,CPTII,S$GLB, | Performed by: INTERNAL MEDICINE

## 2021-11-12 PROCEDURE — 1160F RVW MEDS BY RX/DR IN RCRD: CPT | Mod: HCNC,CPTII,S$GLB, | Performed by: INTERNAL MEDICINE

## 2021-11-12 PROCEDURE — 99214 OFFICE O/P EST MOD 30 MIN: CPT | Mod: HCNC,S$GLB,, | Performed by: INTERNAL MEDICINE

## 2021-11-12 PROCEDURE — 3078F DIAST BP <80 MM HG: CPT | Mod: HCNC,CPTII,S$GLB, | Performed by: INTERNAL MEDICINE

## 2021-11-12 PROCEDURE — 99214 PR OFFICE/OUTPT VISIT, EST, LEVL IV, 30-39 MIN: ICD-10-PCS | Mod: HCNC,S$GLB,, | Performed by: INTERNAL MEDICINE

## 2021-11-12 PROCEDURE — 1101F PT FALLS ASSESS-DOCD LE1/YR: CPT | Mod: HCNC,CPTII,S$GLB, | Performed by: INTERNAL MEDICINE

## 2021-11-12 PROCEDURE — 1101F PR PT FALLS ASSESS DOC 0-1 FALLS W/OUT INJ PAST YR: ICD-10-PCS | Mod: HCNC,CPTII,S$GLB, | Performed by: INTERNAL MEDICINE

## 2021-11-12 PROCEDURE — 3052F HG A1C>EQUAL 8.0%<EQUAL 9.0%: CPT | Mod: HCNC,CPTII,S$GLB, | Performed by: INTERNAL MEDICINE

## 2021-11-12 PROCEDURE — 1159F PR MEDICATION LIST DOCUMENTED IN MEDICAL RECORD: ICD-10-PCS | Mod: HCNC,CPTII,S$GLB, | Performed by: INTERNAL MEDICINE

## 2021-11-12 PROCEDURE — 99999 PR PBB SHADOW E&M-EST. PATIENT-LVL V: CPT | Mod: PBBFAC,HCNC,, | Performed by: INTERNAL MEDICINE

## 2021-11-12 PROCEDURE — 99999 PR PBB SHADOW E&M-EST. PATIENT-LVL V: ICD-10-PCS | Mod: PBBFAC,HCNC,, | Performed by: INTERNAL MEDICINE

## 2021-11-12 PROCEDURE — 1160F PR REVIEW ALL MEDS BY PRESCRIBER/CLIN PHARMACIST DOCUMENTED: ICD-10-PCS | Mod: HCNC,CPTII,S$GLB, | Performed by: INTERNAL MEDICINE

## 2021-11-12 PROCEDURE — 3074F PR MOST RECENT SYSTOLIC BLOOD PRESSURE < 130 MM HG: ICD-10-PCS | Mod: HCNC,CPTII,S$GLB, | Performed by: INTERNAL MEDICINE

## 2021-11-12 PROCEDURE — 3052F PR MOST RECENT HEMOGLOBIN A1C LEVEL 8.0 - < 9.0%: ICD-10-PCS | Mod: HCNC,CPTII,S$GLB, | Performed by: INTERNAL MEDICINE

## 2021-11-12 PROCEDURE — 3288F FALL RISK ASSESSMENT DOCD: CPT | Mod: HCNC,CPTII,S$GLB, | Performed by: INTERNAL MEDICINE

## 2021-11-12 PROCEDURE — 3078F PR MOST RECENT DIASTOLIC BLOOD PRESSURE < 80 MM HG: ICD-10-PCS | Mod: HCNC,CPTII,S$GLB, | Performed by: INTERNAL MEDICINE

## 2021-11-12 PROCEDURE — 1159F MED LIST DOCD IN RCRD: CPT | Mod: HCNC,CPTII,S$GLB, | Performed by: INTERNAL MEDICINE

## 2021-11-20 ENCOUNTER — HOSPITAL ENCOUNTER (OUTPATIENT)
Dept: RADIOLOGY | Facility: HOSPITAL | Age: 81
Discharge: HOME | End: 2021-11-20
Attending: INTERNAL MEDICINE
Payer: MEDICARE

## 2021-11-20 DIAGNOSIS — D17.71 ANGIOMYOLIPOMA OF RIGHT KIDNEY: ICD-10-CM

## 2021-11-20 DIAGNOSIS — I50.32 CHRONIC DIASTOLIC CHF (CONGESTIVE HEART FAILURE): ICD-10-CM

## 2021-11-20 DIAGNOSIS — D63.8 ANEMIA OF CHRONIC ILLNESS: ICD-10-CM

## 2021-11-20 DIAGNOSIS — Z79.4 TYPE 2 DIABETES MELLITUS WITH STAGE 4 CHRONIC KIDNEY DISEASE, WITH LONG-TERM CURRENT USE OF INSULIN: ICD-10-CM

## 2021-11-20 DIAGNOSIS — N28.1 ACQUIRED CYST OF KIDNEY: ICD-10-CM

## 2021-11-20 DIAGNOSIS — M47.816 LUMBAR SPONDYLOSIS: ICD-10-CM

## 2021-11-20 DIAGNOSIS — N18.4 HYPERTENSIVE KIDNEY DISEASE WITH STAGE 4 CHRONIC KIDNEY DISEASE: ICD-10-CM

## 2021-11-20 DIAGNOSIS — R80.1 PERSISTENT PROTEINURIA: ICD-10-CM

## 2021-11-20 DIAGNOSIS — N18.4 CHRONIC KIDNEY DISEASE, STAGE IV (SEVERE): ICD-10-CM

## 2021-11-20 DIAGNOSIS — E11.22 TYPE 2 DIABETES MELLITUS WITH STAGE 4 CHRONIC KIDNEY DISEASE, WITH LONG-TERM CURRENT USE OF INSULIN: ICD-10-CM

## 2021-11-20 DIAGNOSIS — N18.4 TYPE 2 DIABETES MELLITUS WITH STAGE 4 CHRONIC KIDNEY DISEASE, WITH LONG-TERM CURRENT USE OF INSULIN: ICD-10-CM

## 2021-11-20 DIAGNOSIS — N25.81 SECONDARY RENAL HYPERPARATHYROIDISM: ICD-10-CM

## 2021-11-20 DIAGNOSIS — I12.9 HYPERTENSIVE KIDNEY DISEASE WITH STAGE 4 CHRONIC KIDNEY DISEASE: ICD-10-CM

## 2021-11-20 DIAGNOSIS — E55.9 VITAMIN D DEFICIENCY DISEASE: ICD-10-CM

## 2021-11-20 PROCEDURE — 72110 X-RAY EXAM L-2 SPINE 4/>VWS: CPT | Mod: TC,HCNC,FY

## 2021-11-20 PROCEDURE — 76770 US EXAM ABDO BACK WALL COMP: CPT | Mod: 26,HCNC,, | Performed by: STUDENT IN AN ORGANIZED HEALTH CARE EDUCATION/TRAINING PROGRAM

## 2021-11-20 PROCEDURE — 72110 XR LUMBAR SPINE COMPLETE 5 VIEW: ICD-10-PCS | Mod: 26,HCNC,, | Performed by: RADIOLOGY

## 2021-11-20 PROCEDURE — 76770 US RETROPERITONEAL COMPLETE: ICD-10-PCS | Mod: 26,HCNC,, | Performed by: STUDENT IN AN ORGANIZED HEALTH CARE EDUCATION/TRAINING PROGRAM

## 2021-11-20 PROCEDURE — 76770 US EXAM ABDO BACK WALL COMP: CPT | Mod: TC,HCNC

## 2021-11-20 PROCEDURE — 72110 X-RAY EXAM L-2 SPINE 4/>VWS: CPT | Mod: 26,HCNC,, | Performed by: RADIOLOGY

## 2021-11-22 DIAGNOSIS — E11.8 CONTROLLED TYPE 2 DIABETES MELLITUS WITH COMPLICATION, WITH LONG-TERM CURRENT USE OF INSULIN: ICD-10-CM

## 2021-11-22 DIAGNOSIS — Z79.4 CONTROLLED TYPE 2 DIABETES MELLITUS WITH COMPLICATION, WITH LONG-TERM CURRENT USE OF INSULIN: ICD-10-CM

## 2021-11-22 RX ORDER — INSULIN DEGLUDEC 100 U/ML
INJECTION, SOLUTION SUBCUTANEOUS
Qty: 5 PEN | Refills: 0 | Status: SHIPPED | OUTPATIENT
Start: 2021-11-22 | End: 2021-12-07

## 2021-11-23 ENCOUNTER — TELEPHONE (OUTPATIENT)
Dept: NEPHROLOGY | Facility: CLINIC | Age: 81
End: 2021-11-23
Payer: MEDICARE

## 2021-12-07 ENCOUNTER — LAB VISIT (OUTPATIENT)
Dept: LAB | Facility: HOSPITAL | Age: 81
End: 2021-12-07
Attending: NURSE PRACTITIONER
Payer: MEDICARE

## 2021-12-07 ENCOUNTER — OFFICE VISIT (OUTPATIENT)
Dept: ENDOCRINOLOGY | Facility: CLINIC | Age: 81
End: 2021-12-07
Payer: MEDICARE

## 2021-12-07 VITALS
BODY MASS INDEX: 33.52 KG/M2 | HEART RATE: 60 BPM | DIASTOLIC BLOOD PRESSURE: 62 MMHG | WEIGHT: 220.44 LBS | TEMPERATURE: 98 F | SYSTOLIC BLOOD PRESSURE: 140 MMHG

## 2021-12-07 DIAGNOSIS — N18.4 CKD (CHRONIC KIDNEY DISEASE) STAGE 4, GFR 15-29 ML/MIN: ICD-10-CM

## 2021-12-07 DIAGNOSIS — E11.649 HYPOGLYCEMIA ASSOCIATED WITH DIABETES: ICD-10-CM

## 2021-12-07 DIAGNOSIS — I25.10 CORONARY ARTERY DISEASE INVOLVING NATIVE CORONARY ARTERY OF NATIVE HEART WITHOUT ANGINA PECTORIS: Chronic | ICD-10-CM

## 2021-12-07 LAB — GLUCOSE SERPL-MCNC: 69 MG/DL (ref 70–110)

## 2021-12-07 PROCEDURE — 36415 COLL VENOUS BLD VENIPUNCTURE: CPT | Mod: HCNC,PN | Performed by: NURSE PRACTITIONER

## 2021-12-07 PROCEDURE — 99999 PR PBB SHADOW E&M-EST. PATIENT-LVL IV: ICD-10-PCS | Mod: PBBFAC,HCNC,, | Performed by: NURSE PRACTITIONER

## 2021-12-07 PROCEDURE — 82947 ASSAY GLUCOSE BLOOD QUANT: CPT | Mod: HCNC | Performed by: NURSE PRACTITIONER

## 2021-12-07 PROCEDURE — 99999 PR PBB SHADOW E&M-EST. PATIENT-LVL IV: CPT | Mod: PBBFAC,HCNC,, | Performed by: NURSE PRACTITIONER

## 2021-12-07 PROCEDURE — 84681 ASSAY OF C-PEPTIDE: CPT | Mod: HCNC | Performed by: NURSE PRACTITIONER

## 2021-12-07 PROCEDURE — 99499 RISK ADDL DX/OHS AUDIT: ICD-10-PCS | Mod: HCNC,S$GLB,, | Performed by: NURSE PRACTITIONER

## 2021-12-07 PROCEDURE — 99214 OFFICE O/P EST MOD 30 MIN: CPT | Mod: HCNC,S$GLB,, | Performed by: NURSE PRACTITIONER

## 2021-12-07 PROCEDURE — 99214 PR OFFICE/OUTPT VISIT, EST, LEVL IV, 30-39 MIN: ICD-10-PCS | Mod: HCNC,S$GLB,, | Performed by: NURSE PRACTITIONER

## 2021-12-07 PROCEDURE — 99499 UNLISTED E&M SERVICE: CPT | Mod: HCNC,S$GLB,, | Performed by: NURSE PRACTITIONER

## 2021-12-07 RX ORDER — INSULIN DEGLUDEC 100 U/ML
INJECTION, SOLUTION SUBCUTANEOUS
Qty: 5 PEN | Refills: 0
Start: 2021-12-07 | End: 2022-03-10 | Stop reason: SDUPTHER

## 2021-12-08 LAB — C PEPTIDE SERPL-MCNC: 1.09 NG/ML (ref 0.78–5.19)

## 2021-12-09 ENCOUNTER — TELEPHONE (OUTPATIENT)
Dept: ENDOCRINOLOGY | Facility: CLINIC | Age: 81
End: 2021-12-09
Payer: MEDICARE

## 2021-12-09 RX ORDER — DULAGLUTIDE 0.75 MG/.5ML
0.75 INJECTION, SOLUTION SUBCUTANEOUS
Qty: 4 PEN | Refills: 0 | Status: SHIPPED | OUTPATIENT
Start: 2021-12-09 | End: 2022-03-10

## 2021-12-09 RX ORDER — DULAGLUTIDE 1.5 MG/.5ML
1.5 INJECTION, SOLUTION SUBCUTANEOUS
Qty: 4 PEN | Refills: 1 | Status: SHIPPED | OUTPATIENT
Start: 2021-12-09 | End: 2022-03-10

## 2021-12-10 ENCOUNTER — TELEPHONE (OUTPATIENT)
Dept: ENDOCRINOLOGY | Facility: CLINIC | Age: 81
End: 2021-12-10
Payer: MEDICARE

## 2021-12-14 ENCOUNTER — CLINICAL SUPPORT (OUTPATIENT)
Dept: REHABILITATION | Facility: HOSPITAL | Age: 81
End: 2021-12-14
Attending: INTERNAL MEDICINE
Payer: MEDICARE

## 2021-12-14 DIAGNOSIS — R26.2 DIFFICULTY WALKING: Primary | ICD-10-CM

## 2021-12-14 DIAGNOSIS — M54.50 CHRONIC BILATERAL LOW BACK PAIN WITHOUT SCIATICA: ICD-10-CM

## 2021-12-14 DIAGNOSIS — G89.29 CHRONIC BILATERAL LOW BACK PAIN WITHOUT SCIATICA: ICD-10-CM

## 2021-12-14 DIAGNOSIS — R26.81 DIFFICULTY STANDING: ICD-10-CM

## 2021-12-14 DIAGNOSIS — M47.816 LUMBAR SPONDYLOSIS: ICD-10-CM

## 2021-12-14 DIAGNOSIS — R29.898 WEAKNESS OF BOTH LOWER EXTREMITIES: ICD-10-CM

## 2021-12-14 PROCEDURE — 97140 MANUAL THERAPY 1/> REGIONS: CPT | Mod: HCNC,PN

## 2021-12-14 PROCEDURE — 97110 THERAPEUTIC EXERCISES: CPT | Mod: HCNC,PN

## 2021-12-14 PROCEDURE — 97162 PT EVAL MOD COMPLEX 30 MIN: CPT | Mod: HCNC,PN

## 2021-12-16 ENCOUNTER — TELEPHONE (OUTPATIENT)
Dept: ENDOCRINOLOGY | Facility: CLINIC | Age: 81
End: 2021-12-16
Payer: MEDICARE

## 2021-12-17 ENCOUNTER — TELEPHONE (OUTPATIENT)
Dept: ENDOCRINOLOGY | Facility: CLINIC | Age: 81
End: 2021-12-17
Payer: MEDICARE

## 2022-01-07 ENCOUNTER — PATIENT OUTREACH (OUTPATIENT)
Dept: ADMINISTRATIVE | Facility: OTHER | Age: 82
End: 2022-01-07
Payer: MEDICARE

## 2022-01-19 ENCOUNTER — TELEPHONE (OUTPATIENT)
Dept: ENDOCRINOLOGY | Facility: CLINIC | Age: 82
End: 2022-01-19
Payer: MEDICARE

## 2022-01-19 NOTE — TELEPHONE ENCOUNTER
----- Message from Jordyn Zaldivar sent at 1/19/2022  4:24 PM CST -----  Type: Patient Call Back    Who called: self     What is the request in detail: Patient would like to know if she's required to get her A1C before her appt. on 1/24. Patient would also like to notify the doctor that she has not started taking the Trulicity yet.     Can the clinic reply by [facility]? No     Would the patient rather a call back or a response via [facility]? Call back     Best call back number: [phone] (home)

## 2022-01-20 NOTE — TELEPHONE ENCOUNTER
Since pt has not started the Trulicity, yet we can reschedule to a later date. Please ask when or if patient intends to start it.

## 2022-01-21 ENCOUNTER — TELEPHONE (OUTPATIENT)
Dept: ENDOCRINOLOGY | Facility: CLINIC | Age: 82
End: 2022-01-21
Payer: MEDICARE

## 2022-02-04 DIAGNOSIS — Z79.4 CONTROLLED TYPE 2 DIABETES MELLITUS WITH COMPLICATION, WITH LONG-TERM CURRENT USE OF INSULIN: ICD-10-CM

## 2022-02-04 DIAGNOSIS — E11.8 CONTROLLED TYPE 2 DIABETES MELLITUS WITH COMPLICATION, WITH LONG-TERM CURRENT USE OF INSULIN: ICD-10-CM

## 2022-02-04 NOTE — TELEPHONE ENCOUNTER
Please ask pt what dose of Novolog she is taking now and if she's taking the Trulicity yet, so rx can be dosed correctly.

## 2022-02-04 NOTE — TELEPHONE ENCOUNTER
----- Message from Sherman Hwang sent at 2/4/2022  7:34 AM CST -----  Type: RX Refill Request    Who Called: Self    Have you contacted your pharmacy: yes    Refill or New Rx:refill    RX Name and Strength:  insulin aspart U-100 (NOVOLOG) 100 unit/mL (3 mL) InPn pen    Preferred Pharmacy with phone number:  [id number] - [place] [address] AT [address]   Phone:  [phone]  Fax:  [phone]          Local or Mail Order:local    Ordering Provider: Dr. Alvarado    Would the patient rather a call back or a response via [facility]? callback    Best Call Back Number: [phone]     Additional Information: Pt states she is out of pens.         Hpi Title: Evaluation of Skin Lesions How Severe Are Your Spot(S)?: mild Have Your Spot(S) Been Treated In The Past?: has not been treated

## 2022-02-07 RX ORDER — INSULIN ASPART 100 [IU]/ML
INJECTION, SOLUTION INTRAVENOUS; SUBCUTANEOUS
Qty: 15 ML | Refills: 2 | Status: SHIPPED | OUTPATIENT
Start: 2022-02-07 | End: 2022-03-10 | Stop reason: SDUPTHER

## 2022-02-23 DIAGNOSIS — D84.9 IMMUNOSUPPRESSED STATUS: ICD-10-CM

## 2022-03-10 ENCOUNTER — OFFICE VISIT (OUTPATIENT)
Dept: ENDOCRINOLOGY | Facility: CLINIC | Age: 82
End: 2022-03-10
Payer: MEDICARE

## 2022-03-10 VITALS
HEART RATE: 60 BPM | TEMPERATURE: 98 F | DIASTOLIC BLOOD PRESSURE: 65 MMHG | SYSTOLIC BLOOD PRESSURE: 137 MMHG | BODY MASS INDEX: 32.54 KG/M2 | WEIGHT: 214 LBS

## 2022-03-10 DIAGNOSIS — E78.5 HYPERLIPIDEMIA, UNSPECIFIED HYPERLIPIDEMIA TYPE: ICD-10-CM

## 2022-03-10 DIAGNOSIS — N18.4 CKD (CHRONIC KIDNEY DISEASE) STAGE 4, GFR 15-29 ML/MIN: ICD-10-CM

## 2022-03-10 DIAGNOSIS — I25.10 CORONARY ARTERY DISEASE INVOLVING NATIVE CORONARY ARTERY OF NATIVE HEART WITHOUT ANGINA PECTORIS: ICD-10-CM

## 2022-03-10 PROCEDURE — 3052F PR MOST RECENT HEMOGLOBIN A1C LEVEL 8.0 - < 9.0%: ICD-10-PCS | Mod: CPTII,S$GLB,, | Performed by: NURSE PRACTITIONER

## 2022-03-10 PROCEDURE — 3052F HG A1C>EQUAL 8.0%<EQUAL 9.0%: CPT | Mod: CPTII,S$GLB,, | Performed by: NURSE PRACTITIONER

## 2022-03-10 PROCEDURE — 1160F RVW MEDS BY RX/DR IN RCRD: CPT | Mod: CPTII,S$GLB,, | Performed by: NURSE PRACTITIONER

## 2022-03-10 PROCEDURE — 99499 RISK ADDL DX/OHS AUDIT: ICD-10-PCS | Mod: S$GLB,,, | Performed by: NURSE PRACTITIONER

## 2022-03-10 PROCEDURE — 1159F PR MEDICATION LIST DOCUMENTED IN MEDICAL RECORD: ICD-10-PCS | Mod: CPTII,S$GLB,, | Performed by: NURSE PRACTITIONER

## 2022-03-10 PROCEDURE — 3078F PR MOST RECENT DIASTOLIC BLOOD PRESSURE < 80 MM HG: ICD-10-PCS | Mod: CPTII,S$GLB,, | Performed by: NURSE PRACTITIONER

## 2022-03-10 PROCEDURE — 1126F PR PAIN SEVERITY QUANTIFIED, NO PAIN PRESENT: ICD-10-PCS | Mod: CPTII,S$GLB,, | Performed by: NURSE PRACTITIONER

## 2022-03-10 PROCEDURE — 99999 PR PBB SHADOW E&M-EST. PATIENT-LVL IV: CPT | Mod: PBBFAC,,, | Performed by: NURSE PRACTITIONER

## 2022-03-10 PROCEDURE — 3075F SYST BP GE 130 - 139MM HG: CPT | Mod: CPTII,S$GLB,, | Performed by: NURSE PRACTITIONER

## 2022-03-10 PROCEDURE — 1159F MED LIST DOCD IN RCRD: CPT | Mod: CPTII,S$GLB,, | Performed by: NURSE PRACTITIONER

## 2022-03-10 PROCEDURE — 99214 PR OFFICE/OUTPT VISIT, EST, LEVL IV, 30-39 MIN: ICD-10-PCS | Mod: S$GLB,,, | Performed by: NURSE PRACTITIONER

## 2022-03-10 PROCEDURE — 1160F PR REVIEW ALL MEDS BY PRESCRIBER/CLIN PHARMACIST DOCUMENTED: ICD-10-PCS | Mod: CPTII,S$GLB,, | Performed by: NURSE PRACTITIONER

## 2022-03-10 PROCEDURE — 3075F PR MOST RECENT SYSTOLIC BLOOD PRESS GE 130-139MM HG: ICD-10-PCS | Mod: CPTII,S$GLB,, | Performed by: NURSE PRACTITIONER

## 2022-03-10 PROCEDURE — 99214 OFFICE O/P EST MOD 30 MIN: CPT | Mod: S$GLB,,, | Performed by: NURSE PRACTITIONER

## 2022-03-10 PROCEDURE — 99499 UNLISTED E&M SERVICE: CPT | Mod: S$GLB,,, | Performed by: NURSE PRACTITIONER

## 2022-03-10 PROCEDURE — 1126F AMNT PAIN NOTED NONE PRSNT: CPT | Mod: CPTII,S$GLB,, | Performed by: NURSE PRACTITIONER

## 2022-03-10 PROCEDURE — 3078F DIAST BP <80 MM HG: CPT | Mod: CPTII,S$GLB,, | Performed by: NURSE PRACTITIONER

## 2022-03-10 PROCEDURE — 99999 PR PBB SHADOW E&M-EST. PATIENT-LVL IV: ICD-10-PCS | Mod: PBBFAC,,, | Performed by: NURSE PRACTITIONER

## 2022-03-10 RX ORDER — LANCETS
EACH MISCELLANEOUS
Qty: 400 EACH | Refills: 3 | Status: ON HOLD | OUTPATIENT
Start: 2022-03-10 | End: 2022-03-31 | Stop reason: HOSPADM

## 2022-03-10 RX ORDER — INSULIN DEGLUDEC 100 U/ML
INJECTION, SOLUTION SUBCUTANEOUS
Qty: 10 PEN | Refills: 1 | Status: SHIPPED | OUTPATIENT
Start: 2022-03-10 | End: 2022-06-30 | Stop reason: SDUPTHER

## 2022-03-10 RX ORDER — INSULIN ASPART 100 [IU]/ML
INJECTION, SOLUTION INTRAVENOUS; SUBCUTANEOUS
Qty: 45 ML | Refills: 1 | Status: SHIPPED | OUTPATIENT
Start: 2022-03-10 | End: 2022-06-30 | Stop reason: SDUPTHER

## 2022-03-10 RX ORDER — PEN NEEDLE, DIABETIC 30 GX3/16"
NEEDLE, DISPOSABLE MISCELLANEOUS
Qty: 400 EACH | Refills: 3 | Status: ON HOLD | OUTPATIENT
Start: 2022-03-10 | End: 2022-03-31 | Stop reason: HOSPADM

## 2022-03-10 NOTE — PROGRESS NOTES
CC: This 81 y.o. White female  is here for evaluation of  T2DM along with comorbidities indicated in the Visit Diagnosis section of this encounter.    HPI: Radha Degroot was diagnosed with T2DM in 1980s.     Nephrology - Dr. Mcmanus  Cardiology - Dr. Zamora      Prior visit 12/2021  A1c mitesh from 8.6% in March to 9% in June, but now is down to 8.3%.   Pt woke up with FBG of 37 of last week.   And she woke overnight last night with a low as well.   Plan Reduce Tresiba from 32 to 24 units once daily.   Labs today to check endogenous insulin production. Would prefer for patient to wean off Novolog before meals, which puts patient at risk for hypoglycemia especially in light of CKD, by switching to Trulicity. Trulicity has low hypoglycemia risk and has CV benefits.   Will call patient with recommendations when labs are available.   If c-peptide lab is positive, will start weekly Trulicity and reduce Novolog dose.   Patient would likely qualify for Patient Assistance for Trulicity as well.      Interval history  No recent A1c available.   She did not start Trulicity as advised. She continues to take Tresiba at 32 units. She was worried about starting Trulicity after seeing a TV commercial that cautioned about kidney injury with use of Trulicty. She is moving to [place] closer to her family.         DIABETES EDUCATION: 3/5/18    PMHX: atrial fibrillation, NSTEMI 2017, CAD, CHF, CKD stage 3     PRESCRIBED DIABETES MEDICATIONS:   Tresiba 32 units hs  Novolog Flexpen 14 units w/ ss (Use on premeal readings: 150-200=+1, 201-250=+2, 251-300=+3; 301-350=+4, over 350=+5 units    Misses medication doses - no    DM COMPLICATIONS: nephropathy, retinopathy and peripheral neuropathy    SELF MONITORING BLOOD GLUCOSE: accuchek dorothy meter. Checks blood glucose at home 4x/day.   She declined Dexcom CGM device. Does not want anything attached to her.              HYPOGLYCEMIC EPISODES:  None recent     CURRENT DIET: 3 meals/day.   Drinks 1 cup coffee in the AM and iced tea with no sugar. No snacks.   Dinner last night was hot dog and potato salad. After that had a small piece of pecan pie, does not usually eat sweets     CURRENT EXERCISE: none, activity limited d/t back pain       /65   Pulse 60   Temp 98 °F (36.7 °C)   Wt 97.1 kg (214 lb)   BMI 32.54 kg/m²       ROS:   CONSTITUTIONAL: Appetite good, + fatigue  MS: + chronic back pain         PHYSICAL EXAM:  GENERAL: Well developed, well nourished. No acute distress.   PSYCH: AAOx3, appropriate mood and affect, conversant, well-groomed. Judgement and insight good.   NEURO: Cranial nerves grossly intact. Speech clear, no tremor.   CHEST: Respirations even and unlabored.       Hemoglobin A1C   Date Value Ref Range Status   11/12/2021 8.3 (H) 4.0 - 5.6 % Final     Comment:     ADA Screening Guidelines:  5.7-6.4%  Consistent with prediabetes  >or=6.5%  Consistent with diabetes    High levels of fetal hemoglobin interfere with the HbA1C  assay. Heterozygous hemoglobin variants (HbS, HgC, etc)do  not significantly interfere with this assay.   However, presence of multiple variants may affect accuracy.     06/02/2021 9.0 (H) 4.0 - 5.6 % Final     Comment:     ADA Screening Guidelines:  5.7-6.4%  Consistent with prediabetes  >or=6.5%  Consistent with diabetes    High levels of fetal hemoglobin interfere with the HbA1C  assay. Heterozygous hemoglobin variants (HbS, HgC, etc)do  not significantly interfere with this assay.   However, presence of multiple variants may affect accuracy.     03/01/2021 8.6 (H) 4.0 - 5.6 % Final     Comment:     ADA Screening Guidelines:  5.7-6.4%  Consistent with prediabetes  >or=6.5%  Consistent with diabetes    High levels of fetal hemoglobin interfere with the HbA1C  assay. Heterozygous hemoglobin variants (HbS, HgC, etc)do  not significantly interfere with this assay.   However, presence of multiple variants may affect accuracy.             Chemistry         Component Value Date/Time     08/11/2021 1112    K 4.1 08/11/2021 1112     08/11/2021 1112    CO2 23 08/11/2021 1112    BUN 43 (H) 08/11/2021 1112    CREATININE 2.1 (H) 08/11/2021 1112    GLU 69 (L) 12/07/2021 1449        Component Value Date/Time    CALCIUM 9.3 08/11/2021 1112    ALKPHOS 91 09/19/2018 1501    AST 13 09/19/2018 1501    ALT 12 09/19/2018 1501    BILITOT 0.6 09/19/2018 1501    ESTGFRAFRICA 25 (A) 08/11/2021 1112    EGFRNONAA 21 (A) 08/11/2021 1112          Lab Results   Component Value Date    LDLCALC 46.4 (L) 03/01/2021        Ref. Range 3/1/2021 10:05   Cholesterol Latest Ref Range: 120 - 199 mg/dL 129   HDL Latest Ref Range: 40 - 75 mg/dL 56   HDL/Cholesterol Ratio Latest Ref Range: 20.0 - 50.0 % 43.4   LDL Cholesterol External Latest Ref Range: 63 - 159 mg/dL 46.4 (L)   Non-HDL Cholesterol Latest Units: mg/dL 73   Total Cholesterol/HDL Ratio Latest Ref Range: 2.0 - 5.0  2.3   Triglycerides Latest Ref Range: 30 - 150 mg/dL 133       Lab Results   Component Value Date    MICALBCREAT 29.5 02/11/2015             ASSESSMENT and PLAN:    A1C GOAL: < 8%     1. Uncontrolled type 2 diabetes mellitus with complication, with long-term current use of insulin  Reviewed Trulicity's MOA, s/e, FDA-approved CV benefits, and risk of DANDY. Reassured pt that DANDY risk is very low and would only occur should she have severe n/v and dehydration.     Would prefer pt to wean down insulin dose, in particular Novolog, with addition of Trulicity in order to reduce hypoglycemia risk and for Trulicity's CV benefits.     However, since patient will be moving in 2 months and will not be available for close f/u and monitoring, we will not major changes to DM treatment today.      Labs at earliest convenience  and will call pt with results and recommendations.     insulin degludec (TRESIBA FLEXTOUCH U-100) 100 unit/mL (3 mL) insulin pen    lancets (ACCU-CHEK MULTICLIX LANCET) Misc    pen needle, diabetic (BD TIM 2ND  "GEN PEN NEEDLE) 32 gauge x 5/32" Ndle    insulin aspart U-100 (NOVOLOG) 100 unit/mL (3 mL) InPn pen   2. CKD (chronic kidney disease) stage 4, GFR 15-29 ml/min  Avoid hypoglycemia.      3. Coronary artery disease involving native coronary artery of native heart without angina pectoris  Avoid hypoglycemia.   As above.    4. Hyperlipidemia, unspecified hyperlipidemia type  Continue statin        No orders of the defined types were placed in this encounter.       No follow-ups on file.         "

## 2022-03-17 ENCOUNTER — LAB VISIT (OUTPATIENT)
Dept: LAB | Facility: HOSPITAL | Age: 82
End: 2022-03-17
Attending: NURSE PRACTITIONER
Payer: MEDICARE

## 2022-03-17 LAB
CHOLEST SERPL-MCNC: 119 MG/DL (ref 120–199)
CHOLEST/HDLC SERPL: 2.3 {RATIO} (ref 2–5)
ESTIMATED AVG GLUCOSE: 180 MG/DL (ref 68–131)
HBA1C MFR BLD: 7.9 % (ref 4–5.6)
HDLC SERPL-MCNC: 52 MG/DL (ref 40–75)
HDLC SERPL: 43.7 % (ref 20–50)
LDLC SERPL CALC-MCNC: 45.8 MG/DL (ref 63–159)
NONHDLC SERPL-MCNC: 67 MG/DL
TRIGL SERPL-MCNC: 106 MG/DL (ref 30–150)

## 2022-03-17 PROCEDURE — 83036 HEMOGLOBIN GLYCOSYLATED A1C: CPT | Performed by: NURSE PRACTITIONER

## 2022-03-17 PROCEDURE — 36415 COLL VENOUS BLD VENIPUNCTURE: CPT | Mod: PO | Performed by: NURSE PRACTITIONER

## 2022-03-17 PROCEDURE — 80061 LIPID PANEL: CPT | Performed by: NURSE PRACTITIONER

## 2022-03-25 ENCOUNTER — ANESTHESIA EVENT (OUTPATIENT)
Dept: SURGERY | Facility: HOSPITAL | Age: 82
DRG: 481 | End: 2022-03-25
Payer: MEDICARE

## 2022-03-25 ENCOUNTER — HOSPITAL ENCOUNTER (INPATIENT)
Facility: HOSPITAL | Age: 82
LOS: 10 days | Discharge: NURSING, REHAB OR LONG-TERM CARE | DRG: 481 | End: 2022-04-04
Attending: EMERGENCY MEDICINE | Admitting: HOSPITALIST
Payer: MEDICARE

## 2022-03-25 DIAGNOSIS — K92.0 COFFEE GROUND EMESIS: ICD-10-CM

## 2022-03-25 DIAGNOSIS — M25.552 LEFT HIP PAIN: ICD-10-CM
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
## 2022-03-25 DIAGNOSIS — I50.32 CHRONIC DIASTOLIC CHF (CONGESTIVE HEART FAILURE): ICD-10-CM

## 2022-03-25 DIAGNOSIS — M25.559 HIP PAIN: ICD-10-CM

## 2022-03-25 DIAGNOSIS — N18.4 CHRONIC KIDNEY DISEASE, STAGE IV (SEVERE): Primary | Chronic | ICD-10-CM

## 2022-03-25 DIAGNOSIS — S72.009A HIP FRACTURE: ICD-10-CM

## 2022-03-25 PROBLEM — S72.92XA CLOSED LEFT FEMORAL FRACTURE: Status: ACTIVE | Noted: 2022-03-25

## 2022-03-25 PROBLEM — E11.9 DM (DIABETES MELLITUS): Status: ACTIVE | Noted: 2017-02-20

## 2022-03-25 LAB
ALBUMIN SERPL BCP-MCNC: 3.9 G/DL (ref 3.5–5.2)
ALP SERPL-CCNC: 78 U/L (ref 55–135)
ALT SERPL W/O P-5'-P-CCNC: 16 U/L (ref 10–44)
ANION GAP SERPL CALC-SCNC: 12 MMOL/L (ref 8–16)
AST SERPL-CCNC: 29 U/L (ref 10–40)
BACTERIA #/AREA URNS HPF: ABNORMAL /HPF
BASOPHILS # BLD AUTO: 0.02 K/UL (ref 0–0.2)
BASOPHILS NFR BLD: 0.1 % (ref 0–1.9)
BILIRUB SERPL-MCNC: 1 MG/DL (ref 0.1–1)
BILIRUB UR QL STRIP: NEGATIVE
BUN SERPL-MCNC: 48 MG/DL (ref 8–23)
CALCIUM SERPL-MCNC: 9.5 MG/DL (ref 8.7–10.5)
CHLORIDE SERPL-SCNC: 107 MMOL/L (ref 95–110)
CLARITY UR: CLEAR
CO2 SERPL-SCNC: 24 MMOL/L (ref 23–29)
COLOR UR: YELLOW
CREAT SERPL-MCNC: 2 MG/DL (ref 0.5–1.4)
CTP QC/QA: YES
DIFFERENTIAL METHOD: ABNORMAL
EOSINOPHIL # BLD AUTO: 0 K/UL (ref 0–0.5)
EOSINOPHIL NFR BLD: 0 % (ref 0–8)
ERYTHROCYTE [DISTWIDTH] IN BLOOD BY AUTOMATED COUNT: 15.3 % (ref 11.5–14.5)
EST. GFR  (AFRICAN AMERICAN): 26 ML/MIN/1.73 M^2
EST. GFR  (NON AFRICAN AMERICAN): 23 ML/MIN/1.73 M^2
GLUCOSE SERPL-MCNC: 320 MG/DL (ref 70–110)
GLUCOSE UR QL STRIP: ABNORMAL
GRAN CASTS #/AREA URNS LPF: 1 /LPF
HCT VFR BLD AUTO: 36.6 % (ref 37–48.5)
HGB BLD-MCNC: 11.9 G/DL (ref 12–16)
HGB UR QL STRIP: ABNORMAL
HYALINE CASTS #/AREA URNS LPF: 0 /LPF
IMM GRANULOCYTES # BLD AUTO: 0.06 K/UL (ref 0–0.04)
IMM GRANULOCYTES NFR BLD AUTO: 0.4 % (ref 0–0.5)
KETONES UR QL STRIP: NEGATIVE
LEUKOCYTE ESTERASE UR QL STRIP: NEGATIVE
LYMPHOCYTES # BLD AUTO: 1.1 K/UL (ref 1–4.8)
LYMPHOCYTES NFR BLD: 7.5 % (ref 18–48)
MCH RBC QN AUTO: 28.7 PG (ref 27–31)
MCHC RBC AUTO-ENTMCNC: 32.5 G/DL (ref 32–36)
MCV RBC AUTO: 88 FL (ref 82–98)
MICROSCOPIC COMMENT: ABNORMAL
MONOCYTES # BLD AUTO: 1.1 K/UL (ref 0.3–1)
MONOCYTES NFR BLD: 7.3 % (ref 4–15)
NEUTROPHILS # BLD AUTO: 12.5 K/UL (ref 1.8–7.7)
NEUTROPHILS NFR BLD: 84.7 % (ref 38–73)
NITRITE UR QL STRIP: NEGATIVE
NRBC BLD-RTO: 0 /100 WBC
PH UR STRIP: 6 [PH] (ref 5–8)
PLATELET # BLD AUTO: 158 K/UL (ref 150–450)
PMV BLD AUTO: 12.4 FL (ref 9.2–12.9)
POTASSIUM SERPL-SCNC: 4.8 MMOL/L (ref 3.5–5.1)
PROT SERPL-MCNC: 7.1 G/DL (ref 6–8.4)
PROT UR QL STRIP: ABNORMAL
RBC # BLD AUTO: 4.15 M/UL (ref 4–5.4)
RBC #/AREA URNS HPF: 1 /HPF (ref 0–4)
SARS-COV-2 RDRP RESP QL NAA+PROBE: NEGATIVE
SODIUM SERPL-SCNC: 143 MMOL/L (ref 136–145)
SP GR UR STRIP: 1.02 (ref 1–1.03)
TROPONIN I SERPL DL<=0.01 NG/ML-MCNC: 0.02 NG/ML (ref 0–0.03)
URN SPEC COLLECT METH UR: ABNORMAL
UROBILINOGEN UR STRIP-ACNC: NEGATIVE EU/DL
WBC # BLD AUTO: 14.72 K/UL (ref 3.9–12.7)
WBC #/AREA URNS HPF: 1 /HPF (ref 0–5)
WBC CLUMPS URNS QL MICRO: ABNORMAL

## 2022-03-25 PROCEDURE — 93005 ELECTROCARDIOGRAM TRACING: CPT

## 2022-03-25 PROCEDURE — 93010 EKG 12-LEAD: ICD-10-PCS | Mod: ,,, | Performed by: INTERNAL MEDICINE

## 2022-03-25 PROCEDURE — 85025 COMPLETE CBC W/AUTO DIFF WBC: CPT | Performed by: EMERGENCY MEDICINE

## 2022-03-25 PROCEDURE — 11000001 HC ACUTE MED/SURG PRIVATE ROOM

## 2022-03-25 PROCEDURE — 84484 ASSAY OF TROPONIN QUANT: CPT | Performed by: EMERGENCY MEDICINE

## 2022-03-25 PROCEDURE — 81000 URINALYSIS NONAUTO W/SCOPE: CPT | Performed by: EMERGENCY MEDICINE

## 2022-03-25 PROCEDURE — 63600175 PHARM REV CODE 636 W HCPCS: Performed by: EMERGENCY MEDICINE

## 2022-03-25 PROCEDURE — 25000003 PHARM REV CODE 250: Performed by: STUDENT IN AN ORGANIZED HEALTH CARE EDUCATION/TRAINING PROGRAM

## 2022-03-25 PROCEDURE — 63600175 PHARM REV CODE 636 W HCPCS: Performed by: STUDENT IN AN ORGANIZED HEALTH CARE EDUCATION/TRAINING PROGRAM

## 2022-03-25 PROCEDURE — 93010 ELECTROCARDIOGRAM REPORT: CPT | Mod: ,,, | Performed by: INTERNAL MEDICINE

## 2022-03-25 PROCEDURE — U0002 COVID-19 LAB TEST NON-CDC: HCPCS | Performed by: EMERGENCY MEDICINE

## 2022-03-25 PROCEDURE — C9399 UNCLASSIFIED DRUGS OR BIOLOG: HCPCS | Performed by: STUDENT IN AN ORGANIZED HEALTH CARE EDUCATION/TRAINING PROGRAM

## 2022-03-25 PROCEDURE — 96374 THER/PROPH/DIAG INJ IV PUSH: CPT

## 2022-03-25 PROCEDURE — 99285 EMERGENCY DEPT VISIT HI MDM: CPT | Mod: 25

## 2022-03-25 PROCEDURE — 80053 COMPREHEN METABOLIC PANEL: CPT | Performed by: EMERGENCY MEDICINE

## 2022-03-25 RX ORDER — HYDROMORPHONE HYDROCHLORIDE 2 MG/ML
1 INJECTION, SOLUTION INTRAMUSCULAR; INTRAVENOUS; SUBCUTANEOUS
Status: COMPLETED | OUTPATIENT
Start: 2022-03-25 | End: 2022-03-25

## 2022-03-25 RX ORDER — HYDROCODONE BITARTRATE AND ACETAMINOPHEN 10; 325 MG/1; MG/1
1 TABLET ORAL EVERY 6 HOURS PRN
Status: DISCONTINUED | OUTPATIENT
Start: 2022-03-26 | End: 2022-04-04 | Stop reason: HOSPADM

## 2022-03-25 RX ORDER — HYDROMORPHONE HYDROCHLORIDE 2 MG/ML
1 INJECTION, SOLUTION INTRAMUSCULAR; INTRAVENOUS; SUBCUTANEOUS ONCE
Status: COMPLETED | OUTPATIENT
Start: 2022-03-25 | End: 2022-03-25

## 2022-03-25 RX ORDER — METOPROLOL SUCCINATE 25 MG/1
25 TABLET, EXTENDED RELEASE ORAL DAILY
Status: DISCONTINUED | OUTPATIENT
Start: 2022-03-25 | End: 2022-04-04 | Stop reason: HOSPADM

## 2022-03-25 RX ORDER — MORPHINE SULFATE 4 MG/ML
2 INJECTION, SOLUTION INTRAMUSCULAR; INTRAVENOUS EVERY 6 HOURS PRN
Status: DISCONTINUED | OUTPATIENT
Start: 2022-03-26 | End: 2022-03-26

## 2022-03-25 RX ORDER — FUROSEMIDE 40 MG/1
40 TABLET ORAL 2 TIMES DAILY
Status: DISCONTINUED | OUTPATIENT
Start: 2022-03-26 | End: 2022-03-27

## 2022-03-25 RX ORDER — INSULIN ASPART 100 [IU]/ML
1-10 INJECTION, SOLUTION INTRAVENOUS; SUBCUTANEOUS EVERY 6 HOURS PRN
Status: DISCONTINUED | OUTPATIENT
Start: 2022-03-26 | End: 2022-04-04 | Stop reason: HOSPADM

## 2022-03-25 RX ORDER — GLUCAGON 1 MG
1 KIT INJECTION
Status: DISCONTINUED | OUTPATIENT
Start: 2022-03-25 | End: 2022-04-04 | Stop reason: HOSPADM

## 2022-03-25 RX ADMIN — HYDROMORPHONE HYDROCHLORIDE 1 MG: 2 INJECTION INTRAMUSCULAR; INTRAVENOUS; SUBCUTANEOUS at 06:03

## 2022-03-25 RX ADMIN — HYDROMORPHONE HYDROCHLORIDE 1 MG: 2 INJECTION INTRAMUSCULAR; INTRAVENOUS; SUBCUTANEOUS at 10:03

## 2022-03-25 RX ADMIN — INSULIN DETEMIR 20 UNITS: 100 INJECTION, SOLUTION SUBCUTANEOUS at 11:03

## 2022-03-25 NOTE — ED PROVIDER NOTES
Encounter Date: 3/25/2022    SCRIBE #1 NOTE: I, Daljit Arevalo, am scribing for, and in the presence of,  Jayjay Cm MD. I have scribed the following portions of the note - Other sections scribed: HPI, ROS.       History     Chief Complaint   Patient presents with    Hip Pain     EMS called to 81yo female that tripped and fell last night about 1100 pm and is c/o og left hip pain. Shortening and rotation noted at triage. No LOC but stated she did hit her head and is on eliquis. Was on the floor untl medics came recently and stated she has not had anything to eat or drink and has not had any meds.      82 y.o. female, with a pertinent past medical history of persistent atrial fibrillation, heparin-induced thrombocytopenia, type 2 diabetes mellitus, myocardial infarction, anemia, anticoagulant long-term use, arthritis, cardiomyopathy, carpal tunnel syndrome, CHF, chronic kidney disease, coronary artery disease, gout, unspecified, hyperlipidemia, and hypertension presents to the ED with constant left hip pain that began at 1100 pm last night after a fall. Pt states that she fell onto her left side. Pt reports hitting her head but denies LOC. Pt is currently on eliquis. Pt states that she was on the floor until the medics arrived recently. Pt was unable to eat or drink and has not had any of her medications. No other exacerbating or alleviating factors. Patient denies other associated symptoms.     The history is provided by the patient. No  was used.     Review of patient's allergies indicates:   Allergen Reactions    Heparin analogues Other (See Comments)     thrombocytopenia    Ancef [cefazolin]     Keflex [cephalexin] Rash    Oxybutynin Other (See Comments)     Metallic taste     Past Medical History:   Diagnosis Date    ALLERGIC RHINITIS     Anemia     Anticoagulant long-term use     Arthritis     Cardiomyopathy, ischemic     Carpal tunnel syndrome     Cataract     Left  eye    CHF (congestive heart failure)     Chronic kidney disease     Coronary artery disease     hx stent X2    Diabetes mellitus type II     Diabetic neuropathy     Diabetic retinopathy of both eyes     Encounter for blood transfusion     GERD (gastroesophageal reflux disease)     hiatal hernia    Gout, unspecified     h/o LAD and D1 PCI in 2006 6/17/2013    Heart attack 2006    Hiatal hernia     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    Hx of colonic polyps     1/9    Hx of colonic polyps     Hyperlipidemia     Hypertension     Myocardial infarction nov 2006    Persistent atrial fibrillation 4/25/2018    Posterior vitreous detachment of both eyes     Sleep apnea     Type 2 diabetes mellitus with mild nonproliferative diabetic retinopathy with macular edema 2/15/2013     Past Surgical History:   Procedure Laterality Date    APPENDECTOMY      CATARACT EXTRACTION      Right eye    EPIDURAL STEROID INJECTION N/A 11/2/2018    Procedure: Injection, Steroid, Epidural CAUDAL ELISA;  Surgeon: Ирина Peña MD;  Location: [facility];  Service: Pain Management;  Laterality: N/A;    heart stent      X 2    HYSTERECTOMY      Fibroids     Family History   Problem Relation Age of Onset    Diabetes Mother     Heart disease Mother     Hypertension Mother     Diabetes Father     Melanoma Father     Kidney failure Father     Mental illness Sister         suicide/schizophrenic    Cancer Maternal Grandfather     Cancer Paternal Grandfather         colon    Psoriasis Neg Hx     Lupus Neg Hx     Eczema Neg Hx      Social History     Tobacco Use    Smoking status: Never Smoker    Smokeless tobacco: Never Used   Substance Use Topics    Alcohol use: No     Alcohol/week: 0.0 standard drinks    Drug use: No     Review of Systems   Constitutional: Negative.    HENT: Negative.    Eyes: Negative.    Respiratory: Negative.    Cardiovascular: Negative.    Gastrointestinal: Negative.     Endocrine: Negative.    Genitourinary: Negative.    Musculoskeletal:        (+) head injury  (+) hip pain   Skin: Negative.    Allergic/Immunologic: Negative.    Neurological: Negative.  Negative for syncope.   Hematological: Negative.    Psychiatric/Behavioral: Negative.        Physical Exam     Initial Vitals [03/25/22 1727]   BP Pulse Resp Temp SpO2   (!) 162/75 70 18 98.3 °F (36.8 °C) 98 %      MAP       --         Physical Exam    Nursing note and vitals reviewed.  Constitutional: She is not diaphoretic. She appears distressed (mildly).   HENT:   Head: Normocephalic and atraumatic.   Nose: Nose normal.   Eyes: EOM are normal. Pupils are equal, round, and reactive to light.   Neck: Neck supple. No JVD present.   Normal range of motion.  Cardiovascular: Normal rate, regular rhythm, normal heart sounds and intact distal pulses.   Pulmonary/Chest: Breath sounds normal. No stridor. No respiratory distress. She has no wheezes. She has no rhonchi. She has no rales.   Abdominal: Abdomen is soft. Bowel sounds are normal. She exhibits no distension. There is no abdominal tenderness.   Musculoskeletal:         General: Tenderness and edema present.      Cervical back: Normal range of motion and neck supple.      Comments: Noted left lower extremity external rotation and shortening, pain with logroll, dopplerable DP 1+ pulses compared to 2+ pulses in right lower extremity, some mild pitting edema 1+ throughout left lower extremity, neurovascularly intact, able to flex and extend 1st toe with full strength.     Neurological: She is alert and oriented to person, place, and time. She has normal strength.   Skin: Skin is warm and dry. Capillary refill takes less than 2 seconds. No rash noted. No erythema.         ED Course   Procedures  Labs Reviewed   CBC W/ AUTO DIFFERENTIAL - Abnormal; Notable for the following components:       Result Value    WBC 14.72 (*)     Hemoglobin 11.9 (*)     Hematocrit 36.6 (*)     RDW 15.3 (*)      Gran # (ANC) 12.5 (*)     Immature Grans (Abs) 0.06 (*)     Mono # 1.1 (*)     Gran % 84.7 (*)     Lymph % 7.5 (*)     All other components within normal limits   COMPREHENSIVE METABOLIC PANEL - Abnormal; Notable for the following components:    Glucose 320 (*)     BUN 48 (*)     Creatinine 2.0 (*)     eGFR if  26 (*)     eGFR if non  23 (*)     All other components within normal limits   URINALYSIS, REFLEX TO URINE CULTURE - Abnormal; Notable for the following components:    Protein, UA 2+ (*)     Glucose, UA 2+ (*)     Occult Blood UA 2+ (*)     All other components within normal limits    Narrative:     Specimen Source->Urine   URINALYSIS MICROSCOPIC - Abnormal; Notable for the following components:    Granular Casts, UA 1 (*)     All other components within normal limits    Narrative:     Specimen Source->Urine   TROPONIN I   SARS-COV-2 RDRP GENE          Imaging Results          CT Hip Without Contrast Left (Final result)  Result time 03/25/22 20:32:15    Final result by Sincere Avelar MD (03/25/22 20:32:15)                 Impression:      Redemonstration of acute left proximal femur subtrochanteric/intertrochanteric fracture.      Electronically signed by: Sincere Avelar MD  Date:    03/25/2022  Time:    20:32             Narrative:    EXAMINATION:  CT HIP WITHOUT CONTRAST LEFT    CLINICAL HISTORY:  Fracture, hip;    TECHNIQUE:  CT of the left hip was obtained without the use of IV contrast.  Coronal and sagittal reformats and 3D reconstructions were obtained.    COMPARISON:  Left hip radiographs from the same date.    FINDINGS:  There is redemonstration of acute displaced subtrochanteric/intertrochanteric left proximal femur fracture.  There is comminution with impaction and angulation.  No additional acute displaced fractures or dislocation seen.  Some posttraumatic soft tissue swelling is seen in the region with suspected small hematoma about the fracture site.                                CT Cervical Spine Without Contrast (Final result)  Result time 03/25/22 20:34:37    Final result by Demetrio Vo MD (03/25/22 20:34:37)                 Impression:      1. No acute abnormality  2. Multilevel chronic degenerative changes.      Electronically signed by: Demetrio Vo  Date:    03/25/2022  Time:    20:34             Narrative:    EXAMINATION:  CT CERVICAL SPINE WITHOUT CONTRAST    CLINICAL HISTORY:  Neck trauma (Age >= 65y);    TECHNIQUE:  Low dose axial CT images through the cervical spine, with sagittal and coronal reformations.  Contrast was not administered.    COMPARISON:  None    FINDINGS:  No vertebral body fractures are detected.  Minimal grade 1 spondylolisthesis of C4 on C5.    Facet arthropathy at C2-3 and C3-4 on the left and C4-5, C5-6, C6-7, C7-T1 and T1-2 on the right.  Facet arthropathy is most prominent at C4-5 on the right.    Minimal disc space narrowing.    Severe foraminal narrowing at C3-4 the left, C4-5 on the right and C5-6 on the right.    Central canal is adequately maintained.    Limited evaluation of the intraspinal contents demonstrates no hematoma or mass.Paraspinal soft tissues exhibit no acute abnormalities.                               CT Head Without Contrast (Final result)  Result time 03/25/22 20:11:48    Final result by Sincere Avelar MD (03/25/22 20:11:48)                 Impression:      No acute intracranial abnormalities identified.      Electronically signed by: Sincere Avelar MD  Date:    03/25/2022  Time:    20:11             Narrative:    EXAMINATION:  CT HEAD WITHOUT CONTRAST    CLINICAL HISTORY:  Head trauma, minor (Age >= 65y);    TECHNIQUE:  Low dose axial images were obtained through the head.  Coronal and sagittal reformations were also performed. Contrast was not administered.    COMPARISON:  None.    FINDINGS:  There is generalized cerebral volume loss with chronic microvascular ischemic disease.  No evidence of  acute/recent major vascular distribution cerebral infarction, intraparenchymal hemorrhage, or intra-axial space occupying lesion. The ventricular system is normal in size and configuration with no evidence of hydrocephalus. No effacement of the skull-base cisterns. No abnormal extra-axial fluid collections or blood products. Visualized paranasal sinuses and mastoid air cells are clear. The calvarium shows no significant abnormality.                               X-Ray Chest AP Portable (Final result)  Result time 03/25/22 18:49:38    Final result by Sincere Avelar MD (03/25/22 18:49:38)                 Impression:      No acute cardiopulmonary process identified.      Electronically signed by: Sincere Avelar MD  Date:    03/25/2022  Time:    18:49             Narrative:    EXAMINATION:  XR CHEST AP PORTABLE    CLINICAL HISTORY:  pre-operative;    TECHNIQUE:  Single frontal view of the chest was performed.    COMPARISON:  February 2019.    FINDINGS:  Cardiac silhouette is stable in size.  Lungs are symmetrically expanded.  No evidence of focal consolidative process, pneumothorax, or significant pleural effusion.  No acute osseous abnormality identified.                               X-Ray Hip 2 or 3 views Left (with Pelvis when performed) (Final result)  Result time 03/25/22 18:52:39    Final result by Sincere Avelar MD (03/25/22 18:52:39)                 Impression:      Acute displaced subtrochanteric left proximal femur fracture with suspected intertrochanteric component.      Electronically signed by: Sincere Avelar MD  Date:    03/25/2022  Time:    18:52             Narrative:    EXAMINATION:  XR HIP WITH PELVIS WHEN PERFORMED, 2 OR 3 VIEWS LEFT; XR FEMUR 2 VIEW LEFT    CLINICAL HISTORY:  Pain in left hip; Unspecified fall, initial encounter    TECHNIQUE:  AP view of the pelvis and frog leg lateral view of the left hip were performed.  Left femur AP and lateral.    COMPARISON:  None    FINDINGS:  Acute  displaced, obliquely oriented predominantly subtrochanteric left proximal femur fracture is seen.  Suspected intertrochanteric component of fracture is seen.  No evidence of distal femur fracture.  No additional acute fractures or dislocation seen.  Joint space narrowing and degenerative changes are seen involving the bilateral hips.  Degenerative change with prominent joint space narrowing is also seen involving the left knee medial tibiofemoral compartment.                               X-Ray Femur Ap/Lat Left (Final result)  Result time 03/25/22 18:52:39    Final result by Sincere Avelar MD (03/25/22 18:52:39)                 Impression:      Acute displaced subtrochanteric left proximal femur fracture with suspected intertrochanteric component.      Electronically signed by: Sincere Avelar MD  Date:    03/25/2022  Time:    18:52             Narrative:    EXAMINATION:  XR HIP WITH PELVIS WHEN PERFORMED, 2 OR 3 VIEWS LEFT; XR FEMUR 2 VIEW LEFT    CLINICAL HISTORY:  Pain in left hip; Unspecified fall, initial encounter    TECHNIQUE:  AP view of the pelvis and frog leg lateral view of the left hip were performed.  Left femur AP and lateral.    COMPARISON:  None    FINDINGS:  Acute displaced, obliquely oriented predominantly subtrochanteric left proximal femur fracture is seen.  Suspected intertrochanteric component of fracture is seen.  No evidence of distal femur fracture.  No additional acute fractures or dislocation seen.  Joint space narrowing and degenerative changes are seen involving the bilateral hips.  Degenerative change with prominent joint space narrowing is also seen involving the left knee medial tibiofemoral compartment.                                 Medications   furosemide tablet 40 mg (40 mg Oral Given 3/26/22 0829)   glucagon (human recombinant) injection 1 mg (has no administration in time range)   insulin detemir U-100 pen 20 Units (20 Units Subcutaneous Given 3/26/22 0829)   insulin aspart  U-100 pen 1-10 Units (10 Units Subcutaneous Given 3/26/22 1209)   morphine injection 2 mg ( Intravenous Canceled Entry 3/26/22 0518)   HYDROcodone-acetaminophen  mg per tablet 1 tablet (has no administration in time range)   metoprolol succinate (TOPROL-XL) 24 hr tablet 25 mg (25 mg Oral Given 3/26/22 0829)   dextrose 10% bolus 125 mL (has no administration in time range)   pantoprazole injection 40 mg (40 mg Intravenous Given 3/26/22 1209)   mupirocin 2 % ointment ( Nasal Given 3/26/22 1210)   HYDROmorphone (PF) injection 1 mg (1 mg Intravenous Given 3/25/22 1813)   HYDROmorphone (PF) injection 1 mg (1 mg Intravenous Given 3/25/22 2234)     Medical Decision Making:   History:   Old Medical Records: I decided to obtain old medical records.        MDM:    82-year-old female with past medical history as noted above presenting after fall, noted left hip deformity pain.  X-ray confirming subtrochanteric with likely and trochanteric fracture of the left hip, additional imaging studies unremarkable.  Discussed further with Orthopedic surgery, Dr. Avelar, who will consult on the patient, and proceed with likely surgical intervention after clearance.  Discussed further with Hospital Medicine who will admit for further management, do not suspect syncopal etiology for her fall today, no further significant findings other than her hyperglycemia on lab work today.  Patient's pain managed well while in the emergency department, discussed further with daughter and patient at bedside regarding plan, all questions answered patient transferred to floor in stable condition.         Scribe Attestation:   Scribe #1: I performed the above scribed service and the documentation accurately describes the services I performed. I attest to the accuracy of the note.                 Clinical Impression:   Final diagnoses:  [M25.559] Hip pain  [M25.552] Left hip pain  [W19.XXXA] Fall  [S72.009A] Hip fracture          ED Disposition  Condition    Admit           I, Jayjay Cm M.D., personally performed the services described in this documentation. All medical record entries made by the scribe were at my direction and in my presence. I have reviewed the chart and agree that the record reflects my personal performance and is accurate and complete.       Jayjay Cm MD  03/26/22 1257

## 2022-03-26 ENCOUNTER — ANESTHESIA (OUTPATIENT)
Dept: SURGERY | Facility: HOSPITAL | Age: 82
DRG: 481 | End: 2022-03-26
Payer: MEDICARE

## 2022-03-26 PROBLEM — K92.0 COFFEE GROUND EMESIS: Status: ACTIVE | Noted: 2022-03-26

## 2022-03-26 LAB
ABO + RH BLD: NORMAL
BASOPHILS # BLD AUTO: 0.04 K/UL (ref 0–0.2)
BASOPHILS NFR BLD: 0.2 % (ref 0–1.9)
BLD GP AB SCN CELLS X3 SERPL QL: NORMAL
CK SERPL-CCNC: 1179 U/L (ref 20–180)
DIFFERENTIAL METHOD: ABNORMAL
EOSINOPHIL # BLD AUTO: 0 K/UL (ref 0–0.5)
EOSINOPHIL NFR BLD: 0 % (ref 0–8)
ERYTHROCYTE [DISTWIDTH] IN BLOOD BY AUTOMATED COUNT: 15.9 % (ref 11.5–14.5)
HCT VFR BLD AUTO: 35.5 % (ref 37–48.5)
HGB BLD-MCNC: 10 G/DL (ref 12–16)
HGB BLD-MCNC: 10.7 G/DL (ref 12–16)
HGB BLD-MCNC: 10.8 G/DL (ref 12–16)
IMM GRANULOCYTES # BLD AUTO: 0.11 K/UL (ref 0–0.04)
IMM GRANULOCYTES NFR BLD AUTO: 0.6 % (ref 0–0.5)
INR PPP: 1.2 (ref 0.8–1.2)
LYMPHOCYTES # BLD AUTO: 1.2 K/UL (ref 1–4.8)
LYMPHOCYTES NFR BLD: 6.4 % (ref 18–48)
MCH RBC QN AUTO: 28.6 PG (ref 27–31)
MCHC RBC AUTO-ENTMCNC: 30.4 G/DL (ref 32–36)
MCV RBC AUTO: 94 FL (ref 82–98)
MONOCYTES # BLD AUTO: 1.6 K/UL (ref 0.3–1)
MONOCYTES NFR BLD: 8.8 % (ref 4–15)
NEUTROPHILS # BLD AUTO: 15.5 K/UL (ref 1.8–7.7)
NEUTROPHILS NFR BLD: 84 % (ref 38–73)
NRBC BLD-RTO: 0 /100 WBC
PLATELET # BLD AUTO: 157 K/UL (ref 150–450)
PMV BLD AUTO: 12.6 FL (ref 9.2–12.9)
POCT GLUCOSE: 309 MG/DL (ref 70–110)
POCT GLUCOSE: 336 MG/DL (ref 70–110)
POCT GLUCOSE: 354 MG/DL (ref 70–110)
POCT GLUCOSE: 439 MG/DL (ref 70–110)
POCT GLUCOSE: 460 MG/DL (ref 70–110)
POCT GLUCOSE: 478 MG/DL (ref 70–110)
POCT GLUCOSE: 495 MG/DL (ref 70–110)
PROTHROMBIN TIME: 12.3 SEC (ref 9–12.5)
RBC # BLD AUTO: 3.78 M/UL (ref 4–5.4)
WBC # BLD AUTO: 18.46 K/UL (ref 3.9–12.7)

## 2022-03-26 PROCEDURE — C1713 ANCHOR/SCREW BN/BN,TIS/BN: HCPCS | Performed by: ORTHOPAEDIC SURGERY

## 2022-03-26 PROCEDURE — 86850 RBC ANTIBODY SCREEN: CPT | Performed by: ANESTHESIOLOGY

## 2022-03-26 PROCEDURE — 82550 ASSAY OF CK (CPK): CPT | Performed by: INTERNAL MEDICINE

## 2022-03-26 PROCEDURE — 63600175 PHARM REV CODE 636 W HCPCS: Performed by: ANESTHESIOLOGY

## 2022-03-26 PROCEDURE — 85018 HEMOGLOBIN: CPT | Mod: 91 | Performed by: ORTHOPAEDIC SURGERY

## 2022-03-26 PROCEDURE — 25000003 PHARM REV CODE 250: Performed by: STUDENT IN AN ORGANIZED HEALTH CARE EDUCATION/TRAINING PROGRAM

## 2022-03-26 PROCEDURE — 37000008 HC ANESTHESIA 1ST 15 MINUTES: Performed by: ORTHOPAEDIC SURGERY

## 2022-03-26 PROCEDURE — C1769 GUIDE WIRE: HCPCS | Performed by: ORTHOPAEDIC SURGERY

## 2022-03-26 PROCEDURE — 63600175 PHARM REV CODE 636 W HCPCS: Performed by: NURSE ANESTHETIST, CERTIFIED REGISTERED

## 2022-03-26 PROCEDURE — 27201423 OPTIME MED/SURG SUP & DEVICES STERILE SUPPLY: Performed by: ORTHOPAEDIC SURGERY

## 2022-03-26 PROCEDURE — D9220A PRA ANESTHESIA: ICD-10-PCS | Mod: ANES,,, | Performed by: ANESTHESIOLOGY

## 2022-03-26 PROCEDURE — 71000033 HC RECOVERY, INTIAL HOUR: Performed by: ORTHOPAEDIC SURGERY

## 2022-03-26 PROCEDURE — 36415 COLL VENOUS BLD VENIPUNCTURE: CPT | Performed by: ANESTHESIOLOGY

## 2022-03-26 PROCEDURE — 99222 PR INITIAL HOSPITAL CARE,LEVL II: ICD-10-PCS | Mod: ,,, | Performed by: INTERNAL MEDICINE

## 2022-03-26 PROCEDURE — 36000710: Performed by: ORTHOPAEDIC SURGERY

## 2022-03-26 PROCEDURE — 25000003 PHARM REV CODE 250: Performed by: ORTHOPAEDIC SURGERY

## 2022-03-26 PROCEDURE — 63600175 PHARM REV CODE 636 W HCPCS: Performed by: STUDENT IN AN ORGANIZED HEALTH CARE EDUCATION/TRAINING PROGRAM

## 2022-03-26 PROCEDURE — 25000003 PHARM REV CODE 250: Performed by: INTERNAL MEDICINE

## 2022-03-26 PROCEDURE — D9220A PRA ANESTHESIA: Mod: CRNA,,, | Performed by: NURSE ANESTHETIST, CERTIFIED REGISTERED

## 2022-03-26 PROCEDURE — 36620 INSERTION CATHETER ARTERY: CPT | Mod: 59,,, | Performed by: ANESTHESIOLOGY

## 2022-03-26 PROCEDURE — 36415 COLL VENOUS BLD VENIPUNCTURE: CPT | Performed by: INTERNAL MEDICINE

## 2022-03-26 PROCEDURE — 25000003 PHARM REV CODE 250: Performed by: NURSE ANESTHETIST, CERTIFIED REGISTERED

## 2022-03-26 PROCEDURE — C9113 INJ PANTOPRAZOLE SODIUM, VIA: HCPCS | Performed by: ORTHOPAEDIC SURGERY

## 2022-03-26 PROCEDURE — 36000711: Performed by: ORTHOPAEDIC SURGERY

## 2022-03-26 PROCEDURE — 63600175 PHARM REV CODE 636 W HCPCS: Performed by: INTERNAL MEDICINE

## 2022-03-26 PROCEDURE — 85018 HEMOGLOBIN: CPT | Mod: 91 | Performed by: INTERNAL MEDICINE

## 2022-03-26 PROCEDURE — 99222 1ST HOSP IP/OBS MODERATE 55: CPT | Mod: ,,, | Performed by: INTERNAL MEDICINE

## 2022-03-26 PROCEDURE — 71000039 HC RECOVERY, EACH ADD'L HOUR: Performed by: ORTHOPAEDIC SURGERY

## 2022-03-26 PROCEDURE — 37000009 HC ANESTHESIA EA ADD 15 MINS: Performed by: ORTHOPAEDIC SURGERY

## 2022-03-26 PROCEDURE — 63600175 PHARM REV CODE 636 W HCPCS: Performed by: ORTHOPAEDIC SURGERY

## 2022-03-26 PROCEDURE — C9113 INJ PANTOPRAZOLE SODIUM, VIA: HCPCS | Performed by: INTERNAL MEDICINE

## 2022-03-26 PROCEDURE — 36620 PR INSERT CATH,ART,PERCUT,SHORTTERM: ICD-10-PCS | Mod: 59,,, | Performed by: ANESTHESIOLOGY

## 2022-03-26 PROCEDURE — 36415 COLL VENOUS BLD VENIPUNCTURE: CPT | Performed by: ORTHOPAEDIC SURGERY

## 2022-03-26 PROCEDURE — 85025 COMPLETE CBC W/AUTO DIFF WBC: CPT | Performed by: ANESTHESIOLOGY

## 2022-03-26 PROCEDURE — 11000001 HC ACUTE MED/SURG PRIVATE ROOM

## 2022-03-26 PROCEDURE — D9220A PRA ANESTHESIA: Mod: ANES,,, | Performed by: ANESTHESIOLOGY

## 2022-03-26 PROCEDURE — 85610 PROTHROMBIN TIME: CPT | Performed by: HOSPITALIST

## 2022-03-26 PROCEDURE — 36415 COLL VENOUS BLD VENIPUNCTURE: CPT | Performed by: HOSPITALIST

## 2022-03-26 PROCEDURE — 86920 COMPATIBILITY TEST SPIN: CPT | Performed by: ANESTHESIOLOGY

## 2022-03-26 PROCEDURE — D9220A PRA ANESTHESIA: ICD-10-PCS | Mod: CRNA,,, | Performed by: NURSE ANESTHETIST, CERTIFIED REGISTERED

## 2022-03-26 DEVICE — IMPLANTABLE DEVICE: Type: IMPLANTABLE DEVICE | Site: HIP | Status: FUNCTIONAL

## 2022-03-26 DEVICE — KIT NAIL IM GAMMA 11X380MM TI: Type: IMPLANTABLE DEVICE | Site: HIP | Status: FUNCTIONAL

## 2022-03-26 DEVICE — SCREW GAMMA3 10.5 THRD 100MM: Type: IMPLANTABLE DEVICE | Site: HIP | Status: FUNCTIONAL

## 2022-03-26 DEVICE — SCREW LOCKING 5.0 X 50MM: Type: IMPLANTABLE DEVICE | Site: HIP | Status: FUNCTIONAL

## 2022-03-26 DEVICE — SCREW LOCKING T2 F/T 5X47.5MM: Type: IMPLANTABLE DEVICE | Site: HIP | Status: FUNCTIONAL

## 2022-03-26 RX ORDER — PROCHLORPERAZINE EDISYLATE 5 MG/ML
INJECTION INTRAMUSCULAR; INTRAVENOUS
Status: DISCONTINUED | OUTPATIENT
Start: 2022-03-26 | End: 2022-03-26

## 2022-03-26 RX ORDER — FUROSEMIDE 40 MG/1
40 TABLET ORAL DAILY
Status: DISCONTINUED | OUTPATIENT
Start: 2022-03-27 | End: 2022-03-26

## 2022-03-26 RX ORDER — FLUTICASONE PROPIONATE 50 MCG
2 SPRAY, SUSPENSION (ML) NASAL DAILY
Status: DISCONTINUED | OUTPATIENT
Start: 2022-03-27 | End: 2022-04-04 | Stop reason: HOSPADM

## 2022-03-26 RX ORDER — FENTANYL CITRATE 50 UG/ML
25 INJECTION, SOLUTION INTRAMUSCULAR; INTRAVENOUS EVERY 5 MIN PRN
Status: DISCONTINUED | OUTPATIENT
Start: 2022-03-26 | End: 2022-03-31 | Stop reason: HOSPADM

## 2022-03-26 RX ORDER — FENTANYL CITRATE 50 UG/ML
INJECTION, SOLUTION INTRAMUSCULAR; INTRAVENOUS
Status: DISCONTINUED | OUTPATIENT
Start: 2022-03-26 | End: 2022-03-26

## 2022-03-26 RX ORDER — EPHEDRINE SULFATE 50 MG/ML
INJECTION, SOLUTION INTRAVENOUS
Status: DISCONTINUED | OUTPATIENT
Start: 2022-03-26 | End: 2022-03-26

## 2022-03-26 RX ORDER — ASCORBIC ACID 500 MG
500 TABLET ORAL DAILY
Status: DISCONTINUED | OUTPATIENT
Start: 2022-03-27 | End: 2022-04-04 | Stop reason: HOSPADM

## 2022-03-26 RX ORDER — FENTANYL CITRATE 50 UG/ML
25 INJECTION, SOLUTION INTRAMUSCULAR; INTRAVENOUS ONCE
Status: COMPLETED | OUTPATIENT
Start: 2022-03-26 | End: 2022-03-26

## 2022-03-26 RX ORDER — PHENYLEPHRINE HYDROCHLORIDE 10 MG/ML
INJECTION INTRAVENOUS
Status: DISCONTINUED | OUTPATIENT
Start: 2022-03-26 | End: 2022-03-26

## 2022-03-26 RX ORDER — ONDANSETRON 2 MG/ML
4 INJECTION INTRAMUSCULAR; INTRAVENOUS DAILY PRN
Status: DISCONTINUED | OUTPATIENT
Start: 2022-03-26 | End: 2022-03-31 | Stop reason: HOSPADM

## 2022-03-26 RX ORDER — ONDANSETRON 2 MG/ML
INJECTION INTRAMUSCULAR; INTRAVENOUS
Status: DISCONTINUED | OUTPATIENT
Start: 2022-03-26 | End: 2022-03-26

## 2022-03-26 RX ORDER — SODIUM CHLORIDE 0.9 % (FLUSH) 0.9 %
10 SYRINGE (ML) INJECTION
Status: DISCONTINUED | OUTPATIENT
Start: 2022-03-26 | End: 2022-03-31 | Stop reason: HOSPADM

## 2022-03-26 RX ORDER — MORPHINE SULFATE 4 MG/ML
2 INJECTION, SOLUTION INTRAMUSCULAR; INTRAVENOUS
Status: DISCONTINUED | OUTPATIENT
Start: 2022-03-26 | End: 2022-04-04 | Stop reason: HOSPADM

## 2022-03-26 RX ORDER — HYDROMORPHONE HYDROCHLORIDE 2 MG/ML
0.2 INJECTION, SOLUTION INTRAMUSCULAR; INTRAVENOUS; SUBCUTANEOUS EVERY 5 MIN PRN
Status: DISCONTINUED | OUTPATIENT
Start: 2022-03-26 | End: 2022-03-31 | Stop reason: HOSPADM

## 2022-03-26 RX ORDER — PANTOPRAZOLE SODIUM 40 MG/10ML
40 INJECTION, POWDER, LYOPHILIZED, FOR SOLUTION INTRAVENOUS 2 TIMES DAILY
Status: DISCONTINUED | OUTPATIENT
Start: 2022-03-26 | End: 2022-04-01

## 2022-03-26 RX ORDER — ATORVASTATIN CALCIUM 40 MG/1
80 TABLET, FILM COATED ORAL DAILY
Status: DISCONTINUED | OUTPATIENT
Start: 2022-03-27 | End: 2022-04-04 | Stop reason: HOSPADM

## 2022-03-26 RX ORDER — PROPOFOL 10 MG/ML
VIAL (ML) INTRAVENOUS
Status: DISCONTINUED | OUTPATIENT
Start: 2022-03-26 | End: 2022-03-26

## 2022-03-26 RX ORDER — CLINDAMYCIN PHOSPHATE 150 MG/ML
INJECTION, SOLUTION INTRAVENOUS
Status: DISCONTINUED | OUTPATIENT
Start: 2022-03-26 | End: 2022-03-26

## 2022-03-26 RX ORDER — CLINDAMYCIN PHOSPHATE 600 MG/50ML
600 INJECTION, SOLUTION INTRAVENOUS
Status: COMPLETED | OUTPATIENT
Start: 2022-03-26 | End: 2022-03-27

## 2022-03-26 RX ORDER — HYDROCODONE BITARTRATE AND ACETAMINOPHEN 500; 5 MG/1; MG/1
TABLET ORAL
Status: DISCONTINUED | OUTPATIENT
Start: 2022-03-26 | End: 2022-04-04 | Stop reason: HOSPADM

## 2022-03-26 RX ORDER — LIDOCAINE HYDROCHLORIDE 20 MG/ML
INJECTION INTRAVENOUS
Status: DISCONTINUED | OUTPATIENT
Start: 2022-03-26 | End: 2022-03-26

## 2022-03-26 RX ORDER — CARVEDILOL 12.5 MG/1
12.5 TABLET ORAL 2 TIMES DAILY
Status: DISCONTINUED | OUTPATIENT
Start: 2022-03-26 | End: 2022-04-04 | Stop reason: HOSPADM

## 2022-03-26 RX ORDER — ACETAMINOPHEN 10 MG/ML
1000 INJECTION, SOLUTION INTRAVENOUS ONCE
Status: COMPLETED | OUTPATIENT
Start: 2022-03-26 | End: 2022-03-26

## 2022-03-26 RX ORDER — MUPIROCIN 20 MG/G
OINTMENT TOPICAL 2 TIMES DAILY
Status: COMPLETED | OUTPATIENT
Start: 2022-03-26 | End: 2022-03-30

## 2022-03-26 RX ORDER — SODIUM CHLORIDE 0.9 G/100ML
IRRIGANT IRRIGATION
Status: DISCONTINUED | OUTPATIENT
Start: 2022-03-26 | End: 2022-03-26 | Stop reason: HOSPADM

## 2022-03-26 RX ADMIN — SODIUM CHLORIDE, SODIUM LACTATE, POTASSIUM CHLORIDE, AND CALCIUM CHLORIDE: .6; .31; .03; .02 INJECTION, SOLUTION INTRAVENOUS at 02:03

## 2022-03-26 RX ADMIN — SUGAMMADEX 50 MG: 100 INJECTION, SOLUTION INTRAVENOUS at 04:03

## 2022-03-26 RX ADMIN — EPHEDRINE SULFATE 15 MG: 50 INJECTION INTRAVENOUS at 03:03

## 2022-03-26 RX ADMIN — FENTANYL CITRATE 50 MCG: 50 INJECTION, SOLUTION INTRAMUSCULAR; INTRAVENOUS at 03:03

## 2022-03-26 RX ADMIN — PHENYLEPHRINE HYDROCHLORIDE 200 MCG: 10 INJECTION INTRAVENOUS at 03:03

## 2022-03-26 RX ADMIN — ACETAMINOPHEN 1000 MG: 10 INJECTION INTRAVENOUS at 04:03

## 2022-03-26 RX ADMIN — MUPIROCIN: 20 OINTMENT TOPICAL at 08:03

## 2022-03-26 RX ADMIN — EPHEDRINE SULFATE 10 MG: 50 INJECTION INTRAVENOUS at 04:03

## 2022-03-26 RX ADMIN — FENTANYL CITRATE 25 MCG: 50 INJECTION INTRAMUSCULAR; INTRAVENOUS at 02:03

## 2022-03-26 RX ADMIN — ONDANSETRON 4 MG: 2 INJECTION, SOLUTION INTRAMUSCULAR; INTRAVENOUS at 03:03

## 2022-03-26 RX ADMIN — PROPOFOL 10 MG: 10 INJECTION, EMULSION INTRAVENOUS at 04:03

## 2022-03-26 RX ADMIN — PROCHLORPERAZINE EDISYLATE 5 MG: 5 INJECTION INTRAMUSCULAR; INTRAVENOUS at 03:03

## 2022-03-26 RX ADMIN — FUROSEMIDE 40 MG: 40 TABLET ORAL at 08:03

## 2022-03-26 RX ADMIN — CLINDAMYCIN PHOSPHATE 600 MG: 150 INJECTION, SOLUTION INTRAMUSCULAR; INTRAVENOUS at 03:03

## 2022-03-26 RX ADMIN — MUPIROCIN: 20 OINTMENT TOPICAL at 12:03

## 2022-03-26 RX ADMIN — MORPHINE SULFATE 2 MG: 4 INJECTION, SOLUTION INTRAMUSCULAR; INTRAVENOUS at 08:03

## 2022-03-26 RX ADMIN — INSULIN ASPART 4 UNITS: 100 INJECTION, SOLUTION INTRAVENOUS; SUBCUTANEOUS at 11:03

## 2022-03-26 RX ADMIN — PANTOPRAZOLE SODIUM 40 MG: 40 INJECTION, POWDER, FOR SOLUTION INTRAVENOUS at 08:03

## 2022-03-26 RX ADMIN — METOPROLOL SUCCINATE 25 MG: 25 TABLET, EXTENDED RELEASE ORAL at 08:03

## 2022-03-26 RX ADMIN — INSULIN DETEMIR 20 UNITS: 100 INJECTION, SOLUTION SUBCUTANEOUS at 08:03

## 2022-03-26 RX ADMIN — PROPOFOL 100 MG: 10 INJECTION, EMULSION INTRAVENOUS at 03:03

## 2022-03-26 RX ADMIN — INSULIN HUMAN 10 UNITS: 100 INJECTION, SOLUTION PARENTERAL at 05:03

## 2022-03-26 RX ADMIN — LIDOCAINE HYDROCHLORIDE 100 MG: 20 INJECTION, SOLUTION INTRAVENOUS at 03:03

## 2022-03-26 RX ADMIN — INSULIN ASPART 10 UNITS: 100 INJECTION, SOLUTION INTRAVENOUS; SUBCUTANEOUS at 12:03

## 2022-03-26 RX ADMIN — PANTOPRAZOLE SODIUM 40 MG: 40 INJECTION, POWDER, FOR SOLUTION INTRAVENOUS at 12:03

## 2022-03-26 RX ADMIN — HYDROMORPHONE HYDROCHLORIDE 0.2 MG: 2 INJECTION INTRAMUSCULAR; INTRAVENOUS; SUBCUTANEOUS at 05:03

## 2022-03-26 RX ADMIN — CLINDAMYCIN IN 5 PERCENT DEXTROSE 600 MG: 12 INJECTION, SOLUTION INTRAVENOUS at 08:03

## 2022-03-26 NOTE — SUBJECTIVE & OBJECTIVE
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    Hx of colonic polyps     1/9    Hx of colonic polyps     Hyperlipidemia     Hypertension     Myocardial infarction nov 2006    Persistent atrial fibrillation 4/25/2018    Posterior vitreous detachment of both eyes     Sleep apnea     Type 2 diabetes mellitus with mild nonproliferative diabetic retinopathy with macular edema 2/15/2013       Past Surgical History:   Procedure Laterality Date    APPENDECTOMY      CATARACT EXTRACTION      Right eye    EPIDURAL STEROID INJECTION N/A 11/2/2018    Procedure: Injection, Steroid, Epidural CAUDAL ELISA;  Surgeon: Ирина Peña MD;  Location: [facility];  Service: Pain Management;  Laterality: N/A;    heart stent      X 2    HYSTERECTOMY      Fibroids       Review of patient's allergies indicates:   Allergen Reactions    Heparin analogues Other (See Comments)     thrombocytopenia    Ancef [cefazolin]     Keflex [cephalexin] Rash    Oxybutynin Other (See Comments)     Metallic taste       No current facility-administered medications on file prior to encounter.     Current Outpatient Medications on File Prior to Encounter   Medication Sig    apixaban (ELIQUIS) 2.5 mg Tab Take 1 tablet (2.5 mg total) by mouth 2 (two) times a day.    ascorbic acid, vitamin C, (VITAMIN C) 100 MG tablet Take 100 mg by mouth once daily.    blood sugar  "diagnostic (ACCU-CHEK MELIDA PLUS TEST STRP) Strp TEST BLOOD SUGAR 5 TIMES DAILY; E11.65    blood-glucose meter (ACCU-CHEK MELIDA PLUS METER) Misc To check sugars 4 times daily.  Please include control solution    carvediloL (COREG) 12.5 MG tablet Take 1 tablet (12.5 mg total) by mouth 2 (two) times a day.    ergocalciferol, vitamin D2, (VITAMIN D ORAL) Take by mouth once daily.    fluticasone (FLONASE) 50 mcg/actuation nasal spray SHAKE LIQUID AND USE 2 SPRAYS IN EACH NOSTRIL EVERY DAY    furosemide (LASIX) 20 MG tablet Take 40 mg of lasix at 8 a.m. and 20 mg at 3 p.m.    insulin aspart U-100 (NOVOLOG) 100 unit/mL (3 mL) InPn pen Inject 14 units three times daily before meals    insulin degludec (TRESIBA FLEXTOUCH U-100) 100 unit/mL (3 mL) insulin pen INJECT 32 UNITS UNDER THE SKIN DAILY    ketoconazole (NIZORAL) 2 % shampoo APPLY TO AFFECTED AREA EVERY OTHER DAY AND LET SIT 5 MINUTES PRIOR TO RINSING AS DIRECTED    lancets (ACCU-CHEK MULTICLIX LANCET) Misc Test glucose 4x/day. e11.65    MULTIVIT-MIN/IRON/FOLIC/LUTEIN (CENTRUM SILVER WOMEN ORAL) Take by mouth once daily.     pen needle, diabetic (BD TIM 2ND GEN PEN NEEDLE) 32 gauge x 5/32" Ndle Use 4x/day    rosuvastatin (CRESTOR) 40 MG Tab TAKE 1 TABLET(40 MG) BY MOUTH EVERY DAY    vitamin B complex (B COMPLEX VITAMINS ORAL) Take by mouth once daily.    VITAMIN E, BULK, MISC by Misc.(Non-Drug; Combo Route) route once daily.     Family History       Problem Relation (Age of Onset)    Cancer Maternal Grandfather, Paternal Grandfather    Diabetes Mother, Father    Heart disease Mother    Hypertension Mother    Kidney failure Father    Melanoma Father    Mental illness Sister          Tobacco Use    Smoking status: Never Smoker    Smokeless tobacco: Never Used   Substance and Sexual Activity    Alcohol use: No     Alcohol/week: 0.0 standard drinks    Drug use: No    Sexual activity: Not Currently     Review of Systems   Constitutional: Negative.    HENT: Negative.   "   Respiratory: Negative.     Cardiovascular: Negative.    Gastrointestinal: Negative.    Endocrine: Negative.    Genitourinary: Negative.    Musculoskeletal:         Per HPI   Neurological: Negative.    Hematological: Negative.    Psychiatric/Behavioral: Negative.     Objective:     Vital Signs (Most Recent):  Temp: 97.8 °F (36.6 °C) (03/25/22 2153)  Pulse: 66 (03/25/22 2153)  Resp: 18 (03/25/22 2234)  BP: (!) 164/68 (03/25/22 2153)  SpO2: 96 % (03/25/22 2153)   Vital Signs (24h Range):  Temp:  [97.8 °F (36.6 °C)-98.3 °F (36.8 °C)] 97.8 °F (36.6 °C)  Pulse:  [65-82] 66  Resp:  [17-20] 18  SpO2:  [95 %-98 %] 96 %  BP: (157-180)/(68-86) 164/68     Weight: 96.2 kg (212 lb)  Body mass index is 32.23 kg/m².    Physical Exam  Constitutional:       General: She is in acute distress.      Appearance: Normal appearance. She is obese. She is not ill-appearing or diaphoretic.   HENT:      Head: Normocephalic and atraumatic.   Eyes:      General: No scleral icterus.     Extraocular Movements: Extraocular movements intact.      Conjunctiva/sclera: Conjunctivae normal.      Pupils: Pupils are equal, round, and reactive to light.   Cardiovascular:      Rate and Rhythm: Normal rate and regular rhythm.      Pulses: Normal pulses.      Heart sounds: Normal heart sounds. No murmur heard.    No gallop.   Pulmonary:      Effort: Pulmonary effort is normal. No respiratory distress.      Breath sounds: Normal breath sounds. No stridor. No wheezing, rhonchi or rales.   Chest:      Chest wall: No tenderness.   Abdominal:      Comments: Obese abdomen otherwise unremarkable   Musculoskeletal:      Comments: Externally rotated and reduce LLE with significant tenderness around the hip region   Skin:     Capillary Refill: Capillary refill takes less than 2 seconds.   Neurological:      General: No focal deficit present.      Mental Status: She is alert and oriented to person, place, and time. Mental status is at baseline.   Psychiatric:          Mood and Affect: Mood normal.         Behavior: Behavior normal.         Thought Content: Thought content normal.         Judgment: Judgment normal.         CRANIAL NERVES     CN III, IV, VI   Pupils are equal, round, and reactive to light.     Significant Labs: All pertinent labs within the past 24 hours have been reviewed.    Significant Imaging: I have reviewed all pertinent imaging results/findings within the past 24 hours.

## 2022-03-26 NOTE — PROGRESS NOTES
1330 Patient vomited about 200 ml of black/brown emesis while in holding area.    1400. NG tube to right nare placed by Dr. Figueroa with an immediate output off 200 ml.

## 2022-03-26 NOTE — H&P
[facility] Medicine  History & Physical    Patient Name: Radha Degroot  MRN: [id number]  Patient Class: IP- Inpatient  Admission Date: 3/25/2022  Attending Physician: Delia Stacy MD  Primary Care Provider: Katherine Merida MD         Patient information was obtained from patient and ER records.     Subjective:     Principal Problem:Closed left femoral fracture    Chief Complaint:   Chief Complaint   Patient presents with    Hip Pain     EMS called to 81yo female that tripped and fell last night about 1100 pm and is c/o og left hip pain. Shortening and rotation noted at triage. No LOC but stated she did hit her head and is on eliquis. Was on the floor untl medics came recently and stated she has not had anything to eat or drink and has not had any meds.         HPI: Patient is an 81yo f with multiple extensive comorbid medical conditions which include persistent afib on eliquis, HIT, DM II, CAD S/P PCI 06, anemia, cardiomyopathy/CHF, carpal tunnel syndrome, CKD IV,  gout, uHLD and HTN presents to the ED due to left hip pain. Patient states she had a mechanical fall described as tripping over the floor carpet and landing on her left hip. Incident happened last night at 11pm, she has been laying at the same spot due to inability to move up until around 3-4pm today when son came to check up on her and saw her on the floor. EMS was called and patient was brought to the ER. Patient was found to have a left hip fracture and is being admitted to have ortho eval. She states she is completely independent and does all her ADLs by herself with no limitations. Denies any angina or CHFexacerbations symptoms. Also states her afib has been mostly fairly controlled      Past Medical History:   Diagnosis Date    ALLERGIC RHINITIS     Anemia     Anticoagulant long-term use     Arthritis     Cardiomyopathy, ischemic     Carpal tunnel syndrome     Cataract     Left eye    CHF (congestive heart  failure)     Chronic kidney disease     Coronary artery disease     hx stent X2    Diabetes mellitus type II     Diabetic neuropathy     Diabetic retinopathy of both eyes     Encounter for blood transfusion     GERD (gastroesophageal reflux disease)     hiatal hernia    Gout, unspecified     h/o LAD and D1 PCI in 2006 6/17/2013    Heart attack 2006    Hiatal hernia     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    Hx of colonic polyps     1/9    Hx of colonic polyps     Hyperlipidemia     Hypertension     Myocardial infarction nov 2006    Persistent atrial fibrillation 4/25/2018    Posterior vitreous detachment of both eyes     Sleep apnea     Type 2 diabetes mellitus with mild nonproliferative diabetic retinopathy with macular edema 2/15/2013       Past Surgical History:   Procedure Laterality Date    APPENDECTOMY      CATARACT EXTRACTION      Right eye    EPIDURAL STEROID INJECTION N/A 11/2/2018    Procedure: Injection, Steroid, Epidural CAUDAL ELISA;  Surgeon: Ирина Peña MD;  Location: [facility];  Service: Pain Management;  Laterality: N/A;    heart stent      X 2    HYSTERECTOMY      Fibroids       Review of patient's allergies indicates:   Allergen Reactions    Heparin analogues Other (See Comments)     thrombocytopenia    Ancef [cefazolin]     Keflex [cephalexin] Rash    Oxybutynin Other (See Comments)     Metallic taste       No current facility-administered medications on file prior to encounter.     Current Outpatient Medications on File Prior to Encounter   Medication Sig    apixaban (ELIQUIS) 2.5 mg Tab Take 1 tablet (2.5 mg total) by mouth 2 (two) times a day.    ascorbic acid, vitamin C, (VITAMIN C) 100 MG tablet Take 100 mg by mouth once daily.    blood sugar diagnostic (ACCU-CHEK MELIDA PLUS TEST STRP) Strp TEST BLOOD SUGAR 5 TIMES DAILY; E11.65    blood-glucose meter (ACCU-CHEK MELIDA PLUS METER) Misc To check sugars 4 times daily.  Please include control  "solution    carvediloL (COREG) 12.5 MG tablet Take 1 tablet (12.5 mg total) by mouth 2 (two) times a day.    ergocalciferol, vitamin D2, (VITAMIN D ORAL) Take by mouth once daily.    fluticasone (FLONASE) 50 mcg/actuation nasal spray SHAKE LIQUID AND USE 2 SPRAYS IN EACH NOSTRIL EVERY DAY    furosemide (LASIX) 20 MG tablet Take 40 mg of lasix at 8 a.m. and 20 mg at 3 p.m.    insulin aspart U-100 (NOVOLOG) 100 unit/mL (3 mL) InPn pen Inject 14 units three times daily before meals    insulin degludec (TRESIBA FLEXTOUCH U-100) 100 unit/mL (3 mL) insulin pen INJECT 32 UNITS UNDER THE SKIN DAILY    ketoconazole (NIZORAL) 2 % shampoo APPLY TO AFFECTED AREA EVERY OTHER DAY AND LET SIT 5 MINUTES PRIOR TO RINSING AS DIRECTED    lancets (ACCU-CHEK MULTICLIX LANCET) Misc Test glucose 4x/day. e11.65    MULTIVIT-MIN/IRON/FOLIC/LUTEIN (CENTRUM SILVER WOMEN ORAL) Take by mouth once daily.     pen needle, diabetic (BD TIM 2ND GEN PEN NEEDLE) 32 gauge x 5/32" Ndle Use 4x/day    rosuvastatin (CRESTOR) 40 MG Tab TAKE 1 TABLET(40 MG) BY MOUTH EVERY DAY    vitamin B complex (B COMPLEX VITAMINS ORAL) Take by mouth once daily.    VITAMIN E, BULK, MISC by Misc.(Non-Drug; Combo Route) route once daily.     Family History       Problem Relation (Age of Onset)    Cancer Maternal Grandfather, Paternal Grandfather    Diabetes Mother, Father    Heart disease Mother    Hypertension Mother    Kidney failure Father    Melanoma Father    Mental illness Sister          Tobacco Use    Smoking status: Never Smoker    Smokeless tobacco: Never Used   Substance and Sexual Activity    Alcohol use: No     Alcohol/week: 0.0 standard drinks    Drug use: No    Sexual activity: Not Currently     Review of Systems   Constitutional: Negative.    HENT: Negative.     Respiratory: Negative.     Cardiovascular: Negative.    Gastrointestinal: Negative.    Endocrine: Negative.    Genitourinary: Negative.    Musculoskeletal:         Per HPI "   Neurological: Negative.    Hematological: Negative.    Psychiatric/Behavioral: Negative.     Objective:     Vital Signs (Most Recent):  Temp: 97.8 °F (36.6 °C) (03/25/22 2153)  Pulse: 66 (03/25/22 2153)  Resp: 18 (03/25/22 2234)  BP: (!) 164/68 (03/25/22 2153)  SpO2: 96 % (03/25/22 2153)   Vital Signs (24h Range):  Temp:  [97.8 °F (36.6 °C)-98.3 °F (36.8 °C)] 97.8 °F (36.6 °C)  Pulse:  [65-82] 66  Resp:  [17-20] 18  SpO2:  [95 %-98 %] 96 %  BP: (157-180)/(68-86) 164/68     Weight: 96.2 kg (212 lb)  Body mass index is 32.23 kg/m².    Physical Exam  Constitutional:       General: She is in acute distress.      Appearance: Normal appearance. She is obese. She is not ill-appearing or diaphoretic.   HENT:      Head: Normocephalic and atraumatic.   Eyes:      General: No scleral icterus.     Extraocular Movements: Extraocular movements intact.      Conjunctiva/sclera: Conjunctivae normal.      Pupils: Pupils are equal, round, and reactive to light.   Cardiovascular:      Rate and Rhythm: Normal rate and regular rhythm.      Pulses: Normal pulses.      Heart sounds: Normal heart sounds. No murmur heard.    No gallop.   Pulmonary:      Effort: Pulmonary effort is normal. No respiratory distress.      Breath sounds: Normal breath sounds. No stridor. No wheezing, rhonchi or rales.   Chest:      Chest wall: No tenderness.   Abdominal:      Comments: Obese abdomen otherwise unremarkable   Musculoskeletal:      Comments: Externally rotated and reduce LLE with significant tenderness around the hip region   Skin:     Capillary Refill: Capillary refill takes less than 2 seconds.   Neurological:      General: No focal deficit present.      Mental Status: She is alert and oriented to person, place, and time. Mental status is at baseline.   Psychiatric:         Mood and Affect: Mood normal.         Behavior: Behavior normal.         Thought Content: Thought content normal.         Judgment: Judgment normal.         CRANIAL NERVES      CN III, IV, VI   Pupils are equal, round, and reactive to light.     Significant Labs: All pertinent labs within the past 24 hours have been reviewed.    Significant Imaging: I have reviewed all pertinent imaging results/findings within the past 24 hours.    Assessment/Plan:     * Closed left femoral fracture  Ortho consult  Adequate pain control  Keep NPO for possible ortho intervention tomorrow  Hold off on AC and antiplatelet in preparation for surgical intervention    Cardiac preop risk assessment  RCRI >11% (high risk), YEN 0.78%  Extensive history of cardiac comorbidities - CAD/PCI, CHF, Afib, however she states she is very functional and independent.   I do not think she needs any further cardiac workup, however she is at high risk from a cardiac standpoint for this intervention  Continue optimizing cardioprotective meds for good surgical outcome          Chronic diastolic CHF (congestive heart failure)  Appears well compensated  Continue optimizing cardioprotective meds for good surgical outcome      DM (diabetes mellitus)  Will start basal and bolus insulin  Adequate glycemic control for good wound healing      Permanent atrial fibrillation  In NSR during my eval  Restart cardioprotective meds  Holding off on AC for now in anticipation for surgery      Anemia of chronic illness  At baseline      Essential hypertension  Restart home HTN meds once identified   Adequate pain control      Chronic kidney disease, stage IV (severe)  Renal function at baseline  Continue supportive care      VTE Risk Mitigation (From admission, onward)    None             Delia Stacy MD  Department of Hospital Medicine   [facility] S[facility]

## 2022-03-26 NOTE — CONSULTS
83 yo female with a sig pmhx of DM, CHF, neuropathy, MI, A Fib presented to the ED with a chief complaint of left hip pain following a fall last night. She denies LOC but did hit her head in the fall. Her fall was mechanical and tripped on her carpet. Her son found her at her home and call EMS. Previous to this fall she was a normal ambulator and denies SOB, CP, or any other injury. She does report some vomiting brown substance. GI consulted.  BPmax 180/86    Left hip: Skin intact. + TTP on lateral hip. ROM limited by pain. NVI throughout left LE. Confirms sensation throughout, able to flex and extend ankle and wiggle all toes. Pulses palpated.   Left femur xray: Acute displaced subtrochanteric left proximal femur fracture with suspected intertrochanteric component.  Hematocrit: 36.6    A/P: Left hip IT femur fracture  1) NPO  2) to OR today for Left femur IMN  3) patient cleared by GI for surgery

## 2022-03-26 NOTE — HPI
Patient is an 83yo f with multiple extensive comorbid medical conditions which include persistent afib on eliquis, HIT, DM II, CAD S/P PCI 06, anemia, cardiomyopathy/CHF, carpal tunnel syndrome, CKD IV,  gout, uHLD and HTN presents to the ED due to left hip pain. Patient states she had a mechanical fall described as tripping over the floor carpet and landing on her left hip. Incident happened last night at 11pm, she has been laying at the same spot due to inability to move up until around 3-4pm today when son came to check up on her and saw her on the floor. EMS was called and patient was brought to the ER. Patient was found to have a left hip fracture and is being admitted to have ortho eval. She states she is completely independent and does all her ADLs by herself with no limitations. Denies any angina or CHFexacerbations symptoms. Also states her afib has been mostly fairly controlled

## 2022-03-26 NOTE — PROGRESS NOTES
[facility] Medicine  Progress Note    Patient Name: Radha Degroot  MRN: [id number]  Patient Class: IP- Inpatient   Admission Date: 3/25/2022  Length of Stay: 1 days  Attending Physician: Terry Pennington MD  Primary Care Provider: Katherine Merida MD        Subjective:     Principal Problem:Closed left femoral fracture        HPI:  Patient is an 83yo f with multiple extensive comorbid medical conditions which include persistent afib on eliquis, HIT, DM II, CAD S/P PCI 06, anemia, cardiomyopathy/CHF, carpal tunnel syndrome, CKD IV,  gout, uHLD and HTN presents to the ED due to left hip pain. Patient states she had a mechanical fall described as tripping over the floor carpet and landing on her left hip. Incident happened last night at 11pm, she has been laying at the same spot due to inability to move up until around 3-4pm today when son came to check up on her and saw her on the floor. EMS was called and patient was brought to the ER. Patient was found to have a left hip fracture and is being admitted to have ortho eval. She states she is completely independent and does all her ADLs by herself with no limitations. Denies any angina or CHFexacerbations symptoms. Also states her afib has been mostly fairly controlled      Overview/Hospital Course:  Patient admitted for L hip fx from mechanical fall at home.  Ortho was consulted. On 3/26, the patient had coffee ground emesis and GI was consulted.       Interval History: coffee ground emesis this am       Review of Systems   Constitutional:  Positive for activity change. Negative for appetite change, chills, diaphoresis, fatigue and fever.   HENT:  Negative for congestion and dental problem.    Eyes:  Negative for discharge and itching.   Respiratory:  Negative for apnea, choking, chest tightness, shortness of breath and stridor.    Cardiovascular:  Negative for chest pain, palpitations and leg swelling.   Gastrointestinal:  Negative for  abdominal pain, nausea and vomiting.   Genitourinary:  Negative for difficulty urinating, dyspareunia, frequency and genital sores.   Musculoskeletal:  Negative for arthralgias and back pain.   Skin:  Negative for color change and pallor.   Neurological:  Negative for light-headedness and headaches.   Psychiatric/Behavioral:  Negative for agitation and behavioral problems.    Objective:     Vital Signs (Most Recent):  Temp: 97.8 °F (36.6 °C) (03/26/22 0720)  Pulse: 77 (03/26/22 0720)  Resp: 17 (03/26/22 0720)  BP: 139/63 (03/26/22 0720)  SpO2: 98 % (03/26/22 0720) Vital Signs (24h Range):  Temp:  [97.8 °F (36.6 °C)-98.3 °F (36.8 °C)] 97.8 °F (36.6 °C)  Pulse:  [65-82] 77  Resp:  [17-20] 17  SpO2:  [94 %-98 %] 98 %  BP: (129-180)/(61-86) 139/63     Weight: 99.2 kg (218 lb 9.6 oz)  Body mass index is 33.24 kg/m².    Intake/Output Summary (Last 24 hours) at 3/26/2022 0907  Last data filed at 3/26/2022 0800  Gross per 24 hour   Intake 0 ml   Output 1100 ml   Net -1100 ml      Physical Exam  Vitals and nursing note reviewed.   Constitutional:       General: She is not in acute distress.     Appearance: Normal appearance. She is obese. She is not ill-appearing, toxic-appearing or diaphoretic.   HENT:      Head: Normocephalic and atraumatic.   Cardiovascular:      Rate and Rhythm: Normal rate and regular rhythm.   Pulmonary:      Effort: Pulmonary effort is normal. No respiratory distress.      Breath sounds: No wheezing.   Abdominal:      Tenderness: There is no abdominal tenderness.   Skin:     General: Skin is warm and dry.   Neurological:      Mental Status: She is alert and oriented to person, place, and time.   Psychiatric:         Mood and Affect: Mood normal.         Behavior: Behavior normal.         Thought Content: Thought content normal.       Significant Labs: All pertinent labs within the past 24 hours have been reviewed.  BMP:   Recent Labs   Lab 03/25/22  1812   *      K 4.8      CO2 24    BUN 48*   CREATININE 2.0*   CALCIUM 9.5     CBC:   Recent Labs   Lab 03/25/22  1812   WBC 14.72*   HGB 11.9*   HCT 36.6*          Significant Imaging:       Assessment/Plan:      * Closed left femoral fracture  Ortho consult  Adequate pain control  Keep NPO for possible ortho intervention tomorrow  Hold off on AC and antiplatelet in preparation for surgical intervention    Cardiac preop risk assessment  RCRI >11% (high risk), YEN 0.78%  Extensive history of cardiac comorbidities - CAD/PCI, CHF, Afib, however she states she is very functional and independent.   I do not think she needs any further cardiac workup, however she is at high risk from a cardiac standpoint for this intervention  Continue optimizing cardioprotective meds for good surgical outcome          Coffee ground emesis  Hgb every 8 hours and now. PPI bid. GI consult   Patient stable at the moment.       Permanent atrial fibrillation  In NSR during my eval  Restart cardioprotective meds  Holding off on AC for now in anticipation for surgery      DM (diabetes mellitus)  Will start basal and bolus insulin  Adequate glycemic control for good wound healing      Chronic diastolic CHF (congestive heart failure)  Appears well compensated  Continue optimizing cardioprotective meds for good surgical outcome      Essential hypertension  Restart home HTN meds once identified   Adequate pain control      Chronic kidney disease, stage IV (severe)  Renal function at baseline  Continue supportive care      Anemia of chronic illness  At baseline        VTE Risk Mitigation (From admission, onward)    None          Discharge Planning   TRISH:      Code Status: Prior   Is the patient medically ready for discharge?:     Reason for patient still in hospital (select all that apply): Patient unstable                     Terry Chawla MD  Department of Hospital Medicine   [facility] S[facility]

## 2022-03-26 NOTE — ANESTHESIA POSTPROCEDURE EVALUATION
Anesthesia Post Evaluation    Patient: Radha Degroot    Procedure(s) Performed: Procedure(s) (LRB):  INSERTION, INTRAMEDULLARY SARA, FEMUR (Left)    Final Anesthesia Type: general      Patient location during evaluation: PACU  Patient participation: Yes- Able to Participate  Level of consciousness: awake and alert  Post-procedure vital signs: reviewed and stable  Pain management: adequate  Airway patency: patent  DELFINA mitigation strategies: Multimodal analgesia and Extubation while patient is awake  PONV status at discharge: No PONV  Anesthetic complications: no      Cardiovascular status: blood pressure returned to baseline  Respiratory status: unassisted and spontaneous ventilation  Hydration status: euvolemic  Follow-up not needed.          Vitals Value Taken Time   /56 03/26/22 1845   Temp 36.8 °C (98.2 °F) 03/26/22 1845   Pulse 60 03/26/22 1845   Resp 17 03/26/22 1845   SpO2 100 % 03/26/22 1845         Event Time   Out of Recovery 03/26/2022 18:34:00         Pain/Karthik Score: Pain Rating Prior to Med Admin: 7 (3/26/2022  5:55 PM)  Pain Rating Post Med Admin: 4 (3/26/2022  6:01 PM)  Karthik Score: 8 (3/26/2022  6:01 PM)

## 2022-03-26 NOTE — NURSING
"Patient vomitted 400cc of black emesis at this time.  Patient denies any nausea, stated that she just "belches and then it comes up". MD notified and aware.  "

## 2022-03-26 NOTE — PROGRESS NOTES
Consult received. NPO at midnight for possible IM nail of her left femur fracture. Will see her in the morning; full note to follow.

## 2022-03-26 NOTE — ANESTHESIA PROCEDURE NOTES
Intubation    Date/Time: 3/26/2022 3:21 PM  Performed by: Tiera Gilbert CRNA  Authorized by: Tiera Gilbert CRNA     Intubation:     Induction:  Rapid sequence induction    Intubated:  Postinduction    Attempts:  1    Attempted By:  MARIBEL    Blade:  Bella 3    Laryngeal View Grade: Grade I - full view of cords      Difficult Airway Encountered?: No      Complications:  None    Airway Device:  Oral endotracheal tube    Airway Device Size:  7.0    Style/Cuff Inflation:  Cuffed    Inflation Amount (mL):  5    Tube secured:  22    Secured at:  The lips    Placement Verified By:  Capnometry    Complicating Factors:  None    Findings Post-Intubation:  BS equal bilateral and atraumatic/condition of teeth unchanged

## 2022-03-26 NOTE — NURSING
During pt rounding pt  noted to be dry heaving.Call light within reach but not utilized...HOB placed on High fowlers and  a dark brown colored  watery emesis observed ..Complete bed bath/surgical bath completed .Pt awake, alert and oriented..Says she just woke up from sleep.Pt denies further nausea and says she doesn't know where that feeling came from as she has only had x2 emesis all her life..Vitals stable. MD Delia notified. No new orders at this time

## 2022-03-26 NOTE — OP NOTE
[facility] Surg  Surgery Department  Operative Note    SUMMARY     Date of Procedure: 3/26/2022     Procedure: Procedure(s) (LRB):  INSERTION, INTRAMEDULLARY SARA, FEMUR (Left)     Surgeon(s) and Role:     * Eduin Avelar MD - Primary    Assisting Surgeon: Andres    Pre-Operative Diagnosis: Lt ST/IT Femur fracture [S72.90XA]    Post-Operative Diagnosis: Post-Op Diagnosis Codes:    same    Anesthesia: General    Technical Procedures Used: IM nail    Description of the Findings of the Procedure: fx    Significant Surgical Tasks Conducted by the Assistant(s), if Applicable:  Position, prep, drape, retractor, wound closure, dressing application and transfer of patient    Complications: No    Estimated Blood Loss (EBL): 100 mL           Implants:   Implant Name Type Inv. Item Serial No.  Lot No. LRB No. Used Action   KIT NAIL IM GAMMA [id number] TI - [id number]  KIT NAIL IM GAMMA [id number] TI  MAL [employer] JACE. [id number] Left 1 Implanted   WIRE MICHELINE T2 [id number] SS - [id number]  WIRE MICHELINE T2 [id number] SS  MAL [employer] JACE. [id number] Left 1 Implanted and Explanted   GUIDE WIRE [id number] BALL TIP - [id number]  GUIDE WIRE [id number] BALL TIP  MAL [employer] JACE. [id number] Left 1 Implanted and Explanted   GUIDEWIRE 3.2 X 450 - [id number]  GUIDEWIRE 3.2 X 450  MAL [employer] JACE. [id number] Left 1 Implanted and Explanted   bixcut reamer shaft, mod trinkle    MAL [id number] [id number] Left 1 Implanted   SCREW GAMMA3 10.5 THRD 100MM - [id number]  SCREW GAMMA3 10.5 THRD 100MM  [employer] JACE. [id number] Left 1 Implanted   SCREW LOCKING T2 F/T 5X47.5MM - [id number]  SCREW LOCKING T2 F/T 5X47.5MM  MAL [employer] JACE. [id number] Left 1 Implanted   SCREW LOCKING 5.0 X 50MM - [id number]  SCREW LOCKING 5.0 X 50MM  MAL [employer] JACE. [id number] Left 1 Implanted       Specimens:   Specimen (24h ago, onward)            None                  Condition: Good    Disposition: PACU - hemodynamically stable.    Attestation: I  performed the procedure.     Procedure in detail:    After proper consents were obtained, patient was taken to the operating room and administered general anesthesia.  She was placed on the fracture table and left lower extremity was isolated in the fracture table boot.  Reduction was performed and confirmed to be in good position with the fluoroscope.  Left lower extremity was then prepped and draped in normal sterile fashion.  Incision was made just proximal to the greater trochanter.  Tissues were spread and guide pin was inserted into the greater trochanter.  Placement of the guide pin was checked under fluoroscopy and adjusted slightly.  Once it was in its desired position, the opening Reamer was passed over the guidewire the guidewire was removed.  Long guidewire was then passed into the distal femur and placement was checked under fluoroscopy distally.  Was noted to be in good position.  Depth was then measured and an 11 x 380 nail was selected.  An attempt was made to pass nail but a slightly tight.  Nail was removed and a 12.5 Reamer was passed over the long guidewire.  11 x 380 nail was then seated to its desired depth and the guidewire was removed.  Incision was made for the lag screw in tissues were spread.  Drill tubes were advanced and guide pin was inserted under fluoroscopic guidance.  Placement of the pin was checked under fluoroscopy in multiple planes and felt to be in good position.  Depth was then measured and one-step reaming was performed.  100 mm screw was then inserted over the guidewire slight compression was applied and the set screw was inserted.  Guide pin was then removed and the proximal targeting guide was removed.  Final pictures were taken of the proximal into the nail and showed good fracture reduction and placement of hardware.    Distal locking was then performed.  Left lower extremity was taken out of adduction and perfect circles were gained distally.  Incision was made,  tissues were spread and drill bit was lined up using a freehand technique.  Drill bit was passed per os across both cortices and through the nail.  Placement of the drill bit was checked under fluoroscopy without the drill in place.  Drill bit was then removed and depth was measured.  Screw of appropriate length was inserted.  This procedure was repeated for the 2nd distal locking screw.  Placement of hardware was again checked and noted to be appropriate.  Wounds were then irrigated and closed with 2-0 Vicryl and staples.  Sterile dressings were applied.  Patient was aroused in the operating room and transferred

## 2022-03-26 NOTE — CONSULTS
[facility]  Gastroenterology  Consult Note    Patient Name: Radha Degroot  MRN: [id number]  Admission Date: 3/25/2022  Hospital Length of Stay: 1 days  Code Status: Prior   Attending Provider: Terry Pennington MD   Consulting Provider: Mert Calzada MD  Primary Care Physician: Katherine Merida MD  Principal Problem:Closed left femoral fracture    Inpatient consult to Gastroenterology  Consult performed by: Mert Calzada MD  Consult ordered by: Terry Pennington MD        Subjective:     HPI: Radha Degroot is a 82 y.o. female with history of persistent afib on eliquis, HIT, DM II, CAD S/P PCI 06, anemia, cardiomyopathy/CHF, carpal tunnel syndrome, CKD IV,  gout, uHLD and HTN presented to the ED due to left hip pain. Patient states she had a mechanical fall described as tripping over the floor carpet and landing on her left hip. Incident happened 2 nights ago, she has been laying at the same spot due to inability to move up until around 3-4pm today when son came to check up on her and saw her on the floor. EMS was called and patient was brought to the ER. Patient was found to have a left hip fracture and is being admitted to have ortho eval. She states she is completely independent and does all her ADLs by herself with no limitations. Denies any angina or CHFexacerbations symptoms. Also states her afib has been mostly fairly controlled       Patient admitted for L hip fx from mechanical fall at home.  Ortho was consulted. On 3/26, the patient had coffee ground emesis.    Latest Reference Range & Units 05/13/21 11:25 08/11/21 11:12 03/25/22 18:12   Hemoglobin 12.0 - 16.0 g/dL 13.4 12.9 11.9 (L)   Hematocrit 37.0 - 48.5 % 41.4 40.1 36.6 (L)      Latest Reference Range & Units 05/13/21 11:25 08/11/21 11:12 03/25/22 18:12   Platelets 150 - 450 K/uL 178 160 158      Latest Reference Range & Units 08/11/21 11:12 03/25/22 18:12   BUN 8 - 23 mg/dL 43 (H) 48 (H)   Creatinine 0.5 - 1.4 mg/dL  2.1 (H) 2.0 (H)      Latest Reference Range & Units 03/26/22 08:45   Protime 9.0 - 12.5 sec 12.3   INR 0.8 - 1.2  1.2 [1]     She has not been on Eliquis over the past 2 days. No NSAIDs. No melena, BRBPR or maroon stools. No history of dysphagia, odynophagia, heartburn or acid regurgitation.      Past Medical History:   Diagnosis Date    ALLERGIC RHINITIS     Anemia     Anticoagulant long-term use     Arthritis     Cardiomyopathy, ischemic     Carpal tunnel syndrome     Cataract     Left eye    CHF (congestive heart failure)     Chronic kidney disease     Coronary artery disease     hx stent X2    Diabetes mellitus type II     Diabetic neuropathy     Diabetic retinopathy of both eyes     Encounter for blood transfusion     GERD (gastroesophageal reflux disease)     hiatal hernia    Gout, unspecified     h/o LAD and D1 PCI in 2006 6/17/2013    Heart attack 2006    Hiatal hernia     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    Hx of colonic polyps     1/9    Hx of colonic polyps     Hyperlipidemia     Hypertension     Myocardial infarction nov 2006    Persistent atrial fibrillation 4/25/2018    Posterior vitreous detachment of both eyes     Sleep apnea     Type 2 diabetes mellitus with mild nonproliferative diabetic retinopathy with macular edema 2/15/2013       Past Surgical History:   Procedure Laterality Date    APPENDECTOMY      CATARACT EXTRACTION      Right eye    EPIDURAL STEROID INJECTION N/A 11/2/2018    Procedure: Injection, Steroid, Epidural CAUDAL ELISA;  Surgeon: Ирина Peña MD;  Location: [facility];  Service: Pain Management;  Laterality: N/A;    heart stent      X 2    HYSTERECTOMY      Fibroids       Family History   Problem Relation Age of Onset    Diabetes Mother     Heart disease Mother     Hypertension Mother     Diabetes Father     Melanoma Father     Kidney failure Father     Mental illness Sister         suicide/schizophrenic    Cancer  Maternal Grandfather     Cancer Paternal Grandfather         colon    Psoriasis Neg Hx     Lupus Neg Hx     Eczema Neg Hx        Social History     Socioeconomic History    Marital status: Single   Occupational History    Occupation: Realtor   Tobacco Use    Smoking status: Never Smoker    Smokeless tobacco: Never Used   Substance and Sexual Activity    Alcohol use: No     Alcohol/week: 0.0 standard drinks    Drug use: No    Sexual activity: Not Currently   Other Topics Concern    Are you pregnant or think you may be? No    Breast-feeding No   Social History Narrative    Part time .    Severe back pain.    Retired in 1975       No current facility-administered medications on file prior to encounter.     Current Outpatient Medications on File Prior to Encounter   Medication Sig Dispense Refill    apixaban (ELIQUIS) 2.5 mg Tab Take 1 tablet (2.5 mg total) by mouth 2 (two) times a day. 180 tablet 3    ascorbic acid, vitamin C, (VITAMIN C) 100 MG tablet Take 100 mg by mouth once daily.      blood sugar diagnostic (ACCU-CHEK MELIDA PLUS TEST STRP) Strp TEST BLOOD SUGAR 5 TIMES DAILY; E11.65 500 strip 3    blood-glucose meter (ACCU-CHEK MELIDA PLUS METER) Misc To check sugars 4 times daily.  Please include control solution 1 each 0    carvediloL (COREG) 12.5 MG tablet Take 1 tablet (12.5 mg total) by mouth 2 (two) times a day. 180 tablet 3    ergocalciferol, vitamin D2, (VITAMIN D ORAL) Take by mouth once daily.      fluticasone (FLONASE) 50 mcg/actuation nasal spray SHAKE LIQUID AND USE 2 SPRAYS IN EACH NOSTRIL EVERY DAY 48 mL 4    furosemide (LASIX) 20 MG tablet Take 40 mg of lasix at 8 a.m. and 20 mg at 3 p.m. 270 tablet 3    insulin aspart U-100 (NOVOLOG) 100 unit/mL (3 mL) InPn pen Inject 14 units three times daily before meals 45 mL 1    insulin degludec (TRESIBA FLEXTOUCH U-100) 100 unit/mL (3 mL) insulin pen INJECT 32 UNITS UNDER THE SKIN DAILY 10 pen 1    ketoconazole  "(NIZORAL) 2 % shampoo APPLY TO AFFECTED AREA EVERY OTHER DAY AND LET SIT 5 MINUTES PRIOR TO RINSING AS DIRECTED 120 mL 3    lancets (ACCU-CHEK MULTICLIX LANCET) Misc Test glucose 4x/day. e11.65 400 each 3    MULTIVIT-MIN/IRON/FOLIC/LUTEIN (CENTRUM SILVER WOMEN ORAL) Take by mouth once daily.       pen needle, diabetic (BD TIM 2ND GEN PEN NEEDLE) 32 gauge x 5/32" Ndle Use 4x/day 400 each 3    rosuvastatin (CRESTOR) 40 MG Tab TAKE 1 TABLET(40 MG) BY MOUTH EVERY DAY 90 tablet 3    vitamin B complex (B COMPLEX VITAMINS ORAL) Take by mouth once daily.      VITAMIN E, BULK, MISC by Misc.(Non-Drug; Combo Route) route once daily.         Review of patient's allergies indicates:   Allergen Reactions    Heparin analogues Other (See Comments)     thrombocytopenia    Ancef [cefazolin]     Keflex [cephalexin] Rash    Oxybutynin Other (See Comments)     Metallic taste       ROS   Constitutional:  Positive for fatigue. Negative for appetite change, chills, diaphoresis, fatigue and fever.   HENT:  Negative for congestion and dental problem.    Eyes:  Negative for discharge and itching.   Respiratory:  Negative for apnea, choking, chest tightness, shortness of breath and stridor.    Cardiovascular:  Negative for chest pain, palpitations and leg swelling.   Gastrointestinal:  Negative for abdominal pain, nausea and vomiting.   Genitourinary:  Negative for difficulty urinating, dyspareunia, frequency and genital sores.   Musculoskeletal:  Left hip painSkin:  Negative for color change and pallor.   Neurological:  Negative for light-headedness and headaches.   Psychiatric/Behavioral:  Negative for agitation and behavioral problems.    Objective:     Vitals:    03/26/22 0720   BP: 139/63   Pulse: 77   Resp: 17   Temp: 97.8 °F (36.6 °C)         Physical Exam     Constitutional:       General: She is not in acute distress.     Appearance: Normal appearance. She is obese. She is not ill-appearing, toxic-appearing or diaphoretic. "   HENT:      Head: Normocephalic and atraumatic.   Cardiovascular:      Rate and Rhythm: Normal rate and regular rhythm.   Pulmonary:      Effort: Pulmonary effort is normal. No respiratory distress.      Breath sounds: No wheezing.   Abdominal:      Tenderness: There is no abdominal tenderness. Obese, soft, non distended, BS normal, no organomegaly appreciated  Skin:     General: Skin is warm and dry.   Neurological:      Mental Status: She is alert and oriented to person, place, and time.   Psychiatric:         Mood and Affect: Mood normal.         Behavior: Behavior normal.         Thought Content: Thought content normal.   LE: No pedal edema     Significant Labs:  Recent Labs   Lab 03/25/22 1812   HGB 11.9*       Lab Results   Component Value Date    WBC 14.72 (H) 03/25/2022    HGB 11.9 (L) 03/25/2022    HCT 36.6 (L) 03/25/2022    MCV 88 03/25/2022     03/25/2022       Lab Results   Component Value Date     03/25/2022    K 4.8 03/25/2022     03/25/2022    CO2 24 03/25/2022    BUN 48 (H) 03/25/2022    CREATININE 2.0 (H) 03/25/2022    CALCIUM 9.5 03/25/2022    ANIONGAP 12 03/25/2022    ESTGFRAFRICA 26 (A) 03/25/2022    EGFRNONAA 23 (A) 03/25/2022       Lab Results   Component Value Date    ALT 16 03/25/2022    AST 29 03/25/2022    ALKPHOS 78 03/25/2022    BILITOT 1.0 03/25/2022       Lab Results   Component Value Date    INR 1.2 03/26/2022    INR 1.1 05/15/2018    INR 1.1 04/25/2018       Significant Imaging:  Reviewed pertinent radiology findings.       Assessment/Plan:     Radha Degroot is a 82 y.o. female with L hip fracture. New onset of coffee ground emesis. ?Stress related mucosal disease. Hemodynamically stable. May proceed with surgery.    Plan:  1. Pantoprazole 40 mg iv bid for now  2. Hold off on EGD at this time.  3. May proceed with surgery.  4. Continue to monitor clinically    Thank you for involving us in the care of Radha Degroot. Please call with any additional questions,  concerns or changes in the patient's clinical status.    Mert Calzada MD  [facility]

## 2022-03-26 NOTE — ASSESSMENT & PLAN NOTE
In NSR during my eval  Restart cardioprotective meds  Holding off on AC for now in anticipation for surgery

## 2022-03-26 NOTE — ANESTHESIA PROCEDURE NOTES
Arterial    Diagnosis: Hypertension    Patient location during procedure: done in OR  Procedure start time: 3/26/2022 3:20 PM  Timeout: 3/26/2022 3:20 PM  Procedure end time: 3/26/2022 3:25 PM    Staffing  Authorizing Provider: Gabriela Figueroa MD  Performing Provider: Gabriela Figueroa MD    Anesthesiologist was present at the time of the procedure.    Preanesthetic Checklist  Completed: patient identified, IV checked, site marked, risks and benefits discussed, surgical consent, monitors and equipment checked, pre-op evaluation, timeout performed and anesthesia consent givenArterial  Skin Prep: chlorhexidine gluconate  Local Infiltration: none  Orientation: right  Location: radial    Catheter Size: 20 G  Catheter placement by Ultrasound guidance. Heme positive aspiration all ports.   Vessel Caliber: small, patent, compressibility normal  Vascular Doppler:  not done  Needle advanced into vessel with real time Ultrasound guidance.Insertion Attempts: 1  Assessment  Dressing: secured with tape and tegaderm  Patient: Tolerated well

## 2022-03-26 NOTE — SUBJECTIVE & OBJECTIVE
Interval History: coffee ground emesis this am       Review of Systems   Constitutional:  Positive for activity change. Negative for appetite change, chills, diaphoresis, fatigue and fever.   HENT:  Negative for congestion and dental problem.    Eyes:  Negative for discharge and itching.   Respiratory:  Negative for apnea, choking, chest tightness, shortness of breath and stridor.    Cardiovascular:  Negative for chest pain, palpitations and leg swelling.   Gastrointestinal:  Negative for abdominal pain, nausea and vomiting.   Genitourinary:  Negative for difficulty urinating, dyspareunia, frequency and genital sores.   Musculoskeletal:  Negative for arthralgias and back pain.   Skin:  Negative for color change and pallor.   Neurological:  Negative for light-headedness and headaches.   Psychiatric/Behavioral:  Negative for agitation and behavioral problems.    Objective:     Vital Signs (Most Recent):  Temp: 97.8 °F (36.6 °C) (03/26/22 0720)  Pulse: 77 (03/26/22 0720)  Resp: 17 (03/26/22 0720)  BP: 139/63 (03/26/22 0720)  SpO2: 98 % (03/26/22 0720) Vital Signs (24h Range):  Temp:  [97.8 °F (36.6 °C)-98.3 °F (36.8 °C)] 97.8 °F (36.6 °C)  Pulse:  [65-82] 77  Resp:  [17-20] 17  SpO2:  [94 %-98 %] 98 %  BP: (129-180)/(61-86) 139/63     Weight: 99.2 kg (218 lb 9.6 oz)  Body mass index is 33.24 kg/m².    Intake/Output Summary (Last 24 hours) at 3/26/2022 0907  Last data filed at 3/26/2022 0800  Gross per 24 hour   Intake 0 ml   Output 1100 ml   Net -1100 ml      Physical Exam  Vitals and nursing note reviewed.   Constitutional:       General: She is not in acute distress.     Appearance: Normal appearance. She is obese. She is not ill-appearing, toxic-appearing or diaphoretic.   HENT:      Head: Normocephalic and atraumatic.   Cardiovascular:      Rate and Rhythm: Normal rate and regular rhythm.   Pulmonary:      Effort: Pulmonary effort is normal. No respiratory distress.      Breath sounds: No wheezing.   Abdominal:       Tenderness: There is no abdominal tenderness.   Skin:     General: Skin is warm and dry.   Neurological:      Mental Status: She is alert and oriented to person, place, and time.   Psychiatric:         Mood and Affect: Mood normal.         Behavior: Behavior normal.         Thought Content: Thought content normal.       Significant Labs: All pertinent labs within the past 24 hours have been reviewed.  BMP:   Recent Labs   Lab 03/25/22  1812   *      K 4.8      CO2 24   BUN 48*   CREATININE 2.0*   CALCIUM 9.5     CBC:   Recent Labs   Lab 03/25/22  1812   WBC 14.72*   HGB 11.9*   HCT 36.6*          Significant Imaging:

## 2022-03-26 NOTE — HOSPITAL COURSE
Patient admitted for L hip fx from mechanical fall at home.  Ortho was consulted. On 3/26, the patient had coffee ground emesis and GI was consulted.  Patient went for surgery on 3/26/22. PT/OT were consulted. Family requesting SNF placement. Patient with worsening renal function and nephrology was consulted on 3/28. PT/OT rec: SNF.  Ortho consulted for ankle pain.  GI took patient for EGD on 3/31- showed esophagitis. On 4/1, the patient's family was requesting discharge to home. Renal ok'd discharge. Patient will be going to [facility]. Activity as tolerated. Diet- low NA, ADA 2000 casimiro diet. Follow up with GI, renal, PCP and ortho in 1-2 weeks

## 2022-03-26 NOTE — NURSING
Called to room by daughter after pt had another emesis episode. Dark brown emesis noted on gown. HOB elevated and Pt cleaned.Dr. Avelar at bedside . Notified of the two emesis episodes for this shift.Bedside hand off report passed on to Clementina wing RN.

## 2022-03-26 NOTE — ASSESSMENT & PLAN NOTE
Ortho consult  Adequate pain control  Keep NPO for possible ortho intervention tomorrow  Hold off on AC and antiplatelet in preparation for surgical intervention    Cardiac preop risk assessment  RCRI >11% (high risk), YEN 0.78%  Extensive history of cardiac comorbidities - CAD/PCI, CHF, Afib, however she states she is very functional and independent.   I do not think she needs any further cardiac workup, however she is at high risk from a cardiac standpoint for this intervention  Continue optimizing cardioprotective meds for good surgical outcome

## 2022-03-26 NOTE — ED NOTES
Arrived via EMS: c/o fall at her home approx 11:00pm last night (3/24/22).  Was unable to reach a phone or move.  Family member checked on Pt and call EMS.  C/o L leg pain outward rotation noted to L extremity.  AAOx3

## 2022-03-26 NOTE — TRANSFER OF CARE
"Anesthesia Transfer of Care Note    Patient: Radha Degroot    Procedure(s) Performed: Procedure(s) (LRB):  INSERTION, INTRAMEDULLARY SARA, FEMUR (Left)    Patient location: PACU    Anesthesia Type: general    Transport from OR: Transported from OR on 100% O2 by closed face mask with adequate spontaneous ventilation    Post pain: adequate analgesia    Post assessment: no apparent anesthetic complications and tolerated procedure well    Post vital signs: stable    Level of consciousness: awake and alert    Nausea/Vomiting: no nausea/vomiting    Complications: none    Transfer of care protocol was followed      Last vitals:   Visit Vitals  BP (!) 157/60 (BP Location: Left arm, Patient Position: Lying)   Pulse 67   Temp 36.7 °C (98.1 °F) (Axillary)   Resp 17   Ht 5' 8" (1.727 m)   Wt 99.2 kg (218 lb 9.6 oz)   SpO2 99%   BMI 33.24 kg/m²     "

## 2022-03-26 NOTE — NURSING
Patient arrived to [facility] floor from ED  accompanied by transport tech and daughter/son in law.  Transported via stretcher.Patient transferred to bed  with  x3 person assist.  Nicole. LUCY. Patient oriented to room, information on whiteboard, and medication regimen.  Adequate lighting provided, side rails up x2 ,bed low and call bell within reach. Bed alarm on and functioning optimally. Placed on telemetry box #[id number] ; tech notified and verified on monitor  screen.  Admission assessment completed.  Per pt request  notified of pt request for pain medication.New orders given. Will continue to monitor and follow treatment plan.

## 2022-03-26 NOTE — PROGRESS NOTES
S/p IM Alvarez insertion. Karthik 8/10. She easily arouses to voice and her vitals are stable on 2L/nc. LLE dressing (2 aquacels) are intact with scant serosanguineous drainage present (perimeter marked). Her pulses are palpable. NG tube is secured. 150 ml of coffee ground emesis noted in canister. Bailey catheter is secured. See flow sheet for full assessment. Recovery care is complete.      Family allowed to visit briefly with patient.    Report given to JUAN Cortez RN. Patient transferred back to room 420 in stable condition.

## 2022-03-26 NOTE — PLAN OF CARE
Problem: Adult Inpatient Plan of Care  Goal: Plan of Care Review  Outcome: Ongoing, Progressing  Goal: Patient-Specific Goal (Individualized)  Outcome: Ongoing, Progressing  Goal: Absence of Hospital-Acquired Illness or Injury  Outcome: Ongoing, Progressing  Goal: Optimal Comfort and Wellbeing  Outcome: Ongoing, Progressing  Goal: Readiness for Transition of Care  Outcome: Ongoing, Progressing     Problem: Infection  Goal: Absence of Infection Signs and Symptoms  Outcome: Ongoing, Progressing     Problem: Diabetes Comorbidity  Goal: Blood Glucose Level Within Targeted Range  Outcome: Ongoing, Progressing     Problem: Fall Injury Risk  Goal: Absence of Fall and Fall-Related Injury  Outcome: Ongoing, Progressing     Problem: Bariatric Environmental Safety  Goal: Safety Maintained with Care  Outcome: Ongoing, Progressing

## 2022-03-26 NOTE — ED NOTES
MD states Pt may have  something to drink but NPO after midnight. Family and Pt voiced understanding

## 2022-03-26 NOTE — ANESTHESIA PREPROCEDURE EVALUATION
03/26/2022  Radha Degroot is a 82 y.o., female.    Pre-operative evaluation for Procedure(s) (LRB):  INSERTION, INTRAMEDULLARY SARA, FEMUR (Left)    NPO >8  METS 1-3; RODRIGUEZ and can only walk 50 steps and has to stop and sit due to back pain    Last eliquis Thursday AM 3/24. Vomiting Coffee ground emesis this morning. Alerted [facility] who consulted GI and recommends PPI and placing NG tube perioperatively.    Tripped and fell at 11p, 3/24 and found down at 3-4pm on 3/25 found to have left hip Fx.    Vitals:    03/26/22 0720   BP: 139/63   Pulse: 77   Resp: 17   Temp: 36.6 °C (97.8 °F)           Patient Active Problem List   Diagnosis    Hyperlipidemia    Coronary artery disease    Anemia of chronic illness    Hearing loss, sensorineural    Chronic kidney disease, stage IV (severe)    Secondary renal hyperparathyroidism    Acquired cyst of kidney    Nuclear sclerotic cataract of left eye    Pseudophakia of right eye    Amblyopia    Posterior vitreous detachment of both eyes    Type 2 diabetes mellitus with mild nonproliferative retinopathy and macular edema    Type 2 diabetes mellitus with stage 4 chronic kidney disease, with long-term current use of insulin    Type 2 diabetes mellitus with diabetic polyneuropathy, with long-term current use of insulin    Muscle weakness    Stiffness of vertebral column    Chronic midline low back pain without sciatica    Difficulty walking    Degenerative joint disease (DJD) of lumbar spine    Vitamin D deficiency disease    Essential hypertension    Chronic diastolic CHF (congestive heart failure)    Obstructive sleep apnea    Mixed incontinence urge and stress    DM (diabetes mellitus)    Chronic pain    Angiomyolipoma of right kidney    Azotemia    Facet arthritis, degenerative, lumbar spine    Lumbar spondylosis    Permanent atrial fibrillation     Hypertensive kidney disease with stage 4 chronic kidney disease    Venous insufficiency    Moderate nonproliferative diabetic retinopathy of both eyes without macular edema associated with type 2 diabetes mellitus    Macular scar of both eyes    Persistent proteinuria    Sacroiliitis, not elsewhere classified    Thrombocytopenia, unspecified    Chronic bilateral low back pain without sciatica    Weakness of both lower extremities    Difficulty standing    Closed left femoral fracture       Review of patient's allergies indicates:   Allergen Reactions    Heparin analogues Other (See Comments)     thrombocytopenia    Ancef [cefazolin]     Keflex [cephalexin] Rash    Oxybutynin Other (See Comments)     Metallic taste       No current facility-administered medications on file prior to encounter.     Current Outpatient Medications on File Prior to Encounter   Medication Sig Dispense Refill    apixaban (ELIQUIS) 2.5 mg Tab Take 1 tablet (2.5 mg total) by mouth 2 (two) times a day. 180 tablet 3    ascorbic acid, vitamin C, (VITAMIN C) 100 MG tablet Take 100 mg by mouth once daily.      blood sugar diagnostic (ACCU-CHEK MELIDA PLUS TEST STRP) Strp TEST BLOOD SUGAR 5 TIMES DAILY; E11.65 500 strip 3    blood-glucose meter (ACCU-CHEK MELIDA PLUS METER) Misc To check sugars 4 times daily.  Please include control solution 1 each 0    carvediloL (COREG) 12.5 MG tablet Take 1 tablet (12.5 mg total) by mouth 2 (two) times a day. 180 tablet 3    ergocalciferol, vitamin D2, (VITAMIN D ORAL) Take by mouth once daily.      fluticasone (FLONASE) 50 mcg/actuation nasal spray SHAKE LIQUID AND USE 2 SPRAYS IN EACH NOSTRIL EVERY DAY 48 mL 4    furosemide (LASIX) 20 MG tablet Take 40 mg of lasix at 8 a.m. and 20 mg at 3 p.m. 270 tablet 3    insulin aspart U-100 (NOVOLOG) 100 unit/mL (3 mL) InPn pen Inject 14 units three times daily before meals 45 mL 1    insulin degludec (TRESIBA FLEXTOUCH U-100) 100 unit/mL (3 mL)  "insulin pen INJECT 32 UNITS UNDER THE SKIN DAILY 10 pen 1    ketoconazole (NIZORAL) 2 % shampoo APPLY TO AFFECTED AREA EVERY OTHER DAY AND LET SIT 5 MINUTES PRIOR TO RINSING AS DIRECTED 120 mL 3    lancets (ACCU-CHEK MULTICLIX LANCET) Misc Test glucose 4x/day. e11.65 400 each 3    MULTIVIT-MIN/IRON/FOLIC/LUTEIN (CENTRUM SILVER WOMEN ORAL) Take by mouth once daily.       pen needle, diabetic (BD TIM 2ND GEN PEN NEEDLE) 32 gauge x 5/32" Ndle Use 4x/day 400 each 3    rosuvastatin (CRESTOR) 40 MG Tab TAKE 1 TABLET(40 MG) BY MOUTH EVERY DAY 90 tablet 3    vitamin B complex (B COMPLEX VITAMINS ORAL) Take by mouth once daily.      VITAMIN E, BULK, MISC by Misc.(Non-Drug; Combo Route) route once daily.         Past Surgical History:   Procedure Laterality Date    APPENDECTOMY      CATARACT EXTRACTION      Right eye    EPIDURAL STEROID INJECTION N/A 11/2/2018    Procedure: Injection, Steroid, Epidural CAUDAL ELISA;  Surgeon: Ирина Peña MD;  Location: [facility] T;  Service: Pain Management;  Laterality: N/A;    heart stent      X 2    HYSTERECTOMY      Fibroids       Social History     Socioeconomic History    Marital status: Single   Occupational History    Occupation: Realtor   Tobacco Use    Smoking status: Never Smoker    Smokeless tobacco: Never Used   Substance and Sexual Activity    Alcohol use: No     Alcohol/week: 0.0 standard drinks    Drug use: No    Sexual activity: Not Currently   Other Topics Concern    Are you pregnant or think you may be? No    Breast-feeding No   Social History Narrative    Part time .    Severe back pain.    Retired in 1975         CBC:   Recent Labs     03/25/22 1812   WBC 14.72*   RBC 4.15   HGB 11.9*   HCT 36.6*      MCV 88   MCH 28.7   MCHC 32.5       CMP:   Recent Labs     03/25/22 1812      K 4.8      CO2 24   BUN 48*   CREATININE 2.0*   *   CALCIUM 9.5   ALBUMIN 3.9   PROT 7.1   ALKPHOS 78   ALT 16   AST 29 "   BILITOT 1.0         Coags pending    INR  No results for input(s): PT, INR, PROTIME, APTT in the last 72 hours.      A1C 7.9% 3/17/22      Diagnostic Studies:  CXR   NAF 3/25/22    EKG:  Vent. Rate : 067 BPM     Atrial Rate : 078 BPM      P-R Int : 000 ms          QRS Dur : 082 ms       QT Int : 422 ms       P-R-T Axes : 000 -23 101 degrees      QTc Int : 445 ms     Atrial fibrillation   Septal infarct (cited on or before 12-NOV-2021)   Abnormal ECG   When compared with ECG of 30-APR-2021 11:01,   No significant change was found   Confirmed by Jabier SINGLETON, Mejia OBANDO ([id number]) on 11/19/2021 12:18:34 AM     2D Echo:  · The left ventricle is normal in size with concentric remodeling and normal systolic function. The estimated ejection fraction is 60%.  · Normal right ventricular size with normal right ventricular systolic function.  · Biatrial enlargement.  · Mild tricuspid regurgitation.  · The estimated PA systolic pressure is 42 mmHg.  · Normal central venous pressure (3 mmHg).  · Atrial fibrillation observed.        Pre-op Assessment    I have reviewed the Patient Summary Reports.     I have reviewed the Nursing Notes. I have reviewed the NPO Status.   I have reviewed the Medications.     Review of Systems  Anesthesia Hx:  No problems with previous Anesthesia  History of prior surgery of interest to airway management or planning:  Denies Personal Hx of Anesthesia complications.   Social:  Non-Smoker, No Alcohol Use    Hematology/Oncology:         -- Anemia: Hematology Comments: On Eliquis-last dose=    H/o HIT    Typed and screened.   Cardiovascular:   Exercise tolerance: poor Hypertension Past MI CAD  CABG/stent Dysrhythmias atrial fibrillation CHF hyperlipidemia RODRIGUEZ ECG has been reviewed. H/o STEMI 2006; NSTEMI 2017; PCI of LAD      2021 Echo:  · The left ventricle is normal in size with concentric remodeling and normal systolic function. The estimated ejection fraction is 60%.  · Normal right ventricular size with  normal right ventricular systolic function.  · Biatrial enlargement.  · Mild tricuspid regurgitation.  · The estimated PA systolic pressure is 42 mmHg.  · Normal central venous pressure (3 mmHg).  · Atrial fibrillation observed.     Pulmonary:   Sleep Apnea Pulmonary HTN   Renal/:   Chronic Renal Disease, CRI    Hepatic/GI:   Hiatal Hernia, GERD    Musculoskeletal:   Arthritis  Left femur fracture 2/2 fall   Neurological:   Neuromuscular Disease,    Endocrine:   Diabetes, type 2  Obesity / BMI > 30      Physical Exam  General: Cooperative and Alert  Morbidly obese  Airway:  Mallampati: III   Mouth Opening: Small, but > 3cm  TM Distance: 4 - 6 cm  Tongue: Normal  Neck ROM: Normal ROM    Dental:  Intact      Wt Readings from Last 3 Encounters:   03/26/22 99.2 kg (218 lb 9.6 oz)   03/10/22 97.1 kg (214 lb)   12/07/21 100 kg (220 lb 7.4 oz)     Temp Readings from Last 3 Encounters:   03/25/22 36.6 °C (97.8 °F)   03/10/22 36.7 °C (98 °F)   12/07/21 36.8 °C (98.2 °F)     BP Readings from Last 3 Encounters:   03/26/22 129/61   03/10/22 137/65   12/07/21 (!) 140/62     Pulse Readings from Last 3 Encounters:   03/26/22 71   03/10/22 60   12/07/21 60     Lab Results   Component Value Date    WBC 14.72 (H) 03/25/2022    HGB 11.9 (L) 03/25/2022    HCT 36.6 (L) 03/25/2022    MCV 88 03/25/2022     03/25/2022       CMP  Sodium   Date Value Ref Range Status   03/25/2022 143 136 - 145 mmol/L Final     Potassium   Date Value Ref Range Status   03/25/2022 4.8 3.5 - 5.1 mmol/L Final     Chloride   Date Value Ref Range Status   03/25/2022 107 95 - 110 mmol/L Final     CO2   Date Value Ref Range Status   03/25/2022 24 23 - 29 mmol/L Final     Glucose   Date Value Ref Range Status   03/25/2022 320 (H) 70 - 110 mg/dL Final     BUN   Date Value Ref Range Status   03/25/2022 48 (H) 8 - 23 mg/dL Final     Creatinine   Date Value Ref Range Status   03/25/2022 2.0 (H) 0.5 - 1.4 mg/dL Final     Calcium   Date Value Ref Range Status    03/25/2022 9.5 8.7 - 10.5 mg/dL Final     Total Protein   Date Value Ref Range Status   03/25/2022 7.1 6.0 - 8.4 g/dL Final     Albumin   Date Value Ref Range Status   03/25/2022 3.9 3.5 - 5.2 g/dL Final     Total Bilirubin   Date Value Ref Range Status   03/25/2022 1.0 0.1 - 1.0 mg/dL Final     Comment:     For infants and newborns, interpretation of results should be based  on gestational age, weight and in agreement with clinical  observations.    Premature Infant recommended reference ranges:  Up to 24 hours.............<8.0 mg/dL  Up to 48 hours............<12.0 mg/dL  3-5 days..................<15.0 mg/dL  6-29 days.................<15.0 mg/dL       Alkaline Phosphatase   Date Value Ref Range Status   03/25/2022 78 55 - 135 U/L Final     AST   Date Value Ref Range Status   03/25/2022 29 10 - 40 U/L Final     ALT   Date Value Ref Range Status   03/25/2022 16 10 - 44 U/L Final     Anion Gap   Date Value Ref Range Status   03/25/2022 12 8 - 16 mmol/L Final     eGFR if    Date Value Ref Range Status   03/25/2022 26 (A) >60 mL/min/1.73 m^2 Final     eGFR if non    Date Value Ref Range Status   03/25/2022 23 (A) >60 mL/min/1.73 m^2 Final     Comment:     Calculation used to obtain the estimated glomerular filtration  rate (eGFR) is the CKD-EPI equation.        3/25/2022 COVID negative    Anesthesia Plan  Type of Anesthesia, risks & benefits discussed:    Anesthesia Type: Gen ETT  Intra-op Monitoring Plan: Standard ASA Monitors  Post Op Pain Control Plan: multimodal analgesia and IV/PO Opioids PRN  Induction:  IV and rapid sequence  Airway Plan: Direct and Video, Post-Induction  Informed Consent: Informed consent signed with the Patient and all parties understand the risks and agree with anesthesia plan.  All questions answered. Patient consented to blood products? Yes  ASA Score: 4  Day of Surgery Review of History & Physical: H&P Update referred to the surgeon/provider.  Anesthesia  Plan Notes: 82 y.o. woman with HTN, DM, Afib on Eliquis, CAD, prior MI, HIT, admitted for left femur fracture 2/2 fall. Unclear last dose of Eliquis.  IM risk-stratified as high risk given CV history, but deemed pt optimized. GI bleed to place NG tube and deferring EGD.    -  Paper consent in chart    Ready For Surgery From Anesthesia Perspective.     .

## 2022-03-27 LAB
ANION GAP SERPL CALC-SCNC: 10 MMOL/L (ref 8–16)
BASOPHILS # BLD AUTO: 0.03 K/UL (ref 0–0.2)
BASOPHILS NFR BLD: 0.2 % (ref 0–1.9)
BUN SERPL-MCNC: 85 MG/DL (ref 8–23)
CALCIUM SERPL-MCNC: 7.9 MG/DL (ref 8.7–10.5)
CHLORIDE SERPL-SCNC: 103 MMOL/L (ref 95–110)
CO2 SERPL-SCNC: 26 MMOL/L (ref 23–29)
CREAT SERPL-MCNC: 3.2 MG/DL (ref 0.5–1.4)
DIFFERENTIAL METHOD: ABNORMAL
EOSINOPHIL # BLD AUTO: 0 K/UL (ref 0–0.5)
EOSINOPHIL NFR BLD: 0 % (ref 0–8)
ERYTHROCYTE [DISTWIDTH] IN BLOOD BY AUTOMATED COUNT: 16 % (ref 11.5–14.5)
EST. GFR  (AFRICAN AMERICAN): 15 ML/MIN/1.73 M^2
EST. GFR  (NON AFRICAN AMERICAN): 13 ML/MIN/1.73 M^2
GLUCOSE SERPL-MCNC: 303 MG/DL (ref 70–110)
HCT VFR BLD AUTO: 28.2 % (ref 37–48.5)
HGB BLD-MCNC: 10.2 G/DL (ref 12–16)
HGB BLD-MCNC: 8.9 G/DL (ref 12–16)
IMM GRANULOCYTES # BLD AUTO: 0.05 K/UL (ref 0–0.04)
IMM GRANULOCYTES NFR BLD AUTO: 0.3 % (ref 0–0.5)
LYMPHOCYTES # BLD AUTO: 1.2 K/UL (ref 1–4.8)
LYMPHOCYTES NFR BLD: 8.2 % (ref 18–48)
MCH RBC QN AUTO: 29.3 PG (ref 27–31)
MCHC RBC AUTO-ENTMCNC: 31.6 G/DL (ref 32–36)
MCV RBC AUTO: 93 FL (ref 82–98)
MONOCYTES # BLD AUTO: 2.2 K/UL (ref 0.3–1)
MONOCYTES NFR BLD: 15.2 % (ref 4–15)
NEUTROPHILS # BLD AUTO: 11.1 K/UL (ref 1.8–7.7)
NEUTROPHILS NFR BLD: 76.1 % (ref 38–73)
NRBC BLD-RTO: 0 /100 WBC
PLATELET # BLD AUTO: 141 K/UL (ref 150–450)
PMV BLD AUTO: 13.2 FL (ref 9.2–12.9)
POCT GLUCOSE: 154 MG/DL (ref 70–110)
POCT GLUCOSE: 231 MG/DL (ref 70–110)
POCT GLUCOSE: 275 MG/DL (ref 70–110)
POCT GLUCOSE: 318 MG/DL (ref 70–110)
POTASSIUM SERPL-SCNC: 5.3 MMOL/L (ref 3.5–5.1)
RBC # BLD AUTO: 3.04 M/UL (ref 4–5.4)
SODIUM SERPL-SCNC: 139 MMOL/L (ref 136–145)
WBC # BLD AUTO: 14.63 K/UL (ref 3.9–12.7)

## 2022-03-27 PROCEDURE — 25000003 PHARM REV CODE 250: Performed by: INTERNAL MEDICINE

## 2022-03-27 PROCEDURE — 36415 COLL VENOUS BLD VENIPUNCTURE: CPT | Performed by: INTERNAL MEDICINE

## 2022-03-27 PROCEDURE — 86580 TB INTRADERMAL TEST: CPT | Performed by: INTERNAL MEDICINE

## 2022-03-27 PROCEDURE — 11000001 HC ACUTE MED/SURG PRIVATE ROOM

## 2022-03-27 PROCEDURE — 80048 BASIC METABOLIC PNL TOTAL CA: CPT | Performed by: ORTHOPAEDIC SURGERY

## 2022-03-27 PROCEDURE — 25000003 PHARM REV CODE 250: Performed by: ORTHOPAEDIC SURGERY

## 2022-03-27 PROCEDURE — 27000221 HC OXYGEN, UP TO 24 HOURS

## 2022-03-27 PROCEDURE — 25000242 PHARM REV CODE 250 ALT 637 W/ HCPCS: Performed by: ORTHOPAEDIC SURGERY

## 2022-03-27 PROCEDURE — 63600175 PHARM REV CODE 636 W HCPCS: Performed by: ORTHOPAEDIC SURGERY

## 2022-03-27 PROCEDURE — 94760 N-INVAS EAR/PLS OXIMETRY 1: CPT

## 2022-03-27 PROCEDURE — 97161 PT EVAL LOW COMPLEX 20 MIN: CPT

## 2022-03-27 PROCEDURE — 97530 THERAPEUTIC ACTIVITIES: CPT

## 2022-03-27 PROCEDURE — 85025 COMPLETE CBC W/AUTO DIFF WBC: CPT | Performed by: INTERNAL MEDICINE

## 2022-03-27 PROCEDURE — 36415 COLL VENOUS BLD VENIPUNCTURE: CPT | Performed by: ORTHOPAEDIC SURGERY

## 2022-03-27 PROCEDURE — 30200315 PPD INTRADERMAL TEST REV CODE 302: Performed by: INTERNAL MEDICINE

## 2022-03-27 PROCEDURE — 97167 OT EVAL HIGH COMPLEX 60 MIN: CPT

## 2022-03-27 PROCEDURE — C9113 INJ PANTOPRAZOLE SODIUM, VIA: HCPCS | Performed by: ORTHOPAEDIC SURGERY

## 2022-03-27 PROCEDURE — C9399 UNCLASSIFIED DRUGS OR BIOLOG: HCPCS | Performed by: INTERNAL MEDICINE

## 2022-03-27 RX ORDER — LACTULOSE 10 G/15ML
30 SOLUTION ORAL ONCE
Status: COMPLETED | OUTPATIENT
Start: 2022-03-27 | End: 2022-03-27

## 2022-03-27 RX ORDER — SODIUM CHLORIDE 9 MG/ML
INJECTION, SOLUTION INTRAVENOUS CONTINUOUS
Status: DISCONTINUED | OUTPATIENT
Start: 2022-03-27 | End: 2022-03-27

## 2022-03-27 RX ADMIN — METOPROLOL SUCCINATE 25 MG: 25 TABLET, EXTENDED RELEASE ORAL at 08:03

## 2022-03-27 RX ADMIN — INSULIN ASPART 4 UNITS: 100 INJECTION, SOLUTION INTRAVENOUS; SUBCUTANEOUS at 05:03

## 2022-03-27 RX ADMIN — INSULIN ASPART 6 UNITS: 100 INJECTION, SOLUTION INTRAVENOUS; SUBCUTANEOUS at 12:03

## 2022-03-27 RX ADMIN — LACTULOSE 30 G: 10 SOLUTION ORAL at 05:03

## 2022-03-27 RX ADMIN — CLINDAMYCIN IN 5 PERCENT DEXTROSE 600 MG: 12 INJECTION, SOLUTION INTRAVENOUS at 08:03

## 2022-03-27 RX ADMIN — OXYCODONE HYDROCHLORIDE AND ACETAMINOPHEN 500 MG: 500 TABLET ORAL at 08:03

## 2022-03-27 RX ADMIN — CARVEDILOL 12.5 MG: 12.5 TABLET, FILM COATED ORAL at 08:03

## 2022-03-27 RX ADMIN — PANTOPRAZOLE SODIUM 40 MG: 40 INJECTION, POWDER, FOR SOLUTION INTRAVENOUS at 08:03

## 2022-03-27 RX ADMIN — INSULIN ASPART 8 UNITS: 100 INJECTION, SOLUTION INTRAVENOUS; SUBCUTANEOUS at 06:03

## 2022-03-27 RX ADMIN — TUBERCULIN PURIFIED PROTEIN DERIVATIVE 5 UNITS: 5 INJECTION, SOLUTION INTRADERMAL at 08:03

## 2022-03-27 RX ADMIN — MORPHINE SULFATE 2 MG: 4 INJECTION, SOLUTION INTRAMUSCULAR; INTRAVENOUS at 03:03

## 2022-03-27 RX ADMIN — MUPIROCIN: 20 OINTMENT TOPICAL at 08:03

## 2022-03-27 RX ADMIN — CLINDAMYCIN IN 5 PERCENT DEXTROSE 600 MG: 12 INJECTION, SOLUTION INTRAVENOUS at 02:03

## 2022-03-27 RX ADMIN — INSULIN DETEMIR 15 UNITS: 100 INJECTION, SOLUTION SUBCUTANEOUS at 08:03

## 2022-03-27 RX ADMIN — FUROSEMIDE 40 MG: 40 TABLET ORAL at 08:03

## 2022-03-27 RX ADMIN — SODIUM CHLORIDE: 0.9 INJECTION, SOLUTION INTRAVENOUS at 05:03

## 2022-03-27 RX ADMIN — MORPHINE SULFATE 2 MG: 4 INJECTION, SOLUTION INTRAMUSCULAR; INTRAVENOUS at 07:03

## 2022-03-27 RX ADMIN — ATORVASTATIN CALCIUM 80 MG: 40 TABLET, FILM COATED ORAL at 08:03

## 2022-03-27 RX ADMIN — HYDROCODONE BITARTRATE AND ACETAMINOPHEN 1 TABLET: 10; 325 TABLET ORAL at 05:03

## 2022-03-27 RX ADMIN — SODIUM CHLORIDE: 0.9 INJECTION, SOLUTION INTRAVENOUS at 08:03

## 2022-03-27 RX ADMIN — INSULIN DETEMIR 35 UNITS: 100 INJECTION, SOLUTION SUBCUTANEOUS at 08:03

## 2022-03-27 RX ADMIN — FLUTICASONE PROPIONATE 100 MCG: 50 SPRAY, METERED NASAL at 08:03

## 2022-03-27 RX ADMIN — FUROSEMIDE 40 MG: 40 TABLET ORAL at 05:03

## 2022-03-27 NOTE — PLAN OF CARE
Problem: Physical Therapy  Goal: Physical Therapy Goal  Description: Goals to be met by: 4/10/22     Patient will increase functional independence with mobility by performin. Supine to sit with Stand-by Assistance  2. Rolling to Left and Right with Stand-by Assistance.  3. Sit to stand transfer to be assessed  4. Bed to chair transfer with to be assessed  5. Gait to be assessed    6. Lower extremity exercise program x10 reps per handout, with supervision  .     Outcome: Ongoing, Progressing

## 2022-03-27 NOTE — PROGRESS NOTES
POD#1  Patient sleeping with family at bedside.   VSSAF    Left hip: some drainage under aquacel surgical dressing. Will change tomorrow.   Hematocrit: 28.2    A/P: POD#1 Left femur IMN  1) up with PT- WBAT  2) GI following  3) discharge planning

## 2022-03-27 NOTE — PLAN OF CARE
Problem: Adult Inpatient Plan of Care  Goal: Plan of Care Review  Outcome: Ongoing, Progressing  Goal: Patient-Specific Goal (Individualized)  Outcome: Ongoing, Progressing  Goal: Absence of Hospital-Acquired Illness or Injury  Outcome: Ongoing, Progressing  Goal: Optimal Comfort and Wellbeing  Outcome: Ongoing, Progressing  Goal: Readiness for Transition of Care  Outcome: Ongoing, Progressing     Problem: Infection  Goal: Absence of Infection Signs and Symptoms  Outcome: Ongoing, Progressing     Problem: Diabetes Comorbidity  Goal: Blood Glucose Level Within Targeted Range  Outcome: Ongoing, Progressing     Problem: Fall Injury Risk  Goal: Absence of Fall and Fall-Related Injury  Outcome: Ongoing, Progressing     Problem: Skin Injury Risk Increased  Goal: Skin Health and Integrity  Outcome: Ongoing, Progressing     Problem: Bariatric Environmental Safety  Goal: Safety Maintained with Care  Outcome: Ongoing, Progressing

## 2022-03-27 NOTE — SUBJECTIVE & OBJECTIVE
Interval History:  No new issues     Review of Systems   Constitutional:  Positive for activity change. Negative for appetite change, chills, diaphoresis, fatigue and fever.   HENT:  Negative for congestion and dental problem.    Eyes:  Negative for discharge and itching.   Respiratory:  Negative for apnea, choking, chest tightness, shortness of breath and stridor.    Cardiovascular:  Negative for chest pain, palpitations and leg swelling.   Gastrointestinal:  Negative for abdominal pain, nausea and vomiting.   Genitourinary:  Negative for difficulty urinating, dyspareunia, frequency and genital sores.   Musculoskeletal:  Negative for arthralgias and back pain.   Skin:  Negative for color change and pallor.   Neurological:  Negative for light-headedness and headaches.   Psychiatric/Behavioral:  Negative for agitation and behavioral problems.    Objective:     Vital Signs (Most Recent):  Temp: 97 °F (36.1 °C) (03/27/22 0515)  Pulse: 67 (03/27/22 0515)  Resp: 19 (03/27/22 0515)  BP: (!) 115/55 (03/27/22 0515)  SpO2: 98 % (03/27/22 0515)   Vital Signs (24h Range):  Temp:  [96.9 °F (36.1 °C)-98.7 °F (37.1 °C)] 97 °F (36.1 °C)  Pulse:  [60-77] 67  Resp:  [12-20] 19  SpO2:  [94 %-100 %] 98 %  BP: (108-161)/(53-69) 115/55     Weight: 99.2 kg (218 lb 9.6 oz)  Body mass index is 33.24 kg/m².    Intake/Output Summary (Last 24 hours) at 3/27/2022 0651  Last data filed at 3/27/2022 0629  Gross per 24 hour   Intake 497.48 ml   Output 1820 ml   Net -1322.52 ml      Physical Exam  Vitals and nursing note reviewed.   Constitutional:       General: She is not in acute distress.     Appearance: Normal appearance. She is obese. She is not ill-appearing, toxic-appearing or diaphoretic.   HENT:      Head: Normocephalic and atraumatic.   Cardiovascular:      Rate and Rhythm: Normal rate and regular rhythm.   Pulmonary:      Effort: Pulmonary effort is normal. No respiratory distress.      Breath sounds: No wheezing.   Abdominal:       Tenderness: There is no abdominal tenderness.   Skin:     General: Skin is warm and dry.   Neurological:      Mental Status: She is alert and oriented to person, place, and time.   Psychiatric:         Mood and Affect: Mood normal.         Behavior: Behavior normal.         Thought Content: Thought content normal.       Significant Labs: All pertinent labs within the past 24 hours have been reviewed.  BMP:   Recent Labs   Lab 03/27/22  0423   *      K 5.3*      CO2 26   BUN 85*   CREATININE 3.2*   CALCIUM 7.9*     CBC:   Recent Labs   Lab 03/25/22  1812 03/26/22  0941 03/26/22  1419 03/26/22  1852 03/26/22  2343   WBC 14.72*  --  18.46*  --   --    HGB 11.9*   < > 10.8* 10.0* 10.2*   HCT 36.6*  --  35.5*  --   --      --  157  --   --     < > = values in this interval not displayed.       Significant Imaging:

## 2022-03-27 NOTE — ASSESSMENT & PLAN NOTE
Ortho consult  Adequate pain control  Keep NPO for possible ortho intervention tomorrow  Hold off on AC and antiplatelet in preparation for surgical intervention    Cardiac preop risk assessment  RCRI >11% (high risk), YEN 0.78%  Extensive history of cardiac comorbidities - CAD/PCI, CHF, Afib, however she states she is very functional and independent.   I do not think she needs any further cardiac workup, however she is at high risk from a cardiac standpoint for this intervention  Continue optimizing cardioprotective meds for good surgical outcome    S/p surgery on 3/26. PT/OT consult

## 2022-03-27 NOTE — CONSULTS
Thank you for your consult to [facility]. We have reviewed the patient chart. This patient does not meet criteria for [facility] service at this time due to Patient has a condition likely to benefit from in-depth physical exam, as well as evaluation of volume status needing either IVF or lasix. Will hand back to In-house service.     Parish Gregory MD  [facility]

## 2022-03-27 NOTE — PROGRESS NOTES
No further hematemesis.   Latest Reference Range & Units 03/25/22 18:12 03/26/22 09:41 03/26/22 14:19 03/26/22 18:52 03/26/22 23:43 03/27/22 07:04   Hemoglobin 12.0 - 16.0 g/dL 11.9 (L) 10.7 (L) 10.8 (L) 10.0 (L) 10.2 (L) 8.9 (L)   Hematocrit 37.0 - 48.5 % 36.6 (L)  35.5 (L)   28.2 (L)   MCV 82 - 98 fL 88  94   93   MCH 27.0 - 31.0 pg 28.7  28.6   29.3   MCHC 32.0 - 36.0 g/dL 32.5  30.4 (L)   31.6 (L)   RDW 11.5 - 14.5 % 15.3 (H)  15.9 (H)   16.0 (H)   Platelets 150 - 450 K/uL 158  157   141 (L)       Continue iv PPI bid.  May consider EGD before discharge.

## 2022-03-27 NOTE — NURSING
Patient arrived from PACU. Sleeping in bed, arousable to touch. Noted incision sites to Left leg/hip, some drainage noted as reported from PACU RN; margin marked. NG tube in place and clamped. VSS. Patient denies any pain or nausea at this time. Family at bedside. Orders reviewed and Bedside Shift Handoff given to MIGUEL Burgess.

## 2022-03-27 NOTE — PT/OT/SLP EVAL
Physical Therapy Evaluation    Patient Name:  Radha Degroot   MRN:  [id number]    Recommendations:     Discharge Recommendations:  nursing facility, skilled   Discharge Equipment Recommendations:  (Per SNF)   Barriers to discharge: Inaccessible home, Decreased caregiver support and Pt is not functioning at Independent baseline and requires Total A for functional mobility at present time    Assessment:     Radha Degroot is a 82 y.o. female admitted with a medical diagnosis of Closed left femoral fracture.  She presents with the following impairments/functional limitations:  weakness, impaired balance, decreased safety awareness, impaired skin, impaired endurance, pain, impaired self care skills, decreased coordination, decreased ROM, impaired functional mobilty, decreased upper extremity function, impaired coordination, decreased lower extremity function .    Rehab Prognosis: Good; patient would benefit from acute skilled PT services to address these deficits and reach maximum level of function.    Recent Surgery: Procedure(s) (LRB):  INSERTION, INTRAMEDULLARY SARA, FEMUR (Left) 1 Day Post-Op    Plan:     During this hospitalization, patient to be seen daily to address the identified rehab impairments via gait training, therapeutic activities, therapeutic exercises and progress toward the following goals:    · Plan of Care Expires:  04/10/22    Subjective     Chief Complaint: pain, states that her heels are already sore   Patient/Family Comments/goals: PLOF  Pain/Comfort:  · Pain Rating 1:  (No c/o pain at rest.  Not rated with mobility)  · Location - Orientation 1:  (L groin)  · Pain Addressed 1: Pre-medicate for activity, Reposition, Distraction, Cessation of Activity, Nurse notified  · Pain Rating Post-Intervention 1:  (no c/o pain at rest)    Patients cultural, spiritual, Restoration conflicts given the current situation: no    Living Environment:  Pt lives alone in an apt with elevator access   Prior to  admission, patients level of function was Independent.  Equipment used at home: none.  DME owned (not currently used): none.  Upon discharge, patient will have assistance from Son/DIL and daughter.    Objective:     Communicated with nsg prior to session.  Patient found HOB elevated with bed alarm, oxygen  upon PT entry to room.    General Precautions: Standard, fall   Orthopedic Precautions:LLE weight bearing as tolerated   Braces: N/A  Respiratory Status: Nasal cannula, flow 2 L/min    Exams:  · Cognitive Exam:  Patient is oriented to Person and Place  · Gross Motor Coordination:  Impaired 2/2 body habitus, gen weakness, deconditioning, and pain  · Postural Exam:  Patient presented with the following abnormalities:    · -       Rounded shoulders  · -       Forward head  · Sensation:    · -       Intact  light/touch B LE's  · Skin Integrity/Edema:      · -       R hip with dressing intact, Moderate dark bloody drainage noted on dressing  · RLE ROM: WFL  · RLE Strength: WFL  · LLE ROM: N/T 2/2 pain  · LLE Strength: Dflx 5/5, knee N/T 2/2 pain    Functional Mobility:  · Bed Mobility:     · Rolling Left:  total assistance and of 2 persons with VC/TC for reaching for BR  · Rolling Right: total assistance and of 2 persons with VC/TC for reaching for BR  · Scooting: total assistance and of 2 persons with VC/TC for HB  · Supine to Sit: total assistance and of 2 persons with VCTC for hand placement and body mechanics with HOB elevated  · Sit to Supine: total assistance and of 2 persons with VC/TC for hand placement and body mechanics  · Balance: Poor>Fair+ sitting at EOB    Therapeutic Activities and Exercises:   Attempted active and passive Left HS sitting at EOB but pt unable to perform 2/2 pain.  Pt repositioned to HOB in Right SL with wedge, pillow between knees, and Pressure relief boots Donned.    AM-PAC 6 CLICK MOBILITY  Total Score:8     Patient left as above with all lines intact, call button in reach, bed alarm  on, nsg notified and family present.    GOALS:   Multidisciplinary Problems     Physical Therapy Goals        Problem: Physical Therapy    Goal Priority Disciplines Outcome Goal Variances Interventions   Physical Therapy Goal     PT, PT/OT Ongoing, Progressing     Description: Goals to be met by: 4/10/22     Patient will increase functional independence with mobility by performin. Supine to sit with Stand-by Assistance  2. Rolling to Left and Right with Stand-by Assistance.  3. Sit to stand transfer to be assessed  4. Bed to chair transfer with to be assessed  5. Gait to be assessed    6. Lower extremity exercise program x10 reps per handout, with supervision  .                      History:     Past Medical History:   Diagnosis Date    ALLERGIC RHINITIS     Anemia     Anticoagulant long-term use     Arthritis     Cardiomyopathy, ischemic     Carpal tunnel syndrome     Cataract     Left eye    CHF (congestive heart failure)     Chronic kidney disease     Coronary artery disease     hx stent X2    Diabetes mellitus type II     Diabetic neuropathy     Diabetic retinopathy of both eyes     Encounter for blood transfusion     GERD (gastroesophageal reflux disease)     hiatal hernia    Gout, unspecified     h/o LAD and D1 PCI in 2013    Heart attack 2006    Hiatal hernia     HIT (heparin-induced thrombocytopenia) 2013    Hx of colonic polyps         Hx of colonic polyps     Hyperlipidemia     Hypertension     Myocardial infarction 2006    Persistent atrial fibrillation 2018    Posterior vitreous detachment of both eyes     Sleep apnea     Type 2 diabetes mellitus with mild nonproliferative diabetic retinopathy with macular edema 2/15/2013       Past Surgical History:   Procedure Laterality Date    APPENDECTOMY      CATARACT EXTRACTION      Right eye    EPIDURAL STEROID INJECTION N/A 2018    Procedure: Injection, Steroid, Epidural CAUDAL ELISA;  Surgeon:  Ирина Peña MD;  Location: [facility];  Service: Pain Management;  Laterality: N/A;    heart stent      X 2    HYSTERECTOMY      Fibroids       Time Tracking:     PT Received On: 03/27/22  PT Start Time: 1043     PT Stop Time: 1125  PT Total Time (min): 42 min     Billable Minutes: Evaluation 15 and Therapeutic Activity 8 Co-evaluation/treat with OT      03/27/2022

## 2022-03-27 NOTE — ASSESSMENT & PLAN NOTE
Will start basal and bolus insulin  Adequate glycemic control for good wound healing    Uncontrolled with hyperglycemia with manifestations of CKD.  Increasing insulin

## 2022-03-27 NOTE — PLAN OF CARE
Problem: Occupational Therapy  Goal: Occupational Therapy Goal  Description: Goals to be met by: 04/10/2022    Patient will increase functional independence with ADL's by performing:    Feeding with Modified [place].  UE Dressing with Set-up Assistance.  LE Dressing with Minimal Assistance.  Grooming while EOB with Set-up Assistance.  Sitting at edge of bed x 10 minutes with Stand-by Assistance.  Stand-pivot transfers with Minimal Assistance.  Increase functional strength to WFL for improved performance of bed mob's, T/F's, and ADL's.    Outcome: Ongoing, Progressing    Patient co-evaluated today by OT and PT.  Patient to be treated 5 x's per week.  Patient is appropriate to D/C to SNF.  She is Total A for all bed mob's and functional mob's.  Patient with significant c/o pain today, even after being given morphine for pain relief (per patient and family's verbal report).  She requires Total A for all LE ADL's.  She should be encouraged to dress and feed herself/perform UE ADL's as able prior to being assisted.

## 2022-03-27 NOTE — PLAN OF CARE
Problem: Adult Inpatient Plan of Care  Goal: Plan of Care Review  Outcome: Ongoing, Progressing  Goal: Patient-Specific Goal (Individualized)  Outcome: Ongoing, Progressing     Problem: Fall Injury Risk  Goal: Absence of Fall and Fall-Related Injury  Outcome: Ongoing, Progressing     Problem: Skin Injury Risk Increased  Goal: Skin Health and Integrity  Outcome: Ongoing, Progressing    Pt alert and oriented.  Pt free from falls, injury or any further trauma throughout shift. Continued medications as ordered. Complaints of pain, prn medication given . NG tube in place and clamped.  Pt in no distress. Will cont to monitor.

## 2022-03-27 NOTE — PLAN OF CARE
[facility] - Med Surg  Initial Discharge Assessment       Primary Care Provider: Katherine Merida MD Prefers PM appointments    Admission Diagnosis: Hip fracture [S72.009A]  Hip pain [M25.559]  Fall [W19.XXXA]  Left hip pain [M25.552]    Admission Date: 3/25/2022  Expected Discharge Date:     Discharge Barriers Identified: None    Payor: HU[employer] MANAGED MEDICARE / Plan: HU[employer] MEDICARE HMO / Product Type: Capitation /     Extended Emergency Contact Information  Primary Emergency Contact: MadisonMaria Fernanda   [place]  Mobile Phone: [phone]  Relation: Daughter    Discharge Plan A: Skilled Nursing Facility  Discharge Plan B: Other (TBD)      [id number] [place] - [address] [place] [address]  [place] [address]  Phone: [phone] Fax: [phone]    [facility] - [facility] [address]  [address] [facility] [address] [address]  Phone: [phone] Fax: [phone]      Initial Assessment (most recent)     Adult Discharge Assessment - 03/27/22 0836        Discharge Assessment    Assessment Type Discharge Planning Assessment     Confirmed/corrected address, phone number and insurance Yes     Confirmed Demographics Correct on Facesheet     Source of Information family;health record     If unable to respond/provide information was family/caregiver contacted? Yes     Contact Name/Number Israeldaughter: [phone]     Reason For Admission Hip fracture     Lives With alone     Facility Arrived From: Home; will probably discharge to SNF and then move to an [facility] in [facility] near daughter after SNF     Do you expect to return to your current living situation? Other (see comments)   Probable SNF    Do you have help at home or someone to help you manage your care at home? Yes     Who are your caregiver(s) and their phone number(s)? Israeldaughter: [phone]     Prior to  hospitilization cognitive status: Alert/Oriented     Current cognitive status: Alert/Oriented     Walking or Climbing Stairs Difficulty none     Dressing/Bathing Difficulty none     Home Accessibility wheelchair accessible     Home Layout Able to live on 1st floor     Equipment Currently Used at Home other (see comments)   TBD    Readmission within 30 days? No     Patient currently being followed by outpatient case management? No     Do you currently have service(s) that help you manage your care at home? No     Do you take prescription medications? Yes     Do you have prescription coverage? Yes     Coverage [employer]     Do you have any problems affording any of your prescribed medications? No     Is the patient taking medications as prescribed? yes     Who is going to help you get home at discharge? Maria Fernanda-daughter; probable SNF then to apartment near daughter     How do you get to doctors appointments? car, drives self;family or friend will provide     Are you on dialysis? No     Do you take coumadin? No     Discharge Plan A Skilled Nursing Facility     Discharge Plan B Other   TBD    DME Needed Upon Discharge  other (see comments)   TBD    Discharge Plan discussed with: Adult children     Discharge Barriers Identified None               SW Role explained to patient; two patient identifiers recognized; SW contact information placed on Communication board. Discussed patient managing health care at home; determined who would be helping patient at home with recovery: Probable SNF discharge; Maria Fernanda daughter will help with recovery; patient will be moving to an apartment near family April 1, 2022.    PCP: Katherine Merida MD  Prefers PM appointments    Extended Emergency Contact Information  Primary Emergency Contact: Maria Fernanda Wallace   [place]  Mobile Phone: [phone]  Relation: Daughter   Payor: HUMANA MANAGED MEDICARE / Plan: HU[employer]NA MEDICARE HMO / Product Type: Capitation /

## 2022-03-27 NOTE — PT/OT/SLP EVAL
Occupational Therapy   Evaluation    Name: Radha Degroot  MRN: [id number]  Admitting Diagnosis:  Closed left femoral fracture  Recent Surgery: Procedure(s) (LRB):  INSERTION, INTRAMEDULLARY SARA, FEMUR (Left) 1 Day Post-Op    Recommendations:     Discharge Recommendations: nursing facility, skilled  Discharge Equipment Recommendations:  other (see comments) (TBD per SNF.)  Barriers to discharge:  Other (Comment) (Patient is extremely weak and requires Total A for many self-care tasks and all functional mobility tasks at present.  Patient is appropriate for SNF setting.)    Assessment:     Radha Degroot is an 82 y.o. female with a medical diagnosis of closed left femoral fracture.  She presents with poor motivation/willingness to participate and she c/o pain at movement, even after meds.  Performance deficits affecting function are weakness, decreased safety awareness, pain, decreased coordination, decreased ROM, decreased upper extremity function, decreased lower extremity function, impaired balance, impaired skin, impaired endurance, impaired self-care skills, impaired functional mob's, and impaired coordination.  Patient is at risk of skin breakdown.      Rehab Prognosis: Good and Fair pending patient's participation; patient would benefit from acute skilled OT services to address these deficits and reach maximum level of function.       Plan:     Patient to be seen 5 x/week to address the above listed problems via self-care/home management, therapeutic activities, and therapeutic exercises.  · Plan of Care Expires: 04/10/22  · Plan of Care Reviewed with: patient, family    Subjective     Chief Complaint: Patient c/o LLE pain with movement.  She repeatedly made comments about wanting to be done and that she was glad when her session was complete.   Patient/Family Comments/goals: For patient to reach her PLOF and be at home/with family.     Occupational Profile:  Living Environment: Patient has her own apartment  (lives alone) with elevator access.  Steps to enter: 0-1.     Previous level of function: The patient was independent for all ADL's and functional mob's.   Roles and Routines: Patient's son and daughter and/or daughter-in-law check on her regularly.  The patient fell Thursday evening at 11PM.  When family was unable to reach the patient by phone Friday morning, they assumed she may have been grocery shopping.  When they still could not reach her that afternoon, the patient's son checked on her between 3 and 4PM and discovered her on the floor.    Equipment Used at Home:  None  Assistance upon Discharge: Patient to have assistance from her son, daughter-in-law, and daughter.     Pain/Comfort:  · Pain Rating 1: 0/10 (0/10 when at rest.  Patient did c/o pain with movement.)  · Pain Rating Post-Intervention 1: 0/10    Patients cultural, spiritual, Episcopal conflicts given the current situation: No    Objective:     Communicated with: Nursing prior to session.  Patient found in bed with HOB elevated with bed alarm and oxygen upon OT entry to room.    General Precautions: Standard, fall   Orthopedic Precautions:LLE weight bearing as tolerated   Braces: N/A  Respiratory Status: Nasal cannula, flow 2.5 L/min    Occupational Performance:    Bed Mobility:    · Total A for rolling L and R with 2 persons assisting and max verbal and tactile cues provided for patient to reach for bed rail   · Total A for scooting to EOB with 2 persons assisting and max verbal and tactile cues provided for patient to use bed rail/headboard as needed   · Supine to sitting at EOB and sitting at EOB to supine with Total A with 2 persons assisting and max verbal and tactile cues provided for proper hand placement and body positioning    Functional Mobility/Transfers:  · Transfers: Patient was unable to transfer from bed today.   · Functional Mobility: Patient sat at EOB with assistance from PT and/or OT; patient with poor sitting balance.      Activities of Daily Living:  · LE Dressing: Total A  · Feeding: Min A  · UE Dressing: Max A    Cognitive/Visual Perceptual:  Cognitive/Psychosocial Skills:     -       Oriented to: Person, Place, and Situation   -       Follows Commands/Attention: Inattentive and Easily Distracted  -       Mood/Affect/Coping Skills/Emotional Control: Lethargic, Agitated, and Unmotivated  Visual/Perceptual:      -Intact     Physical Exam:  Balance: Impaired/Poor for sitting at EOB  Skin integrity: Surgical dressing in place at hip; drainage present.   Upper Extremity Range of Motion: Impaired at shoulder(s) for AROM/shoulder flexion   Upper Extremity Strength: Impaired   Strength: WFL  Fine Motor Coordination: Impaired  Gross motor coordination: Impaired    AMPAC 6 Click ADL:  AMPAC Total Score: 10    Treatment & Education:  Patient co-evaluated today by OT and PT.  Plan of Care and schedule discussed with patient and family (son and daughter-in-law).  Patient was unable to stand or T/F but she performed bed mob's as per above and sat at EOB/completed dressing and feeding tasks from bed as per above.  Patient required frequent encouragement from her therapists to participate.  PT obtained pressure-relief boots for the patient prior to end of session; therapists applied these and pulled patient up in bed prior to positioning her as per below.   Education:    Patient left in right side-lying with wedge cushion behind L hip, pillow between knees, and pressure relief boots applied.  Call light was within patient's reach.  Son and daughter-in-law were present.     GOALS:   Multidisciplinary Problems     Occupational Therapy Goals        Problem: Occupational Therapy    Goal Priority Disciplines Outcome Interventions   Occupational Therapy Goal     OT, PT/OT Ongoing, Progressing    Description: Goals to be met by: 04/10/2022    Patient will increase functional independence with ADL's by performing:    Feeding with Modified  [place].  UE Dressing with Set-up Assistance.  LE Dressing with Minimal Assistance.  Grooming while EOB with Set-up Assistance.  Sitting at edge of bed x 10 minutes with Stand-by Assistance.  Stand-pivot transfers with Minimal Assistance.  Increase functional strength to WFL for improved performance of bed mob's, T/F's, and ADL's.                     History:     Past Medical History:   Diagnosis Date    ALLERGIC RHINITIS     Anemia     Anticoagulant long-term use     Arthritis     Cardiomyopathy, ischemic     Carpal tunnel syndrome     Cataract     Left eye    CHF (congestive heart failure)     Chronic kidney disease     Coronary artery disease     hx stent X2    Diabetes mellitus type II     Diabetic neuropathy     Diabetic retinopathy of both eyes     Encounter for blood transfusion     GERD (gastroesophageal reflux disease)     hiatal hernia    Gout, unspecified     h/o LAD and D1 PCI in 2006 6/17/2013    Heart attack 2006    Hiatal hernia     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    Hx of colonic polyps     1/9    Hx of colonic polyps     Hyperlipidemia     Hypertension     Myocardial infarction nov 2006    Persistent atrial fibrillation 4/25/2018    Posterior vitreous detachment of both eyes     Sleep apnea     Type 2 diabetes mellitus with mild nonproliferative diabetic retinopathy with macular edema 2/15/2013       Past Surgical History:   Procedure Laterality Date    APPENDECTOMY      CATARACT EXTRACTION      Right eye    EPIDURAL STEROID INJECTION N/A 11/2/2018    Procedure: Injection, Steroid, Epidural CAUDAL ELISA;  Surgeon: Ирина Peña MD;  Location: [facility]IN [facility];  Service: Pain Management;  Laterality: N/A;    heart stent      X 2    HYSTERECTOMY      Fibroids       Time Tracking:     OT Date of Treatment: 03/27/22  OT Start Time: 1043  OT Stop Time: 1129  OT Total Time (min): 46 min    Billable Minutes:Evaluation 15 mins  Therapeutic Activity 8  mins co-evaluation and treatment with PT    3/27/2022

## 2022-03-27 NOTE — CONSULTS
Patient and family believe patient will discharge to SNF and desire to be placed at a facility in [place], [place] where friends are on staff. The patient will then move into an handicap-access apartment in [place] near her family after going to SNF. See D/C assessment for further details.

## 2022-03-27 NOTE — ASSESSMENT & PLAN NOTE
Hgb every 8 hours and now. PPI bid. GI consult   Patient stable at the moment.     CBC pending this am. Follow GI recs

## 2022-03-27 NOTE — PROGRESS NOTES
[facility] Medicine  Progress Note    Patient Name: Radha Degroot  MRN: [id number]  Patient Class: IP- Inpatient   Admission Date: 3/25/2022  Length of Stay: 2 days  Attending Physician: Terry Pennington MD  Primary Care Provider: Katherine Merida MD        Subjective:     Principal Problem:Closed left femoral fracture        HPI:  Patient is an 83yo f with multiple extensive comorbid medical conditions which include persistent afib on eliquis, HIT, DM II, CAD S/P PCI 06, anemia, cardiomyopathy/CHF, carpal tunnel syndrome, CKD IV,  gout, uHLD and HTN presents to the ED due to left hip pain. Patient states she had a mechanical fall described as tripping over the floor carpet and landing on her left hip. Incident happened last night at 11pm, she has been laying at the same spot due to inability to move up until around 3-4pm today when son came to check up on her and saw her on the floor. EMS was called and patient was brought to the ER. Patient was found to have a left hip fracture and is being admitted to have ortho eval. She states she is completely independent and does all her ADLs by herself with no limitations. Denies any angina or CHFexacerbations symptoms. Also states her afib has been mostly fairly controlled      Overview/Hospital Course:  Patient admitted for L hip fx from mechanical fall at home.  Ortho was consulted. On 3/26, the patient had coffee ground emesis and GI was consulted.  Patient went for surgery on 3/26/22. PT/OT were consulted       Interval History:  No new issues     Review of Systems   Constitutional:  Positive for activity change. Negative for appetite change, chills, diaphoresis, fatigue and fever.   HENT:  Negative for congestion and dental problem.    Eyes:  Negative for discharge and itching.   Respiratory:  Negative for apnea, choking, chest tightness, shortness of breath and stridor.    Cardiovascular:  Negative for chest pain, palpitations and leg  swelling.   Gastrointestinal:  Negative for abdominal pain, nausea and vomiting.   Genitourinary:  Negative for difficulty urinating, dyspareunia, frequency and genital sores.   Musculoskeletal:  Negative for arthralgias and back pain.   Skin:  Negative for color change and pallor.   Neurological:  Negative for light-headedness and headaches.   Psychiatric/Behavioral:  Negative for agitation and behavioral problems.    Objective:     Vital Signs (Most Recent):  Temp: 97 °F (36.1 °C) (03/27/22 0515)  Pulse: 67 (03/27/22 0515)  Resp: 19 (03/27/22 0515)  BP: (!) 115/55 (03/27/22 0515)  SpO2: 98 % (03/27/22 0515)   Vital Signs (24h Range):  Temp:  [96.9 °F (36.1 °C)-98.7 °F (37.1 °C)] 97 °F (36.1 °C)  Pulse:  [60-77] 67  Resp:  [12-20] 19  SpO2:  [94 %-100 %] 98 %  BP: (108-161)/(53-69) 115/55     Weight: 99.2 kg (218 lb 9.6 oz)  Body mass index is 33.24 kg/m².    Intake/Output Summary (Last 24 hours) at 3/27/2022 0651  Last data filed at 3/27/2022 0629  Gross per 24 hour   Intake 497.48 ml   Output 1820 ml   Net -1322.52 ml      Physical Exam  Vitals and nursing note reviewed.   Constitutional:       General: She is not in acute distress.     Appearance: Normal appearance. She is obese. She is not ill-appearing, toxic-appearing or diaphoretic.   HENT:      Head: Normocephalic and atraumatic.   Cardiovascular:      Rate and Rhythm: Normal rate and regular rhythm.   Pulmonary:      Effort: Pulmonary effort is normal. No respiratory distress.      Breath sounds: No wheezing.   Abdominal:      Tenderness: There is no abdominal tenderness.   Skin:     General: Skin is warm and dry.   Neurological:      Mental Status: She is alert and oriented to person, place, and time.   Psychiatric:         Mood and Affect: Mood normal.         Behavior: Behavior normal.         Thought Content: Thought content normal.       Significant Labs: All pertinent labs within the past 24 hours have been reviewed.  BMP:   Recent Labs   Lab  03/27/22  0423   *      K 5.3*      CO2 26   BUN 85*   CREATININE 3.2*   CALCIUM 7.9*     CBC:   Recent Labs   Lab 03/25/22  1812 03/26/22  0941 03/26/22  1419 03/26/22  1852 03/26/22  2343   WBC 14.72*  --  18.46*  --   --    HGB 11.9*   < > 10.8* 10.0* 10.2*   HCT 36.6*  --  35.5*  --   --      --  157  --   --     < > = values in this interval not displayed.       Significant Imaging:       Assessment/Plan:      * Closed left femoral fracture  Ortho consult  Adequate pain control  Keep NPO for possible ortho intervention tomorrow  Hold off on AC and antiplatelet in preparation for surgical intervention    Cardiac preop risk assessment  RCRI >11% (high risk), YEN 0.78%  Extensive history of cardiac comorbidities - CAD/PCI, CHF, Afib, however she states she is very functional and independent.   I do not think she needs any further cardiac workup, however she is at high risk from a cardiac standpoint for this intervention  Continue optimizing cardioprotective meds for good surgical outcome    S/p surgery on 3/26. PT/OT consult         Coffee ground emesis  Hgb every 8 hours and now. PPI bid. GI consult   Patient stable at the moment.     CBC pending this am. Follow GI recs       Permanent atrial fibrillation  In NSR during my eval  Restart cardioprotective meds  Holding off on AC for now in anticipation for surgery      DM (diabetes mellitus)  Will start basal and bolus insulin  Adequate glycemic control for good wound healing    Uncontrolled with hyperglycemia with manifestations of CKD.  Increasing insulin      Chronic diastolic CHF (congestive heart failure)  Appears well compensated  Continue optimizing cardioprotective meds for good surgical outcome      Essential hypertension  Restart home HTN meds once identified   Adequate pain control      Chronic kidney disease, stage IV (severe)  Renal function at baseline  Continue supportive care    Now with some acute renal failure. Will  start IV fluids       Anemia of chronic illness  At baseline        VTE Risk Mitigation (From admission, onward)    None          Discharge Planning   TRISH:      Code Status: Prior   Is the patient medically ready for discharge?:     Reason for patient still in hospital (select all that apply): Patient unstable                     Terry Chawla MD  Department of Hospital Medicine   [facility]

## 2022-03-27 NOTE — ASSESSMENT & PLAN NOTE
Renal function at baseline  Continue supportive care    Now with some acute renal failure. Will start IV fluids

## 2022-03-27 NOTE — PLAN OF CARE
Problem: Physical Therapy  Goal: Physical Therapy Goal  Description: Goals to be met by: 4/10/22     Patient will increase functional independence with mobility by performin. Supine to sit with Stand-by Assistance  2. Rolling to Left and Right with Stand-by Assistance.  3. Sit to stand transfer to be assessed  4. Bed to chair transfer with to be assessed  5. Gait to be assessed    6. Lower extremity exercise program x10 reps per handout, with supervision  .     3/27/2022 1259 by Omaira Meza, PT  Outcome: Ongoing, Progressing  Initial PT evaluation performed today.  Pt could benefit from Daily skilled PT services in order to maximize function prior to D/C.  SNF recommended as patient is not functioning at Independent baseline and requires Total A for all functional mobility at present time.

## 2022-03-28 LAB
ANION GAP SERPL CALC-SCNC: 12 MMOL/L (ref 8–16)
BUN SERPL-MCNC: 98 MG/DL (ref 8–23)
CALCIUM SERPL-MCNC: 7.4 MG/DL (ref 8.7–10.5)
CHLORIDE SERPL-SCNC: 104 MMOL/L (ref 95–110)
CO2 SERPL-SCNC: 23 MMOL/L (ref 23–29)
CREAT SERPL-MCNC: 4 MG/DL (ref 0.5–1.4)
EST. GFR  (AFRICAN AMERICAN): 11 ML/MIN/1.73 M^2
EST. GFR  (NON AFRICAN AMERICAN): 10 ML/MIN/1.73 M^2
GLUCOSE SERPL-MCNC: 145 MG/DL (ref 70–110)
POCT GLUCOSE: 154 MG/DL (ref 70–110)
POCT GLUCOSE: 177 MG/DL (ref 70–110)
POCT GLUCOSE: 231 MG/DL (ref 70–110)
POCT GLUCOSE: 289 MG/DL (ref 70–110)
POTASSIUM SERPL-SCNC: 5.1 MMOL/L (ref 3.5–5.1)
SODIUM SERPL-SCNC: 139 MMOL/L (ref 136–145)
URATE SERPL-MCNC: 8.8 MG/DL (ref 2.4–5.7)

## 2022-03-28 PROCEDURE — 63600175 PHARM REV CODE 636 W HCPCS: Performed by: ORTHOPAEDIC SURGERY

## 2022-03-28 PROCEDURE — 51798 US URINE CAPACITY MEASURE: CPT

## 2022-03-28 PROCEDURE — 25000003 PHARM REV CODE 250: Performed by: ORTHOPAEDIC SURGERY

## 2022-03-28 PROCEDURE — 27000190 HC CPAP FULL FACE MASK W/VALVE

## 2022-03-28 PROCEDURE — 36415 COLL VENOUS BLD VENIPUNCTURE: CPT | Performed by: INTERNAL MEDICINE

## 2022-03-28 PROCEDURE — 99900035 HC TECH TIME PER 15 MIN (STAT)

## 2022-03-28 PROCEDURE — C9113 INJ PANTOPRAZOLE SODIUM, VIA: HCPCS | Performed by: ORTHOPAEDIC SURGERY

## 2022-03-28 PROCEDURE — 84550 ASSAY OF BLOOD/URIC ACID: CPT | Performed by: INTERNAL MEDICINE

## 2022-03-28 PROCEDURE — 11000001 HC ACUTE MED/SURG PRIVATE ROOM

## 2022-03-28 PROCEDURE — 25000003 PHARM REV CODE 250: Performed by: INTERNAL MEDICINE

## 2022-03-28 PROCEDURE — 80048 BASIC METABOLIC PNL TOTAL CA: CPT | Performed by: INTERNAL MEDICINE

## 2022-03-28 PROCEDURE — 27000221 HC OXYGEN, UP TO 24 HOURS

## 2022-03-28 PROCEDURE — 97535 SELF CARE MNGMENT TRAINING: CPT

## 2022-03-28 PROCEDURE — 94660 CPAP INITIATION&MGMT: CPT

## 2022-03-28 PROCEDURE — 63600175 PHARM REV CODE 636 W HCPCS: Performed by: INTERNAL MEDICINE

## 2022-03-28 PROCEDURE — 97530 THERAPEUTIC ACTIVITIES: CPT

## 2022-03-28 RX ORDER — METHYLPREDNISOLONE SOD SUCC 125 MG
125 VIAL (EA) INJECTION ONCE
Status: COMPLETED | OUTPATIENT
Start: 2022-03-28 | End: 2022-03-28

## 2022-03-28 RX ORDER — LACTULOSE 10 G/15ML
30 SOLUTION ORAL ONCE
Status: COMPLETED | OUTPATIENT
Start: 2022-03-28 | End: 2022-03-28

## 2022-03-28 RX ORDER — COLCHICINE 0.6 MG/1
0.6 TABLET, FILM COATED ORAL 2 TIMES DAILY
Status: DISCONTINUED | OUTPATIENT
Start: 2022-03-28 | End: 2022-03-29

## 2022-03-28 RX ORDER — SODIUM CHLORIDE 9 MG/ML
INJECTION, SOLUTION INTRAVENOUS CONTINUOUS
Status: DISCONTINUED | OUTPATIENT
Start: 2022-03-28 | End: 2022-03-30

## 2022-03-28 RX ADMIN — COLCHICINE 0.6 MG: 0.6 TABLET, FILM COATED ORAL at 02:03

## 2022-03-28 RX ADMIN — CARVEDILOL 12.5 MG: 12.5 TABLET, FILM COATED ORAL at 08:03

## 2022-03-28 RX ADMIN — OXYCODONE HYDROCHLORIDE AND ACETAMINOPHEN 500 MG: 500 TABLET ORAL at 11:03

## 2022-03-28 RX ADMIN — APIXABAN 2.5 MG: 2.5 TABLET, FILM COATED ORAL at 11:03

## 2022-03-28 RX ADMIN — INSULIN ASPART 2 UNITS: 100 INJECTION, SOLUTION INTRAVENOUS; SUBCUTANEOUS at 06:03

## 2022-03-28 RX ADMIN — FLUTICASONE PROPIONATE 100 MCG: 50 SPRAY, METERED NASAL at 11:03

## 2022-03-28 RX ADMIN — HYDROCODONE BITARTRATE AND ACETAMINOPHEN 1 TABLET: 10; 325 TABLET ORAL at 12:03

## 2022-03-28 RX ADMIN — PANTOPRAZOLE SODIUM 40 MG: 40 INJECTION, POWDER, FOR SOLUTION INTRAVENOUS at 11:03

## 2022-03-28 RX ADMIN — MUPIROCIN: 20 OINTMENT TOPICAL at 11:03

## 2022-03-28 RX ADMIN — METOPROLOL SUCCINATE 25 MG: 25 TABLET, EXTENDED RELEASE ORAL at 09:03

## 2022-03-28 RX ADMIN — ATORVASTATIN CALCIUM 80 MG: 40 TABLET, FILM COATED ORAL at 11:03

## 2022-03-28 RX ADMIN — APIXABAN 2.5 MG: 2.5 TABLET, FILM COATED ORAL at 08:03

## 2022-03-28 RX ADMIN — INSULIN DETEMIR 35 UNITS: 100 INJECTION, SOLUTION SUBCUTANEOUS at 11:03

## 2022-03-28 RX ADMIN — MORPHINE SULFATE 2 MG: 4 INJECTION, SOLUTION INTRAMUSCULAR; INTRAVENOUS at 10:03

## 2022-03-28 RX ADMIN — COLCHICINE 0.6 MG: 0.6 TABLET, FILM COATED ORAL at 08:03

## 2022-03-28 RX ADMIN — METHYLPREDNISOLONE SODIUM SUCCINATE 125 MG: 125 INJECTION, POWDER, FOR SOLUTION INTRAMUSCULAR; INTRAVENOUS at 02:03

## 2022-03-28 RX ADMIN — CARVEDILOL 12.5 MG: 12.5 TABLET, FILM COATED ORAL at 11:03

## 2022-03-28 RX ADMIN — HYDROCODONE BITARTRATE AND ACETAMINOPHEN 1 TABLET: 10; 325 TABLET ORAL at 02:03

## 2022-03-28 RX ADMIN — PANTOPRAZOLE SODIUM 40 MG: 40 INJECTION, POWDER, FOR SOLUTION INTRAVENOUS at 08:03

## 2022-03-28 RX ADMIN — LACTULOSE 30 G: 10 SOLUTION ORAL at 11:03

## 2022-03-28 RX ADMIN — INSULIN ASPART 1 UNITS: 100 INJECTION, SOLUTION INTRAVENOUS; SUBCUTANEOUS at 12:03

## 2022-03-28 RX ADMIN — SODIUM CHLORIDE: 0.9 INJECTION, SOLUTION INTRAVENOUS at 11:03

## 2022-03-28 RX ADMIN — MUPIROCIN: 20 OINTMENT TOPICAL at 08:03

## 2022-03-28 NOTE — SUBJECTIVE & OBJECTIVE
Interval History:  complains of R ankle pain       Review of Systems   Constitutional:  Positive for activity change. Negative for appetite change, chills, diaphoresis, fatigue and fever.   HENT:  Negative for congestion and dental problem.    Eyes:  Negative for discharge and itching.   Respiratory:  Negative for apnea, choking, chest tightness, shortness of breath and stridor.    Cardiovascular:  Negative for chest pain, palpitations and leg swelling.   Gastrointestinal:  Negative for abdominal pain, nausea and vomiting.   Genitourinary:  Negative for difficulty urinating, dyspareunia, frequency and genital sores.   Musculoskeletal:  Positive for arthralgias.   Skin:  Negative for color change and pallor.   Neurological:  Negative for light-headedness and headaches.   Psychiatric/Behavioral:  Negative for agitation and behavioral problems.    Objective:     Vital Signs (Most Recent):  Temp: 98.2 °F (36.8 °C) (03/28/22 0743)  Pulse: 72 (03/28/22 0743)  Resp: 18 (03/28/22 0743)  BP: 132/60 (03/28/22 0743)  SpO2: 96 % (03/28/22 0743) Vital Signs (24h Range):  Temp:  [97.6 °F (36.4 °C)-99.3 °F (37.4 °C)] 98.2 °F (36.8 °C)  Pulse:  [60-74] 72  Resp:  [16-20] 18  SpO2:  [96 %-99 %] 96 %  BP: ()/(43-66) 132/60     Weight: 99.2 kg (218 lb 9.6 oz)  Body mass index is 33.24 kg/m².    Intake/Output Summary (Last 24 hours) at 3/28/2022 1004  Last data filed at 3/28/2022 0830  Gross per 24 hour   Intake 360 ml   Output 950 ml   Net -590 ml      Physical Exam  Vitals and nursing note reviewed.   Constitutional:       General: She is not in acute distress.     Appearance: Normal appearance. She is obese. She is not ill-appearing, toxic-appearing or diaphoretic.   HENT:      Head: Normocephalic and atraumatic.   Cardiovascular:      Rate and Rhythm: Normal rate and regular rhythm.   Pulmonary:      Effort: Pulmonary effort is normal. No respiratory distress.      Breath sounds: No wheezing.   Abdominal:      Tenderness:  There is no abdominal tenderness.   Skin:     General: Skin is warm and dry.   Neurological:      Mental Status: She is alert and oriented to person, place, and time.   Psychiatric:         Mood and Affect: Mood normal.         Behavior: Behavior normal.         Thought Content: Thought content normal.       Significant Labs: All pertinent labs within the past 24 hours have been reviewed.  BMP:   Recent Labs   Lab 03/28/22  0527   *      K 5.1      CO2 23   BUN 98*   CREATININE 4.0*   CALCIUM 7.4*     CBC:   Recent Labs   Lab 03/26/22  1419 03/26/22  1852 03/26/22  2343 03/27/22  0704   WBC 18.46*  --   --  14.63*   HGB 10.8* 10.0* 10.2* 8.9*   HCT 35.5*  --   --  28.2*     --   --  141*       Significant Imaging:

## 2022-03-28 NOTE — ASSESSMENT & PLAN NOTE
In NSR during my eval  Restart cardioprotective meds  Holding off on AC for now in anticipation for surgery    Will restart NOAC

## 2022-03-28 NOTE — NURSING
Noted drainage dribbling out of aquacel dressing. Reinforced the dressing. Will continue to monitor.

## 2022-03-28 NOTE — PROGRESS NOTES
[facility] Medicine  Progress Note    Patient Name: Radha Degroot  MRN: [id number]  Patient Class: IP- Inpatient   Admission Date: 3/25/2022  Length of Stay: 3 days  Attending Physician: Terry Pennington MD  Primary Care Provider: Katherine Merida MD        Subjective:     Principal Problem:Closed left femoral fracture        HPI:  Patient is an 83yo f with multiple extensive comorbid medical conditions which include persistent afib on eliquis, HIT, DM II, CAD S/P PCI 06, anemia, cardiomyopathy/CHF, carpal tunnel syndrome, CKD IV,  gout, uHLD and HTN presents to the ED due to left hip pain. Patient states she had a mechanical fall described as tripping over the floor carpet and landing on her left hip. Incident happened last night at 11pm, she has been laying at the same spot due to inability to move up until around 3-4pm today when son came to check up on her and saw her on the floor. EMS was called and patient was brought to the ER. Patient was found to have a left hip fracture and is being admitted to have ortho eval. She states she is completely independent and does all her ADLs by herself with no limitations. Denies any angina or CHFexacerbations symptoms. Also states her afib has been mostly fairly controlled      Overview/Hospital Course:  Patient admitted for L hip fx from mechanical fall at home.  Ortho was consulted. On 3/26, the patient had coffee ground emesis and GI was consulted.  Patient went for surgery on 3/26/22. PT/OT were consulted. Family requesting SNF placement. Patient with worsening renal function and nephrology was consulted on 3/28.      Interval History:  complains of R ankle pain       Review of Systems   Constitutional:  Positive for activity change. Negative for appetite change, chills, diaphoresis, fatigue and fever.   HENT:  Negative for congestion and dental problem.    Eyes:  Negative for discharge and itching.   Respiratory:  Negative for apnea, choking,  chest tightness, shortness of breath and stridor.    Cardiovascular:  Negative for chest pain, palpitations and leg swelling.   Gastrointestinal:  Negative for abdominal pain, nausea and vomiting.   Genitourinary:  Negative for difficulty urinating, dyspareunia, frequency and genital sores.   Musculoskeletal:  Positive for arthralgias.   Skin:  Negative for color change and pallor.   Neurological:  Negative for light-headedness and headaches.   Psychiatric/Behavioral:  Negative for agitation and behavioral problems.    Objective:     Vital Signs (Most Recent):  Temp: 98.2 °F (36.8 °C) (03/28/22 0743)  Pulse: 72 (03/28/22 0743)  Resp: 18 (03/28/22 0743)  BP: 132/60 (03/28/22 0743)  SpO2: 96 % (03/28/22 0743) Vital Signs (24h Range):  Temp:  [97.6 °F (36.4 °C)-99.3 °F (37.4 °C)] 98.2 °F (36.8 °C)  Pulse:  [60-74] 72  Resp:  [16-20] 18  SpO2:  [96 %-99 %] 96 %  BP: ()/(43-66) 132/60     Weight: 99.2 kg (218 lb 9.6 oz)  Body mass index is 33.24 kg/m².    Intake/Output Summary (Last 24 hours) at 3/28/2022 1004  Last data filed at 3/28/2022 0830  Gross per 24 hour   Intake 360 ml   Output 950 ml   Net -590 ml      Physical Exam  Vitals and nursing note reviewed.   Constitutional:       General: She is not in acute distress.     Appearance: Normal appearance. She is obese. She is not ill-appearing, toxic-appearing or diaphoretic.   HENT:      Head: Normocephalic and atraumatic.   Cardiovascular:      Rate and Rhythm: Normal rate and regular rhythm.   Pulmonary:      Effort: Pulmonary effort is normal. No respiratory distress.      Breath sounds: No wheezing.   Abdominal:      Tenderness: There is no abdominal tenderness.   Skin:     General: Skin is warm and dry.   Neurological:      Mental Status: She is alert and oriented to person, place, and time.   Psychiatric:         Mood and Affect: Mood normal.         Behavior: Behavior normal.         Thought Content: Thought content normal.       Significant Labs: All  pertinent labs within the past 24 hours have been reviewed.  BMP:   Recent Labs   Lab 03/28/22  0527   *      K 5.1      CO2 23   BUN 98*   CREATININE 4.0*   CALCIUM 7.4*     CBC:   Recent Labs   Lab 03/26/22  1419 03/26/22  1852 03/26/22  2343 03/27/22  0704   WBC 18.46*  --   --  14.63*   HGB 10.8* 10.0* 10.2* 8.9*   HCT 35.5*  --   --  28.2*     --   --  141*       Significant Imaging:       Assessment/Plan:      * Closed left femoral fracture  Ortho consult  Adequate pain control  Keep NPO for possible ortho intervention tomorrow  Hold off on AC and antiplatelet in preparation for surgical intervention    Cardiac preop risk assessment  RCRI >11% (high risk), YEN 0.78%  Extensive history of cardiac comorbidities - CAD/PCI, CHF, Afib, however she states she is very functional and independent.   I do not think she needs any further cardiac workup, however she is at high risk from a cardiac standpoint for this intervention  Continue optimizing cardioprotective meds for good surgical outcome    S/p surgery on 3/26. PT/OT consult     To SNF once clinically stable.         Coffee ground emesis  Hgb every 8 hours and now. PPI bid. GI consult   Patient stable at the moment.     CBC pending this am. Follow GI recs     Resolved. Follow up GI      Permanent atrial fibrillation  In NSR during my eval  Restart cardioprotective meds  Holding off on AC for now in anticipation for surgery    Will restart NOAC       DM (diabetes mellitus)  Will start basal and bolus insulin  Adequate glycemic control for good wound healing    Uncontrolled with hyperglycemia with manifestations of CKD.  Increasing insulin      Chronic diastolic CHF (congestive heart failure)  Appears well compensated  Continue optimizing cardioprotective meds for good surgical outcome      Essential hypertension  Restart home HTN meds once identified   Adequate pain control      Chronic kidney disease, stage IV (severe)  Renal function  at baseline  Continue supportive care    Now with some acute renal failure. Will start IV fluids     With worsening function. Will consult nephrology       Anemia of chronic illness  At baseline      R ankle pain- does have history of gout. No trauma. Will check uric acid level.     VTE Risk Mitigation (From admission, onward)    None          Discharge Planning   TRISH:      Code Status: Prior   Is the patient medically ready for discharge?:     Reason for patient still in hospital (select all that apply): Patient unstable  Discharge Plan A: Skilled Nursing Facility   Discharge Delays: None known at this time              Terry Chawla MD  Department of Hospital Medicine   [facility] S[facility]

## 2022-03-28 NOTE — PT/OT/SLP PROGRESS
Occupational Therapy   Treatment    Name: Radha Degroot  MRN: [id number]  Admitting Diagnosis:  Closed left femoral fracture  3 Days Post-Op    Recommendations:     Discharge Recommendations: nursing facility, skilled  Discharge Equipment Recommendations:  other (see comments) (TBD per SNF.)  Barriers to discharge:   High risk of care giver strain. Poorly regulated pain, significantly compromised ADL task initiation and tasl tolerance levels     Assessment:     Radha Degroot is a 82 y.o. female with a medical diagnosis of Closed left femoral fracture. The Patient presents with marked free floating anxiety response to all initial pre preposition assessments/ assistance provision, max anxiety responses in all transitional movements. Max UB bracing, guarding and breath holding behaviors noted and inhibitory this date re: functional mobility. Daughter present and very supportive of Patient and current POC.Performance deficits affecting function are weakness, impaired functional mobilty, impaired cognition, decreased safety awareness, impaired coordination, impaired cardiopulmonary response to activity, impaired endurance, impaired balance, impaired self care skills, decreased lower extremity function, decreased upper extremity function, edema, decreased ROM, pain, impaired joint extensibility.     Rehab Prognosis:  Fair(+), patient would benefit from acute skilled OT services to address these deficits and reach maximum level of function.       Plan:     Patient to be seen 5 x/week to address the above listed problems via self-care/home management, therapeutic activities, therapeutic exercises  · Plan of Care Expires: 04/10/22  · Plan of Care Reviewed with: patient, daughter    Subjective     Pain/Comfort:   Global significant, poorly regulated. Patient unable to clarify or quantify, sig hindrance to functional performance levels at present.     Objective:     Communicated with: RN prior to session.  Patient found HOB  elevated 20 degrees, sleeping 1st visit, poor pain tolerance, premedication requested for follow up this date. Patient encountered with bed alarm, oxygen, PureWick, peripheral IV, telemetry upon OT entry to room.    General Precautions: Standard, fall, aspiration   Orthopedic Precautions:LLE weight bearing as tolerated   Braces:  N/A  Respiratory Status: Room air     Occupational Performance:     Bed Mobility:  staff assist of 2 required for all mobilizing interventions this date.   · Patient completed Rolling/Turning to Right with total assistance  · Patient completed Scooting/Bridging with total assistance  · Patient completed Supine to Sit with total assistance  · Patient completed Sit to Supine with total assistance     Functional Mobility/Transfers: Patient unable to tolerate stand multiple trials.   · Poor static sitting EOB, max retro pulsion, max bracing/breath holding, frequent VC required for social gaze (chin elevation, eyes open).   · Functional Mobility: Extensive caregiver assist required at present .    Activities of Daily Living:  · Grooming: maximal assistance    · Upper Body Dressing: maximal assistance    · Lower Body Dressing: total assistance    · Toileting: total assistance        AMPA 6 Click ADL:  7    Treatment & Education:  Orienting interventions provided.   *Re-eucation provided to Patient re: role of OT, and OT Treatment rationales / protocols. Patient / All parties indicating/verbalized understanding.  *Patient agreeable to therapy session. Lights on / shade open.  SC:   *Basic self-care tasks and rudimentary functional mobility undertaken -- assist levels noted above.  *Safe swallowsupported, with bed positioned on mid/High fowlers recommended/instructed to Patient and caregiver/s prior to all intake and 20 mins post consumption for gravity assisted digestion. Daughter states understanding.   *Education underway for intro deep relaxed/restorative breathing tech as needed for non Rx  pain management/MM relaxation, and to support internal self reguation.     Fair (-) to Fair return demonstration, Patient unable to volitionally utilize  Strategies taught this date. Follow up recommended.   SESSION'S END:  *General in room safety and (S)'d mobility advised, call button use reviewed/in hand.  *Questions/concerns within OT scope addressed.      Patient left HOB elevated 60 degrees with all lines intact, call button in reach, bed alarm on, RN notified, and daughter present.    GOALS:   Multidisciplinary Problems     Occupational Therapy Goals        Problem: Occupational Therapy    Goal Priority Disciplines Outcome Interventions   Occupational Therapy Goal     OT, PT/OT Ongoing, Progressing    Description: Goals to be met by: 04/10/2022    Patient will increase functional independence with ADL's by performing:    Feeding with [place].  UE Dressing with Set-up Assistance.  LE Dressing with Minimal Assistance.  Grooming while EOB with Set-up Assistance.  Sitting at edge of bed x 10 minutes with Stand-by Assistance.  Stand-pivot transfers with Minimal Assistance.  Increase functional strength to WFL for improved performance of bed mob's, T/F's, and ADL's.                     Time Tracking:     OT Date of Treatment: 03/28/22  OT Start Time: 0254  OT Stop Time: 0336  OT Total Time (min): 42 min    Billable Minutes:Self Care/Self Management 24  3/29/2022

## 2022-03-28 NOTE — PROGRESS NOTES
SNF Follow Up:    [facility] [place], spoke with Admission Intake person, haven't check [employer] for referral. Says she will check and update soon. (Family Preference)    [facility], willing to accept    [facility], haven't reviewed

## 2022-03-28 NOTE — ASSESSMENT & PLAN NOTE
Renal function at baseline  Continue supportive care    Now with some acute renal failure. Will start IV fluids     With worsening function. Will consult nephrology

## 2022-03-28 NOTE — PLAN OF CARE
Per SW noted, patient daughter preference for [place] area for snf placement. Multiple snf referrals sent via care port based on preference. Pending review. Patient plans to move into an handicap-access apartment in [place] near her family after going to SNF.    11:26am TN spoke with patient and son, Edwin at bedside regarding discharge planning. Per son, the preferred facility would be [facility] in [place]. Explained placement is based upon medical acceptance and bed availability, verbalized understanding. Patient choice form explained and signed.     2:00pm Placed call for [facility], due to high call volume, awaiting call back.     2:32pm TN informed patient's daughter, Maira Fernanda, of acceptance to [facility], stated she had spoken with admissions at [facility] pending review. TN to continue to follow.     3:35pm Additional call for [facility], continue to have high call volume awaiting call back     4:30pm Received call back, [facility] completed, pasrr faxed to [facility], awaiting 142   03/28/22 0850   Post-Acute Status   Post-Acute Authorization Placement   Post-Acute Placement Status Referrals Sent   Coverage [employer] Manged Medicare   Discharge Delays None known at this time   Discharge Plan   Discharge Plan A Skilled Nursing Facility   Discharge Plan B Home Health;Home with family

## 2022-03-28 NOTE — PLAN OF CARE
Problem: Physical Therapy  Goal: Physical Therapy Goal  Description: Goals to be met by: 4/10/22     Patient will increase functional independence with mobility by performin. Supine to sit with Stand-by Assistance  2. Rolling to Left and Right with Stand-by Assistance.  3. Sit to stand transfer to be assessed  4. Bed to chair transfer with to be assessed  5. Gait to be assessed    6. Lower extremity exercise program x10 reps per handout, with supervision  .     Outcome: Ongoing, Progressing   Pt is currently total assist c supine<>sit, scooting to EOB while seated, and dependent c scooting to HOB while in supine. Pt demonstrated max guarding c transfers and c movement to Jonah LE while seated EOB. Pt was able to tolerate sitting EOB ~ 20 min but had difficulty transferring weight onto L hip.

## 2022-03-28 NOTE — PLAN OF CARE
Problem: Adult Inpatient Plan of Care  Goal: Plan of Care Review  Outcome: Ongoing, Progressing  Goal: Patient-Specific Goal (Individualized)  Outcome: Ongoing, Progressing     Problem: Infection  Goal: Absence of Infection Signs and Symptoms  Outcome: Ongoing, Progressing     Problem: Fall Injury Risk  Goal: Absence of Fall and Fall-Related Injury  Outcome: Ongoing, Progressing     Pt alert and oriented. Pt free from falls, injury or any further trauma throughout shift. Continued medications as ordered. Complaints of pain, prn medication given. Pt turned. Bailey in place.  Pt in no distress. Will cont to monitor.

## 2022-03-28 NOTE — ASSESSMENT & PLAN NOTE
Hgb every 8 hours and now. PPI bid. GI consult   Patient stable at the moment.     CBC pending this am. Follow GI recs     Resolved. Follow up GI

## 2022-03-28 NOTE — PROGRESS NOTES
Patient unable to fully participate in exam. She may be getting too much pain medicine. PT stated she sat on the side of the bed. Has not walked yet. Family in room.   VSSAF    Left hip: Dressing with some blood underneath. No active bleeding noted.   Hematocrit: 28.2    A/P: POD#2 Left femur IMN  1) new aquacel ordered  2) up with PT- WBAT  3) discharge planning

## 2022-03-28 NOTE — ASSESSMENT & PLAN NOTE
Ortho consult  Adequate pain control  Keep NPO for possible ortho intervention tomorrow  Hold off on AC and antiplatelet in preparation for surgical intervention    Cardiac preop risk assessment  RCRI >11% (high risk), YEN 0.78%  Extensive history of cardiac comorbidities - CAD/PCI, CHF, Afib, however she states she is very functional and independent.   I do not think she needs any further cardiac workup, however she is at high risk from a cardiac standpoint for this intervention  Continue optimizing cardioprotective meds for good surgical outcome    S/p surgery on 3/26. PT/OT consult     To SNF once clinically stable.

## 2022-03-28 NOTE — PT/OT/SLP PROGRESS
Physical Therapy Treatment    Patient Name:  Radha Degroot   MRN:  [id number]    Recommendations:     Discharge Recommendations:  nursing facility, skilled   Discharge Equipment Recommendations:  (TBD per SNF)   Barriers to discharge:  Inaccessible home, Decreased caregiver support and Pt is not functioning at Independent baseline and requires Total A for functional mobility at present time    Assessment:     Radha Degroot is a 82 y.o. female admitted with a medical diagnosis of Closed left femoral fracture.  She presents with the following impairments/functional limitations:  weakness, pain, decreased safety awareness, impaired functional mobilty, impaired self care skills, impaired skin, edema, decreased lower extremity function, decreased upper extremity function, decreased coordination, impaired endurance, impaired coordination. Pt is currently total assist c supine<>sit, scooting to EOB while seated, and dependent c scooting to HOB while in supine. Pt demonstrated max guarding c transfers and c movement to Jonah LE while seated EOB. Pt was able to tolerate sitting EOB ~ 20 min but had difficulty transferring weight onto L hip.     Rehab Prognosis: Good and Fair; patient would benefit from acute skilled PT services to address these deficits and reach maximum level of function.    Recent Surgery: Procedure(s) (LRB):  INSERTION, INTRAMEDULLARY SARA, FEMUR (Left) 2 Days Post-Op    Plan:     During this hospitalization, patient to be seen daily to address the identified rehab impairments via gait training, therapeutic activities, therapeutic exercises and progress toward the following goals:    · Plan of Care Expires:  04/10/22    Subjective     Chief Complaint: R hip thigh pain; L hip pain c movement  Patient/Family Comments/goals: Needed skilled encouragement to participate c PT/OT  Pain/Comfort:  · Pain Rating 1: 7/10  · Location - Side 1: Right  · Location 1: thigh  · Pain Addressed 1: Pre-medicate for activity,  Reposition, Distraction, Cessation of Activity, Nurse notified      Objective:     Communicated with nurseClementina prior to session.  Patient found HOB elevated with oxygen, bed alarm, PureWick, peripheral IV upon PT entry to room.     General Precautions: Standard, fall   Orthopedic Precautions:LLE weight bearing as tolerated   Braces: N/A  Respiratory Status: Nasal cannula, flow 2.5 L/min     Functional Mobility:  · Bed Mobility:     · Scooting: to EOB while seated total assistance and to HOB while in supine dependence  · Supine to Sit: total assistance  · Sit to Supine: total assistance  · Balance: Poor static sitting balance due to muscle guarding and rigidity      AM-PAC 6 CLICK MOBILITY  Turning over in bed (including adjusting bedclothes, sheets and blankets)?: 2  Sitting down on and standing up from a chair with arms (e.g., wheelchair, bedside commode, etc.): 1  Moving from lying on back to sitting on the side of the bed?: 2  Moving to and from a bed to a chair (including a wheelchair)?: 1  Need to walk in hospital room?: 1  Climbing 3-5 steps with a railing?: 1  Basic Mobility Total Score: 8       Therapeutic Activities and Exercises:  Seated EOB tolerance ~ 20 min   Lateral weight shift on L hip 2 x 10 mod A from PT/OT  Jonah PROM Knee extension 2 x 10   Educated pt/daughter on continuing to perform PROM to Jonah LE while in bed in order to maintain ROM and decrease swelling    Patient left HOB elevated with all lines intact, call button in reach, bed alarm on, pillows supporting Jonah LE, RN notified and daughter present..    GOALS:   Multidisciplinary Problems     Physical Therapy Goals        Problem: Physical Therapy    Goal Priority Disciplines Outcome Goal Variances Interventions   Physical Therapy Goal     PT, PT/OT Ongoing, Progressing     Description: Goals to be met by: 4/10/22     Patient will increase functional independence with mobility by performin. Supine to sit with Stand-by  Assistance  2. Rolling to Left and Right with Stand-by Assistance.  3. Sit to stand transfer to be assessed  4. Bed to chair transfer with to be assessed  5. Gait to be assessed    6. Lower extremity exercise program x10 reps per handout, with supervision  .                      Time Tracking:     PT Received On:    PT Start Time: 1501     PT Stop Time: 1535  PT Total Time (min): 34 min     Billable Minutes: Therapeutic Activity 16 min (co treat c OT)    Treatment Type: Treatment  PT/PTA: PT          03/28/2022

## 2022-03-28 NOTE — PLAN OF CARE
Problem: Occupational Therapy  Goal: Occupational Therapy Goal  Description: Goals to be met by: 04/10/2022    Patient will increase functional independence with ADL's by performing:    Feeding with Modified [place].  UE Dressing with Set-up Assistance.  LE Dressing with Minimal Assistance.  Grooming while EOB with Set-up Assistance.  Sitting at edge of bed x 10 minutes with Stand-by Assistance.  Stand-pivot transfers with Minimal Assistance.  Increase functional strength to WFL for improved performance of bed mob's, T/F's, and ADL's.    Outcome: Ongoing, Progressing

## 2022-03-28 NOTE — CONSULTS
Date of Admit: 3/25/2022  Length of Stay: 3   Days  Consulting Staff:MD Denton    Date of Consult: 3/28/2022    Reason for Consultation     Worsening renal function    Subjective:      History of Present Illness:  Radha Degroot is a 82 y.o. year old female with a past medical history significant for ckd 4, [place] [facility] is here for  Left leg fx after a fall. Pt went to surgery 3/26. Pt noted also to have concerns of gib-on ppi. Pt noted to have mild proteinuria and a creatininet of 2.1 last August. Creatinine is now up to 4 today.  Pt notes abdominal pain.      Past Medical History:  Past Medical History:   Diagnosis Date    ALLERGIC RHINITIS     Anemia     Anticoagulant long-term use     Arthritis     Cardiomyopathy, ischemic     Carpal tunnel syndrome     Cataract     Left eye    CHF (congestive heart failure)     Chronic kidney disease     Coronary artery disease     hx stent X2    Diabetes mellitus type II     Diabetic neuropathy     Diabetic retinopathy of both eyes     Encounter for blood transfusion     GERD (gastroesophageal reflux disease)     hiatal hernia    Gout, unspecified     h/o LAD and D1 PCI in 2006 6/17/2013    Heart attack 2006    Hiatal hernia     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    Hx of colonic polyps     1/9    Hx of colonic polyps     Hyperlipidemia     Hypertension     Myocardial infarction nov 2006    Persistent atrial fibrillation 4/25/2018    Posterior vitreous detachment of both eyes     Sleep apnea     Type 2 diabetes mellitus with mild nonproliferative diabetic retinopathy with macular edema 2/15/2013       Past Surgical History:  Past Surgical History:   Procedure Laterality Date    APPENDECTOMY      CATARACT EXTRACTION      Right eye    EPIDURAL STEROID INJECTION N/A 11/2/2018    Procedure: Injection, Steroid, Epidural CAUDAL ELISA;  Surgeon: Ирина Peña MD;  Location: [facility]IN T;  Service: Pain Management;   Laterality: N/A;    heart stent      X 2    HYSTERECTOMY      Fibroids    INTRAMEDULLARY RODDING OF FEMUR Left 3/26/2022    Procedure: INSERTION, INTRAMEDULLARY SARA, FEMUR;  Surgeon: Eduin Avelar MD;  Location: [facility];  Service: Orthopedics;  Laterality: Left;       Allergies:  Review of patient's allergies indicates:   Allergen Reactions    Heparin analogues Other (See Comments)     thrombocytopenia    Ancef [cefazolin]     Keflex [cephalexin] Rash    Oxybutynin Other (See Comments)     Metallic taste       Home Medications:    No current facility-administered medications on file prior to encounter.     Current Outpatient Medications on File Prior to Encounter   Medication Sig Dispense Refill    apixaban (ELIQUIS) 2.5 mg Tab Take 1 tablet (2.5 mg total) by mouth 2 (two) times a day. 180 tablet 3    ascorbic acid, vitamin C, (VITAMIN C) 100 MG tablet Take 100 mg by mouth once daily.      blood sugar diagnostic (ACCU-CHEK MELIAD PLUS TEST STRP) Strp TEST BLOOD SUGAR 5 TIMES DAILY; E11.65 500 strip 3    blood-glucose meter (ACCU-CHEK MELIDA PLUS METER) Misc To check sugars 4 times daily.  Please include control solution 1 each 0    carvediloL (COREG) 12.5 MG tablet Take 1 tablet (12.5 mg total) by mouth 2 (two) times a day. 180 tablet 3    ergocalciferol, vitamin D2, (VITAMIN D ORAL) Take by mouth once daily.      fluticasone (FLONASE) 50 mcg/actuation nasal spray SHAKE LIQUID AND USE 2 SPRAYS IN EACH NOSTRIL EVERY DAY 48 mL 4    furosemide (LASIX) 20 MG tablet Take 40 mg of lasix at 8 a.m. and 20 mg at 3 p.m. 270 tablet 3    insulin aspart U-100 (NOVOLOG) 100 unit/mL (3 mL) InPn pen Inject 14 units three times daily before meals 45 mL 1    insulin degludec (TRESIBA FLEXTOUCH U-100) 100 unit/mL (3 mL) insulin pen INJECT 32 UNITS UNDER THE SKIN DAILY 10 pen 1    ketoconazole (NIZORAL) 2 % shampoo APPLY TO AFFECTED AREA EVERY OTHER DAY AND LET SIT 5 MINUTES PRIOR TO RINSING AS DIRECTED 120 mL 3     "lancets (ACCU-CHEK MULTICLIX LANCET) Misc Test glucose 4x/day. e11.65 400 each 3    MULTIVIT-MIN/IRON/FOLIC/LUTEIN (CENTRUM SILVER WOMEN ORAL) Take by mouth once daily.       pen needle, diabetic (BD TIM 2ND GEN PEN NEEDLE) 32 gauge x 5/32" Ndle Use 4x/day 400 each 3    rosuvastatin (CRESTOR) 40 MG Tab TAKE 1 TABLET(40 MG) BY MOUTH EVERY DAY 90 tablet 3    vitamin B complex (B COMPLEX VITAMINS ORAL) Take by mouth once daily.      VITAMIN E, BULK, MISC by Misc.(Non-Drug; Combo Route) route once daily.         Family History:  Family History   Problem Relation Age of Onset    Diabetes Mother     Heart disease Mother     Hypertension Mother     Diabetes Father     Melanoma Father     Kidney failure Father     Mental illness Sister         suicide/schizophrenic    Cancer Maternal Grandfather     Cancer Paternal Grandfather         colon    Psoriasis Neg Hx     Lupus Neg Hx     Eczema Neg Hx        Social History:  Social History     Tobacco Use    Smoking status: Never Smoker    Smokeless tobacco: Never Used   Substance Use Topics    Alcohol use: No     Alcohol/week: 0.0 standard drinks    Drug use: No       Review of Systems:10 pt ros: + fall, abdominal pain, edema       Objective:     Scheduled Meds:   apixaban  2.5 mg Oral BID    ascorbic acid (vitamin C)  500 mg Oral Daily    atorvastatin  80 mg Oral Daily    carvediloL  12.5 mg Oral BID    colchicine  0.6 mg Oral BID    fluticasone propionate  2 spray Each Nostril Daily    insulin detemir U-100  35 Units Subcutaneous Daily    metoprolol succinate  25 mg Oral Daily    mupirocin   Nasal BID    pantoprazole  40 mg Intravenous BID     Continuous Infusions:   sodium chloride 0.9% 100 mL/hr at 03/28/22 1100     PRN Meds:.sodium chloride, dextrose 10%, fentaNYL, glucagon (human recombinant), HYDROcodone-acetaminophen, HYDROmorphone, insulin aspart U-100, morphine, ondansetron, sodium chloride 0.9%      Physical Examination:    Vitals: BP " "(!) 121/57 (BP Location: Left arm, Patient Position: Lying)   Pulse 70   Temp 98.9 °F (37.2 °C) (Oral)   Resp 16   Ht 5' 8" (1.727 m)   Wt 99.2 kg (218 lb 9.6 oz)   SpO2 96%   BMI 33.24 kg/m²    I/O last 3 completed shifts:  In: 457.5 [P.O.:360; IV Piggyback:97.5]  Out: 1450 [Urine:1450]  I/O this shift:  In: 120 [P.O.:120]  Out: -       General: wd female in nad  HEENT:ncat,eomi,mm  CVS:s1s2 regular  PULM:ctab  ABD:+bs,soft, fullness  EXT:no leg edema  NEURO:awakens, somnolent  Laboratory:  Recent Results (from the past 24 hour(s))   POCT glucose    Collection Time: 03/27/22  5:01 PM   Result Value Ref Range    POCT Glucose 231 (H) 70 - 110 mg/dL   POCT glucose    Collection Time: 03/27/22 11:37 PM   Result Value Ref Range    POCT Glucose 154 (H) 70 - 110 mg/dL   Basic Metabolic Panel    Collection Time: 03/28/22  5:27 AM   Result Value Ref Range    Sodium 139 136 - 145 mmol/L    Potassium 5.1 3.5 - 5.1 mmol/L    Chloride 104 95 - 110 mmol/L    CO2 23 23 - 29 mmol/L    Glucose 145 (H) 70 - 110 mg/dL    BUN 98 (H) 8 - 23 mg/dL    Creatinine 4.0 (H) 0.5 - 1.4 mg/dL    Calcium 7.4 (L) 8.7 - 10.5 mg/dL    Anion Gap 12 8 - 16 mmol/L    eGFR if African American 11 (A) >60 mL/min/1.73 m^2    eGFR if non African American 10 (A) >60 mL/min/1.73 m^2   Uric acid    Collection Time: 03/28/22  5:27 AM   Result Value Ref Range    Uric Acid 8.8 (H) 2.4 - 5.7 mg/dL   POCT glucose    Collection Time: 03/28/22  6:04 AM   Result Value Ref Range    POCT Glucose 177 (H) 70 - 110 mg/dL   POCT glucose    Collection Time: 03/28/22  6:19 AM   Result Value Ref Range    POCT Glucose 154 (H) 70 - 110 mg/dL               Diagnostic Tests:     X-Ray Chest AP Portable [[id number]] Resulted: 03/25/22 1849   Order Status: Completed Updated: 03/25/22 1852   Narrative:     EXAMINATION:   XR CHEST AP PORTABLE     CLINICAL HISTORY:   pre-operative;     TECHNIQUE:   Single frontal view of the chest was performed.     COMPARISON:   February 2019. "     FINDINGS:   Cardiac silhouette is stable in size.  Lungs are symmetrically expanded.  No evidence of focal consolidative process, pneumothorax, or significant pleural effusion.  No acute osseous abnormality identified.    Impression:       No acute cardiopulmonary process identified.       Electronically signed by: Sincere Avelar MD   Date: 03/25/2022   Time: 18:49            Assessment/Plan:     Radha Degroot is a 82 y.o. female admitted with:     1.dora. on ckd 4. [place] ua. Ck us. Ok for hydration.  2.s/p orif left leg. No nsaids.  3.dm2. following sugars.  4.anemia 2nd to acute loss.[place] cbc in am.  5.htn. no arb.  6.gib. cont ppi per gi. Watch for ain.  7.proteinuira. no arb /ace for now.  8.afib. rate ok.    Julita Galarza

## 2022-03-29 LAB
ANION GAP SERPL CALC-SCNC: 13 MMOL/L (ref 8–16)
BASOPHILS # BLD AUTO: 0 K/UL (ref 0–0.2)
BASOPHILS # BLD AUTO: 0 K/UL (ref 0–0.2)
BASOPHILS NFR BLD: 0 % (ref 0–1.9)
BASOPHILS NFR BLD: 0 % (ref 0–1.9)
BUN SERPL-MCNC: 104 MG/DL (ref 8–23)
CALCIUM SERPL-MCNC: 7.4 MG/DL (ref 8.7–10.5)
CHLORIDE SERPL-SCNC: 102 MMOL/L (ref 95–110)
CO2 SERPL-SCNC: 21 MMOL/L (ref 23–29)
CREAT SERPL-MCNC: 3.9 MG/DL (ref 0.5–1.4)
DIFFERENTIAL METHOD: ABNORMAL
DIFFERENTIAL METHOD: ABNORMAL
EOSINOPHIL # BLD AUTO: 0 K/UL (ref 0–0.5)
EOSINOPHIL # BLD AUTO: 0 K/UL (ref 0–0.5)
EOSINOPHIL NFR BLD: 0 % (ref 0–8)
EOSINOPHIL NFR BLD: 0 % (ref 0–8)
ERYTHROCYTE [DISTWIDTH] IN BLOOD BY AUTOMATED COUNT: 15.6 % (ref 11.5–14.5)
ERYTHROCYTE [DISTWIDTH] IN BLOOD BY AUTOMATED COUNT: 15.6 % (ref 11.5–14.5)
EST. GFR  (AFRICAN AMERICAN): 12 ML/MIN/1.73 M^2
EST. GFR  (NON AFRICAN AMERICAN): 10 ML/MIN/1.73 M^2
GLUCOSE SERPL-MCNC: 290 MG/DL (ref 70–110)
HCT VFR BLD AUTO: 24 % (ref 37–48.5)
HCT VFR BLD AUTO: 24 % (ref 37–48.5)
HGB BLD-MCNC: 7.4 G/DL (ref 12–16)
HGB BLD-MCNC: 7.4 G/DL (ref 12–16)
IMM GRANULOCYTES # BLD AUTO: 0.05 K/UL (ref 0–0.04)
IMM GRANULOCYTES # BLD AUTO: 0.05 K/UL (ref 0–0.04)
IMM GRANULOCYTES NFR BLD AUTO: 0.7 % (ref 0–0.5)
IMM GRANULOCYTES NFR BLD AUTO: 0.7 % (ref 0–0.5)
LYMPHOCYTES # BLD AUTO: 0.6 K/UL (ref 1–4.8)
LYMPHOCYTES # BLD AUTO: 0.6 K/UL (ref 1–4.8)
LYMPHOCYTES NFR BLD: 8.2 % (ref 18–48)
LYMPHOCYTES NFR BLD: 8.2 % (ref 18–48)
MCH RBC QN AUTO: 28.6 PG (ref 27–31)
MCH RBC QN AUTO: 28.6 PG (ref 27–31)
MCHC RBC AUTO-ENTMCNC: 30.8 G/DL (ref 32–36)
MCHC RBC AUTO-ENTMCNC: 30.8 G/DL (ref 32–36)
MCV RBC AUTO: 93 FL (ref 82–98)
MCV RBC AUTO: 93 FL (ref 82–98)
MONOCYTES # BLD AUTO: 0.3 K/UL (ref 0.3–1)
MONOCYTES # BLD AUTO: 0.3 K/UL (ref 0.3–1)
MONOCYTES NFR BLD: 3.6 % (ref 4–15)
MONOCYTES NFR BLD: 3.6 % (ref 4–15)
NEUTROPHILS # BLD AUTO: 6.6 K/UL (ref 1.8–7.7)
NEUTROPHILS # BLD AUTO: 6.6 K/UL (ref 1.8–7.7)
NEUTROPHILS NFR BLD: 87.5 % (ref 38–73)
NEUTROPHILS NFR BLD: 87.5 % (ref 38–73)
NRBC BLD-RTO: 0 /100 WBC
NRBC BLD-RTO: 0 /100 WBC
PLATELET # BLD AUTO: 113 K/UL (ref 150–450)
PLATELET # BLD AUTO: 113 K/UL (ref 150–450)
PMV BLD AUTO: 12.4 FL (ref 9.2–12.9)
PMV BLD AUTO: 12.4 FL (ref 9.2–12.9)
POCT GLUCOSE: 271 MG/DL (ref 70–110)
POCT GLUCOSE: 284 MG/DL (ref 70–110)
POCT GLUCOSE: 303 MG/DL (ref 70–110)
POCT GLUCOSE: 316 MG/DL (ref 70–110)
POCT GLUCOSE: 332 MG/DL (ref 70–110)
POTASSIUM SERPL-SCNC: 5.4 MMOL/L (ref 3.5–5.1)
RBC # BLD AUTO: 2.59 M/UL (ref 4–5.4)
RBC # BLD AUTO: 2.59 M/UL (ref 4–5.4)
SODIUM SERPL-SCNC: 136 MMOL/L (ref 136–145)
WBC # BLD AUTO: 7.56 K/UL (ref 3.9–12.7)
WBC # BLD AUTO: 7.56 K/UL (ref 3.9–12.7)

## 2022-03-29 PROCEDURE — 80048 BASIC METABOLIC PNL TOTAL CA: CPT | Performed by: INTERNAL MEDICINE

## 2022-03-29 PROCEDURE — C9113 INJ PANTOPRAZOLE SODIUM, VIA: HCPCS | Performed by: ORTHOPAEDIC SURGERY

## 2022-03-29 PROCEDURE — 97530 THERAPEUTIC ACTIVITIES: CPT | Mod: CQ

## 2022-03-29 PROCEDURE — 25000003 PHARM REV CODE 250: Performed by: ORTHOPAEDIC SURGERY

## 2022-03-29 PROCEDURE — 99900035 HC TECH TIME PER 15 MIN (STAT)

## 2022-03-29 PROCEDURE — 63600175 PHARM REV CODE 636 W HCPCS: Performed by: ORTHOPAEDIC SURGERY

## 2022-03-29 PROCEDURE — 94760 N-INVAS EAR/PLS OXIMETRY 1: CPT

## 2022-03-29 PROCEDURE — 11000001 HC ACUTE MED/SURG PRIVATE ROOM

## 2022-03-29 PROCEDURE — 99232 SBSQ HOSP IP/OBS MODERATE 35: CPT | Mod: ,,, | Performed by: INTERNAL MEDICINE

## 2022-03-29 PROCEDURE — 36415 COLL VENOUS BLD VENIPUNCTURE: CPT | Performed by: INTERNAL MEDICINE

## 2022-03-29 PROCEDURE — 25000003 PHARM REV CODE 250: Performed by: INTERNAL MEDICINE

## 2022-03-29 PROCEDURE — 99232 PR SUBSEQUENT HOSPITAL CARE,LEVL II: ICD-10-PCS | Mod: ,,, | Performed by: INTERNAL MEDICINE

## 2022-03-29 PROCEDURE — 97112 NEUROMUSCULAR REEDUCATION: CPT

## 2022-03-29 PROCEDURE — 97110 THERAPEUTIC EXERCISES: CPT | Mod: CQ

## 2022-03-29 PROCEDURE — 85025 COMPLETE CBC W/AUTO DIFF WBC: CPT | Performed by: INTERNAL MEDICINE

## 2022-03-29 PROCEDURE — 97535 SELF CARE MNGMENT TRAINING: CPT

## 2022-03-29 RX ORDER — COLCHICINE 0.6 MG/1
0.6 TABLET, FILM COATED ORAL DAILY
Status: DISCONTINUED | OUTPATIENT
Start: 2022-03-30 | End: 2022-04-04 | Stop reason: HOSPADM

## 2022-03-29 RX ORDER — TIZANIDINE 4 MG/1
4 TABLET ORAL EVERY 8 HOURS PRN
Status: DISCONTINUED | OUTPATIENT
Start: 2022-03-30 | End: 2022-04-04 | Stop reason: HOSPADM

## 2022-03-29 RX ADMIN — APIXABAN 2.5 MG: 2.5 TABLET, FILM COATED ORAL at 11:03

## 2022-03-29 RX ADMIN — INSULIN ASPART 3 UNITS: 100 INJECTION, SOLUTION INTRAVENOUS; SUBCUTANEOUS at 12:03

## 2022-03-29 RX ADMIN — TIZANIDINE 4 MG: 4 TABLET ORAL at 11:03

## 2022-03-29 RX ADMIN — INSULIN DETEMIR 35 UNITS: 100 INJECTION, SOLUTION SUBCUTANEOUS at 11:03

## 2022-03-29 RX ADMIN — OXYCODONE HYDROCHLORIDE AND ACETAMINOPHEN 500 MG: 500 TABLET ORAL at 10:03

## 2022-03-29 RX ADMIN — FLUTICASONE PROPIONATE 100 MCG: 50 SPRAY, METERED NASAL at 10:03

## 2022-03-29 RX ADMIN — CARVEDILOL 12.5 MG: 12.5 TABLET, FILM COATED ORAL at 09:03

## 2022-03-29 RX ADMIN — HYDROCODONE BITARTRATE AND ACETAMINOPHEN 1 TABLET: 10; 325 TABLET ORAL at 12:03

## 2022-03-29 RX ADMIN — CARVEDILOL 12.5 MG: 12.5 TABLET, FILM COATED ORAL at 10:03

## 2022-03-29 RX ADMIN — SODIUM ZIRCONIUM CYCLOSILICATE 10 G: 10 POWDER, FOR SUSPENSION ORAL at 06:03

## 2022-03-29 RX ADMIN — METOPROLOL SUCCINATE 25 MG: 25 TABLET, EXTENDED RELEASE ORAL at 10:03

## 2022-03-29 RX ADMIN — PANTOPRAZOLE SODIUM 40 MG: 40 INJECTION, POWDER, FOR SOLUTION INTRAVENOUS at 12:03

## 2022-03-29 RX ADMIN — INSULIN ASPART 8 UNITS: 100 INJECTION, SOLUTION INTRAVENOUS; SUBCUTANEOUS at 06:03

## 2022-03-29 RX ADMIN — INSULIN ASPART 6 UNITS: 100 INJECTION, SOLUTION INTRAVENOUS; SUBCUTANEOUS at 12:03

## 2022-03-29 RX ADMIN — SODIUM CHLORIDE: 0.9 INJECTION, SOLUTION INTRAVENOUS at 10:03

## 2022-03-29 RX ADMIN — MUPIROCIN: 20 OINTMENT TOPICAL at 09:03

## 2022-03-29 RX ADMIN — APIXABAN 2.5 MG: 2.5 TABLET, FILM COATED ORAL at 09:03

## 2022-03-29 RX ADMIN — ATORVASTATIN CALCIUM 80 MG: 40 TABLET, FILM COATED ORAL at 10:03

## 2022-03-29 RX ADMIN — PANTOPRAZOLE SODIUM 40 MG: 40 INJECTION, POWDER, FOR SOLUTION INTRAVENOUS at 09:03

## 2022-03-29 RX ADMIN — MUPIROCIN: 20 OINTMENT TOPICAL at 10:03

## 2022-03-29 RX ADMIN — INSULIN ASPART 4 UNITS: 100 INJECTION, SOLUTION INTRAVENOUS; SUBCUTANEOUS at 10:03

## 2022-03-29 RX ADMIN — COLCHICINE 0.6 MG: 0.6 TABLET, FILM COATED ORAL at 10:03

## 2022-03-29 NOTE — PT/OT/SLP PROGRESS
Occupational Therapy   Treatment    Name: Radha Degroot  MRN: [id number]  Admitting Diagnosis:  Closed left femoral fracture  3 Days Post-Op    Recommendations:     Discharge Recommendations: nursing facility, skilled  Discharge Equipment Recommendations:   (TBD SNF)  Barriers to discharge:   (Acuity, catastrophic anxiety responses in all transitional movements.)    Assessment:     Radha Degroot is a 82 y.o. female with a medical diagnosis of Closed left femoral fracture. The Patient presents with significant anxiety responses to all transitional movements and movement assist. Consistent supportive feedback required. Proprioceptive awareness and information integration compromised. Patient however, attempting cooperation with support of present family members.  Performance deficits affecting function are weakness, impaired endurance, impaired functional mobilty, impaired cognition, decreased safety awareness, pain, impaired balance, impaired self care skills, decreased lower extremity function, decreased ROM, edema, impaired skin, impaired fine motor, impaired coordination, impaired cardiopulmonary response to activity (guarded proprioceptive feedback. Family states disorganized responses to movemet trials 2* (-) effects of meds. refrred to RN.).     Rehab Prognosis:  Good; patient would benefit from acute skilled OT services to address these deficits and reach maximum level of function.       Plan:     Patient to be seen 5 x/week to address the above listed problems via self-care/home management, therapeutic activities, therapeutic exercises, neuromuscular re-education  · Plan of Care Expires: 04/10/22  · Plan of Care Reviewed with: patient, son (2 Sons and Daughter in law.)    Subjective     Pain/Comfort:  Pain Rating 1:  (Overwhelming anxiety related to pain and fear of pain all aspects of bed mobility and stand trials this date. Patient unable to internally self regualte. Mixed responses to  supportive/corrective feedback. Family present and supportive of Pt/current POC.)  Pain Addressed 1: Pre-medicate for activity, Reposition, Distraction, Nurse notified    Objective:     Communicated with: MIGUEL Mcrae prior to session.  Patient found HOB elevated 35 degrees with telemetry, peripheral IV, oxygen, pressure relief boots (BP cuff and pulse ox procured and utilized during tretaments this date. BP and spo2 stable all trials. Spo2 92 < 98% with VC, / 81) upon OT entry to room.    General Precautions: Standard, fall, aspiration   Orthopedic Precautions:LLE weight bearing as tolerated   Braces: N/A  Respiratory Status: Nasal cannula, flow 2 L/min     Occupational Performance:     Bed Mobility:    · Patient completed Rolling/Turning to Left with  dependent and 2 persons  · Patient completed Rolling/Turning to Right with dependent and 2 persons  · Patient completed Scooting/Bridging with dependent and 2 persons  · Patient completed Supine to Sit with dependent and 2 persons  · Patient completed Sit to Supine with dependent and 2 persons     Functional Mobility/Transfers: Patient unable to achieve stand, deemed unsafe with staff assist of 2 2* retro pulsion tendencies, BLE extension (rigid/signifcant), rigid/fixed UB bracing/pushing from bed rails.      Activities of Daily Living:  · Grooming: maximal assistance    · Upper Body Dressing: total assistance    · Lower Body Dressing: total assistance  High BLE R>L limb giuarding during pressure boot removal and Patient reuested skin inspection.   · Toileting: dependence        [facility] 6 Click ADL: 8    Treatment & Education:  SC:   *Basic self-care tasks and rudimentary functional mobility undertaken -- assist levels noted above.  TE:   *Reducation provided regarding the importance of early/consistent mobility to maximize recovery in conjunction w/ edu related to importance of performing daily   UB/LB AROM exercises, all joints/all planes in sit as indicated for  recovery of effective respiration in functional tasks, and to support effective circulation while in acute setting.  *Education underway for intro deep relaxed/restorative breathing tech as needed for non Rx pain management/MM relaxation, and to support internal self reguation.  POOR (+) return demonstration, follow up recommended.   Neuro re-ed: Mid line crossing with V/T cues as needed for roll initiation L<>R, recovery of effective use lf UB as a stabilizer in E unsupported sitting. Challenges to C/O/Gravity provided to reintroduce proprioceptive feedback self assessment capacity without trigger of internal anxiety responses.   SESSION'S END:  *General in room safety and (S)'d mobility advised, call button use reviewed/in hand.  *Questions/concerns within OT scope addressed.      Patient left HOB elevated with all lines intact, call button in reach, RN notified and   2 Sons and Daughter in law presentEducation:      GOALS:   Multidisciplinary Problems     Occupational Therapy Goals        Problem: Occupational Therapy    Goal Priority Disciplines Outcome Interventions   Occupational Therapy Goal     OT, PT/OT Ongoing, Progressing    Description: Goals to be met by: 04/10/2022    Patient will increase functional independence with ADL's by performing:    Feeding with Modified [place].  UE Dressing with Set-up Assistance.  LE Dressing with Minimal Assistance.  Grooming while EOB with Set-up Assistance.  Sitting at edge of bed x 10 minutes with Stand-by Assistance.  Stand-pivot transfers with Minimal Assistance.  Increase functional strength to WFL for improved performance of bed mob's, T/F's, and ADL's.                     Time Tracking:     OT Date of Treatment: 03/29/22   OT Start Time: 0109  OT Stop Time: 0123  OT Total Time (min): 14    OT Start Time: 0139  OT Stop Time: 0218  OT Total Time (min): 39 min    Co-tx performed for this visit due to patient need for two skilled therapists to ensure patient and  staff safety and to accommodate for patient activity tolerance, 2* Patient orientation needs, and compromised task initiation/task tolerance levels. OT focus on energy conservation/jont protection edu during ADLs, and in room edu to bolster volition, and support safest effective engagement levels in present care setting.     Pt continues to benefit from a collaborative therapeutic program to improve quality of life and sustain focus on impairment resolution.  Patient progressing steadily towards goals    Billable Minutes:Self Care/Home Management 14  Neuromuscular Re-education 20  3/29/2022

## 2022-03-29 NOTE — PROGRESS NOTES
Date of Admission:3/25/2022    SUBJECTIVE:notes more alert and eating    Current Facility-Administered Medications   Medication    0.9%  NaCl infusion (for blood administration)    0.9%  NaCl infusion    apixaban tablet 2.5 mg    ascorbic acid (vitamin C) tablet 500 mg    atorvastatin tablet 80 mg    carvediloL tablet 12.5 mg    [START ON 3/30/2022] colchicine capsule/tablet 0.6 mg    dextrose 10% bolus 125 mL    fentaNYL 50 mcg/mL injection 25 mcg    fluticasone propionate 50 mcg/actuation nasal spray 100 mcg    glucagon (human recombinant) injection 1 mg    HYDROcodone-acetaminophen  mg per tablet 1 tablet    HYDROmorphone (PF) injection 0.2 mg    insulin aspart U-100 pen 1-10 Units    insulin detemir U-100 pen 35 Units    metoprolol succinate (TOPROL-XL) 24 hr tablet 25 mg    morphine injection 2 mg    mupirocin 2 % ointment    ondansetron injection 4 mg    pantoprazole injection 40 mg    sodium chloride 0.9% flush 10 mL    sodium zirconium cyclosilicate packet 10 g       Wt Readings from Last 3 Encounters:   03/26/22 99.2 kg (218 lb 9.6 oz)   03/10/22 97.1 kg (214 lb)   12/07/21 100 kg (220 lb 7.4 oz)     Temp Readings from Last 3 Encounters:   03/29/22 97.5 °F (36.4 °C)   03/10/22 98 °F (36.7 °C)   12/07/21 98.2 °F (36.8 °C)     BP Readings from Last 3 Encounters:   03/29/22 124/87   03/10/22 137/65   12/07/21 (!) 140/62     Pulse Readings from Last 3 Encounters:   03/29/22 70   03/10/22 60   12/07/21 60       Intake/Output Summary (Last 24 hours) at 3/29/2022 1416  Last data filed at 3/29/2022 0621  Gross per 24 hour   Intake 240 ml   Output 575 ml   Net -335 ml       PE:  GEN:wd female in nad  HEENT:ncat,eomi,mm  CVS:s1s2 regular  PULM:ctab  ABD:bs, soft  EXT:trace leg edema  NEURO:awake  Dressing of left thigh    Recent Labs   Lab 03/29/22  0448   *      K 5.4*      CO2 21*   *   CREATININE 3.9*   CALCIUM 7.4*       Lab Results   Component Value Date     .3 (H) 08/11/2021    CALCIUM 7.4 (L) 03/29/2022    PHOS 3.6 08/11/2021       Recent Labs   Lab 03/29/22  0448   WBC 7.56  7.56   RBC 2.59*  2.59*   HGB 7.4*  7.4*   HCT 24.0*  24.0*   *  113*   MCV 93  93   MCH 28.6  28.6   MCHC 30.8*  30.8*           A/P:  1.dora. on ckd 4. [place] creatinine about same. UA and us noted. no hd needs. Cont hydration and nutrition.  2.s/p orif left leg. No nsaids.  3.dm2. following sugars.  4.anemia 2nd to acute loss.[place] cbc low. Following. No prbc today.  5.htn. no arb.  6.gib. cont ppi per gi. Watch for ain.  7.proteinuira. no arb /ace for now.  8.afib. rate ok.  9.hyperkalemia. sugar is up too. Add some lokelma for now to help

## 2022-03-29 NOTE — SUBJECTIVE & OBJECTIVE
Interval History: No new issues     Review of Systems   Constitutional:  Positive for activity change. Negative for appetite change, chills, diaphoresis, fatigue and fever.   HENT:  Negative for congestion and dental problem.    Eyes:  Negative for discharge and itching.   Respiratory:  Negative for apnea, choking, chest tightness, shortness of breath and stridor.    Cardiovascular:  Negative for chest pain, palpitations and leg swelling.   Gastrointestinal:  Negative for abdominal pain, nausea and vomiting.   Genitourinary:  Negative for difficulty urinating, dyspareunia, frequency and genital sores.   Musculoskeletal:  Positive for arthralgias.   Skin:  Negative for color change and pallor.   Neurological:  Negative for light-headedness and headaches.   Psychiatric/Behavioral:  Negative for agitation and behavioral problems.    Objective:     Vital Signs (Most Recent):  Temp: 98 °F (36.7 °C) (03/29/22 0910)  Pulse: 70 (03/29/22 0910)  Resp: 18 (03/29/22 0910)  BP: 128/60 (03/29/22 0910)  SpO2: 99 % (03/29/22 0910) Vital Signs (24h Range):  Temp:  [98 °F (36.7 °C)-99 °F (37.2 °C)] 98 °F (36.7 °C)  Pulse:  [68-75] 70  Resp:  [11-20] 18  SpO2:  [95 %-100 %] 99 %  BP: (113-145)/(56-67) 128/60     Weight: 99.2 kg (218 lb 9.6 oz)  Body mass index is 33.24 kg/m².    Intake/Output Summary (Last 24 hours) at 3/29/2022 1006  Last data filed at 3/29/2022 0621  Gross per 24 hour   Intake 480 ml   Output 925 ml   Net -445 ml      Physical Exam  Vitals and nursing note reviewed.   Constitutional:       General: She is not in acute distress.     Appearance: Normal appearance. She is obese. She is not ill-appearing, toxic-appearing or diaphoretic.   HENT:      Head: Normocephalic and atraumatic.   Cardiovascular:      Rate and Rhythm: Normal rate and regular rhythm.   Pulmonary:      Effort: Pulmonary effort is normal. No respiratory distress.      Breath sounds: No wheezing.   Abdominal:      Tenderness: There is no abdominal  tenderness.   Skin:     General: Skin is warm and dry.   Neurological:      Mental Status: She is alert and oriented to person, place, and time.   Psychiatric:         Mood and Affect: Mood normal.         Behavior: Behavior normal.         Thought Content: Thought content normal.       Significant Labs: All pertinent labs within the past 24 hours have been reviewed.  BMP:   Recent Labs   Lab 03/29/22 0448   *      K 5.4*      CO2 21*   *   CREATININE 3.9*   CALCIUM 7.4*     CBC:   Recent Labs   Lab 03/29/22 0448   WBC 7.56  7.56   HGB 7.4*  7.4*   HCT 24.0*  24.0*   *  113*       Significant Imaging:

## 2022-03-29 NOTE — PLAN OF CARE
Patient accepted to [facility]. Placed call to [facility], left detailed message.     10:55am Per Shyla with [facility], patient can be accepted tomorrow, pending medical clearance and insurance auth. [place] notified patient and pt's son at bedside.     Sent 142/pasrr, covid screen, ppd and chest xray to [facility] via care port.    03/29/22 1030   Post-Acute Status   Post-Acute Authorization Placement   Post-Acute Placement Status Pending payor review/awaiting authorization (if required)   Coverage [employer]   Patient choice form signed by patient/caregiver List from [facility]-Ac[facility]   Discharge Delays None known at this time   Discharge Plan   Discharge Plan A Skilled Nursing Facility   Discharge Plan B Home Health;Home with family

## 2022-03-29 NOTE — PLAN OF CARE
Problem: Physical Therapy  Goal: Physical Therapy Goal  Description: Goals to be met by: 4/10/22     Patient will increase functional independence with mobility by performin. Supine to sit with Stand-by Assistance  2. Rolling to Left and Right with Stand-by Assistance.  3. Sit to stand transfer to be assessed  4. Bed to chair transfer with to be assessed  5. Gait to be assessed    6. Lower extremity exercise program x10 reps per handout, with supervision  .     Outcome: Ongoing, Progressing   Pt will benefit from further skilled therapy in order to return to PLOF.

## 2022-03-29 NOTE — PT/OT/SLP PROGRESS
Physical Therapy Treatment    Patient Name:  Radha Degroot   MRN:  [id number]    Recommendations:     Discharge Recommendations:  nursing facility, skilled   Discharge Equipment Recommendations:  (TBD per SNF)   Barriers to discharge: None    Assessment:     Radha Degroot is a 82 y.o. female admitted with a medical diagnosis of Closed left femoral fracture.  She presents with the following impairments/functional limitations:  weakness, impaired endurance, gait instability, impaired balance, decreased lower extremity function, impaired self care skills, decreased upper extremity function, decreased coordination, pain, decreased safety awareness, impaired functional mobilty, edema, decreased ROM, orthopedic precautions, impaired muscle length, impaired joint extensibility . Pt is very fearful of falling, increased anxious and resisted with movements, required max encouragements and positive reinforcements to complete tasks. Pt required extra time and frequent rest breaks throughout treatment.     Rehab Prognosis: Fair; patient would benefit from acute skilled PT services to address these deficits and reach maximum level of function.    Recent Surgery: Procedure(s) (LRB):  INSERTION, INTRAMEDULLARY SARA, FEMUR (Left) 3 Days Post-Op    Plan:     During this hospitalization, patient to be seen daily to address the identified rehab impairments via gait training, therapeutic activities, therapeutic exercises and progress toward the following goals:    · Plan of Care Expires:  04/10/22    Subjective     Chief Complaint: fear of falling   Patient/Family Comments/goals:   Pain/Comfort:  · Location - Side 1: Left  · Location 1: leg  · Pain Addressed 1: Pre-medicate for activity, Nurse notified, Cessation of Activity      Objective:     Communicated with nurse Quintana prior to session.  Patient found up in chair with bed alarm, telemetry, peripheral IV, oxygen, pressure relief boots upon PT entry to room.     General  Precautions: Standard, fall, aspiration   Orthopedic Precautions:LLE weight bearing as tolerated   Braces: N/A  Respiratory Status: Nasal cannula, flow 2 L/min   SpO2 : 96% on RA.   Functional Mobility:  · Bed Mobility:     · Rolling Right: dependence  · Scooting: dependence and of 2 persons  · Supine to Sit: dependence and of 2 persons  · Sit to Supine: dependence and of 2 persons  · Balance:  Fair- in sitting balance .       AM-PAC 6 CLICK MOBILITY  Turning over in bed (including adjusting bedclothes, sheets and blankets)?: 2  Sitting down on and standing up from a chair with arms (e.g., wheelchair, bedside commode, etc.): 1  Moving from lying on back to sitting on the side of the bed?: 2  Moving to and from a bed to a chair (including a wheelchair)?: 1  Need to walk in hospital room?: 1  Climbing 3-5 steps with a railing?: 1  Basic Mobility Total Score: 8       Therapeutic Activities and Exercises:   Lower Extremity Exercises.   Patient educated on the purpose of therapeutic exercise.     Patient verbalized acceptance/understanding of instructions, expectations, and limitations(for safety).   Patient performed: 2 sets of 10 reps (each) of B LE There Ex (AAROM BLE) : AP, QS , Hip abd/add, HS  While in bed .        Patient required  verbal cues/tactile cues to ensure correct sequence, to maintain proper form, and to allow for self-correction.   Encouraged pt to perform BLE AP throughout the day, pt verbalized understanding, family said they will remind her to perform.      Patient left HOB elevated with pressure relief boots placed to BLE all lines intact, call button in reach, bed alarm on, nurse notified and family present..    GOALS:   Multidisciplinary Problems     Physical Therapy Goals        Problem: Physical Therapy    Goal Priority Disciplines Outcome Goal Variances Interventions   Physical Therapy Goal     PT, PT/OT Ongoing, Progressing     Description: Goals to be met by: 4/10/22     Patient will  increase functional independence with mobility by performin. Supine to sit with Stand-by Assistance  2. Rolling to Left and Right with Stand-by Assistance.  3. Sit to stand transfer to be assessed  4. Bed to chair transfer with to be assessed  5. Gait to be assessed    6. Lower extremity exercise program x10 reps per handout, with supervision  .                      Time Tracking:     PT Received On: 22  PT Start Time: 1339     PT Stop Time: 1425  PT Total Time (min): 46 min     Billable Minutes: Therapeutic Activity 12, Therapeutic Exercise 11 and Total Time 46 min partial co-treat with OT    Treatment Type: Treatment  PT/PTA: PTA     PTA Visit Number: 1     2022

## 2022-03-29 NOTE — PLAN OF CARE
Problem: Occupational Therapy  Goal: Occupational Therapy Goal  Description: Goals to be met by: 04/10/2022    Patient will increase functional independence with ADL's by performing:    Feeding with Modified [place].  UE Dressing with Set-up Assistance.  LE Dressing with Minimal Assistance.  Grooming while EOB with Set-up Assistance.  Sitting at edge of bed x 10 minutes with Stand-by Assistance.  Stand-pivot transfers with Minimal Assistance.  Increase functional strength to WFL for improved performance of bed mob's, T/F's, and ADL's.    Outcome: Ongoing.

## 2022-03-29 NOTE — PROGRESS NOTES
[facility]  Gastroenterology  Progress Note    Patient Name: Radha Degroot  MRN: [id number]  Admission Date: 3/25/2022  Hospital Length of Stay: 4 days  Code Status: Prior   Attending Provider: Terry Pennington MD  Consulting Provider: Samm Billings MD  Primary Care Physician: Katherine Merida MD  Principal Problem: Closed left femoral fracture    Subjective:     Interval History:  82-year-old woman with recent orthopedic surgery.  We will consult of last week because of hematemesis in the setting of anticoagulation.  EGD not performed, empiric treatment with PPI.  On follow-up today she describes no further episodes of vomiting or hematemesis.  She has no appetite or interest in food, partly because she is unable to sit up.  She also complains of no bowel movement in nearly a week.  There is no significant abdominal pain.  She very rarely suffered from constipation previously.    Review of Systems  Objective:     Vital Signs (Most Recent):  Temp: 98.2 °F (36.8 °C) (03/29/22 0337)  Pulse: 72 (03/29/22 0337)  Resp: 20 (03/29/22 0337)  BP: (!) 145/61 (03/29/22 0337)  SpO2: 100 % (03/29/22 0337) Vital Signs (24h Range):  Temp:  [98 °F (36.7 °C)-99 °F (37.2 °C)] 98.2 °F (36.8 °C)  Pulse:  [68-75] 72  Resp:  [11-20] 20  SpO2:  [95 %-100 %] 100 %  BP: (113-145)/(56-67) 145/61     Weight: 99.2 kg (218 lb 9.6 oz) (03/26/22 0334)  Body mass index is 33.24 kg/m².      Intake/Output Summary (Last 24 hours) at 3/29/2022 0829  Last data filed at 3/29/2022 0621  Gross per 24 hour   Intake 600 ml   Output 925 ml   Net -325 ml       Lines/Drains/Airways     Peripheral Intravenous Line  Duration                Peripheral IV - Single Lumen 03/26/22 1237 20 G Right Antecubital 2 days                Physical Exam  Abdominal:      General: Abdomen is protuberant. Bowel sounds are normal. There is distension.      Palpations: Abdomen is soft. There is no shifting dullness or mass.      Tenderness: There is no abdominal  tenderness.         Significant Labs:  CBC:   Recent Labs   Lab 03/29/22  0448   WBC 7.56  7.56   HGB 7.4*  7.4*   HCT 24.0*  24.0*   *  113*     CMP:   Recent Labs   Lab 03/29/22  0448   *   CALCIUM 7.4*      K 5.4*   CO2 21*      *   CREATININE 3.9*         Significant Imaging:      Assessment/Plan:     Active Diagnoses:    Diagnosis Date Noted POA    PRINCIPAL PROBLEM:  Closed left femoral fracture [S72.92XA] 03/25/2022 Yes    Coffee ground emesis [K92.0] 03/26/2022 Yes    Permanent atrial fibrillation [I48.21] 04/25/2018 Yes    DM (diabetes mellitus) [E11.9] 02/20/2017 Yes    Chronic diastolic CHF (congestive heart failure) [I50.32] 05/18/2016 Yes    Essential hypertension [I10]  Yes     Chronic    Chronic kidney disease, stage IV (severe) [N18.4] 10/30/2012 Yes     Chronic    Anemia of chronic illness [D63.8]  Yes      Problems Resolved During this Admission:       Assessment  1. GI bleeding.  Hemoglobin is stable.  No intervention or EGD is needed at this time .  Will continue to monitor.  2. Constipation.  Probably multifactorial, inactivity, immobility, decreased fluid intake, pain medications, will get abdominal x-ray today and advise on treatment after that.      Thank you for your consult. I will follow-up with patient. Please contact us if you have any additional questions.    Samm Billings MD  Gastroenterology  [facility] [place] [facility]

## 2022-03-29 NOTE — PROGRESS NOTES
SNF Follow Up:    [facility] called [facility],  stated that admissions intake person isnt in and to call back in a hour.     UPDATE:   Patient accepted to [facility] via [facility]

## 2022-03-29 NOTE — PLAN OF CARE
Problem: Adult Inpatient Plan of Care  Goal: Plan of Care Review  Outcome: Ongoing, Progressing  Goal: Patient-Specific Goal (Individualized)  Outcome: Ongoing, Progressing  Goal: Absence of Hospital-Acquired Illness or Injury  Outcome: Ongoing, Progressing  Goal: Optimal Comfort and Wellbeing  Outcome: Ongoing, Progressing  Goal: Readiness for Transition of Care  Outcome: Ongoing, Progressing     Problem: Infection  Goal: Absence of Infection Signs and Symptoms  Outcome: Ongoing, Progressing     Problem: Fall Injury Risk  Goal: Absence of Fall and Fall-Related Injury  Outcome: Ongoing, Progressing     Problem: Skin Injury Risk Increased  Goal: Skin Health and Integrity  Outcome: Ongoing, Progressing     Problem: Bariatric Environmental Safety  Goal: Safety Maintained with Care  Outcome: Ongoing, Progressing

## 2022-03-29 NOTE — PROGRESS NOTES
[facility]  Progress Note    Patient Name: Radha Degroot  MRN: [id number]  Patient Class: IP- Inpatient   Admission Date: 3/25/2022  Length of Stay: 4 days  Attending Physician: Terry Pennington MD  Primary Care Provider: Katherine Merida MD        Subjective:     Principal Problem:Closed left femoral fracture        HPI:  Patient is an 83yo f with multiple extensive comorbid medical conditions which include persistent afib on eliquis, HIT, DM II, CAD S/P PCI 06, anemia, cardiomyopathy/CHF, carpal tunnel syndrome, CKD IV,  gout, uHLD and HTN presents to the ED due to left hip pain. Patient states she had a mechanical fall described as tripping over the floor carpet and landing on her left hip. Incident happened last night at 11pm, she has been laying at the same spot due to inability to move up until around 3-4pm today when son came to check up on her and saw her on the floor. EMS was called and patient was brought to the ER. Patient was found to have a left hip fracture and is being admitted to have ortho eval. She states she is completely independent and does all her ADLs by herself with no limitations. Denies any angina or CHFexacerbations symptoms. Also states her afib has been mostly fairly controlled      Overview/Hospital Course:  Patient admitted for L hip fx from mechanical fall at home.  Ortho was consulted. On 3/26, the patient had coffee ground emesis and GI was consulted.  Patient went for surgery on 3/26/22. PT/OT were consulted. Family requesting SNF placement. Patient with worsening renal function and nephrology was consulted on 3/28.      Interval History: No new issues     Review of Systems   Constitutional:  Positive for activity change. Negative for appetite change, chills, diaphoresis, fatigue and fever.   HENT:  Negative for congestion and dental problem.    Eyes:  Negative for discharge and itching.   Respiratory:  Negative for apnea, choking, chest  tightness, shortness of breath and stridor.    Cardiovascular:  Negative for chest pain, palpitations and leg swelling.   Gastrointestinal:  Negative for abdominal pain, nausea and vomiting.   Genitourinary:  Negative for difficulty urinating, dyspareunia, frequency and genital sores.   Musculoskeletal:  Positive for arthralgias.   Skin:  Negative for color change and pallor.   Neurological:  Negative for light-headedness and headaches.   Psychiatric/Behavioral:  Negative for agitation and behavioral problems.    Objective:     Vital Signs (Most Recent):  Temp: 98 °F (36.7 °C) (03/29/22 0910)  Pulse: 70 (03/29/22 0910)  Resp: 18 (03/29/22 0910)  BP: 128/60 (03/29/22 0910)  SpO2: 99 % (03/29/22 0910) Vital Signs (24h Range):  Temp:  [98 °F (36.7 °C)-99 °F (37.2 °C)] 98 °F (36.7 °C)  Pulse:  [68-75] 70  Resp:  [11-20] 18  SpO2:  [95 %-100 %] 99 %  BP: (113-145)/(56-67) 128/60     Weight: 99.2 kg (218 lb 9.6 oz)  Body mass index is 33.24 kg/m².    Intake/Output Summary (Last 24 hours) at 3/29/2022 1006  Last data filed at 3/29/2022 0621  Gross per 24 hour   Intake 480 ml   Output 925 ml   Net -445 ml      Physical Exam  Vitals and nursing note reviewed.   Constitutional:       General: She is not in acute distress.     Appearance: Normal appearance. She is obese. She is not ill-appearing, toxic-appearing or diaphoretic.   HENT:      Head: Normocephalic and atraumatic.   Cardiovascular:      Rate and Rhythm: Normal rate and regular rhythm.   Pulmonary:      Effort: Pulmonary effort is normal. No respiratory distress.      Breath sounds: No wheezing.   Abdominal:      Tenderness: There is no abdominal tenderness.   Skin:     General: Skin is warm and dry.   Neurological:      Mental Status: She is alert and oriented to person, place, and time.   Psychiatric:         Mood and Affect: Mood normal.         Behavior: Behavior normal.         Thought Content: Thought content normal.       Significant Labs: All pertinent labs  within the past 24 hours have been reviewed.  BMP:   Recent Labs   Lab 03/29/22  0448   *      K 5.4*      CO2 21*   *   CREATININE 3.9*   CALCIUM 7.4*     CBC:   Recent Labs   Lab 03/29/22  0448   WBC 7.56  7.56   HGB 7.4*  7.4*   HCT 24.0*  24.0*   *  113*       Significant Imaging:       Assessment/Plan:      * Closed left femoral fracture  Ortho consult  Adequate pain control  Keep NPO for possible ortho intervention tomorrow  Hold off on AC and antiplatelet in preparation for surgical intervention    Cardiac preop risk assessment  RCRI >11% (high risk), YEN 0.78%  Extensive history of cardiac comorbidities - CAD/PCI, CHF, Afib, however she states she is very functional and independent.   I do not think she needs any further cardiac workup, however she is at high risk from a cardiac standpoint for this intervention  Continue optimizing cardioprotective meds for good surgical outcome    S/p surgery on 3/26. PT/OT consult     To SNF once clinically stable.         Coffee ground emesis  Hgb every 8 hours and now. PPI bid. GI consult   Patient stable at the moment.     CBC pending this am. Follow GI recs     Resolved. Follow up GI      Permanent atrial fibrillation  In NSR during my eval  Restart cardioprotective meds  Holding off on AC for now in anticipation for surgery    Will restart NOAC       DM (diabetes mellitus)  Will start basal and bolus insulin  Adequate glycemic control for good wound healing    Uncontrolled with hyperglycemia with manifestations of CKD.  Increasing insulin      Chronic diastolic CHF (congestive heart failure)  Appears well compensated  Continue optimizing cardioprotective meds for good surgical outcome      Essential hypertension  Restart home HTN meds once identified   Adequate pain control      Chronic kidney disease, stage IV (severe)  Renal function at baseline  Continue supportive care    Now with some acute renal failure. Will start IV  fluids     With worsening function. Will consult nephrology       Anemia of chronic illness  At baseline        VTE Risk Mitigation (From admission, onward)         Ordered     apixaban tablet 2.5 mg  2 times daily         03/28/22 1007                Discharge Planning   TRISH:      Code Status: Prior   Is the patient medically ready for discharge?:     Reason for patient still in hospital (select all that apply): Patient unstable  Discharge Plan A: Skilled Nursing Facility   Discharge Delays: None known at this time              Terry Chawla MD  Department of Hospital Medicine   [facility]

## 2022-03-30 ENCOUNTER — OUTPATIENT CASE MANAGEMENT (OUTPATIENT)
Dept: ADMINISTRATIVE | Facility: OTHER | Age: 82
End: 2022-03-30
Payer: MEDICARE

## 2022-03-30 LAB
ANION GAP SERPL CALC-SCNC: 10 MMOL/L (ref 8–16)
BASOPHILS # BLD AUTO: 0.01 K/UL (ref 0–0.2)
BASOPHILS NFR BLD: 0.1 % (ref 0–1.9)
BLD PROD TYP BPU: NORMAL
BLOOD UNIT EXPIRATION DATE: NORMAL
BLOOD UNIT TYPE CODE: 7300
BLOOD UNIT TYPE: NORMAL
BUN SERPL-MCNC: 105 MG/DL (ref 8–23)
CALCIUM SERPL-MCNC: 6.9 MG/DL (ref 8.7–10.5)
CHLORIDE SERPL-SCNC: 108 MMOL/L (ref 95–110)
CO2 SERPL-SCNC: 20 MMOL/L (ref 23–29)
CODING SYSTEM: NORMAL
CREAT SERPL-MCNC: 3.1 MG/DL (ref 0.5–1.4)
DIFFERENTIAL METHOD: ABNORMAL
DISPENSE STATUS: NORMAL
EOSINOPHIL # BLD AUTO: 0 K/UL (ref 0–0.5)
EOSINOPHIL NFR BLD: 0.4 % (ref 0–8)
ERYTHROCYTE [DISTWIDTH] IN BLOOD BY AUTOMATED COUNT: 15.9 % (ref 11.5–14.5)
EST. GFR  (AFRICAN AMERICAN): 15 ML/MIN/1.73 M^2
EST. GFR  (NON AFRICAN AMERICAN): 13 ML/MIN/1.73 M^2
GLUCOSE SERPL-MCNC: 229 MG/DL (ref 70–110)
HCT VFR BLD AUTO: 23.5 % (ref 37–48.5)
HGB BLD-MCNC: 7.2 G/DL (ref 12–16)
IMM GRANULOCYTES # BLD AUTO: 0.03 K/UL (ref 0–0.04)
IMM GRANULOCYTES NFR BLD AUTO: 0.4 % (ref 0–0.5)
LYMPHOCYTES # BLD AUTO: 0.9 K/UL (ref 1–4.8)
LYMPHOCYTES NFR BLD: 12 % (ref 18–48)
MCH RBC QN AUTO: 28.5 PG (ref 27–31)
MCHC RBC AUTO-ENTMCNC: 30.6 G/DL (ref 32–36)
MCV RBC AUTO: 93 FL (ref 82–98)
MONOCYTES # BLD AUTO: 1.1 K/UL (ref 0.3–1)
MONOCYTES NFR BLD: 14.4 % (ref 4–15)
NEUTROPHILS # BLD AUTO: 5.5 K/UL (ref 1.8–7.7)
NEUTROPHILS NFR BLD: 72.7 % (ref 38–73)
NRBC BLD-RTO: 0 /100 WBC
NUM UNITS TRANS PACKED RBC: NORMAL
PLATELET # BLD AUTO: 130 K/UL (ref 150–450)
PMV BLD AUTO: 12 FL (ref 9.2–12.9)
POCT GLUCOSE: 234 MG/DL (ref 70–110)
POCT GLUCOSE: 252 MG/DL (ref 70–110)
POCT GLUCOSE: 255 MG/DL (ref 70–110)
POCT GLUCOSE: 263 MG/DL (ref 70–110)
POTASSIUM SERPL-SCNC: 4.5 MMOL/L (ref 3.5–5.1)
RBC # BLD AUTO: 2.53 M/UL (ref 4–5.4)
SODIUM SERPL-SCNC: 138 MMOL/L (ref 136–145)
WBC # BLD AUTO: 7.52 K/UL (ref 3.9–12.7)

## 2022-03-30 PROCEDURE — 85025 COMPLETE CBC W/AUTO DIFF WBC: CPT | Performed by: INTERNAL MEDICINE

## 2022-03-30 PROCEDURE — 97530 THERAPEUTIC ACTIVITIES: CPT

## 2022-03-30 PROCEDURE — 25000003 PHARM REV CODE 250: Performed by: INTERNAL MEDICINE

## 2022-03-30 PROCEDURE — C9113 INJ PANTOPRAZOLE SODIUM, VIA: HCPCS | Performed by: ORTHOPAEDIC SURGERY

## 2022-03-30 PROCEDURE — 97535 SELF CARE MNGMENT TRAINING: CPT

## 2022-03-30 PROCEDURE — 11000001 HC ACUTE MED/SURG PRIVATE ROOM

## 2022-03-30 PROCEDURE — 99231 SBSQ HOSP IP/OBS SF/LOW 25: CPT | Mod: ,,, | Performed by: INTERNAL MEDICINE

## 2022-03-30 PROCEDURE — 99231 PR SUBSEQUENT HOSPITAL CARE,LEVL I: ICD-10-PCS | Mod: ,,, | Performed by: INTERNAL MEDICINE

## 2022-03-30 PROCEDURE — 25000003 PHARM REV CODE 250: Performed by: NURSE PRACTITIONER

## 2022-03-30 PROCEDURE — 97130 THER IVNTJ EA ADDL 15 MIN: CPT

## 2022-03-30 PROCEDURE — 63600175 PHARM REV CODE 636 W HCPCS: Performed by: ORTHOPAEDIC SURGERY

## 2022-03-30 PROCEDURE — 97110 THERAPEUTIC EXERCISES: CPT

## 2022-03-30 PROCEDURE — 25000003 PHARM REV CODE 250: Performed by: ORTHOPAEDIC SURGERY

## 2022-03-30 PROCEDURE — 80048 BASIC METABOLIC PNL TOTAL CA: CPT | Performed by: INTERNAL MEDICINE

## 2022-03-30 PROCEDURE — 36415 COLL VENOUS BLD VENIPUNCTURE: CPT | Performed by: INTERNAL MEDICINE

## 2022-03-30 RX ORDER — OXYCODONE AND ACETAMINOPHEN 5; 325 MG/1; MG/1
1 TABLET ORAL EVERY 4 HOURS PRN
Status: DISCONTINUED | OUTPATIENT
Start: 2022-03-30 | End: 2022-04-04 | Stop reason: HOSPADM

## 2022-03-30 RX ORDER — CALCIUM CARBONATE 200(500)MG
500 TABLET,CHEWABLE ORAL 2 TIMES DAILY
Status: DISCONTINUED | OUTPATIENT
Start: 2022-03-30 | End: 2022-04-04 | Stop reason: HOSPADM

## 2022-03-30 RX ORDER — ALPRAZOLAM 0.5 MG/1
1 TABLET ORAL EVERY 8 HOURS PRN
Status: DISCONTINUED | OUTPATIENT
Start: 2022-03-30 | End: 2022-04-02

## 2022-03-30 RX ADMIN — CALCIUM CARBONATE (ANTACID) CHEW TAB 500 MG 500 MG: 500 CHEW TAB at 08:03

## 2022-03-30 RX ADMIN — COLCHICINE 0.6 MG: 0.6 TABLET, FILM COATED ORAL at 11:03

## 2022-03-30 RX ADMIN — INSULIN ASPART 6 UNITS: 100 INJECTION, SOLUTION INTRAVENOUS; SUBCUTANEOUS at 11:03

## 2022-03-30 RX ADMIN — PANTOPRAZOLE SODIUM 40 MG: 40 INJECTION, POWDER, FOR SOLUTION INTRAVENOUS at 11:03

## 2022-03-30 RX ADMIN — CARVEDILOL 12.5 MG: 12.5 TABLET, FILM COATED ORAL at 08:03

## 2022-03-30 RX ADMIN — ALPRAZOLAM 1 MG: 0.5 TABLET ORAL at 08:03

## 2022-03-30 RX ADMIN — OXYCODONE HYDROCHLORIDE AND ACETAMINOPHEN 500 MG: 500 TABLET ORAL at 11:03

## 2022-03-30 RX ADMIN — INSULIN ASPART 3 UNITS: 100 INJECTION, SOLUTION INTRAVENOUS; SUBCUTANEOUS at 08:03

## 2022-03-30 RX ADMIN — FLUTICASONE PROPIONATE 100 MCG: 50 SPRAY, METERED NASAL at 11:03

## 2022-03-30 RX ADMIN — ATORVASTATIN CALCIUM 80 MG: 40 TABLET, FILM COATED ORAL at 11:03

## 2022-03-30 RX ADMIN — METOPROLOL SUCCINATE 25 MG: 25 TABLET, EXTENDED RELEASE ORAL at 11:03

## 2022-03-30 RX ADMIN — MUPIROCIN: 20 OINTMENT TOPICAL at 11:03

## 2022-03-30 RX ADMIN — CARVEDILOL 12.5 MG: 12.5 TABLET, FILM COATED ORAL at 11:03

## 2022-03-30 RX ADMIN — SODIUM CHLORIDE: 0.9 INJECTION, SOLUTION INTRAVENOUS at 04:03

## 2022-03-30 RX ADMIN — INSULIN DETEMIR 35 UNITS: 100 INJECTION, SOLUTION SUBCUTANEOUS at 11:03

## 2022-03-30 RX ADMIN — CALCIUM CARBONATE (ANTACID) CHEW TAB 500 MG 500 MG: 500 CHEW TAB at 11:03

## 2022-03-30 RX ADMIN — PANTOPRAZOLE SODIUM 40 MG: 40 INJECTION, POWDER, FOR SOLUTION INTRAVENOUS at 08:03

## 2022-03-30 RX ADMIN — MUPIROCIN: 20 OINTMENT TOPICAL at 08:03

## 2022-03-30 NOTE — ASSESSMENT & PLAN NOTE
Renal function at baseline  Continue supportive care    Now with some acute renal failure. Will start IV fluids     With worsening function. Will consult nephrology     Acute on chronic renal failure. Follow nephrology recs. BMP pending

## 2022-03-30 NOTE — PROGRESS NOTES
"Date of Admission:3/25/2022    SUBJECTIVE:pt notes concern of "2 way" talking    Current Facility-Administered Medications   Medication    0.9%  NaCl infusion (for blood administration)    ALPRAZolam tablet 1 mg    apixaban tablet 2.5 mg    ascorbic acid (vitamin C) tablet 500 mg    atorvastatin tablet 80 mg    calcium carbonate 200 mg calcium (500 mg) chewable tablet 500 mg    carvediloL tablet 12.5 mg    colchicine capsule/tablet 0.6 mg    dextrose 10% bolus 125 mL    fentaNYL 50 mcg/mL injection 25 mcg    fluticasone propionate 50 mcg/actuation nasal spray 100 mcg    glucagon (human recombinant) injection 1 mg    HYDROcodone-acetaminophen  mg per tablet 1 tablet    HYDROmorphone (PF) injection 0.2 mg    insulin aspart U-100 pen 1-10 Units    insulin detemir U-100 pen 35 Units    metoprolol succinate (TOPROL-XL) 24 hr tablet 25 mg    morphine injection 2 mg    mupirocin 2 % ointment    ondansetron injection 4 mg    pantoprazole injection 40 mg    sodium chloride 0.9% flush 10 mL    sodium zirconium cyclosilicate packet 10 g    tiZANidine tablet 4 mg       Wt Readings from Last 3 Encounters:   03/26/22 99.2 kg (218 lb 9.6 oz)   03/10/22 97.1 kg (214 lb)   12/07/21 100 kg (220 lb 7.4 oz)     Temp Readings from Last 3 Encounters:   03/30/22 98.4 °F (36.9 °C) (Oral)   03/10/22 98 °F (36.7 °C)   12/07/21 98.2 °F (36.8 °C)     BP Readings from Last 3 Encounters:   03/30/22 (!) 153/80   03/10/22 137/65   12/07/21 (!) 140/62     Pulse Readings from Last 3 Encounters:   03/30/22 (!) 114   03/10/22 60   12/07/21 60       Intake/Output Summary (Last 24 hours) at 3/30/2022 1045  Last data filed at 3/30/2022 0822  Gross per 24 hour   Intake 240 ml   Output 800 ml   Net -560 ml       PE:  GEN:wd female in nad  HEENT:ncat,eomi,mm  CVS:s1s2 regular  PULM:ctab  ABD:bs, soft  EXT:trace leg edema, dressing of left thigh  NEURO:awake, alert      Recent Labs   Lab 03/30/22  0931   *      K " 4.5      CO2 20*   *   CREATININE 3.1*   CALCIUM 6.9*       Lab Results   Component Value Date    .3 (H) 08/11/2021    CALCIUM 6.9 (LL) 03/30/2022    PHOS 3.6 08/11/2021       Recent Labs   Lab 03/30/22  0931   WBC 7.52   RBC 2.53*   HGB 7.2*   HCT 23.5*   *   MCV 93   MCH 28.5   MCHC 30.6*           A/P:  1.dora. on ckd 4. [place] creatinine better. Stop ivifs.  2.s/p orif left leg. No nsaids.   3.dm2. following sugars.  4.anemia 2nd to acute loss.[place] cbc low. Following. No prbc today.  5.htn. no arb.  6.gib. cont ppi per gi. Watch for ain.  7.proteinuira. no arb /ace for now.  8.afib. rate ok.  9.hyperkalemia.  Better. Can do short course of lokelma.  10.hypocalcemia. not corrected. Pt needs to eat more.   Pt can see Mohare next 2-3weeks. Pt going to [facility] today. Would [place] creatinine at [facility] and f/u with her nephrologist.

## 2022-03-30 NOTE — PT/OT/SLP PROGRESS
"Physical Therapy Treatment    Patient Name:  Radha Degroot   MRN:  [id number]    Recommendations:     Discharge Recommendations:  nursing facility, skilled   Discharge Equipment Recommendations:  (Per SNF)   Barriers to discharge: Inaccessible home, Decreased caregiver support and Pt is not functioning at Independent baseline and is at increased risk for falls, readmission, and morbidity if she returns to her home or anyone else's home at present time     Assessment:     Radha Degroot is a 82 y.o. female admitted with a medical diagnosis of Closed left femoral fracture.  She presents with the following impairments/functional limitations:  weakness, impaired balance, decreased safety awareness, impaired skin, impaired endurance, pain, impaired self care skills, decreased coordination, decreased ROM, impaired functional mobilty, decreased upper extremity function, impaired coordination, decreased lower extremity function, impaired fine motor (Anxiety) Pt is self limiting and is making poor functional progress toward PT gals      Rehab Prognosis: Fair; patient would benefit from acute skilled PT services to address these deficits and reach maximum level of function.    Recent Surgery: Procedure(s) (LRB):  INSERTION, INTRAMEDULLARY SARA, FEMUR (Left) 4 Days Post-Op    Plan:     During this hospitalization, patient to be seen daily to address the identified rehab impairments via gait training, therapeutic activities, therapeutic exercises and progress toward the following goals:    · Plan of Care Expires:  04/10/22    Subjective     Chief Complaint: pain, fear of falling, "woozy", fear of pain  Patient/Family Comments/goals: to lay back down, "Let me do it"  Pain/Comfort:  · Pain Rating 1:  (Not rated, sometimes pt screaming out in pain and others no signs of pain with distraction.)  · Location - Orientation 1:  (L groin)      Objective:     Communicated with nsg prior to session.  Patient found HOB elevated with " "telemetry, PureWick, pressure relief boots upon PT entry to room.     General Precautions: Standard, fall   Orthopedic Precautions:LLE weight bearing as tolerated   Braces: N/A  Respiratory Status: Room air     Functional Mobility:  · Bed Mobility:     · Rolling Left:  total assistance and of 2 persons with Max VC/TC for reaching for HR and body mechanics x 2 trials  · Rolling Right: total assistance and of 2 persons with Max Vc/TC for reaching for HR and body mechanics x 3 trials   · Scooting: Total  assistance and of 2 persons in sitting to EOB, CGA with VC/TC for hand placement and bridging to HOB in supine with bed in Reverse trendelenburg.  Max A x 2 persons in sitting along EOB with VC/Tc for hand placement and forward weight shift over KAJAL  · Supine to Sit: total assistance and of 2 persons with VC/TC for body mechanics, hand placement, and HOB elevated  · Sit to Supine: dependence with Max VC/TC for hooking L LE with R and body mechanics    AM-PAC 6 CLICK MOBILITY  Turning over in bed (including adjusting bedclothes, sheets and blankets)?: 2  Sitting down on and standing up from a chair with arms (e.g., wheelchair, bedside commode, etc.): 1  Moving from lying on back to sitting on the side of the bed?: 2  Moving to and from a bed to a chair (including a wheelchair)?: 1  Need to walk in hospital room?: 1  Climbing 3-5 steps with a railing?: 1  Basic Mobility Total Score: 8       Therapeutic Activities and Exercises:   Pt sat at EOB with SBA and VC for hand placement and forward weight shift over KAJAL between BR, but unable to perform "rocking or Pre-stand.  Pt resistant to all mobility.  Wants to do it herself, but then says that she can't.  Handout provided to and reviewed with patient for AP's, GS, and QS to be performed throughout the day while in bed.  Pt verbalized/demonstrated understanding x 10 reps with Max Vc/TC and education.  Needs reinforcement!  Patient left HOB elevated with all lines intact, " call button in reach, bed alarm on and nsg notified..    GOALS:   Multidisciplinary Problems     Physical Therapy Goals        Problem: Physical Therapy    Goal Priority Disciplines Outcome Goal Variances Interventions   Physical Therapy Goal     PT, PT/OT Ongoing, Not Progressing     Description: Goals to be met by: 4/10/22     Patient will increase functional independence with mobility by performin. Supine to sit with Stand-by Assistance  2. Rolling to Left and Right with Stand-by Assistance.  3. Sit to stand transfer to be assessed  4. Bed to chair transfer with to be assessed  5. Gait to be assessed    6. Lower extremity exercise program x10 reps per handout, with supervision  .                      Time Tracking:     PT Received On: 22  PT Start Time: 1331     PT Stop Time: 1430  PT Total Time (min): 59 min     Billable Minutes: Therapeutic Activity 15 and Therapeutic Exercise 15 Co-treat with OT    Treatment Type: Treatment  PT/PTA: PT     PTA Visit Number: 0     2022

## 2022-03-30 NOTE — PT/OT/SLP PROGRESS
Occupational Therapy   Treatment    Name: Radha Degroot  MRN: [id number]  Admitting Diagnosis:  Closed left femoral fracture  4 Days Post-Op    Recommendations:     Discharge Recommendations: nursing facility, skilled  Discharge Equipment Recommendations:   (TBD SNF)  Barriers to discharge:   (Acuity, persistant anxiety responses in all transitional movements.)    Assessment:     Radha Degroot is a 82 y.o. female with a medical diagnosis of Closed left femoral fracture. The Patient presents with  persistent and significant  free floating anxiety responses during mobilization interventions, pursuant decreased effective information integration as well as marked gravitational insecurity and limb guarding. Performance deficits affecting function are weakness, impaired self care skills, impaired balance, decreased coordination, decreased safety awareness, decreased ROM, pain, decreased lower extremity function, decreased upper extremity function, impaired functional mobilty, impaired endurance, impaired coordination, impaired joint extensibility.     Rehab Prognosis:  Fair; patient would benefit from acute skilled OT services to address these deficits and reach maximum level of function.       Plan:     Patient to be seen 5 x/week to address the above listed problems via self-care/home management, therapeutic activities, therapeutic exercises  Plan of Care Expires: 04/10/22  Plan of Care Reviewed with: patient, family    Subjective     Pain/Comfort:   Patient unable to clarify or quantify, however guarding behaviors are profound.     Objective:     Communicated with: RN prior to session.  Patient found HOB elevated low Mcgee's with telemetry, PureWick, pressure relief boots upon OT entry to room.    General Precautions: Standard, fall   Orthopedic Precautions:LLE weight bearing as tolerated   Braces: N/A  Respiratory Status: Room air     Occupational Performance:     Bed Mobility:    Patient Required total assist of 2  therapy staff   for L<> R Rolling/Turning, Scooting and Bridging as well as Supine <> Sit. Max supportive feedback required throughout and verbal/tactile cues for limb placement and hand rails use.     Functional Mobility/Transfers: Patient unable to complete stand multiple trials, severe gravitational insecurity inhibitory.   Supportive family member present at outset. Patient verbally ejected said family member from room.     Activities of Daily Living:  Feeding:  set up    Grooming: min a in long sit  Upper Body Dressing: maximal assistance behaviors avoidant  Lower Body Dressing: total assistance behaviors guarded  Toileting: dependence staff assist of 2      [facility] 6 Click ADL: 9    Treatment & Education:  *Patient agreeable to therapy session with encouragement required and Family member support at outset.  Lights on / shade open.  SC:   *Basic self-care tasks and rudimentary functional mobility undertaken -- assist levels noted above.  TE:   Bed mobility training, core stability awareness/recovery actions seated EOB  *Education  ongoing for deep relaxed/restorative breathing tech as needed for non Rx pain management/MM relaxation, and to support internal self reguation.  Fair (-) return demonstration, f/u recommended.   Limb differentiation training in unsupported sitting. Follow up planned 2* high internal distraction and sub optimal information integration.    SESSION'S END:  *Questions/concerns within OT scope addressed.      Patient left HOB elevated 70 degrees with all lines intact, call button in reach, and RN  and Son in law  present    GOALS:   Multidisciplinary Problems       Occupational Therapy Goals          Problem: Occupational Therapy    Goal Priority Disciplines Outcome Interventions   Occupational Therapy Goal     OT, PT/OT Ongoing, Progressing    Description: Goals to be met by: 04/10/2022    Patient will increase functional independence with ADL's by performing:    Feeding with Modified  [place].  UE Dressing with Set-up Assistance.  LE Dressing with Minimal Assistance.  Grooming while EOB with Set-up Assistance.  Sitting at edge of bed x 10 minutes with Stand-by Assistance.  Stand-pivot transfers with Minimal Assistance.  Increase functional strength to WFL for improved performance of bed mob's, T/F's, and ADL's.                         Time Tracking:     OT Date of Treatment: 03/30/22  OT Start Time: 0130  OT Stop Time: 0231  OT Total Time (min): 61 min    Billable Minutes:Self Care/Home Management 15  Cognitive Retraining 15    Co-tx performed for this visit due to patient need for two skilled therapists to ensure patient and staff safety and to accommodate for patient activity tolerance.    Skills of 2 clinicians required 2* Patient BMI/orientation levels and compromised task initiation/task tolerance levels.  PT focus on dyn sitting and stamina/ LB management, OT focus on energy conservation/jont protection edu during ADLs, caregiver training re: Patient specific cog support requirements and in room safety to bolster volition, and support safest effective engagement levels in present care setting.     Pt continues to benefit from a collaborative therapeutic program to improve quality of life and sustain focus on impairment resolution.  Patient progressing steadily towards goals     3/30/2022

## 2022-03-30 NOTE — PLAN OF CARE
Problem: Occupational Therapy  Goal: Occupational Therapy Goal  Description: Goals to be met by: 04/10/2022    Patient will increase functional independence with ADL's by performing:    Feeding with Modified [place].  UE Dressing with Set-up Assistance.  LE Dressing with Minimal Assistance.  Grooming while EOB with Set-up Assistance.  Sitting at edge of bed x 10 minutes with Stand-by Assistance.  Stand-pivot transfers with Minimal Assistance.  Increase functional strength to WFL for improved performance of bed mob's, T/F's, and ADL's.    Outcome: Ongoing

## 2022-03-30 NOTE — PLAN OF CARE
Per Shyla with [facility], patient can be accepted, once patient clear for discharge. Auth has been received.

## 2022-03-30 NOTE — PLAN OF CARE
Pt free from falls, injury or any further trauma throughout shift. Pt AAO x4 . Continued medications as ordered. Complaints of spasms of LE, PRN med administered, blood glucose monitored as ordered. Dressing to left hip intact with moist drainage. Pt on  RA, cardiac monitoring in place. IVF infusing as ordered. Pt in no distress. Call light in reach, bed locked in lowest position. Will cont to monitor.      Problem: Adult Inpatient Plan of Care  Goal: Plan of Care Review  Outcome: Ongoing, Progressing     Problem: Infection  Goal: Absence of Infection Signs and Symptoms  Outcome: Ongoing, Progressing     Problem: Diabetes Comorbidity  Goal: Blood Glucose Level Within Targeted Range  Outcome: Ongoing, Progressing     Problem: Fall Injury Risk  Goal: Absence of Fall and Fall-Related Injury  Outcome: Ongoing, Progressing     Problem: Skin Injury Risk Increased  Goal: Skin Health and Integrity  Outcome: Ongoing, Progressing

## 2022-03-30 NOTE — PLAN OF CARE
Problem: Physical Therapy  Goal: Physical Therapy Goal  Description: Goals to be met by: 4/10/22     Patient will increase functional independence with mobility by performin. Supine to sit with Stand-by Assistance  2. Rolling to Left and Right with Stand-by Assistance.  3. Sit to stand transfer to be assessed  4. Bed to chair transfer with to be assessed  5. Gait to be assessed    6. Lower extremity exercise program x10 reps per handout, with supervision  .     Outcome: Ongoing, Not Progressing   Pt making poor functional progress 2/2 anxiety, pain, and fear.  Continue with POC, SNF recommended

## 2022-03-30 NOTE — SUBJECTIVE & OBJECTIVE
Interval History:  No new issues     Review of Systems   Constitutional:  Positive for activity change. Negative for appetite change, chills, diaphoresis, fatigue and fever.   HENT:  Negative for congestion and dental problem.    Eyes:  Negative for discharge and itching.   Respiratory:  Negative for apnea, choking, chest tightness, shortness of breath and stridor.    Cardiovascular:  Negative for chest pain, palpitations and leg swelling.   Gastrointestinal:  Negative for abdominal pain, nausea and vomiting.   Genitourinary:  Negative for difficulty urinating, dyspareunia, frequency and genital sores.   Musculoskeletal:  Positive for arthralgias.   Skin:  Negative for color change and pallor.   Neurological:  Negative for light-headedness and headaches.   Psychiatric/Behavioral:  Negative for agitation and behavioral problems.    Objective:     Vital Signs (Most Recent):  Temp: 98.4 °F (36.9 °C) (03/30/22 0731)  Pulse: (!) 114 (03/30/22 0731)  Resp: 18 (03/30/22 0731)  BP: (!) 153/80 (03/30/22 0731)  SpO2: 96 % (03/30/22 0731)   Vital Signs (24h Range):  Temp:  [97.3 °F (36.3 °C)-98.4 °F (36.9 °C)] 98.4 °F (36.9 °C)  Pulse:  [] 114  Resp:  [18] 18  SpO2:  [92 %-100 %] 96 %  BP: (102-153)/(46-87) 153/80     Weight: 99.2 kg (218 lb 9.6 oz)  Body mass index is 33.24 kg/m².    Intake/Output Summary (Last 24 hours) at 3/30/2022 0858  Last data filed at 3/30/2022 0822  Gross per 24 hour   Intake 480 ml   Output 800 ml   Net -320 ml      Physical Exam  Vitals and nursing note reviewed.   Constitutional:       General: She is not in acute distress.     Appearance: Normal appearance. She is obese. She is not ill-appearing, toxic-appearing or diaphoretic.   HENT:      Head: Normocephalic and atraumatic.   Cardiovascular:      Rate and Rhythm: Normal rate and regular rhythm.   Pulmonary:      Effort: Pulmonary effort is normal. No respiratory distress.      Breath sounds: No wheezing.   Abdominal:      Tenderness: There  is no abdominal tenderness.   Skin:     General: Skin is warm and dry.   Neurological:      Mental Status: She is alert and oriented to person, place, and time.   Psychiatric:         Mood and Affect: Mood normal.         Behavior: Behavior normal.         Thought Content: Thought content normal.       Significant Labs: All pertinent labs within the past 24 hours have been reviewed.  BMP:   Recent Labs   Lab 03/29/22 0448   *      K 5.4*      CO2 21*   *   CREATININE 3.9*   CALCIUM 7.4*     CBC:   Recent Labs   Lab 03/29/22 0448   WBC 7.56  7.56   HGB 7.4*  7.4*   HCT 24.0*  24.0*   *  113*       Significant Imaging:

## 2022-03-30 NOTE — PROGRESS NOTES
[facility] Medicine  Progress Note    Patient Name: Radha Degroot  MRN: [id number]  Patient Class: IP- Inpatient   Admission Date: 3/25/2022  Length of Stay: 5 days  Attending Physician: Terry Pennington MD  Primary Care Provider: Katherine Merida MD        Subjective:     Principal Problem:Closed left femoral fracture        HPI:  Patient is an 83yo f with multiple extensive comorbid medical conditions which include persistent afib on eliquis, HIT, DM II, CAD S/P PCI 06, anemia, cardiomyopathy/CHF, carpal tunnel syndrome, CKD IV,  gout, uHLD and HTN presents to the ED due to left hip pain. Patient states she had a mechanical fall described as tripping over the floor carpet and landing on her left hip. Incident happened last night at 11pm, she has been laying at the same spot due to inability to move up until around 3-4pm today when son came to check up on her and saw her on the floor. EMS was called and patient was brought to the ER. Patient was found to have a left hip fracture and is being admitted to have ortho eval. She states she is completely independent and does all her ADLs by herself with no limitations. Denies any angina or CHFexacerbations symptoms. Also states her afib has been mostly fairly controlled      Overview/Hospital Course:  Patient admitted for L hip fx from mechanical fall at home.  Ortho was consulted. On 3/26, the patient had coffee ground emesis and GI was consulted.  Patient went for surgery on 3/26/22. PT/OT were consulted. Family requesting SNF placement. Patient with worsening renal function and nephrology was consulted on 3/28. PT/OT rec: SNF       Interval History:  No new issues     Review of Systems   Constitutional:  Positive for activity change. Negative for appetite change, chills, diaphoresis, fatigue and fever.   HENT:  Negative for congestion and dental problem.    Eyes:  Negative for discharge and itching.   Respiratory:  Negative for apnea,  choking, chest tightness, shortness of breath and stridor.    Cardiovascular:  Negative for chest pain, palpitations and leg swelling.   Gastrointestinal:  Negative for abdominal pain, nausea and vomiting.   Genitourinary:  Negative for difficulty urinating, dyspareunia, frequency and genital sores.   Musculoskeletal:  Positive for arthralgias.   Skin:  Negative for color change and pallor.   Neurological:  Negative for light-headedness and headaches.   Psychiatric/Behavioral:  Negative for agitation and behavioral problems.    Objective:     Vital Signs (Most Recent):  Temp: 98.4 °F (36.9 °C) (03/30/22 0731)  Pulse: (!) 114 (03/30/22 0731)  Resp: 18 (03/30/22 0731)  BP: (!) 153/80 (03/30/22 0731)  SpO2: 96 % (03/30/22 0731)   Vital Signs (24h Range):  Temp:  [97.3 °F (36.3 °C)-98.4 °F (36.9 °C)] 98.4 °F (36.9 °C)  Pulse:  [] 114  Resp:  [18] 18  SpO2:  [92 %-100 %] 96 %  BP: (102-153)/(46-87) 153/80     Weight: 99.2 kg (218 lb 9.6 oz)  Body mass index is 33.24 kg/m².    Intake/Output Summary (Last 24 hours) at 3/30/2022 0858  Last data filed at 3/30/2022 0822  Gross per 24 hour   Intake 480 ml   Output 800 ml   Net -320 ml      Physical Exam  Vitals and nursing note reviewed.   Constitutional:       General: She is not in acute distress.     Appearance: Normal appearance. She is obese. She is not ill-appearing, toxic-appearing or diaphoretic.   HENT:      Head: Normocephalic and atraumatic.   Cardiovascular:      Rate and Rhythm: Normal rate and regular rhythm.   Pulmonary:      Effort: Pulmonary effort is normal. No respiratory distress.      Breath sounds: No wheezing.   Abdominal:      Tenderness: There is no abdominal tenderness.   Skin:     General: Skin is warm and dry.   Neurological:      Mental Status: She is alert and oriented to person, place, and time.   Psychiatric:         Mood and Affect: Mood normal.         Behavior: Behavior normal.         Thought Content: Thought content normal.        Significant Labs: All pertinent labs within the past 24 hours have been reviewed.  BMP:   Recent Labs   Lab 03/29/22  0448   *      K 5.4*      CO2 21*   *   CREATININE 3.9*   CALCIUM 7.4*     CBC:   Recent Labs   Lab 03/29/22  0448   WBC 7.56  7.56   HGB 7.4*  7.4*   HCT 24.0*  24.0*   *  113*       Significant Imaging:       Assessment/Plan:      * Closed left femoral fracture  Ortho consult  Adequate pain control  Keep NPO for possible ortho intervention tomorrow  Hold off on AC and antiplatelet in preparation for surgical intervention    Cardiac preop risk assessment  RCRI >11% (high risk), YEN 0.78%  Extensive history of cardiac comorbidities - CAD/PCI, CHF, Afib, however she states she is very functional and independent.   I do not think she needs any further cardiac workup, however she is at high risk from a cardiac standpoint for this intervention  Continue optimizing cardioprotective meds for good surgical outcome    S/p surgery on 3/26. PT/OT consult     To SNF once clinically stable.         Coffee ground emesis  Hgb every 8 hours and now. PPI bid. GI consult   Patient stable at the moment.     CBC pending this am. Follow GI recs     Resolved. Follow up GI      Permanent atrial fibrillation  In NSR during my eval  Restart cardioprotective meds  Holding off on AC for now in anticipation for surgery    Will restart NOAC       DM (diabetes mellitus)  Will start basal and bolus insulin  Adequate glycemic control for good wound healing    Uncontrolled with hyperglycemia with manifestations of CKD.  Increasing insulin      Chronic diastolic CHF (congestive heart failure)  Appears well compensated  Continue optimizing cardioprotective meds for good surgical outcome      Essential hypertension  Restart home HTN meds once identified   Adequate pain control      Chronic kidney disease, stage IV (severe)  Renal function at baseline  Continue supportive care    Now with  some acute renal failure. Will start IV fluids     With worsening function. Will consult nephrology     Acute on chronic renal failure. Follow nephrology recs. BMP pending       Anemia of chronic illness  At baseline      L and R ankle pain- could be gout with elevated uric acid   On colchicine.  X ray of L ankle not remarkable      VTE Risk Mitigation (From admission, onward)         Ordered     apixaban tablet 2.5 mg  2 times daily         03/28/22 1007                Discharge Planning   TRISH:      Code Status: Prior   Is the patient medically ready for discharge?:     Reason for patient still in hospital (select all that apply): Patient unstable  Discharge Plan A: Skilled Nursing Facility   Discharge Delays: None known at this time              Terry Chawla MD  Department of Hospital Medicine   [facility] Surg

## 2022-03-30 NOTE — PROGRESS NOTES
[facility] Surg  Gastroenterology  Progress Note    Patient Name: Radha Degroot  MRN: [id number]  Admission Date: 3/25/2022  Hospital Length of Stay: 5 days  Code Status: Prior   Attending Provider: Terry Pennington MD  Consulting Provider: Samm Billings MD  Primary Care Physician: Katherine Merida MD  Principal Problem: Closed left femoral fracture    Subjective:     Interval History:  Patient complains of more reflux symptoms then she has had.  Poor appetite, not eating very much.  She has not had a bowel movement but does not feel uncomfortable in that regard.  X-ray yesterday showed some mild constipation but it did not show any concerning colonic dilatation    Review of Systems  Objective:     Vital Signs (Most Recent):  Temp: 98.4 °F (36.9 °C) (03/30/22 0731)  Pulse: (!) 114 (03/30/22 0731)  Resp: 18 (03/30/22 0731)  BP: (!) 153/80 (03/30/22 0731)  SpO2: 96 % (03/30/22 0731) Vital Signs (24h Range):  Temp:  [97.3 °F (36.3 °C)-98.4 °F (36.9 °C)] 98.4 °F (36.9 °C)  Pulse:  [] 114  Resp:  [18] 18  SpO2:  [92 %-100 %] 96 %  BP: (102-153)/(46-87) 153/80     Weight: 99.2 kg (218 lb 9.6 oz) (03/26/22 0334)  Body mass index is 33.24 kg/m².      Intake/Output Summary (Last 24 hours) at 3/30/2022 1053  Last data filed at 3/30/2022 0822  Gross per 24 hour   Intake 240 ml   Output 800 ml   Net -560 ml       Lines/Drains/Airways     Peripheral Intravenous Line  Duration                Peripheral IV - Single Lumen 03/26/22 1237 20 G Right Antecubital 3 days                Physical Exam    Significant Labs:  CBC:   Recent Labs   Lab 03/29/22  0448 03/30/22  0931   WBC 7.56  7.56 7.52   HGB 7.4*  7.4* 7.2*   HCT 24.0*  24.0* 23.5*   *  113* 130*     BMP:   Recent Labs   Lab 03/30/22 0931   *      K 4.5      CO2 20*   *   CREATININE 3.1*   CALCIUM 6.9*     CMP:   Recent Labs   Lab 03/30/22 0931   *   CALCIUM 6.9*      K 4.5   CO2 20*      *    CREATININE 3.1*         Significant Imaging:      Assessment/Plan:     Active Diagnoses:    Diagnosis Date Noted POA    PRINCIPAL PROBLEM:  Closed left femoral fracture [S72.92XA] 03/25/2022 Yes    Coffee ground emesis [K92.0] 03/26/2022 Yes    Permanent atrial fibrillation [I48.21] 04/25/2018 Yes    DM (diabetes mellitus) [E11.9] 02/20/2017 Yes    Chronic diastolic CHF (congestive heart failure) [I50.32] 05/18/2016 Yes    Essential hypertension [I10]  Yes     Chronic    Chronic kidney disease, stage IV (severe) [N18.4] 10/30/2012 Yes     Chronic    Anemia of chronic illness [D63.8]  Yes      Problems Resolved During this Admission:       Assessment  1. History of GI bleed manifest as hematemesis did not have EGD initially because of the anticoagulation.  Discussed with primary team and we will proceed with EGD tomorrow as her discharge to skilled nursing facility will hopefully be soon  2. Gastroesophageal reflux.  With her being supine essentially for a week that would definitely lead to some significant reflux.  As soon as she can start sitting up more that would be beneficial.  I will review her PPI dosing.  3. Constipation.  Difficult issue with immobility and pain medicines, I would continue to use lactulose to see if that initiates a bowel movement      Thank you for your consult. I will follow-up with patient. Please contact us if you have any additional questions.    Samm Billings MD  Gastroenterology  [facility] Surg

## 2022-03-31 ENCOUNTER — ANESTHESIA (OUTPATIENT)
Dept: ENDOSCOPY | Facility: HOSPITAL | Age: 82
DRG: 481 | End: 2022-03-31
Payer: MEDICARE

## 2022-03-31 ENCOUNTER — ANESTHESIA EVENT (OUTPATIENT)
Dept: ENDOSCOPY | Facility: HOSPITAL | Age: 82
DRG: 481 | End: 2022-03-31
Payer: MEDICARE

## 2022-03-31 LAB
ABO + RH BLD: NORMAL
ANION GAP SERPL CALC-SCNC: 7 MMOL/L (ref 8–16)
BLD GP AB SCN CELLS X3 SERPL QL: NORMAL
BUN SERPL-MCNC: 95 MG/DL (ref 8–23)
CALCIUM SERPL-MCNC: 7.8 MG/DL (ref 8.7–10.5)
CHLORIDE SERPL-SCNC: 112 MMOL/L (ref 95–110)
CO2 SERPL-SCNC: 24 MMOL/L (ref 23–29)
CREAT SERPL-MCNC: 2.8 MG/DL (ref 0.5–1.4)
EST. GFR  (AFRICAN AMERICAN): 17 ML/MIN/1.73 M^2
EST. GFR  (NON AFRICAN AMERICAN): 15 ML/MIN/1.73 M^2
GLUCOSE SERPL-MCNC: 103 MG/DL (ref 70–110)
HCT VFR BLD AUTO: 23.3 % (ref 37–48.5)
HGB BLD-MCNC: 7.3 G/DL (ref 12–16)
POCT GLUCOSE: 146 MG/DL (ref 70–110)
POCT GLUCOSE: 164 MG/DL (ref 70–110)
POCT GLUCOSE: 173 MG/DL (ref 70–110)
POCT GLUCOSE: 220 MG/DL (ref 70–110)
POTASSIUM SERPL-SCNC: 4.2 MMOL/L (ref 3.5–5.1)
SODIUM SERPL-SCNC: 143 MMOL/L (ref 136–145)

## 2022-03-31 PROCEDURE — 63600175 PHARM REV CODE 636 W HCPCS: Performed by: STUDENT IN AN ORGANIZED HEALTH CARE EDUCATION/TRAINING PROGRAM

## 2022-03-31 PROCEDURE — 37000008 HC ANESTHESIA 1ST 15 MINUTES: Performed by: INTERNAL MEDICINE

## 2022-03-31 PROCEDURE — 86920 COMPATIBILITY TEST SPIN: CPT | Performed by: ANESTHESIOLOGY

## 2022-03-31 PROCEDURE — 25000003 PHARM REV CODE 250: Performed by: INTERNAL MEDICINE

## 2022-03-31 PROCEDURE — 36415 COLL VENOUS BLD VENIPUNCTURE: CPT | Performed by: ANESTHESIOLOGY

## 2022-03-31 PROCEDURE — 0004A HC IMMUNIZ ADMIN, SARS-COV-2 COVID-19 VACC, 30MCG/0.3ML, BOOSTER DOSE: CPT | Performed by: INTERNAL MEDICINE

## 2022-03-31 PROCEDURE — C9399 UNCLASSIFIED DRUGS OR BIOLOG: HCPCS | Performed by: INTERNAL MEDICINE

## 2022-03-31 PROCEDURE — 37000009 HC ANESTHESIA EA ADD 15 MINS: Performed by: INTERNAL MEDICINE

## 2022-03-31 PROCEDURE — 80048 BASIC METABOLIC PNL TOTAL CA: CPT | Performed by: INTERNAL MEDICINE

## 2022-03-31 PROCEDURE — D9220A PRA ANESTHESIA: ICD-10-PCS | Mod: ANES,,, | Performed by: ANESTHESIOLOGY

## 2022-03-31 PROCEDURE — 86850 RBC ANTIBODY SCREEN: CPT | Performed by: ANESTHESIOLOGY

## 2022-03-31 PROCEDURE — D9220A PRA ANESTHESIA: Mod: CRNA,,, | Performed by: STUDENT IN AN ORGANIZED HEALTH CARE EDUCATION/TRAINING PROGRAM

## 2022-03-31 PROCEDURE — 11000001 HC ACUTE MED/SURG PRIVATE ROOM

## 2022-03-31 PROCEDURE — 85014 HEMATOCRIT: CPT | Performed by: ANESTHESIOLOGY

## 2022-03-31 PROCEDURE — 63600175 PHARM REV CODE 636 W HCPCS: Performed by: ORTHOPAEDIC SURGERY

## 2022-03-31 PROCEDURE — 97530 THERAPEUTIC ACTIVITIES: CPT

## 2022-03-31 PROCEDURE — 94761 N-INVAS EAR/PLS OXIMETRY MLT: CPT

## 2022-03-31 PROCEDURE — 85018 HEMOGLOBIN: CPT | Performed by: ANESTHESIOLOGY

## 2022-03-31 PROCEDURE — 25000003 PHARM REV CODE 250: Performed by: ORTHOPAEDIC SURGERY

## 2022-03-31 PROCEDURE — 91300 PHARM REV CODE 636 W HCPCS: CPT | Performed by: INTERNAL MEDICINE

## 2022-03-31 PROCEDURE — D9220A PRA ANESTHESIA: Mod: ANES,,, | Performed by: ANESTHESIOLOGY

## 2022-03-31 PROCEDURE — 36415 COLL VENOUS BLD VENIPUNCTURE: CPT | Performed by: INTERNAL MEDICINE

## 2022-03-31 PROCEDURE — 97535 SELF CARE MNGMENT TRAINING: CPT

## 2022-03-31 PROCEDURE — 63600175 PHARM REV CODE 636 W HCPCS: Performed by: INTERNAL MEDICINE

## 2022-03-31 PROCEDURE — 43235 EGD DIAGNOSTIC BRUSH WASH: CPT | Performed by: INTERNAL MEDICINE

## 2022-03-31 PROCEDURE — 43235 PR EGD, FLEX, DIAGNOSTIC: ICD-10-PCS | Mod: ,,, | Performed by: INTERNAL MEDICINE

## 2022-03-31 PROCEDURE — 97110 THERAPEUTIC EXERCISES: CPT

## 2022-03-31 PROCEDURE — 43235 EGD DIAGNOSTIC BRUSH WASH: CPT | Mod: ,,, | Performed by: INTERNAL MEDICINE

## 2022-03-31 PROCEDURE — 25000003 PHARM REV CODE 250: Performed by: STUDENT IN AN ORGANIZED HEALTH CARE EDUCATION/TRAINING PROGRAM

## 2022-03-31 PROCEDURE — C9113 INJ PANTOPRAZOLE SODIUM, VIA: HCPCS | Performed by: ORTHOPAEDIC SURGERY

## 2022-03-31 PROCEDURE — D9220A PRA ANESTHESIA: ICD-10-PCS | Mod: CRNA,,, | Performed by: STUDENT IN AN ORGANIZED HEALTH CARE EDUCATION/TRAINING PROGRAM

## 2022-03-31 RX ORDER — PANTOPRAZOLE SODIUM 40 MG/1
40 TABLET, DELAYED RELEASE ORAL 2 TIMES DAILY
Qty: 60 TABLET | Refills: 11
Start: 2022-03-31 | End: 2022-06-30 | Stop reason: SDUPTHER

## 2022-03-31 RX ORDER — COLCHICINE 0.6 MG/1
0.6 TABLET ORAL DAILY
Qty: 30 TABLET | Refills: 11
Start: 2022-04-01 | End: 2022-04-27

## 2022-03-31 RX ORDER — LIDOCAINE HYDROCHLORIDE 20 MG/ML
INJECTION INTRAVENOUS
Status: DISCONTINUED | OUTPATIENT
Start: 2022-03-31 | End: 2022-03-31

## 2022-03-31 RX ORDER — DOCUSATE SODIUM 100 MG/1
100 CAPSULE, LIQUID FILLED ORAL 2 TIMES DAILY
Status: DISCONTINUED | OUTPATIENT
Start: 2022-03-31 | End: 2022-04-02

## 2022-03-31 RX ORDER — HYDROCODONE BITARTRATE AND ACETAMINOPHEN 10; 325 MG/1; MG/1
1 TABLET ORAL EVERY 6 HOURS PRN
Qty: 20 TABLET | Refills: 0 | Status: SHIPPED | OUTPATIENT
Start: 2022-03-31 | End: 2022-04-27

## 2022-03-31 RX ORDER — DEXTROMETHORPHAN POLISTIREX 30 MG/5 ML
1 SUSPENSION, EXTENDED RELEASE 12 HR ORAL ONCE
Status: COMPLETED | OUTPATIENT
Start: 2022-03-31 | End: 2022-03-31

## 2022-03-31 RX ORDER — PROPOFOL 10 MG/ML
INJECTION, EMULSION INTRAVENOUS
Status: DISPENSED
Start: 2022-03-31 | End: 2022-03-31

## 2022-03-31 RX ORDER — LIDOCAINE HYDROCHLORIDE 20 MG/ML
INJECTION, SOLUTION EPIDURAL; INFILTRATION; INTRACAUDAL; PERINEURAL
Status: DISPENSED
Start: 2022-03-31 | End: 2022-03-31

## 2022-03-31 RX ORDER — OXYCODONE AND ACETAMINOPHEN 5; 325 MG/1; MG/1
1 TABLET ORAL EVERY 4 HOURS PRN
Qty: 20 TABLET | Refills: 0 | Status: SHIPPED | OUTPATIENT
Start: 2022-03-31 | End: 2022-06-30

## 2022-03-31 RX ORDER — PROPOFOL 10 MG/ML
VIAL (ML) INTRAVENOUS
Status: DISCONTINUED | OUTPATIENT
Start: 2022-03-31 | End: 2022-03-31

## 2022-03-31 RX ORDER — HYDROCODONE BITARTRATE AND ACETAMINOPHEN 500; 5 MG/1; MG/1
TABLET ORAL
Status: DISCONTINUED | OUTPATIENT
Start: 2022-03-31 | End: 2022-04-04 | Stop reason: HOSPADM

## 2022-03-31 RX ADMIN — CARVEDILOL 12.5 MG: 12.5 TABLET, FILM COATED ORAL at 10:03

## 2022-03-31 RX ADMIN — DOCUSATE SODIUM 100 MG: 100 CAPSULE, LIQUID FILLED ORAL at 08:03

## 2022-03-31 RX ADMIN — ALPRAZOLAM 1 MG: 0.5 TABLET ORAL at 03:03

## 2022-03-31 RX ADMIN — PANTOPRAZOLE SODIUM 40 MG: 40 INJECTION, POWDER, FOR SOLUTION INTRAVENOUS at 10:03

## 2022-03-31 RX ADMIN — COLCHICINE 0.6 MG: 0.6 TABLET, FILM COATED ORAL at 10:03

## 2022-03-31 RX ADMIN — METOPROLOL SUCCINATE 25 MG: 25 TABLET, EXTENDED RELEASE ORAL at 10:03

## 2022-03-31 RX ADMIN — OXYCODONE HYDROCHLORIDE AND ACETAMINOPHEN 500 MG: 500 TABLET ORAL at 10:03

## 2022-03-31 RX ADMIN — INSULIN ASPART 2 UNITS: 100 INJECTION, SOLUTION INTRAVENOUS; SUBCUTANEOUS at 08:03

## 2022-03-31 RX ADMIN — BNT162B2 0.3 ML: 0.23 INJECTION, SUSPENSION INTRAMUSCULAR at 02:03

## 2022-03-31 RX ADMIN — PROPOFOL 10 MG: 10 INJECTION, EMULSION INTRAVENOUS at 08:03

## 2022-03-31 RX ADMIN — CALCIUM CARBONATE (ANTACID) CHEW TAB 500 MG 500 MG: 500 CHEW TAB at 10:03

## 2022-03-31 RX ADMIN — LIDOCAINE HYDROCHLORIDE 140 MG: 20 INJECTION, SOLUTION INTRAVENOUS at 07:03

## 2022-03-31 RX ADMIN — CARVEDILOL 12.5 MG: 12.5 TABLET, FILM COATED ORAL at 08:03

## 2022-03-31 RX ADMIN — PROPOFOL 30 MG: 10 INJECTION, EMULSION INTRAVENOUS at 07:03

## 2022-03-31 RX ADMIN — INSULIN DETEMIR 35 UNITS: 100 INJECTION, SOLUTION SUBCUTANEOUS at 10:03

## 2022-03-31 RX ADMIN — ATORVASTATIN CALCIUM 80 MG: 40 TABLET, FILM COATED ORAL at 10:03

## 2022-03-31 RX ADMIN — MINERAL OIL 1 ENEMA: 100 ENEMA RECTAL at 05:03

## 2022-03-31 RX ADMIN — SODIUM CHLORIDE: 0.9 INJECTION, SOLUTION INTRAVENOUS at 07:03

## 2022-03-31 RX ADMIN — FLUTICASONE PROPIONATE 100 MCG: 50 SPRAY, METERED NASAL at 10:03

## 2022-03-31 RX ADMIN — DOCUSATE SODIUM 100 MG: 100 CAPSULE, LIQUID FILLED ORAL at 11:03

## 2022-03-31 RX ADMIN — CALCIUM CARBONATE (ANTACID) CHEW TAB 500 MG 500 MG: 500 CHEW TAB at 08:03

## 2022-03-31 NOTE — PLAN OF CARE
Patient awake, denies any pain; has her eyeglasses. Discharge criteria met; transferred back to the floor; handoff given to MIGUEL Lucas.

## 2022-03-31 NOTE — PLAN OF CARE
Plan of care snf orders sent to [facility] via care port, pending review for call report. Placed call to Robina with [facility], left detailed voice message.    2:20pm Per Robina with [facility], nurse can call report to Stephania [phone]. Pt and daughter notified [facility] for transportation, verbalized acceptance and understanding.    03/31/22 1238   Post-Acute Status   Post-Acute Authorization Placement   Post-Acute Placement Status Pending payor medical review/second level review   Coverage [employer]   Patient choice form signed by patient/caregiver List from System Post-Acute Care   Discharge Delays None known at this time   Discharge Plan   Discharge Plan A Skilled Nursing Facility

## 2022-03-31 NOTE — SUBJECTIVE & OBJECTIVE
Interval History:  No new issues     Review of Systems   Constitutional:  Positive for activity change. Negative for appetite change, chills, diaphoresis, fatigue and fever.   HENT:  Negative for congestion and dental problem.    Eyes:  Negative for discharge and itching.   Respiratory:  Negative for apnea, choking, chest tightness, shortness of breath and stridor.    Cardiovascular:  Negative for chest pain, palpitations and leg swelling.   Gastrointestinal:  Negative for abdominal pain, nausea and vomiting.   Genitourinary:  Negative for difficulty urinating, dyspareunia, frequency and genital sores.   Musculoskeletal:  Positive for arthralgias.   Skin:  Negative for color change and pallor.   Neurological:  Negative for light-headedness and headaches.   Psychiatric/Behavioral:  Negative for agitation and behavioral problems.    Objective:     Vital Signs (Most Recent):  Temp: 97.8 °F (36.6 °C) (03/31/22 0812)  Pulse: 78 (03/31/22 0842)  Resp: 16 (03/31/22 0842)  BP: (!) 168/80 (03/31/22 0842)  SpO2: 99 % (room air) (03/31/22 0842)   Vital Signs (24h Range):  Temp:  [97.8 °F (36.6 °C)-98.4 °F (36.9 °C)] 97.8 °F (36.6 °C)  Pulse:  [] 78  Resp:  [15-20] 16  SpO2:  [96 %-100 %] 99 %  BP: (122-168)/(59-80) 168/80     Weight: 99.2 kg (218 lb 9.6 oz)  Body mass index is 33.24 kg/m².    Intake/Output Summary (Last 24 hours) at 3/31/2022 1021  Last data filed at 3/31/2022 0813  Gross per 24 hour   Intake 820 ml   Output 800 ml   Net 20 ml      Physical Exam  Vitals and nursing note reviewed.   Constitutional:       General: She is not in acute distress.     Appearance: Normal appearance. She is obese. She is not ill-appearing, toxic-appearing or diaphoretic.   HENT:      Head: Normocephalic and atraumatic.   Cardiovascular:      Rate and Rhythm: Normal rate and regular rhythm.   Pulmonary:      Effort: Pulmonary effort is normal. No respiratory distress.      Breath sounds: No wheezing.   Abdominal:      Tenderness:  There is no abdominal tenderness.   Skin:     General: Skin is warm and dry.   Neurological:      Mental Status: She is alert and oriented to person, place, and time.   Psychiatric:         Mood and Affect: Mood normal.         Behavior: Behavior normal.         Thought Content: Thought content normal.       Significant Labs: All pertinent labs within the past 24 hours have been reviewed.  BMP:   Recent Labs   Lab 03/30/22  0931   *      K 4.5      CO2 20*   *   CREATININE 3.1*   CALCIUM 6.9*     CBC:   Recent Labs   Lab 03/30/22  0931 03/31/22  0815   WBC 7.52  --    HGB 7.2* 7.3*   HCT 23.5* 23.3*   *  --        Significant Imaging:

## 2022-03-31 NOTE — TRANSFER OF CARE
"Anesthesia Transfer of Care Note    Patient: Radha Degroot    Procedure(s) Performed: Procedure(s) (LRB):  EGD (ESOPHAGOGASTRODUODENOSCOPY) (N/A)    Patient location: GI    Anesthesia Type: general    Transport from OR: Transported from OR on room air with adequate spontaneous ventilation    Post pain: adequate analgesia    Post assessment: no apparent anesthetic complications and tolerated procedure well    Post vital signs: stable    Level of consciousness: sedated and responds to stimulation    Nausea/Vomiting: no nausea/vomiting    Complications: none    Transfer of care protocol was followed      Last vitals:   Visit Vitals  BP (!) 122/59 (BP Location: Left arm, Patient Position: Lying)   Pulse 90   Temp 36.6 °C (97.8 °F) (Skin)   Resp 18   Ht 5' 8" (1.727 m)   Wt 99.2 kg (218 lb 9.6 oz)   SpO2 100%   Breastfeeding Unknown   BMI 33.24 kg/m²     "

## 2022-03-31 NOTE — NURSING
Pfizer COVID vaccine booster administered to patient Left deltoid. Patient tolerated well, no bleeding note; bandaid applied.Will continue to monitor.

## 2022-03-31 NOTE — PLAN OF CARE
03/31/22 1405   Medicare Message   Important Message from Medicare regarding Discharge Appeal Rights Given to patient/caregiver;Explained to patient/caregiver;Other (comments)  (TN spoke with patient's daughter, Maria Fernanda Wallace via phone [phone], verbalized understanding copy sent certified to address in chart. [phone])   Date IMM was signed 03/31/22   Time IMM was signed [id number]

## 2022-03-31 NOTE — NURSING
16:45:  Called report to Tasha at [facility] in [place], [place]. Tasha voiced that patient unable to be accepted at this time due to no BM regardless to notation that patient abd xray result and patient lack of consuming regular meals (eating mostly liquid or snacking here and there). MD and  made aware of patient return to room.     Pt returned to her room and RN requested mineral oil enema. MD ordered and gave ok to leave patient without PIV at this time.     Patient and family updated on plan of care.

## 2022-03-31 NOTE — PLAN OF CARE
ADT 30 order placed for stretcher Transportation.  Requested  time:2:00pm   If transportation does not arrive at ETA time nurse will be instructed to follow protocol for transportation below:   How can I get in touch directly with dispatch, if needed?                 Non-emergent dispatch: [phone]      +++NURSING:  If stretcher does not arrive at requested time please call the above Non Emergent Dispatcher.  If issue not resolved please escalate to your charge nurse for further instructions.    Transport to [facility], [address], [place] [address]

## 2022-03-31 NOTE — PLAN OF CARE
[facility]     Department of Hospital Medicine     [address] [place] [address]     [phone] after hours  [phone] fax           4/4/22    Admit to Nursing Home: [facility]    Skilled Bed                                                 Diagnoses: Weakness/s/p L hip fx.     Active Hospital Problems    Diagnosis  POA    *Closed left femoral fracture [S72.92XA]  Yes     Priority: 1 - High    Coffee ground emesis [K92.0]  Yes    Permanent atrial fibrillation [I48.21]  Yes    DM (diabetes mellitus) [E11.9]  Yes    Chronic diastolic CHF (congestive heart failure) [I50.32]  Yes    Essential hypertension [I10]  Yes     Chronic    Chronic kidney disease, stage IV (severe) [N18.4]  Yes     Chronic    Anemia of chronic illness [D63.8]  Yes      Resolved Hospital Problems   No resolved problems to display.       Patient is homebound due to:  Closed left femoral fracture    Allergies:  Review of patient's allergies indicates:   Allergen Reactions    Heparin analogues Other (See Comments)     thrombocytopenia    Ancef [cefazolin]     Keflex [cephalexin] Rash    Oxybutynin Other (See Comments)     Metallic taste       Vitals:     Routine    Diet: ADA 2000 casimiro, low NA     Acitivities:    - Up in a chair each morning as tolerated   - Ambulate with assistance to bathroom     LABS:  Per facility protocol  CBC/BMP Wednesday        Nursing Precautions:   - Aspiration precautions:             - Total assistance with meals            -  Upright 90 degrees befor during and after meals             -  Suction at bedside          - Fall precautions per nursing home protocol     - Decubitus precautions:        -  for positioning   - Pressure reducing foam mattress   - Turn patient every two hours. Use wedge pillows to anchor patient    CONSULTS:     Physical Therapy to evaluate and treat     Occupational Therapy to evaluate and treat       DIABETES CARE:     Check  blood sugar:          Fingerstick blood sugar AC and HS   Fingerstick blood sugar every 6 hours if unable to eat      Report CBG < 60 or > 400 to physician.                                          Insulin Sliding Scale          Glucose  Novolog Insulin Subcutaneous        0 - 60   Orange juice or glucose tablet, hold insulin      No insulin   201-250  2 units   251-300  4 units   301-350  6 units   351-400  8 units   >400   10 units then call physician      Medications: Discontinue all previous medication orders, if any. See new list below.          Medication List      START taking these medications    colchicine 0.6 mg tablet  Commonly known as: COLCRYS  Take 1 tablet (0.6 mg total) by mouth once daily.  Start taking on: April 1, 2022     HYDROcodone-acetaminophen 5-325 mg per tablet  Commonly known as: NORCO  Take 1 tablet by mouth every 6 (six) hours as needed.     pantoprazole 40 MG tablet  Commonly known as: PROTONIX  Take 1 tablet (40 mg total) by mouth 2 (two) times daily.        CONTINUE taking these medications    apixaban 2.5 mg Tab  Commonly known as: ELIQUIS  Take 1 tablet (2.5 mg total) by mouth 2 (two) times a day.     ascorbic acid (vitamin C) 100 MG tablet  Commonly known as: VITAMIN C  Take 100 mg by mouth once daily.     B COMPLEX VITAMINS ORAL  Take by mouth once daily.     carvediloL 12.5 MG tablet  Commonly known as: COREG  Take 1 tablet (12.5 mg total) by mouth 2 (two) times a day.     CENTRUM SILVER WOMEN ORAL  Take by mouth once daily.     fluticasone propionate 50 mcg/actuation nasal spray  Commonly known as: FLONASE  SHAKE LIQUID AND USE 2 SPRAYS IN EACH NOSTRIL EVERY DAY     furosemide 20 MG tablet  Commonly known as: LASIX  Take 40 mg of lasix at 8 a.m. and 20 mg at 3 p.m.     insulin aspart U-100 100 unit/mL (3 mL) Inpn pen  Commonly known as: NovoLOG  Inject 14 units three times daily before meals     ketoconazole 2 % shampoo  Commonly known as: NIZORAL  APPLY TO AFFECTED AREA  "EVERY OTHER DAY AND LET SIT 5 MINUTES PRIOR TO RINSING AS DIRECTED     rosuvastatin 40 MG Tab  Commonly known as: CRESTOR  TAKE 1 TABLET(40 MG) BY MOUTH EVERY DAY     TRESIBA FLEXTOUCH U-100 100 unit/mL (3 mL) insulin pen  Generic drug: insulin degludec  INJECT 32 UNITS UNDER THE SKIN DAILY     VITAMIN D ORAL  Take by mouth once daily.     VITAMIN E (BULK) MISC  by Misc.(Non-Drug; Combo Route) route once daily.        STOP taking these medications    blood sugar diagnostic Strp  Commonly known as: ACCU-CHEK MELIDA PLUS TEST STRP     blood-glucose meter Misc  Commonly known as: ACCU-CHEK MELIDA PLUS METER     lancets Misc  Commonly known as: ACCU-CHEK MULTICLIX LANCET     pen needle, diabetic 32 gauge x 5/32" Ndle  Commonly known as: BD TIM 2ND GEN PEN NEEDLE                  _________________________________  Parish Gregory MD  04/04/2022  "

## 2022-03-31 NOTE — PROGRESS NOTES
Date of Admission:3/25/2022    SUBJECTIVE:pt notes doing better family    Current Facility-Administered Medications   Medication    0.9%  NaCl infusion (for blood administration)    0.9%  NaCl infusion (for blood administration)    ALPRAZolam tablet 1 mg    ascorbic acid (vitamin C) tablet 500 mg    atorvastatin tablet 80 mg    calcium carbonate 200 mg calcium (500 mg) chewable tablet 500 mg    carvediloL tablet 12.5 mg    colchicine capsule/tablet 0.6 mg    dextrose 10% bolus 125 mL    docusate sodium capsule 100 mg    fluticasone propionate 50 mcg/actuation nasal spray 100 mcg    glucagon (human recombinant) injection 1 mg    HYDROcodone-acetaminophen  mg per tablet 1 tablet    insulin aspart U-100 pen 1-10 Units    insulin detemir U-100 pen 35 Units    LIDOcaine (PF) 20 mg/mL (2%) 20 mg/mL (2 %) injection    metoprolol succinate (TOPROL-XL) 24 hr tablet 25 mg    morphine injection 2 mg    oxyCODONE-acetaminophen 5-325 mg per tablet 1 tablet    pantoprazole injection 40 mg    propofoL (DIPRIVAN) 10 mg/mL infusion    sars-cov-2 (covid-19) (Pfizer COVID-19) 30 mcg/0.3 ml injection 0.3 mL    sodium zirconium cyclosilicate packet 10 g    tiZANidine tablet 4 mg       Wt Readings from Last 3 Encounters:   03/26/22 99.2 kg (218 lb 9.6 oz)   03/10/22 97.1 kg (214 lb)   12/07/21 100 kg (220 lb 7.4 oz)     Temp Readings from Last 3 Encounters:   03/31/22 97.8 °F (36.6 °C) (Skin)   03/10/22 98 °F (36.7 °C)   12/07/21 98.2 °F (36.8 °C)     BP Readings from Last 3 Encounters:   03/31/22 (!) 168/80   03/10/22 137/65   12/07/21 (!) 140/62     Pulse Readings from Last 3 Encounters:   03/31/22 78   03/10/22 60   12/07/21 60       Intake/Output Summary (Last 24 hours) at 3/31/2022 1041  Last data filed at 3/31/2022 0813  Gross per 24 hour   Intake 820 ml   Output 800 ml   Net 20 ml       PE:  GEN:wd female in nad  HEENT:ncat,eomi,mm  CVS:s1s2 regular  PULM:ctab  ABD:bs, soft  EXT:no legedema of  rt  NEURO:awake, alert      Recent Labs   Lab 03/31/22  0943         K 4.2   *   CO2 24   BUN 95*   CREATININE 2.8*   CALCIUM 7.8*       Lab Results   Component Value Date    .3 (H) 08/11/2021    CALCIUM 7.8 (L) 03/31/2022    PHOS 3.6 08/11/2021       Recent Labs   Lab 03/31/22  0815   HGB 7.3*   HCT 23.3*           A/P:  1.dora. on ckd 4. [place] creatinine better again.  2.s/p orif left leg. No nsaids.   3.dm2. following sugars.  4.anemia 2nd to acute loss.dakota cbc low. Following. No prbc today.  5.htn. no arb.  6.gib. cont ppi per gi. Watch for ain.  7.proteinuira. no arb /ace for now.  8.afib. rate ok.  9.hyperkalemia.  Better. Can do short course of lokelma.  10.hypocalcemia. not corrected. Pt needs to eat more.   Pt can see Mohare next 2-3weeks. Pt going to [facility] today. Would dakota creatinine at [facility] and f/u with her nephrologist. Will sign off. Thanks.

## 2022-03-31 NOTE — PROGRESS NOTES
[facility] Medicine  Progress Note    Patient Name: Radha Degroot  MRN: [id number]  Patient Class: IP- Inpatient   Admission Date: 3/25/2022  Length of Stay: 6 days  Attending Physician: Terry Pennington MD  Primary Care Provider: Katherine Merdia MD        Subjective:     Principal Problem:Closed left femoral fracture        HPI:  Patient is an 81yo f with multiple extensive comorbid medical conditions which include persistent afib on eliquis, HIT, DM II, CAD S/P PCI 06, anemia, cardiomyopathy/CHF, carpal tunnel syndrome, CKD IV,  gout, uHLD and HTN presents to the ED due to left hip pain. Patient states she had a mechanical fall described as tripping over the floor carpet and landing on her left hip. Incident happened last night at 11pm, she has been laying at the same spot due to inability to move up until around 3-4pm today when son came to check up on her and saw her on the floor. EMS was called and patient was brought to the ER. Patient was found to have a left hip fracture and is being admitted to have ortho eval. She states she is completely independent and does all her ADLs by herself with no limitations. Denies any angina or CHFexacerbations symptoms. Also states her afib has been mostly fairly controlled      Overview/Hospital Course:  Patient admitted for L hip fx from mechanical fall at home.  Ortho was consulted. On 3/26, the patient had coffee ground emesis and GI was consulted.  Patient went for surgery on 3/26/22. PT/OT were consulted. Family requesting SNF placement. Patient with worsening renal function and nephrology was consulted on 3/28. PT/OT rec: SNF.  Ortho consulted for ankle pain.  GI took patient for EGD on 3/31- showed esophagitis.       Interval History:  No new issues     Review of Systems   Constitutional:  Positive for activity change. Negative for appetite change, chills, diaphoresis, fatigue and fever.   HENT:  Negative for congestion and dental problem.     Eyes:  Negative for discharge and itching.   Respiratory:  Negative for apnea, choking, chest tightness, shortness of breath and stridor.    Cardiovascular:  Negative for chest pain, palpitations and leg swelling.   Gastrointestinal:  Negative for abdominal pain, nausea and vomiting.   Genitourinary:  Negative for difficulty urinating, dyspareunia, frequency and genital sores.   Musculoskeletal:  Positive for arthralgias.   Skin:  Negative for color change and pallor.   Neurological:  Negative for light-headedness and headaches.   Psychiatric/Behavioral:  Negative for agitation and behavioral problems.    Objective:     Vital Signs (Most Recent):  Temp: 97.8 °F (36.6 °C) (03/31/22 0812)  Pulse: 78 (03/31/22 0842)  Resp: 16 (03/31/22 0842)  BP: (!) 168/80 (03/31/22 0842)  SpO2: 99 % (room air) (03/31/22 0842)   Vital Signs (24h Range):  Temp:  [97.8 °F (36.6 °C)-98.4 °F (36.9 °C)] 97.8 °F (36.6 °C)  Pulse:  [] 78  Resp:  [15-20] 16  SpO2:  [96 %-100 %] 99 %  BP: (122-168)/(59-80) 168/80     Weight: 99.2 kg (218 lb 9.6 oz)  Body mass index is 33.24 kg/m².    Intake/Output Summary (Last 24 hours) at 3/31/2022 1021  Last data filed at 3/31/2022 0813  Gross per 24 hour   Intake 820 ml   Output 800 ml   Net 20 ml      Physical Exam  Vitals and nursing note reviewed.   Constitutional:       General: She is not in acute distress.     Appearance: Normal appearance. She is obese. She is not ill-appearing, toxic-appearing or diaphoretic.   HENT:      Head: Normocephalic and atraumatic.   Cardiovascular:      Rate and Rhythm: Normal rate and regular rhythm.   Pulmonary:      Effort: Pulmonary effort is normal. No respiratory distress.      Breath sounds: No wheezing.   Abdominal:      Tenderness: There is no abdominal tenderness.   Skin:     General: Skin is warm and dry.   Neurological:      Mental Status: She is alert and oriented to person, place, and time.   Psychiatric:         Mood and Affect: Mood normal.          Behavior: Behavior normal.         Thought Content: Thought content normal.       Significant Labs: All pertinent labs within the past 24 hours have been reviewed.  BMP:   Recent Labs   Lab 03/30/22  0931   *      K 4.5      CO2 20*   *   CREATININE 3.1*   CALCIUM 6.9*     CBC:   Recent Labs   Lab 03/30/22  0931 03/31/22  0815   WBC 7.52  --    HGB 7.2* 7.3*   HCT 23.5* 23.3*   *  --        Significant Imaging:       Assessment/Plan:      * Closed left femoral fracture  Ortho consult  Adequate pain control  Keep NPO for possible ortho intervention tomorrow  Hold off on AC and antiplatelet in preparation for surgical intervention    Cardiac preop risk assessment  RCRI >11% (high risk), YEN 0.78%  Extensive history of cardiac comorbidities - CAD/PCI, CHF, Afib, however she states she is very functional and independent.   I do not think she needs any further cardiac workup, however she is at high risk from a cardiac standpoint for this intervention  Continue optimizing cardioprotective meds for good surgical outcome    S/p surgery on 3/26. PT/OT consult     To SNF once clinically stable.         Coffee ground emesis  Hgb every 8 hours and now. PPI bid. GI consult   Patient stable at the moment.     CBC pending this am. Follow GI recs     Resolved. Follow up GI    EGD on 3/31- esophagitis. Continue PPI    Permanent atrial fibrillation  In NSR during my eval  Restart cardioprotective meds  Holding off on AC for now in anticipation for surgery    Will restart NOAC       DM (diabetes mellitus)  Will start basal and bolus insulin  Adequate glycemic control for good wound healing    Uncontrolled with hyperglycemia with manifestations of CKD.  Increasing insulin      Chronic diastolic CHF (congestive heart failure)  Appears well compensated  Continue optimizing cardioprotective meds for good surgical outcome      Essential hypertension  Restart home HTN meds once identified   Adequate pain  control      Chronic kidney disease, stage IV (severe)  Renal function at baseline  Continue supportive care    Now with some acute renal failure. Will start IV fluids     With worsening function. Will consult nephrology     Acute on chronic renal failure. Follow nephrology recs. BMP pending     Renal stated ok to discharge on 3/31/22.    Anemia of chronic illness  At baseline        VTE Risk Mitigation (From admission, onward)    None          Discharge Planning   TRISH:      Code Status: Prior   Is the patient medically ready for discharge?:     Reason for patient still in hospital (select all that apply): Patient unstable  Discharge Plan A: Skilled Nursing Facility   Discharge Delays: None known at this time              Terry Chawla MD  Department of Hospital Medicine   [facility] - Med Surg

## 2022-03-31 NOTE — PT/OT/SLP PROGRESS
Occupational Therapy   Treatment    Name: Radha Degroot  MRN: [id number]  Admitting Diagnosis:  Closed left femoral fracture  Day of Surgery    Recommendations:     Discharge Recommendations: nursing facility, skilled  Discharge Equipment Recommendations:   (TBD at SNF level)  Barriers to discharge:   (High risk of untrained caregiver strain.)    Assessment:     Radha Degroot is a 82 y.o. female with a medical diagnosis of Closed left femoral fracture. Daughter Maria Fernanda present and supportive this date. Patient agreeable with encouragement for OOB mobility trial engagements. Performance deficits affecting function are weakness, impaired sensation, impaired functional mobilty, impaired balance, decreased upper extremity function, impaired cognition, impaired endurance, impaired self care skills, decreased coordination, decreased lower extremity function, decreased safety awareness, pain, decreased ROM, impaired coordination, impaired skin, edema, impaired joint extensibility.     Rehab Prognosis:  Fair; patient would benefit from acute skilled OT services to address these deficits and reach maximum level of function.       Plan:     Patient to be seen 5 x/week to address the above listed problems via self-care/home management, therapeutic activities, therapeutic exercises  · Plan of Care Expires: 04/10/22  · Plan of Care Reviewed with: patient, daughter    Subjective     Pain/Comfort:  · Pain Rating 1:  (Patient reporting decreased resting pain, unable to clarify or quantify however.)    Objective:     Communicated with: RN prior to session.  Patient found HOB elevated low Mcgee's with telemetry, PureWick, pressure relief boots upon OT entry to room.    General Precautions: Standard, fall   Orthopedic Precautions:LLE weight bearing as tolerated   Braces: N/A  Respiratory Status: Room air     Occupational Performance:     Bed Mobility:    · Patient completed Rolling/Turning to Right with maximal  assistance  · Patient completed Scooting/Bridging with minimum assistance and 2 persons  · Patient completed Supine to Sit with maximal assistance and 2 persons  · Patient completed Sit to Supine with maximal assistance and 2 persons     Functional Mobility/Transfers:  · Patient completed Sit <> Stand Transfer with maximal assistance and of 2 persons  with  rolling walker   · Functional Mobility: Right lateral to anterior HEB seated weight shift and seatded/partial push ups for return to bed prep max a 1< 2 staff and sheet/pad assist also required this date. Improved information integration as session progressed, however fullest direction following remains an area of concern.     Activities of Daily Living:  · Patient off unit am for testing. Daughter and Patient report appetite intact.   · Grooming: stand by assistance facial hygiene, max a hair care  · Upper Body Dressing: maximal assistance gown, mod a emergent  · Lower Body Dressing: total assistance urine soaked socks removed prior to return to bed.   · Toileting: total assistance of 2 staff in stand bedside 2* bladder management, hygiene and floor attention to prevent safety risks to patient.       [facility] 6 Click ADL: 11    Treatment & Education:  Orienting interventions provided. Patient response Fair (+)  *Patient agreeable to therapy session with encouragement. Lights on / shade open.  SC:   *Daily self-care performance and  functional mobility undertaken with Patient support levels noted above.  Daily (I) there x program reviewed, FAIR <> FAIR (+) Patient verbal recitation provided.   Min cog assist for information integration required and ongoing.   Family (Cachorro Irving this date) present and supportive of Patient and POC daily.   SESSION'S END:  *General in room safety and (S)'d mobility advised, call button use reviewed/in hand.  *Questions/concerns within OT scope addressed.    Patient left HOB elevated mid/high Mcgee's with UB pillow support  provided, and pressure relief boots reapplied  all lines intact, call button in reach, RN notified and  Daughter presentEducation:      GOALS:   Multidisciplinary Problems     Occupational Therapy Goals        Problem: Occupational Therapy    Goal Priority Disciplines Outcome Interventions   Occupational Therapy Goal     OT, PT/OT Ongoing, Progressing    Description: Goals to be met by: 04/10/2022    Patient will increase functional independence with ADL's by performing:    Feeding with Modified [place].  UE Dressing with Set-up Assistance.  LE Dressing with Minimal Assistance.  Grooming while EOB with Set-up Assistance.  Sitting at edge of bed x 10 minutes with Stand-by Assistance.  Stand-pivot transfers with Minimal Assistance.  Increase functional strength to WFL for improved performance of bed mob's, T/F's, and ADL's.                     Time Tracking:     OT Date of Treatment: 03/31/22  OT Start Time: 1101  OT Stop Time: 1149  OT Total Time (min): 48 min    Billable Minutes:Self Care/Self Management 24  Co treat with PT ongoing 2* sig Patient safety and general Patient task tolerance level concerns   3/31/2022

## 2022-03-31 NOTE — PT/OT/SLP PROGRESS
"Physical Therapy Treatment    Patient Name:  Radha Degroot   MRN:  [id number]    Recommendations:     Discharge Recommendations:  nursing facility, skilled   Discharge Equipment Recommendations:  (Per SNF)   Barriers to discharge: Inaccessible home, Decreased caregiver support and Pt is not functioning at Independent baseline and is at increased risk for falls, morbidity, and readmission if she returns home alone at present time.        Assessment:     Radha Degroot is a 82 y.o. female admitted with a medical diagnosis of Closed left femoral fracture.  She presents with the following impairments/functional limitations:  weakness, impaired balance, decreased safety awareness, impaired endurance, pain, impaired skin, impaired self care skills, decreased coordination, decreased ROM, impaired functional mobilty, decreased upper extremity function, impaired coordination, gait instability, decreased lower extremity function Pt is making slow steady progress toward PT goals and should continue to do so.  Less anxiety today.    Rehab Prognosis: Good; patient would benefit from acute skilled PT services to address these deficits and reach maximum level of function.    Recent Surgery: Procedure(s) (LRB):  EGD (ESOPHAGOGASTRODUODENOSCOPY) (N/A) Day of Surgery    Plan:     During this hospitalization, patient to be seen daily to address the identified rehab impairments via gait training, therapeutic activities, therapeutic exercises and progress toward the following goals:    · Plan of Care Expires:  04/10/22    Subjective     Chief Complaint: Fatigue, pain, "woozy" sitting at EOB, "Don't rush me, Let me do it myself"  Patient/Family Comments/goals: to take her time and do it herself  Pain/Comfort:  · Pain Rating 1:  ("not bad")  · Location - Orientation 1:  (L groin)  · Pain Rating Post-Intervention 1:  (No c/o at rest)      Objective:     Communicated with nsg prior to session.  Patient found HOB elevated with telemetry, " "PureWick, pressure relief boots upon PT entry to room. Lethargic    General Precautions: Standard, fall   Orthopedic Precautions:LLE weight bearing as tolerated   Braces: N/A  Respiratory Status: Room air     Functional Mobility:  · Bed Mobility:     · Scooting: Min A assistance and of 2 persons with VC/TC for hand placement and bridging to HOB in supine. Max A to EOB in sitting and along EOB in sitting with VC/TC for weight shift and hand placement  · Supine to Sit: moderate assistance and of 2 persons with VC/TC for hand placemtn and body mechanics with HOB elevated  · Sit to Supine: maximal assistance and of 2 persons with VC/TC for hooking L LE with R, hand placement, and body mechanics  · Transfers:     · Sit to Stand:  maximal assistance and of 2 persons with RWVC/TC for hand placemetn and forward weight shift over KAJAL, "Rocking"  · Gait: N/A  · Balance: Fair+/Fair sit, Fair-/Poor+ stand      AM-PAC 6 CLICK MOBILITY  Turning over in bed (including adjusting bedclothes, sheets and blankets)?: 2  Sitting down on and standing up from a chair with arms (e.g., wheelchair, bedside commode, etc.): 2  Moving from lying on back to sitting on the side of the bed?: 2  Moving to and from a bed to a chair (including a wheelchair)?: 1  Need to walk in hospital room?: 1  Climbing 3-5 steps with a railing?: 1  Basic Mobility Total Score: 9       Therapeutic Activities and Exercises:   Pt Able to recall and demonstrate 2/3 exercises per HEP independently and 3rd with VC/TC. Bed mobility and transfer training as above.  Pt sat at EOB with SBA/CGA for balance and performed L HS's and FAQ's with Max VC/TC x 10 reps.  Pt sat at EOB with SBA and RW and performed "Rocking" for forward weight shift over KAJAL and momentum approx 20x.  Pt able to stand with RW and Max A x 2 persons with VC/TC for increased trunk ext and distribution of weight through UE's to walker approx 10 sec.    Patient left HOB elevated with all lines intact, call " button in reach, bed alarm on, nsg notified and daughter present..    GOALS:   Multidisciplinary Problems     Physical Therapy Goals        Problem: Physical Therapy    Goal Priority Disciplines Outcome Goal Variances Interventions   Physical Therapy Goal     PT, PT/OT Ongoing, Progressing     Description: Goals to be met by: 4/10/22     Patient will increase functional independence with mobility by performin. Supine to sit with Stand-by Assistance  2. Rolling to Left and Right with Stand-by Assistance.  3. Sit to stand transfer to be assessed  4. Bed to chair transfer with to be assessed  5. Gait to be assessed    6. Lower extremity exercise program x10 reps per handout, with supervision  .                      Time Tracking:     PT Received On: 22  PT Start Time: 1102     PT Stop Time: 1149  PT Total Time (min): 47 min     Billable Minutes: Therapeutic Activity 15 and Therapeutic Exercise 8 Co-treat with OT    Treatment Type: Treatment  PT/PTA: PT     PTA Visit Number: 0     2022

## 2022-03-31 NOTE — PLAN OF CARE
Problem: Occupational Therapy  Goal: Occupational Therapy Goal  Description: Goals to be met by: 04/10/2022    Patient will increase functional independence with ADL's by performing:    Feeding with Modified [place].  UE Dressing with Set-up Assistance.  LE Dressing with Minimal Assistance.  Grooming while EOB with Set-up Assistance.  Sitting at edge of bed x 10 minutes with Stand-by Assistance.  Stand-pivot transfers with Minimal Assistance.  Increase functional strength to WFL for improved performance of bed mob's, T/F's, and ADL's.    Outcome: Ongoing, Progressing

## 2022-03-31 NOTE — ANESTHESIA POSTPROCEDURE EVALUATION
Anesthesia Post Evaluation    Patient: Radha Degroot    Procedure(s) Performed: Procedure(s) (LRB):  EGD (ESOPHAGOGASTRODUODENOSCOPY) (N/A)    Final Anesthesia Type: general      Patient location during evaluation: GI PACU  Patient participation: Yes- Able to Participate  Level of consciousness: awake and alert and oriented  Post-procedure vital signs: reviewed and stable  Pain management: adequate  Airway patency: patent    PONV status at discharge: No PONV  Anesthetic complications: no      Cardiovascular status: blood pressure returned to baseline and hemodynamically stable  Respiratory status: unassisted, spontaneous ventilation and room air  Hydration status: euvolemic  Follow-up not needed.          Vitals Value Taken Time   /80 03/31/22 0842   Temp 36.6 °C (97.8 °F) 03/31/22 0812   Pulse 78 03/31/22 0842   Resp 16 03/31/22 0842   SpO2 99 % 03/31/22 0842         Event Time   Out of Recovery 03/31/2022 08:49:00         Pain/Karthik Score: Karthik Score: 10 (3/31/2022  8:42 AM)

## 2022-03-31 NOTE — PROVATION PATIENT INSTRUCTIONS
Discharge Summary/Instructions after an Endoscopic Procedure  Patient Name: Radha Degroot  Patient MRN: [id number]  Patient YOB: 1940  Thursday, March 31, 2022  Samm Billings MD  Dear patient,  As a result of recent federal legislation (The Federal Cures Act), you may   receive lab or pathology results from your procedure in your [facility]   account before your physician is able to contact you. Your physician or   their representative will relay the results to you with their   recommendations at their soonest availability.  Thank you,  RESTRICTIONS:  During your procedure today, you received medications for sedation.  These   medications may affect your judgment, balance and coordination.  Therefore,   for 24 hours, you have the following restrictions:   - DO NOT drive a car, operate machinery, make legal/financial decisions,   sign important papers or drink alcohol.    ACTIVITY:  Today: no heavy lifting, straining or running due to procedural   sedation/anesthesia.  The following day: return to full activity including work.  DIET:  Eat and drink normally unless instructed otherwise.     TREATMENT FOR COMMON SIDE EFFECTS:  - Mild abdominal pain, nausea, belching, bloating or excessive gas:  rest,   eat lightly and use a heating pad.  - Sore Throat: treat with throat lozenges and/or gargle with warm salt   water.  - Because air was used during the procedure, expelling large amounts of air   from your rectum or belching is normal.  - If a bowel prep was taken, you may not have a bowel movement for 1-3 days.    This is normal.  SYMPTOMS TO WATCH FOR AND REPORT TO YOUR PHYSICIAN:  1. Abdominal pain or bloating, other than gas cramps.  2. Chest pain.  3. Back pain.  4. Signs of infection such as: chills or fever occurring within 24 hours   after the procedure.  5. Rectal bleeding, which would show as bright red, maroon, or black stools.   (A tablespoon of blood from the rectum is not serious, especially if    hemorrhoids are present.)  6. Vomiting.  7. Weakness or dizziness.  GO DIRECTLY TO THE NEAREST EMERGENCY ROOM IF YOU HAVE ANY OF THE FOLLOWING:      Difficulty breathing              Chills and/or fever over 101 F   Persistent vomiting and/or vomiting blood   Severe abdominal pain   Severe chest pain   Black, tarry stools   Bleeding- more than one tablespoon   Any other symptom or condition that you feel may need urgent attention  Your doctor recommends these additional instructions:  If any biopsies were taken, your doctors clinic will contact you in 1 to 2   weeks with any results.  - Return patient to [facility] for ongoing care.   - Advance diet as tolerated.   - Continue present medications.   - Use Protonix (pantoprazole) 40 mg PO BID.   - Repeat upper endoscopy in 3 months to check healing.  For questions, problems or results please call your physician - Samm Billings MD at Work:  [phone].  [facility] can be reached at [phone]     IF A COMPLICATION OR EMERGENCY SITUATION ARISES AND YOU ARE UNABLE TO REACH   YOUR PHYSICIAN - GO DIRECTLY TO THE EMERGENCY ROOM.  Samm Billings MD  3/31/2022 8:13:27 AM  This report has been verified and signed electronically.  Dear patient,  As a result of recent federal legislation (The Federal Cures Act), you may   receive lab or pathology results from your procedure in your [facility]   account before your physician is able to contact you. Your physician or   their representative will relay the results to you with their   recommendations at their soonest availability.  Thank you,  PROVATION

## 2022-03-31 NOTE — NURSING
Pt resting in bed, daughter at bedside. No signs or symptoms of reaction noted at this time. Booster vaccine injection site C/D/I with bandaid.

## 2022-03-31 NOTE — PLAN OF CARE
Problem: Physical Therapy  Goal: Physical Therapy Goal  Description: Goals to be met by: 4/10/22     Patient will increase functional independence with mobility by performin. Supine to sit with Stand-by Assistance  2. Rolling to Left and Right with Stand-by Assistance.  3. Sit to stand transfer to be assessed  4. Bed to chair transfer with to be assessed  5. Gait to be assessed    6. Lower extremity exercise program x10 reps per handout, with supervision  .     3/31/2022 1223 by Omaira Meza, PT  Outcome: Ongoing, ProgressingGoals not met, but adequate for transfer of care to SNF.

## 2022-03-31 NOTE — ASSESSMENT & PLAN NOTE
Hgb every 8 hours and now. PPI bid. GI consult   Patient stable at the moment.     CBC pending this am. Follow GI recs     Resolved. Follow up GI    EGD on 3/31- esophagitis. Continue PPI

## 2022-03-31 NOTE — PLAN OF CARE
[facility]  Discharge Final Note    Patient clear to discharge from case management stand point. Transportation requested, patient family and nurse notified.     Primary Care Provider: Katherine Merida MD    Expected Discharge Date: 3/31/2022    Final Discharge Note (most recent)       Final Note - 03/31/22 1422          Final Note    Assessment Type Final Discharge Note     Anticipated Discharge Disposition Skilled Nursing Facility (P)    [facility]    What phone number can be called within the next 1-3 days to see how you are doing after discharge? [phone] (P)      Hospital Resources/Appts/Education Provided Provided patient/caregiver with written discharge plan information (P)         Post-Acute Status    Post-Acute Authorization Placement (P)      Post-Acute Placement Status Set-up Complete/Auth obtained (P)      Coverage [employer] Managed Medicare (P)      Discharge Delays None known at this time (P)                      Important Message from Medicare  Important Message from Medicare regarding Discharge Appeal Rights: Given to patient/caregiver, Explained to patient/caregiver, Other (comments) (TN spoke with patient's daughter, Maria Fernanda Wallace via phone [phone], verbalized understanding copy sent certified to address in chart. [address] [place] [address] [phone])     Date IMM was signed: 03/31/22  Time IMM was signed: 4675    Contact Info       Julita Galarza MD   Specialty: Nephrology    [address] [place] [address] [address]  [place] [address]   Phone: [phone]       Next Steps: Follow up in 1 week(s)    Eduin Avelar MD   Specialty: Orthopedic Surgery    [address] [facility] [address]  [place] [address]   Phone: [phone]       Next Steps: Follow up in 1 week(s)    Samm Billings MD   Specialty: Gastroenterology    [address] [facility] [place] [address]   Phone: [phone]       Next Steps: Follow up in 1 week(s)    Katherine Merida MD   Specialty: Internal Medicine   Relationship: PCP -  General    [address]  [facility] [address]   Phone: [phone]       Next Steps: Follow up

## 2022-03-31 NOTE — ASSESSMENT & PLAN NOTE
Renal function at baseline  Continue supportive care    Now with some acute renal failure. Will start IV fluids     With worsening function. Will consult nephrology     Acute on chronic renal failure. Follow nephrology recs. BMP pending     Renal stated ok to discharge on 3/31/22.

## 2022-03-31 NOTE — PLAN OF CARE
Problem: Physical Therapy  Goal: Physical Therapy Goal  Description: Goals to be met by: 4/10/22     Patient will increase functional independence with mobility by performin. Supine to sit with Stand-by Assistance  2. Rolling to Left and Right with Stand-by Assistance.  3. Sit to stand transfer to be assessed  4. Bed to chair transfer with to be assessed  5. Gait to be assessed    6. Lower extremity exercise program x10 reps per handout, with supervision  .     Outcome: Adequate for Care Transition   Goals not met, but adequate for T/F to SNF.  PT to sign off.

## 2022-03-31 NOTE — ANESTHESIA PREPROCEDURE EVALUATION
03/31/2022    Pre-operative evaluation for Procedure(s) (LRB):  EGD (ESOPHAGOGASTRODUODENOSCOPY) (N/A)    Radha Degroot is a 82 y.o. 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    Type 2 diabetes mellitus with stage 4 chronic kidney disease, with long-term current use of insulin    Type 2 diabetes mellitus with diabetic polyneuropathy, with long-term current use of insulin    Muscle weakness    Stiffness of vertebral column    Chronic midline low back pain without sciatica    Difficulty walking    Degenerative joint disease (DJD) of lumbar spine    Vitamin D deficiency disease    Essential hypertension    Chronic diastolic CHF (congestive heart failure)    Obstructive sleep apnea    Mixed incontinence urge and stress    DM (diabetes mellitus)    Chronic pain    Angiomyolipoma of right kidney    Azotemia    Facet arthritis, degenerative, lumbar spine    Lumbar spondylosis    Permanent atrial fibrillation    Hypertensive kidney disease with stage 4 chronic kidney disease    Venous insufficiency    Moderate nonproliferative diabetic retinopathy of both eyes without macular edema associated with type 2 diabetes mellitus    Macular scar of both eyes    Persistent proteinuria    Sacroiliitis, not elsewhere classified    Thrombocytopenia, unspecified    Chronic bilateral low back pain without sciatica    Weakness of both lower extremities     Difficulty standing    Closed left femoral fracture    Coffee ground emesis       Review of patient's allergies indicates:   Allergen Reactions    Heparin analogues Other (See Comments)     thrombocytopenia    Ancef [cefazolin]     Keflex [cephalexin] Rash    Oxybutynin Other (See Comments)     Metallic taste       No current facility-administered medications on file prior to encounter.     Current Outpatient Medications on File Prior to Encounter   Medication Sig Dispense Refill    apixaban (ELIQUIS) 2.5 mg Tab Take 1 tablet (2.5 mg total) by mouth 2 (two) times a day. 180 tablet 3    ascorbic acid, vitamin C, (VITAMIN C) 100 MG tablet Take 100 mg by mouth once daily.      blood sugar diagnostic (ACCU-CHEK MELIDA PLUS TEST STRP) Strp TEST BLOOD SUGAR 5 TIMES DAILY; E11.65 500 strip 3    blood-glucose meter (ACCU-CHEK MELIDA PLUS METER) Misc To check sugars 4 times daily.  Please include control solution 1 each 0    carvediloL (COREG) 12.5 MG tablet Take 1 tablet (12.5 mg total) by mouth 2 (two) times a day. 180 tablet 3    ergocalciferol, vitamin D2, (VITAMIN D ORAL) Take by mouth once daily.      fluticasone (FLONASE) 50 mcg/actuation nasal spray SHAKE LIQUID AND USE 2 SPRAYS IN EACH NOSTRIL EVERY DAY 48 mL 4    furosemide (LASIX) 20 MG tablet Take 40 mg of lasix at 8 a.m. and 20 mg at 3 p.m. 270 tablet 3    insulin aspart U-100 (NOVOLOG) 100 unit/mL (3 mL) InPn pen Inject 14 units three times daily before meals 45 mL 1    insulin degludec (TRESIBA FLEXTOUCH U-100) 100 unit/mL (3 mL) insulin pen INJECT 32 UNITS UNDER THE SKIN DAILY 10 pen 1    ketoconazole (NIZORAL) 2 % shampoo APPLY TO AFFECTED AREA EVERY OTHER DAY AND LET SIT 5 MINUTES PRIOR TO RINSING AS DIRECTED 120 mL 3    lancets (ACCU-CHEK MULTICLIX LANCET) Misc Test glucose 4x/day. e11.65 400 each 3    MULTIVIT-MIN/IRON/FOLIC/LUTEIN (CENTRUM SILVER WOMEN ORAL) Take by mouth once daily.       pen needle, diabetic (BD TIM 2ND GEN PEN  "NEEDLE) 32 gauge x 5/32" Ndle Use 4x/day 400 each 3    rosuvastatin (CRESTOR) 40 MG Tab TAKE 1 TABLET(40 MG) BY MOUTH EVERY DAY 90 tablet 3    vitamin B complex (B COMPLEX VITAMINS ORAL) Take by mouth once daily.      VITAMIN E, BULK, MISC by Misc.(Non-Drug; Combo Route) route once daily.         Past Surgical History:   Procedure Laterality Date    APPENDECTOMY      CATARACT EXTRACTION      Right eye    EPIDURAL STEROID INJECTION N/A 11/2/2018    Procedure: Injection, Steroid, Epidural CAUDAL ELISA;  Surgeon: Ирина Peña MD;  Location: [facility] PAIN [facility]GT;  Service: Pain Management;  Laterality: N/A;    heart stent      X 2    HYSTERECTOMY      Fibroids    INTRAMEDULLARY RODDING OF FEMUR Left 3/26/2022    Procedure: INSERTION, INTRAMEDULLARY SARA, FEMUR;  Surgeon: Eduin Avelar MD;  Location: [facility] OR;  Service: Orthopedics;  Laterality: Left;       Social History     Socioeconomic History    Marital status: Single   Occupational History    Occupation: Realtor   Tobacco Use    Smoking status: Never Smoker    Smokeless tobacco: Never Used   Substance and Sexual Activity    Alcohol use: No     Alcohol/week: 0.0 standard drinks    Drug use: No    Sexual activity: Not Currently   Other Topics Concern    Are you pregnant or think you may be? No    Breast-feeding No   Social History Narrative    Part time .    Severe back pain.    Retired in 1975         CBC:   Recent Labs     03/29/22  0448 03/30/22  0931   WBC 7.56  7.56 7.52   RBC 2.59*  2.59* 2.53*   HGB 7.4*  7.4* 7.2*   HCT 24.0*  24.0* 23.5*   *  113* 130*   MCV 93  93 93   MCH 28.6  28.6 28.5   MCHC 30.8*  30.8* 30.6*       CMP:   Recent Labs     03/29/22  0448 03/30/22  0931    138   K 5.4* 4.5    108   CO2 21* 20*   * 105*   CREATININE 3.9* 3.1*   * 229*   CALCIUM 7.4* 6.9*       INR  No results for input(s): PT, INR, PROTIME, APTT in the last 72 hours.            Pre-op Assessment    I " have reviewed the Patient Summary Reports.     I have reviewed the Nursing Notes. I have reviewed the NPO Status.   I have reviewed the Medications.     Review of Systems  Anesthesia Hx:  No problems with previous Anesthesia  History of prior surgery of interest to airway management or planning:  Denies Personal Hx of Anesthesia complications.   Social:  Non-Smoker, No Alcohol Use    Hematology/Oncology:         -- Anemia: Hematology Comments: On Eliquis-last dose=    H/o HIT    Typed and screened.   Cardiovascular:   Exercise tolerance: poor Hypertension Past MI CAD  CABG/stent Dysrhythmias atrial fibrillation CHF hyperlipidemia [address] ECG has been reviewed. H/o STEMI 2006; NSTEMI 2017; PCI of LAD      2021 Echo:  · The left ventricle is normal in size with concentric remodeling and normal systolic function. The estimated ejection fraction is 60%.  · Normal right ventricular size with normal right ventricular systolic function.  · Biatrial enlargement.  · Mild tricuspid regurgitation.  · The estimated PA systolic pressure is 42 mmHg.  · Normal central venous pressure (3 mmHg).  · Atrial fibrillation observed.     Pulmonary:   Sleep Apnea Pulmonary HTN   Renal/:   Chronic Renal Disease, CRI    Hepatic/GI:   Hiatal Hernia, GERD    Musculoskeletal:   Arthritis  Left femur fracture 2/2 fall   Neurological:   Neuromuscular Disease,    Endocrine:   Diabetes, type 2  Obesity / BMI > 30      Physical Exam  General: Cooperative and Alert  Morbidly obese  Airway:  Mallampati: III   Mouth Opening: Small, but > 3cm  TM Distance: 4 - 6 cm  Tongue: Normal  Neck ROM: Normal ROM    Dental:  Intact    Chest/Lungs:  Normal Respiratory Rate    Heart:  Rhythm: Irregularly Irregular        Anesthesia Plan  Type of Anesthesia, risks & benefits discussed:    Anesthesia Type: Gen Natural Airway  Intra-op Monitoring Plan: Standard ASA Monitors  Induction:  IV  Informed Consent: Informed consent signed with the Patient and all parties  understand the risks and agree with anesthesia plan.  All questions answered. Patient consented to blood products? Yes  ASA Score: 3  Day of Surgery Review of History & Physical: H&P Update referred to the surgeon/provider.    Ready For Surgery From Anesthesia Perspective.     .

## 2022-04-01 DIAGNOSIS — K20.90 ESOPHAGITIS: Primary | ICD-10-CM

## 2022-04-01 LAB
POCT GLUCOSE: 136 MG/DL (ref 70–110)
POCT GLUCOSE: 194 MG/DL (ref 70–110)
POCT GLUCOSE: 217 MG/DL (ref 70–110)
POCT GLUCOSE: 225 MG/DL (ref 70–110)

## 2022-04-01 PROCEDURE — 97530 THERAPEUTIC ACTIVITIES: CPT

## 2022-04-01 PROCEDURE — 97535 SELF CARE MNGMENT TRAINING: CPT

## 2022-04-01 PROCEDURE — 25000003 PHARM REV CODE 250: Performed by: ORTHOPAEDIC SURGERY

## 2022-04-01 PROCEDURE — 97110 THERAPEUTIC EXERCISES: CPT

## 2022-04-01 PROCEDURE — 94660 CPAP INITIATION&MGMT: CPT

## 2022-04-01 PROCEDURE — C9113 INJ PANTOPRAZOLE SODIUM, VIA: HCPCS | Performed by: ORTHOPAEDIC SURGERY

## 2022-04-01 PROCEDURE — 25000003 PHARM REV CODE 250: Performed by: INTERNAL MEDICINE

## 2022-04-01 PROCEDURE — 94760 N-INVAS EAR/PLS OXIMETRY 1: CPT

## 2022-04-01 PROCEDURE — 11000001 HC ACUTE MED/SURG PRIVATE ROOM

## 2022-04-01 PROCEDURE — 63600175 PHARM REV CODE 636 W HCPCS: Performed by: ORTHOPAEDIC SURGERY

## 2022-04-01 PROCEDURE — 99900035 HC TECH TIME PER 15 MIN (STAT)

## 2022-04-01 RX ORDER — LACTULOSE 10 G/15ML
30 SOLUTION ORAL 2 TIMES DAILY
Status: DISCONTINUED | OUTPATIENT
Start: 2022-04-01 | End: 2022-04-02

## 2022-04-01 RX ORDER — PSEUDOEPHEDRINE/ACETAMINOPHEN 30MG-500MG
100 TABLET ORAL
Status: ACTIVE | OUTPATIENT
Start: 2022-04-01 | End: 2022-04-02

## 2022-04-01 RX ORDER — SYRING-NEEDL,DISP,INSUL,0.3 ML 29 G X1/2"
296 SYRINGE, EMPTY DISPOSABLE MISCELLANEOUS
Status: ACTIVE | OUTPATIENT
Start: 2022-04-01 | End: 2022-04-02

## 2022-04-01 RX ORDER — PANTOPRAZOLE SODIUM 40 MG/1
40 TABLET, DELAYED RELEASE ORAL DAILY
Status: DISCONTINUED | OUTPATIENT
Start: 2022-04-01 | End: 2022-04-04 | Stop reason: HOSPADM

## 2022-04-01 RX ADMIN — CARVEDILOL 12.5 MG: 12.5 TABLET, FILM COATED ORAL at 09:04

## 2022-04-01 RX ADMIN — COLCHICINE 0.6 MG: 0.6 TABLET, FILM COATED ORAL at 09:04

## 2022-04-01 RX ADMIN — CALCIUM CARBONATE (ANTACID) CHEW TAB 500 MG 500 MG: 500 CHEW TAB at 09:04

## 2022-04-01 RX ADMIN — PANTOPRAZOLE SODIUM 40 MG: 40 TABLET, DELAYED RELEASE ORAL at 09:04

## 2022-04-01 RX ADMIN — INSULIN DETEMIR 35 UNITS: 100 INJECTION, SOLUTION SUBCUTANEOUS at 09:04

## 2022-04-01 RX ADMIN — CALCIUM CARBONATE (ANTACID) CHEW TAB 500 MG 500 MG: 500 CHEW TAB at 08:04

## 2022-04-01 RX ADMIN — ATORVASTATIN CALCIUM 80 MG: 40 TABLET, FILM COATED ORAL at 09:04

## 2022-04-01 RX ADMIN — INSULIN ASPART 4 UNITS: 100 INJECTION, SOLUTION INTRAVENOUS; SUBCUTANEOUS at 06:04

## 2022-04-01 RX ADMIN — FLUTICASONE PROPIONATE 100 MCG: 50 SPRAY, METERED NASAL at 09:04

## 2022-04-01 RX ADMIN — DOCUSATE SODIUM 100 MG: 100 CAPSULE, LIQUID FILLED ORAL at 08:04

## 2022-04-01 RX ADMIN — LACTULOSE 30 G: 10 SOLUTION ORAL at 08:04

## 2022-04-01 RX ADMIN — METOPROLOL SUCCINATE 25 MG: 25 TABLET, EXTENDED RELEASE ORAL at 09:04

## 2022-04-01 RX ADMIN — DOCUSATE SODIUM 100 MG: 100 CAPSULE, LIQUID FILLED ORAL at 09:04

## 2022-04-01 RX ADMIN — CARVEDILOL 12.5 MG: 12.5 TABLET, FILM COATED ORAL at 08:04

## 2022-04-01 RX ADMIN — OXYCODONE HYDROCHLORIDE AND ACETAMINOPHEN 500 MG: 500 TABLET ORAL at 09:04

## 2022-04-01 NOTE — PT/OT/SLP PROGRESS
Physical Therapy Treatment    Patient Name:  Radha Degroot   MRN:  [id number]    Recommendations:     Discharge Recommendations:  nursing facility, skilled   Discharge Equipment Recommendations:  (Per SNF)   Barriers to discharge: Pt had BM, OK for D/C to SNF    Assessment:     Radha Degroot is a 82 y.o. female admitted with a medical diagnosis of Closed left femoral fracture.  She presents with the following impairments/functional limitations:  weakness, impaired balance, decreased safety awareness, impaired skin, impaired endurance, pain, impaired self care skills, decreased coordination, impaired functional mobilty, decreased upper extremity function, impaired coordination, decreased lower extremity function, orthopedic precautions .Pt making slow steady progress toward PT goals and should be appropriate to transfer to SNF    Rehab Prognosis: Good; patient would benefit from acute skilled PT services to address these deficits and reach maximum level of function.    Recent Surgery: Procedure(s) (LRB):  EGD (ESOPHAGOGASTRODUODENOSCOPY) (N/A) 1 Day Post-Op    Plan:     During this hospitalization, patient to be seen daily to address the identified rehab impairments via gait training, therapeutic activities, therapeutic exercises, wheelchair management/training and progress toward the following goals:    · Plan of Care Expires:  04/10/22    Subjective     Chief Complaint: Fatigue   Patient/Family Comments/goals: to have a BM  Pain/Comfort:  · Pain Rating 1: 0/10      Objective:     Communicated with nsg prior to session.  Patient found HOB elevated with telemetry, PureWick, pressure relief boots upon PT entry to room.     General Precautions: Standard, fall   Orthopedic Precautions:LLE weight bearing as tolerated   Braces: N/A  Respiratory Status: Room air     Functional Mobility:  · Bed Mobility:     · Scooting: contact guard assistance and with VC/TC for hand placement to scoot into position for transfer and to  HOB in supine.  Min>Total A during slide board transfer  · Supine to Sit: maximal assistance and of 2 persons with VC/TC for hand placement and body mechanics  · Sit to Supine: dependence  · Transfers:     · Bed to BSC:  Max A x 2 persons using  Slide Board with Max VC/TC for hand placement and weight shift  · BSC to Bed:  Max A x 3 persons using SB with Max VC/TC for hand placement and weight shift  · Balance: Fair sitting at EOB and on BSC, poor head control      AM-PAC 6 CLICK MOBILITY  Turning over in bed (including adjusting bedclothes, sheets and blankets)?: 2  Sitting down on and standing up from a chair with arms (e.g., wheelchair, bedside commode, etc.): 2  Moving from lying on back to sitting on the side of the bed?: 2  Moving to and from a bed to a chair (including a wheelchair)?: 2  Need to walk in hospital room?: 1  Climbing 3-5 steps with a railing?: 1  Basic Mobility Total Score: 10       Therapeutic Activities and Exercises:   Pt able to recall and perform B AP's, GA, and QS x 10 reps.  Bed mobiity and transfer training a s above    Patient left HOB elevated with all lines intact, call button in reach, bed alarm on, nsg notified, son present and Pressure releif boots donned..    GOALS:   Multidisciplinary Problems     Physical Therapy Goals        Problem: Physical Therapy    Goal Priority Disciplines Outcome Goal Variances Interventions   Physical Therapy Goal     PT, PT/OT Ongoing, Progressing     Description: Goals to be met by: 4/10/22     Patient will increase functional independence with mobility by performin. Supine to sit with Stand-by Assistance  2. Rolling to Left and Right with Stand-by Assistance.  3. Sit to stand transfer to be assessed  4. Bed to chair transfer with to be assessed  5. Gait to be assessed    6. Lower extremity exercise program x10 reps per handout, with supervision  .                      Time Tracking:     PT Received On: 22  PT Start Time: 1400     PT Stop  Time: 1448  PT Total Time (min): 48 min     Billable Minutes: Therapeutic Activity 15 and Therapeutic Exercise 8 Co-treat with OT    Treatment Type: Treatment  PT/PTA: PT     PTA Visit Number: 0     04/01/2022

## 2022-04-01 NOTE — SUBJECTIVE & OBJECTIVE
Interval History: No new issues     Review of Systems   Constitutional:  Positive for activity change. Negative for appetite change, chills, diaphoresis, fatigue and fever.   HENT:  Negative for congestion and dental problem.    Eyes:  Negative for discharge and itching.   Respiratory:  Negative for apnea, choking, chest tightness, shortness of breath and stridor.    Cardiovascular:  Negative for chest pain, palpitations and leg swelling.   Gastrointestinal:  Negative for abdominal pain, nausea and vomiting.   Genitourinary:  Negative for difficulty urinating, dyspareunia, frequency and genital sores.   Musculoskeletal:  Positive for arthralgias.   Skin:  Negative for color change and pallor.   Neurological:  Negative for light-headedness and headaches.   Psychiatric/Behavioral:  Negative for agitation and behavioral problems.    Objective:     Vital Signs (Most Recent):  Temp: 97.2 °F (36.2 °C) (04/01/22 0424)  Pulse: 83 (04/01/22 0424)  Resp: 18 (04/01/22 0424)  BP: 137/65 (04/01/22 0424)  SpO2: 99 % (04/01/22 0424) Vital Signs (24h Range):  Temp:  [97.2 °F (36.2 °C)-98.4 °F (36.9 °C)] 97.2 °F (36.2 °C)  Pulse:  [78-97] 83  Resp:  [15-19] 18  SpO2:  [96 %-100 %] 99 %  BP: (122-168)/(58-80) 137/65     Weight: 99.2 kg (218 lb 9.6 oz)  Body mass index is 33.24 kg/m².    Intake/Output Summary (Last 24 hours) at 4/1/2022 0643  Last data filed at 3/31/2022 1400  Gross per 24 hour   Intake 580 ml   Output 450 ml   Net 130 ml      Physical Exam  Vitals and nursing note reviewed.   Constitutional:       General: She is not in acute distress.     Appearance: Normal appearance. She is obese. She is not ill-appearing, toxic-appearing or diaphoretic.   HENT:      Head: Normocephalic and atraumatic.   Cardiovascular:      Rate and Rhythm: Normal rate and regular rhythm.   Pulmonary:      Effort: Pulmonary effort is normal. No respiratory distress.      Breath sounds: No wheezing.   Abdominal:      Tenderness: There is no  abdominal tenderness.   Skin:     General: Skin is warm and dry.   Neurological:      Mental Status: She is alert and oriented to person, place, and time.   Psychiatric:         Mood and Affect: Mood normal.         Behavior: Behavior normal.         Thought Content: Thought content normal.       Significant Labs: All pertinent labs within the past 24 hours have been reviewed.  BMP:   Recent Labs   Lab 03/31/22  0943         K 4.2   *   CO2 24   BUN 95*   CREATININE 2.8*   CALCIUM 7.8*     CBC:   Recent Labs   Lab 03/30/22  0931 03/31/22  0815   WBC 7.52  --    HGB 7.2* 7.3*   HCT 23.5* 23.3*   *  --        Significant Imaging:

## 2022-04-01 NOTE — DISCHARGE SUMMARY
[facility]  Discharge Summary      Patient Name: Radha Degroot  MRN: [id number]  Patient Class: IP- Inpatient  Admission Date: 3/25/2022  Hospital Length of Stay: 7 days  Discharge Date and Time:  04/01/2022 6:45 AM  Attending Physician: Terry Pennington MD   Discharging Provider: Terry Pennington MD  Primary Care Provider: Katherine Merida MD      HPI:   Patient is an 81yo f with multiple extensive comorbid medical conditions which include persistent afib on eliquis, HIT, DM II, CAD S/P PCI 06, anemia, cardiomyopathy/CHF, carpal tunnel syndrome, CKD IV,  gout, uHLD and HTN presents to the ED due to left hip pain. Patient states she had a mechanical fall described as tripping over the floor carpet and landing on her left hip. Incident happened last night at 11pm, she has been laying at the same spot due to inability to move up until around 3-4pm today when son came to check up on her and saw her on the floor. EMS was called and patient was brought to the ER. Patient was found to have a left hip fracture and is being admitted to have ortho eval. She states she is completely independent and does all her ADLs by herself with no limitations. Denies any angina or CHFexacerbations symptoms. Also states her afib has been mostly fairly controlled      Procedure(s) (LRB):  EGD (ESOPHAGOGASTRODUODENOSCOPY) (N/A)      Hospital Course:   Patient admitted for L hip fx from mechanical fall at home.  Ortho was consulted. On 3/26, the patient had coffee ground emesis and GI was consulted.  Patient went for surgery on 3/26/22. PT/OT were consulted. Family requesting SNF placement. Patient with worsening renal function and nephrology was consulted on 3/28. PT/OT rec: SNF.  Ortho consulted for ankle pain.  GI took patient for EGD on 3/31- showed esophagitis. On 4/1, the patient's family was requesting discharge to home. Renal ok'd discharge. Patient will be going to [facility]. Activity as  tolerated. Diet- low NA, ADA 2000 casimiro diet. Follow up with GI, renal, PCP and ortho in 1-2 weeks        Goals of Care Treatment Preferences:  Code Status: Full Code      Consults:   Consults (From admission, onward)        Status Ordering Provider     Inpatient consult to Orthopedic Surgery  Once        Provider:  Eduin Avelar MD    Acknowledged SIN NOBLE     Inpatient consult to Nephrology  Once        Provider:  Julita Galarza MD    Completed SIN NOBLE     Inpatient virtual consult to Hospital Medicine  Once        Provider:  Parish Gregory MD    Completed SIN NOBLE     Inpatient consult to Social Work  Once        Provider:  (Not yet assigned)    Completed SIN NOBLE     Inpatient consult to Gastroenterology  Once        Provider:  Mert Calzada MD    Completed SIN NOBLE     Inpatient consult to Orthopedic Surgery  Once        Provider:  Eduin Avelar MD    Completed MARION ZAVALA          No new Assessment & Plan notes have been filed under this hospital service since the last note was generated.  Service: Hospital Medicine    Final Active Diagnoses:    Diagnosis Date Noted POA    PRINCIPAL PROBLEM:  Closed left femoral fracture [S72.92XA] 03/25/2022 Yes    Coffee ground emesis [K92.0] 03/26/2022 Yes    Permanent atrial fibrillation [I48.21] 04/25/2018 Yes    DM (diabetes mellitus) [E11.9] 02/20/2017 Yes    Chronic diastolic CHF (congestive heart failure) [I50.32] 05/18/2016 Yes    Essential hypertension [I10]  Yes     Chronic    Chronic kidney disease, stage IV (severe) [N18.4] 10/30/2012 Yes     Chronic    Anemia of chronic illness [D63.8]  Yes      Problems Resolved During this Admission:       Discharged Condition: good    Disposition: Skilled Nursing Facility    Follow Up:   Follow-up Information     Julita Galarza MD Follow up in 1 week(s).    Specialty: Nephrology  Contact information:  [address] [place] [address]  [address]  [place] [address]  [phone]             Eduin Avelar MD Follow up in 1 week(s).    Specialty: Orthopedic Surgery  Contact information:  [address]  [place] [address] [phone]             Samm Billings MD Follow up in 1 week(s).    Specialty: Gastroenterology  Contact information:  [address]  [facility] [id number] [phone]             Katherine Merida MD Follow up.    Specialty: Internal Medicine  Contact information:  [address]  [facility] [id number] [phone]                       Patient Instructions:      Ambulatory referral/consult to Outpatient Case Management   Referral Priority: Routine Referral Type: Consultation   Referral Reason: Specialty Services Required   Number of Visits Requested: 1       Significant Diagnostic Studies:     Pending Diagnostic Studies:     None         Medications:  Reconciled Home Medications:      Medication List      START taking these medications    colchicine 0.6 mg tablet  Commonly known as: COLCRYS  Take 1 tablet (0.6 mg total) by mouth once daily.     HYDROcodone-acetaminophen  mg per tablet  Commonly known as: NORCO  Take 1 tablet by mouth every 6 (six) hours as needed.     oxyCODONE-acetaminophen 5-325 mg per tablet  Commonly known as: PERCOCET  Take 1 tablet by mouth every 4 (four) hours as needed.     pantoprazole 40 MG tablet  Commonly known as: PROTONIX  Take 1 tablet (40 mg total) by mouth 2 (two) times daily.        CONTINUE taking these medications    apixaban 2.5 mg Tab  Commonly known as: ELIQUIS  Take 1 tablet (2.5 mg total) by mouth 2 (two) times a day.     ascorbic acid (vitamin C) 100 MG tablet  Commonly known as: VITAMIN C  Take 100 mg by mouth once daily.     B COMPLEX VITAMINS ORAL  Take by mouth once daily.     carvediloL 12.5 MG tablet  Commonly known as: COREG  Take 1 tablet (12.5 mg total) by mouth 2 (two) times a day.     CENTRUM SILVER WOMEN ORAL  Take by mouth once daily.    "  fluticasone propionate 50 mcg/actuation nasal spray  Commonly known as: FLONASE  SHAKE LIQUID AND USE 2 SPRAYS IN EACH NOSTRIL EVERY DAY     furosemide 20 MG tablet  Commonly known as: LASIX  Take 40 mg of lasix at 8 a.m. and 20 mg at 3 p.m.     insulin aspart U-100 100 unit/mL (3 mL) Inpn pen  Commonly known as: NovoLOG  Inject 14 units three times daily before meals     ketoconazole 2 % shampoo  Commonly known as: NIZORAL  APPLY TO AFFECTED AREA EVERY OTHER DAY AND LET SIT 5 MINUTES PRIOR TO RINSING AS DIRECTED     rosuvastatin 40 MG Tab  Commonly known as: CRESTOR  TAKE 1 TABLET(40 MG) BY MOUTH EVERY DAY     TRESIBA FLEXTOUCH U-100 100 unit/mL (3 mL) insulin pen  Generic drug: insulin degludec  INJECT 32 UNITS UNDER THE SKIN DAILY     VITAMIN D ORAL  Take by mouth once daily.     VITAMIN E (BULK) MISC  by Misc.(Non-Drug; Combo Route) route once daily.        STOP taking these medications    blood sugar diagnostic Strp  Commonly known as: ACCU-CHEK MELIDA PLUS TEST STRP     blood-glucose meter Misc  Commonly known as: ACCU-CHEK MELIDA PLUS METER     lancets Misc  Commonly known as: ACCU-CHEK MULTICLIX LANCET     pen needle, diabetic 32 gauge x 5/32" Ndle  Commonly known as: BD TIM 2ND GEN PEN NEEDLE            Indwelling Lines/Drains at time of discharge:   Lines/Drains/Airways     None                 Time spent on the discharge of patient:  > 35  minutes         Terry Chawla MD  Department of Hospital Medicine  [facility] Surg  "

## 2022-04-01 NOTE — PLAN OF CARE
Problem: Physical Therapy  Goal: Physical Therapy Goal  Description: Goals to be met by: 4/10/22     Patient will increase functional independence with mobility by performin. Supine to sit with Stand-by Assistance  2. Rolling to Left and Right with Stand-by Assistance.  3. Sit to stand transfer to be assessed  4. Bed to chair transfer with to be assessed  5. Gait to be assessed    6. Lower extremity exercise program x10 reps per handout, with supervision  .     Outcome: Ongoing, Progressing  Pt making slow steady progress toward PT goals.  SNF recommended

## 2022-04-01 NOTE — PROGRESS NOTES
[facility]  Progress Note    Patient Name: Radha Degroot  MRN: [id number]  Patient Class: IP- Inpatient   Admission Date: 3/25/2022  Length of Stay: 7 days  Attending Physician: Terry Pennington MD  Primary Care Provider: Katherine Merida MD        Subjective:     Principal Problem:Closed left femoral fracture        HPI:  Patient is an 83yo f with multiple extensive comorbid medical conditions which include persistent afib on eliquis, HIT, DM II, CAD S/P PCI 06, anemia, cardiomyopathy/CHF, carpal tunnel syndrome, CKD IV,  gout, uHLD and HTN presents to the ED due to left hip pain. Patient states she had a mechanical fall described as tripping over the floor carpet and landing on her left hip. Incident happened last night at 11pm, she has been laying at the same spot due to inability to move up until around 3-4pm today when son came to check up on her and saw her on the floor. EMS was called and patient was brought to the ER. Patient was found to have a left hip fracture and is being admitted to have ortho eval. She states she is completely independent and does all her ADLs by herself with no limitations. Denies any angina or CHFexacerbations symptoms. Also states her afib has been mostly fairly controlled      Overview/Hospital Course:  Patient admitted for L hip fx from mechanical fall at home.  Ortho was consulted. On 3/26, the patient had coffee ground emesis and GI was consulted.  Patient went for surgery on 3/26/22. PT/OT were consulted. Family requesting SNF placement. Patient with worsening renal function and nephrology was consulted on 3/28. PT/OT rec: SNF.  Ortho consulted for ankle pain.  GI took patient for EGD on 3/31- showed esophagitis. On 4/1, the patient's family was requesting discharge to home. Renal ok'd discharge. Patient will be going to [facility]. Activity as tolerated. Diet- low NA, ADA 2000 casimiro diet. Follow up with GI, renal, PCP and ortho in 1-2 weeks        Interval History: No new issues     Review of Systems   Constitutional:  Positive for activity change. Negative for appetite change, chills, diaphoresis, fatigue and fever.   HENT:  Negative for congestion and dental problem.    Eyes:  Negative for discharge and itching.   Respiratory:  Negative for apnea, choking, chest tightness, shortness of breath and stridor.    Cardiovascular:  Negative for chest pain, palpitations and leg swelling.   Gastrointestinal:  Negative for abdominal pain, nausea and vomiting.   Genitourinary:  Negative for difficulty urinating, dyspareunia, frequency and genital sores.   Musculoskeletal:  Positive for arthralgias.   Skin:  Negative for color change and pallor.   Neurological:  Negative for light-headedness and headaches.   Psychiatric/Behavioral:  Negative for agitation and behavioral problems.    Objective:     Vital Signs (Most Recent):  Temp: 97.2 °F (36.2 °C) (04/01/22 0424)  Pulse: 83 (04/01/22 0424)  Resp: 18 (04/01/22 0424)  BP: 137/65 (04/01/22 0424)  SpO2: 99 % (04/01/22 0424) Vital Signs (24h Range):  Temp:  [97.2 °F (36.2 °C)-98.4 °F (36.9 °C)] 97.2 °F (36.2 °C)  Pulse:  [78-97] 83  Resp:  [15-19] 18  SpO2:  [96 %-100 %] 99 %  BP: (122-168)/(58-80) 137/65     Weight: 99.2 kg (218 lb 9.6 oz)  Body mass index is 33.24 kg/m².    Intake/Output Summary (Last 24 hours) at 4/1/2022 0643  Last data filed at 3/31/2022 1400  Gross per 24 hour   Intake 580 ml   Output 450 ml   Net 130 ml      Physical Exam  Vitals and nursing note reviewed.   Constitutional:       General: She is not in acute distress.     Appearance: Normal appearance. She is obese. She is not ill-appearing, toxic-appearing or diaphoretic.   HENT:      Head: Normocephalic and atraumatic.   Cardiovascular:      Rate and Rhythm: Normal rate and regular rhythm.   Pulmonary:      Effort: Pulmonary effort is normal. No respiratory distress.      Breath sounds: No wheezing.   Abdominal:      Tenderness: There is no  abdominal tenderness.   Skin:     General: Skin is warm and dry.   Neurological:      Mental Status: She is alert and oriented to person, place, and time.   Psychiatric:         Mood and Affect: Mood normal.         Behavior: Behavior normal.         Thought Content: Thought content normal.       Significant Labs: All pertinent labs within the past 24 hours have been reviewed.  BMP:   Recent Labs   Lab 03/31/22  0943         K 4.2   *   CO2 24   BUN 95*   CREATININE 2.8*   CALCIUM 7.8*     CBC:   Recent Labs   Lab 03/30/22  0931 03/31/22  0815   WBC 7.52  --    HGB 7.2* 7.3*   HCT 23.5* 23.3*   *  --        Significant Imaging:       Assessment/Plan:      * Closed left femoral fracture  Ortho consult  Adequate pain control  Keep NPO for possible ortho intervention tomorrow  Hold off on AC and antiplatelet in preparation for surgical intervention    Cardiac preop risk assessment  RCRI >11% (high risk), YEN 0.78%  Extensive history of cardiac comorbidities - CAD/PCI, CHF, Afib, however she states she is very functional and independent.   I do not think she needs any further cardiac workup, however she is at high risk from a cardiac standpoint for this intervention  Continue optimizing cardioprotective meds for good surgical outcome    S/p surgery on 3/26. PT/OT consult     To SNF once clinically stable.         Coffee ground emesis  Hgb every 8 hours and now. PPI bid. GI consult   Patient stable at the moment.     CBC pending this am. Follow GI recs     Resolved. Follow up GI    EGD on 3/31- esophagitis. Continue PPI    Permanent atrial fibrillation  In NSR during my eval  Restart cardioprotective meds  Holding off on AC for now in anticipation for surgery    Will restart NOAC       DM (diabetes mellitus)  Will start basal and bolus insulin  Adequate glycemic control for good wound healing    Uncontrolled with hyperglycemia with manifestations of CKD.  Increasing insulin      Chronic  diastolic CHF (congestive heart failure)  Appears well compensated  Continue optimizing cardioprotective meds for good surgical outcome      Essential hypertension  Restart home HTN meds once identified   Adequate pain control      Chronic kidney disease, stage IV (severe)  Renal function at baseline  Continue supportive care    Now with some acute renal failure. Will start IV fluids     With worsening function. Will consult nephrology     Acute on chronic renal failure. Follow nephrology recs. BMP pending     Renal stated ok to discharge on 3/31/22.    Anemia of chronic illness  At baseline        VTE Risk Mitigation (From admission, onward)    None          Discharge Planning   TRISH: 3/31/2022     Code Status: Prior   Is the patient medically ready for discharge?:     Reason for patient still in hospital (select all that apply): Patient unstable  Discharge Plan A: Skilled Nursing Facility   Discharge Delays: None known at this time    Discharge to SNF           Terry Chawla MD  Department of Hospital Medicine   [facility] S[facility]

## 2022-04-01 NOTE — PLAN OF CARE
1500: CM Supervisor called [facility] [phone], spoke with  twice, both times informed that DON and Admissions is gone for the day and patient will not be able to be admitted until Monday. Requested to speak with Charge nurse on call or DON on call - phone disconnected and when called back - multiple rings and call drops.      1515:  Admissions at [facility]: Yanet beal [phone] called and sent multiple text messages  - no response.    1530: sent message to [place] in [facility] requesting response.    [facility] still has not responded, no bed information given and no confirmation of call report info.      [facility] instructed CM that pt will have to transfer on Monday April 4.     CM will update MD, patient and family.

## 2022-04-01 NOTE — PT/OT/SLP PROGRESS
Occupational Therapy   Treatment    Name: Radha Degroot  MRN: [id number]  Admitting Diagnosis:  Closed left femoral fracture  1 Day Post-Op    Recommendations:     Discharge Recommendations: nursing facility, skilled  Discharge Equipment Recommendations:   (TBD SNF)  Barriers to discharge:  None    Assessment:     Radha Degroot is a 82 y.o. female with a medical diagnosis of Closed left femoral fracture.Unregulated anxiety responses to all transitional movements remain an area of concern. Patient unable to effectively,  internally self regulate at present. Consistent supportive feedback required. Patient information integration at those times compromised. Performance responses frequently disorganized. High caregiver demand. Performance deficits affecting function are weakness, impaired self care skills, impaired balance, decreased coordination, decreased safety awareness, pain, decreased upper extremity function, decreased lower extremity function, impaired cognition, gait instability, impaired endurance, impaired functional mobilty, impaired coordination, impaired cardiopulmonary response to activity, edema, impaired skin.     Rehab Prognosis:  Fair; patient would benefit from acute skilled OT services to address these deficits and reach maximum level of function.       Plan:     Patient to be seen 5 x/week to address the above listed problems via self-care/home management, therapeutic activities, therapeutic exercises, neuromuscular re-education  · Plan of Care Expires: 04/10/22  · Plan of Care Reviewed with: patient, son    Subjective     Pain/Comfort:  · Pain Rating 1: 0/10    Objective:     Communicated with: RN prior to session.  Patient found HOB elevated with telemetry, PureWick, pressure relief boots upon OT entry to room.    General Precautions: Standard, fall   Orthopedic Precautions:LLE weight bearing as tolerated   Braces: N/A  Respiratory Status: Room air     Occupational Performance:     Bed /  Functional Mobility:    **Min a BLE movement toward EOB with encouragement.  **Rapid onset BUE bracing and clinging to R side bed rails during supine to sit -> max a x2 to extensive staff assist.  **Max a x2 to total assist utilizing absorbent pads for transfer to [facility] <> scooting.  **Max x3 for return to bed via slide board from [facility]  Supine to sit max a x2.   **All above with VC/TC requirements for BUE/BLE placement,  release and cog support.   **Patient unable to achieve stand this date.   **Staff assist of 2>3 for bed level hygiene upon return to bed.   **Patient demonstrating effective use of BUEs with head board hand grasp and flexed RLE for repositioning to HOB with min and and VC at sessions end.     Activities of Daily Living:  · Feeding:  independence    · Grooming: moderate assistance    · Upper Body Dressing: maximal assistance    · Lower Body Dressing: total assistance    · Toileting: total assistance and of 3 persons        [facility] 6 Click ADL: 11    Treatment & Education:  Orienting interventions.    *Basic self-care tasks and rudimentary functional mobility undertaken -- assist levels noted above.  SESSION'S END:  *Questions/concerns within OT scope addressed.  Patient left HOB elevated with all lines intact, call button in hand, 2 therapist and alternate RNs x2 variously employed during fullest transfers and bed level hygiene this date required. Son Edwin presentEducation:   and supportive of all herein and Patient this date.     GOALS:   Multidisciplinary Problems     Occupational Therapy Goals        Problem: Occupational Therapy    Goal Priority Disciplines Outcome Interventions   Occupational Therapy Goal     OT, PT/OT Ongoing, Progressing    Description: Goals to be met by: 04/10/2022    Patient will increase functional independence with ADL's by performing:    Feeding with Modified [place].  UE Dressing with Set-up Assistance.  LE Dressing with Minimal Assistance.  Grooming while EOB with  Set-up Assistance.  Sitting at edge of bed x 10 minutes with Stand-by Assistance.  Stand-pivot transfers with Minimal Assistance.  Increase functional strength to WFL for improved performance of bed mob's, T/F's, and ADL's.                     Time Tracking:     OT Date of Treatment: 04/01/22  OT Start Time: 0159  OT Stop Time: 0248  OT Total Time (min): 49 min    Billable Minutes:Self Care/Self Management/[facility] re: role of OT and POC:  24  4/1/2022

## 2022-04-01 NOTE — PLAN OF CARE
Patient not discharged on yesterday, per [facility], patient could not be accepted without BM. Patient returned to room on yesterday.     Per patient nurse, lactulose ordered, awaiting BM for discharge to [facility]     Updated plan of care orders sent to [facility] via care port.       TN spoke with Robina with [facility], confirmed updated orders received, awaiting BM for planned discharge today.      2:09pm Per pt nurse, still no BM noted, physician notified    2:43pm Per nurse, patient had BM, placed call to Robina with [facility], left detailed voice messages.     Multiple calls placed to [facility], per  admissions and administration were gone for the day and was transferred to nurse [place]. Per nurse, she had been advised the patient would not be admitted until Monday. TN informed  supervisor Susan.     4:15pm TN informed patient and son at bedside regarding delay for discharge and plan for early discharge Monday morning.     TN notified physician.

## 2022-04-01 NOTE — NURSING
Patient had a bowel movement on BSC; assisted by PT to bedside commode. BM moderate in amount, dark brown formed stool.  Assisted back to bed at this time.

## 2022-04-02 LAB
ALBUMIN SERPL BCP-MCNC: 2.4 G/DL (ref 3.5–5.2)
ANION GAP SERPL CALC-SCNC: 8 MMOL/L (ref 8–16)
BASOPHILS # BLD AUTO: 0.02 K/UL (ref 0–0.2)
BASOPHILS NFR BLD: 0.3 % (ref 0–1.9)
BUN SERPL-MCNC: 61 MG/DL (ref 8–23)
CALCIUM SERPL-MCNC: 7.9 MG/DL (ref 8.7–10.5)
CHLORIDE SERPL-SCNC: 111 MMOL/L (ref 95–110)
CO2 SERPL-SCNC: 22 MMOL/L (ref 23–29)
CREAT SERPL-MCNC: 2.2 MG/DL (ref 0.5–1.4)
DIFFERENTIAL METHOD: ABNORMAL
EOSINOPHIL # BLD AUTO: 0.1 K/UL (ref 0–0.5)
EOSINOPHIL NFR BLD: 1.6 % (ref 0–8)
ERYTHROCYTE [DISTWIDTH] IN BLOOD BY AUTOMATED COUNT: 15.9 % (ref 11.5–14.5)
EST. GFR  (AFRICAN AMERICAN): 23 ML/MIN/1.73 M^2
EST. GFR  (NON AFRICAN AMERICAN): 20 ML/MIN/1.73 M^2
GLUCOSE SERPL-MCNC: 172 MG/DL (ref 70–110)
HCT VFR BLD AUTO: 23.4 % (ref 37–48.5)
HGB BLD-MCNC: 7.3 G/DL (ref 12–16)
IMM GRANULOCYTES # BLD AUTO: 0.09 K/UL (ref 0–0.04)
IMM GRANULOCYTES NFR BLD AUTO: 1.2 % (ref 0–0.5)
LYMPHOCYTES # BLD AUTO: 1 K/UL (ref 1–4.8)
LYMPHOCYTES NFR BLD: 13.8 % (ref 18–48)
MCH RBC QN AUTO: 28.3 PG (ref 27–31)
MCHC RBC AUTO-ENTMCNC: 31.2 G/DL (ref 32–36)
MCV RBC AUTO: 91 FL (ref 82–98)
MONOCYTES # BLD AUTO: 0.9 K/UL (ref 0.3–1)
MONOCYTES NFR BLD: 11.9 % (ref 4–15)
NEUTROPHILS # BLD AUTO: 5.4 K/UL (ref 1.8–7.7)
NEUTROPHILS NFR BLD: 71.2 % (ref 38–73)
NRBC BLD-RTO: 0 /100 WBC
PHOSPHATE SERPL-MCNC: 2.6 MG/DL (ref 2.7–4.5)
PLATELET # BLD AUTO: 151 K/UL (ref 150–450)
PMV BLD AUTO: 11.9 FL (ref 9.2–12.9)
POCT GLUCOSE: 155 MG/DL (ref 70–110)
POCT GLUCOSE: 160 MG/DL (ref 70–110)
POCT GLUCOSE: 209 MG/DL (ref 70–110)
POCT GLUCOSE: 224 MG/DL (ref 70–110)
POTASSIUM SERPL-SCNC: 4 MMOL/L (ref 3.5–5.1)
RBC # BLD AUTO: 2.58 M/UL (ref 4–5.4)
SODIUM SERPL-SCNC: 141 MMOL/L (ref 136–145)
WBC # BLD AUTO: 7.54 K/UL (ref 3.9–12.7)

## 2022-04-02 PROCEDURE — 99900035 HC TECH TIME PER 15 MIN (STAT)

## 2022-04-02 PROCEDURE — 94660 CPAP INITIATION&MGMT: CPT

## 2022-04-02 PROCEDURE — 11000001 HC ACUTE MED/SURG PRIVATE ROOM

## 2022-04-02 PROCEDURE — 85025 COMPLETE CBC W/AUTO DIFF WBC: CPT | Performed by: STUDENT IN AN ORGANIZED HEALTH CARE EDUCATION/TRAINING PROGRAM

## 2022-04-02 PROCEDURE — 80069 RENAL FUNCTION PANEL: CPT | Performed by: STUDENT IN AN ORGANIZED HEALTH CARE EDUCATION/TRAINING PROGRAM

## 2022-04-02 PROCEDURE — 97110 THERAPEUTIC EXERCISES: CPT | Mod: CQ

## 2022-04-02 PROCEDURE — 97530 THERAPEUTIC ACTIVITIES: CPT | Mod: CQ

## 2022-04-02 PROCEDURE — 36415 COLL VENOUS BLD VENIPUNCTURE: CPT | Performed by: STUDENT IN AN ORGANIZED HEALTH CARE EDUCATION/TRAINING PROGRAM

## 2022-04-02 PROCEDURE — 94760 N-INVAS EAR/PLS OXIMETRY 1: CPT

## 2022-04-02 PROCEDURE — 25000003 PHARM REV CODE 250: Performed by: ORTHOPAEDIC SURGERY

## 2022-04-02 PROCEDURE — 25000003 PHARM REV CODE 250: Performed by: INTERNAL MEDICINE

## 2022-04-02 RX ADMIN — OXYCODONE HYDROCHLORIDE AND ACETAMINOPHEN 500 MG: 500 TABLET ORAL at 09:04

## 2022-04-02 RX ADMIN — INSULIN DETEMIR 35 UNITS: 100 INJECTION, SOLUTION SUBCUTANEOUS at 09:04

## 2022-04-02 RX ADMIN — INSULIN ASPART 4 UNITS: 100 INJECTION, SOLUTION INTRAVENOUS; SUBCUTANEOUS at 12:04

## 2022-04-02 RX ADMIN — COLCHICINE 0.6 MG: 0.6 TABLET, FILM COATED ORAL at 09:04

## 2022-04-02 RX ADMIN — CARVEDILOL 12.5 MG: 12.5 TABLET, FILM COATED ORAL at 08:04

## 2022-04-02 RX ADMIN — ATORVASTATIN CALCIUM 80 MG: 40 TABLET, FILM COATED ORAL at 09:04

## 2022-04-02 RX ADMIN — PANTOPRAZOLE SODIUM 40 MG: 40 TABLET, DELAYED RELEASE ORAL at 09:04

## 2022-04-02 RX ADMIN — INSULIN ASPART 1 UNITS: 100 INJECTION, SOLUTION INTRAVENOUS; SUBCUTANEOUS at 08:04

## 2022-04-02 RX ADMIN — INSULIN ASPART 4 UNITS: 100 INJECTION, SOLUTION INTRAVENOUS; SUBCUTANEOUS at 09:04

## 2022-04-02 RX ADMIN — CARVEDILOL 12.5 MG: 12.5 TABLET, FILM COATED ORAL at 09:04

## 2022-04-02 RX ADMIN — CALCIUM CARBONATE (ANTACID) CHEW TAB 500 MG 500 MG: 500 CHEW TAB at 09:04

## 2022-04-02 RX ADMIN — CALCIUM CARBONATE (ANTACID) CHEW TAB 500 MG 500 MG: 500 CHEW TAB at 08:04

## 2022-04-02 RX ADMIN — INSULIN ASPART 4 UNITS: 100 INJECTION, SOLUTION INTRAVENOUS; SUBCUTANEOUS at 06:04

## 2022-04-02 RX ADMIN — ALPRAZOLAM 1 MG: 0.5 TABLET ORAL at 02:04

## 2022-04-02 RX ADMIN — METOPROLOL SUCCINATE 25 MG: 25 TABLET, EXTENDED RELEASE ORAL at 09:04

## 2022-04-02 NOTE — PLAN OF CARE
Problem: Physical Therapy  Goal: Physical Therapy Goal  Description: Goals to be met by: 4/10/22     Patient will increase functional independence with mobility by performin. Supine to sit with Stand-by Assistance  2. Rolling to Left and Right with Stand-by Assistance.  3. Sit to stand transfer to be assessed  4. Bed to chair transfer with to be assessed  5. Gait to be assessed    6. Lower extremity exercise program x10 reps per handout, with supervision  .     Outcome: Ongoing, Progressing   Pt tolerated treatment good today and required decreased assistance. Pt able to perform bed mobility and scooting along EOB with Max A of 1 persons this date. Prior to recent fall and worsening renal function, while in hospital, pt was independent with ADLs, functional mobility and ambulation. Pt would continue to benefit from skilled PT services to address pt's deficits and improve current level of function.

## 2022-04-02 NOTE — SUBJECTIVE & OBJECTIVE
Interval History: Patient doing well, had multiple bowel movements so stool softeners/laxatives discontinued. sCr continues to improve. Awaiting placement.     Review of Systems   Constitutional:  Positive for activity change. Negative for appetite change, chills, diaphoresis, fatigue and fever.   HENT:  Negative for congestion and dental problem.    Eyes:  Negative for discharge and itching.   Respiratory:  Negative for apnea, choking, chest tightness, shortness of breath and stridor.    Cardiovascular:  Negative for chest pain, palpitations and leg swelling.   Gastrointestinal:  Negative for abdominal pain, nausea and vomiting.   Genitourinary:  Negative for difficulty urinating, dyspareunia, frequency and genital sores.   Musculoskeletal:  Positive for arthralgias.   Skin:  Negative for color change and pallor.   Neurological:  Negative for light-headedness and headaches.   Psychiatric/Behavioral:  Negative for agitation and behavioral problems.    Objective:     Vital Signs (Most Recent):  Temp: 97 °F (36.1 °C) (04/02/22 1234)  Pulse: 86 (04/02/22 1234)  Resp: 17 (04/02/22 1234)  BP: (!) 140/69 (04/02/22 1234)  SpO2: 100 % (04/02/22 1234) Vital Signs (24h Range):  Temp:  [97 °F (36.1 °C)-98.3 °F (36.8 °C)] 97 °F (36.1 °C)  Pulse:  [79-95] 86  Resp:  [17-20] 17  SpO2:  [97 %-100 %] 100 %  BP: (125-153)/(59-74) 140/69     Weight: 99.2 kg (218 lb 9.6 oz)  Body mass index is 33.24 kg/m².    Intake/Output Summary (Last 24 hours) at 4/2/2022 1336  Last data filed at 4/2/2022 0600  Gross per 24 hour   Intake --   Output 500 ml   Net -500 ml        Physical Exam  Vitals and nursing note reviewed.   Constitutional:       General: She is not in acute distress.     Appearance: Normal appearance. She is obese.   HENT:      Head: Normocephalic and atraumatic.   Pulmonary:      Effort: Pulmonary effort is normal. No respiratory distress.   Abdominal:      Tenderness: There is no abdominal tenderness.   Neurological:       General: No focal deficit present.      Mental Status: She is alert and oriented to person, place, and time.       Significant Labs: All pertinent labs within the past 24 hours have been reviewed.  BMP:   Recent Labs   Lab 04/02/22  0345   *      K 4.0   *   CO2 22*   BUN 61*   CREATININE 2.2*   CALCIUM 7.9*       CBC:   Recent Labs   Lab 04/02/22  0345   WBC 7.54   HGB 7.3*   HCT 23.4*            Significant Imaging:

## 2022-04-02 NOTE — PROGRESS NOTES
Patient in bed with son at bedside. She had BM this morning. She has not worked with PT yet today and is supposed to discharge to SNF on Monday.   VSSAF    Left hip: Dressing c/d/i. NVI distally.  Hct: 23.4    A/P: s/p Left femur IMN  1) cont to get up with PT- WBAT  2) H&H mgmt per primary team

## 2022-04-02 NOTE — PROGRESS NOTES
[facility]  Telemedicine Progress Note    Patient Name: Radha Degroot  MRN: [id number]  Patient Class: IP- Inpatient   Admission Date: 3/25/2022  Length of Stay: 8 days  Attending Physician: Parish Gregory MD  Primary Care Provider: Katherine Merida MD          Subjective:     Principal Problem:Closed left femoral fracture        HPI:  Patient is an 83yo f with multiple extensive comorbid medical conditions which include persistent afib on eliquis, HIT, DM II, CAD S/P PCI 06, anemia, cardiomyopathy/CHF, carpal tunnel syndrome, CKD IV,  gout, uHLD and HTN presents to the ED due to left hip pain. Patient states she had a mechanical fall described as tripping over the floor carpet and landing on her left hip. Incident happened last night at 11pm, she has been laying at the same spot due to inability to move up until around 3-4pm today when son came to check up on her and saw her on the floor. EMS was called and patient was brought to the ER. Patient was found to have a left hip fracture and is being admitted to have ortho eval. She states she is completely independent and does all her ADLs by herself with no limitations. Denies any angina or CHFexacerbations symptoms. Also states her afib has been mostly fairly controlled      Overview/Hospital Course:  Patient admitted for L hip fx from mechanical fall at home.  Ortho was consulted. On 3/26, the patient had coffee ground emesis and GI was consulted.  Patient went for surgery on 3/26/22. PT/OT were consulted. Family requesting SNF placement. Patient with worsening renal function and nephrology was consulted on 3/28. PT/OT rec: SNF.  Ortho consulted for ankle pain.  GI took patient for EGD on 3/31- showed esophagitis. On 4/1, the patient's family was requesting discharge to home. Renal ok'd discharge. Patient will be going to [facility]. Activity as tolerated. Diet- low NA, ADA 2000 casimiro diet. Follow up with GI, renal, PCP and  ortho in 1-2 weeks       Interval History: Patient doing well, had multiple bowel movements so stool softeners/laxatives discontinued. sCr continues to improve. Awaiting placement.     Review of Systems   Constitutional:  Positive for activity change. Negative for appetite change, chills, diaphoresis, fatigue and fever.   HENT:  Negative for congestion and dental problem.    Eyes:  Negative for discharge and itching.   Respiratory:  Negative for apnea, choking, chest tightness, shortness of breath and stridor.    Cardiovascular:  Negative for chest pain, palpitations and leg swelling.   Gastrointestinal:  Negative for abdominal pain, nausea and vomiting.   Genitourinary:  Negative for difficulty urinating, dyspareunia, frequency and genital sores.   Musculoskeletal:  Positive for arthralgias.   Skin:  Negative for color change and pallor.   Neurological:  Negative for light-headedness and headaches.   Psychiatric/Behavioral:  Negative for agitation and behavioral problems.    Objective:     Vital Signs (Most Recent):  Temp: 97 °F (36.1 °C) (04/02/22 1234)  Pulse: 86 (04/02/22 1234)  Resp: 17 (04/02/22 1234)  BP: (!) 140/69 (04/02/22 1234)  SpO2: 100 % (04/02/22 1234) Vital Signs (24h Range):  Temp:  [97 °F (36.1 °C)-98.3 °F (36.8 °C)] 97 °F (36.1 °C)  Pulse:  [79-95] 86  Resp:  [17-20] 17  SpO2:  [97 %-100 %] 100 %  BP: (125-153)/(59-74) 140/69     Weight: 99.2 kg (218 lb 9.6 oz)  Body mass index is 33.24 kg/m².    Intake/Output Summary (Last 24 hours) at 4/2/2022 1336  Last data filed at 4/2/2022 0600  Gross per 24 hour   Intake --   Output 500 ml   Net -500 ml        Physical Exam  Vitals and nursing note reviewed.   Constitutional:       General: She is not in acute distress.     Appearance: Normal appearance. She is obese.   HENT:      Head: Normocephalic and atraumatic.   Pulmonary:      Effort: Pulmonary effort is normal. No respiratory distress.   Abdominal:      Tenderness: There is no abdominal tenderness.    Neurological:      General: No focal deficit present.      Mental Status: She is alert and oriented to person, place, and time.       Significant Labs: All pertinent labs within the past 24 hours have been reviewed.  BMP:   Recent Labs   Lab 04/02/22  0345   *      K 4.0   *   CO2 22*   BUN 61*   CREATININE 2.2*   CALCIUM 7.9*       CBC:   Recent Labs   Lab 04/02/22  0345   WBC 7.54   HGB 7.3*   HCT 23.4*            Significant Imaging:       Assessment/Plan:      * Closed left femoral fracture  Ortho consult  Adequate pain control  Keep NPO for possible ortho intervention tomorrow  Hold off on AC and antiplatelet in preparation for surgical intervention    Cardiac preop risk assessment  RCRI >11% (high risk), YEN 0.78%  Extensive history of cardiac comorbidities - CAD/PCI, CHF, Afib, however she states she is very functional and independent.   I do not think she needs any further cardiac workup, however she is at high risk from a cardiac standpoint for this intervention  Continue optimizing cardioprotective meds for good surgical outcome    S/p surgery on 3/26. PT/OT consult     To SNF once clinically stable.         Coffee ground emesis  Hgb every 8 hours and now. PPI bid. GI consult   Patient stable at the moment.     CBC pending this am. Follow GI recs     Resolved. Follow up GI    EGD on 3/31- esophagitis. Continue PPI    Permanent atrial fibrillation  In NSR during my eval  Restart cardioprotective meds  Holding off on AC for now in anticipation for surgery    Will restart NOAC       DM (diabetes mellitus)  Will start basal and bolus insulin  Adequate glycemic control for good wound healing    Uncontrolled with hyperglycemia with manifestations of CKD.  Increasing insulin      Chronic diastolic CHF (congestive heart failure)  Appears well compensated  Continue optimizing cardioprotective meds for good surgical outcome      Essential hypertension  Restart home HTN meds once  identified   Adequate pain control      Chronic kidney disease, stage IV (severe)  Renal function at baseline  Continue supportive care    Now with some acute renal failure. Will start IV fluids     With worsening function. Will consult nephrology     Acute on chronic renal failure. Follow nephrology recs. BMP pending     Renal stated ok to discharge on 3/31/22.    Anemia of chronic illness  At baseline        VTE Risk Mitigation (From admission, onward)    None                I have assessed these finding virtually using telemed platform and with assistance of bedside nurse                 The attending portion of this evaluation, treatment, and documentation was performed per Parish Gregory MD via Telemedicine AudioVisual using the secure [employer] software platform with 2 way audio/video. The provider was located off-site and the patient is located in the hospital. The aforementioned video software was utilized to document the relevant history and physical exam    Parish Gregory MD  Department of Hospital Medicine   [facility]

## 2022-04-02 NOTE — PT/OT/SLP PROGRESS
Physical Therapy Treatment    Patient Name:  Radha Degroot   MRN:  [id number]    Recommendations:     Discharge Recommendations:  nursing facility, skilled   Discharge Equipment Recommendations:  (per SNF)   Barriers to discharge: Decreased functional mobiliity, unable to ambulate, increased assistance for Bed mobility and transfers    Assessment:     Radha Degroot is a 82 y.o. female admitted with a medical diagnosis of Closed left femoral fracture.  She presents with the following impairments/functional limitations:  weakness, impaired endurance, decreased coordination, impaired self care skills, impaired functional mobilty, impaired balance, gait instability, pain, edema, decreased safety awareness, impaired skin, decreased lower extremity function, decreased upper extremity function, orthopedic precautions, decreased ROM, impaired joint extensibility, impaired muscle length.    Pt tolerated treatment good today and required decreased assistance. Pt able to perform bed mobility and scooting along EOB with Max A of 1 persons this date. Prior to recent fall and worsening renal function, while in hospital, pt was independent with ADLs, functional mobility and ambulation. Pt would continue to benefit from skilled PT services to address pt's deficits and improve current level of function.     Rehab Prognosis: Fair; patient would benefit from acute skilled PT services to address these deficits and reach maximum level of function.    Recent Surgery: Procedure(s) (LRB):  EGD (ESOPHAGOGASTRODUODENOSCOPY) (N/A) 2 Days Post-Op    Plan:     During this hospitalization, patient to be seen daily to address the identified rehab impairments via gait training, therapeutic activities, therapeutic exercises, wheelchair management/training and progress toward the following goals:    · Plan of Care Expires:  04/10/22    Subjective     Chief Complaint: having 6 bowel movements  Patient/Family Comments/goals: Pt reports she is so  tired from moving all morning and getting cleaned up  Pain/Comfort:  · Pain Rating 1:  (yes, with movement. Did not rate)  · Location - Side 1: Left  · Location 1: hip  · Pain Addressed 1: Cessation of Activity, Distraction, Reposition (Per nurseDonald, pt will not receive pain medication yet due to still come down off of overnight medication)      Objective:     Communicated with pt's nurse, Donald, prior to session.  Patient found HOB elevated with son present at bedside (son stepped out for therapy session) with telemetry, pressure relief boots, peripheral IV upon PT entry to room.     General Precautions: Standard, fall   Orthopedic Precautions:LLE weight bearing as tolerated   Braces: N/A  Respiratory Status: Room air     Functional Mobility:  · Bed Mobility:     · Rolling Left:  maximal assistance  · Rolling Right: maximal assistance  · Scooting: Max A to scoot EOB for foot placement, Max A to scoot along EOB, SBA to scoot to HOB in supine/trendelenburg with use of RLE and BUEs.   · Supine to Sit: Max A with step by step verbal/tactile cues and use of HR  · Sit to Supine: maximal assistance  · Balance: fair sitting balance      AM-PAC 6 CLICK MOBILITY  Turning over in bed (including adjusting bedclothes, sheets and blankets)?: 2  Sitting down on and standing up from a chair with arms (e.g., wheelchair, bedside commode, etc.): 1  Moving from lying on back to sitting on the side of the bed?: 2  Moving to and from a bed to a chair (including a wheelchair)?: 2  Climbing 3-5 steps with a railing?: 1       Therapeutic Activities and Exercises:   Therex to BLEs x15 reps AROM: Marching, LAQs, HRs, TRs, Quad sets, ankle pumps, glut sets   Scooted laterally along EOB x2 scoots requiring Max A. Pt requires cues for proper hand/foot placement and weight shifting.   Rolling L and R for assistance with perineal care and brief changing. Brief applied, but not fastened on either side.  Pt required Total A.   · Pt educated on  PTA role/POC.   · Importance of OOB activity with staff assistance.  · Importance of sitting up in the chair throughout the day as tolerated, especially for meals   · Safety during functional t/f and mobility with therapy staff only  · White board updated   · Multiple self-care tasks/functional mobility completed- assistance level noted above   · All questions/concerns answered within scope of practice      Patient left HOB elevated with B pressure boots, tray table with all needs within reach with all lines intact, call button in reach, bed alarm on and pt's nurseDonald, notified..    GOALS:   Multidisciplinary Problems     Physical Therapy Goals        Problem: Physical Therapy    Goal Priority Disciplines Outcome Goal Variances Interventions   Physical Therapy Goal     PT, PT/OT Ongoing, Progressing     Description: Goals to be met by: 4/10/22     Patient will increase functional independence with mobility by performin. Supine to sit with Stand-by Assistance  2. Rolling to Left and Right with Stand-by Assistance.  3. Sit to stand transfer to be assessed  4. Bed to chair transfer with to be assessed  5. Gait to be assessed    6. Lower extremity exercise program x10 reps per handout, with supervision  .                      Time Tracking:     PT Received On: 22  PT Start Time: 1047     PT Stop Time: 1125  PT Total Time (min): 38 min     Billable Minutes: Therapeutic Activity 23 and Therapeutic Exercise 15    Treatment Type: Treatment  PT/PTA: PTA     PTA Visit Number: 1     2022

## 2022-04-02 NOTE — CONSULTS
Thank you for your consult to [facility]. We have reviewed the patient chart. This patient does meet criteria for [facility] service at this time. Will assume care on 04/02/22 at 6AM     Parish Gregory MD  [facility]

## 2022-04-03 LAB
POCT GLUCOSE: 158 MG/DL (ref 70–110)
POCT GLUCOSE: 160 MG/DL (ref 70–110)
POCT GLUCOSE: 177 MG/DL (ref 70–110)
POCT GLUCOSE: 71 MG/DL (ref 70–110)

## 2022-04-03 PROCEDURE — 25000003 PHARM REV CODE 250: Performed by: ORTHOPAEDIC SURGERY

## 2022-04-03 PROCEDURE — 99900035 HC TECH TIME PER 15 MIN (STAT)

## 2022-04-03 PROCEDURE — 94660 CPAP INITIATION&MGMT: CPT

## 2022-04-03 PROCEDURE — C9399 UNCLASSIFIED DRUGS OR BIOLOG: HCPCS | Performed by: INTERNAL MEDICINE

## 2022-04-03 PROCEDURE — 94760 N-INVAS EAR/PLS OXIMETRY 1: CPT

## 2022-04-03 PROCEDURE — 97110 THERAPEUTIC EXERCISES: CPT | Mod: CQ

## 2022-04-03 PROCEDURE — 25000003 PHARM REV CODE 250: Performed by: INTERNAL MEDICINE

## 2022-04-03 PROCEDURE — 25000003 PHARM REV CODE 250: Performed by: STUDENT IN AN ORGANIZED HEALTH CARE EDUCATION/TRAINING PROGRAM

## 2022-04-03 PROCEDURE — 97530 THERAPEUTIC ACTIVITIES: CPT | Mod: CQ

## 2022-04-03 PROCEDURE — 11000001 HC ACUTE MED/SURG PRIVATE ROOM

## 2022-04-03 RX ORDER — ACETAMINOPHEN 325 MG/1
650 TABLET ORAL EVERY 6 HOURS PRN
Status: DISCONTINUED | OUTPATIENT
Start: 2022-04-03 | End: 2022-04-04 | Stop reason: HOSPADM

## 2022-04-03 RX ADMIN — INSULIN ASPART 2 UNITS: 100 INJECTION, SOLUTION INTRAVENOUS; SUBCUTANEOUS at 11:04

## 2022-04-03 RX ADMIN — CALCIUM CARBONATE (ANTACID) CHEW TAB 500 MG 500 MG: 500 CHEW TAB at 08:04

## 2022-04-03 RX ADMIN — INSULIN DETEMIR 35 UNITS: 100 INJECTION, SOLUTION SUBCUTANEOUS at 09:04

## 2022-04-03 RX ADMIN — METOPROLOL SUCCINATE 25 MG: 25 TABLET, EXTENDED RELEASE ORAL at 09:04

## 2022-04-03 RX ADMIN — CALCIUM CARBONATE (ANTACID) CHEW TAB 500 MG 500 MG: 500 CHEW TAB at 09:04

## 2022-04-03 RX ADMIN — ATORVASTATIN CALCIUM 80 MG: 40 TABLET, FILM COATED ORAL at 09:04

## 2022-04-03 RX ADMIN — COLCHICINE 0.6 MG: 0.6 TABLET, FILM COATED ORAL at 09:04

## 2022-04-03 RX ADMIN — CARVEDILOL 12.5 MG: 12.5 TABLET, FILM COATED ORAL at 08:04

## 2022-04-03 RX ADMIN — INSULIN ASPART 2 UNITS: 100 INJECTION, SOLUTION INTRAVENOUS; SUBCUTANEOUS at 05:04

## 2022-04-03 RX ADMIN — ACETAMINOPHEN 650 MG: 325 TABLET ORAL at 04:04

## 2022-04-03 RX ADMIN — OXYCODONE HYDROCHLORIDE AND ACETAMINOPHEN 500 MG: 500 TABLET ORAL at 09:04

## 2022-04-03 RX ADMIN — PANTOPRAZOLE SODIUM 40 MG: 40 TABLET, DELAYED RELEASE ORAL at 09:04

## 2022-04-03 RX ADMIN — CARVEDILOL 12.5 MG: 12.5 TABLET, FILM COATED ORAL at 09:04

## 2022-04-03 NOTE — PT/OT/SLP PROGRESS
Physical Therapy Treatment    Patient Name:  Radha Degroot   MRN:  [id number]    Recommendations:     Discharge Recommendations:  nursing facility, skilled   Discharge Equipment Recommendations: none   Barriers to discharge: Decreased caregiver support and unable to ambulate, increased assistance for bed mobility and transfers    Assessment:     Radha Degroot is a 82 y.o. female admitted with a medical diagnosis of Closed left femoral fracture.  She presents with the following impairments/functional limitations:  weakness, impaired endurance, impaired functional mobilty, gait instability, impaired balance, edema, impaired joint extensibility, impaired cardiopulmonary response to activity, impaired muscle length, orthopedic precautions, decreased safety awareness, decreased lower extremity function, decreased upper extremity function, decreased ROM.  Patient was agreeable and ready for treatment.  Patient reported that she has been completing GS, QS and AP throughout the day between treatment sessions.  Patient did require Mod A for LAQ for the first set and was Juani/CGA at the end of the exercise.  Patient does require verbal commands and tactile cueing for correct positioning and sequencing for bed mobility and exercise.       Rehab Prognosis: Fair; patient would benefit from acute skilled PT services to address these deficits and reach maximum level of function.    Recent Surgery: Procedure(s) (LRB):  EGD (ESOPHAGOGASTRODUODENOSCOPY) (N/A) 3 Days Post-Op    Plan:     During this hospitalization, patient to be seen daily to address the identified rehab impairments via gait training, therapeutic activities, therapeutic exercises and progress toward the following goals:    · Plan of Care Expires:  04/10/22    Subjective     Chief Complaint: General weakness and discomfort  Patient/Family Comments/goals: N/A  Pain/Comfort:  · Pain Rating 1: 5/10  · Location - Side 1: Left  · Location 1: hip  · Pain Addressed 1:  Cessation of Activity  · Pain Rating Post-Intervention 1: 5/10      Objective:     Communicated with RN prior to session.  Patient found HOB elevated with telemetry, peripheral IV, pressure relief boots upon PT entry to room.     General Precautions: Standard, fall   Orthopedic Precautions:LLE weight bearing as tolerated   Braces: N/A  Respiratory Status: Room air     Functional Mobility:  · Bed Mobility:     · Rolling Right: minimum assistance  · Scooting: minimum assistance  · Supine to Sit: moderate assistance and of 2 persons  · Sit to Supine: maximal assistance and of 2 persons  · Balance: Fair in seated, Standing not observed      AM-PAC 6 CLICK MOBILITY  Turning over in bed (including adjusting bedclothes, sheets and blankets)?: 3  Sitting down on and standing up from a chair with arms (e.g., wheelchair, bedside commode, etc.): 3  Moving from lying on back to sitting on the side of the bed?: 2  Moving to and from a bed to a chair (including a wheelchair)?: 2  Need to walk in hospital room?: 1  Climbing 3-5 steps with a railing?: 1  Basic Mobility Total Score: 12       Therapeutic Activities and Exercises:   Supine QS, GS, AP 20x each  Seated Hip abduction, LAQ, Toe Raises, Hip Flexion maintaining hip precautions 20each    Patient left HOB elevated with all lines intact, call button in reach, bed alarm on, RN notified and family  present..    GOALS:   Multidisciplinary Problems     Physical Therapy Goals        Problem: Physical Therapy    Goal Priority Disciplines Outcome Goal Variances Interventions   Physical Therapy Goal     PT, PT/OT Ongoing, Progressing     Description: Goals to be met by: 4/10/22     Patient will increase functional independence with mobility by performin. Supine to sit with Stand-by Assistance  2. Rolling to Left and Right with Stand-by Assistance.  3. Sit to stand transfer to be assessed  4. Bed to chair transfer with to be assessed  5. Gait to be assessed    6. Lower extremity  exercise program x10 reps per handout, with supervision  .                      Time Tracking:     PT Received On: 04/03/22  PT Start Time: 1535     PT Stop Time: 1605  PT Total Time (min): 30 min     Billable Minutes: Therapeutic Activity 20 min and Therapeutic Exercise 10 min    Treatment Type: Treatment  PT/PTA: PTA     PTA Visit Number: 2     04/03/2022

## 2022-04-03 NOTE — PLAN OF CARE
Problem: Physical Therapy  Goal: Physical Therapy Goal  Description: Goals to be met by: 4/10/22     Patient will increase functional independence with mobility by performin. Supine to sit with Stand-by Assistance  2. Rolling to Left and Right with Stand-by Assistance.  3. Sit to stand transfer to be assessed  4. Bed to chair transfer with to be assessed  5. Gait to be assessed    6. Lower extremity exercise program x10 reps per handout, with supervision  .     Outcome: Ongoing, Progressing     Patient reported that she has been completing GS, QS and AP throughout the day between treatment sessions.  Patient did require Mod A for LAQ for the first set and was Juani/CGA at the end of the exercise.  Patient does require verbal commands and tactile cueing for correct positioning and sequencing for bed mobility and exercise.

## 2022-04-04 VITALS
WEIGHT: 218.63 LBS | HEART RATE: 79 BPM | DIASTOLIC BLOOD PRESSURE: 59 MMHG | OXYGEN SATURATION: 98 % | TEMPERATURE: 99 F | BODY MASS INDEX: 33.14 KG/M2 | RESPIRATION RATE: 18 BRPM | SYSTOLIC BLOOD PRESSURE: 117 MMHG | HEIGHT: 68 IN

## 2022-04-04 LAB
ALBUMIN SERPL BCP-MCNC: 2.3 G/DL (ref 3.5–5.2)
ANION GAP SERPL CALC-SCNC: 6 MMOL/L (ref 8–16)
BASOPHILS # BLD AUTO: 0.03 K/UL (ref 0–0.2)
BASOPHILS NFR BLD: 0.3 % (ref 0–1.9)
BLD PROD TYP BPU: NORMAL
BLD PROD TYP BPU: NORMAL
BLOOD UNIT EXPIRATION DATE: NORMAL
BLOOD UNIT EXPIRATION DATE: NORMAL
BLOOD UNIT TYPE CODE: 7300
BLOOD UNIT TYPE CODE: 7300
BLOOD UNIT TYPE: NORMAL
BLOOD UNIT TYPE: NORMAL
BUN SERPL-MCNC: 49 MG/DL (ref 8–23)
CALCIUM SERPL-MCNC: 8 MG/DL (ref 8.7–10.5)
CHLORIDE SERPL-SCNC: 111 MMOL/L (ref 95–110)
CO2 SERPL-SCNC: 22 MMOL/L (ref 23–29)
CODING SYSTEM: NORMAL
CODING SYSTEM: NORMAL
CREAT SERPL-MCNC: 2 MG/DL (ref 0.5–1.4)
DIFFERENTIAL METHOD: ABNORMAL
DISPENSE STATUS: NORMAL
DISPENSE STATUS: NORMAL
EOSINOPHIL # BLD AUTO: 0.2 K/UL (ref 0–0.5)
EOSINOPHIL NFR BLD: 2.7 % (ref 0–8)
ERYTHROCYTE [DISTWIDTH] IN BLOOD BY AUTOMATED COUNT: 16.5 % (ref 11.5–14.5)
EST. GFR  (AFRICAN AMERICAN): 26 ML/MIN/1.73 M^2
EST. GFR  (NON AFRICAN AMERICAN): 23 ML/MIN/1.73 M^2
GLUCOSE SERPL-MCNC: 153 MG/DL (ref 70–110)
HCT VFR BLD AUTO: 24.8 % (ref 37–48.5)
HGB BLD-MCNC: 7.5 G/DL (ref 12–16)
IMM GRANULOCYTES # BLD AUTO: 0.11 K/UL (ref 0–0.04)
IMM GRANULOCYTES NFR BLD AUTO: 1.2 % (ref 0–0.5)
LYMPHOCYTES # BLD AUTO: 1.4 K/UL (ref 1–4.8)
LYMPHOCYTES NFR BLD: 15.5 % (ref 18–48)
MCH RBC QN AUTO: 27.8 PG (ref 27–31)
MCHC RBC AUTO-ENTMCNC: 30.2 G/DL (ref 32–36)
MCV RBC AUTO: 92 FL (ref 82–98)
MONOCYTES # BLD AUTO: 0.8 K/UL (ref 0.3–1)
MONOCYTES NFR BLD: 9.1 % (ref 4–15)
NEUTROPHILS # BLD AUTO: 6.3 K/UL (ref 1.8–7.7)
NEUTROPHILS NFR BLD: 71.2 % (ref 38–73)
NRBC BLD-RTO: 0 /100 WBC
NUM UNITS TRANS PACKED RBC: NORMAL
NUM UNITS TRANS PACKED RBC: NORMAL
PHOSPHATE SERPL-MCNC: 2.7 MG/DL (ref 2.7–4.5)
PLATELET # BLD AUTO: 209 K/UL (ref 150–450)
PLATELET BLD QL SMEAR: ABNORMAL
PMV BLD AUTO: 11.6 FL (ref 9.2–12.9)
POCT GLUCOSE: 171 MG/DL (ref 70–110)
POTASSIUM SERPL-SCNC: 4 MMOL/L (ref 3.5–5.1)
RBC # BLD AUTO: 2.7 M/UL (ref 4–5.4)
SODIUM SERPL-SCNC: 139 MMOL/L (ref 136–145)
WBC # BLD AUTO: 8.83 K/UL (ref 3.9–12.7)

## 2022-04-04 PROCEDURE — 80069 RENAL FUNCTION PANEL: CPT | Performed by: STUDENT IN AN ORGANIZED HEALTH CARE EDUCATION/TRAINING PROGRAM

## 2022-04-04 PROCEDURE — 25000003 PHARM REV CODE 250: Performed by: STUDENT IN AN ORGANIZED HEALTH CARE EDUCATION/TRAINING PROGRAM

## 2022-04-04 PROCEDURE — 25000003 PHARM REV CODE 250: Performed by: INTERNAL MEDICINE

## 2022-04-04 PROCEDURE — 36415 COLL VENOUS BLD VENIPUNCTURE: CPT | Performed by: STUDENT IN AN ORGANIZED HEALTH CARE EDUCATION/TRAINING PROGRAM

## 2022-04-04 PROCEDURE — 25000003 PHARM REV CODE 250: Performed by: ORTHOPAEDIC SURGERY

## 2022-04-04 PROCEDURE — 85025 COMPLETE CBC W/AUTO DIFF WBC: CPT | Performed by: STUDENT IN AN ORGANIZED HEALTH CARE EDUCATION/TRAINING PROGRAM

## 2022-04-04 PROCEDURE — 94760 N-INVAS EAR/PLS OXIMETRY 1: CPT

## 2022-04-04 PROCEDURE — 99900035 HC TECH TIME PER 15 MIN (STAT)

## 2022-04-04 RX ADMIN — OXYCODONE HYDROCHLORIDE AND ACETAMINOPHEN 500 MG: 500 TABLET ORAL at 09:04

## 2022-04-04 RX ADMIN — ATORVASTATIN CALCIUM 80 MG: 40 TABLET, FILM COATED ORAL at 09:04

## 2022-04-04 RX ADMIN — PANTOPRAZOLE SODIUM 40 MG: 40 TABLET, DELAYED RELEASE ORAL at 09:04

## 2022-04-04 RX ADMIN — METOPROLOL SUCCINATE 25 MG: 25 TABLET, EXTENDED RELEASE ORAL at 09:04

## 2022-04-04 RX ADMIN — FLUTICASONE PROPIONATE 100 MCG: 50 SPRAY, METERED NASAL at 09:04

## 2022-04-04 RX ADMIN — CALCIUM CARBONATE (ANTACID) CHEW TAB 500 MG 500 MG: 500 CHEW TAB at 09:04

## 2022-04-04 RX ADMIN — CARVEDILOL 12.5 MG: 12.5 TABLET, FILM COATED ORAL at 09:04

## 2022-04-04 RX ADMIN — ACETAMINOPHEN 650 MG: 325 TABLET ORAL at 04:04

## 2022-04-04 RX ADMIN — INSULIN DETEMIR 35 UNITS: 100 INJECTION, SOLUTION SUBCUTANEOUS at 09:04

## 2022-04-04 RX ADMIN — COLCHICINE 0.6 MG: 0.6 TABLET, FILM COATED ORAL at 09:04

## 2022-04-04 NOTE — PLAN OF CARE
04/04/22 0953   Medicare Message   Important Message from Medicare regarding Discharge Appeal Rights Given to patient/caregiver;Explained to patient/caregiver;Other (comments)  (TN spoke with patient's daughter, Maria Fernanda Wallace via phone [phone] verbalized understanding copy sent certified to address in chart. [phone])   Date IMM was signed 04/04/22   Time IMM was signed 0954

## 2022-04-04 NOTE — PLAN OF CARE
ADT 30 order placed for Stretcher Transportation.  Requested  time:11:00AM   If transportation does not arrive at ETA time nurse will be instructed to follow protocol for transportation below:   How can I get in touch directly with dispatch, if needed?                 Non-emergent dispatch: [phone]      +++NURSING:  If stretcher  does not arrive at requested time please call the above Non Emergent Dispatcher.  If issue not resolved please escalate to your charge nurse for further instructions.    Transport to [facility]  [address], [place] [address] [phone]

## 2022-04-04 NOTE — PLAN OF CARE
TN spoke with Robina, with [facility], stated awaiting updated [employer] auth. TN sent updated clinicals to [facility] via care port, pending updated plan of care orders. Per Robina, TN will be notified once auth received.     9:05am Per Robina, auth has been received, patient to be accepted today, NP notified.     Call report to [facility], pt's nurse notified, awaiting final poc and dc orders, physician notified. Transportation requested.    Plan of care orders and updated mar sent to [facility] via care port. TN notified patient's daughter, verbalized acceptance.    04/04/22 2831   Discharge Reassessment   Assessment Type Discharge Planning Reassessment   Did the patient's condition or plan change since previous assessment? No   Discharge Plan A Skilled Nursing Facility   DME Needed Upon Discharge  none   Discharge Barriers Identified None   Why the patient remains in the hospital Placement issues   Post-Acute Status   Post-Acute Authorization Placement   Post-Acute Placement Status Pending payor review/awaiting authorization (if required)   Coverage [employer] Managed Medicare   Discharge Delays (!) Post-Acute Set-up

## 2022-04-04 NOTE — NURSING
pt stable, discharge instructions reviewed, report called to Neha at [facility]. IV removed, pt leaving by ambulance with Mo.

## 2022-04-04 NOTE — SUBJECTIVE & OBJECTIVE
Interval History: Patient doing well, no complaints. Awaiting placement.     Review of Systems   Constitutional:  Positive for activity change. Negative for appetite change, chills, diaphoresis, fatigue and fever.   HENT:  Negative for congestion and dental problem.    Eyes:  Negative for discharge and itching.   Respiratory:  Negative for apnea, choking, chest tightness, shortness of breath and stridor.    Cardiovascular:  Negative for chest pain, palpitations and leg swelling.   Gastrointestinal:  Negative for abdominal pain, nausea and vomiting.   Genitourinary:  Negative for difficulty urinating, dyspareunia, frequency and genital sores.   Musculoskeletal:  Positive for arthralgias.   Skin:  Negative for color change and pallor.   Neurological:  Negative for light-headedness and headaches.   Psychiatric/Behavioral:  Negative for agitation and behavioral problems.    Objective:     Vital Signs (Most Recent):  Temp: 97.9 °F (36.6 °C) (04/03/22 2334)  Pulse: 70 (04/03/22 2334)  Resp: 18 (04/03/22 2334)  BP: (!) 116/56 (04/03/22 2334)  SpO2: 98 % (04/03/22 2334) Vital Signs (24h Range):  Temp:  [97.9 °F (36.6 °C)-99 °F (37.2 °C)] 97.9 °F (36.6 °C)  Pulse:  [67-93] 70  Resp:  [16-18] 18  SpO2:  [96 %-99 %] 98 %  BP: (112-137)/(54-65) 116/56     Weight: 99.2 kg (218 lb 9.6 oz)  Body mass index is 33.24 kg/m².    Intake/Output Summary (Last 24 hours) at 4/3/2022 2346  Last data filed at 4/3/2022 1730  Gross per 24 hour   Intake 840 ml   Output 1050 ml   Net -210 ml        Physical Exam  Vitals and nursing note reviewed.   Constitutional:       General: She is not in acute distress.     Appearance: Normal appearance. She is obese.   HENT:      Head: Normocephalic and atraumatic.   Pulmonary:      Effort: Pulmonary effort is normal. No respiratory distress.   Abdominal:      Tenderness: There is no abdominal tenderness.   Neurological:      General: No focal deficit present.      Mental Status: She is alert and oriented  to person, place, and time.       Significant Labs: All pertinent labs within the past 24 hours have been reviewed.  BMP:   Recent Labs   Lab 04/02/22  0345   *      K 4.0   *   CO2 22*   BUN 61*   CREATININE 2.2*   CALCIUM 7.9*       CBC:   Recent Labs   Lab 04/02/22  0345   WBC 7.54   HGB 7.3*   HCT 23.4*            Significant Imaging:

## 2022-04-04 NOTE — PLAN OF CARE
Pt free from falls, injury or any further trauma throughout shift. Pt AAO x4 . Continued medications as ordered. Complaints of pain, PRN med administered. Pt on RA, pt turned throughout shift. External catheter in use. Dressing to Left hip C/D/I. Blood glucose monitored as ordered. Pt in no distress. Call light in reach, bed locked in lowest position, bed alarm activated. Will cont to monitor.      Problem: Adult Inpatient Plan of Care  Goal: Plan of Care Review  Outcome: Ongoing, Progressing     Problem: Infection  Goal: Absence of Infection Signs and Symptoms  Outcome: Ongoing, Progressing     Problem: Diabetes Comorbidity  Goal: Blood Glucose Level Within Targeted Range  Outcome: Ongoing, Progressing     Problem: Fall Injury Risk  Goal: Absence of Fall and Fall-Related Injury  Outcome: Ongoing, Progressing     Problem: Skin Injury Risk Increased  Goal: Skin Health and Integrity  Outcome: Ongoing, Progressing     Problem: Bariatric Environmental Safety  Goal: Safety Maintained with Care  Outcome: Ongoing, Progressing

## 2022-04-04 NOTE — PLAN OF CARE
[place] [facility]urg  Discharge Final Note    Patient clear to discharge from case management stand point. Patient's daughter notified, transportation requested.     Primary Care Provider: Katherine Merida MD    Expected Discharge Date: 4/4/2022    Final Discharge Note (most recent)       Final Note - 04/04/22 1016          Final Note    Assessment Type Final Discharge Note (P)      Anticipated Discharge Disposition Skilled Nursing Facility (P)    [facility]    What phone number can be called within the next 1-3 days to see how you are doing after discharge? [phone] (P)      Hospital Resources/Appts/Education Provided Provided patient/caregiver with written discharge plan information (P)         Post-Acute Status    Post-Acute Authorization Placement (P)      Post-Acute Placement Status Set-up Complete/Auth obtained (P)      Coverage [employer] Managed Medicare (P)      Discharge Delays None known at this time (P)                      Important Message from Medicare  Important Message from Medicare regarding Discharge Appeal Rights: Given to patient/caregiver, Explained to patient/caregiver, Other (comments) (TN spoke with patient's daughter, Maria Fernanda Wallace via phone [phone] verbalized understanding copy sent certified to address in [facility]. [address] [place] [address] [phone])     Date IMM was signed: 04/04/22  Time IMM was signed: 0954    Contact Info       Julita Galarza MD   Specialty: Nephrology    [address] [place] [address] [address]  [place] [address]   Phone: [phone]       Next Steps: Follow up in 1 week(s)    Eduin Avelar MD   Specialty: Orthopedic Surgery    [address] [facility] [place] [address]   Phone: [phone]       Next Steps: Follow up in 1 week(s)    Samm Billings MD   Specialty: Gastroenterology    [address] [facility] [place] [address]   Phone: [phone]       Next Steps: Follow up in 1 week(s)    Katherine Merida MD   Specialty: Internal Medicine   Relationship: PCP -  General    [address]  [facility] [address]   Phone: [phone]       Next Steps: Follow up

## 2022-04-04 NOTE — PROGRESS NOTES
[facility] Medicine  Telemedicine Progress Note    Patient Name: Radha Degroot  MRN: [id number]  Patient Class: IP- Inpatient   Admission Date: 3/25/2022  Length of Stay: 9 days  Attending Physician: Parish Gregory MD  Primary Care Provider: Katherine Merida MD          Subjective:     Principal Problem:Closed left femoral fracture        HPI:  Patient is an 83yo f with multiple extensive comorbid medical conditions which include persistent afib on eliquis, HIT, DM II, CAD S/P PCI 06, anemia, cardiomyopathy/CHF, carpal tunnel syndrome, CKD IV,  gout, uHLD and HTN presents to the ED due to left hip pain. Patient states she had a mechanical fall described as tripping over the floor carpet and landing on her left hip. Incident happened last night at 11pm, she has been laying at the same spot due to inability to move up until around 3-4pm today when son came to check up on her and saw her on the floor. EMS was called and patient was brought to the ER. Patient was found to have a left hip fracture and is being admitted to have ortho eval. She states she is completely independent and does all her ADLs by herself with no limitations. Denies any angina or CHFexacerbations symptoms. Also states her afib has been mostly fairly controlled      Overview/Hospital Course:  Patient admitted for L hip fx from mechanical fall at home.  Ortho was consulted. On 3/26, the patient had coffee ground emesis and GI was consulted.  Patient went for surgery on 3/26/22. PT/OT were consulted. Family requesting SNF placement. Patient with worsening renal function and nephrology was consulted on 3/28. PT/OT rec: SNF.  Ortho consulted for ankle pain.  GI took patient for EGD on 3/31- showed esophagitis. On 4/1, the patient's family was requesting discharge to home. Renal ok'd discharge. Patient will be going to [facility]. Activity as tolerated. Diet- low NA, ADA 2000 casimiro diet. Follow up with GI, renal, PCP and  ortho in 1-2 weeks       Interval History: Patient doing well, no complaints. Awaiting placement.     Review of Systems   Constitutional:  Positive for activity change. Negative for appetite change, chills, diaphoresis, fatigue and fever.   HENT:  Negative for congestion and dental problem.    Eyes:  Negative for discharge and itching.   Respiratory:  Negative for apnea, choking, chest tightness, shortness of breath and stridor.    Cardiovascular:  Negative for chest pain, palpitations and leg swelling.   Gastrointestinal:  Negative for abdominal pain, nausea and vomiting.   Genitourinary:  Negative for difficulty urinating, dyspareunia, frequency and genital sores.   Musculoskeletal:  Positive for arthralgias.   Skin:  Negative for color change and pallor.   Neurological:  Negative for light-headedness and headaches.   Psychiatric/Behavioral:  Negative for agitation and behavioral problems.    Objective:     Vital Signs (Most Recent):  Temp: 97.9 °F (36.6 °C) (04/03/22 2334)  Pulse: 70 (04/03/22 2334)  Resp: 18 (04/03/22 2334)  BP: (!) 116/56 (04/03/22 2334)  SpO2: 98 % (04/03/22 2334) Vital Signs (24h Range):  Temp:  [97.9 °F (36.6 °C)-99 °F (37.2 °C)] 97.9 °F (36.6 °C)  Pulse:  [67-93] 70  Resp:  [16-18] 18  SpO2:  [96 %-99 %] 98 %  BP: (112-137)/(54-65) 116/56     Weight: 99.2 kg (218 lb 9.6 oz)  Body mass index is 33.24 kg/m².    Intake/Output Summary (Last 24 hours) at 4/3/2022 2346  Last data filed at 4/3/2022 1730  Gross per 24 hour   Intake 840 ml   Output 1050 ml   Net -210 ml        Physical Exam  Vitals and nursing note reviewed.   Constitutional:       General: She is not in acute distress.     Appearance: Normal appearance. She is obese.   HENT:      Head: Normocephalic and atraumatic.   Pulmonary:      Effort: Pulmonary effort is normal. No respiratory distress.   Abdominal:      Tenderness: There is no abdominal tenderness.   Neurological:      General: No focal deficit present.      Mental Status:  She is alert and oriented to person, place, and time.       Significant Labs: All pertinent labs within the past 24 hours have been reviewed.  BMP:   Recent Labs   Lab 04/02/22  0345   *      K 4.0   *   CO2 22*   BUN 61*   CREATININE 2.2*   CALCIUM 7.9*       CBC:   Recent Labs   Lab 04/02/22  0345   WBC 7.54   HGB 7.3*   HCT 23.4*            Significant Imaging:       Assessment/Plan:      * Closed left femoral fracture  Ortho consult  Adequate pain control  Keep NPO for possible ortho intervention tomorrow  Hold off on AC and antiplatelet in preparation for surgical intervention    Cardiac preop risk assessment  RCRI >11% (high risk), YEN 0.78%  Extensive history of cardiac comorbidities - CAD/PCI, CHF, Afib, however she states she is very functional and independent.   I do not think she needs any further cardiac workup, however she is at high risk from a cardiac standpoint for this intervention  Continue optimizing cardioprotective meds for good surgical outcome    S/p surgery on 3/26. PT/OT consult     To SNF once clinically stable.         Coffee ground emesis  Hgb every 8 hours and now. PPI bid. GI consult   Patient stable at the moment.     CBC pending this am. Follow GI recs     Resolved. Follow up GI    EGD on 3/31- esophagitis. Continue PPI    Permanent atrial fibrillation  In NSR during my eval  Restart cardioprotective meds  Holding off on AC for now in anticipation for surgery    Will restart NOAC       DM (diabetes mellitus)  Will start basal and bolus insulin  Adequate glycemic control for good wound healing    Uncontrolled with hyperglycemia with manifestations of CKD.  Increasing insulin      Chronic diastolic CHF (congestive heart failure)  Appears well compensated  Continue optimizing cardioprotective meds for good surgical outcome      Essential hypertension  Restart home HTN meds once identified   Adequate pain control      Chronic kidney disease, stage IV  (severe)  Renal function at baseline  Continue supportive care    Now with some acute renal failure. Will start IV fluids     With worsening function. Will consult nephrology     Acute on chronic renal failure. Follow nephrology recs. BMP pending     Renal stated ok to discharge on 3/31/22.    Anemia of chronic illness  At baseline        VTE Risk Mitigation (From admission, onward)    None                I have assessed these finding virtually using telemed platform and with assistance of bedside nurse                 The attending portion of this evaluation, treatment, and documentation was performed per Parish Gregory MD via Telemedicine AudioVisual using the secure Headplay software platform with 2 way audio/video. The provider was located off-site and the patient is located in the hospital. The aforementioned video software was utilized to document the relevant history and physical exam    Parish Gregory MD  Department of Hospital Medicine   [facility]

## 2022-04-05 NOTE — PT/OT/SLP DISCHARGE
Physical Therapy Discharge Summary    Name: Radha Degroot  MRN: [id number]   Principal Problem: Closed left femoral fracture     Patient Discharged from acute Physical Therapy on 22.  Please refer to prior PT noted date on 4/3/22 for functional status.     Assessment:     Patient appropriate for care in another setting.    Objective:     GOALS:   Multidisciplinary Problems     Physical Therapy Goals        Problem: Physical Therapy    Goal Priority Disciplines Outcome Goal Variances Interventions   Physical Therapy Goal     PT, PT/OT Ongoing, Progressing     Description: Goals to be met by: 4/10/22     Patient will increase functional independence with mobility by performin. Supine to sit with Stand-by Assistance  2. Rolling to Left and Right with Stand-by Assistance.  3. Sit to stand transfer to be assessed  4. Bed to chair transfer with to be assessed  5. Gait to be assessed    6. Lower extremity exercise program x10 reps per handout, with supervision  .                      Reasons for Discontinuation of Therapy Services  Transfer to alternate level of care.      Plan:     Patient Discharged to: Skilled Nursing Facility.      2022

## 2022-04-07 ENCOUNTER — TELEPHONE (OUTPATIENT)
Dept: NEPHROLOGY | Facility: CLINIC | Age: 82
End: 2022-04-07
Payer: MEDICARE

## 2022-04-07 NOTE — TELEPHONE ENCOUNTER
Linda Murray Staff  Caller: Unspecified (Today,  8:07 AM)  Type: Patient Call Back     Who called: daughter Maria Fernanda     What is the request in detail: mother has fallen and went into a skilled facility she asked that the labs be transferred to [facility].     Can the clinic reply by [facility]? no     Would the patient rather a call back or a response via [facility]?  Call back     Best call back number: [phone]     She asked for a call back she would like the appt with the provider to be a virtu          Returned phone call , spoke with Ms Irving appointment rescheduled to Virtual and will call back to reschedule labs

## 2022-04-13 ENCOUNTER — TELEPHONE (OUTPATIENT)
Dept: NEPHROLOGY | Facility: CLINIC | Age: 82
End: 2022-04-13
Payer: MEDICARE

## 2022-04-13 NOTE — TELEPHONE ENCOUNTER
Called pt daughter to get information on facility so we can send orders no answer l/m     ----- Message from Tasha Castro sent at 4/13/2022  8:07 AM CDT -----  Contact: Maria Fernanda (daughter)  Pt's daughter requesting call back re: sending lab orders to care facility that pt is now living at.     Confirmed contact below:  Contact Name:Maria Fernanda   Phone Number: [phone]

## 2022-04-13 NOTE — TELEPHONE ENCOUNTER
Faxed orders to facility on 4/13/2022@1:40pm     ----- Message from Terri Mcmanus MD sent at 4/13/2022  1:18 PM CDT -----  Contact: asha (daughter)  If prior pre clinic lab orders still valid then can use those otherwise will need  CBC  Renal panel  PTH  Vit D  UA  UPC    Thanks.    ----- Message -----  From: Emily Murray LPN  Sent: 4/13/2022   1:05 PM CDT  To: Terri Mcmanus MD    Pt has an appt with you on 4/27/2022 . Would you like for me to use the  orders that is already in the computer or would you like to order new ones ?  ----- Message -----  From: Tasha Castro  Sent: 4/13/2022  11:31 AM CDT  To: Yonathan Murray Staff    Pt's daughter returning call to staff re: pt's care facility information.     \[facility] [place] [facility]\""   Phone Number: [phone]  Fax Number: [phone]

## 2022-04-20 NOTE — ANESTHESIA POSTPROCEDURE EVALUATION
"Anesthesia Post Evaluation    Patient: Radha Degroot    Procedure(s) Performed: Procedure(s) (LRB):  CARDIOVERSION (N/A)  TRANSESOPHAGEAL ECHOCARDIOGRAM (SHAYY) (N/A)    Final Anesthesia Type: general  Patient location during evaluation: PACU  Patient participation: Yes- Able to Participate  Level of consciousness: awake and alert  Post-procedure vital signs: reviewed and stable  Pain management: adequate  Airway patency: patent  PONV status at discharge: No PONV  Anesthetic complications: no      Cardiovascular status: blood pressure returned to baseline  Respiratory status: unassisted  Hydration status: euvolemic  Follow-up not needed.        Visit Vitals  BP (!) 126/48   Pulse (!) 56   Temp 36.8 °C (98.3 °F) (Temporal)   Resp 13   Ht 5' 8" (1.727 m)   Wt 95.3 kg (210 lb)   SpO2 99%   Breastfeeding? No   BMI 31.93 kg/m²       Pain/Karthik Score: Pain Assessment Performed: Yes (5/18/2018 12:20 PM)  Presence of Pain: denies (5/18/2018 12:20 PM)  Karthik Score: 9 (5/18/2018 12:20 PM)  Modified Karthik Score: 19 (5/18/2018 12:20 PM)      "
193.04

## 2022-04-27 ENCOUNTER — OFFICE VISIT (OUTPATIENT)
Dept: NEPHROLOGY | Facility: CLINIC | Age: 82
End: 2022-04-27
Payer: MEDICARE

## 2022-04-27 ENCOUNTER — OFFICE VISIT (OUTPATIENT)
Dept: INTERNAL MEDICINE | Facility: CLINIC | Age: 82
End: 2022-04-27
Payer: MEDICARE

## 2022-04-27 ENCOUNTER — TELEPHONE (OUTPATIENT)
Dept: INTERNAL MEDICINE | Facility: CLINIC | Age: 82
End: 2022-04-27
Payer: MEDICARE

## 2022-04-27 DIAGNOSIS — N18.4 HYPERTENSIVE KIDNEY DISEASE WITH STAGE 4 CHRONIC KIDNEY DISEASE: ICD-10-CM

## 2022-04-27 DIAGNOSIS — D63.8 ANEMIA OF CHRONIC ILLNESS: ICD-10-CM

## 2022-04-27 DIAGNOSIS — S72.92XD CLOSED FRACTURE OF LEFT FEMUR WITH ROUTINE HEALING, UNSPECIFIED FRACTURE MORPHOLOGY, UNSPECIFIED PORTION OF FEMUR, SUBSEQUENT ENCOUNTER: Primary | ICD-10-CM

## 2022-04-27 DIAGNOSIS — N18.4 CHRONIC KIDNEY DISEASE, STAGE IV (SEVERE): Primary | Chronic | ICD-10-CM

## 2022-04-27 DIAGNOSIS — I48.91 ATRIAL FIBRILLATION, UNSPECIFIED TYPE: ICD-10-CM

## 2022-04-27 DIAGNOSIS — R80.1 PERSISTENT PROTEINURIA: ICD-10-CM

## 2022-04-27 DIAGNOSIS — I10 PRIMARY HYPERTENSION: ICD-10-CM

## 2022-04-27 DIAGNOSIS — I12.9 HYPERTENSIVE KIDNEY DISEASE WITH STAGE 4 CHRONIC KIDNEY DISEASE: ICD-10-CM

## 2022-04-27 DIAGNOSIS — I50.32 CHRONIC DIASTOLIC CHF (CONGESTIVE HEART FAILURE): ICD-10-CM

## 2022-04-27 DIAGNOSIS — E11.22 TYPE 2 DIABETES MELLITUS WITH STAGE 4 CHRONIC KIDNEY DISEASE, WITH LONG-TERM CURRENT USE OF INSULIN: ICD-10-CM

## 2022-04-27 DIAGNOSIS — K92.2 GASTROINTESTINAL HEMORRHAGE, UNSPECIFIED GASTROINTESTINAL HEMORRHAGE TYPE: ICD-10-CM

## 2022-04-27 DIAGNOSIS — Z79.4 TYPE 2 DIABETES MELLITUS WITH STAGE 4 CHRONIC KIDNEY DISEASE, WITH LONG-TERM CURRENT USE OF INSULIN: ICD-10-CM

## 2022-04-27 DIAGNOSIS — E55.9 VITAMIN D DEFICIENCY DISEASE: ICD-10-CM

## 2022-04-27 DIAGNOSIS — N18.4 CHRONIC KIDNEY DISEASE, STAGE IV (SEVERE): ICD-10-CM

## 2022-04-27 DIAGNOSIS — D17.71 ANGIOMYOLIPOMA OF RIGHT KIDNEY: ICD-10-CM

## 2022-04-27 DIAGNOSIS — N18.4 TYPE 2 DIABETES MELLITUS WITH STAGE 4 CHRONIC KIDNEY DISEASE, WITH LONG-TERM CURRENT USE OF INSULIN: ICD-10-CM

## 2022-04-27 DIAGNOSIS — N25.81 SECONDARY RENAL HYPERPARATHYROIDISM: ICD-10-CM

## 2022-04-27 PROCEDURE — 99214 OFFICE O/P EST MOD 30 MIN: CPT | Mod: 95,,, | Performed by: INTERNAL MEDICINE

## 2022-04-27 PROCEDURE — 99214 PR OFFICE/OUTPT VISIT, EST, LEVL IV, 30-39 MIN: ICD-10-PCS | Mod: 95,,, | Performed by: INTERNAL MEDICINE

## 2022-04-27 PROCEDURE — 99499 UNLISTED E&M SERVICE: CPT | Mod: 95,,, | Performed by: INTERNAL MEDICINE

## 2022-04-27 PROCEDURE — 1160F PR REVIEW ALL MEDS BY PRESCRIBER/CLIN PHARMACIST DOCUMENTED: ICD-10-PCS | Mod: CPTII,95,, | Performed by: INTERNAL MEDICINE

## 2022-04-27 PROCEDURE — 1160F RVW MEDS BY RX/DR IN RCRD: CPT | Mod: CPTII,95,, | Performed by: INTERNAL MEDICINE

## 2022-04-27 PROCEDURE — 1159F MED LIST DOCD IN RCRD: CPT | Mod: CPTII,95,, | Performed by: INTERNAL MEDICINE

## 2022-04-27 PROCEDURE — 99499 RISK ADDL DX/OHS AUDIT: ICD-10-PCS | Mod: 95,,, | Performed by: INTERNAL MEDICINE

## 2022-04-27 PROCEDURE — 1111F DSCHRG MED/CURRENT MED MERGE: CPT | Mod: CPTII,95,, | Performed by: INTERNAL MEDICINE

## 2022-04-27 PROCEDURE — 1111F PR DISCHARGE MEDS RECONCILED W/ CURRENT OUTPATIENT MED LIST: ICD-10-PCS | Mod: CPTII,95,, | Performed by: INTERNAL MEDICINE

## 2022-04-27 PROCEDURE — 3051F HG A1C>EQUAL 7.0%<8.0%: CPT | Mod: CPTII,95,, | Performed by: INTERNAL MEDICINE

## 2022-04-27 PROCEDURE — 1159F PR MEDICATION LIST DOCUMENTED IN MEDICAL RECORD: ICD-10-PCS | Mod: CPTII,95,, | Performed by: INTERNAL MEDICINE

## 2022-04-27 PROCEDURE — 3051F PR MOST RECENT HEMOGLOBIN A1C LEVEL 7.0 - < 8.0%: ICD-10-PCS | Mod: CPTII,95,, | Performed by: INTERNAL MEDICINE

## 2022-04-27 NOTE — TELEPHONE ENCOUNTER
Spoke with pt daughter after virtual visit to schedule pt gastro appt pt daughter states they will call back tomorrow to scheduled to be sure which location they will choose

## 2022-04-27 NOTE — PROGRESS NOTES
Answers for HPI/ROS submitted by the patient on 4/27/2022  activity change: Yes  unexpected weight change: No  neck pain: Yes  hearing loss: Yes  rhinorrhea: Yes  trouble swallowing: No  eye discharge: No  visual disturbance: No  chest tightness: No  wheezing: No  chest pain: No  palpitations: No  blood in stool: No  constipation: No  vomiting: No  diarrhea: No  polydipsia: No  polyuria: No  difficulty urinating: No  hematuria: No  menstrual problem: No  dysuria: Yes  joint swelling: Yes  arthralgias: Yes  headaches: No  weakness: Yes  confusion: No  dysphoric mood: No    Subjective:       Patient ID: Radha Degroot is a 82 y.o. female.    Chief Complaint: Follow-up (Sp fracture )  The patient location is: rehab [facility]   The chief complaint leading to consultation is:     Visit type: audiovisual    Face to Face time with patient: 10 15  minutes of total time spent on the encounter, which includes face to face time and non-face to face time preparing to see the patient (eg, review of tests), Obtaining and/or reviewing separately obtained history, Documenting clinical information in the electronic or other health record, Independently interpreting results (not separately reported) and communicating results to the patient/family/caregiver, or Care coordination (not separately reported).         Each patient to whom he or she provides medical services by telemedicine is:  (1) informed of the relationship between the physician and patient and the respective role of any other health care provider with respect to management of the patient; and (2) notified that he or she may decline to receive medical services by telemedicine and may withdraw from such care at any time.    Notes:   This is an 82-year-old who presents today for follow-up virtually .  Patient reports that she had a fall on her birthday and she slipped and injured her left femur she underwent surgical repair and was discharged to rehab center [place] she reports that she feels like she is getting stronger over time and able to ambulate a bit more now she reports she was in the process of moving to [place] where her children live to be closer to them and she is in the process of doing that and getting things out of her home when this happened she reports that her pain is improved she is able to get around a little better she had an episode when she was in the hospital where she had GI bleeding and was seen by GI .  They placed her on a Protonix and did an EGD and recommended a repeat follow-up in a few months she has had no recurrence of her bleeding she does remain on anticoagulation for her atrial fibrillation  She reports medicines mostly kept the same she is no longer taking pain medicine she was placed on a potassium supplement when her potassium was low but she recently stopped her her nephrologist and they stopped that when her potassium normalized.  She was on colchicine but no longer taking that.   She is hoping to get her strength good enough so that she will be able to move into a home in [place]  She has had some issues with her foot and has a rehab physician that will take a look at it later no drainage but is trying to avoid putting a lot of pressure on the area    HPI  Review of Systems   Constitutional: Positive for activity change. Negative for unexpected weight change.   HENT: Positive for hearing loss and rhinorrhea. Negative for trouble swallowing.    Eyes: Negative for discharge and visual disturbance.   Respiratory: Negative for chest tightness and wheezing.    Cardiovascular: Negative for chest pain and palpitations.   Gastrointestinal: Negative for blood in stool, constipation, diarrhea and vomiting.   Endocrine: Negative for polydipsia and polyuria.   Genitourinary: Positive for dysuria. Negative for difficulty urinating, hematuria and menstrual problem.   Musculoskeletal: Positive for arthralgias, joint swelling and  neck pain.   Neurological: Positive for weakness. Negative for headaches.   Psychiatric/Behavioral: Negative for confusion and dysphoric mood.       Objective:    pt appears comfortable on video   Physical Exam    Assessment:       1. Closed fracture of left femur with routine healing, unspecified fracture morphology, unspecified portion of femur, subsequent encounter    2. Primary hypertension    3. Gastrointestinal hemorrhage, unspecified gastrointestinal hemorrhage type    4. Chronic kidney disease, stage IV (severe)    5. Chronic diastolic CHF (congestive heart failure)    6. Atrial fibrillation, unspecified type        Plan:       Radha was seen today for follow-up.    Diagnoses and all orders for this visit:    Closed fracture of left femur with routine healing, unspecified fracture morphology, unspecified portion of femur, subsequent encounter  History of status post surgery patient reports mobility is improving over time she has follow-up with orthopedist planned we discussed update bone density she declines at this time since she is still in rehab center    Primary hypertension  She remains on medications no new concerns    Gastrointestinal hemorrhage, unspecified gastrointestinal hemorrhage type  Episode of when she was in the hospital she is now on Protonix and had an EGD they recommended a follow-up referral was placed for scheduling when she gets out of rehab  -     Ambulatory referral/consult to Gastroenterology; Future    Chronic kidney disease, stage IV (severe)  Has been stable and is following with Nephrology patient reports recent UTI being treated    Chronic diastolic CHF (congestive heart failure)  Atrial fibrillation, unspecified type  History of she remains on carvedilol and anticoagulation and follows with her cardiologist    For foot concerns we discussed avoidance of decubiti I avoid putting a lot of pressure on the area and she will have them check her foot  In person at rehab center as  planned     Patient reports she does plan to continue to follow at [facility] she is able to get here and will make a follow-up for her regular check up when she  Is discharged

## 2022-04-27 NOTE — Clinical Note
Please send a copy of office note to patient's medical team at rehab at [place]. Changes are suggested to her lasix dose and she is advised to stop K supplements and have repeat renal panel in 1 week. Details included in the note. Please inform patient of document being sent to her rehab so she can follow with the team there. Thanks.

## 2022-04-27 NOTE — PATIENT INSTRUCTIONS
Ms. Degroot, since you reported having more swelling on both feet, it would be ok to increase lasix to 40 mg in the morning and 20 mg in the afternoon.    I suggest to have renal panel checked within a week of this dose increase to that potassium, creatinine can be monitored closely.    I will copy my note to your rehab medical team so that my recommendations can be shared with them.    Also given your potassium level is now normal, ok to stop potassium supplement. Most recent episode of low potassium was likely from losses through GI fluids as we discussed during our meeting.    Hope you get well soon.    Thanks,  Dr. Mcmanus

## 2022-04-27 NOTE — PROGRESS NOTES
Subjective:       Patient ID: Radha Degroot is a 82 y.o. White female who presents for follow-up evaluation of CKD.    The patient location is: rehab room in [place]  The chief complaint leading to consultation is: CKD    Visit type: audiovisual    Face to Face time with patient: 15 minutes  40 minutes of total time spent on the encounter, which includes face to face time and non-face to face time preparing to see the patient (eg, review of tests), Obtaining and/or reviewing separately obtained history, Documenting clinical information in the electronic or other health record, Independently interpreting results (not separately reported) and communicating results to the patient/family/caregiver, or Care coordination (not separately reported).         Each patient to whom he or she provides medical services by telemedicine is:  (1) informed of the relationship between the physician and patient and the respective role of any other health care provider with respect to management of the patient; and (2) notified that he or she may decline to receive medical services by telemedicine and may withdraw from such care at any time.    Notes:     HPI     Ms. Degroot was seen in nephrology clinic for follow up on CKD. She was last followed on 8/16/21.     She reports being in the rehab following hip surgery. She took a fall at home, stayed on the floor for several hours, she reports. She was diagnosed with left hip fracture, s/p surgery by ortho. She also had coffee ground emesis, EGD which showed esophagitis. Since discharge she has been at rehab.     She plans to continue to follow with [facility].    She reports now she is doing ok, working with PT. She reports worsened edema of both legs but unchanged chronic RODRIGUEZ. She reports she is getting lasix only once per day. She had hypokalemia in the recent past and has been continued on K supplements. She wants to know if that can be stopped.    She reports GI symptoms when she  had hypokalemia, she had vomiting.    Noted DANDY on CKD IV on 3/28/22, creatinine rise to 4 while she was hospitalized with GI bleed and hip fracture. Creatinine improving subsequently to 2.0.    Reviewed serial labs from hospitalization but do not notice any episode of hypokalemia. As such 2 episodes of mild hyperkalemia around the time of DANDY with K of 5.3 to 5.4.       Labs from rehab from 4/27/22 noted:  Na 142  K 4.4  Bicarb 28  BUN 30  Creatinine 1.96  Ca 8.9  AG 11  Mg 2.0  eGFR 24    CBC 4/16/22  Hb 9.1    She has CKD IV, diabetes with complications like neuropathy, diabetic retinopathy, hypertension, CAD, DELFINA, diastolic heart failure and several other medical problems. In the past she was taken off aldactone due to hyperkalemia.     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     Her diabetes control continues to remain sub optimal. She has longstanding and poorly controlled diabetes, at times A1C has remained above 10, she has longstanding proteinuria. Most recent A1C was 9.    At the time of her visit on 6/28/17 she was noted to have worsening renal function and hypotension which she confirmed was happening on her home BP readings also and as a result she was feeling tired and dizzy. She was taken off amlodipine and lisinopril at that time. She was advised to follow BP at home while continuing Coreg and lasix. Her follow up labs on 7/13/17 noted for improvement in her renal function including creatinine and BUN.     Prior lab trend noted for onset of CKD since around 2006 and episodes of DANDY, and progression to CKD IV. US kidneys per PCP from 9/16 noted for Kidney size of 11 and 10.7 cm. Per report 1.6 cm isoechoic focus within the superior pole of the right kidney corresponding to abnormality seen on recent MRI which does not have the characteristics of a simple cyst.  Recommend further  evaluation with CT of the abdomen with and without contrast renal mass protocol. 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 It did not show any mass/ stone/ obstruction.     Most recent labs noted  Renal Function:  Lab Results   Component Value Date     (H) 04/04/2022     (H) 04/02/2022     04/04/2022     04/02/2022    K 4.0 04/04/2022    K 4.0 04/02/2022     (H) 04/04/2022     (H) 04/02/2022    CO2 22 (L) 04/04/2022    CO2 22 (L) 04/02/2022    BUN 49 (H) 04/04/2022    BUN 61 (H) 04/02/2022    CALCIUM 8.0 (L) 04/04/2022    CALCIUM 7.9 (L) 04/02/2022    CREATININE 2.0 (H) 04/04/2022    CREATININE 2.2 (H) 04/02/2022    ALBUMIN 2.3 (L) 04/04/2022    ALBUMIN 2.4 (L) 04/02/2022    PHOS 2.7 04/04/2022    PHOS 2.6 (L) 04/02/2022    ESTGFRAFRICA 26 (A) 04/04/2022    ESTGFRAFRICA 23 (A) 04/02/2022    EGFRNONAA 23 (A) 04/04/2022    EGFRNONAA 20 (A) 04/02/2022       Urinalysis:  Lab Results   Component Value Date    APPEARANCEUA Clear 03/25/2022    PHUR 6.0 03/25/2022    SPECGRAV 1.020 03/25/2022    PROTEINUA 2+ (A) 03/25/2022    GLUCUA 2+ (A) 03/25/2022    OCCULTUA 2+ (A) 03/25/2022    NITRITE Negative 03/25/2022    LEUKOCYTESUR Negative 03/25/2022       Protein/Creatinine Ratio:  Lab Results   Component Value Date    PROTEINURINE 11 08/11/2021    CREATRANDUR 63.2 08/11/2021    UTPCR 0.17 08/11/2021       CBC:  Lab Results   Component Value Date    WBC 8.83 04/04/2022    HGB 7.5 (L) 04/04/2022    HCT 24.8 (L) 04/04/2022       .3 from 138 from 129  Vit D 39  kidney US in 2016, she had one ordered by urologist, Dr. Greene in 6/17 which patient has not done so far   US kidney was ordered at the time of visit in may 2018 but for unclear reason she has not done that so far    US kidneys 9/2018  11.4 and 10.6 cm kidneys, There is no evidence hydronephrosis. There is a 1.5 cm angiomyolipoma within the superior pole of the  right kidney correlating with the fatty lesion seen on CT from May.  .  Review of Systems   Constitutional: Positive for activity change. Negative for appetite change, chills and fever.   HENT: Negative for sneezing and sore throat.    Eyes: Negative for visual disturbance.   Respiratory: Positive for shortness of breath. Negative for cough.    Cardiovascular: Positive for leg swelling. Negative for chest pain.   Gastrointestinal: Negative for abdominal pain, diarrhea, nausea and vomiting.   Genitourinary: Negative for decreased urine volume, dysuria, hematuria and urgency.   Musculoskeletal: Positive for gait problem.   Skin: Negative for rash.   Allergic/Immunologic: Negative for immunocompromised state.   Neurological: Negative for dizziness and headaches.   Psychiatric/Behavioral: Negative for confusion.       Objective:      Physical Exam  virtual visit  NAD  Alert and oriented x 3          Assessment:       1. Chronic kidney disease, stage IV (severe)    2. Hypertensive kidney disease with stage 4 chronic kidney disease    3. Persistent proteinuria    4. Type 2 diabetes mellitus with stage 4 chronic kidney disease, with long-term current use of insulin    5. Angiomyolipoma of right kidney    6. Anemia of chronic illness    7. Chronic diastolic CHF (congestive heart failure)    8. Secondary renal hyperparathyroidism    9. Vitamin D deficiency disease        Plan:     Ms. Degroot has CKD IV with sub nephrotic proteinuria which is clinically felt to be due to diabetic nephropathy and hypertensive glomerulosclerosis. Labs, CKD staging, risk of progression to was discussed with her in detail. So far available serial labs were explained to her in detail.     Prior episodes of DANDY superimposed on CKD in the context of hypotensive episodes in 2017. Over time there has been slow progression of CKD IV.      She has longstanding CKD IV status already which has progressed,higher risk of DANDY due to hypotension/ volume  depletion/ contrast agents/ nephrotoxins/ NSAID etc and higher risk of progression to CKD .     Higher risk of DANDY/ hyperkalemia with ACE-I/ ARB and during prior such episodes she was taken off ACE-I due to risk of hyperkalemia.     Reestablish care with urology for right kidney AML and annual US kidney for surveillance.     CKD IV with slow progression. Stressed to follow salt and fluid restricted diet given worsened edema. Will have her increase dose to 40 mg in the morning and 20 mg in the afternoon, further dose titration per her symptoms. Pt advised to report promptly of any new symptoms. Re-establish care with cardiology. Renal panel in 2 weeks of increasing lasix dose for closer monitoring of acid base, creatinine, electrolytes. Further diuretic dosing per cardiology.    Will instruct staff to send a copy of note to rehab medical team with above recommendations about her lasix dose and need for labs in 1-2 weeks with a copy sent to our office.  Also do not notice any episode of hypokalemia on recent labs from [facility] as well as from rehab.  She has prior episode of hyperkalemia.  Hence given her normal K on most recent labs, will advise stopping K supplements, can be used prn with lab surveillance should she develop hypokalemia. I discussed this with patient in details.       - Trend proteinuria  - Trend PTH levels, CC, phos  - trend H/H, RITO if Hb < 10, recently worsened anemia due to blood loss when she had hip fracture.  - closely monitor renal panel for electrolytes, acid base status, eGFR  - low salt and low potassium diet, low phos diet  - avoid NSAID/ bactrim/ IV contrast/ gadolinium/ aminoglycoside/ fleet enema where possible   - decision to continue PPI per primary and GI  - renal dosing of medicines for creatinine clearance less than 30    Follow with PCP if SOB worsening to decide if would need home oxygen     RTC 2 months  Labs, plan, recommendations were discussed with patient and her daughter in  detail. Their questions were answered to their satisfaction.

## 2022-06-12 ENCOUNTER — PATIENT MESSAGE (OUTPATIENT)
Dept: ENDOCRINOLOGY | Facility: CLINIC | Age: 82
End: 2022-06-12
Payer: MEDICARE

## 2022-06-13 ENCOUNTER — TELEPHONE (OUTPATIENT)
Dept: FAMILY MEDICINE | Facility: CLINIC | Age: 82
End: 2022-06-13
Payer: MEDICARE

## 2022-06-13 NOTE — TELEPHONE ENCOUNTER
----- Message from Katt Kim sent at 6/13/2022  2:25 PM CDT -----  Contact: Radha  .Type:  Sooner Apoointment Request    Caller is requesting a sooner appointment.  Caller declined first available appointment listed below.  Caller will not accept being placed on the waitlist and is requesting a message be sent to doctor.  Name of Caller:Radha  When is the first available appointment? 08/04/2022  Symptoms:na     Would the patient rather a call back or a response via My [facility]? call    Best Call Back Number:[phone] (home)    Additional Information:  pt is requesting a callback from the nurse in regards to pt wanting an appointment to establish care in [place] in June 2022 please

## 2022-06-30 ENCOUNTER — OFFICE VISIT (OUTPATIENT)
Dept: FAMILY MEDICINE | Facility: CLINIC | Age: 82
End: 2022-06-30
Payer: MEDICARE

## 2022-06-30 ENCOUNTER — LAB VISIT (OUTPATIENT)
Dept: LAB | Facility: HOSPITAL | Age: 82
End: 2022-06-30
Attending: INTERNAL MEDICINE
Payer: MEDICARE

## 2022-06-30 VITALS
SYSTOLIC BLOOD PRESSURE: 138 MMHG | HEIGHT: 68 IN | BODY MASS INDEX: 33.24 KG/M2 | DIASTOLIC BLOOD PRESSURE: 54 MMHG | HEART RATE: 63 BPM | TEMPERATURE: 98 F

## 2022-06-30 DIAGNOSIS — I10 ESSENTIAL HYPERTENSION: Primary | Chronic | ICD-10-CM

## 2022-06-30 DIAGNOSIS — N18.4 TYPE 2 DIABETES MELLITUS WITH STAGE 4 CHRONIC KIDNEY DISEASE, WITH LONG-TERM CURRENT USE OF INSULIN: ICD-10-CM

## 2022-06-30 DIAGNOSIS — I50.32 CHRONIC DIASTOLIC CHF (CONGESTIVE HEART FAILURE): ICD-10-CM

## 2022-06-30 DIAGNOSIS — E66.2 CLASS 1 OBESITY WITH ALVEOLAR HYPOVENTILATION, SERIOUS COMORBIDITY, AND BODY MASS INDEX (BMI) OF 33.0 TO 33.9 IN ADULT: ICD-10-CM

## 2022-06-30 DIAGNOSIS — S72.92XD CLOSED FRACTURE OF LEFT FEMUR WITH ROUTINE HEALING, UNSPECIFIED FRACTURE MORPHOLOGY, UNSPECIFIED PORTION OF FEMUR, SUBSEQUENT ENCOUNTER: ICD-10-CM

## 2022-06-30 DIAGNOSIS — D63.8 ANEMIA OF CHRONIC ILLNESS: ICD-10-CM

## 2022-06-30 DIAGNOSIS — Z79.4 TYPE 2 DIABETES MELLITUS WITH STAGE 4 CHRONIC KIDNEY DISEASE, WITH LONG-TERM CURRENT USE OF INSULIN: ICD-10-CM

## 2022-06-30 DIAGNOSIS — K92.0 COFFEE GROUND EMESIS: ICD-10-CM

## 2022-06-30 DIAGNOSIS — I25.10 CORONARY ARTERY DISEASE INVOLVING NATIVE CORONARY ARTERY OF NATIVE HEART WITHOUT ANGINA PECTORIS: Chronic | ICD-10-CM

## 2022-06-30 DIAGNOSIS — E78.2 MIXED HYPERLIPIDEMIA: Chronic | ICD-10-CM

## 2022-06-30 DIAGNOSIS — I48.21 PERMANENT ATRIAL FIBRILLATION: ICD-10-CM

## 2022-06-30 DIAGNOSIS — I48.19 PERSISTENT ATRIAL FIBRILLATION: ICD-10-CM

## 2022-06-30 DIAGNOSIS — I70.0 AORTIC ATHEROSCLEROSIS: ICD-10-CM

## 2022-06-30 DIAGNOSIS — H91.90 HEARING LOSS, UNSPECIFIED HEARING LOSS TYPE, UNSPECIFIED LATERALITY: ICD-10-CM

## 2022-06-30 DIAGNOSIS — N18.4 CHRONIC KIDNEY DISEASE, STAGE IV (SEVERE): ICD-10-CM

## 2022-06-30 DIAGNOSIS — E11.22 TYPE 2 DIABETES MELLITUS WITH STAGE 4 CHRONIC KIDNEY DISEASE, WITH LONG-TERM CURRENT USE OF INSULIN: ICD-10-CM

## 2022-06-30 LAB
ALBUMIN SERPL BCP-MCNC: 3.8 G/DL (ref 3.5–5.2)
ALP SERPL-CCNC: 125 U/L (ref 55–135)
ALT SERPL W/O P-5'-P-CCNC: 13 U/L (ref 10–44)
ANION GAP SERPL CALC-SCNC: 10 MMOL/L (ref 8–16)
AST SERPL-CCNC: 18 U/L (ref 10–40)
BILIRUB SERPL-MCNC: 0.5 MG/DL (ref 0.1–1)
BUN SERPL-MCNC: 30 MG/DL (ref 8–23)
CALCIUM SERPL-MCNC: 9.1 MG/DL (ref 8.7–10.5)
CHLORIDE SERPL-SCNC: 106 MMOL/L (ref 95–110)
CO2 SERPL-SCNC: 27 MMOL/L (ref 23–29)
CREAT SERPL-MCNC: 2 MG/DL (ref 0.5–1.4)
ERYTHROCYTE [DISTWIDTH] IN BLOOD BY AUTOMATED COUNT: 15 % (ref 11.5–14.5)
EST. GFR  (AFRICAN AMERICAN): 26 ML/MIN/1.73 M^2
EST. GFR  (NON AFRICAN AMERICAN): 22.6 ML/MIN/1.73 M^2
ESTIMATED AVG GLUCOSE: 151 MG/DL (ref 68–131)
FERRITIN SERPL-MCNC: 103 NG/ML (ref 20–300)
GLUCOSE SERPL-MCNC: 77 MG/DL (ref 70–110)
HBA1C MFR BLD: 6.9 % (ref 4–5.6)
HCT VFR BLD AUTO: 39.2 % (ref 37–48.5)
HGB BLD-MCNC: 12.1 G/DL (ref 12–16)
IRON SERPL-MCNC: 88 UG/DL (ref 30–160)
MCH RBC QN AUTO: 28.7 PG (ref 27–31)
MCHC RBC AUTO-ENTMCNC: 30.9 G/DL (ref 32–36)
MCV RBC AUTO: 93 FL (ref 82–98)
PLATELET # BLD AUTO: 156 K/UL (ref 150–450)
PMV BLD AUTO: 13.8 FL (ref 9.2–12.9)
POTASSIUM SERPL-SCNC: 4.5 MMOL/L (ref 3.5–5.1)
PROT SERPL-MCNC: 6.9 G/DL (ref 6–8.4)
RBC # BLD AUTO: 4.21 M/UL (ref 4–5.4)
SATURATED IRON: 26 % (ref 20–50)
SODIUM SERPL-SCNC: 143 MMOL/L (ref 136–145)
TOTAL IRON BINDING CAPACITY: 333 UG/DL (ref 250–450)
TRANSFERRIN SERPL-MCNC: 225 MG/DL (ref 200–375)
WBC # BLD AUTO: 7.07 K/UL (ref 3.9–12.7)

## 2022-06-30 PROCEDURE — 80053 COMPREHEN METABOLIC PANEL: CPT | Performed by: INTERNAL MEDICINE

## 2022-06-30 PROCEDURE — 3078F DIAST BP <80 MM HG: CPT | Mod: CPTII,S$GLB,, | Performed by: INTERNAL MEDICINE

## 2022-06-30 PROCEDURE — 3075F PR MOST RECENT SYSTOLIC BLOOD PRESS GE 130-139MM HG: ICD-10-PCS | Mod: CPTII,S$GLB,, | Performed by: INTERNAL MEDICINE

## 2022-06-30 PROCEDURE — 1126F PR PAIN SEVERITY QUANTIFIED, NO PAIN PRESENT: ICD-10-PCS | Mod: CPTII,S$GLB,, | Performed by: INTERNAL MEDICINE

## 2022-06-30 PROCEDURE — 3075F SYST BP GE 130 - 139MM HG: CPT | Mod: CPTII,S$GLB,, | Performed by: INTERNAL MEDICINE

## 2022-06-30 PROCEDURE — 3288F FALL RISK ASSESSMENT DOCD: CPT | Mod: CPTII,S$GLB,, | Performed by: INTERNAL MEDICINE

## 2022-06-30 PROCEDURE — 99999 PR PBB SHADOW E&M-EST. PATIENT-LVL IV: CPT | Mod: PBBFAC,,, | Performed by: INTERNAL MEDICINE

## 2022-06-30 PROCEDURE — 1101F PR PT FALLS ASSESS DOC 0-1 FALLS W/OUT INJ PAST YR: ICD-10-PCS | Mod: CPTII,S$GLB,, | Performed by: INTERNAL MEDICINE

## 2022-06-30 PROCEDURE — 1159F PR MEDICATION LIST DOCUMENTED IN MEDICAL RECORD: ICD-10-PCS | Mod: CPTII,S$GLB,, | Performed by: INTERNAL MEDICINE

## 2022-06-30 PROCEDURE — 85027 COMPLETE CBC AUTOMATED: CPT | Performed by: INTERNAL MEDICINE

## 2022-06-30 PROCEDURE — 99499 UNLISTED E&M SERVICE: CPT | Mod: S$GLB,,, | Performed by: INTERNAL MEDICINE

## 2022-06-30 PROCEDURE — 3078F PR MOST RECENT DIASTOLIC BLOOD PRESSURE < 80 MM HG: ICD-10-PCS | Mod: CPTII,S$GLB,, | Performed by: INTERNAL MEDICINE

## 2022-06-30 PROCEDURE — 36415 COLL VENOUS BLD VENIPUNCTURE: CPT | Mod: PO | Performed by: INTERNAL MEDICINE

## 2022-06-30 PROCEDURE — 99499 RISK ADDL DX/OHS AUDIT: ICD-10-PCS | Mod: S$GLB,,, | Performed by: INTERNAL MEDICINE

## 2022-06-30 PROCEDURE — 3288F PR FALLS RISK ASSESSMENT DOCUMENTED: ICD-10-PCS | Mod: CPTII,S$GLB,, | Performed by: INTERNAL MEDICINE

## 2022-06-30 PROCEDURE — 99999 PR PBB SHADOW E&M-EST. PATIENT-LVL IV: ICD-10-PCS | Mod: PBBFAC,,, | Performed by: INTERNAL MEDICINE

## 2022-06-30 PROCEDURE — 99397 PR PREVENTIVE VISIT,EST,65 & OVER: ICD-10-PCS | Mod: S$GLB,,, | Performed by: INTERNAL MEDICINE

## 2022-06-30 PROCEDURE — 1101F PT FALLS ASSESS-DOCD LE1/YR: CPT | Mod: CPTII,S$GLB,, | Performed by: INTERNAL MEDICINE

## 2022-06-30 PROCEDURE — 82728 ASSAY OF FERRITIN: CPT | Performed by: INTERNAL MEDICINE

## 2022-06-30 PROCEDURE — 1159F MED LIST DOCD IN RCRD: CPT | Mod: CPTII,S$GLB,, | Performed by: INTERNAL MEDICINE

## 2022-06-30 PROCEDURE — 84466 ASSAY OF TRANSFERRIN: CPT | Performed by: INTERNAL MEDICINE

## 2022-06-30 PROCEDURE — 1126F AMNT PAIN NOTED NONE PRSNT: CPT | Mod: CPTII,S$GLB,, | Performed by: INTERNAL MEDICINE

## 2022-06-30 PROCEDURE — 99397 PER PM REEVAL EST PAT 65+ YR: CPT | Mod: S$GLB,,, | Performed by: INTERNAL MEDICINE

## 2022-06-30 PROCEDURE — 83036 HEMOGLOBIN GLYCOSYLATED A1C: CPT | Performed by: INTERNAL MEDICINE

## 2022-06-30 RX ORDER — FUROSEMIDE 20 MG/1
TABLET ORAL
Qty: 270 TABLET | Refills: 3 | Status: SHIPPED | OUTPATIENT
Start: 2022-06-30 | End: 2023-10-12

## 2022-06-30 RX ORDER — PANTOPRAZOLE SODIUM 40 MG/1
40 TABLET, DELAYED RELEASE ORAL 2 TIMES DAILY
Qty: 60 TABLET | Refills: 1 | Status: SHIPPED | OUTPATIENT
Start: 2022-06-30 | End: 2022-07-01

## 2022-06-30 RX ORDER — INSULIN DEGLUDEC 100 U/ML
INJECTION, SOLUTION SUBCUTANEOUS
Qty: 10 PEN | Refills: 1 | Status: SHIPPED | OUTPATIENT
Start: 2022-06-30 | End: 2022-09-13

## 2022-06-30 RX ORDER — INSULIN ASPART 100 [IU]/ML
INJECTION, SOLUTION INTRAVENOUS; SUBCUTANEOUS
Qty: 45 ML | Refills: 1 | Status: SHIPPED | OUTPATIENT
Start: 2022-06-30 | End: 2023-03-20 | Stop reason: SDUPTHER

## 2022-06-30 RX ORDER — ROSUVASTATIN CALCIUM 40 MG/1
40 TABLET, COATED ORAL DAILY
Qty: 90 TABLET | Refills: 3 | Status: SHIPPED | OUTPATIENT
Start: 2022-06-30 | End: 2023-02-24 | Stop reason: SDUPTHER

## 2022-06-30 NOTE — TELEPHONE ENCOUNTER
"Refill Routing Note   Medication(s) are not appropriate for processing by [facility] for the following reason(s):      - Pharmacy settings for pt are "prefers 90 day supplies"    [facility] action(s):  Route Medication-related problems identified: Requires labs     Medication Therapy Plan: Labs (a1c); [facility] pantoprazole dose limit=40mg/day  Medication reconciliation completed: No     Appointments  past 12m or future 3m with PCP    Date Provider   Last Visit   6/30/2022 Jana Holguin MD   Next Visit   8/11/2022 Jana Holguin MD   ED visits in past 90 days: 0        Note composed:2:41 PM 06/30/2022           "

## 2022-06-30 NOTE — TELEPHONE ENCOUNTER
Care Due:                  Date            Visit Type   Department     Provider  --------------------------------------------------------------------------------                                NP -                              [facility]  Last Visit: 06-      CARE ([facility])   JAZMINE Holguin                               -                              PRIMARY      [facility]  Next Visit: 08-      CARE ([facility])   MEDICINE       Jana Holguin                                                            Last  Test          Frequency    Reason                     Performed    Due Date  --------------------------------------------------------------------------------    HBA1C.......  6 months...  insulin..................  03- 09-    [facility] Embedded Care Gaps. Reference number: [id number]. 6/30/2022   11:16:46 AM LYNNET

## 2022-07-01 RX ORDER — PANTOPRAZOLE SODIUM 40 MG/1
TABLET, DELAYED RELEASE ORAL
Qty: 180 TABLET | Refills: 0 | Status: SHIPPED | OUTPATIENT
Start: 2022-07-01 | End: 2022-10-04

## 2022-07-04 PROBLEM — E66.811 CLASS 1 OBESITY WITH ALVEOLAR HYPOVENTILATION, SERIOUS COMORBIDITY, AND BODY MASS INDEX (BMI) OF 33.0 TO 33.9 IN ADULT: Status: ACTIVE | Noted: 2022-07-04

## 2022-07-04 PROBLEM — M46.1 SACROILIITIS, NOT ELSEWHERE CLASSIFIED: Status: RESOLVED | Noted: 2020-02-07 | Resolved: 2022-07-04

## 2022-07-04 PROBLEM — E66.2 CLASS 1 OBESITY WITH ALVEOLAR HYPOVENTILATION, SERIOUS COMORBIDITY, AND BODY MASS INDEX (BMI) OF 33.0 TO 33.9 IN ADULT: Status: ACTIVE | Noted: 2022-07-04

## 2022-07-04 PROBLEM — I70.0 AORTIC ATHEROSCLEROSIS: Status: ACTIVE | Noted: 2022-07-04

## 2022-07-04 PROBLEM — D69.6 THROMBOCYTOPENIA, UNSPECIFIED: Status: RESOLVED | Noted: 2020-02-07 | Resolved: 2022-07-04

## 2022-07-04 NOTE — PROGRESS NOTES
Assessment/Plan:    Problem List Items Addressed This Visit        Cardiac/Vascular    Coronary artery disease (Chronic)    Overview     -hx of STEMI in 2006; PCI of LAD and diagonal  -NSTEM in April 2017; Goal-directed medical therapy; No C 2/2 renal function  -on eliquis 2/2 a fib  -on statin and beta blocker  -needs referral to local cardiologist           Essential hypertension - Primary (Chronic)    Overview     Hypertension Medications             carvediloL (COREG) 12.5 MG tablet Take 1 tablet (12.5 mg total) by mouth 2 (two) times a day.    furosemide (LASIX) 20 MG tablet Take 40 mg of lasix at 8 a.m. and 20 mg at 3 p.m.      -at goal today  -continue lifestyle modification with low sodium diet and exercise   -discussed hypertension disease course and importance of treating high blood pressure  -patient understood and advised of risk of untreated blood pressure.  ER precautions were given   for symptoms of hypertensive urgency and emergency.           Relevant Orders    Ambulatory referral/consult to Cardiology    Hyperlipidemia (Chronic)    Overview     Hyperlipidemia Medications             rosuvastatin (CRESTOR) 40 MG Tab TAKE 1 TABLET(40 MG) BY MOUTH EVERY DAY      -chronic condition. Currently stable.    -reports compliance with hyperlipidemia treatment as prescribed  -denies any known adverse effects of medications  -most recent labs listed below:  Lab Results   Component Value Date    CHOL 119 (L) 03/17/2022     Lab Results   Component Value Date    HDL 52 03/17/2022     Lab Results   Component Value Date    LDLCALC 45.8 (L) 03/17/2022     Lab Results   Component Value Date    TRIG 106 03/17/2022     Lab Results   Component Value Date    ALT 16 03/25/2022    AST 29 03/25/2022    ALKPHOS 78 03/25/2022    BILITOT 1.0 03/25/2022              Relevant Medications    rosuvastatin (CRESTOR) 40 MG Tab    Aortic atherosclerosis    Overview     -remains on statin  -on eliquis due to a fib           Chronic  diastolic CHF (congestive heart failure)    Overview     -last TTE June 2021:  · The left ventricle is normal in size with concentric remodeling and normal systolic function. The estimated ejection fraction is 60%.  · Normal right ventricular size with normal right ventricular systolic function.  · Biatrial enlargement.  · Mild tricuspid regurgitation.  · The estimated PA systolic pressure is 42 mmHg.  · Normal central venous pressure (3 mmHg).  · Atrial fibrillation observed.  -remains on lasix 40 mg am and 20 mg pm with control of volume status             Relevant Orders    Ambulatory referral/consult to Cardiology    Permanent atrial fibrillation    Overview     -followed by cardiology  -remains on eliquis for stroke prevention  -on beta blocker for rate control           Relevant Medications    apixaban (ELIQUIS) 2.5 mg Tab    Other Relevant Orders    Ambulatory referral/consult to Cardiology       Renal/    Chronic kidney disease, stage IV (severe) (Chronic)    Overview     -followed by nephrology  -labs remain stable  -renally dose medication  -avoid nephrotoxic agents, including NSAIDs           Relevant Medications    furosemide (LASIX) 20 MG tablet    Other Relevant Orders    Ambulatory referral/consult to Nephrology       Oncology    Anemia of chronic illness    Relevant Orders    CBC Without Differential    Iron and TIBC (Completed)    Ferritin (Completed)       Endocrine    Type 2 diabetes mellitus with stage 4 chronic kidney disease, with long-term current use of insulin    Overview     Diabetes Medications             insulin aspart U-100 (NOVOLOG) 100 unit/mL (3 mL) InPn pen Inject 14 units three times daily before meals    insulin degludec (TRESIBA FLEXTOUCH U-100) 100 unit/mL (3 mL) insulin pen INJECT 32 UNITS UNDER THE SKIN DAILY      -condition is currently uncontrolled  -plan to check a1c and refer to diabetes clinic in [place]  -followed by endo in past  -see diabetic health maintenance  listed below  -on statin: Yes  -on ACE-I/ARB: No  -counseling provided on importance of diabetic diet and medication compliance in order to treat diabetes  -discussed diabetes disease course and potential           Relevant Medications    insulin aspart U-100 (NOVOLOG) 100 unit/mL (3 mL) InPn pen    insulin degludec (TRESIBA FLEXTOUCH U-100) 100 unit/mL (3 mL) insulin pen    Other Relevant Orders    Ambulatory referral/consult to Diabetic Advanced Practice Providers (Medical Management)    Hemoglobin A1C    CBC Without Differential    Comprehensive Metabolic Panel       GI    Coffee ground emesis  -episode of UGIB during hospitalization for femur fracture  -EGD during hospital stay: esophagitis  -has GI follow up this month  -no recurrence of symptoms since d/c  -remains on eliquis for a fib  -remains on BID PPI: discussed continuing until GI follow up       Orthopedic    Closed left femoral fracture  -hip fracture due to mechanical fall  -s/p surgical repair March 2022  -completed inpatient rehab  -discharged from orthopedics  -continues to require assistant with walker for mobility; she would like to continue outpatient PT  -normal DEXA in 2018 but will need to repeat eventually    Relevant Orders    Ambulatory referral/consult to Physical/Occupational Therapy       Other    Class 1 obesity with alveolar hypoventilation, serious comorbidity, and body mass index (BMI) of 33.0 to 33.9 in adult    Overview     General weight loss/lifestyle modification strategies discussed (elicit support from others; identify saboteurs; non-food rewards, etc).  Informal exercise measures discussed, e.g. taking stairs instead of elevator.  Regular aerobic exercise program discussed.               Other Visit Diagnoses     Hearing loss, unspecified hearing loss type, unspecified laterality        Relevant Orders    Ambulatory referral/consult to ENT    Persistent atrial fibrillation        Relevant Medications    apixaban (ELIQUIS)  2.5 mg Tab    Uncontrolled type 2 diabetes mellitus with complication, with long-term current use of insulin        Relevant Medications    insulin aspart U-100 (NOVOLOG) 100 unit/mL (3 mL) InPn pen    insulin degludec (TRESIBA FLEXTOUCH U-100) 100 unit/mL (3 mL) insulin pen          Follow up in about 4 weeks (around 7/28/2022).    Jana Holguin MD  ______________________________________________________________________________________________________________________________    CC: Establish care    HPI:    Patient is a new patient to me here to establish care. Patient is an 81 yo WF with a PMH of CAD, HTN, HLD, A fib, HFpEF, DM2, CKD4. Patient recent moved here from [place].    Previous PCP: Fuad    HTN: The patient is currently being treated for essential hypertension. This condition is chronic and stable. The patient is tolerating their medication well with good compliance.  Denies any adverse effects of medications.  Counseling was offered regarding low sodium diet.  The patient has a reduced salt intake. Routine exercise recommended. The patient denies headache, vision changes, chest pain, palpitations, shortness of breath, or lower extremity edema.    A fib/HFpEF: symptoms stable. Has been followed routinely by cardiology. Needs referral to local cardiology. Denies recent SOB, CP or palpitations. LE edema stable with diuretic use.    UGIB: hospital stay in March after following and sustaining a L hip fracture, s/p surgical repair. EGD in hospital showing esophagitis. Started on BID PPI. Symptoms resolved. Restarted on eliquis. Denies recurrence of symptoms since d/c. Denies melena or hematochezia. GI follow up already scheduled.    Hip fracture: completed inpatient rehab. D/c'd from ortho office. Still using walker. Patient interested in more therapy to help with strength and mobility.    DM2: Patient presents for follow up of diabetes. Condition is chronic and uncontrolled in past. Patient denies  symptoms, including foot ulcerations, hypoglycemia , nausea, paresthesia of the feet, polydipsia, polyuria and visual disturbances.  Evaluation to date has been included: fasting blood sugar, fasting lipid panel, hemoglobin A1C and microalbuminuria.  Denies adverse effects of medications.     Diabetes Management Status    Statin: Taking  ACE/ARB: Not taking    Screening or Prevention Patient's value Goal Complete/Controlled?   HgA1C Testing and Control   Lab Results   Component Value Date    HGBA1C 6.9 (H) 06/30/2022      Annually/Less than 8% Yes   Lipid profile : 03/17/2022 Annually Yes   LDL control Lab Results   Component Value Date    LDLCALC 45.8 (L) 03/17/2022    Annually/Less than 100 mg/dl  Yes   Nephropathy screening Lab Results   Component Value Date    LABMICR 64.0 02/11/2015     Lab Results   Component Value Date    PROTEINUA 2+ (A) 03/25/2022    Annually Yes   Blood pressure BP Readings from Last 1 Encounters:   06/30/22 (!) 138/54    Less than 140/90 Yes   Dilated retinal exam : 08/08/2019 Annually Yes   Foot exam   : 02/07/2020 Annually Yes     No other new complaints today.  Remaining chronic conditions have been reviewed and remain stable. Further detail as stated above.      Past Medical History:  Past Medical History:   Diagnosis Date    ALLERGIC RHINITIS     Anemia     Anticoagulant long-term use     Arthritis     Cardiomyopathy, ischemic     Carpal tunnel syndrome     Cataract     Left eye    CHF (congestive heart failure)     Chronic kidney disease     Coronary artery disease     hx stent X2    Diabetes mellitus type II     Diabetic neuropathy     Diabetic retinopathy of both eyes     Encounter for blood transfusion     GERD (gastroesophageal reflux disease)     hiatal hernia    Gout, unspecified     h/o LAD and D1 PCI in 2006 6/17/2013    Heart attack 2006    Hiatal hernia     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    Hx of colonic polyps     1/9    Hx of  colonic polyps     Hyperlipidemia     Hypertension     Myocardial infarction nov 2006    Persistent atrial fibrillation 4/25/2018    Posterior vitreous detachment of both eyes     Sleep apnea     Type 2 diabetes mellitus with mild nonproliferative diabetic retinopathy with macular edema 2/15/2013     Past Surgical History:   Procedure Laterality Date    APPENDECTOMY      CATARACT EXTRACTION      Right eye    EPIDURAL STEROID INJECTION N/A 11/2/2018    Procedure: Injection, Steroid, Epidural CAUDAL ELISA;  Surgeon: Ирина Peña MD;  Location: [facility] PAIN [facility]GT;  Service: Pain Management;  Laterality: N/A;    ESOPHAGOGASTRODUODENOSCOPY N/A 3/31/2022    Procedure: EGD (ESOPHAGOGASTRODUODENOSCOPY);  Surgeon: Samm Billings MD;  Location: [facility] ENDO;  Service: Endoscopy;  Laterality: N/A;    heart stent      X 2    HYSTERECTOMY      Fibroids    INTRAMEDULLARY RODDING OF FEMUR Left 3/26/2022    Procedure: INSERTION, INTRAMEDULLARY SARA, FEMUR;  Surgeon: Eduin Avelar MD;  Location: [facility] OR;  Service: Orthopedics;  Laterality: Left;     Review of patient's allergies indicates:   Allergen Reactions    Heparin analogues Other (See Comments)     thrombocytopenia    Ancef [cefazolin]     Keflex [cephalexin] Rash    Oxybutynin Other (See Comments)     Metallic taste     Social History     Tobacco Use    Smoking status: Never Smoker    Smokeless tobacco: Never Used   Substance Use Topics    Alcohol use: No     Alcohol/week: 0.0 standard drinks    Drug use: No     Family History   Problem Relation Age of Onset    Diabetes Mother     Heart disease Mother     Hypertension Mother     Diabetes Father     Melanoma Father     Kidney failure Father     Mental illness Sister         suicide/schizophrenic    Cancer Maternal Grandfather     Cancer Paternal Grandfather         colon    Psoriasis Neg Hx     Lupus Neg Hx     Eczema Neg Hx      Current Outpatient Medications on File Prior to Visit   Medication  "Sig Dispense Refill    ascorbic acid, vitamin C, (VITAMIN C) 100 MG tablet Take 100 mg by mouth once daily.      carvediloL (COREG) 12.5 MG tablet Take 1 tablet (12.5 mg total) by mouth 2 (two) times a day. 180 tablet 3    ergocalciferol, vitamin D2, (VITAMIN D ORAL) Take by mouth once daily.      fluticasone (FLONASE) 50 mcg/actuation nasal spray SHAKE LIQUID AND USE 2 SPRAYS IN EACH NOSTRIL EVERY DAY 48 mL 4    ketoconazole (NIZORAL) 2 % shampoo APPLY TO AFFECTED AREA EVERY OTHER DAY AND LET SIT 5 MINUTES PRIOR TO RINSING AS DIRECTED 120 mL 3    MULTIVIT-MIN/IRON/FOLIC/LUTEIN (CENTRUM SILVER WOMEN ORAL) Take by mouth once daily.       vitamin B complex (B COMPLEX VITAMINS ORAL) Take by mouth once daily.      VITAMIN E, BULK, MISC by Misc.(Non-Drug; Combo Route) route once daily.      [DISCONTINUED] blood-glucose meter (ACCU-CHEK MELIDA PLUS METER) Misc To check sugars 4 times daily.  Please include control solution 1 each 0     No current facility-administered medications on file prior to visit.       Review of Systems   Constitutional: Negative for chills, diaphoresis, fatigue and fever.   HENT: Negative for congestion, ear pain, postnasal drip, sinus pain and sore throat.    Eyes: Negative for pain and redness.   Respiratory: Negative for cough, chest tightness and shortness of breath.    Cardiovascular: Negative for chest pain and leg swelling.   Gastrointestinal: Negative for abdominal pain, constipation, diarrhea, nausea and vomiting.   Genitourinary: Negative for dysuria and hematuria.   Musculoskeletal: Negative for arthralgias and joint swelling.   Skin: Negative for rash.   Neurological: Negative for dizziness, syncope and headaches.       Vitals:    06/30/22 0933   BP: (!) 138/54   Pulse: 63   Temp: 98.1 °F (36.7 °C)   TempSrc: Oral   Height: 5' 8" (1.727 m)       Wt Readings from Last 3 Encounters:   03/26/22 99.2 kg (218 lb 9.6 oz)   03/10/22 97.1 kg (214 lb)   12/07/21 100 kg (220 lb 7.4 oz) "       Physical Exam  Constitutional:       General: She is not in acute distress.     Appearance: Normal appearance. She is well-developed. She is obese.   HENT:      Head: Normocephalic and atraumatic.   Eyes:      Conjunctiva/sclera: Conjunctivae normal.   Cardiovascular:      Rate and Rhythm: Normal rate and regular rhythm.      Pulses: Normal pulses.      Heart sounds: Normal heart sounds. No murmur heard.  Pulmonary:      Effort: Pulmonary effort is normal. No respiratory distress.      Breath sounds: Normal breath sounds.   Abdominal:      General: Bowel sounds are normal. There is no distension.      Palpations: Abdomen is soft.      Tenderness: There is no abdominal tenderness.   Musculoskeletal:         General: Normal range of motion.      Cervical back: Normal range of motion and neck supple.      Comments: 1+ LE edema   Skin:     General: Skin is warm and dry.      Findings: No rash.   Neurological:      General: No focal deficit present.      Mental Status: She is alert and oriented to person, place, and time.         Health Maintenance   Topic Date Due    TETANUS VACCINE  10/03/2015    Hemoglobin A1c  12/30/2022    Colonoscopy  06/21/2026    Foot Exam  Discontinued    Lipid Panel  Discontinued    Eye Exam  Discontinued    DEXA Scan  Discontinued

## 2022-07-06 DIAGNOSIS — Z79.4 TYPE 2 DIABETES MELLITUS WITH STAGE 4 CHRONIC KIDNEY DISEASE, WITH LONG-TERM CURRENT USE OF INSULIN: Primary | ICD-10-CM

## 2022-07-06 DIAGNOSIS — N18.4 TYPE 2 DIABETES MELLITUS WITH STAGE 4 CHRONIC KIDNEY DISEASE, WITH LONG-TERM CURRENT USE OF INSULIN: Primary | ICD-10-CM

## 2022-07-06 DIAGNOSIS — E11.22 TYPE 2 DIABETES MELLITUS WITH STAGE 4 CHRONIC KIDNEY DISEASE, WITH LONG-TERM CURRENT USE OF INSULIN: Primary | ICD-10-CM

## 2022-07-06 RX ORDER — PEN NEEDLE, DIABETIC 30 GX3/16"
NEEDLE, DISPOSABLE MISCELLANEOUS
Qty: 100 EACH | Refills: 11 | Status: SHIPPED | OUTPATIENT
Start: 2022-07-06 | End: 2023-06-19 | Stop reason: SDUPTHER

## 2022-07-06 RX ORDER — LANCETS
EACH MISCELLANEOUS
Qty: 100 EACH | Refills: 11 | Status: SHIPPED | OUTPATIENT
Start: 2022-07-06

## 2022-07-06 NOTE — TELEPHONE ENCOUNTER
----- Message from Rekha Quijano sent at 7/6/2022  2:36 PM CDT -----  Contact: [phone]  Pt is calling requesting mediation insulin aspart U-100 (NOVOLOG) 100 unit/mL (3 mL) InPn pen,insulin degludec (TRESIBA FLEXTOUCH U-100) 100 unit/mL (3 mL) insulin pen,please call back at [phone],kathy , need to test blood sugar . Thanks/cesilia          [id number] - [place] - [address]  [address] [facility] [address]  [address] [facility] [address]  Phone: [phone] Fax: [phone]

## 2022-07-06 NOTE — TELEPHONE ENCOUNTER
No new care gaps identified.  [facility] Embedded Care Gaps. Reference number: [id number]. 7/06/2022   2:51:40 PM CDT

## 2022-07-08 ENCOUNTER — PATIENT OUTREACH (OUTPATIENT)
Dept: ADMINISTRATIVE | Facility: HOSPITAL | Age: 82
End: 2022-07-08
Payer: MEDICARE

## 2022-07-08 DIAGNOSIS — Z78.0 ASYMPTOMATIC MENOPAUSE: Primary | ICD-10-CM

## 2022-07-18 ENCOUNTER — OFFICE VISIT (OUTPATIENT)
Dept: CARDIOLOGY | Facility: CLINIC | Age: 82
End: 2022-07-18
Payer: MEDICARE

## 2022-07-18 VITALS
SYSTOLIC BLOOD PRESSURE: 152 MMHG | BODY MASS INDEX: 31.83 KG/M2 | HEIGHT: 68 IN | HEART RATE: 52 BPM | WEIGHT: 210 LBS | DIASTOLIC BLOOD PRESSURE: 72 MMHG

## 2022-07-18 DIAGNOSIS — E78.2 MIXED HYPERLIPIDEMIA: Primary | Chronic | ICD-10-CM

## 2022-07-18 DIAGNOSIS — I48.19 PERSISTENT ATRIAL FIBRILLATION: ICD-10-CM

## 2022-07-18 DIAGNOSIS — I10 ESSENTIAL HYPERTENSION: Chronic | ICD-10-CM

## 2022-07-18 DIAGNOSIS — I50.32 CHRONIC DIASTOLIC CHF (CONGESTIVE HEART FAILURE): ICD-10-CM

## 2022-07-18 DIAGNOSIS — N18.4 CHRONIC KIDNEY DISEASE, STAGE IV (SEVERE): Chronic | ICD-10-CM

## 2022-07-18 DIAGNOSIS — E66.2 CLASS 1 OBESITY WITH ALVEOLAR HYPOVENTILATION, SERIOUS COMORBIDITY, AND BODY MASS INDEX (BMI) OF 33.0 TO 33.9 IN ADULT: ICD-10-CM

## 2022-07-18 DIAGNOSIS — I48.21 PERMANENT ATRIAL FIBRILLATION: ICD-10-CM

## 2022-07-18 PROCEDURE — 1126F PR PAIN SEVERITY QUANTIFIED, NO PAIN PRESENT: ICD-10-PCS | Mod: CPTII,S$GLB,, | Performed by: INTERNAL MEDICINE

## 2022-07-18 PROCEDURE — 1160F PR REVIEW ALL MEDS BY PRESCRIBER/CLIN PHARMACIST DOCUMENTED: ICD-10-PCS | Mod: CPTII,S$GLB,, | Performed by: INTERNAL MEDICINE

## 2022-07-18 PROCEDURE — 93010 EKG 12-LEAD: ICD-10-PCS | Mod: S$GLB,,, | Performed by: INTERNAL MEDICINE

## 2022-07-18 PROCEDURE — 93010 ELECTROCARDIOGRAM REPORT: CPT | Mod: S$GLB,,, | Performed by: INTERNAL MEDICINE

## 2022-07-18 PROCEDURE — 1100F PR PT FALLS ASSESS DOC 2+ FALLS/FALL W/INJURY/YR: ICD-10-PCS | Mod: CPTII,S$GLB,, | Performed by: INTERNAL MEDICINE

## 2022-07-18 PROCEDURE — 99214 OFFICE O/P EST MOD 30 MIN: CPT | Mod: S$GLB,,, | Performed by: INTERNAL MEDICINE

## 2022-07-18 PROCEDURE — 3288F FALL RISK ASSESSMENT DOCD: CPT | Mod: CPTII,S$GLB,, | Performed by: INTERNAL MEDICINE

## 2022-07-18 PROCEDURE — 93005 ELECTROCARDIOGRAM TRACING: CPT | Mod: PO

## 2022-07-18 PROCEDURE — 1160F RVW MEDS BY RX/DR IN RCRD: CPT | Mod: CPTII,S$GLB,, | Performed by: INTERNAL MEDICINE

## 2022-07-18 PROCEDURE — 3078F PR MOST RECENT DIASTOLIC BLOOD PRESSURE < 80 MM HG: ICD-10-PCS | Mod: CPTII,S$GLB,, | Performed by: INTERNAL MEDICINE

## 2022-07-18 PROCEDURE — 3077F PR MOST RECENT SYSTOLIC BLOOD PRESSURE >= 140 MM HG: ICD-10-PCS | Mod: CPTII,S$GLB,, | Performed by: INTERNAL MEDICINE

## 2022-07-18 PROCEDURE — 99214 PR OFFICE/OUTPT VISIT, EST, LEVL IV, 30-39 MIN: ICD-10-PCS | Mod: S$GLB,,, | Performed by: INTERNAL MEDICINE

## 2022-07-18 PROCEDURE — 99999 PR PBB SHADOW E&M-EST. PATIENT-LVL IV: CPT | Mod: PBBFAC,,, | Performed by: INTERNAL MEDICINE

## 2022-07-18 PROCEDURE — 3078F DIAST BP <80 MM HG: CPT | Mod: CPTII,S$GLB,, | Performed by: INTERNAL MEDICINE

## 2022-07-18 PROCEDURE — 3288F PR FALLS RISK ASSESSMENT DOCUMENTED: ICD-10-PCS | Mod: CPTII,S$GLB,, | Performed by: INTERNAL MEDICINE

## 2022-07-18 PROCEDURE — 1159F MED LIST DOCD IN RCRD: CPT | Mod: CPTII,S$GLB,, | Performed by: INTERNAL MEDICINE

## 2022-07-18 PROCEDURE — 99999 PR PBB SHADOW E&M-EST. PATIENT-LVL IV: ICD-10-PCS | Mod: PBBFAC,,, | Performed by: INTERNAL MEDICINE

## 2022-07-18 PROCEDURE — 1159F PR MEDICATION LIST DOCUMENTED IN MEDICAL RECORD: ICD-10-PCS | Mod: CPTII,S$GLB,, | Performed by: INTERNAL MEDICINE

## 2022-07-18 PROCEDURE — 1100F PTFALLS ASSESS-DOCD GE2>/YR: CPT | Mod: CPTII,S$GLB,, | Performed by: INTERNAL MEDICINE

## 2022-07-18 PROCEDURE — 3077F SYST BP >= 140 MM HG: CPT | Mod: CPTII,S$GLB,, | Performed by: INTERNAL MEDICINE

## 2022-07-18 PROCEDURE — 1126F AMNT PAIN NOTED NONE PRSNT: CPT | Mod: CPTII,S$GLB,, | Performed by: INTERNAL MEDICINE

## 2022-07-18 RX ORDER — CARVEDILOL 12.5 MG/1
12.5 TABLET ORAL 2 TIMES DAILY
Qty: 180 TABLET | Refills: 3 | Status: SHIPPED | OUTPATIENT
Start: 2022-07-18 | End: 2023-09-05 | Stop reason: SDUPTHER

## 2022-07-18 NOTE — PROGRESS NOTES
Subjective:    Patient ID:  Radha Degroot is a 82 y.o. female who presents for evaluation of Establish Care      HPI82 yo WF with hx of MI in 2006 with LAD and diagonal stent, HTN and permanent AF. Also with CKD with last Cr 2.0. She is establishing care here because she has family in [place] and in March she fell and broke her left leg. Not having any acute issues . Has had chronic edema of left leg since accident.     Review of Systems   Constitutional: Negative for decreased appetite, fever, malaise/fatigue, weight gain and weight loss.   HENT: Negative for hearing loss and nosebleeds.    Eyes: Negative for visual disturbance.   Cardiovascular: Positive for leg swelling. Negative for chest pain, claudication, cyanosis, dyspnea on exertion, irregular heartbeat, near-syncope, orthopnea, palpitations, paroxysmal nocturnal dyspnea and syncope.   Respiratory: Negative for cough, hemoptysis, shortness of breath, sleep disturbances due to breathing, snoring and wheezing.    Endocrine: Negative for cold intolerance, heat intolerance, polydipsia and polyuria.   Hematologic/Lymphatic: Negative for adenopathy and bleeding problem. Does not bruise/bleed easily.   Skin: Negative for color change, itching, poor wound healing, rash and suspicious lesions.   Musculoskeletal: Positive for arthritis. Negative for back pain, falls, joint pain, joint swelling, muscle cramps, muscle weakness and myalgias.   Gastrointestinal: Negative for bloating, abdominal pain, change in bowel habit, constipation, flatus, heartburn, hematemesis, hematochezia, hemorrhoids, jaundice, melena, nausea and vomiting.   Genitourinary: Negative for bladder incontinence, decreased libido, frequency, hematuria, hesitancy and urgency.   Neurological: Negative for brief paralysis, difficulty with concentration, excessive daytime sleepiness, dizziness, focal weakness, headaches, light-headedness, loss of balance, numbness, vertigo and weakness.  "  Psychiatric/Behavioral: Negative for altered mental status, depression and memory loss. The patient does not have insomnia and is not nervous/anxious.    Allergic/Immunologic: Negative for environmental allergies, hives and persistent infections.        Objective:    Physical Exam  Vitals and nursing note reviewed.   Constitutional:       Appearance: She is well-developed.      Comments: BP (!) 152/72 (BP Location: Left arm, Patient Position: Sitting, BP Method: Large (Manual))   Pulse (!) 52   Ht 5' 8" (1.727 m)   Wt 95.3 kg (210 lb)   BMI 31.93 kg/m²      HENT:      Head: Normocephalic and atraumatic.      Right Ear: External ear normal.      Left Ear: External ear normal.      Nose: Nose normal.   Eyes:      General: Lids are normal. No scleral icterus.        Right eye: No discharge.         Left eye: No discharge.      Conjunctiva/sclera: Conjunctivae normal.      Right eye: No hemorrhage.     Pupils: Pupils are equal, round, and reactive to light.   Neck:      Thyroid: No thyromegaly.      Vascular: No JVD.      Trachea: No tracheal deviation.   Cardiovascular:      Rate and Rhythm: Normal rate. Rhythm irregularly irregular.      Pulses: Intact distal pulses.      Heart sounds: Normal heart sounds. No murmur heard.    No friction rub. No gallop.   Pulmonary:      Effort: Pulmonary effort is normal. No respiratory distress.      Breath sounds: Normal breath sounds. No wheezing or rales.   Chest:      Chest wall: No tenderness.   Breasts: Breasts are symmetrical.       Abdominal:      General: Bowel sounds are normal. There is no distension.      Palpations: Abdomen is soft. There is no hepatomegaly or mass.      Tenderness: There is no abdominal tenderness. There is no guarding or rebound.   Musculoskeletal:         General: No tenderness. Normal range of motion.      Cervical back: Normal range of motion and neck supple.      Left lower leg: Edema present.   Lymphadenopathy:      Cervical: No cervical " adenopathy.   Skin:     General: Skin is warm and dry.      Coloration: Skin is not pale.      Findings: No erythema or rash.   Neurological:      Mental Status: She is alert and oriented to person, place, and time.      Cranial Nerves: No cranial nerve deficit.      Coordination: Coordination normal.      Deep Tendon Reflexes: Reflexes normal.   Psychiatric:         Behavior: Behavior normal.         Thought Content: Thought content normal.         Judgment: Judgment normal.           Assessment:       1. Mixed hyperlipidemia    2. Essential hypertension    3. Chronic diastolic CHF (congestive heart failure)    4. Permanent atrial fibrillation    5. Chronic kidney disease, stage IV (severe)    6. Class 1 obesity with alveolar hypoventilation, serious comorbidity, and body mass index (BMI) of 33.0 to 33.9 in adult         Plan:     The current medical regimen is effective;  continue present plan and medications.      Leg elevation and low salt    Reviewed meds      Orders Placed This Encounter   Procedures    IN OFFICE EKG 12-LEAD (to [place])     No follow-ups on file.

## 2022-08-04 DIAGNOSIS — H91.90 HEARING DIFFICULTY, UNSPECIFIED LATERALITY: Primary | ICD-10-CM

## 2022-08-08 ENCOUNTER — CLINICAL SUPPORT (OUTPATIENT)
Dept: AUDIOLOGY | Facility: CLINIC | Age: 82
End: 2022-08-08
Payer: MEDICARE

## 2022-08-08 ENCOUNTER — OFFICE VISIT (OUTPATIENT)
Dept: OTOLARYNGOLOGY | Facility: CLINIC | Age: 82
End: 2022-08-08
Payer: MEDICARE

## 2022-08-08 VITALS — WEIGHT: 210.13 LBS | BODY MASS INDEX: 31.85 KG/M2 | HEIGHT: 68 IN

## 2022-08-08 DIAGNOSIS — H91.90 HEARING DIFFICULTY, UNSPECIFIED LATERALITY: ICD-10-CM

## 2022-08-08 DIAGNOSIS — H91.90 HEARING LOSS, UNSPECIFIED HEARING LOSS TYPE, UNSPECIFIED LATERALITY: ICD-10-CM

## 2022-08-08 DIAGNOSIS — H81.09 COCHLEAR HYDROPS, UNSPECIFIED LATERALITY: Primary | ICD-10-CM

## 2022-08-08 DIAGNOSIS — H90.3 ASYMMETRIC SNHL (SENSORINEURAL HEARING LOSS): Primary | ICD-10-CM

## 2022-08-08 DIAGNOSIS — H93.292 IMPAIRED AUDITORY DISCRIMINATION, LEFT: ICD-10-CM

## 2022-08-08 PROCEDURE — 99999 PR PBB SHADOW E&M-EST. PATIENT-LVL IV: CPT | Mod: PBBFAC,,, | Performed by: OTOLARYNGOLOGY

## 2022-08-08 PROCEDURE — 99999 PR PBB SHADOW E&M-EST. PATIENT-LVL II: ICD-10-PCS | Mod: PBBFAC,,,

## 2022-08-08 PROCEDURE — 1126F PR PAIN SEVERITY QUANTIFIED, NO PAIN PRESENT: ICD-10-PCS | Mod: CPTII,S$GLB,, | Performed by: OTOLARYNGOLOGY

## 2022-08-08 PROCEDURE — 1101F PR PT FALLS ASSESS DOC 0-1 FALLS W/OUT INJ PAST YR: ICD-10-PCS | Mod: CPTII,S$GLB,, | Performed by: OTOLARYNGOLOGY

## 2022-08-08 PROCEDURE — 92553 PR AUDIOMETRY, AIR & BONE: ICD-10-PCS | Mod: S$GLB,,, | Performed by: AUDIOLOGIST

## 2022-08-08 PROCEDURE — 1159F PR MEDICATION LIST DOCUMENTED IN MEDICAL RECORD: ICD-10-PCS | Mod: CPTII,S$GLB,, | Performed by: OTOLARYNGOLOGY

## 2022-08-08 PROCEDURE — 1159F MED LIST DOCD IN RCRD: CPT | Mod: CPTII,S$GLB,, | Performed by: OTOLARYNGOLOGY

## 2022-08-08 PROCEDURE — 3288F FALL RISK ASSESSMENT DOCD: CPT | Mod: CPTII,S$GLB,, | Performed by: OTOLARYNGOLOGY

## 2022-08-08 PROCEDURE — 92567 PR TYMPA2METRY: ICD-10-PCS | Mod: S$GLB,,, | Performed by: AUDIOLOGIST

## 2022-08-08 PROCEDURE — 1101F PT FALLS ASSESS-DOCD LE1/YR: CPT | Mod: CPTII,S$GLB,, | Performed by: OTOLARYNGOLOGY

## 2022-08-08 PROCEDURE — 92553 AUDIOMETRY AIR & BONE: CPT | Mod: S$GLB,,, | Performed by: AUDIOLOGIST

## 2022-08-08 PROCEDURE — 3288F PR FALLS RISK ASSESSMENT DOCUMENTED: ICD-10-PCS | Mod: CPTII,S$GLB,, | Performed by: OTOLARYNGOLOGY

## 2022-08-08 PROCEDURE — 1160F RVW MEDS BY RX/DR IN RCRD: CPT | Mod: CPTII,S$GLB,, | Performed by: OTOLARYNGOLOGY

## 2022-08-08 PROCEDURE — 92567 TYMPANOMETRY: CPT | Mod: S$GLB,,, | Performed by: AUDIOLOGIST

## 2022-08-08 PROCEDURE — 99203 PR OFFICE/OUTPT VISIT, NEW, LEVL III, 30-44 MIN: ICD-10-PCS | Mod: S$GLB,,, | Performed by: OTOLARYNGOLOGY

## 2022-08-08 PROCEDURE — 1126F AMNT PAIN NOTED NONE PRSNT: CPT | Mod: CPTII,S$GLB,, | Performed by: OTOLARYNGOLOGY

## 2022-08-08 PROCEDURE — 99203 OFFICE O/P NEW LOW 30 MIN: CPT | Mod: S$GLB,,, | Performed by: OTOLARYNGOLOGY

## 2022-08-08 PROCEDURE — 99999 PR PBB SHADOW E&M-EST. PATIENT-LVL II: CPT | Mod: PBBFAC,,,

## 2022-08-08 PROCEDURE — 1160F PR REVIEW ALL MEDS BY PRESCRIBER/CLIN PHARMACIST DOCUMENTED: ICD-10-PCS | Mod: CPTII,S$GLB,, | Performed by: OTOLARYNGOLOGY

## 2022-08-08 PROCEDURE — 99999 PR PBB SHADOW E&M-EST. PATIENT-LVL IV: ICD-10-PCS | Mod: PBBFAC,,, | Performed by: OTOLARYNGOLOGY

## 2022-08-08 NOTE — PROGRESS NOTES
Radha Degroot was seen 08/08/2022 for an audiological evaluation.     Pt reported a history of left-sided Meniere's.  She has asymmetrical SNHL and does not currently wear hearing aids.     Otoscopy revealed clear view of both external ear canals and tympanic membranes.  No obstructive cerumen present in either ear canal.       Audiogram results revealed a mild-to-moderate sensorineural hearing loss for the right ear and a moderate-to-severe sensorineural hearing loss for the left ear.  A  Speech Reception Threshold of 30 dBHL was obtained for the right ear with good word discrimination.  Speech testing was not discernable for the left ear and a SDT was obtained at 60dB for the left ear.  Tympanograms were Type Ad for the right ear and Type Ad for the left ear.    Audiogram results were reviewed in detail with patient and all questions were answered. Results will be reviewed by ENT at the completion of this note.     Recommend BICROS amplification pending medical clearance, hearing protection for all loud sounds and annual audiogram to monitor hearing loss.

## 2022-08-08 NOTE — PROGRESS NOTES
Subjective:       Patient ID: Radha Degroot is a 82 y.o. female.    Chief Complaint: Hearing Loss    Radha is here for follow-up.   She reports history of L Meniere', but has never had vertigo episodes.   No family history of HL at a young age.     Her left ear has progressed gradually since low freq HL diagnosed 2017.   She had a fall recently.   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    Patient validated questionnaires (if applicable):      %       No flowsheet data found.  No flowsheet data found.  No flowsheet data found.         Review of Systems   Constitutional: Negative for activity change and appetite change.   Respiratory: Negative for difficulty breathing and wheezing   Cardiovascular: Negative for chest pain.      Objective:        Constitutional:   Vital signs are normal. She appears well-developed and well-nourished.     Head:  Normocephalic and atraumatic.     Ears:    Right Ear: Tympanic membrane is not injected. No middle ear effusion.   Left Ear: Tympanic membrane is not injected.  No middle ear effusion. Decreased hearing is noted.     Nose:  Nose normal including turbinates, nasal mucosa, sinuses and nasal septum.     Mouth/Throat  Oropharynx clear and moist without lesions or asymmetry.     Neck:  Neck normal without thyromegaly masses, asymmetry, normal tracheal structure, crepitus, and tenderness.         Tests / Results:          Prev audio 2017 reviewed which showed mild left HL with asymmetry at low freq.    Assessment:       1. Cochlear hydrops, unspecified laterality    2. Hearing loss, unspecified hearing loss type, unspecified laterality          Plan:       We discussed HL as above  Discussed musical tinnitus  BiCROS candidate  No imaging completed in the past. but medical co-morbidities and age preclude need at this point.

## 2022-08-10 ENCOUNTER — TELEPHONE (OUTPATIENT)
Dept: FAMILY MEDICINE | Facility: CLINIC | Age: 82
End: 2022-08-10
Payer: MEDICARE

## 2022-08-10 NOTE — TELEPHONE ENCOUNTER
----- Message from Amanda Nunez sent at 8/10/2022  3:45 PM CDT -----  Regarding: Advice  Contact: Patient  Please call patient concerning her PT referral and where was it sent to, patient would like it to be close to [facility]. Please call to advise at Ph .[phone] (home)

## 2022-08-10 NOTE — TELEPHONE ENCOUNTER
Call returned. Patient advised that an internal referral was placed on 6/30/22. Referral reinstated as patient says that she is able to drive to [place]. Scheduling access was blocked, patient given contact information for further assistance in scheduling.

## 2022-08-17 ENCOUNTER — CLINICAL SUPPORT (OUTPATIENT)
Dept: REHABILITATION | Facility: HOSPITAL | Age: 82
End: 2022-08-17
Attending: INTERNAL MEDICINE
Payer: MEDICARE

## 2022-08-17 DIAGNOSIS — S72.92XD CLOSED FRACTURE OF LEFT FEMUR WITH ROUTINE HEALING, UNSPECIFIED FRACTURE MORPHOLOGY, UNSPECIFIED PORTION OF FEMUR, SUBSEQUENT ENCOUNTER: ICD-10-CM

## 2022-08-17 PROCEDURE — 97162 PT EVAL MOD COMPLEX 30 MIN: CPT | Mod: PN | Performed by: GENERAL ACUTE CARE HOSPITAL

## 2022-08-22 ENCOUNTER — CLINICAL SUPPORT (OUTPATIENT)
Dept: REHABILITATION | Facility: HOSPITAL | Age: 82
End: 2022-08-22
Attending: INTERNAL MEDICINE
Payer: MEDICARE

## 2022-08-22 DIAGNOSIS — M62.81 MUSCLE WEAKNESS: Primary | ICD-10-CM

## 2022-08-22 PROCEDURE — 97530 THERAPEUTIC ACTIVITIES: CPT | Mod: PN | Performed by: GENERAL ACUTE CARE HOSPITAL

## 2022-08-22 PROCEDURE — 97110 THERAPEUTIC EXERCISES: CPT | Mod: PN | Performed by: GENERAL ACUTE CARE HOSPITAL

## 2022-08-22 NOTE — PROGRESS NOTES
[facility]   Physical Therapy Treatment Note   Name: Radha Degroot  Clinic Number: [id number]  Therapy Diagnosis:   Encounter Diagnosis   Name Primary?    Muscle weakness Yes     Physician: Jana Holguin MD  Physician Orders: PT Eval and Treat  Medical Diagnosis from Referral: L femur fracture     Evaluation Date: 8/17/2022  Authorization Period Expiration: 10/26/2022-10 weeks  Plan of Care Expiration: 10/26/2022-10 weeks  Progress Note Due: at 10th visit or when applicable  Visit # / Visits authorized:   1 1 Evaluation       PT Treatments      FOTO:   1  8/17/2022  UPPER LEG   2       3          Precautions: Falls     Visit Date: 8/22/2022  Time In: 0845  Time Out: 0920  Total Billable Time: 35 minutes  PTA Visit #: 0/5     SUBJECTIVE   Pt returns to OP PT reporting: No pain upon arrival. She is approx 15 minutes late and arrives on transportation bus, unable to safely get off bus independently.   Pain: 0/10  Location: left upper legs   Response to previous treatment: 1st treatment session  Functional change: 1st treatment session  She will receive home exercises program today and is expected to be compliant with home exercise program.  OBJECTIVE     Objective Measures updated at progress report unless specified.     Treatment   Radha received following skilled interventions listed below:    PT Intervention Parameters Time   Therapeutic Exercise to develop strength, endurance, ROM, flexibility, posture and core stabilization  2.5# seated marches 2x2min / 2 min rest   4x10 SLR bilateral    home exercise program review 10 minutes   Therapeutic Activities to improve functional performance  Stairs descent off anjali x 4 steps    Curb navigation   Sit>standing 5x3   Rollator walking CGA/SBA 25ft x4 with object navigation 23 minutes     Patient Education and Home Exercises   Home Exercises Provided and Patient Education Provided    Patient was educated on the role of PT, POC,  treatment plan, discharge goals, HEP.   Patient educated on biomechanical justification for therapeutic exercise and importance of compliance with HEP in order to improve overall impairments and QOL    Patient was educated on all the above exercise prior/during/after for proper posture, positioning, and execution for safe performance with home exercise program.       Written Home Exercises Provided:    yes.    Exercises were reviewed and Radha was able to demonstrate them prior to the end of the session.     Radha demonstrated fair  understanding of the education provided.    See EMR under Patient Instructions for exercises provided during therapy sessions    ASSESSMENT     Patient arrives late for 1st PT session due to transportation. She is fatigued upon arrival. Low tolerance to activity, decreased CV status noted as well. Short session is performed. Emphasis on functional movement, LE strength and endurance.     Radha Is progressing well towards her goals.   Pt prognosis is Fair / Good.     Pt will continue to benefit from skilled outpatient physical therapy to address the deficits listed in the problem list box on initial evaluation, provide pt/family education and to maximize pt's level of independence in the home and community environment.    Pt's spiritual, cultural and educational needs considered and pt agreeable to plan of care and goals.   Anticipated barriers to physical therapy: Lives home alone, age, transportation, Unable to drive    Goals:    Short Term Goals: 7/14/2022-4 weeks Status Date Met   1. Patient to be independent with Home Exercise Program performance to impact knowledge of condition  []? Met  []? Not Met  [x]? Progressing     2. Pt to perform TUG 20s or less with RW to display improved mobility safety []? Met  []? Not Met  [x]? Progressing     3. Pt to ascend/descend x2 steps with UE assistance to impact curb and community navigation   []? Met  []? Not Met  [x]? Progressing      4. Pt to display L knee extension to 0 degrees to impact heel strike during gait []? Met  []? Not Met  [x]? Progressing          Long Term Goal: 10/26/2022-10 weeks Status Date Met   1. Pt to ambulate Community distances with least restrictive AD, ascend/descend x5 steps with handrail and get in/out of car without assistance to impact current status, health and quality of life []? Met  []? Not Met  [x]? Progressing        PLAN   Plan of care Certification: 8/17/2022 to 10/26/2022-10 weeks     Outpatient Physical Therapy 1-3 times weekly for 8-10 weeks to include the following interventions: Cervical/Lumbar Traction, Electrical Stimulation IFC, Gait Training, Manual Therapy, Neuromuscular Re-ed, Patient Education, Self Care, Therapeutic Activities, Therapeutic Exercise and Dry Needling.       Maria Fernanda Mota PT, DPT, SCS, CSCS  Board Certified Sports Clinical Specialist   Certified Dry Needling Provider  8/22/2022  9:02 AM

## 2022-08-29 ENCOUNTER — CLINICAL SUPPORT (OUTPATIENT)
Dept: REHABILITATION | Facility: HOSPITAL | Age: 82
End: 2022-08-29
Attending: INTERNAL MEDICINE
Payer: MEDICARE

## 2022-08-29 DIAGNOSIS — M62.81 MUSCLE WEAKNESS: Primary | ICD-10-CM

## 2022-08-29 PROCEDURE — 97110 THERAPEUTIC EXERCISES: CPT | Mod: PN | Performed by: GENERAL ACUTE CARE HOSPITAL

## 2022-08-29 PROCEDURE — 97530 THERAPEUTIC ACTIVITIES: CPT | Mod: PN | Performed by: GENERAL ACUTE CARE HOSPITAL

## 2022-08-29 PROCEDURE — 97116 GAIT TRAINING THERAPY: CPT | Mod: PN | Performed by: GENERAL ACUTE CARE HOSPITAL

## 2022-08-29 NOTE — PROGRESS NOTES
[facility]   Physical Therapy Treatment Note   Name: Radha Degroot  Clinic Number: [id number]  Therapy Diagnosis:   No diagnosis found.    Physician: Jana Holguin MD  Physician Orders: PT Eval and Treat  Medical Diagnosis from Referral: L femur fracture     Evaluation Date: 8/17/2022  Authorization Period Expiration: 10/26/2022-10 weeks  Plan of Care Expiration: 10/26/2022-10 weeks  Progress Note Due: at 10th visit or when applicable  Visit # / Visits authorized:   1 1 Evaluation    2  20 PT Treatments      FOTO:   1  8/17/2022  UPPER LEG   2       3          Precautions: Falls     Visit Date: 8/29/2022  Time In: 1255  Time Out: 1348  Total Billable Time: 40 minutes  PTA Visit #: 0/5     SUBJECTIVE   Pt returns to OP PT reporting: No pain upon arrival. She is approx 10 minutes late due to transportation from her son. She does not have an AD with her, she arrives in wheelchair. PT speaks with son about have walker, preferably RW with her to train and for projected use at home.   Pain: 0/10  Location: left upper legs   Response to previous treatment: 1st treatment session  Functional change: 1st treatment session  She was compliant with home exercise program.  OBJECTIVE     Objective Measures updated at progress report unless specified.     Treatment   Radha received following skilled interventions listed below:    PT Intervention Parameters Time   Therapeutic Exercise to develop strength, endurance, ROM, flexibility, posture and core stabilization Seated marches x30 bilateral   Seated LAQ x15 bilateral   Nustep x6 min LE only L3  Briges 4x5  SLR x20 bilateral hooklying 20 minutes   Therapeutic Activities to improve functional performance Sit>stand 3x5 UE assist in // bars 8 minutes     Gait training to improve functional mobility and safety Gait training, CGA, VC with RW 2 x 55ft 12 minutes          Patient Education and Home Exercises   Home Exercises Provided and Patient Education  Provided   Patient was educated on the role of PT, POC, treatment plan, discharge goals, HEP.  Patient educated on biomechanical justification for therapeutic exercise and importance of compliance with HEP in order to improve overall impairments and QOL   Patient was educated on all the above exercise prior/during/after for proper posture, positioning, and execution for safe performance with home exercise program.       Written Home Exercises Provided:   yes.   Exercises were reviewed and Radha was able to demonstrate them prior to the end of the session.    Radha demonstrated fair  understanding of the education provided.   See EMR under Patient Instructions for exercises provided during therapy sessions    ASSESSMENT     Patient arrives late for session again due to transportation. Patient displays increased tolerance to activity today. Gait training performed with rolling walker with verbal and and tactile cues for safety. She has a tendency to lean anterior with face and chest over the front of the walker putting her at risk to fall forward. Patient is educated on safe and correct gait pattern. She will need reinforcement with this gait pattern.    Radha Is progressing well towards her goals.   Pt prognosis is Fair / Good.    Pt will continue to benefit from skilled outpatient physical therapy to address the deficits listed in the problem list box on initial evaluation, provide pt/family education and to maximize pt's level of independence in the home and community environment.   Pt's spiritual, cultural and educational needs considered and pt agreeable to plan of care and goals.  Anticipated barriers to physical therapy: Lives home alone, age, transportation, Unable to drive    Goals:    Short Term Goals: 7/14/2022-4 weeks Status Date Met   1. Patient to be independent with Home Exercise Program performance to impact knowledge of condition  [] Met  [] Not Met  [x] Progressing     2. Pt to perform TUG 20s or  less with RW to display improved mobility safety [] Met  [] Not Met  [x] Progressing     3. Pt to ascend/descend x2 steps with UE assistance to impact curb and community navigation   [] Met  [] Not Met  [x] Progressing     4. Pt to display L knee extension to 0 degrees to impact heel strike during gait [] Met  [] Not Met  [x] Progressing          Long Term Goal: 10/26/2022-10 weeks Status Date Met   1. Pt to ambulate Community distances with least restrictive AD, ascend/descend x5 steps with handrail and get in/out of car without assistance to impact current status, health and quality of life [] Met  [] Not Met  [x] Progressing        PLAN   Plan of care Certification: 8/17/2022 to 10/26/2022-10 weeks     Outpatient Physical Therapy 1-3 times weekly for 8-10 weeks to include the following interventions: Cervical/Lumbar Traction, Electrical Stimulation IFC, Gait Training, Manual Therapy, Neuromuscular Re-ed, Patient Education, Self Care, Therapeutic Activities, Therapeutic Exercise and Dry Needling.     Cont POC emphasis on basic ADL performance, safety and LE mobility at this time.     Maria Fernanda Mota PT, DPT, SCS, CSCS  Board Certified Sports Clinical Specialist   Certified Dry Needling Provider  8/29/2022  9:02 AM

## 2022-08-30 NOTE — PROGRESS NOTES
[facility]   Physical Therapy Treatment Note   Name: Radha Degroot  Clinic Number: [id number]  Therapy Diagnosis:   Encounter Diagnosis   Name Primary?    Muscle weakness Yes       Physician: Jana Holguin MD  Physician Orders: PT Eval and Treat  Medical Diagnosis from Referral: L femur fracture     Evaluation Date: 8/17/2022  Authorization Period Expiration: 10/26/2022-10 weeks  Plan of Care Expiration: 10/26/2022-10 weeks  Progress Note Due: at 10th visit or when applicable  Visit # / Visits authorized:   1 1 Evaluation    3  20 PT Treatments      FOTO:   1  8/17/2022  UPPER LEG   2       3          Precautions: Falls     Visit Date: 8/31/2022  Time In: 12:45 PM  Time Out: 1:30 PM  Total Billable Time: 45 minutes  PTA Visit #: 0/5     SUBJECTIVE   Pt returns to OP PT reporting: she purchased a rolling walker and wants to start using it at home. She has no new complaints.  Pain: 0/10  Location: left upper legs   Response to previous treatment: 1st treatment session  Functional change: 1st treatment session  She was compliant with home exercise program.  OBJECTIVE     Objective Measures updated at progress report unless specified.     Treatment   Radha received following skilled interventions listed below:    PT Intervention Parameters Time   Therapeutic Exercise to develop strength, endurance, ROM, flexibility, posture and core stabilization Seated marches x30 bilateral   Seated LAQ x15 bilateral   Nustep x6 min LE only L3  Standing Hip Abduction 1x5 L  Weight shifts onto L - 2x10 25 minutes   Therapeutic Activities to improve functional performance Sit>stand 1x5 UE assist in // bars  Sit>stand 1x5 UE assist in // bars with foam pad in chair and no UE assist 10 minutes     Gait training to improve functional mobility and safety Gait training, CGA, VC with RW 2 x 55ft 10 minutes          Patient Education and Home Exercises   Home Exercises Provided and Patient Education Provided    Patient was educated on the role of PT, POC, treatment plan, discharge goals, HEP.  Patient educated on biomechanical justification for therapeutic exercise and importance of compliance with HEP in order to improve overall impairments and QOL   Patient was educated on all the above exercise prior/during/after for proper posture, positioning, and execution for safe performance with home exercise program.       Written Home Exercises Provided:   yes.   Exercises were reviewed and Radha was able to demonstrate them prior to the end of the session.    Radha demonstrated fair  understanding of the education provided.   See EMR under Patient Instructions for exercises provided during therapy sessions    ASSESSMENT     Patient tolerated today's treatment well. She did require some guarding for safe ambulation and performance of exercise. Endurance remains limited.    Radha Is progressing well towards her goals.   Pt prognosis is Fair / Good.    Pt will continue to benefit from skilled outpatient physical therapy to address the deficits listed in the problem list box on initial evaluation, provide pt/family education and to maximize pt's level of independence in the home and community environment.   Pt's spiritual, cultural and educational needs considered and pt agreeable to plan of care and goals.  Anticipated barriers to physical therapy: Lives home alone, age, transportation, Unable to drive    Goals:    Short Term Goals: 7/14/2022-4 weeks Status Date Met   1. Patient to be independent with Home Exercise Program performance to impact knowledge of condition  [] Met  [] Not Met  [x] Progressing     2. Pt to perform TUG 20s or less with RW to display improved mobility safety [] Met  [] Not Met  [x] Progressing     3. Pt to ascend/descend x2 steps with UE assistance to impact curb and community navigation   [] Met  [] Not Met  [x] Progressing     4. Pt to display L knee extension to 0 degrees to impact heel strike  during gait [] Met  [] Not Met  [x] Progressing          Long Term Goal: 10/26/2022-10 weeks Status Date Met   1. Pt to ambulate Community distances with least restrictive AD, ascend/descend x5 steps with handrail and get in/out of car without assistance to impact current status, health and quality of life [] Met  [] Not Met  [x] Progressing        PLAN   Plan of care Certification: 8/17/2022 to 10/26/2022-10 weeks     Outpatient Physical Therapy 1-3 times weekly for 8-10 weeks to include the following interventions: Cervical/Lumbar Traction, Electrical Stimulation IFC, Gait Training, Manual Therapy, Neuromuscular Re-ed, Patient Education, Self Care, Therapeutic Activities, Therapeutic Exercise and Dry Needling.     Cont POC emphasis on basic ADL performance, safety and LE mobility at this time.     Edwin Mtz PT, DPT

## 2022-08-31 ENCOUNTER — CLINICAL SUPPORT (OUTPATIENT)
Dept: REHABILITATION | Facility: HOSPITAL | Age: 82
End: 2022-08-31
Attending: INTERNAL MEDICINE
Payer: MEDICARE

## 2022-08-31 DIAGNOSIS — M62.81 MUSCLE WEAKNESS: Primary | ICD-10-CM

## 2022-08-31 PROCEDURE — 97110 THERAPEUTIC EXERCISES: CPT | Mod: PN

## 2022-08-31 PROCEDURE — 97530 THERAPEUTIC ACTIVITIES: CPT | Mod: PN

## 2022-09-09 ENCOUNTER — CLINICAL SUPPORT (OUTPATIENT)
Dept: REHABILITATION | Facility: HOSPITAL | Age: 82
End: 2022-09-09
Attending: INTERNAL MEDICINE
Payer: MEDICARE

## 2022-09-09 DIAGNOSIS — M62.81 MUSCLE WEAKNESS: Primary | ICD-10-CM

## 2022-09-09 PROCEDURE — 97116 GAIT TRAINING THERAPY: CPT | Mod: PN | Performed by: GENERAL ACUTE CARE HOSPITAL

## 2022-09-09 PROCEDURE — 97530 THERAPEUTIC ACTIVITIES: CPT | Mod: PN | Performed by: GENERAL ACUTE CARE HOSPITAL

## 2022-09-09 PROCEDURE — 97110 THERAPEUTIC EXERCISES: CPT | Mod: PN | Performed by: GENERAL ACUTE CARE HOSPITAL

## 2022-09-09 NOTE — PROGRESS NOTES
[facility]   Physical Therapy Treatment Note   Name: Radha Degroot  Clinic Number: [id number]  Therapy Diagnosis:   Encounter Diagnosis   Name Primary?    Muscle weakness Yes       Physician: Jana Holguin MD  Physician Orders: PT Eval and Treat  Medical Diagnosis from Referral: L femur fracture     Evaluation Date: 8/17/2022  Authorization Period Expiration: 10/26/2022-10 weeks  Plan of Care Expiration: 10/26/2022-10 weeks  Progress Note Due: at 10th visit or when applicable  Visit # / Visits authorized:   1 1 Evaluation    4  20 PT Treatments      FOTO:   1  8/17/2022  UPPER LEG   2       3          Precautions: Falls     Visit Date: 9/9/2022  Time In: 1105  Time Out: 1145  Total Billable Time: 40 minutes  PTA Visit #: 0/5     SUBJECTIVE   Pt returns to OP PT reporting: no increased pain. She arrives on time for session but requires seated rest break before she is ready to begin treatment.   Pain: 0/10  Location: left upper legs   Response to previous treatment: 1st treatment session  Functional change: 1st treatment session  She was compliant with home exercise program.  OBJECTIVE     Objective Measures updated at progress report unless specified.     Treatment   Radha received following skilled interventions listed below:    PT Intervention Parameters Time   Therapeutic Exercise to develop strength, endurance, ROM, flexibility, posture and core stabilization Straight leg raise bilateral 2# 3x10  Double knee to chest 3x10  Seated hip add squeeze, 3s 3x10  Internal rotation bilateral 2x10 ea   23 minutes   Therapeutic Activities to improve functional performance Sit>stand 3x8 UE assist 8 minutes     Gait training to improve functional mobility and safety Gait training, CGA, VC with RW 2 x 55ft  Gait training CGA with RW on turf 2x25 ft 8 minutes        Patient Education and Home Exercises   Home Exercises Provided and Patient Education Provided   Patient was educated on the role  of PT, POC, treatment plan, discharge goals, HEP.  Patient educated on biomechanical justification for therapeutic exercise and importance of compliance with HEP in order to improve overall impairments and QOL   Patient was educated on all the above exercise prior/during/after for proper posture, positioning, and execution for safe performance with home exercise program.       Written Home Exercises Provided:   yes.   Exercises were reviewed and Radha was able to demonstrate them prior to the end of the session.    Radha demonstrated fair  understanding of the education provided.   See EMR under Patient Instructions for exercises provided during therapy sessions    ASSESSMENT     Continued guarding required for patient safety. Limited lower extremity range of motion and endurance noted with all tasks. Continued training is indicated. Encouragement for home exercise program compliance is stressed.     Radha Is progressing well towards her goals.   Pt prognosis is Fair / Good.    Pt will continue to benefit from skilled outpatient physical therapy to address the deficits listed in the problem list box on initial evaluation, provide pt/family education and to maximize pt's level of independence in the home and community environment.   Pt's spiritual, cultural and educational needs considered and pt agreeable to plan of care and goals.  Anticipated barriers to physical therapy: Lives home alone, age, transportation, Unable to drive    Goals:    Short Term Goals: 7/14/2022-4 weeks Status Date Met   1. Patient to be independent with Home Exercise Program performance to impact knowledge of condition  [] Met  [] Not Met  [x] Progressing     2. Pt to perform TUG 20s or less with RW to display improved mobility safety [] Met  [] Not Met  [x] Progressing     3. Pt to ascend/descend x2 steps with UE assistance to impact curb and community navigation   [] Met  [] Not Met  [x] Progressing     4. Pt to display L knee extension  to 0 degrees to impact heel strike during gait [] Met  [] Not Met  [x] Progressing          Long Term Goal: 10/26/2022-10 weeks Status Date Met   1. Pt to ambulate Community distances with least restrictive AD, ascend/descend x5 steps with handrail and get in/out of car without assistance to impact current status, health and quality of life [] Met  [] Not Met  [x] Progressing        PLAN   Plan of care Certification: 8/17/2022 to 10/26/2022-10 weeks     Outpatient Physical Therapy 1-3 times weekly for 8-10 weeks to include the following interventions: Cervical/Lumbar Traction, Electrical Stimulation IFC, Gait Training, Manual Therapy, Neuromuscular Re-ed, Patient Education, Self Care, Therapeutic Activities, Therapeutic Exercise and Dry Needling.     Cont POC emphasis on basic ADL performance, safety and LE mobility at this time.     Maria Fernanda Mota PT, DPT

## 2022-09-12 ENCOUNTER — CLINICAL SUPPORT (OUTPATIENT)
Dept: REHABILITATION | Facility: HOSPITAL | Age: 82
End: 2022-09-12
Attending: INTERNAL MEDICINE
Payer: MEDICARE

## 2022-09-12 DIAGNOSIS — M62.81 MUSCLE WEAKNESS: Primary | ICD-10-CM

## 2022-09-12 PROCEDURE — 97110 THERAPEUTIC EXERCISES: CPT | Mod: PN

## 2022-09-12 PROCEDURE — 97530 THERAPEUTIC ACTIVITIES: CPT | Mod: PN

## 2022-09-12 PROCEDURE — 97116 GAIT TRAINING THERAPY: CPT | Mod: PN

## 2022-09-12 NOTE — PROGRESS NOTES
[facility]   Physical Therapy Treatment Note   Name: Radha Degroot  Clinic Number: [id number]  Therapy Diagnosis:   Encounter Diagnosis   Name Primary?    Muscle weakness Yes         Physician: Jana Holguin MD  Physician Orders: PT Eval and Treat  Medical Diagnosis from Referral: L femur fracture     Evaluation Date: 8/17/2022  Authorization Period Expiration: 10/26/2022-10 weeks  Plan of Care Expiration: 10/26/2022-10 weeks  Progress Note Due: at 10th visit or when applicable  Visit # / Visits authorized:   1 1 Evaluation    5  20 PT Treatments      FOTO:   1  8/17/2022  UPPER LEG   2       3          Precautions: Falls     Visit Date: 9/12/2022  Time In: 12:50 PM  Time Out: 1:35 PM  Total Billable Time: 45 minutes  PTA Visit #: 0/5     SUBJECTIVE   Pt returns to OP PT reporting: she was somewhat sore after her last visit but is doing better today.  Pain: 0/10  Location: left upper legs   Response to previous treatment: 1st treatment session  Functional change: 1st treatment session  She was compliant with home exercise program.  OBJECTIVE     Objective Measures updated at progress report unless specified.     Treatment   Radha received following skilled interventions listed below:    PT Intervention Parameters Time   Therapeutic Exercise to develop strength, endurance, ROM, flexibility, posture and core stabilization   Standing Hip Ext - 2x10 B  Seated Hip Abd with yellow band - 2x10  Seated Ball Squeeze - 2x10 B  Standing Hip Abd - 2x10  Mini Squats - 2x10   25 minutes   Therapeutic Activities to improve functional performance Sit>stand 2x8 UE assist 8 minutes     Gait training to improve functional mobility and safety Gait training, CGA, VC with RW 2 x 55ft  Gait training CGA with quad cane in RUE 1x50 ft 12 minutes        Patient Education and Home Exercises   Home Exercises Provided and Patient Education Provided   Patient was educated on the role of PT, POC, treatment plan,  discharge goals, HEP.  Patient educated on biomechanical justification for therapeutic exercise and importance of compliance with HEP in order to improve overall impairments and QOL   Patient was educated on all the above exercise prior/during/after for proper posture, positioning, and execution for safe performance with home exercise program.       Written Home Exercises Provided:   yes.   Exercises were reviewed and Radha was able to demonstrate them prior to the end of the session.    Radha demonstrated fair  understanding of the education provided.   See EMR under Patient Instructions for exercises provided during therapy sessions    ASSESSMENT     Patient able to initiate ambulation with quad cane today with slow gait speed and CGA. Recommended continued use of RW at home and outside of the clinic but she would benefit from continued training with quad cane at future visits. Anticipate single point cane will eventually be least restrictive assistive device as she continues to show improvements in strength.    Radha Is progressing well towards her goals.   Pt prognosis is Fair / Good.    Pt will continue to benefit from skilled outpatient physical therapy to address the deficits listed in the problem list box on initial evaluation, provide pt/family education and to maximize pt's level of independence in the home and community environment.   Pt's spiritual, cultural and educational needs considered and pt agreeable to plan of care and goals.  Anticipated barriers to physical therapy: Lives home alone, age, transportation, Unable to drive    Goals:    Short Term Goals: 7/14/2022-4 weeks Status Date Met   1. Patient to be independent with Home Exercise Program performance to impact knowledge of condition  [] Met  [] Not Met  [x] Progressing     2. Pt to perform TUG 20s or less with RW to display improved mobility safety [] Met  [] Not Met  [x] Progressing     3. Pt to ascend/descend x2 steps with UE assistance  to impact curb and community navigation   [] Met  [] Not Met  [x] Progressing     4. Pt to display L knee extension to 0 degrees to impact heel strike during gait [] Met  [] Not Met  [x] Progressing          Long Term Goal: 10/26/2022-10 weeks Status Date Met   1. Pt to ambulate Community distances with least restrictive AD, ascend/descend x5 steps with handrail and get in/out of car without assistance to impact current status, health and quality of life [] Met  [] Not Met  [x] Progressing        PLAN   Plan of care Certification: 8/17/2022 to 10/26/2022-10 weeks     Outpatient Physical Therapy 1-3 times weekly for 8-10 weeks to include the following interventions: Cervical/Lumbar Traction, Electrical Stimulation IFC, Gait Training, Manual Therapy, Neuromuscular Re-ed, Patient Education, Self Care, Therapeutic Activities, Therapeutic Exercise and Dry Needling.     Cont POC emphasis on basic ADL performance, safety and LE mobility at this time.     Edwin Mtz PT, DPT

## 2022-09-13 NOTE — PROGRESS NOTES
[facility]   Physical Therapy Treatment Note   Name: Radha Degroot  Clinic Number: [id number]  Therapy Diagnosis:   Encounter Diagnosis   Name Primary?    Muscle weakness Yes     Physician: Jana Holguin MD  Physician Orders: PT Eval and Treat  Medical Diagnosis from Referral: L femur fracture     Evaluation Date: 8/17/2022  Authorization Period Expiration: 10/26/2022-10 weeks  Plan of Care Expiration: 10/26/2022-10 weeks  Progress Note Due: at 10th visit or when applicable  Visit # / Visits authorized:   1 1 Evaluation   6  20 PT Treatments      FOTO:   1  8/17/2022  UPPER LEG   2       3          Precautions: Falls     Visit Date: 9/14/2022  Time In: 1245 PM  Time Out: 120 PM  Total Billable Time: 35 minutes  PTA Visit #: 1/5     SUBJECTIVE   Pt returns to OP PT reporting: she isn't having too much pain but she is aching. Her right groin has been achy as well.    Pain: 0/10  Location: left upper legs  Response to previous treatment: too soon to tell  Functional change: too soon to tell  She was compliant with home exercise program.  OBJECTIVE     Objective Measures updated at progress report unless specified.     Treatment   Radha received following skilled interventions listed below:    PT Intervention Parameters Time   Therapeutic Exercise to develop strength, endurance, ROM, flexibility, posture and core stabilization   Seated Hip Abd YTB - 2x10  Seated Ball Squeeze - 2x10 B  Standing Hip Ext - 2x10 B   15 minutes   Therapeutic Activities to improve functional performance Sit>stand 2x8 without UE assist 8 minutes     Gait training to improve functional mobility and safety Gait training CGA with quad cane in RUE 2 x50 ft - VC for AD use  Gait training in // bars with unilateral upper extremity support 12 minutes        Patient Education and Home Exercises   Home Exercises Provided and Patient Education Provided   Patient was educated on the role of PT, POC, treatment plan,  discharge goals, HEP.  Patient educated on biomechanical justification for therapeutic exercise and importance of compliance with HEP in order to improve overall impairments and QOL   Patient was educated on all the above exercise prior/during/after for proper posture, positioning, and execution for safe performance with home exercise program.       Written Home Exercises Provided:   Patient instructed to cont prior HEP.   Exercises were reviewed and Radha was able to demonstrate them prior to the end of the session.    Radha demonstrated fair  understanding of the education provided.   See EMR under Patient Instructions for exercises provided during therapy sessions    ASSESSMENT     Patient did well gait training with the cane this session, verbal cues were given for proper use of the cane for safety. She had muscular fatigue in bilateral lower extremities toward the end of gait training. She was limited in further therex secondary to fatigue.    Radha Is progressing well towards her goals.   Pt prognosis is Fair / Good.    Pt will continue to benefit from skilled outpatient physical therapy to address the deficits listed in the problem list box on initial evaluation, provide pt/family education and to maximize pt's level of independence in the home and community environment.   Pt's spiritual, cultural and educational needs considered and pt agreeable to plan of care and goals.  Anticipated barriers to physical therapy: Lives home alone, age, transportation, Unable to drive    Goals:    Short Term Goals: 7/14/2022-4 weeks Status Date Met   1. Patient to be independent with Home Exercise Program performance to impact knowledge of condition  [] Met  [] Not Met  [x] Progressing     2. Pt to perform TUG 20s or less with RW to display improved mobility safety [] Met  [] Not Met  [x] Progressing     3. Pt to ascend/descend x2 steps with UE assistance to impact curb and community navigation   [] Met  [] Not Met  [x]  Progressing     4. Pt to display L knee extension to 0 degrees to impact heel strike during gait [] Met  [] Not Met  [x] Progressing          Long Term Goal: 10/26/2022-10 weeks Status Date Met   1. Pt to ambulate Community distances with least restrictive AD, ascend/descend x5 steps with handrail and get in/out of car without assistance to impact current status, health and quality of life [] Met  [] Not Met  [x] Progressing        PLAN   Plan of care Certification: 8/17/2022 to 10/26/2022-10 weeks     Outpatient Physical Therapy 1-3 times weekly for 8-10 weeks to include the following interventions: Cervical/Lumbar Traction, Electrical Stimulation IFC, Gait Training, Manual Therapy, Neuromuscular Re-ed, Patient Education, Self Care, Therapeutic Activities, Therapeutic Exercise and Dry Needling.     Cont POC emphasis on basic ADL performance, safety and LE mobility at this time.     Thien Billings

## 2022-09-14 ENCOUNTER — CLINICAL SUPPORT (OUTPATIENT)
Dept: REHABILITATION | Facility: HOSPITAL | Age: 82
End: 2022-09-14
Attending: INTERNAL MEDICINE
Payer: MEDICARE

## 2022-09-14 DIAGNOSIS — M62.81 MUSCLE WEAKNESS: Primary | ICD-10-CM

## 2022-09-14 PROCEDURE — 97116 GAIT TRAINING THERAPY: CPT | Mod: PN,CQ

## 2022-09-14 PROCEDURE — 97110 THERAPEUTIC EXERCISES: CPT | Mod: PN,CQ

## 2022-09-22 ENCOUNTER — CLINICAL SUPPORT (OUTPATIENT)
Dept: REHABILITATION | Facility: HOSPITAL | Age: 82
End: 2022-09-22
Attending: INTERNAL MEDICINE
Payer: MEDICARE

## 2022-09-22 DIAGNOSIS — M62.81 MUSCLE WEAKNESS: Primary | ICD-10-CM

## 2022-09-22 PROCEDURE — 97530 THERAPEUTIC ACTIVITIES: CPT | Mod: PN | Performed by: GENERAL ACUTE CARE HOSPITAL

## 2022-09-22 PROCEDURE — 97116 GAIT TRAINING THERAPY: CPT | Mod: PN | Performed by: GENERAL ACUTE CARE HOSPITAL

## 2022-09-22 PROCEDURE — 97110 THERAPEUTIC EXERCISES: CPT | Mod: PN | Performed by: GENERAL ACUTE CARE HOSPITAL

## 2022-09-22 NOTE — PROGRESS NOTES
[facility]   Physical Therapy Treatment Note   Name: Radha Degroot  Clinic Number: [id number]  Therapy Diagnosis:   Encounter Diagnosis   Name Primary?    Muscle weakness Yes     Physician: Jana Holguin MD  Physician Orders: PT Eval and Treat  Medical Diagnosis from Referral: L femur fracture     Evaluation Date: 8/17/2022  Authorization Period Expiration: 10/26/2022-10 weeks  Plan of Care Expiration: 10/26/2022-10 weeks  Progress Note Due: at 10th visit or when applicable  Visit # / Visits authorized:   1 1 Evaluation   6  20 PT Treatments      FOTO:   1  8/17/2022  UPPER LEG   2       3          Precautions: Falls     Visit Date: 9/22/2022  Time In: 1415  Time Out: 1505 PM  Total Billable Time: 43 minutes  PTA Visit #: 1/5     SUBJECTIVE   Pt returns to OP PT reporting: That she is tired from the walk in from the parking lot. She wonders when she will be ready to walk without the walker.     Pain: 0/10  Location: left upper legs  Response to previous treatment: too soon to tell  Functional change: too soon to tell  She was compliant with home exercise program.  OBJECTIVE     Objective Measures updated at progress report unless specified.     Treatment   Radha received following skilled interventions listed below:    PT Intervention Parameters Time   Therapeutic Exercise to develop strength, endurance, ROM, flexibility, posture and core stabilization Standing heel raise 2x10-upper extremity assist  Standing hip abduction 2x10 bilateral    10 minutes   Therapeutic Activities to improve functional performance 6in threshold navigation  Sit>stand x10 8 minutes     Gait training to improve functional mobility and safety Treadmill training walk trials 4x45s with standing and sitting rest breaks 25 minutes        Patient Education and Home Exercises   Home Exercises Provided and Patient Education Provided   Patient was educated on the role of PT, POC, treatment plan, discharge goals,  HEP.  Patient educated on biomechanical justification for therapeutic exercise and importance of compliance with HEP in order to improve overall impairments and QOL   Patient was educated on all the above exercise prior/during/after for proper posture, positioning, and execution for safe performance with home exercise program.       Written Home Exercises Provided:   Patient instructed to cont prior HEP.   Exercises were reviewed and Radha was able to demonstrate them prior to the end of the session.    Radha demonstrated fair  understanding of the education provided.   See EMR under Patient Instructions for exercises provided during therapy sessions    ASSESSMENT     Performed gait training trials with 0.5 speed to impact patient silvina and endurance. She is easily fatigued only able to sustain gait for approx 45s before requiring a rest break. Heavy emphasis provided on patient safety with navigation around obstacles as well as home exercise program performance.    Radha Is progressing well towards her goals.   Pt prognosis is Fair / Good.    Pt will continue to benefit from skilled outpatient physical therapy to address the deficits listed in the problem list box on initial evaluation, provide pt/family education and to maximize pt's level of independence in the home and community environment.   Pt's spiritual, cultural and educational needs considered and pt agreeable to plan of care and goals.  Anticipated barriers to physical therapy: Lives home alone, age, transportation, Unable to drive    Goals:    Short Term Goals: 7/14/2022-4 weeks Status Date Met   1. Patient to be independent with Home Exercise Program performance to impact knowledge of condition  [] Met  [] Not Met  [x] Progressing     2. Pt to perform TUG 20s or less with RW to display improved mobility safety [] Met  [] Not Met  [x] Progressing     3. Pt to ascend/descend x2 steps with UE assistance to impact curb and community navigation   []  Met  [] Not Met  [x] Progressing     4. Pt to display L knee extension to 0 degrees to impact heel strike during gait [] Met  [] Not Met  [x] Progressing          Long Term Goal: 10/26/2022-10 weeks Status Date Met   1. Pt to ambulate Community distances with least restrictive AD, ascend/descend x5 steps with handrail and get in/out of car without assistance to impact current status, health and quality of life [] Met  [] Not Met  [x] Progressing        PLAN   Plan of care Certification: 8/17/2022 to 10/26/2022-10 weeks     Outpatient Physical Therapy 1-3 times weekly for 8-10 weeks to include the following interventions: Cervical/Lumbar Traction, Electrical Stimulation IFC, Gait Training, Manual Therapy, Neuromuscular Re-ed, Patient Education, Self Care, Therapeutic Activities, Therapeutic Exercise and Dry Needling.     Cont POC emphasis on basic ADL performance, safety and LE mobility at this time. Will add additional appts to continue with endurance training.     Maria Fernanda Mota

## 2022-09-27 NOTE — PROGRESS NOTES
[facility]   Physical Therapy Treatment Note     Name: Radha Degroot  Clinic Number: [id number]    Therapy Diagnosis:   Encounter Diagnosis   Name Primary?    Muscle weakness Yes     Physician: Jana Holguin MD  Physician Orders: PT Eval and Treat  Medical Diagnosis from Referral: L femur fracture     Evaluation Date: 8/17/2022  Authorization Period Expiration: 10/26/2022-10 weeks  Plan of Care Expiration: 10/26/2022-10 weeks  Progress Note Due: at 10th visit or when applicable  Visit # / Visits authorized: 8 / 20 (1 / 1 Eval)    FOTO:   1  8/17/2022  UPPER LEG   2       3          Precautions: Falls     Visit Date: 9/28/2022    Time In: 1:31 PM  Time Out: 2:11 PM  Total Billable Time: 40 minutes    PTA Visit #: 0 / 5     SUBJECTIVE     Pt returns to OP PT reporting: that she is not having any pain this afternoon. Did fine after last session, no problems.    Pain: 0/10  Location: left upper legs    Response to previous treatment: no soreness or increase in symptoms   Functional change: no functional change at this time - in progress  She was compliant with home exercise program.    OBJECTIVE     Objective Measures updated at progress report unless specified.     Treatment     Radha received following skilled interventions listed below:    PT Intervention Parameters Time   Therapeutic Exercise to develop strength, endurance, ROM, flexibility, posture and core stabilization Standing heel raise 2x10-upper extremity assist  Standing hip abduction 2x10 bilateral    8 minutes   Therapeutic Activities to improve functional performance 6in threshold navigation  Sit<>stands x10 reps - no UE support 8 minutes     Gait training to improve functional mobility and safety Treadmill training walk trials 2x45s, 1x60s with standing and sitting rest breaks (0.5mph, 0 incline) 24 minutes        Patient Education and Home Exercises   Home Exercises Provided and Patient Education Provided   Patient was  educated on the role of PT, POC, treatment plan, discharge goals, HEP.  Patient educated on biomechanical justification for therapeutic exercise and importance of compliance with HEP in order to improve overall impairments and QOL   Patient was educated on all the above exercise prior/during/after for proper posture, positioning, and execution for safe performance with home exercise program.       Written Home Exercises Provided:   Patient instructed to cont prior HEP.   Exercises were reviewed and Radha was able to demonstrate them prior to the end of the session.    Radha demonstrated fair  understanding of the education provided.   See EMR under Patient Instructions for exercises provided during therapy sessions    ASSESSMENT     Patient fatigues easily and therefore requires frequent rest breaks to recover. Patient safety continues to be emphasized especially when performing transfers when navigating obstacles. Patient was able to perform 1 trial of 60s walking interval without rest break - no LOB observed. May begin light gait training with large based quad cane in parallel bars as well as further LE strengthening and functional activity training.    Radha Is progressing well towards her goals.   Pt prognosis is Fair / Good.    Pt will continue to benefit from skilled outpatient physical therapy to address the deficits listed in the problem list box on initial evaluation, provide pt/family education and to maximize pt's level of independence in the home and community environment.   Pt's spiritual, cultural and educational needs considered and pt agreeable to plan of care and goals.  Anticipated barriers to physical therapy: Lives home alone, age, transportation, Unable to drive    Goals:    Short Term Goals: 7/14/2022-4 weeks Status Date Met   1. Patient to be independent with Home Exercise Program performance to impact knowledge of condition  [] Met  [] Not Met  [x] Progressing     2. Pt to perform TUG 20s  or less with RW to display improved mobility safety [] Met  [] Not Met  [x] Progressing     3. Pt to ascend/descend x2 steps with UE assistance to impact curb and community navigation   [] Met  [] Not Met  [x] Progressing     4. Pt to display L knee extension to 0 degrees to impact heel strike during gait [] Met  [] Not Met  [x] Progressing          Long Term Goal: 10/26/2022-10 weeks Status Date Met   1. Pt to ambulate Community distances with least restrictive AD, ascend/descend x5 steps with handrail and get in/out of car without assistance to impact current status, health and quality of life [] Met  [] Not Met  [x] Progressing        PLAN   Plan of care Certification: 8/17/2022 to 10/26/2022-10 weeks     Outpatient Physical Therapy 1-3 times weekly for 8-10 weeks to include the following interventions: Cervical/Lumbar Traction, Electrical Stimulation IFC, Gait Training, Manual Therapy, Neuromuscular Re-ed, Patient Education, Self Care, Therapeutic Activities, Therapeutic Exercise and Dry Needling.     Cont POC emphasis on basic ADL performance, safety and LE mobility at this time. Will add additional appts to continue with endurance training.     Jeni Harding, PT, DPT, Cert. DN

## 2022-09-28 ENCOUNTER — CLINICAL SUPPORT (OUTPATIENT)
Dept: REHABILITATION | Facility: HOSPITAL | Age: 82
End: 2022-09-28
Attending: INTERNAL MEDICINE
Payer: MEDICARE

## 2022-09-28 DIAGNOSIS — M62.81 MUSCLE WEAKNESS: Primary | ICD-10-CM

## 2022-09-28 PROCEDURE — 97110 THERAPEUTIC EXERCISES: CPT | Mod: PN

## 2022-09-28 PROCEDURE — 97116 GAIT TRAINING THERAPY: CPT | Mod: PN

## 2022-09-28 PROCEDURE — 97530 THERAPEUTIC ACTIVITIES: CPT | Mod: PN

## 2022-10-04 ENCOUNTER — CLINICAL SUPPORT (OUTPATIENT)
Dept: REHABILITATION | Facility: HOSPITAL | Age: 82
End: 2022-10-04
Attending: INTERNAL MEDICINE
Payer: MEDICARE

## 2022-10-04 DIAGNOSIS — M62.81 MUSCLE WEAKNESS: Primary | ICD-10-CM

## 2022-10-04 PROCEDURE — 97116 GAIT TRAINING THERAPY: CPT | Mod: PN | Performed by: PHYSICAL THERAPIST

## 2022-10-04 PROCEDURE — 97110 THERAPEUTIC EXERCISES: CPT | Mod: PN | Performed by: PHYSICAL THERAPIST

## 2022-10-04 PROCEDURE — 97530 THERAPEUTIC ACTIVITIES: CPT | Mod: PN | Performed by: PHYSICAL THERAPIST

## 2022-10-04 NOTE — PROGRESS NOTES
[facility]   Physical Therapy Treatment Note     Name: Radha Degroot  Clinic Number: [id number]    Therapy Diagnosis:   Encounter Diagnosis   Name Primary?    Muscle weakness Yes     Physician: Jana Holguin MD  Physician Orders: PT Eval and Treat  Medical Diagnosis from Referral: L femur fracture     Evaluation Date: 8/17/2022  Authorization Period Expiration: 10/26/2022-10 weeks  Plan of Care Expiration: 10/26/2022-10 weeks  Progress Note Due: at 10th visit or when applicable  Visit # / Visits authorized: 8 / 20 (1 / 1 Eval)    FOTO:   1  8/17/2022  UPPER LEG   2       3          Precautions: Falls     Visit Date: 10/4/2022    Time In: 2:26 PM  Time Out: 3:13 PM  Total Billable Time: 40 minutes    PTA Visit #: 0 / 5     SUBJECTIVE     Pt returns to OP PT reporting: that she is doing well this afternoon. Just a little tired     Pain: 0/10  Location: left upper legs    Response to previous treatment: no soreness or increase in symptoms   Functional change: no functional change at this time - in progress  She was compliant with home exercise program.    OBJECTIVE     Objective Measures updated at progress report unless specified.     Treatment     Radha received following skilled interventions listed below:    PT Intervention Parameters Time   Therapeutic Exercise to develop strength, endurance, ROM, flexibility, posture and core stabilization Standing heel raise 2x10-upper extremity assist  Standing hip abduction 2x10 bilateral    10 minutes   Therapeutic Activities to improve functional performance 6in threshold navigation  Sit<>stands x10 reps - no UE support 8 minutes     Gait training to improve functional mobility and safety In parallel bars with RUE assist  x 5  With quad cane with a step to pattern SBA 2 x 44 feet  20 minutes        Patient Education and Home Exercises   Home Exercises Provided and Patient Education Provided   Patient was educated on the role of PT, POC,  treatment plan, discharge goals, HEP.  Patient educated on biomechanical justification for therapeutic exercise and importance of compliance with HEP in order to improve overall impairments and QOL   Patient was educated on all the above exercise prior/during/after for proper posture, positioning, and execution for safe performance with home exercise program.       Written Home Exercises Provided:   Patient instructed to cont prior HEP.   Exercises were reviewed and Radha was able to demonstrate them prior to the end of the session.    Radha demonstrated fair  understanding of the education provided.   See EMR under Patient Instructions for exercises provided during therapy sessions    ASSESSMENT     Patient continues to fatigue quickly during exercise and gait activities. She demonstrated a safe step to pattern with a wide based quad cane. She is ok to continue gait training with a quad cane and practicing at home as long as she has supervision by a family member. Evident weakness and reduced endurance remain.     Radha Is progressing well towards her goals.   Pt prognosis is Fair / Good.    Pt will continue to benefit from skilled outpatient physical therapy to address the deficits listed in the problem list box on initial evaluation, provide pt/family education and to maximize pt's level of independence in the home and community environment.   Pt's spiritual, cultural and educational needs considered and pt agreeable to plan of care and goals.  Anticipated barriers to physical therapy: Lives home alone, age, transportation, Unable to drive    Goals:    Short Term Goals: 7/14/2022-4 weeks Status Date Met   1. Patient to be independent with Home Exercise Program performance to impact knowledge of condition  [] Met  [] Not Met  [x] Progressing     2. Pt to perform TUG 20s or less with RW to display improved mobility safety [] Met  [] Not Met  [x] Progressing     3. Pt to ascend/descend x2 steps with UE assistance  to impact curb and community navigation   [] Met  [] Not Met  [x] Progressing     4. Pt to display L knee extension to 0 degrees to impact heel strike during gait [] Met  [] Not Met  [x] Progressing          Long Term Goal: 10/26/2022-10 weeks Status Date Met   1. Pt to ambulate Community distances with least restrictive AD, ascend/descend x5 steps with handrail and get in/out of car without assistance to impact current status, health and quality of life [] Met  [] Not Met  [x] Progressing        PLAN   Plan of care Certification: 8/17/2022 to 10/26/2022-10 weeks     Outpatient Physical Therapy 1-3 times weekly for 8-10 weeks to include the following interventions: Cervical/Lumbar Traction, Electrical Stimulation IFC, Gait Training, Manual Therapy, Neuromuscular Re-ed, Patient Education, Self Care, Therapeutic Activities, Therapeutic Exercise and Dry Needling.     Cont POC emphasis on basic ADL performance, safety and LE mobility at this time. Will add additional appts to continue with endurance training.     César Sexton, PT, DPT

## 2022-10-06 ENCOUNTER — CLINICAL SUPPORT (OUTPATIENT)
Dept: REHABILITATION | Facility: HOSPITAL | Age: 82
End: 2022-10-06
Attending: INTERNAL MEDICINE
Payer: MEDICARE

## 2022-10-06 DIAGNOSIS — M62.81 MUSCLE WEAKNESS: Primary | ICD-10-CM

## 2022-10-06 PROCEDURE — 97530 THERAPEUTIC ACTIVITIES: CPT | Mod: PN

## 2022-10-06 PROCEDURE — 97110 THERAPEUTIC EXERCISES: CPT | Mod: PN

## 2022-10-06 PROCEDURE — 97116 GAIT TRAINING THERAPY: CPT | Mod: PN

## 2022-10-06 NOTE — PROGRESS NOTES
[facility]   Physical Therapy Treatment Note     Name: Radha Degroot  Clinic Number: [id number]    Therapy Diagnosis:   Encounter Diagnosis   Name Primary?    Muscle weakness Yes     Physician: Jana Holguin MD  Physician Orders: PT Eval and Treat  Medical Diagnosis from Referral: L femur fracture     Evaluation Date: 8/17/2022  Authorization Period Expiration: 10/26/2022-10 weeks  Plan of Care Expiration: 10/26/2022-10 weeks  Progress Note Due: at 10th visit or when applicable  Visit # / Visits authorized: 9 / 20 (1 / 1 Eval)    FOTO:   1  8/17/2022  UPPER LEG   2       3          Precautions: Falls     Visit Date: 10/6/2022    Time In: 2:20 PM  Time Out: 3:00 PM  Total Billable Time: 40 minutes    PTA Visit #: 0 / 5     SUBJECTIVE     Pt returns to OP PT reporting: she cleaned her house this morning and has some increased fatigue this afternoon.    Pain: 0/10  Location: left upper legs    Response to previous treatment: no soreness or increase in symptoms   Functional change: no functional change at this time - in progress  She was compliant with home exercise program.    OBJECTIVE     Objective Measures updated at progress report unless specified.     Treatment     Radha received following skilled interventions listed below:    PT Intervention Parameters Time   Therapeutic Exercise to develop strength, endurance, ROM, flexibility, posture and core stabilization Standing heel raise 3x10-upper extremity assist  Standing hip abduction 2x10 bilateral    10 minutes   Therapeutic Activities to improve functional performance 6in threshold navigation  Sit<>stands x10 reps - no UE support 8 minutes     Gait training to improve functional mobility and safety In parallel bars with RUE assist  x 5  With quad cane with a step to pattern SBA 1 x 60 feet  20 minutes        Patient Education and Home Exercises   Home Exercises Provided and Patient Education Provided   Patient was educated on the  role of PT, POC, treatment plan, discharge goals, HEP.  Patient educated on biomechanical justification for therapeutic exercise and importance of compliance with HEP in order to improve overall impairments and QOL   Patient was educated on all the above exercise prior/during/after for proper posture, positioning, and execution for safe performance with home exercise program.       Written Home Exercises Provided:   Patient instructed to cont prior HEP.   Exercises were reviewed and Radha was able to demonstrate them prior to the end of the session.    Radha demonstrated fair  understanding of the education provided.   See EMR under Patient Instructions for exercises provided during therapy sessions    ASSESSMENT     Radha is able to progress distance of gait training with quad cane but required verbal cues for silvina and stride length.  Patient has continued weakness in left hip musculature causing difficulty during stance phase of gait.  She will require continued treatment to improve overall strength and endurance in order to improve tolerance to normal house work.      Radha Is progressing well towards her goals.   Pt prognosis is Fair / Good.    Pt will continue to benefit from skilled outpatient physical therapy to address the deficits listed in the problem list box on initial evaluation, provide pt/family education and to maximize pt's level of independence in the home and community environment.   Pt's spiritual, cultural and educational needs considered and pt agreeable to plan of care and goals.  Anticipated barriers to physical therapy: Lives home alone, age, transportation, Unable to drive    Goals:    Short Term Goals: 7/14/2022-4 weeks Status Date Met   1. Patient to be independent with Home Exercise Program performance to impact knowledge of condition  [] Met  [] Not Met  [x] Progressing     2. Pt to perform TUG 20s or less with RW to display improved mobility safety [] Met  [] Not Met  [x]  Progressing     3. Pt to ascend/descend x2 steps with UE assistance to impact curb and community navigation   [] Met  [] Not Met  [x] Progressing     4. Pt to display L knee extension to 0 degrees to impact heel strike during gait [] Met  [] Not Met  [x] Progressing          Long Term Goal: 10/26/2022-10 weeks Status Date Met   1. Pt to ambulate Community distances with least restrictive AD, ascend/descend x5 steps with handrail and get in/out of car without assistance to impact current status, health and quality of life [] Met  [] Not Met  [x] Progressing        PLAN   Plan of care Certification: 8/17/2022 to 10/26/2022-10 weeks     Outpatient Physical Therapy 1-3 times weekly for 8-10 weeks to include the following interventions: Cervical/Lumbar Traction, Electrical Stimulation IFC, Gait Training, Manual Therapy, Neuromuscular Re-ed, Patient Education, Self Care, Therapeutic Activities, Therapeutic Exercise and Dry Needling.     Cont POC emphasis on basic ADL performance, safety and LE mobility at this time. Will add additional appts to continue with endurance training.     Daljit Slaughter, PT, DPT

## 2022-10-11 ENCOUNTER — CLINICAL SUPPORT (OUTPATIENT)
Dept: REHABILITATION | Facility: HOSPITAL | Age: 82
End: 2022-10-11
Attending: INTERNAL MEDICINE
Payer: MEDICARE

## 2022-10-11 DIAGNOSIS — M62.81 MUSCLE WEAKNESS: Primary | ICD-10-CM

## 2022-10-11 PROCEDURE — 97110 THERAPEUTIC EXERCISES: CPT | Mod: PN | Performed by: GENERAL ACUTE CARE HOSPITAL

## 2022-10-11 NOTE — PROGRESS NOTES
[facility] OUTPATIENT THERAPY AND WELLNESS   Physical Therapy Treatment Note     Name: Radha Degroot  Clinic Number: [id number]    Therapy Diagnosis:   Encounter Diagnosis   Name Primary?    Muscle weakness Yes     Physician: Jana Holguin MD  Physician Orders: PT Eval and Treat  Medical Diagnosis from Referral: L femur fracture     Evaluation Date: 8/17/2022  Authorization Period Expiration: 10/26/2022-10 weeks  Plan of Care Expiration: 10/26/2022-10 weeks  Progress Note Due: at 10th visit or when applicable  Visit # / Visits authorized: 10 / 20 (1 / 1 Eval)    FOTO:   1  8/17/2022  UPPER LEG   2       3          Precautions: Falls   Visit Date: 10/11/2022    Time In: 2:25 PM (patient arrived late)  Time Out: 3:02 PM  Total Billable Time: 31 minutes    PTA Visit #: 0 / 5   SUBJECTIVE   Pt returns to OP PT reporting: she has been walking around her home with her rolling walker.    Pain: 0/10  Location: left upper legs    Response to previous treatment: no soreness or increase in symptoms   Functional change: no functional change at this time - in progress  She was compliant with home exercise program.    OBJECTIVE     Objective Measures updated at progress report unless specified.     Treatment     Radha received following skilled interventions listed below:    PT Intervention Parameters Time   Therapeutic Exercise to develop strength, endurance, ROM, flexibility, posture and core stabilization Standing heel raise 3x10-upper   Standing hip abduction 2x10 bilateral   Hip add squeeze x30  5# LAQ 3x10 bilateral  5# hip marches 3x10 bilateral   Straight leg raise 3x10 bilateral 0# 25 minutes     Gait training to improve functional mobility and safety Gait training in clinic with RW with stand by assist 6 minutes        Patient Education and Home Exercises   Home Exercises Provided and Patient Education Provided   Patient was educated on the role of PT, POC, treatment plan, discharge goals, HEP.  Patient educated on  biomechanical justification for therapeutic exercise and importance of compliance with HEP in order to improve overall impairments and QOL   Patient was educated on all the above exercise prior/during/after for proper posture, positioning, and execution for safe performance with home exercise program.       Written Home Exercises Provided:   Patient instructed to cont prior HEP.   Exercises were reviewed and Radha was able to demonstrate them prior to the end of the session.    Radha demonstrated fair  understanding of the education provided.   See EMR under Patient Instructions for exercises provided during therapy sessions    ASSESSMENT     Increased resistance with marches and SAQs today. Patient is easily fatigued with LE exercises limiting gait training and standing exercise tolerance today.     Radha Is progressing well towards her goals.   Pt prognosis is Fair / Good.    Pt will continue to benefit from skilled outpatient physical therapy to address the deficits listed in the problem list box on initial evaluation, provide pt/family education and to maximize pt's level of independence in the home and community environment.   Pt's spiritual, cultural and educational needs considered and pt agreeable to plan of care and goals.  Anticipated barriers to physical therapy: Lives home alone, age, transportation, Unable to drive    Goals:    Short Term Goals: 7/14/2022-4 weeks Status Date Met   1. Patient to be independent with Home Exercise Program performance to impact knowledge of condition  [] Met  [] Not Met  [x] Progressing     2. Pt to perform TUG 20s or less with RW to display improved mobility safety [] Met  [] Not Met  [x] Progressing     3. Pt to ascend/descend x2 steps with UE assistance to impact curb and community navigation   [] Met  [] Not Met  [x] Progressing     4. Pt to display L knee extension to 0 degrees to impact heel strike during gait [] Met  [] Not Met  [x] Progressing          Long Term  Goal: 10/26/2022-10 weeks Status Date Met   1. Pt to ambulate Community distances with least restrictive AD, ascend/descend x5 steps with handrail and get in/out of car without assistance to impact current status, health and quality of life [] Met  [] Not Met  [x] Progressing        PLAN   Plan of care Certification: 8/17/2022 to 10/26/2022-10 weeks     Outpatient Physical Therapy 1-3 times weekly for 8-10 weeks to include the following interventions: Cervical/Lumbar Traction, Electrical Stimulation IFC, Gait Training, Manual Therapy, Neuromuscular Re-ed, Patient Education, Self Care, Therapeutic Activities, Therapeutic Exercise and Dry Needling.     Cont POC emphasis on basic ADL performance, safety and LE mobility at this time. Will add additional appts to continue with endurance training.     Maria Fernanda Mota, PT, DPT

## 2022-10-13 ENCOUNTER — CLINICAL SUPPORT (OUTPATIENT)
Dept: REHABILITATION | Facility: HOSPITAL | Age: 82
End: 2022-10-13
Attending: INTERNAL MEDICINE
Payer: MEDICARE

## 2022-10-13 DIAGNOSIS — M62.81 MUSCLE WEAKNESS: Primary | ICD-10-CM

## 2022-10-13 PROCEDURE — 97110 THERAPEUTIC EXERCISES: CPT | Mod: PN | Performed by: GENERAL ACUTE CARE HOSPITAL

## 2022-10-13 PROCEDURE — 97116 GAIT TRAINING THERAPY: CPT | Mod: PN | Performed by: GENERAL ACUTE CARE HOSPITAL

## 2022-10-13 NOTE — PROGRESS NOTES
[facility]   Physical Therapy Treatment Note     Name: Radha Degroot  Clinic Number: [id number]    Therapy Diagnosis:   Encounter Diagnosis   Name Primary?    Muscle weakness Yes     Physician: Jana Holguin MD  Physician Orders: PT Eval and Treat  Medical Diagnosis from Referral: L femur fracture     Evaluation Date: 8/17/2022  Authorization Period Expiration: 10/26/2022-10 weeks  Plan of Care Expiration: 10/26/2022-10 weeks  Progress Note Due: at 10th visit or when applicable  Visit # / Visits authorized: 10 / 20 (1 / 1 Eval)    FOTO:   1  8/17/2022  UPPER LEG   2       3          Precautions: Falls   Visit Date: 10/13/2022    Time In: 1415  Time Out: 1505  Total Billable Time: 45minutes    PTA Visit #: 0 / 5   SUBJECTIVE   Pt returns to OP PT reporting: she has been walking around her home with her rolling walker.    Pain: 0/10  Location: left upper legs    Response to previous treatment: no soreness or increase in symptoms   Functional change: no functional change at this time - in progress  She was compliant with home exercise program.    OBJECTIVE     Objective Measures updated at progress report unless specified.     Treatment     Radha received following skilled interventions listed below:    PT Intervention Parameters Time   Therapeutic Exercise to develop strength, endurance, ROM, flexibility, posture and core stabilization Sit>stand 3x5  Standing hip abduction 4x5 4# bilateral  Standing marches 4x5 4# bilateral  Standing heel raises 3x10 25 minutes     Gait training to improve functional mobility and safety Gait training in clinic with quad cane with stand by assist 20 minutes        Patient Education and Home Exercises   Home Exercises Provided and Patient Education Provided   Patient was educated on the role of PT, POC, treatment plan, discharge goals, HEP.  Patient educated on biomechanical justification for therapeutic exercise and importance of compliance with HEP in  order to improve overall impairments and QOL   Patient was educated on all the above exercise prior/during/after for proper posture, positioning, and execution for safe performance with home exercise program.       Written Home Exercises Provided:   Patient instructed to cont prior HEP.   Exercises were reviewed and Radha was able to demonstrate them prior to the end of the session.    Radha demonstrated fair  understanding of the education provided.   See EMR under Patient Instructions for exercises provided during therapy sessions    ASSESSMENT     Increased standing exercises today. Patient performs well. At end of session patient reports fatigue and dizziness. She checks her sugar it is at 55. Exercises is stopped and patient's daughter provides patient with something to eat to raise her blood sugar levels.    Radha Is progressing well towards her goals.   Pt prognosis is Fair / Good.    Pt will continue to benefit from skilled outpatient physical therapy to address the deficits listed in the problem list box on initial evaluation, provide pt/family education and to maximize pt's level of independence in the home and community environment.   Pt's spiritual, cultural and educational needs considered and pt agreeable to plan of care and goals.  Anticipated barriers to physical therapy: Lives home alone, age, transportation, Unable to drive    Goals:    Short Term Goals: 7/14/2022-4 weeks Status Date Met   1. Patient to be independent with Home Exercise Program performance to impact knowledge of condition  [] Met  [] Not Met  [x] Progressing     2. Pt to perform TUG 20s or less with RW to display improved mobility safety [] Met  [] Not Met  [x] Progressing     3. Pt to ascend/descend x2 steps with UE assistance to impact curb and community navigation   [] Met  [] Not Met  [x] Progressing     4. Pt to display L knee extension to 0 degrees to impact heel strike during gait [] Met  [] Not Met  [x] Progressing           Long Term Goal: 10/26/2022-10 weeks Status Date Met   1. Pt to ambulate Community distances with least restrictive AD, ascend/descend x5 steps with handrail and get in/out of car without assistance to impact current status, health and quality of life [] Met  [] Not Met  [x] Progressing        PLAN   Plan of care Certification: 8/17/2022 to 10/26/2022-10 weeks     Outpatient Physical Therapy 1-3 times weekly for 8-10 weeks to include the following interventions: Cervical/Lumbar Traction, Electrical Stimulation IFC, Gait Training, Manual Therapy, Neuromuscular Re-ed, Patient Education, Self Care, Therapeutic Activities, Therapeutic Exercise and Dry Needling.     Cont POC emphasis on basic ADL performance, safety and LE mobility at this time.    Maria Fernanda Mota, PT, DPT

## 2022-10-17 NOTE — PROGRESS NOTES
[facility]   Physical Therapy Treatment Note     Name: Radha Degroot  Clinic Number: [id number]    Therapy Diagnosis:   Encounter Diagnosis   Name Primary?    Muscle weakness Yes       Physician: Jana Holguin MD  Physician Orders: PT Eval and Treat  Medical Diagnosis from Referral: L femur fracture     Evaluation Date: 8/17/2022  Authorization Period Expiration: 10/26/2022-10 weeks  Plan of Care Expiration: 10/26/2022-10 weeks  Progress Note Due: at 10th visit or when applicable  Visit # / Visits authorized: 13 / 20 (1 / 1 Eval)    FOTO:   1  8/17/2022  UPPER LEG   2       3          Precautions: Falls   Visit Date: 10/18/2022    Time In: 307 pm  Time Out: 345 pm  Total Billable Time: 38 minutes    PTA Visit #: 1 / 5   SUBJECTIVE   Pt returns to OP PT reporting: she just having regular aches and pains.    Pain: 0/10  Location: left upper legs    Response to previous treatment: no soreness or increase in symptoms   Functional change: no functional change at this time - in progress  She was compliant with home exercise program.    OBJECTIVE     Objective Measures updated at progress report unless specified.     Treatment     Radha received following skilled interventions listed below:    PT Intervention Parameters Time   Therapeutic Exercise to develop strength, endurance, ROM, flexibility, posture and core stabilization Sit>stand 3x5  Seated hip adduction with pillow x15  Standing hip abduction 4x5 4# bilateral  Standing marches 4x5 4# bilateral  Standing heel raises 3x10 28 minutes     Gait training to improve functional mobility and safety Gait training in clinic with quad cane with stand by assist 10 minutes        Patient Education and Home Exercises   Home Exercises Provided and Patient Education Provided   Patient was educated on the role of PT, POC, treatment plan, discharge goals, HEP.  Patient educated on biomechanical justification for therapeutic exercise and importance of  compliance with HEP in order to improve overall impairments and QOL   Patient was educated on all the above exercise prior/during/after for proper posture, positioning, and execution for safe performance with home exercise program.       Written Home Exercises Provided:   Patient instructed to cont prior HEP.   Exercises were reviewed and Radha was able to demonstrate them prior to the end of the session.    Radha demonstrated fair  understanding of the education provided.   See EMR under Patient Instructions for exercises provided during therapy sessions    ASSESSMENT     Patient with fatigue throughout session requiring seated rest breaks. She was able to complete all prescribed therex and gait training with no increase in pain noted.     Radha Is progressing well towards her goals.   Pt prognosis is Fair / Good.    Pt will continue to benefit from skilled outpatient physical therapy to address the deficits listed in the problem list box on initial evaluation, provide pt/family education and to maximize pt's level of independence in the home and community environment.   Pt's spiritual, cultural and educational needs considered and pt agreeable to plan of care and goals.  Anticipated barriers to physical therapy: Lives home alone, age, transportation, Unable to drive    Goals:    Short Term Goals: 7/14/2022-4 weeks Status Date Met   1. Patient to be independent with Home Exercise Program performance to impact knowledge of condition  [] Met  [] Not Met  [x] Progressing     2. Pt to perform TUG 20s or less with RW to display improved mobility safety [] Met  [] Not Met  [x] Progressing     3. Pt to ascend/descend x2 steps with UE assistance to impact curb and community navigation   [] Met  [] Not Met  [x] Progressing     4. Pt to display L knee extension to 0 degrees to impact heel strike during gait [] Met  [] Not Met  [x] Progressing          Long Term Goal: 10/26/2022-10 weeks Status Date Met   1. Pt to ambulate  Community distances with least restrictive AD, ascend/descend x5 steps with handrail and get in/out of car without assistance to impact current status, health and quality of life [] Met  [] Not Met  [x] Progressing        PLAN   Plan of care Certification: 8/17/2022 to 10/26/2022-10 weeks     Outpatient Physical Therapy 1-3 times weekly for 8-10 weeks to include the following interventions: Cervical/Lumbar Traction, Electrical Stimulation IFC, Gait Training, Manual Therapy, Neuromuscular Re-ed, Patient Education, Self Care, Therapeutic Activities, Therapeutic Exercise and Dry Needling.     Cont POC emphasis on basic ADL performance, safety and LE mobility at this time.    Thien Billings

## 2022-10-18 ENCOUNTER — CLINICAL SUPPORT (OUTPATIENT)
Dept: REHABILITATION | Facility: HOSPITAL | Age: 82
End: 2022-10-18
Attending: INTERNAL MEDICINE
Payer: MEDICARE

## 2022-10-18 DIAGNOSIS — M62.81 MUSCLE WEAKNESS: Primary | ICD-10-CM

## 2022-10-18 PROCEDURE — 97110 THERAPEUTIC EXERCISES: CPT | Mod: PN,CQ

## 2022-10-18 PROCEDURE — 97116 GAIT TRAINING THERAPY: CPT | Mod: PN,CQ

## 2022-10-19 NOTE — PROGRESS NOTES
[facility]   Physical Therapy Treatment Note     Name: Radha Degroot  Clinic Number: [id number]    Therapy Diagnosis:   Encounter Diagnosis   Name Primary?    Muscle weakness Yes       Physician: Jana Holguin MD  Physician Orders: PT Eval and Treat  Medical Diagnosis from Referral: L femur fracture     Evaluation Date: 8/17/2022  Authorization Period Expiration: 10/26/2022-10 weeks  Plan of Care Expiration: 10/26/2022-10 weeks  Progress Note Due: at 10th visit or when applicable  Visit # / Visits authorized: 14 / 20 (1 / 1 Eval)    FOTO:   1  8/17/2022  UPPER LEG   2       3          Precautions: Falls   Visit Date: 10/20/2022    Time In: 305 pm  Time Out: 345 pm  Total Billable Time: 40 minutes    PTA Visit #: 2 / 5   SUBJECTIVE   Pt returns to OP PT reporting: she is just having trouble getting in and out of her sons truck. Her legs are feeling a little achy today    Pain: 4/10  Location: left upper legs    Response to previous treatment: no soreness or increase in symptoms   Functional change: no functional change at this time - in progress  She was compliant with home exercise program.    OBJECTIVE     Objective Measures updated at progress report unless specified.     Treatment     Radha received following skilled interventions listed below:    PT Intervention Parameters Time   Therapeutic Exercise to develop strength, endurance, ROM, flexibility, posture and core stabilization Bridges 2 x10  Hook lying hip abduction RTB 2 x10  SLR 2 x10 ea  Seated marching 4# 2 x10 ea  LAQ 4# 2 x10  HS curls RTB 2 x10  Seated hamstring stretch 10s x10 ea  Seated heel slides 2 x10 ea  Gait training with quad cane x10 ft - working on decreased speed 40 minutes     Patient Education and Home Exercises   Home Exercises Provided and Patient Education Provided   Patient was educated on the role of PT, POC, treatment plan, discharge goals, HEP.  Patient educated on biomechanical justification for  therapeutic exercise and importance of compliance with HEP in order to improve overall impairments and QOL   Patient was educated on all the above exercise prior/during/after for proper posture, positioning, and execution for safe performance with home exercise program.       Written Home Exercises Provided:   Patient instructed to cont prior HEP.   Exercises were reviewed and Radha was able to demonstrate them prior to the end of the session.    Radha demonstrated fair  understanding of the education provided.   See EMR under Patient Instructions for exercises provided during therapy sessions    ASSESSMENT     Patient presented to therapy this date with complaints of knee pain, which she believes happened when getting into her sons truck. Focus of today's session was placed on supine and seated strengthening exercises. She attempted gait training; however, her knee started to give out on her and she requested to stop. She was encouraged to bring her cane in at next session if she has it. Will attempt to resume gait training at next session.    Radha Is progressing well towards her goals.   Pt prognosis is Fair / Good.    Pt will continue to benefit from skilled outpatient physical therapy to address the deficits listed in the problem list box on initial evaluation, provide pt/family education and to maximize pt's level of independence in the home and community environment.   Pt's spiritual, cultural and educational needs considered and pt agreeable to plan of care and goals.  Anticipated barriers to physical therapy: Lives home alone, age, transportation, Unable to drive    Goals:    Short Term Goals: 7/14/2022-4 weeks Status Date Met   1. Patient to be independent with Home Exercise Program performance to impact knowledge of condition  [] Met  [] Not Met  [x] Progressing     2. Pt to perform TUG 20s or less with RW to display improved mobility safety [] Met  [] Not Met  [x] Progressing     3. Pt to  ascend/descend x2 steps with UE assistance to impact curb and community navigation   [] Met  [] Not Met  [x] Progressing     4. Pt to display L knee extension to 0 degrees to impact heel strike during gait [] Met  [] Not Met  [x] Progressing          Long Term Goal: 10/26/2022-10 weeks Status Date Met   1. Pt to ambulate Community distances with least restrictive AD, ascend/descend x5 steps with handrail and get in/out of car without assistance to impact current status, health and quality of life [] Met  [] Not Met  [x] Progressing        PLAN   Plan of care Certification: 8/17/2022 to 10/26/2022-10 weeks     Outpatient Physical Therapy 1-3 times weekly for 8-10 weeks to include the following interventions: Cervical/Lumbar Traction, Electrical Stimulation IFC, Gait Training, Manual Therapy, Neuromuscular Re-ed, Patient Education, Self Care, Therapeutic Activities, Therapeutic Exercise and Dry Needling.     Cont POC emphasis on basic ADL performance, safety and LE mobility at this time.    Thien Billings

## 2022-10-20 ENCOUNTER — CLINICAL SUPPORT (OUTPATIENT)
Dept: REHABILITATION | Facility: HOSPITAL | Age: 82
End: 2022-10-20
Attending: INTERNAL MEDICINE
Payer: MEDICARE

## 2022-10-20 DIAGNOSIS — M62.81 MUSCLE WEAKNESS: Primary | ICD-10-CM

## 2022-10-20 PROCEDURE — 97110 THERAPEUTIC EXERCISES: CPT | Mod: PN,CQ

## 2022-11-08 ENCOUNTER — CLINICAL SUPPORT (OUTPATIENT)
Dept: REHABILITATION | Facility: HOSPITAL | Age: 82
End: 2022-11-08
Attending: INTERNAL MEDICINE
Payer: MEDICARE

## 2022-11-08 DIAGNOSIS — M62.81 MUSCLE WEAKNESS: Primary | ICD-10-CM

## 2022-11-08 PROCEDURE — 97530 THERAPEUTIC ACTIVITIES: CPT | Mod: PN | Performed by: GENERAL ACUTE CARE HOSPITAL

## 2022-11-08 PROCEDURE — 97110 THERAPEUTIC EXERCISES: CPT | Mod: PN | Performed by: GENERAL ACUTE CARE HOSPITAL

## 2022-11-08 PROCEDURE — 97116 GAIT TRAINING THERAPY: CPT | Mod: PN | Performed by: GENERAL ACUTE CARE HOSPITAL

## 2022-11-08 NOTE — PROGRESS NOTES
[facility]   Physical Therapy Reassessment & Treatment Note     Name: Radha Degroot  Clinic Number: [id number]    Therapy Diagnosis:   Encounter Diagnosis   Name Primary?    Muscle weakness Yes       Physician: Jana Holguin MD  Physician Orders: PT Eval and Treat  Medical Diagnosis from Referral: L femur fracture     Evaluation Date: 2022  Authorization Period Expiration: 10/26/2022-10 weeks  Plan of Care Expiration: 22 new POC end date   Progress Note Due: 22  Visit # / Visits authorized: 15 / 20 ( Eval)    FOTO:   1  2022  UPPER LEG   2  2022 UPPER LEG    3          Precautions: Falls   Visit Date: 2022    Time In: 1250  Time Out: 1330  Total Billable Time: 40 minutes    PTA Visit #: 0 / 5   SUBJECTIVE   Pt returns to OP PT reporting: She got a quad cane and brought it today for use. She continues to report weakness in lower extremities and today back pain.     Pain: 4/10  Location: left upper legs    Response to previous treatment: no soreness or increase in symptoms   Functional change: no functional change at this time - in progress  She was compliant with home exercise program.    OBJECTIVE     Objective Measures updated at progress report unless specified.     Upper Leg FOTO: 47%  Gait: rolling walker  TUs rolling walker  6MWT: 200ft (1 seated rest break, rolling walker)    Treatment     Radha received following skilled interventions listed below:    PT Intervention Parameters Time   Therapeutic Exercise to develop strength, endurance, ROM, flexibility, posture and core stabilization Standing hip abduction Red Theraband 3x10 bilateral  Standing hip ext 3x10 bilateral   Required rest breaks  20 minutes     Therapeutic Activities to improve functional performance Sit> stand x10  Cane fitting / measurement/ assessment & education  8 minutes   Gait training to improve functional mobility and safety Small base quad cane: poor stabilization,  decreased confidence  6 minute walk test: rolling walker   Required several rest breaks  16 minutes          Patient Education and Home Exercises   Home Exercises Provided and Patient Education Provided   Patient was educated on the role of PT, POC, treatment plan, discharge goals, HEP.  Patient educated on biomechanical justification for therapeutic exercise and importance of compliance with HEP in order to improve overall impairments and QOL   Patient was educated on all the above exercise prior/during/after for proper posture, positioning, and execution for safe performance with home exercise program.       Written Home Exercises Provided:   Patient instructed to cont prior HEP.   Exercises were reviewed and Radha was able to demonstrate them prior to the end of the session.    Radha demonstrated fair  understanding of the education provided.   See EMR under Patient Instructions for exercises provided during therapy sessions    ASSESSMENT   Patient is making very slow functional progress. She is unable to ambulate safely on her own small base quad cane. She has not met previously established goals. She continues to display poor tolerance/endurance during treatment sessions requiring several rest breaks throughout. She does not have assistance at home to reinforce her therapy. She continues to use the wheelchair as primary mode of mobility    Radha Is progressing well towards her goals.   Pt prognosis is Fair / Good.    Pt will continue to benefit from skilled outpatient physical therapy to address the deficits listed in the problem list box on initial evaluation, provide pt/family education and to maximize pt's level of independence in the home and community environment.   Pt's spiritual, cultural and educational needs considered and pt agreeable to plan of care and goals.  Anticipated barriers to physical therapy: Lives home alone, age, transportation, Unable to drive    Goals:    Short Term Goals:  7/14/2022-4 weeks Status Date Met   1. Patient to be independent with Home Exercise Program performance to impact knowledge of condition  [x] Met  [] Not Met  [] Progressing  11/8/22   2. Pt to perform TUG 20s or less with RW to display improved mobility safety [] Met  [x] Not Met  [x] Progressing  11/8/22   3. Pt to ascend/descend x2 steps with UE assistance to impact curb and community navigation   [x] Met  [] Not Met  [] Progressing  11/8/22   4. Pt to display L knee extension to 0 degrees to impact heel strike during gait [] Met  [x] Not Met  [x] Progressing  11/8/22        Long Term Goal: 10/26/2022-10 weeks Status Date Met   1. Pt to ambulate Community distances with least restrictive AD, ascend/descend x5 steps with handrail and get in/out of car without assistance to impact current status, health and quality of life [] Met  [] Not Met  [x] Progressing        PLAN   Plan of care Certification: 8/17/2022 to 12/20/2022-UPDATED POC     Outpatient Physical Therapy 1-3 times weekly for 8-10 weeks to include the following interventions: Cervical/Lumbar Traction, Electrical Stimulation IFC, Gait Training, Manual Therapy, Neuromuscular Re-ed, Patient Education, Self Care, Therapeutic Activities, Therapeutic Exercise and Dry Needling.  Patient continues to display impairments with gait, mobility and functional task performance. Since she is unable to bring herself to physical therapist she has had to miss several appointments. She is recommended for continued skilled services to impact safety with navigation.    Maria Fernanda Mota

## 2022-11-15 ENCOUNTER — CLINICAL SUPPORT (OUTPATIENT)
Dept: REHABILITATION | Facility: HOSPITAL | Age: 82
End: 2022-11-15
Attending: INTERNAL MEDICINE
Payer: MEDICARE

## 2022-11-15 DIAGNOSIS — M62.81 MUSCLE WEAKNESS: Primary | ICD-10-CM

## 2022-11-15 PROCEDURE — 97116 GAIT TRAINING THERAPY: CPT | Mod: PN | Performed by: GENERAL ACUTE CARE HOSPITAL

## 2022-11-15 PROCEDURE — 97110 THERAPEUTIC EXERCISES: CPT | Mod: PN | Performed by: GENERAL ACUTE CARE HOSPITAL

## 2022-11-15 NOTE — PROGRESS NOTES
[facility]   Physical Therapy Treatment Note     Name: Radha Degroot  Clinic Number: [id number]    Therapy Diagnosis:   Encounter Diagnosis   Name Primary?    Muscle weakness Yes       Physician: Jana Holguin MD  Physician Orders: PT Eval and Treat  Medical Diagnosis from Referral: L femur fracture     Evaluation Date: 2022  Authorization Period Expiration: 10/26/2022-10 weeks  Plan of Care Expiration: 22 new POC end date   Progress Note Due: 22  Visit # / Visits authorized:  ( Eval)    FOTO:   1  2022  UPPER LEG   2  2022 UPPER LEG    3          Precautions: Falls   Visit Date: 11/15/2022    Time In: 1415  Time Out: 1330  Total Billable Time: 40 minutes    PTA Visit #: 0 / 5   SUBJECTIVE   Pt returns to OP PT reporting: Patient continues to ambulate well with rolling walker at this time. She is recommended to start using the rolling walker at home. We will continue progressed of quad cane gait training here in therapy.      Pain: 4/10  Location: left upper legs    Response to previous treatment: some fatigue  Functional change:improved confidence and mobility with rolling walker   She was compliant with home exercise program.    OBJECTIVE     Objective Measures updated at progress report unless specified.     Upper Leg FOTO: 47%  Gait: rolling walker  TUs rolling walker  6MWT: 200ft (1 seated rest break, rolling walker)    Treatment     Radha received following skilled interventions listed below:    PT Intervention Parameters Time   Therapeutic Exercise to develop strength, endurance, ROM, flexibility, posture and core stabilization Standing hip abduction 2x10 bilateral  Standing heel raies 2x10 standing mini squats 2x10  Hip add ball squeeze x50  Hip Red Theraband abduction x100  Straight leg raise 3x10 bilateral   Sit>stand x10 23 minutes     Gait training to improve functional mobility and safety Large base quad cane gait trianing around  clinic/obstacles stand by assist assist 2x50' cane  Rolling walker gait trianig mod I 2x50' 15 minutes          Patient Education and Home Exercises   Home Exercises Provided and Patient Education Provided   Patient was educated on the role of PT, POC, treatment plan, discharge goals, HEP.  Patient educated on biomechanical justification for therapeutic exercise and importance of compliance with HEP in order to improve overall impairments and QOL   Patient was educated on all the above exercise prior/during/after for proper posture, positioning, and execution for safe performance with home exercise program.       Written Home Exercises Provided:   Patient instructed to cont prior HEP.   Exercises were reviewed and Radha was able to demonstrate them prior to the end of the session.    Radha demonstrated fair  understanding of the education provided.   See EMR under Patient Instructions for exercises provided during therapy sessions    ASSESSMENT   Patient is making very slow functional progress. She is able to safely ambulate with mod I with her rolling walker within the clinic. Increased tolerance to standing exercises is noted today.     Radha Is progressing well towards her goals.   Pt prognosis is Fair / Good.    Pt will continue to benefit from skilled outpatient physical therapy to address the deficits listed in the problem list box on initial evaluation, provide pt/family education and to maximize pt's level of independence in the home and community environment.   Pt's spiritual, cultural and educational needs considered and pt agreeable to plan of care and goals.  Anticipated barriers to physical therapy: Lives home alone, age, transportation, Unable to drive    Goals:    Short Term Goals: 7/14/2022-4 weeks Status Date Met   1. Patient to be independent with Home Exercise Program performance to impact knowledge of condition  [x] Met  [] Not Met  [] Progressing  11/8/22   2. Pt to perform TUG 20s or less  with RW to display improved mobility safety [] Met  [x] Not Met  [x] Progressing  11/8/22   3. Pt to ascend/descend x2 steps with UE assistance to impact curb and community navigation   [x] Met  [] Not Met  [] Progressing  11/8/22   4. Pt to display L knee extension to 0 degrees to impact heel strike during gait [] Met  [x] Not Met  [x] Progressing  11/8/22        Long Term Goal: 10/26/2022-10 weeks Status Date Met   1. Pt to ambulate Community distances with least restrictive AD, ascend/descend x5 steps with handrail and get in/out of car without assistance to impact current status, health and quality of life [] Met  [] Not Met  [x] Progressing        PLAN   Plan of care Certification: 8/17/2022 to 12/20/2022-UPDATED POC     Outpatient Physical Therapy 1-3 times weekly for 8-10 weeks to include the following interventions: Cervical/Lumbar Traction, Electrical Stimulation IFC, Gait Training, Manual Therapy, Neuromuscular Re-ed, Patient Education, Self Care, Therapeutic Activities, Therapeutic Exercise and Dry Needling.    Continue with progression of standing exercise and gait training to impact activities of daily living performance.     Maria Fernanda Mota

## 2022-11-17 ENCOUNTER — CLINICAL SUPPORT (OUTPATIENT)
Dept: REHABILITATION | Facility: HOSPITAL | Age: 82
End: 2022-11-17
Attending: INTERNAL MEDICINE
Payer: MEDICARE

## 2022-11-17 DIAGNOSIS — M62.81 MUSCLE WEAKNESS: Primary | ICD-10-CM

## 2022-11-17 PROCEDURE — 97116 GAIT TRAINING THERAPY: CPT | Mod: PN | Performed by: GENERAL ACUTE CARE HOSPITAL

## 2022-11-17 PROCEDURE — 97110 THERAPEUTIC EXERCISES: CPT | Mod: PN | Performed by: GENERAL ACUTE CARE HOSPITAL

## 2022-11-17 NOTE — PROGRESS NOTES
[facility]   Physical Therapy Treatment Note     Name: Radha Degroot  Clinic Number: [id number]    Therapy Diagnosis:   Encounter Diagnosis   Name Primary?    Muscle weakness Yes       Physician: Jana Holguin MD  Physician Orders: PT Eval and Treat  Medical Diagnosis from Referral: L femur fracture     Evaluation Date: 2022  Authorization Period Expiration: 10/26/2022-10 weeks  Plan of Care Expiration: 22 new POC end date   Progress Note Due: 22  Visit # / Visits authorized:  ( Eval)    FOTO:   1  2022  UPPER LEG   2  2022 UPPER LEG    3          Precautions: Falls   Visit Date: 2022    Time In: 1415  Time Out: 1530  Total Billable Time: 40 minutes    PTA Visit #: 0 / 5   SUBJECTIVE   Pt returns to OP PT reporting: feeling very fatigued since she had a busy morning and just came from lunch out.    Pain: 4/10  Location: left upper legs    Response to previous treatment: some fatigue  Functional change:improved confidence and mobility with rolling walker   She was compliant with home exercise program.    OBJECTIVE     Objective Measures updated at progress report unless specified.     Upper Leg FOTO: 47%  Gait: rolling walker  TUs rolling walker  6MWT: 200ft (1 seated rest break, rolling walker)    Treatment     Radha received following skilled interventions listed below:    PT Intervention Parameters Time   Therapeutic Exercise to develop strength, endurance, ROM, flexibility, posture and core stabilization Seated hip add 5# squeeze 4x10  Seated Red Theraband marches 3x10 bilateral  Seated Red Theraband hip abduction clams 3x10  Stansing heel raises 3x10  Lateral stepping over 6in beam x10 each  Foam balance step-ups x15 bilateral 23 minutes     Gait training to improve functional mobility and safety Large base quad cane gait training 2x50' with visual surroundings scanning-stand by assist/CGA assist 10 minutes      Patient Education  and Home Exercises   Home Exercises Provided and Patient Education Provided   Patient was educated on the role of PT, POC, treatment plan, discharge goals, HEP.  Patient educated on biomechanical justification for therapeutic exercise and importance of compliance with HEP in order to improve overall impairments and QOL   Patient was educated on all the above exercise prior/during/after for proper posture, positioning, and execution for safe performance with home exercise program.       Written Home Exercises Provided:   Patient instructed to cont prior HEP.   Exercises were reviewed and Radha was able to demonstrate them prior to the end of the session.    Radha demonstrated fair  understanding of the education provided.   See EMR under Patient Instructions for exercises provided during therapy sessions    ASSESSMENT   Patient has low tolerance to treatment session today due to be fatigued from busy morning. She requires increased rest breaks throughout session today.     Radha Is progressing well towards her goals.   Pt prognosis is Fair / Good.    Pt will continue to benefit from skilled outpatient physical therapy to address the deficits listed in the problem list box on initial evaluation, provide pt/family education and to maximize pt's level of independence in the home and community environment.   Pt's spiritual, cultural and educational needs considered and pt agreeable to plan of care and goals.  Anticipated barriers to physical therapy: Lives home alone, age, transportation, Unable to drive    Goals:    Short Term Goals: 7/14/2022-4 weeks Status Date Met   1. Patient to be independent with Home Exercise Program performance to impact knowledge of condition  [x] Met  [] Not Met  [] Progressing  11/8/22   2. Pt to perform TUG 20s or less with RW to display improved mobility safety [] Met  [x] Not Met  [x] Progressing  11/8/22   3. Pt to ascend/descend x2 steps with UE assistance to impact curb and community  navigation   [x] Met  [] Not Met  [] Progressing  11/8/22   4. Pt to display L knee extension to 0 degrees to impact heel strike during gait [] Met  [x] Not Met  [x] Progressing  11/8/22        Long Term Goal: 10/26/2022-10 weeks Status Date Met   1. Pt to ambulate Community distances with least restrictive AD, ascend/descend x5 steps with handrail and get in/out of car without assistance to impact current status, health and quality of life [] Met  [] Not Met  [x] Progressing        PLAN   Plan of care Certification: 8/17/2022 to 12/20/2022-UPDATED POC     Outpatient Physical Therapy 1-3 times weekly for 8-10 weeks to include the following interventions: Cervical/Lumbar Traction, Electrical Stimulation IFC, Gait Training, Manual Therapy, Neuromuscular Re-ed, Patient Education, Self Care, Therapeutic Activities, Therapeutic Exercise and Dry Needling.    Continue with progression of standing exercise and gait training to impact activities of daily living performance.     Maria Fernanda Mota

## 2022-12-01 ENCOUNTER — OFFICE VISIT (OUTPATIENT)
Dept: NEPHROLOGY | Facility: CLINIC | Age: 82
End: 2022-12-01
Payer: MEDICARE

## 2022-12-01 ENCOUNTER — LAB VISIT (OUTPATIENT)
Dept: LAB | Facility: HOSPITAL | Age: 82
End: 2022-12-01
Attending: INTERNAL MEDICINE
Payer: MEDICARE

## 2022-12-01 VITALS
SYSTOLIC BLOOD PRESSURE: 122 MMHG | DIASTOLIC BLOOD PRESSURE: 68 MMHG | WEIGHT: 210 LBS | BODY MASS INDEX: 31.83 KG/M2 | HEART RATE: 57 BPM | HEIGHT: 68 IN | OXYGEN SATURATION: 95 %

## 2022-12-01 DIAGNOSIS — E11.22 TYPE 2 DIABETES MELLITUS WITH STAGE 4 CHRONIC KIDNEY DISEASE, WITH LONG-TERM CURRENT USE OF INSULIN: ICD-10-CM

## 2022-12-01 DIAGNOSIS — N25.81 SECONDARY RENAL HYPERPARATHYROIDISM: ICD-10-CM

## 2022-12-01 DIAGNOSIS — N18.4 CHRONIC KIDNEY DISEASE, STAGE IV (SEVERE): Primary | ICD-10-CM

## 2022-12-01 DIAGNOSIS — N18.4 TYPE 2 DIABETES MELLITUS WITH STAGE 4 CHRONIC KIDNEY DISEASE, WITH LONG-TERM CURRENT USE OF INSULIN: ICD-10-CM

## 2022-12-01 DIAGNOSIS — E66.2 CLASS 1 OBESITY WITH ALVEOLAR HYPOVENTILATION, SERIOUS COMORBIDITY, AND BODY MASS INDEX (BMI) OF 33.0 TO 33.9 IN ADULT: ICD-10-CM

## 2022-12-01 DIAGNOSIS — E55.9 VITAMIN D DEFICIENCY: ICD-10-CM

## 2022-12-01 DIAGNOSIS — Z79.4 TYPE 2 DIABETES MELLITUS WITH STAGE 4 CHRONIC KIDNEY DISEASE, WITH LONG-TERM CURRENT USE OF INSULIN: ICD-10-CM

## 2022-12-01 DIAGNOSIS — N18.4 CHRONIC KIDNEY DISEASE, STAGE IV (SEVERE): ICD-10-CM

## 2022-12-01 DIAGNOSIS — I87.2 VENOUS INSUFFICIENCY: ICD-10-CM

## 2022-12-01 DIAGNOSIS — D17.71 ANGIOMYOLIPOMA OF RIGHT KIDNEY: ICD-10-CM

## 2022-12-01 DIAGNOSIS — N28.1 ACQUIRED CYST OF KIDNEY: ICD-10-CM

## 2022-12-01 DIAGNOSIS — E55.9 VITAMIN D DEFICIENCY DISEASE: ICD-10-CM

## 2022-12-01 DIAGNOSIS — I50.32 CHRONIC DIASTOLIC CHF (CONGESTIVE HEART FAILURE): ICD-10-CM

## 2022-12-01 PROCEDURE — 80069 RENAL FUNCTION PANEL: CPT | Performed by: INTERNAL MEDICINE

## 2022-12-01 PROCEDURE — 84155 ASSAY OF PROTEIN SERUM: CPT | Performed by: INTERNAL MEDICINE

## 2022-12-01 PROCEDURE — 1100F PR PT FALLS ASSESS DOC 2+ FALLS/FALL W/INJURY/YR: ICD-10-PCS | Mod: HCNC,CPTII,S$GLB, | Performed by: INTERNAL MEDICINE

## 2022-12-01 PROCEDURE — 83970 ASSAY OF PARATHORMONE: CPT | Performed by: INTERNAL MEDICINE

## 2022-12-01 PROCEDURE — 83735 ASSAY OF MAGNESIUM: CPT | Performed by: INTERNAL MEDICINE

## 2022-12-01 PROCEDURE — 1126F AMNT PAIN NOTED NONE PRSNT: CPT | Mod: HCNC,CPTII,S$GLB, | Performed by: INTERNAL MEDICINE

## 2022-12-01 PROCEDURE — 85025 COMPLETE CBC W/AUTO DIFF WBC: CPT | Performed by: INTERNAL MEDICINE

## 2022-12-01 PROCEDURE — 99499 UNLISTED E&M SERVICE: CPT | Mod: S$GLB,,, | Performed by: INTERNAL MEDICINE

## 2022-12-01 PROCEDURE — 3288F FALL RISK ASSESSMENT DOCD: CPT | Mod: HCNC,CPTII,S$GLB, | Performed by: INTERNAL MEDICINE

## 2022-12-01 PROCEDURE — 3288F PR FALLS RISK ASSESSMENT DOCUMENTED: ICD-10-PCS | Mod: HCNC,CPTII,S$GLB, | Performed by: INTERNAL MEDICINE

## 2022-12-01 PROCEDURE — 3074F PR MOST RECENT SYSTOLIC BLOOD PRESSURE < 130 MM HG: ICD-10-PCS | Mod: HCNC,CPTII,S$GLB, | Performed by: INTERNAL MEDICINE

## 2022-12-01 PROCEDURE — 99215 PR OFFICE/OUTPT VISIT, EST, LEVL V, 40-54 MIN: ICD-10-PCS | Mod: HCNC,S$GLB,, | Performed by: INTERNAL MEDICINE

## 2022-12-01 PROCEDURE — 99499 RISK ADDL DX/OHS AUDIT: ICD-10-PCS | Mod: S$GLB,,, | Performed by: INTERNAL MEDICINE

## 2022-12-01 PROCEDURE — 84075 ASSAY ALKALINE PHOSPHATASE: CPT | Performed by: INTERNAL MEDICINE

## 2022-12-01 PROCEDURE — 1126F PR PAIN SEVERITY QUANTIFIED, NO PAIN PRESENT: ICD-10-PCS | Mod: HCNC,CPTII,S$GLB, | Performed by: INTERNAL MEDICINE

## 2022-12-01 PROCEDURE — 1100F PTFALLS ASSESS-DOCD GE2>/YR: CPT | Mod: HCNC,CPTII,S$GLB, | Performed by: INTERNAL MEDICINE

## 2022-12-01 PROCEDURE — 99999 PR PBB SHADOW E&M-EST. PATIENT-LVL IV: CPT | Mod: PBBFAC,HCNC,, | Performed by: INTERNAL MEDICINE

## 2022-12-01 PROCEDURE — 83036 HEMOGLOBIN GLYCOSYLATED A1C: CPT | Performed by: INTERNAL MEDICINE

## 2022-12-01 PROCEDURE — 82306 VITAMIN D 25 HYDROXY: CPT | Performed by: INTERNAL MEDICINE

## 2022-12-01 PROCEDURE — 3074F SYST BP LT 130 MM HG: CPT | Mod: HCNC,CPTII,S$GLB, | Performed by: INTERNAL MEDICINE

## 2022-12-01 PROCEDURE — 3078F DIAST BP <80 MM HG: CPT | Mod: HCNC,CPTII,S$GLB, | Performed by: INTERNAL MEDICINE

## 2022-12-01 PROCEDURE — 99215 OFFICE O/P EST HI 40 MIN: CPT | Mod: HCNC,S$GLB,, | Performed by: INTERNAL MEDICINE

## 2022-12-01 PROCEDURE — 3078F PR MOST RECENT DIASTOLIC BLOOD PRESSURE < 80 MM HG: ICD-10-PCS | Mod: HCNC,CPTII,S$GLB, | Performed by: INTERNAL MEDICINE

## 2022-12-01 PROCEDURE — 36415 COLL VENOUS BLD VENIPUNCTURE: CPT | Mod: PO | Performed by: INTERNAL MEDICINE

## 2022-12-01 PROCEDURE — 99999 PR PBB SHADOW E&M-EST. PATIENT-LVL IV: ICD-10-PCS | Mod: PBBFAC,HCNC,, | Performed by: INTERNAL MEDICINE

## 2022-12-01 RX ORDER — CHOLECALCIFEROL (VITAMIN D3) 25 MCG
1000 TABLET ORAL DAILY
COMMUNITY

## 2022-12-01 RX ORDER — MELATONIN 10 MG
CAPSULE ORAL
COMMUNITY

## 2022-12-01 NOTE — PROGRESS NOTES
Subjective:       Patient ID: Radha Degroot is a 82 y.o. White female who presents for follow-up evaluation of Chronic Kidney Disease    HPI    She is referred by her PCP for CKD, last seen by Dr. Mcmanus of Nephrology 4/2022  DM HTN afib on Eliquis   Staying with dtr in [place]  Stays hydrated, LE edema about he same  as before hospital stay, on daily Lasix   Less cheese, less constipation   Low sodium diet   No NSAIDs,Tylenol for back pain   Lives here now, not  [place]     Review of Systems   Constitutional:  Negative for activity change and appetite change.   HENT:  Negative for facial swelling.    Respiratory:  Positive for shortness of breath (RODRIGUEZ).    Cardiovascular:  Positive for leg swelling.   Genitourinary:  Positive for frequency. Negative for difficulty urinating.   Allergic/Immunologic: Positive for immunocompromised state (age and CKD and).   Psychiatric/Behavioral:  Negative for confusion and decreased concentration.      Objective:      Physical Exam  Vitals and nursing note reviewed.   Constitutional:       General: She is not in acute distress.     Appearance: She is not ill-appearing.   HENT:      Head: Atraumatic.   Cardiovascular:      Rate and Rhythm: Normal rate.   Pulmonary:      Effort: Pulmonary effort is normal. No respiratory distress.   Abdominal:      General: There is no distension.   Musculoskeletal:      Right lower leg: No edema.      Left lower leg: No edema.   Neurological:      Mental Status: She is alert and oriented to person, place, and time.   Psychiatric:         Mood and Affect: Mood normal.       Assessment:       1. Chronic kidney disease, stage IV (severe)    2. Vitamin D deficiency    3. Type 2 diabetes mellitus with stage 4 chronic kidney disease, with long-term current use of insulin    4. Venous insufficiency    5. Chronic diastolic CHF (congestive heart failure)    6. Acquired cyst of kidney    7. Angiomyolipoma of right kidney    8. Vitamin D deficiency disease     9. Secondary renal hyperparathyroidism    10. Class 1 obesity with alveolar hypoventilation, serious comorbidity, and body mass index (BMI) of 33.0 to 33.9 in adult          Plan:          CKD Stage 4 with baseline creatinine rnaging 2.2-2.8mg/dL  - recent DANDY in March 2022, peak Cr 4  - currently stable kidney function   - good BP control, good glycemic control, low sodium diet     Secondary Hyperparathyroidism  - most recent PTH at goal  - continue D supplement     HTN  - adequate control     RTC 4mo w labs  59 minutes of total time spent on the encounter, which includes face to face time and non-face to face time preparing to see the patient (eg, review of tests), Obtaining and/or reviewing separately obtained history, Documenting clinical information in the electronic or other health record, Independently interpreting results (not separately reported) and communicating results to the patient/family/caregiver, or Care coordination (not separately reported).

## 2022-12-02 LAB
25(OH)D3+25(OH)D2 SERPL-MCNC: 48 NG/ML (ref 30–96)
ALBUMIN SERPL BCP-MCNC: 4 G/DL (ref 3.5–5.2)
ALBUMIN SERPL BCP-MCNC: 4 G/DL (ref 3.5–5.2)
ALP SERPL-CCNC: 101 U/L (ref 55–135)
ALT SERPL W/O P-5'-P-CCNC: 13 U/L (ref 10–44)
ANION GAP SERPL CALC-SCNC: 14 MMOL/L (ref 8–16)
AST SERPL-CCNC: 17 U/L (ref 10–40)
BASOPHILS # BLD AUTO: 0.08 K/UL (ref 0–0.2)
BASOPHILS NFR BLD: 1 % (ref 0–1.9)
BILIRUB DIRECT SERPL-MCNC: 0.2 MG/DL (ref 0.1–0.3)
BILIRUB SERPL-MCNC: 0.6 MG/DL (ref 0.1–1)
BUN SERPL-MCNC: 44 MG/DL (ref 8–23)
CALCIUM SERPL-MCNC: 9.5 MG/DL (ref 8.7–10.5)
CHLORIDE SERPL-SCNC: 108 MMOL/L (ref 95–110)
CO2 SERPL-SCNC: 22 MMOL/L (ref 23–29)
CREAT SERPL-MCNC: 2.4 MG/DL (ref 0.5–1.4)
DIFFERENTIAL METHOD: ABNORMAL
EOSINOPHIL # BLD AUTO: 0.2 K/UL (ref 0–0.5)
EOSINOPHIL NFR BLD: 2.2 % (ref 0–8)
ERYTHROCYTE [DISTWIDTH] IN BLOOD BY AUTOMATED COUNT: 15 % (ref 11.5–14.5)
EST. GFR  (NO RACE VARIABLE): 19.6 ML/MIN/1.73 M^2
ESTIMATED AVG GLUCOSE: 146 MG/DL (ref 68–131)
GLUCOSE SERPL-MCNC: 149 MG/DL (ref 70–110)
HBA1C MFR BLD: 6.7 % (ref 4–5.6)
HCT VFR BLD AUTO: 38.2 % (ref 37–48.5)
HGB BLD-MCNC: 12 G/DL (ref 12–16)
IMM GRANULOCYTES # BLD AUTO: 0.01 K/UL (ref 0–0.04)
IMM GRANULOCYTES NFR BLD AUTO: 0.1 % (ref 0–0.5)
LYMPHOCYTES # BLD AUTO: 1.5 K/UL (ref 1–4.8)
LYMPHOCYTES NFR BLD: 18.4 % (ref 18–48)
MAGNESIUM SERPL-MCNC: 2.3 MG/DL (ref 1.6–2.6)
MCH RBC QN AUTO: 28.6 PG (ref 27–31)
MCHC RBC AUTO-ENTMCNC: 31.4 G/DL (ref 32–36)
MCV RBC AUTO: 91 FL (ref 82–98)
MONOCYTES # BLD AUTO: 0.6 K/UL (ref 0.3–1)
MONOCYTES NFR BLD: 7.5 % (ref 4–15)
NEUTROPHILS # BLD AUTO: 5.9 K/UL (ref 1.8–7.7)
NEUTROPHILS NFR BLD: 70.8 % (ref 38–73)
NRBC BLD-RTO: 0 /100 WBC
PHOSPHATE SERPL-MCNC: 4.3 MG/DL (ref 2.7–4.5)
PLATELET # BLD AUTO: 166 K/UL (ref 150–450)
PMV BLD AUTO: 13.2 FL (ref 9.2–12.9)
POTASSIUM SERPL-SCNC: 4.3 MMOL/L (ref 3.5–5.1)
PROT SERPL-MCNC: 7.3 G/DL (ref 6–8.4)
PTH-INTACT SERPL-MCNC: 94 PG/ML (ref 9–77)
RBC # BLD AUTO: 4.2 M/UL (ref 4–5.4)
SODIUM SERPL-SCNC: 144 MMOL/L (ref 136–145)
WBC # BLD AUTO: 8.28 K/UL (ref 3.9–12.7)

## 2022-12-05 ENCOUNTER — PATIENT MESSAGE (OUTPATIENT)
Dept: NEPHROLOGY | Facility: CLINIC | Age: 82
End: 2022-12-05
Payer: MEDICARE

## 2022-12-06 DIAGNOSIS — K20.90 ESOPHAGITIS: Primary | ICD-10-CM

## 2022-12-12 ENCOUNTER — CLINICAL SUPPORT (OUTPATIENT)
Dept: REHABILITATION | Facility: HOSPITAL | Age: 82
End: 2022-12-12
Attending: INTERNAL MEDICINE
Payer: MEDICARE

## 2022-12-12 DIAGNOSIS — M62.81 MUSCLE WEAKNESS: Primary | ICD-10-CM

## 2022-12-12 PROCEDURE — 97110 THERAPEUTIC EXERCISES: CPT | Mod: HCNC,PN | Performed by: GENERAL ACUTE CARE HOSPITAL

## 2022-12-12 PROCEDURE — 97116 GAIT TRAINING THERAPY: CPT | Mod: HCNC,PN | Performed by: GENERAL ACUTE CARE HOSPITAL

## 2022-12-12 NOTE — PROGRESS NOTES
[facility]   Physical Therapy Reassessment & Treatment Note     Name: Radha Degroot  Clinic Number: [id number]    Therapy Diagnosis:   Encounter Diagnosis   Name Primary?    Muscle weakness Yes     Physician: Jana Holguin MD  Physician Orders: PT Eval and Treat  Medical Diagnosis from Referral: L femur fracture     Evaluation Date: 8/17/2022  Authorization Period Expiration: 10/26/2022-10 weeks  Plan of Care Expiration: 12/20/22 new POC end date   Progress Note Due: 1/7/23  Visit # / Visits authorized: 18 / 20 (1 / 1 Eval)    FOTO:   1  8/17/2022  UPPER LEG   2  11/8/2022 UPPER LEG    3  12/12/2022 UPPER LEG      Precautions: Falls   Visit Date: 12/12/2022    Time In: 0920  Time Out: 1000  Total Billable Time: 40 minutes    PTA Visit #: 0 / 5   SUBJECTIVE   Pt returns to OP PT reporting: feeling very fatigued since she had a busy morning and just came from lunch out.    Pain: 4/10  Location: left upper legs    Response to previous treatment: some fatigue  Functional change:improved confidence and mobility with rolling walker   She was compliant with home exercise program.    OBJECTIVE     Objective Measures updated at progress report unless specified.     Upper Leg FOTO: 44%  Gait: rolling walker at home / wide case quad cane at physical therapist clinic 2x100'  Chair Rise Test: 21.5s    Treatment     Radha received following skilled interventions listed below:    PT Intervention Parameters Time   Therapeutic Exercise to develop strength, endurance, ROM, flexibility, posture and core stabilization Chair Rise Test   Reassessment  3x10 5# LAQ bilateral  10 minutes     Gait training to improve functional mobility and safety Large base quad cane gait training 2x100' with visual surroundings scanning-stand by assist/CGA assist 23 minutes      Patient Education and Home Exercises   Home Exercises Provided and Patient Education Provided   Patient was educated on the role of PT, POC,  treatment plan, discharge goals, HEP.  Patient educated on biomechanical justification for therapeutic exercise and importance of compliance with HEP in order to improve overall impairments and QOL   Patient was educated on all the above exercise prior/during/after for proper posture, positioning, and execution for safe performance with home exercise program.       Written Home Exercises Provided:   Patient instructed to cont prior HEP.   Exercises were reviewed and Radha was able to demonstrate them prior to the end of the session.    Radha demonstrated fair  understanding of the education provided.   See EMR under Patient Instructions for exercises provided during therapy sessions    ASSESSMENT   Patient has low tolerance to treatment session today due to be fatigued from busy morning. She requires increased rest breaks throughout session today.     Radha Is progressing well towards her goals.   Pt prognosis is Fair / Good.    Pt will continue to benefit from skilled outpatient physical therapy to address the deficits listed in the problem list box on initial evaluation, provide pt/family education and to maximize pt's level of independence in the home and community environment.   Pt's spiritual, cultural and educational needs considered and pt agreeable to plan of care and goals.  Anticipated barriers to physical therapy: Lives home alone, age, transportation, Unable to drive    Goals:    Short Term Goals: 7/14/2022-4 weeks Status Date Met   1. Patient to be independent with Home Exercise Program performance to impact knowledge of condition  [x] Met  [] Not Met  [] Progressing  11/8/22   2. Pt to perform TUG 20s or less with RW to display improved mobility safety [] Met  [x] Not Met  [x] Progressing     3. Pt to ascend/descend x2 steps with UE assistance to impact curb and community navigation   [x] Met  [] Not Met  [] Progressing  11/8/22   4. Pt to display L knee extension to 0 degrees to impact heel strike  during gait [] Met  [x] Not Met  [x] Progressing          Long Term Goal: 10/26/2022-10 weeks Status Date Met   1. Pt to ambulate Community distances with least restrictive AD, ascend/descend x5 steps with handrail and get in/out of car without assistance to impact current status, health and quality of life [] Met  [] Not Met  [x] Progressing        PLAN   Plan of care Certification: 8/17/2022 to 12/20/2022-UPDATED POC     Outpatient Physical Therapy 1-3 times weekly for 8-10 weeks to include the following interventions: Cervical/Lumbar Traction, Electrical Stimulation IFC, Gait Training, Manual Therapy, Neuromuscular Re-ed, Patient Education, Self Care, Therapeutic Activities, Therapeutic Exercise and Dry Needling.    Continue with progression of functional tasks including stair gait and gait with quad cane at this time.     Maria Fernanda Mota

## 2022-12-19 ENCOUNTER — CLINICAL SUPPORT (OUTPATIENT)
Dept: REHABILITATION | Facility: HOSPITAL | Age: 82
End: 2022-12-19
Attending: INTERNAL MEDICINE
Payer: MEDICARE

## 2022-12-19 DIAGNOSIS — M62.81 MUSCLE WEAKNESS: Primary | ICD-10-CM

## 2022-12-19 PROCEDURE — 97110 THERAPEUTIC EXERCISES: CPT | Mod: HCNC,PN | Performed by: GENERAL ACUTE CARE HOSPITAL

## 2022-12-19 PROCEDURE — 97116 GAIT TRAINING THERAPY: CPT | Mod: HCNC,PN | Performed by: GENERAL ACUTE CARE HOSPITAL

## 2022-12-19 NOTE — PROGRESS NOTES
[facility]   Physical Therapy Reassessment & Treatment Note     Name: Radha Degroot  Clinic Number: [id number]    Therapy Diagnosis:   Encounter Diagnosis   Name Primary?    Muscle weakness Yes     Physician: Jana Holguin MD  Physician Orders: PT Eval and Treat  Medical Diagnosis from Referral: L femur fracture     Evaluation Date: 8/17/2022  Authorization Period Expiration: 12/31/2022  Plan of Care Expiration: 12/20/22 new POC end date   Progress Note Due: 1/7/23  Visit # / Visits authorized: 19 / 20 (1 / 1 Eval)    FOTO:   1  8/17/2022  UPPER LEG   2  11/8/2022 UPPER LEG    3  12/12/2022 UPPER LEG      Precautions: Falls   Visit Date: 12/19/2022    Time In: 1330  Time Out: 1420  Total Billable Time: 45 minutes    PTA Visit #: 0 / 5   SUBJECTIVE   Pt returns to OP PT reporting: feeling good today. She is excited and motivated to practice gait training with use of the quad cane today.     Pain: 2/10  Location: left upper legs    Response to previous treatment: some fatigue  Functional change:improved confidence and mobility with rolling walker   She was compliant with home exercise program.    OBJECTIVE     Objective Measures updated at progress report unless specified.     Treatment   Radha received following skilled interventions listed below:    PT Intervention Parameters Time   Therapeutic Exercise to develop strength, endurance, ROM, flexibility, posture and core stabilization Seated hamstring curl Red Theraband x30 bilateral  Seated Red Theraband hip internal rotation x30 bilateral  Seated green theraband hip abduction x30 clamshells  10 minutes     Gait training to improve functional mobility and safety Standing marches with quad cane x2 trials to fatigue  X1 min standing tolerance with quad cane (x2 trials)  Large base quad cane gait training 2x100', 1x75' with visual surroundings scanning-stand by assist/CGA assist  Safety education & review with quad cane 30  minutes       Patient Education and Home Exercises   Home Exercises Provided and Patient Education Provided   Patient was educated on the role of PT, POC, treatment plan, discharge goals, HEP.  Patient educated on biomechanical justification for therapeutic exercise and importance of compliance with HEP in order to improve overall impairments and QOL   Patient was educated on all the above exercise prior/during/after for proper posture, positioning, and execution for safe performance with home exercise program.       Written Home Exercises Provided:   Patient instructed to cont prior HEP.   Exercises were reviewed and Radha was able to demonstrate them prior to the end of the session.    Radha demonstrated fair  understanding of the education provided.   See EMR under Patient Instructions for exercises provided during therapy sessions    ASSESSMENT   Patient continues to display fatigue with quad cane ambulation. Unable to walk further than 100ft during today's session without requiring a seated rest break. We are able to progress to a 3rd gait training set today. Plan to continue to progress distance with quad cane at each session.     Radha Is progressing well towards her goals.   Pt prognosis is Fair / Good.    Pt will continue to benefit from skilled outpatient physical therapy to address the deficits listed in the problem list box on initial evaluation, provide pt/family education and to maximize pt's level of independence in the home and community environment.   Pt's spiritual, cultural and educational needs considered and pt agreeable to plan of care and goals.  Anticipated barriers to physical therapy: Lives home alone, age, transportation, Unable to drive    Goals:    Short Term Goals: 7/14/2022-4 weeks Status Date Met   1. Patient to be independent with Home Exercise Program performance to impact knowledge of condition  [x] Met  [] Not Met  [] Progressing  11/8/22   2. Pt to perform TUG 20s or less with RW to  display improved mobility safety [] Met  [x] Not Met  [x] Progressing     3. Pt to ascend/descend x2 steps with UE assistance to impact curb and community navigation   [x] Met  [] Not Met  [] Progressing  11/8/22   4. Pt to display L knee extension to 0 degrees to impact heel strike during gait [] Met  [x] Not Met  [x] Progressing          Long Term Goal: 10/26/2022-10 weeks Status Date Met   1. Pt to ambulate Community distances with least restrictive AD, ascend/descend x5 steps with handrail and get in/out of car without assistance to impact current status, health and quality of life [] Met  [] Not Met  [x] Progressing        PLAN   Plan of care Certification: 8/17/2022 to 12/20/2022-UPDATED POC     Outpatient Physical Therapy 1-3 times weekly for 8-10 weeks to include the following interventions: Cervical/Lumbar Traction, Electrical Stimulation IFC, Gait Training, Manual Therapy, Neuromuscular Re-ed, Patient Education, Self Care, Therapeutic Activities, Therapeutic Exercise and Dry Needling.    Continue with progression of functional tasks including stair gait and gait with quad cane at this time.  Do plan to continue into the new year with physical therapy treatment.     Maria Fernanda Mota

## 2022-12-21 ENCOUNTER — CLINICAL SUPPORT (OUTPATIENT)
Dept: REHABILITATION | Facility: HOSPITAL | Age: 82
End: 2022-12-21
Attending: INTERNAL MEDICINE
Payer: MEDICARE

## 2022-12-21 DIAGNOSIS — M62.81 MUSCLE WEAKNESS: Primary | ICD-10-CM

## 2022-12-21 PROCEDURE — 97110 THERAPEUTIC EXERCISES: CPT | Mod: HCNC,PN | Performed by: GENERAL ACUTE CARE HOSPITAL

## 2022-12-21 PROCEDURE — 97116 GAIT TRAINING THERAPY: CPT | Mod: HCNC,PN | Performed by: GENERAL ACUTE CARE HOSPITAL

## 2022-12-21 NOTE — PROGRESS NOTES
[facility]   Physical Therapy Treatment Note     Name: Radha Degroot  Clinic Number: [id number]    Therapy Diagnosis:   Encounter Diagnosis   Name Primary?    Muscle weakness Yes     Physician: Jana Holguin MD  Physician Orders: PT Eval and Treat  Medical Diagnosis from Referral: L femur fracture     Evaluation Date: 8/17/2022  Authorization Period Expiration: 12/31/2022  Plan of Care Expiration: 12/20/22 new POC end date   Progress Note Due: 1/7/23  Visit # / Visits authorized: 20/20 (1/1 Eval)    FOTO:   1  8/17/2022  UPPER LEG   2  11/8/2022 UPPER LEG    3  12/12/2022 UPPER LEG      Precautions: Falls   Visit Date: 12/21/2022    Time In: 1332  Time Out: 1420  Total Billable Time: 40 minutes    PTA Visit #: 0 / 5   SUBJECTIVE   Pt returns to OP PT reporting: feeling good today. She is excited and motivated to practice gait training with use of the quad cane today.     Pain: 1/10  Location: left upper legs    Response to previous treatment: some fatigue  Functional change:improved confidence and mobility with rolling walker   She was compliant with home exercise program.    OBJECTIVE     Objective Measures updated at progress report unless specified.     SpO2= 98%  Heart Rate: 72bpm    Treatment   Radha received following skilled interventions listed below:    PT Intervention Parameters Time   Therapeutic Exercise to develop strength, endurance, ROM, flexibility, posture and core stabilization Stand pivot transfer without AD x3 trials  Sit>stand 3x5   Seated heel raises 7# x30 bilateral  Seated bicep curls 7# x30 both upper extremities  15 minutes     Gait training to improve functional mobility and safety Large base quad cane gait training x200ft-3 trials,stand by assist/CGA assist  Safety education & review with quad cane 25 minutes      Patient Education and Home Exercises   Home Exercises Provided and Patient Education Provided   Patient was educated on the role of PT, POC,  treatment plan, discharge goals, HEP.  Patient educated on biomechanical justification for therapeutic exercise and importance of compliance with HEP in order to improve overall impairments and QOL   Patient was educated on all the above exercise prior/during/after for proper posture, positioning, and execution for safe performance with home exercise program.       Written Home Exercises Provided:   Patient instructed to cont prior HEP.   Exercises were reviewed and Radha was able to demonstrate them prior to the end of the session.    Radha demonstrated fair  understanding of the education provided.   See EMR under Patient Instructions for exercises provided during therapy sessions    ASSESSMENT   Patient continues to display fatigue with quad cane ambulation. Unable to walk further than 65ft during today's session without requiring a seated rest break. We are able to progress to a 3rd gait training set today. Plan to continue to progress distance with quad cane at each session.     Radha Is progressing well towards her goals.   Pt prognosis is Fair / Good.    Pt will continue to benefit from skilled outpatient physical therapy to address the deficits listed in the problem list box on initial evaluation, provide pt/family education and to maximize pt's level of independence in the home and community environment.   Pt's spiritual, cultural and educational needs considered and pt agreeable to plan of care and goals.  Anticipated barriers to physical therapy: Lives home alone, age, transportation, Unable to drive    Goals:    Short Term Goals: 7/14/2022-4 weeks Status Date Met   1. Patient to be independent with Home Exercise Program performance to impact knowledge of condition  [x] Met  [] Not Met  [] Progressing  11/8/22   2. Pt to perform TUG 20s or less with RW to display improved mobility safety [] Met  [x] Not Met  [x] Progressing     3. Pt to ascend/descend x2 steps with UE assistance to impact curb and  community navigation   [x] Met  [] Not Met  [] Progressing  11/8/22   4. Pt to display L knee extension to 0 degrees to impact heel strike during gait [] Met  [x] Not Met  [x] Progressing          Long Term Goal: 10/26/2022-10 weeks Status Date Met   1. Pt to ambulate Community distances with least restrictive AD, ascend/descend x5 steps with handrail and get in/out of car without assistance to impact current status, health and quality of life [] Met  [] Not Met  [x] Progressing        PLAN   Plan of care Certification: 8/17/2022 to 12/20/2022-UPDATED POC     Outpatient Physical Therapy 1-3 times weekly for 8-10 weeks to include the following interventions: Cervical/Lumbar Traction, Electrical Stimulation IFC, Gait Training, Manual Therapy, Neuromuscular Re-ed, Patient Education, Self Care, Therapeutic Activities, Therapeutic Exercise and Dry Needling.    Continue with progression of functional tasks including stair gait and gait with quad cane at this time.  Do plan to continue into the new year with physical therapy treatment.     Maria Fernanda Mota

## 2023-01-05 ENCOUNTER — CLINICAL SUPPORT (OUTPATIENT)
Dept: REHABILITATION | Facility: HOSPITAL | Age: 83
End: 2023-01-05
Attending: INTERNAL MEDICINE
Payer: MEDICARE

## 2023-01-05 DIAGNOSIS — M62.81 MUSCLE WEAKNESS: Primary | ICD-10-CM

## 2023-01-05 PROCEDURE — 97530 THERAPEUTIC ACTIVITIES: CPT | Mod: HCNC,PN | Performed by: GENERAL ACUTE CARE HOSPITAL

## 2023-01-05 PROCEDURE — 97110 THERAPEUTIC EXERCISES: CPT | Mod: HCNC,PN | Performed by: GENERAL ACUTE CARE HOSPITAL

## 2023-01-05 PROCEDURE — 97112 NEUROMUSCULAR REEDUCATION: CPT | Mod: HCNC,PN | Performed by: GENERAL ACUTE CARE HOSPITAL

## 2023-01-06 NOTE — PROGRESS NOTES
[facility]   Physical Therapy Treatment Note     Name: Radha Degroot  Clinic Number: [id number]    Therapy Diagnosis:   Encounter Diagnosis   Name Primary?    Muscle weakness Yes     Physician: Jana Holguin MD  Physician Orders: PT Eval and Treat  Medical Diagnosis from Referral: L femur fracture     Evaluation Date: 8/17/2022  Authorization Period Expiration: 12/31/2022  Plan of Care Expiration:1/7/23  Progress Note Due: 1/7/23  Visit # / Visits authorized: 21/32 (1/1 Eval)    FOTO:   1  8/17/2022  UPPER LEG   2  11/8/2022 UPPER LEG    3  12/12/2022 UPPER LEG      Precautions: Falls   Visit Date: 1/5/2023    Time In: 1415  Time Out: 1500  Total Billable Time: 38 minutes    PTA Visit #: 0 / 5   SUBJECTIVE   Pt returns to OP PT reporting: Feeling tired today. Patient shows up for physical therapy but does not have an appointment scheduled today. Therapist is able to accommodate patient.  Pain: 1/10  Location: left upper legs    Response to previous treatment: some fatigue  Functional change:improved confidence and mobility with rolling walker   She was compliant with home exercise program.  OBJECTIVE     Objective Measures updated at progress report unless specified.     Treatment   Radha received following skilled interventions listed below:    PT Intervention Parameters Time   Therapeutic Exercise to develop strength, endurance, ROM, flexibility, posture and core stabilization Bridges 3x10  5# LAQ 3x15 bilateral  Straight leg raise 2x10 bilateral 15 minutes     Therapeutic Activities to improve functional performance Sit>stand 3x5-no upper extremity assist 8 minutes   Neuromuscular Re-education activities to improve: Balance, Coordination, Kinesthetic, Sense, Proprioception, and Posture  Hip add ball squeeze-isometric hold x45  Hooklying clamshells x45  Standing marches 3x10 15 minutes        Patient Education and Home Exercises   Home Exercises Provided and Patient Education  Provided   Patient was educated on the role of PT, POC, treatment plan, discharge goals, HEP.  Patient educated on biomechanical justification for therapeutic exercise and importance of compliance with HEP in order to improve overall impairments and QOL   Patient was educated on all the above exercise prior/during/after for proper posture, positioning, and execution for safe performance with home exercise program.       Written Home Exercises Provided:   Patient instructed to cont prior HEP.   Exercises were reviewed and Radha was able to demonstrate them prior to the end of the session.    Radha demonstrated fair  understanding of the education provided.   See EMR under Patient Instructions for exercises provided during therapy sessions    ASSESSMENT   Patient is very fatigued today and has difficulty completing sit>stand sets without fatigue. Treatment focused on therapeutic exercises and activities instead of gait training.     Radha Is progressing well towards her goals.   Pt prognosis is Fair / Good.    Pt will continue to benefit from skilled outpatient physical therapy to address the deficits listed in the problem list box on initial evaluation, provide pt/family education and to maximize pt's level of independence in the home and community environment.   Pt's spiritual, cultural and educational needs considered and pt agreeable to plan of care and goals.  Anticipated barriers to physical therapy: Lives home alone, age, transportation, Unable to drive    Goals:    Short Term Goals: 7/14/2022-4 weeks Status Date Met   1. Patient to be independent with Home Exercise Program performance to impact knowledge of condition  [x] Met  [] Not Met  [] Progressing  11/8/22   2. Pt to perform TUG 20s or less with RW to display improved mobility safety [] Met  [x] Not Met  [x] Progressing     3. Pt to ascend/descend x2 steps with UE assistance to impact curb and community navigation   [x] Met  [] Not Met  [] Progressing   11/8/22   4. Pt to display L knee extension to 0 degrees to impact heel strike during gait [] Met  [x] Not Met  [x] Progressing          Long Term Goal: 10/26/2022-10 weeks Status Date Met   1. Pt to ambulate Community distances with least restrictive AD, ascend/descend x5 steps with handrail and get in/out of car without assistance to impact current status, health and quality of life [] Met  [] Not Met  [x] Progressing        PLAN   Plan of care Certification: 8/17/2022 to 1/7/23     Outpatient Physical Therapy 1-3 times weekly for 8-10 weeks to include the following interventions: Cervical/Lumbar Traction, Electrical Stimulation IFC, Gait Training, Manual Therapy, Neuromuscular Re-ed, Patient Education, Self Care, Therapeutic Activities, Therapeutic Exercise and Dry Needling.    Resume gait training with straight cane and stair gait/curb navigation.    Maria Fernanda Mota

## 2023-01-10 ENCOUNTER — TELEPHONE (OUTPATIENT)
Dept: OPHTHALMOLOGY | Facility: CLINIC | Age: 83
End: 2023-01-10
Payer: MEDICARE

## 2023-01-10 ENCOUNTER — CLINICAL SUPPORT (OUTPATIENT)
Dept: REHABILITATION | Facility: HOSPITAL | Age: 83
End: 2023-01-10
Attending: INTERNAL MEDICINE
Payer: MEDICARE

## 2023-01-10 ENCOUNTER — TELEPHONE (OUTPATIENT)
Dept: FAMILY MEDICINE | Facility: CLINIC | Age: 83
End: 2023-01-10
Payer: MEDICARE

## 2023-01-10 DIAGNOSIS — M62.81 MUSCLE WEAKNESS: Primary | ICD-10-CM

## 2023-01-10 PROCEDURE — 97140 MANUAL THERAPY 1/> REGIONS: CPT | Mod: HCNC,PN | Performed by: GENERAL ACUTE CARE HOSPITAL

## 2023-01-10 PROCEDURE — 97110 THERAPEUTIC EXERCISES: CPT | Mod: HCNC,PN | Performed by: GENERAL ACUTE CARE HOSPITAL

## 2023-01-10 PROCEDURE — 97116 GAIT TRAINING THERAPY: CPT | Mod: HCNC,PN | Performed by: GENERAL ACUTE CARE HOSPITAL

## 2023-01-10 NOTE — TELEPHONE ENCOUNTER
----- Message from Tatyana Hernandez sent at 1/10/2023  4:03 PM CST -----  Patient is calling stating that her OS is bleeding and started yesterday.   Please contact patient at [phone]

## 2023-01-10 NOTE — PROGRESS NOTES
[facility]   Physical Therapy Treatment Note     Name: Radha Degroot  Clinic Number: [id number]    Therapy Diagnosis:   Encounter Diagnosis   Name Primary?    Muscle weakness Yes     Physician: Jana Holguin MD  Physician Orders: PT Eval and Treat  Medical Diagnosis from Referral: L femur fracture     Evaluation Date: 8/17/2022  Authorization Period Expiration: 12/31/2022  Plan of Care Expiration:1/7/23  Progress Note Due: 1/7/23  Visit # / Visits authorized: 21/32 (1/1 Eval)    FOTO:   1  8/17/2022  UPPER LEG   2  11/8/2022 UPPER LEG    3  12/12/2022 UPPER LEG      Precautions: Falls   Visit Date: 1/10/2023    Time In: 1415  Time Out: 1500  Total Billable Time: 39 minutes    PTA Visit #: 0 / 5   SUBJECTIVE   Pt returns to OP PT reporting: Feeling tired today. Patient shows up for physical therapy but does not have an appointment scheduled today. Therapist is able to accommodate patient.  Pain: 1/10  Location: left upper legs    Response to previous treatment: some fatigue  Functional change:improved confidence and mobility with rolling walker   She was compliant with home exercise program.  OBJECTIVE     Objective Measures updated at progress report unless specified.     Treatment   Radha received following skilled interventions listed below:    PT Intervention Parameters Time   Therapeutic Exercise to develop strength, endurance, ROM, flexibility, posture and core stabilization UBE x 5minutes  Sit>stand x6 times quad cane 8 minutes      Gait training to improve functional mobility and safety Gait training unlevel surfaces outside with quad cane x145 ft  Gait training level surface inside clinic 2x45 ft quad cane inside clinic 23 minutes      Manual Therapy techniques Joint mobilizations and Assesment  Patient assessment, R eye redness, blood pressure check  128/74 8 minutes        Patient Education and Home Exercises   Home Exercises Provided and Patient Education Provided   Patient  was educated on the role of PT, POC, treatment plan, discharge goals, HEP.  Patient educated on biomechanical justification for therapeutic exercise and importance of compliance with HEP in order to improve overall impairments and QOL   Patient was educated on all the above exercise prior/during/after for proper posture, positioning, and execution for safe performance with home exercise program.       Written Home Exercises Provided:   Patient instructed to cont prior HEP.   Exercises were reviewed and Radha was able to demonstrate them prior to the end of the session.    Radha demonstrated fair  understanding of the education provided.   See EMR under Patient Instructions for exercises provided during therapy sessions    ASSESSMENT   Patient is very fatigued today and has difficulty completing sit>stand sets without fatigue. Treatment focused on therapeutic exercises and activities instead of gait training.     Radha Is progressing well towards her goals.   Pt prognosis is Fair / Good.    Pt will continue to benefit from skilled outpatient physical therapy to address the deficits listed in the problem list box on initial evaluation, provide pt/family education and to maximize pt's level of independence in the home and community environment.   Pt's spiritual, cultural and educational needs considered and pt agreeable to plan of care and goals.  Anticipated barriers to physical therapy: Lives home alone, age, transportation, Unable to drive    Goals:    Short Term Goals: 7/14/2022-4 weeks Status Date Met   1. Patient to be independent with Home Exercise Program performance to impact knowledge of condition  [x] Met  [] Not Met  [] Progressing  11/8/22   2. Pt to perform TUG 20s or less with RW to display improved mobility safety [] Met  [x] Not Met  [x] Progressing     3. Pt to ascend/descend x2 steps with UE assistance to impact curb and community navigation   [x] Met  [] Not Met  [] Progressing  11/8/22   4. Pt  to display L knee extension to 0 degrees to impact heel strike during gait [] Met  [x] Not Met  [x] Progressing          Long Term Goal: 10/26/2022-10 weeks Status Date Met   1. Pt to ambulate Community distances with least restrictive AD, ascend/descend x5 steps with handrail and get in/out of car without assistance to impact current status, health and quality of life [] Met  [] Not Met  [x] Progressing        PLAN   Plan of care Certification: 8/17/2022 to 1/7/23     Outpatient Physical Therapy 1-3 times weekly for 8-10 weeks to include the following interventions: Cervical/Lumbar Traction, Electrical Stimulation IFC, Gait Training, Manual Therapy, Neuromuscular Re-ed, Patient Education, Self Care, Therapeutic Activities, Therapeutic Exercise and Dry Needling.    Resume gait training with straight cane and stair gait/curb navigation.    Rebecca Troulliet [facility]   Physical Therapy Treatment Note     Name: Radha MARTINEZ [place]  Clinic Number: [id number]    Therapy Diagnosis:   Encounter Diagnosis   Name Primary?    Muscle weakness Yes     Physician: Jana Holguin MD  Physician Orders: PT Eval and Treat  Medical Diagnosis from Referral: L femur fracture     Evaluation Date: 8/17/2022  Authorization Period Expiration: 12/31/2022  Plan of Care Expiration:1/7/23  Progress Note Due: 1/7/23  Visit # / Visits authorized: 21/32 (1/1 Eval)    FOTO:   1  8/17/2022  UPPER LEG   2  11/8/2022 UPPER LEG    3  12/12/2022 UPPER LEG      Precautions: Falls   Visit Date: 1/10/2023    Time In: 1415  Time Out: 1500  Total Billable Time: 38 minutes    PTA Visit #: 0 / 5   SUBJECTIVE   Pt returns to OP PT reporting: Feeling tired today. Patient shows up for physical therapy but does not have an appointment scheduled today. Therapist is able to accommodate patient.  Pain: 1/10  Location: left upper legs    Response to previous treatment: some fatigue  Functional  change:improved confidence and mobility with rolling walker   She was compliant with home exercise program.  OBJECTIVE     Objective Measures updated at progress report unless specified.     Treatment   Radha received following skilled interventions listed below:    PT Intervention Parameters Time   Therapeutic Exercise to develop strength, endurance, ROM, flexibility, posture and core stabilization Bridges 3x10  5# LAQ 3x15 bilateral  Straight leg raise 2x10 bilateral 15 minutes     Therapeutic Activities to improve functional performance Sit>stand 3x5-no upper extremity assist 8 minutes   Neuromuscular Re-education activities to improve: Balance, Coordination, Kinesthetic, Sense, Proprioception, and Posture  Hip add ball squeeze-isometric hold x45  Hooklying clamshells x45  Standing marches 3x10 15 minutes        Patient Education and Home Exercises   Home Exercises Provided and Patient Education Provided   Patient was educated on the role of PT, POC, treatment plan, discharge goals, HEP.  Patient educated on biomechanical justification for therapeutic exercise and importance of compliance with HEP in order to improve overall impairments and QOL   Patient was educated on all the above exercise prior/during/after for proper posture, positioning, and execution for safe performance with home exercise program.       Written Home Exercises Provided:   Patient instructed to cont prior HEP.   Exercises were reviewed and Radha was able to demonstrate them prior to the end of the session.    Radha demonstrated fair  understanding of the education provided.   See EMR under Patient Instructions for exercises provided during therapy sessions    ASSESSMENT   Patient is very fatigued today and has difficulty completing sit>stand sets without fatigue. Treatment focused on therapeutic exercises and activities instead of gait training.     Radha Is progressing well towards her goals.   Pt prognosis is Fair / Good.    Pt will  continue to benefit from skilled outpatient physical therapy to address the deficits listed in the problem list box on initial evaluation, provide pt/family education and to maximize pt's level of independence in the home and community environment.   Pt's spiritual, cultural and educational needs considered and pt agreeable to plan of care and goals.  Anticipated barriers to physical therapy: Lives home alone, age, transportation, Unable to drive    Goals:    Short Term Goals: 7/14/2022-4 weeks Status Date Met   1. Patient to be independent with Home Exercise Program performance to impact knowledge of condition  [x] Met  [] Not Met  [] Progressing  11/8/22   2. Pt to perform TUG 20s or less with RW to display improved mobility safety [] Met  [x] Not Met  [x] Progressing     3. Pt to ascend/descend x2 steps with UE assistance to impact curb and community navigation   [x] Met  [] Not Met  [] Progressing  11/8/22   4. Pt to display L knee extension to 0 degrees to impact heel strike during gait [] Met  [x] Not Met  [x] Progressing          Long Term Goal: 10/26/2022-10 weeks Status Date Met   1. Pt to ambulate Community distances with least restrictive AD, ascend/descend x5 steps with handrail and get in/out of car without assistance to impact current status, health and quality of life [] Met  [] Not Met  [x] Progressing        PLAN   Plan of care Certification: 8/17/2022 to 1/7/23     Outpatient Physical Therapy 1-3 times weekly for 8-10 weeks to include the following interventions: Cervical/Lumbar Traction, Electrical Stimulation IFC, Gait Training, Manual Therapy, Neuromuscular Re-ed, Patient Education, Self Care, Therapeutic Activities, Therapeutic Exercise and Dry Needling.    Resume gait training with straight cane and stair gait/curb navigation.    Rebecca Troulliet [facility]   Physical Therapy Treatment Note     Name: Radha Degroot  [facility]  Number: [id number]    Therapy Diagnosis:   Encounter Diagnosis   Name Primary?    Muscle weakness Yes     Physician: Jana Holguin MD  Physician Orders: PT Eval and Treat  Medical Diagnosis from Referral: L femur fracture     Evaluation Date: 8/17/2022  Authorization Period Expiration: 12/31/2022  Plan of Care Expiration:1/7/23  Progress Note Due: 1/7/23  Visit # / Visits authorized: 21/32 (1/1 Eval)    FOTO:   1  8/17/2022  UPPER LEG   2  11/8/2022 UPPER LEG    3  12/12/2022 UPPER LEG      Precautions: Falls   Visit Date: 1/10/2023    Time In: 1415  Time Out: 1500  Total Billable Time: 38 minutes    PTA Visit #: 0 / 5   SUBJECTIVE   Pt returns to OP PT reporting: Feeling tired today. Patient shows up for physical therapy but does not have an appointment scheduled today. Therapist is able to accommodate patient.  Pain: 1/10  Location: left upper legs    Response to previous treatment: some fatigue  Functional change:improved confidence and mobility with rolling walker   She was compliant with home exercise program.  OBJECTIVE     Objective Measures updated at progress report unless specified.     Treatment   Radha received following skilled interventions listed below:    PT Intervention Parameters Time   Therapeutic Exercise to develop strength, endurance, ROM, flexibility, posture and core stabilization Bridges 3x10  5# LAQ 3x15 bilateral  Straight leg raise 2x10 bilateral 15 minutes     Therapeutic Activities to improve functional performance     Neuromuscular Re-education activities to improve: Balance, Coordination, Kinesthetic, Sense, Proprioception, and Posture           Patient Education and Home Exercises   Home Exercises Provided and Patient Education Provided   Patient was educated on the role of PT, POC, treatment plan, discharge goals, HEP.  Patient educated on biomechanical justification for therapeutic exercise and importance of compliance with HEP in order to improve overall impairments and QOL    Patient was educated on all the above exercise prior/during/after for proper posture, positioning, and execution for safe performance with home exercise program.       Written Home Exercises Provided:   Patient instructed to cont prior HEP.   Exercises were reviewed and Radha was able to demonstrate them prior to the end of the session.    Radha demonstrated fair  understanding of the education provided.   See EMR under Patient Instructions for exercises provided during therapy sessions    ASSESSMENT   Patient is extremely fatigued today. Her reports of feeling funny and her bloodshot eye limits performance with physical therapy today. Her overall endurance does not appear to be progressing as planned, likely due to her immobile status at home. Will continue to monitor and attempt to increase cardiovascular training as appropriate in the clinic.    Radha Is progressing well towards her goals.   Pt prognosis is Fair / Good.    Pt will continue to benefit from skilled outpatient physical therapy to address the deficits listed in the problem list box on initial evaluation, provide pt/family education and to maximize pt's level of independence in the home and community environment.   Pt's spiritual, cultural and educational needs considered and pt agreeable to plan of care and goals.  Anticipated barriers to physical therapy: Lives home alone, age, transportation, Unable to drive    Goals:    Short Term Goals: 7/14/2022-4 weeks Status Date Met   1. Patient to be independent with Home Exercise Program performance to impact knowledge of condition  [x] Met  [] Not Met  [] Progressing  11/8/22   2. Pt to perform TUG 20s or less with RW to display improved mobility safety [] Met  [x] Not Met  [x] Progressing     3. Pt to ascend/descend x2 steps with UE assistance to impact curb and community navigation   [x] Met  [] Not Met  [] Progressing  11/8/22   4. Pt to display L knee extension to 0 degrees to impact heel strike  during gait [] Met  [x] Not Met  [x] Progressing          Long Term Goal: 10/26/2022-10 weeks Status Date Met   1. Pt to ambulate Community distances with least restrictive AD, ascend/descend x5 steps with handrail and get in/out of car without assistance to impact current status, health and quality of life [] Met  [] Not Met  [x] Progressing        PLAN   Plan of care Certification: 8/17/2022 to 1/7/23     Outpatient Physical Therapy 1-3 times weekly for 8-10 weeks to include the following interventions: Cervical/Lumbar Traction, Electrical Stimulation IFC, Gait Training, Manual Therapy, Neuromuscular Re-ed, Patient Education, Self Care, Therapeutic Activities, Therapeutic Exercise and Dry Needling.    Will continue to impact cardiovascular status and transfers as well as gait with straight cane as tolerable.    Maria Fernanda Mota

## 2023-01-10 NOTE — TELEPHONE ENCOUNTER
----- Message from Jana Holguin MD sent at 1/10/2023  2:23 PM CST -----    ----- Message -----  From: Dyan Gifford  Sent: 1/10/2023   1:07 PM CST  To: Jana Holguin MD    Pt is here for Physical Therapy and daughter came up to say her eye is bloody red. The daughter Maria Fernanda wants a call to see if she can speak to a nurse on whether or not she needs to be seen. Daughter number is [phone]

## 2023-01-10 NOTE — TELEPHONE ENCOUNTER
Pt. Describes CAROLYN she is on blood thinners. No pain no changes in vision. Pt. Inquired about retina specialist on the [facility]. We got cut off. I tried calling the pt. Back several times I left her a message. Dr Farias in [place], Dr Russo in [place]

## 2023-01-11 ENCOUNTER — OFFICE VISIT (OUTPATIENT)
Dept: FAMILY MEDICINE | Facility: CLINIC | Age: 83
End: 2023-01-11
Payer: MEDICARE

## 2023-01-11 VITALS
TEMPERATURE: 98 F | DIASTOLIC BLOOD PRESSURE: 61 MMHG | BODY MASS INDEX: 32.59 KG/M2 | RESPIRATION RATE: 18 BRPM | SYSTOLIC BLOOD PRESSURE: 140 MMHG | WEIGHT: 215.06 LBS | OXYGEN SATURATION: 99 % | HEIGHT: 68 IN | HEART RATE: 70 BPM

## 2023-01-11 DIAGNOSIS — H11.32 SUBCONJUNCTIVAL HEMORRHAGE OF LEFT EYE: Primary | ICD-10-CM

## 2023-01-11 PROCEDURE — 99999 PR PBB SHADOW E&M-EST. PATIENT-LVL V: ICD-10-PCS | Mod: PBBFAC,HCNC,, | Performed by: PHYSICIAN ASSISTANT

## 2023-01-11 PROCEDURE — 3078F DIAST BP <80 MM HG: CPT | Mod: HCNC,CPTII,S$GLB, | Performed by: PHYSICIAN ASSISTANT

## 2023-01-11 PROCEDURE — 99213 PR OFFICE/OUTPT VISIT, EST, LEVL III, 20-29 MIN: ICD-10-PCS | Mod: HCNC,S$GLB,, | Performed by: PHYSICIAN ASSISTANT

## 2023-01-11 PROCEDURE — 1160F PR REVIEW ALL MEDS BY PRESCRIBER/CLIN PHARMACIST DOCUMENTED: ICD-10-PCS | Mod: HCNC,CPTII,S$GLB, | Performed by: PHYSICIAN ASSISTANT

## 2023-01-11 PROCEDURE — 1126F PR PAIN SEVERITY QUANTIFIED, NO PAIN PRESENT: ICD-10-PCS | Mod: HCNC,CPTII,S$GLB, | Performed by: PHYSICIAN ASSISTANT

## 2023-01-11 PROCEDURE — 1126F AMNT PAIN NOTED NONE PRSNT: CPT | Mod: HCNC,CPTII,S$GLB, | Performed by: PHYSICIAN ASSISTANT

## 2023-01-11 PROCEDURE — 3078F PR MOST RECENT DIASTOLIC BLOOD PRESSURE < 80 MM HG: ICD-10-PCS | Mod: HCNC,CPTII,S$GLB, | Performed by: PHYSICIAN ASSISTANT

## 2023-01-11 PROCEDURE — 1100F PR PT FALLS ASSESS DOC 2+ FALLS/FALL W/INJURY/YR: ICD-10-PCS | Mod: HCNC,CPTII,S$GLB, | Performed by: PHYSICIAN ASSISTANT

## 2023-01-11 PROCEDURE — 99213 OFFICE O/P EST LOW 20 MIN: CPT | Mod: HCNC,S$GLB,, | Performed by: PHYSICIAN ASSISTANT

## 2023-01-11 PROCEDURE — 1160F RVW MEDS BY RX/DR IN RCRD: CPT | Mod: HCNC,CPTII,S$GLB, | Performed by: PHYSICIAN ASSISTANT

## 2023-01-11 PROCEDURE — 1100F PTFALLS ASSESS-DOCD GE2>/YR: CPT | Mod: HCNC,CPTII,S$GLB, | Performed by: PHYSICIAN ASSISTANT

## 2023-01-11 PROCEDURE — 99999 PR PBB SHADOW E&M-EST. PATIENT-LVL V: CPT | Mod: PBBFAC,HCNC,, | Performed by: PHYSICIAN ASSISTANT

## 2023-01-11 PROCEDURE — 3288F FALL RISK ASSESSMENT DOCD: CPT | Mod: HCNC,CPTII,S$GLB, | Performed by: PHYSICIAN ASSISTANT

## 2023-01-11 PROCEDURE — 3077F SYST BP >= 140 MM HG: CPT | Mod: HCNC,CPTII,S$GLB, | Performed by: PHYSICIAN ASSISTANT

## 2023-01-11 PROCEDURE — 1159F PR MEDICATION LIST DOCUMENTED IN MEDICAL RECORD: ICD-10-PCS | Mod: HCNC,CPTII,S$GLB, | Performed by: PHYSICIAN ASSISTANT

## 2023-01-11 PROCEDURE — 3077F PR MOST RECENT SYSTOLIC BLOOD PRESSURE >= 140 MM HG: ICD-10-PCS | Mod: HCNC,CPTII,S$GLB, | Performed by: PHYSICIAN ASSISTANT

## 2023-01-11 PROCEDURE — 1159F MED LIST DOCD IN RCRD: CPT | Mod: HCNC,CPTII,S$GLB, | Performed by: PHYSICIAN ASSISTANT

## 2023-01-11 PROCEDURE — 3288F PR FALLS RISK ASSESSMENT DOCUMENTED: ICD-10-PCS | Mod: HCNC,CPTII,S$GLB, | Performed by: PHYSICIAN ASSISTANT

## 2023-01-11 NOTE — PROGRESS NOTES
Assessment/Plan:    Problem List Items Addressed This Visit    None  Visit Diagnoses       Subconjunctival hemorrhage of left eye    -  Primary        -symptoms likely due to subconjunctival hemorrhage of left eye  -no tx required, symptoms should resolve in 1-2 weeks  -recommend follow up with ophthalmology if no improvement  -ER precautions for severe or worsening of symptoms    Follow up if symptoms worsen or fail to improve.    Deyanira John PA-C  _____________________________________________________________________________________________________________________________________________________    CC: blood in left eye    HPI: Patient is in clinic today as an established patient here for blood in left eye. Symptom onset was about 3 days ago. Denies recent trauma or injuries. Denies eye pain or vision changes. She is currently on a blood thinner (Eliquis). She does not have an ophthalmologist since moving to [place]. No other complaints today.     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    Hx of colonic polyps     1/9    Hx of colonic polyps     Hyperlipidemia     Hypertension     Myocardial infarction nov 2006    Persistent atrial fibrillation 4/25/2018    Posterior vitreous detachment of both eyes     Sleep apnea     Type 2 diabetes mellitus with mild nonproliferative diabetic retinopathy with macular edema 2/15/2013     Past Surgical  History:   Procedure Laterality Date    APPENDECTOMY      CATARACT EXTRACTION      Right eye    EPIDURAL STEROID INJECTION N/A 11/2/2018    Procedure: Injection, Steroid, Epidural CAUDAL ELISA;  Surgeon: Ирина Peña MD;  Location: [facility] PAIN MGT;  Service: Pain Management;  Laterality: N/A;    ESOPHAGOGASTRODUODENOSCOPY N/A 3/31/2022    Procedure: EGD (ESOPHAGOGASTRODUODENOSCOPY);  Surgeon: Samm Billings MD;  Location: [facility] ENDO;  Service: Endoscopy;  Laterality: N/A;    heart stent      X 2    HYSTERECTOMY      Fibroids    INTRAMEDULLARY RODDING OF FEMUR Left 3/26/2022    Procedure: INSERTION, INTRAMEDULLARY SARA, FEMUR;  Surgeon: Eduin Avelar MD;  Location: [facility] OR;  Service: Orthopedics;  Laterality: Left;     Review of patient's allergies indicates:   Allergen Reactions    Heparin analogues Other (See Comments)     thrombocytopenia    Ancef [cefazolin]     Keflex [cephalexin] Rash    Oxybutynin Other (See Comments)     Metallic taste     Social History     Tobacco Use    Smoking status: Never    Smokeless tobacco: Never   Substance Use Topics    Alcohol use: No     Alcohol/week: 0.0 standard drinks    Drug use: No     Family History   Problem Relation Age of Onset    Diabetes Mother     Heart disease Mother     Hypertension Mother     Diabetes Father     Melanoma Father     Kidney failure Father     Mental illness Sister         suicide/schizophrenic    Cancer Maternal Grandfather     Cancer Paternal Grandfather         colon    Psoriasis Neg Hx     Lupus Neg Hx     Eczema Neg Hx      Current Outpatient Medications on File Prior to Visit   Medication Sig Dispense Refill    apixaban (ELIQUIS) 2.5 mg Tab Take 1 tablet (2.5 mg total) by mouth 2 (two) times a day. 180 tablet 3    ascorbic acid, vitamin C, (VITAMIN C) 100 MG tablet Take 100 mg by mouth once daily.      blood sugar diagnostic Strp To check BG 4 times daily, to use with insurance preferred meter; Brand: Accucheck 100 strip 11    carvediloL  "(COREG) 12.5 MG tablet Take 1 tablet (12.5 mg total) by mouth 2 (two) times a day. 180 tablet 3    fluticasone (FLONASE) 50 mcg/actuation nasal spray SHAKE LIQUID AND USE 2 SPRAYS IN EACH NOSTRIL EVERY DAY 48 mL 4    furosemide (LASIX) 20 MG tablet Take 40 mg of lasix at 8 a.m. and 20 mg at 3 p.m. (Patient taking differently: 40 mg once daily. Take 40 mg of lasix at 8 a.m. and 20 mg at 3 p.m.) 270 tablet 3    insulin aspart U-100 (NOVOLOG) 100 unit/mL (3 mL) InPn pen Inject 14 units three times daily before meals 45 mL 1    insulin degludec (TRESIBA FLEXTOUCH U-100) 100 unit/mL (3 mL) insulin pen Inject 32 Units into the skin once daily. 4 pen 0    ketoconazole (NIZORAL) 2 % shampoo APPLY TO AFFECTED AREA EVERY OTHER DAY AND LET SIT 5 MINUTES PRIOR TO RINSING AS DIRECTED (Patient not taking: Reported on 12/1/2022) 120 mL 3    lancets Misc To check BG 4 times daily, to use with insurance preferred meter 100 each 11    melatonin 10 mg Cap Take by mouth.      MULTIVIT-MIN/IRON/FOLIC/LUTEIN (CENTRUM SILVER WOMEN ORAL) Take by mouth once daily.       pantoprazole (PROTONIX) 40 MG tablet Take 1 tablet (40 mg total) by mouth once daily. (Patient not taking: Reported on 12/1/2022) 90 tablet 3    pen needle, diabetic (BD ULTRA-FINE TIM PEN NEEDLE) 32 gauge x 5/32" Ndle Use as administer medication as prescribed. 100 each 11    rosuvastatin (CRESTOR) 40 MG Tab Take 1 tablet (40 mg total) by mouth once daily. 90 tablet 3    vitamin B complex (B COMPLEX VITAMINS ORAL) Take by mouth once daily.      vitamin D (VITAMIN D3) 1000 units Tab Take 1,000 Units by mouth once daily.      VITAMIN E, BULK, MISC by Misc.(Non-Drug; Combo Route) route once daily.      [DISCONTINUED] blood-glucose meter (ACCU-CHEK MELIDA PLUS METER) Misc To check sugars 4 times daily.  Please include control solution 1 each 0     No current facility-administered medications on file prior to visit.       Review of Systems   Constitutional:  Negative for chills, " "diaphoresis, fatigue and fever.   HENT:  Negative for congestion, ear pain, postnasal drip, sinus pain and sore throat.    Eyes:  Negative for pain, redness and visual disturbance.   Respiratory:  Negative for cough, chest tightness and shortness of breath.    Cardiovascular:  Negative for chest pain and leg swelling.   Gastrointestinal:  Negative for abdominal pain, constipation, diarrhea, nausea and vomiting.   Genitourinary:  Negative for dysuria and hematuria.   Musculoskeletal:  Negative for arthralgias and joint swelling.   Skin:  Negative for rash.   Neurological:  Negative for dizziness, syncope and headaches.   Psychiatric/Behavioral:  Negative for dysphoric mood. The patient is not nervous/anxious.      Vitals:    01/11/23 1638 01/11/23 1639   BP: (!) 163/63 (!) 140/61   Pulse: 70    Resp: 18    Temp: 98.1 °F (36.7 °C)    SpO2: 99%    Weight: 97.6 kg (215 lb 0.9 oz)    Height: 5' 8" (1.727 m)        Wt Readings from Last 3 Encounters:   01/11/23 97.6 kg (215 lb 0.9 oz)   12/01/22 95.3 kg (210 lb)   08/08/22 95.3 kg (210 lb 1.6 oz)       Physical Exam  Constitutional:       General: She is not in acute distress.     Appearance: Normal appearance. She is well-developed.   HENT:      Head: Normocephalic and atraumatic.   Eyes:      General:         Left eye: No foreign body or discharge.      Extraocular Movements:      Left eye: Normal extraocular motion.      Conjunctiva/sclera:      Left eye: Hemorrhage present.   Cardiovascular:      Rate and Rhythm: Normal rate and regular rhythm.      Pulses: Normal pulses.      Heart sounds: Normal heart sounds. No murmur heard.  Pulmonary:      Effort: Pulmonary effort is normal. No respiratory distress.      Breath sounds: Normal breath sounds.   Abdominal:      General: Bowel sounds are normal. There is no distension.      Palpations: Abdomen is soft.      Tenderness: There is no abdominal tenderness.   Musculoskeletal:         General: Normal range of motion.      " Cervical back: Normal range of motion and neck supple.   Skin:     General: Skin is warm and dry.      Findings: No rash.   Neurological:      General: No focal deficit present.      Mental Status: She is alert and oriented to person, place, and time.   Psychiatric:         Mood and Affect: Mood normal.         Behavior: Behavior normal.       Health Maintenance   Topic Date Due    TETANUS VACCINE  10/03/2015    Hemoglobin A1c  06/01/2023    Colonoscopy  06/21/2026    Lipid Panel  Discontinued    Eye Exam  Discontinued    DEXA Scan  Discontinued

## 2023-01-12 ENCOUNTER — CLINICAL SUPPORT (OUTPATIENT)
Dept: REHABILITATION | Facility: HOSPITAL | Age: 83
End: 2023-01-12
Attending: INTERNAL MEDICINE
Payer: MEDICARE

## 2023-01-12 DIAGNOSIS — M62.81 MUSCLE WEAKNESS: Primary | ICD-10-CM

## 2023-01-12 PROCEDURE — 97110 THERAPEUTIC EXERCISES: CPT | Mod: HCNC,PN | Performed by: GENERAL ACUTE CARE HOSPITAL

## 2023-01-12 PROCEDURE — 97116 GAIT TRAINING THERAPY: CPT | Mod: HCNC,PN | Performed by: GENERAL ACUTE CARE HOSPITAL

## 2023-01-12 NOTE — PROGRESS NOTES
[facility]   Physical Therapy Reassessment & Treatment Note     Name: Radha Degroot  Clinic Number: [id number]    Therapy Diagnosis:   Encounter Diagnosis   Name Primary?    Muscle weakness Yes     Physician: Jana Holguin MD  Physician Orders: PT Eval and Treat  Medical Diagnosis from Referral: L femur fracture     Evaluation Date: 8/17/2022  Authorization Period Expiration: 12/31/2022  Plan of Care Expiration: 2/11/23  Progress Note Due: 2/11/23  Visit # / Visits authorized: 22/32 (1/1 Eval)    FOTO:   1  8/17/2022  UPPER LEG   2  11/8/2022 UPPER LEG    3  12/12/2022 UPPER LEG      Precautions: Falls   Visit Date: 1/12/2023    Time In: 1415  Time Out: 1500  Total Billable Time: 23 minutes    PTA Visit #: 0 / 5   SUBJECTIVE   Pt returns to OP PT reporting: That she did have her eye examined by 2 different medical professionals and stated it is nothing serious, likely due to bearing down or lifting something heavy, it will dissolve over the next week.  Pain: 1/10  Location: left upper legs    Response to previous treatment: some fatigue  Functional change:improved confidence and mobility with rolling walker   She was compliant with home exercise program.  OBJECTIVE     Objective Measures updated at progress report unless specified.     Gait: longest distance 270ft with 2 rest breaks  Gait: Longest consecutive distance: 100ft    Treatment   Radha received following skilled interventions listed below:    PT Intervention Parameters Time   Therapeutic Exercise to develop strength, endurance, ROM, flexibility, posture and core stabilization UBE x 5minutes  Seated hip add ball squeeze 45x2s isometric hold  Seated hip abduction Red Theraband x45 23 minutes      Gait training to improve functional mobility and safety Gait training level surface inside clinic 1x75ft 2x45 ft quad cane inside clinic 15 minutes        Patient Education and Home Exercises   Home Exercises Provided and Patient  Education Provided   Patient was educated on the role of PT, POC, treatment plan, discharge goals, HEP.  Patient educated on biomechanical justification for therapeutic exercise and importance of compliance with HEP in order to improve overall impairments and QOL   Patient was educated on all the above exercise prior/during/after for proper posture, positioning, and execution for safe performance with home exercise program.       Written Home Exercises Provided:   Patient instructed to cont prior HEP.   Exercises were reviewed and Radha was able to demonstrate them prior to the end of the session.    Radha demonstrated fair  understanding of the education provided.   See EMR under Patient Instructions for exercises provided during therapy sessions    ASSESSMENT   Patient continues to display moderate fatigue with standing tasks and ambulation with quad cane. Patient does not get much physical activity at home. Physical therapist is Considering a 6 minute walk test with rolling walker to better track progress as quad cane gait shows little improvement.    Radha Is progressing well towards her goals.   Pt prognosis is Fair / Good.    Pt will continue to benefit from skilled outpatient physical therapy to address the deficits listed in the problem list box on initial evaluation, provide pt/family education and to maximize pt's level of independence in the home and community environment.   Pt's spiritual, cultural and educational needs considered and pt agreeable to plan of care and goals.  Anticipated barriers to physical therapy: Lives home alone, age, transportation, Unable to drive    Goals:    Short Term Goals: 7/14/2022-4 weeks Status Date Met   1. Patient to be independent with Home Exercise Program performance to impact knowledge of condition  [x] Met  [] Not Met  [] Progressing  11/8/22   2. Pt to perform TUG 20s or less with RW to display improved mobility safety [] Met  [x] Not Met  [x] Progressing     3.  Pt to ascend/descend x2 steps with UE assistance to impact curb and community navigation   [x] Met  [] Not Met  [] Progressing  11/8/22   4. Pt to display L knee extension to 0 degrees to impact heel strike during gait [] Met  [x] Not Met  [x] Progressing          Long Term Goal: 10/26/2022-10 weeks Status Date Met   1. Pt to ambulate Community distances with least restrictive AD, ascend/descend x5 steps with handrail and get in/out of car without assistance to impact current status, health and quality of life [] Met  [] Not Met  [x] Progressing        PLAN   Plan of care Certification: 8/17/2022 to 1/7/23     Outpatient Physical Therapy 1-3 times weekly for 8-10 weeks to include the following interventions: Cervical/Lumbar Traction, Electrical Stimulation IFC, Gait Training, Manual Therapy, Neuromuscular Re-ed, Patient Education, Self Care, Therapeutic Activities, Therapeutic Exercise and Dry Needling.    Resume gait training with straight cane and stair gait/curb navigation.    Rebecca Troulliet [facility]   Physical Therapy Treatment Note     Name: Radha Degroot  Clinic Number: [id number]    Therapy Diagnosis:   Encounter Diagnosis   Name Primary?    Muscle weakness Yes     Physician: Jana Holguin MD  Physician Orders: PT Eval and Treat  Medical Diagnosis from Referral: L femur fracture     Evaluation Date: 8/17/2022  Authorization Period Expiration: 12/31/2022  Plan of Care Expiration:1/7/23  Progress Note Due: 1/7/23  Visit # / Visits authorized: 21/32 (1/1 Eval)    FOTO:   1  8/17/2022  UPPER LEG   2  11/8/2022 UPPER LEG    3  12/12/2022 UPPER LEG      Precautions: Falls   Visit Date: 1/12/2023    Time In: 1415  Time Out: 1500  Total Billable Time: 38 minutes    PTA Visit #: 0 / 5   SUBJECTIVE   Pt returns to OP PT reporting: Feeling tired today. Patient shows up for physical therapy but does not have an appointment scheduled today.  Therapist is able to accommodate patient.  Pain: 1/10  Location: left upper legs    Response to previous treatment: some fatigue  Functional change:improved confidence and mobility with rolling walker   She was compliant with home exercise program.  OBJECTIVE     Objective Measures updated at progress report unless specified.     Treatment   Radha received following skilled interventions listed below:    PT Intervention Parameters Time   Therapeutic Exercise to develop strength, endurance, ROM, flexibility, posture and core stabilization Bridges 3x10  5# LAQ 3x15 bilateral  Straight leg raise 2x10 bilateral 15 minutes     Therapeutic Activities to improve functional performance Sit>stand 3x5-no upper extremity assist 8 minutes   Neuromuscular Re-education activities to improve: Balance, Coordination, Kinesthetic, Sense, Proprioception, and Posture  Hip add ball squeeze-isometric hold x45  Hooklying clamshells x45  Standing marches 3x10 15 minutes        Patient Education and Home Exercises   Home Exercises Provided and Patient Education Provided   Patient was educated on the role of PT, POC, treatment plan, discharge goals, HEP.  Patient educated on biomechanical justification for therapeutic exercise and importance of compliance with HEP in order to improve overall impairments and QOL   Patient was educated on all the above exercise prior/during/after for proper posture, positioning, and execution for safe performance with home exercise program.       Written Home Exercises Provided:   Patient instructed to cont prior HEP.   Exercises were reviewed and Radha was able to demonstrate them prior to the end of the session.    Radha demonstrated fair  understanding of the education provided.   See EMR under Patient Instructions for exercises provided during therapy sessions    ASSESSMENT   Patient is very fatigued today and has difficulty completing sit>stand sets without fatigue. Treatment focused on therapeutic  exercises and activities instead of gait training.     Radha Is progressing well towards her goals.   Pt prognosis is Fair / Good.    Pt will continue to benefit from skilled outpatient physical therapy to address the deficits listed in the problem list box on initial evaluation, provide pt/family education and to maximize pt's level of independence in the home and community environment.   Pt's spiritual, cultural and educational needs considered and pt agreeable to plan of care and goals.  Anticipated barriers to physical therapy: Lives home alone, age, transportation, Unable to drive    Goals:    Short Term Goals: 7/14/2022-4 weeks Status Date Met   1. Patient to be independent with Home Exercise Program performance to impact knowledge of condition  [x] Met  [] Not Met  [] Progressing  11/8/22   2. Pt to perform TUG 20s or less with RW to display improved mobility safety [] Met  [x] Not Met  [x] Progressing     3. Pt to ascend/descend x2 steps with UE assistance to impact curb and community navigation   [x] Met  [] Not Met  [] Progressing  11/8/22   4. Pt to display L knee extension to 0 degrees to impact heel strike during gait [] Met  [x] Not Met  [x] Progressing          Long Term Goal: 10/26/2022-10 weeks Status Date Met   1. Pt to ambulate Community distances with least restrictive AD, ascend/descend x5 steps with handrail and get in/out of car without assistance to impact current status, health and quality of life [] Met  [] Not Met  [x] Progressing        PLAN   Plan of care Certification: 8/17/2022 to 1/7/23     Outpatient Physical Therapy 1-3 times weekly for 8-10 weeks to include the following interventions: Cervical/Lumbar Traction, Electrical Stimulation IFC, Gait Training, Manual Therapy, Neuromuscular Re-ed, Patient Education, Self Care, Therapeutic Activities, Therapeutic Exercise and Dry Needling.    Resume gait training with straight cane and stair gait/curb navigation.    Maria Fernanda Mota                                          [facility]   Physical Therapy Treatment Note     Name: Radha Degroot  Clinic Number: [id number]    Therapy Diagnosis:   Encounter Diagnosis   Name Primary?    Muscle weakness Yes     Physician: Jana Holguin MD  Physician Orders: PT Eval and Treat  Medical Diagnosis from Referral: L femur fracture     Evaluation Date: 8/17/2022  Authorization Period Expiration: 12/31/2022  Plan of Care Expiration:1/7/23  Progress Note Due: 1/7/23  Visit # / Visits authorized: 21/32 (1/1 Eval)    FOTO:   1  8/17/2022  UPPER LEG   2  11/8/2022 UPPER LEG    3  12/12/2022 UPPER LEG      Precautions: Falls   Visit Date: 1/12/2023    Time In: 1415  Time Out: 1500  Total Billable Time: 38 minutes    PTA Visit #: 0 / 5   SUBJECTIVE   Pt returns to OP PT reporting: Feeling tired today. Patient shows up for physical therapy but does not have an appointment scheduled today. Therapist is able to accommodate patient.  Pain: 1/10  Location: left upper legs    Response to previous treatment: some fatigue  Functional change:improved confidence and mobility with rolling walker   She was compliant with home exercise program.  OBJECTIVE     Objective Measures updated at progress report unless specified.     Treatment   Radha received following skilled interventions listed below:    PT Intervention Parameters Time   Therapeutic Exercise to develop strength, endurance, ROM, flexibility, posture and core stabilization Bridges 3x10  5# LAQ 3x15 bilateral  Straight leg raise 2x10 bilateral 15 minutes     Therapeutic Activities to improve functional performance     Neuromuscular Re-education activities to improve: Balance, Coordination, Kinesthetic, Sense, Proprioception, and Posture           Patient Education and Home Exercises   Home Exercises Provided and Patient Education Provided   Patient was educated on the role of PT, POC, treatment plan, discharge goals, HEP.  Patient educated  on biomechanical justification for therapeutic exercise and importance of compliance with HEP in order to improve overall impairments and QOL   Patient was educated on all the above exercise prior/during/after for proper posture, positioning, and execution for safe performance with home exercise program.       Written Home Exercises Provided:   Patient instructed to cont prior HEP.   Exercises were reviewed and Radha was able to demonstrate them prior to the end of the session.    Radha demonstrated fair  understanding of the education provided.   See EMR under Patient Instructions for exercises provided during therapy sessions    ASSESSMENT   Patient is extremely fatigued today. Her reports of feeling funny and her bloodshot eye limits performance with physical therapy today. Her overall endurance does not appear to be progressing as planned, likely due to her immobile status at home. Will continue to monitor and attempt to increase cardiovascular training as appropriate in the clinic.    Radha Is progressing well towards her goals.   Pt prognosis is Fair / Good.    Pt will continue to benefit from skilled outpatient physical therapy to address the deficits listed in the problem list box on initial evaluation, provide pt/family education and to maximize pt's level of independence in the home and community environment.   Pt's spiritual, cultural and educational needs considered and pt agreeable to plan of care and goals.  Anticipated barriers to physical therapy: Lives home alone, age, transportation, Unable to drive    Goals:    Short Term Goals: 7/14/2022-4 weeks Status Date Met   1. Patient to be independent with Home Exercise Program performance to impact knowledge of condition  [x] Met  [] Not Met  [] Progressing  11/8/22   2. Pt to perform TUG 20s or less with RW to display improved mobility safety [] Met  [x] Not Met  [x] Progressing     3. Pt to ascend/descend x2 steps with UE assistance to impact curb  and community navigation   [x] Met  [] Not Met  [] Progressing  11/8/22   4. Pt to display L knee extension to 0 degrees to impact heel strike during gait [] Met  [x] Not Met  [x] Progressing          Long Term Goal: 10/26/2022-10 weeks Status Date Met   1. Pt to ambulate Community distances with least restrictive AD, ascend/descend x5 steps with handrail and get in/out of car without assistance to impact current status, health and quality of life [] Met  [] Not Met  [x] Progressing        PLAN   Plan of care Certification: 8/17/2022 to 1/7/23     Outpatient Physical Therapy 1-3 times weekly for 8-10 weeks to include the following interventions: Cervical/Lumbar Traction, Electrical Stimulation IFC, Gait Training, Manual Therapy, Neuromuscular Re-ed, Patient Education, Self Care, Therapeutic Activities, Therapeutic Exercise and Dry Needling.    Will continue to impact cardiovascular status and transfers as well as gait with straight cane as tolerable.    Maria Fernanda Mota

## 2023-01-17 ENCOUNTER — CLINICAL SUPPORT (OUTPATIENT)
Dept: REHABILITATION | Facility: HOSPITAL | Age: 83
End: 2023-01-17
Attending: INTERNAL MEDICINE
Payer: MEDICARE

## 2023-01-17 DIAGNOSIS — M62.81 MUSCLE WEAKNESS: Primary | ICD-10-CM

## 2023-01-17 PROCEDURE — 97116 GAIT TRAINING THERAPY: CPT | Mod: HCNC,PN | Performed by: GENERAL ACUTE CARE HOSPITAL

## 2023-01-17 PROCEDURE — 97110 THERAPEUTIC EXERCISES: CPT | Mod: HCNC,PN | Performed by: GENERAL ACUTE CARE HOSPITAL

## 2023-01-17 NOTE — PROGRESS NOTES
[facility]   Physical Therapy Treatment Note     Name: Radha Degroot  Clinic Number: [id number]    Therapy Diagnosis:   Encounter Diagnosis   Name Primary?    Muscle weakness Yes     Physician: Jana Holguin MD  Physician Orders: PT Eval and Treat  Medical Diagnosis from Referral: L femur fracture     Evaluation Date: 8/17/2022  Authorization Period Expiration: 12/31/2022  Plan of Care Expiration: 2/11/23  Progress Note Due: 2/11/23  Visit # / Visits authorized: 22/32 (1/1 Eval)    FOTO:   1  8/17/2022  UPPER LEG   2  11/8/2022 UPPER LEG    3  12/12/2022 UPPER LEG      Precautions: Falls   Visit Date: 1/17/2023    Time In: 1415  Time Out: 1500  Total Billable Time: 23 minutes    PTA Visit #: 0 / 5   SUBJECTIVE   Pt returns to OP PT reporting: Patient returns to out patient physical therapist feeling good. She has no continued pain, bloodiness or redness in the left eye. Patient reports walking little at home.   Pain: 1/10  Location: left upper legs    Response to previous treatment: some fatigue  Functional change:improved confidence and mobility with rolling walker   She was compliant with home exercise program.  OBJECTIVE     Objective Measures updated at progress report unless specified.     Gait: longest distance 270ft with 2 rest breaks-quad cane  Gait: Longest consecutive distance: 100ft- quad cane    Gait: 6 minute walk test Rolling walker 217ft (1 seated rest break)    Treatment   Radha received following skilled interventions listed below:    PT Intervention Parameters Time   Therapeutic Exercise to develop strength, endurance, ROM, flexibility, posture and core stabilization UBE x 5minutes  Red Theraband rows 3x10-standing 8 minutes      Gait training to improve functional mobility and safety Gait training level surface inside clinic with rolling walking   6 minute walk test 215 ft (1 seated rest break)  127, 215, 307 20 minutes        Patient Education and Home Exercises    Home Exercises Provided and Patient Education Provided   Patient was educated on the role of PT, POC, treatment plan, discharge goals, HEP.  Patient educated on biomechanical justification for therapeutic exercise and importance of compliance with HEP in order to improve overall impairments and QOL   Patient was educated on all the above exercise prior/during/after for proper posture, positioning, and execution for safe performance with home exercise program.       Written Home Exercises Provided:   Patient instructed to cont prior HEP.   Exercises were reviewed and Radha was able to demonstrate them prior to the end of the session.    Radha demonstrated fair  understanding of the education provided.   See EMR under Patient Instructions for exercises provided during therapy sessions    ASSESSMENT   6 minute walk test completed today. Patient continues to have significant fatigue even with rolling walker gait training, this is due to her little ambulation and cardiovascular activity at home. Physical therapist has discussion with patient regarding increased home ambulation with the rolling walker. Ambulation distance was slightly increased with use of the rolling walker as opposed to the quad cane. However endurance and cardiovascular status continue as primary limiting factor for this patient's activities of daily living performance.    Radha Is progressing well towards her goals.   Pt prognosis is Fair / Good.    Pt will continue to benefit from skilled outpatient physical therapy to address the deficits listed in the problem list box on initial evaluation, provide pt/family education and to maximize pt's level of independence in the home and community environment.   Pt's spiritual, cultural and educational needs considered and pt agreeable to plan of care and goals.  Anticipated barriers to physical therapy: Lives home alone, age, transportation, Unable to drive    Goals:    Short Term Goals: 7/14/2022-4  weeks Status Date Met   1. Patient to be independent with Home Exercise Program performance to impact knowledge of condition  [x] Met  [] Not Met  [] Progressing  11/8/22   2. Pt to perform TUG 20s or less with RW to display improved mobility safety [] Met  [x] Not Met  [x] Progressing     3. Pt to ascend/descend x2 steps with UE assistance to impact curb and community navigation   [x] Met  [] Not Met  [] Progressing  11/8/22   4. Pt to display L knee extension to 0 degrees to impact heel strike during gait [] Met  [x] Not Met  [x] Progressing          Long Term Goal: 10/26/2022-10 weeks Status Date Met   1. Pt to ambulate Community distances with least restrictive AD, ascend/descend x5 steps with handrail and get in/out of car without assistance to impact current status, health and quality of life [] Met  [] Not Met  [x] Progressing        PLAN   Plan of care Certification: 8/17/2022 to 1/7/23     Outpatient Physical Therapy 1-3 times weekly for 8-10 weeks to include the following interventions: Cervical/Lumbar Traction, Electrical Stimulation IFC, Gait Training, Manual Therapy, Neuromuscular Re-ed, Patient Education, Self Care, Therapeutic Activities, Therapeutic Exercise and Dry Needling.        Maria Fernanda Mota

## 2023-01-19 ENCOUNTER — CLINICAL SUPPORT (OUTPATIENT)
Dept: REHABILITATION | Facility: HOSPITAL | Age: 83
End: 2023-01-19
Attending: INTERNAL MEDICINE
Payer: MEDICARE

## 2023-01-19 DIAGNOSIS — M62.81 MUSCLE WEAKNESS: Primary | ICD-10-CM

## 2023-01-19 PROCEDURE — 97110 THERAPEUTIC EXERCISES: CPT | Mod: HCNC,PN | Performed by: GENERAL ACUTE CARE HOSPITAL

## 2023-01-19 PROCEDURE — 97530 THERAPEUTIC ACTIVITIES: CPT | Mod: HCNC,PN | Performed by: GENERAL ACUTE CARE HOSPITAL

## 2023-01-19 PROCEDURE — 97116 GAIT TRAINING THERAPY: CPT | Mod: HCNC,PN | Performed by: GENERAL ACUTE CARE HOSPITAL

## 2023-01-19 NOTE — PROGRESS NOTES
"[facility]   Physical Therapy Treatment Note     Name: Radha Degroot  Clinic Number: [id number]    Therapy Diagnosis:   Encounter Diagnosis   Name Primary?    Muscle weakness Yes     Physician: Jana Holguin MD  Physician Orders: PT Eval and Treat  Medical Diagnosis from Referral: L femur fracture     Evaluation Date: 8/17/2022  Authorization Period Expiration: 12/31/2022  Plan of Care Expiration: 2/11/23  Progress Note Due: 2/11/23  Visit # / Visits authorized: 22/32 (1/1 Eval)    FOTO:   1  8/17/2022  UPPER LEG   2  11/8/2022 UPPER LEG    3  12/12/2022 UPPER LEG      Precautions: Falls   Visit Date: 1/19/2023    Time In: 1300  Time Out: 1400  Total Billable Time: 38 minutes    PTA Visit #: 0 / 5   SUBJECTIVE   Pt returns to OP PT reporting: Patient returns to out patient physical therapist with her rolling walker. When asked about compliance with her home exercises program which include walking-she states "no"  Pain: 1/10  Location: left upper legs    Response to previous treatment: some fatigue  Functional change:improved confidence and mobility with rolling walker   She was compliant with home exercise program.  OBJECTIVE     Objective Measures updated at progress report unless specified.     R knee range of motion:  °   L knee range of motion:  °     Treatment   Radha received following skilled interventions listed below:    PT Intervention Parameters Time   Therapeutic Exercise to develop strength, endurance, ROM, flexibility, posture and core stabilization UBE x 8 minutes  Red Theraband rows 3x10-standing  Yellow Therband 4x10 LAQ bilateral  Standing hip abduction 4x10 bilateral YTB 15 minutes     Therapeutic Activities to improve functional performance 4in step-ups 2 sets to fatigue with upper extremity assist   Sit>stand x10  Range of motion assessent 15 minutes      Gait training to improve functional mobility and safety Gait training level surface inside clinic " x100ft with rolling walker. SBA 8 minutes        Patient Education and Home Exercises   Home Exercises Provided and Patient Education Provided   Patient was educated on the role of PT, POC, treatment plan, discharge goals, HEP.  Patient educated on biomechanical justification for therapeutic exercise and importance of compliance with HEP in order to improve overall impairments and QOL   Patient was educated on all the above exercise prior/during/after for proper posture, positioning, and execution for safe performance with home exercise program.       Written Home Exercises Provided:   Patient instructed to cont prior HEP.   Exercises were reviewed and Radha was able to demonstrate them prior to the end of the session.    Radha demonstrated fair  understanding of the education provided.   See EMR under Patient Instructions for exercises provided during therapy sessions    ASSESSMENT   Range of motion is assessed with end range impairments for flexion and extension bilaterally. Physical therapist emphasizes the importance of home exercise program to impact lower extremity endurance and function, patient reports that she is progressing and improving well. She does continue to have significant fatigue with any standing exercises.     Radha Is progressing well towards her goals.   Pt prognosis is Fair / Good.    Pt will continue to benefit from skilled outpatient physical therapy to address the deficits listed in the problem list box on initial evaluation, provide pt/family education and to maximize pt's level of independence in the home and community environment.   Pt's spiritual, cultural and educational needs considered and pt agreeable to plan of care and goals.  Anticipated barriers to physical therapy: Lives home alone, age, transportation, Unable to drive    Goals:    Short Term Goals: 7/14/2022-4 weeks Status Date Met   1. Patient to be independent with Home Exercise Program performance to impact knowledge of  condition  [x] Met  [] Not Met  [] Progressing  11/8/22   2. Pt to perform TUG 20s or less with RW to display improved mobility safety [] Met  [x] Not Met  [x] Progressing     3. Pt to ascend/descend x2 steps with UE assistance to impact curb and community navigation   [x] Met  [] Not Met  [] Progressing  11/8/22   4. Pt to display L knee extension to 0 degrees to impact heel strike during gait [] Met  [x] Not Met  [x] Progressing          Long Term Goal: 10/26/2022-10 weeks Status Date Met   1. Pt to ambulate Community distances with least restrictive AD, ascend/descend x5 steps with handrail and get in/out of car without assistance to impact current status, health and quality of life [] Met  [] Not Met  [x] Progressing        PLAN   Plan of care Certification: 8/17/2022 to 1/7/23     Outpatient Physical Therapy 1-3 times weekly for 8-10 weeks to include the following interventions: Cervical/Lumbar Traction, Electrical Stimulation IFC, Gait Training, Manual Therapy, Neuromuscular Re-ed, Patient Education, Self Care, Therapeutic Activities, Therapeutic Exercise and Dry Needling.    Continued standing exercises are indicated for patient. She would benefit from endurance training to impact functional activity performance, however Patient might be reaching her maximum benefit from physical therapy based on access to assistance at home and compliance with home exercise program.      Maria Fernanda Mota

## 2023-01-23 ENCOUNTER — TELEPHONE (OUTPATIENT)
Dept: ADMINISTRATIVE | Facility: HOSPITAL | Age: 83
End: 2023-01-23
Payer: MEDICARE

## 2023-01-26 ENCOUNTER — CLINICAL SUPPORT (OUTPATIENT)
Dept: REHABILITATION | Facility: HOSPITAL | Age: 83
End: 2023-01-26
Attending: INTERNAL MEDICINE
Payer: MEDICARE

## 2023-01-26 DIAGNOSIS — E11.42 TYPE 2 DIABETES MELLITUS WITH DIABETIC POLYNEUROPATHY, WITH LONG-TERM CURRENT USE OF INSULIN: Primary | ICD-10-CM

## 2023-01-26 DIAGNOSIS — Z79.4 TYPE 2 DIABETES MELLITUS WITH DIABETIC POLYNEUROPATHY, WITH LONG-TERM CURRENT USE OF INSULIN: Primary | ICD-10-CM

## 2023-01-26 DIAGNOSIS — M62.81 MUSCLE WEAKNESS: Primary | ICD-10-CM

## 2023-01-26 PROCEDURE — 97116 GAIT TRAINING THERAPY: CPT | Mod: HCNC,PN | Performed by: GENERAL ACUTE CARE HOSPITAL

## 2023-01-26 RX ORDER — INSULIN DEGLUDEC 100 U/ML
32 INJECTION, SOLUTION SUBCUTANEOUS DAILY
Qty: 10 PEN | Refills: 1 | Status: SHIPPED | OUTPATIENT
Start: 2023-01-26 | End: 2023-01-26 | Stop reason: SDUPTHER

## 2023-01-26 NOTE — TELEPHONE ENCOUNTER
No new care gaps identified.  [facility] Embedded Care Gaps. Reference number: [id number]. 1/26/2023   11:00:50 AM CST

## 2023-01-26 NOTE — PROGRESS NOTES
"[facility]   Physical Therapy Treatment Note     Name: Radha Degroot  Clinic Number: [id number]    Therapy Diagnosis:   No diagnosis found.    Physician: Jana Hloguin MD  Physician Orders: PT Eval and Treat  Medical Diagnosis from Referral: L femur fracture     Evaluation Date: 8/17/2022  Authorization Period Expiration: 12/31/2022  Plan of Care Expiration: 2/11/23  Progress Note Due: 2/11/23  Visit # / Visits authorized: 22/32 (1/1 Eval)    FOTO:   1  8/17/2022  UPPER LEG   2  11/8/2022 UPPER LEG    3  12/12/2022 UPPER LEG      Precautions: Falls   Visit Date: 1/26/2023    Time In: 1425  Time Out: 1505  Total Billable Time: 25 minutes    PTA Visit #: 0 / 5   SUBJECTIVE   Pt returns to OP PT reporting: Patient was 20 minutes late to therapy today. Patient returns to out patient physical therapist with her rolling walker. When asked about compliance with her home exercises program which include walking-she states "no"  Pain: 0/10  Location: left upper legs    Response to previous treatment: some fatigue  Functional change:improved confidence and mobility with rolling walker   She was compliant with home exercise program.  OBJECTIVE     Objective Measures updated at progress report unless specified.     Treatment   Radha received following skilled interventions listed below:    PT Intervention Parameters Time   Therapeutic Exercise to develop strength, endurance, ROM, flexibility, posture and core stabilization UBE x 6 minutes  Seated abduction with red tband x20  Seated abduction with blue tband 2x20 10 minutes     Therapeutic Activities to improve functional performance Sit to stands, 3x5 5 minutes      Gait training to improve functional mobility and safety Gait training level surface inside clinic x95ft, 120ft, 115ftwith rolling walker. SBA 10 minutes        Patient Education and Home Exercises   Home Exercises Provided and Patient Education Provided   Patient was educated on the " role of PT, POC, treatment plan, discharge goals, HEP.  Patient educated on biomechanical justification for therapeutic exercise and importance of compliance with HEP in order to improve overall impairments and QOL   Patient was educated on all the above exercise prior/during/after for proper posture, positioning, and execution for safe performance with home exercise program.       Written Home Exercises Provided:   Patient instructed to cont prior HEP.   Exercises were reviewed and Radha was able to demonstrate them prior to the end of the session.    Radha demonstrated fair  understanding of the education provided.   See EMR under Patient Instructions for exercises provided during therapy sessions    ASSESSMENT   Physical therapist emphasizes the importance of home exercise program to impact lower extremity endurance and function, patient reports that she is progressing and improving well. She does continue to have significant fatigue with any standing exercises.     Radha Is progressing well towards her goals.   Pt prognosis is Fair / Good.    Pt will continue to benefit from skilled outpatient physical therapy to address the deficits listed in the problem list box on initial evaluation, provide pt/family education and to maximize pt's level of independence in the home and community environment.   Pt's spiritual, cultural and educational needs considered and pt agreeable to plan of care and goals.  Anticipated barriers to physical therapy: Lives home alone, age, transportation, Unable to drive    Goals:    Short Term Goals: 7/14/2022-4 weeks Status Date Met   1. Patient to be independent with Home Exercise Program performance to impact knowledge of condition  [x] Met  [] Not Met  [] Progressing  11/8/22   2. Pt to perform TUG 20s or less with RW to display improved mobility safety [] Met  [x] Not Met  [x] Progressing     3. Pt to ascend/descend x2 steps with UE assistance to impact curb and community  navigation   [x] Met  [] Not Met  [] Progressing  11/8/22   4. Pt to display L knee extension to 0 degrees to impact heel strike during gait [] Met  [x] Not Met  [x] Progressing          Long Term Goal: 10/26/2022-10 weeks Status Date Met   1. Pt to ambulate Community distances with least restrictive AD, ascend/descend x5 steps with handrail and get in/out of car without assistance to impact current status, health and quality of life [] Met  [] Not Met  [x] Progressing        PLAN   Plan of care Certification: 8/17/2022 to 1/7/23     Outpatient Physical Therapy 1-3 times weekly for 8-10 weeks to include the following interventions: Cervical/Lumbar Traction, Electrical Stimulation IFC, Gait Training, Manual Therapy, Neuromuscular Re-ed, Patient Education, Self Care, Therapeutic Activities, Therapeutic Exercise and Dry Needling.    Continued standing exercises are indicated for patient. She would benefit from endurance training to impact functional activity performance, however Patient might be reaching her maximum benefit from physical therapy based on access to assistance at home and compliance with home exercise program.      Luke Alvarez, SPT    Maria Fernanda Mota PT, DPT, SCS, CSCS  Board Certified Sports Clinical Specialist   Certified Dry Needling Provider  1/26/2023    I have reviewed the notes, assessments, and/or procedures performed by Luke, I concur with her/his documentation of Radha Degroot.

## 2023-01-29 PROBLEM — E55.9 VITAMIN D DEFICIENCY: Status: ACTIVE | Noted: 2023-01-29

## 2023-01-30 NOTE — PROGRESS NOTES
Patient, Radha Degroot (MRN #[id number]), presented with a recent Estimated Glumerular Filtration Rate (EGFR) between 15 and 29 consistent with the definition of chronic kidney disease stage 4 (ICD10 - N18.4).    eGFR if non    Date Value Ref Range Status   06/30/2022 22.6 (A) >60 mL/min/1.73 m^2 Final     Comment:     Calculation used to obtain the estimated glomerular filtration  rate (eGFR) is the CKD-EPI equation.          The patient's chronic kidney disease stage 4 was monitored, evaluated, addressed and/or treated. This addendum to the medical record is made on 01/29/2023.

## 2023-01-31 ENCOUNTER — CLINICAL SUPPORT (OUTPATIENT)
Dept: REHABILITATION | Facility: HOSPITAL | Age: 83
End: 2023-01-31
Attending: INTERNAL MEDICINE
Payer: MEDICARE

## 2023-01-31 DIAGNOSIS — M62.81 MUSCLE WEAKNESS: Primary | ICD-10-CM

## 2023-01-31 PROCEDURE — 97110 THERAPEUTIC EXERCISES: CPT | Mod: HCNC,PN | Performed by: GENERAL ACUTE CARE HOSPITAL

## 2023-01-31 PROCEDURE — 97116 GAIT TRAINING THERAPY: CPT | Mod: HCNC,PN | Performed by: GENERAL ACUTE CARE HOSPITAL

## 2023-01-31 NOTE — PROGRESS NOTES
[facility]   Physical Therapy Treatment Note     Name: Radha Degroot  Clinic Number: [id number]    Therapy Diagnosis:   Encounter Diagnosis   Name Primary?    Muscle weakness Yes       Physician: Jana Holguin MD  Physician Orders: PT Eval and Treat  Medical Diagnosis from Referral: L femur fracture     Evaluation Date: 8/17/2022  Authorization Period Expiration: 12/31/2022  Plan of Care Expiration: 2/11/23  Progress Note Due: 2/11/23  Visit # / Visits authorized: 22/32 (1/1 Eval)    FOTO:   1  8/17/2022  UPPER LEG   2  11/8/2022 UPPER LEG    3  12/12/2022 UPPER LEG      Precautions: Falls   Visit Date: 1/31/2023    Time In: 1015  Time Out: 1100  Total Billable Time: 30 minutes    PTA Visit #: 0 / 5   SUBJECTIVE   Pt returns to OP PT reporting: Feeling good today. But reports that she has not been compliant with her walking at home.  Pain: 0/10  Location: left upper legs    Response to previous treatment: some fatigue  Functional change:improved confidence and mobility with rolling walker   She was not compliant with home exercise program.  OBJECTIVE     Objective Measures updated at progress report unless specified.     Treatment   Radha received following skilled interventions listed below:    PT Intervention Parameters Time   Therapeutic Exercise to develop strength, endurance, ROM, flexibility, posture and core stabilization Standing hip abduction 2x15 bilateral  Seated hip adduction ball squeeze x50 8 minutes     Therapeutic Activities to improve functional performance Sit to stands, x8 5 minutes      Gait training to improve functional mobility and safety Gait training level surface inside clinic with rolling walker  (450 ft total) Supervision  123 ft  160 ft  167 ft  25ft  Cueing required for proper leg sequencing, verbal cues for head positioning and environmental scanning. Cueing for proper breathing. 20 minutes      Patient Education and Home Exercises   Home Exercises  Provided and Patient Education Provided   Patient was educated on the role of PT, POC, treatment plan, discharge goals, HEP.  Patient educated on biomechanical justification for therapeutic exercise and importance of compliance with HEP in order to improve overall impairments and QOL   Patient was educated on all the above exercise prior/during/after for proper posture, positioning, and execution for safe performance with home exercise program.       Written Home Exercises Provided:   Patient instructed to cont prior HEP.   Exercises were reviewed and Radha was able to demonstrate them prior to the end of the session.    Radha demonstrated fair  understanding of the education provided.   See EMR under Patient Instructions for exercises provided during therapy sessions    ASSESSMENT   Physical therapist emphasizes the importance of home exercise program to impact lower extremity endurance and function. After heavy education patient reports that she will attempt to perform her walking at home for short distances. Today patient is able to walk her further consecutive distance of 167ft with rolling walker and total cumulative distance of 475 ft during session. She continues to requires cueing for proper breathing, but has decreased her need to rush and sit displaying improved activity tolerance and endurance.Continued home exercises program will impact her progressions positively.     Radha Is progressing well towards her goals.   Pt prognosis is Fair / Good.    Pt will continue to benefit from skilled outpatient physical therapy to address the deficits listed in the problem list box on initial evaluation, provide pt/family education and to maximize pt's level of independence in the home and community environment.   Pt's spiritual, cultural and educational needs considered and pt agreeable to plan of care and goals.  Anticipated barriers to physical therapy: Lives home alone, age, transportation, Unable to  drive    Goals:    Short Term Goals: 7/14/2022-4 weeks Status Date Met   1. Patient to be independent with Home Exercise Program performance to impact knowledge of condition  [x] Met  [] Not Met  [] Progressing  11/8/22   2. Pt to perform TUG 20s or less with RW to display improved mobility safety [] Met  [x] Not Met  [x] Progressing     3. Pt to ascend/descend x2 steps with UE assistance to impact curb and community navigation   [x] Met  [] Not Met  [] Progressing  11/8/22   4. Pt to display L knee extension to 0 degrees to impact heel strike during gait [] Met  [x] Not Met  [x] Progressing          Long Term Goal: 10/26/2022-10 weeks Status Date Met   1. Pt to ambulate Community distances with least restrictive AD, ascend/descend x5 steps with handrail and get in/out of car without assistance to impact current status, health and quality of life [] Met  [] Not Met  [x] Progressing        PLAN   Plan of care Certification: 8/17/2022 to 1/7/23  Outpatient Physical Therapy 1-3 times weekly for 8-10 weeks to include the following interventions: Cervical/Lumbar Traction, Electrical Stimulation IFC, Gait Training, Manual Therapy, Neuromuscular Re-ed, Patient Education, Self Care, Therapeutic Activities, Therapeutic Exercise and Dry Needling.    Continued standing exercises are indicated for patient. She would benefit from endurance training to impact functional activity performance, however Patient might be reaching her maximum benefit from physical therapy based on access to assistance at home and compliance with home exercise program.    Maria Fernanda Mota PT, DPT, SCS, CSCS  Board Certified Sports Clinical Specialist   Certified Dry Needling Provider  1/31/2023

## 2023-02-02 ENCOUNTER — CLINICAL SUPPORT (OUTPATIENT)
Dept: REHABILITATION | Facility: HOSPITAL | Age: 83
End: 2023-02-02
Attending: INTERNAL MEDICINE
Payer: MEDICARE

## 2023-02-02 DIAGNOSIS — M62.81 MUSCLE WEAKNESS: Primary | ICD-10-CM

## 2023-02-02 PROBLEM — M54.50 CHRONIC BILATERAL LOW BACK PAIN WITHOUT SCIATICA: Status: RESOLVED | Noted: 2021-12-14 | Resolved: 2023-02-02

## 2023-02-02 PROBLEM — R29.898 WEAKNESS OF BOTH LOWER EXTREMITIES: Status: RESOLVED | Noted: 2021-12-14 | Resolved: 2023-02-02

## 2023-02-02 PROBLEM — R26.81 DIFFICULTY STANDING: Status: RESOLVED | Noted: 2021-12-14 | Resolved: 2023-02-02

## 2023-02-02 PROBLEM — G89.29 CHRONIC BILATERAL LOW BACK PAIN WITHOUT SCIATICA: Status: RESOLVED | Noted: 2021-12-14 | Resolved: 2023-02-02

## 2023-02-02 PROCEDURE — 97530 THERAPEUTIC ACTIVITIES: CPT | Mod: HCNC,PN | Performed by: GENERAL ACUTE CARE HOSPITAL

## 2023-02-02 PROCEDURE — 97116 GAIT TRAINING THERAPY: CPT | Mod: HCNC,PN | Performed by: GENERAL ACUTE CARE HOSPITAL

## 2023-02-02 PROCEDURE — 97110 THERAPEUTIC EXERCISES: CPT | Mod: HCNC,PN | Performed by: GENERAL ACUTE CARE HOSPITAL

## 2023-02-02 NOTE — PROGRESS NOTES
"[facility]   Physical Therapy Treatment Note     Name: Radha Degroot  Clinic Number: [id number]    Therapy Diagnosis:   Encounter Diagnosis   Name Primary?    Muscle weakness Yes       Physician: Jana Holguin MD  Physician Orders: PT Eval and Treat  Medical Diagnosis from Referral: L femur fracture     Evaluation Date: 8/17/2022  Authorization Period Expiration: 12/31/2022  Plan of Care Expiration: 2/11/23  Progress Note Due: 2/11/23  Visit # / Visits authorized: 22/32 (1/1 Eval)    FOTO:   1  8/17/2022  UPPER LEG   2  11/8/2022 UPPER LEG    3  12/12/2022 UPPER LEG      Precautions: Falls   Visit Date: 2/2/2023    Time In: 1007  Time Out: 1050  Total Billable Time: 49 minutes    PTA Visit #: 0 / 5   SUBJECTIVE   Pt returns to OP PT reporting: feeling good after our last session. She reports she has realized that she had been fearful of walking at home, and she does not feel fear when she is here at physical therapy. "I am glad you talked to me last time and had that tough conversation with me. I recognized I have been fearful at home. And now I am more motivated to do more at home."  Pain: 0/10  Location: left upper legs    Response to previous treatment: some fatigue  Functional change:improved confidence and mobility with rolling walker   She was compliant with home exercise program.  OBJECTIVE     Objective Measures updated at progress report unless specified.     Treatment   Radha received following skilled interventions listed below:    PT Intervention Parameters Time   Therapeutic Exercise to develop strength, endurance, ROM, flexibility, posture and core stabilization Seated hip adduction ball squeeze x60  Seated bike L2 x 8 minutes 14 minutes     Therapeutic Activities to improve functional performance Sit to stands x10  Green theraband deadlifts 2x8  *cueing required for proper hip hinge 12 minutes      Gait training to improve functional mobility and safety Gait training " level surface inside clinic with quad cane  (450 ft total) SBA  100ft  100ft  *cueing for proper cane sequencing, environmental scanning and proper trunk posture. Breathing techniques to slow heart rate to sustain endurance  Gait assessment: decreased lateral trunk lean, reduced trendelenburg gait 15 minutes      Patient Education and Home Exercises   Home Exercises Provided and Patient Education Provided   Patient was educated on the role of PT, POC, treatment plan, discharge goals, HEP.  Patient educated on biomechanical justification for therapeutic exercise and importance of compliance with HEP in order to improve overall impairments and QOL   Patient was educated on all the above exercise prior/during/after for proper posture, positioning, and execution for safe performance with home exercise program.       Written Home Exercises Provided:   Patient instructed to cont prior HEP.   Exercises were reviewed and Radha was able to demonstrate them prior to the end of the session.    Radha demonstrated fair  understanding of the education provided.   See EMR under Patient Instructions for exercises provided during therapy sessions    ASSESSMENT   Improved gait tolerance and sequencing is noticed today. Decreased lateral trunk lean, decreased L trendelenburg, improved silvina and improved environmental scanning is noted during gait training today. Able to progress to banded deadlifts today with fair to good performance.     Radha Is progressing well towards her goals.   Pt prognosis is Fair / Good.    Pt will continue to benefit from skilled outpatient physical therapy to address the deficits listed in the problem list box on initial evaluation, provide pt/family education and to maximize pt's level of independence in the home and community environment.   Pt's spiritual, cultural and educational needs considered and pt agreeable to plan of care and goals.  Anticipated barriers to physical therapy: Lives home alone,  age, transportation, Unable to drive    Goals:    Short Term Goals: 7/14/2022-4 weeks Status Date Met   1. Patient to be independent with Home Exercise Program performance to impact knowledge of condition  [x] Met  [] Not Met  [] Progressing  11/8/22   2. Pt to perform TUG 20s or less with RW to display improved mobility safety [] Met  [x] Not Met  [x] Progressing     3. Pt to ascend/descend x2 steps with UE assistance to impact curb and community navigation   [x] Met  [] Not Met  [] Progressing  11/8/22   4. Pt to display L knee extension to 0 degrees to impact heel strike during gait [] Met  [x] Not Met  [x] Progressing          Long Term Goal: 10/26/2022-10 weeks Status Date Met   1. Pt to ambulate Community distances with least restrictive AD, ascend/descend x5 steps with handrail and get in/out of car without assistance to impact current status, health and quality of life [] Met  [] Not Met  [x] Progressing        PLAN   Plan of care Certification: 8/17/2022 to 1/7/23  Outpatient Physical Therapy 1-3 times weekly for 8-10 weeks to include the following interventions: Cervical/Lumbar Traction, Electrical Stimulation IFC, Gait Training, Manual Therapy, Neuromuscular Re-ed, Patient Education, Self Care, Therapeutic Activities, Therapeutic Exercise and Dry Needling.    Continued standing exercises are indicated for patient. She would benefit from endurance training to impact functional activity performance, however Patient might be reaching her maximum benefit from physical therapy based on access to assistance at home and compliance with home exercise program.    Maria Fernanda Mota PT, DPT, SCS, CSCS  Board Certified Sports Clinical Specialist   Certified Dry Needling Provider  2/2/2023

## 2023-02-07 ENCOUNTER — CLINICAL SUPPORT (OUTPATIENT)
Dept: REHABILITATION | Facility: HOSPITAL | Age: 83
End: 2023-02-07
Attending: INTERNAL MEDICINE
Payer: MEDICARE

## 2023-02-07 DIAGNOSIS — Z00.00 ENCOUNTER FOR MEDICARE ANNUAL WELLNESS EXAM: ICD-10-CM

## 2023-02-07 DIAGNOSIS — M62.81 MUSCLE WEAKNESS: Primary | ICD-10-CM

## 2023-02-07 PROCEDURE — 97110 THERAPEUTIC EXERCISES: CPT | Mod: HCNC,PN | Performed by: GENERAL ACUTE CARE HOSPITAL

## 2023-02-07 PROCEDURE — 97530 THERAPEUTIC ACTIVITIES: CPT | Mod: HCNC,PN | Performed by: GENERAL ACUTE CARE HOSPITAL

## 2023-02-07 NOTE — PROGRESS NOTES
"[facility]   Physical Therapy Reassessment & Treatment Note     Name: Radha Degroot  Clinic Number: [id number]    Therapy Diagnosis:   Encounter Diagnosis   Name Primary?    Muscle weakness Yes     Physician: Jana Holguin MD  Physician Orders: PT Eval and Treat  Medical Diagnosis from Referral: L femur fracture     Evaluation Date: 8/17/2022  Authorization Period Expiration: 12/31/2022  Plan of Care Expiration: 3/7/23-UPDATED  Progress Note Due: 3/7/23  Visit # / Visits authorized: 23/32 (1/1 Eval)    FOTO:   1 8/17/2022  UPPER LEG   2 11/8/2022 UPPER LEG    3 12/12/2022 UPPER LEG   4. 2/7/2023 UPPER LEG      Precautions: Falls   Visit Date: 2/7/2023    Time In: 1010  Time Out: 1105  Total Billable Time: 53 minutes    PTA Visit #: 0 / 5   SUBJECTIVE   Pt returns to OP PT reporting: that she "had a breakthrough" when waking at home with her rolling walker. Patient is no longer afraid to walk at home without assistance with her rolling walker. She reports improved independence and increased desired to continue with her home program daily at home alone.  Pain: 0/10  Location: left upper legs    Response to previous treatment: some fatigue  Functional change:improved confidence and mobility with rolling walker   She was compliant with home exercise program.  OBJECTIVE     RANGE OF MOTION:  Hip AROM/PROM Right  2/7/2023 Pain/Dysfunction with Movement Left  2/7/2023 Pain/Dysfunction with Movement   Hip Flexion (120) WFL [x]No []Yes WFL [x]No []Yes   Hip IR (45) 35 [x]No []Yes 30 [x]No []Yes   Hip ER (45) 25 [x]No []Yes 25 [x]No []Yes     Knee AROM/PROM Right  2/7/2023 Pain/Dysfunction with Movement Left  2/7/2023 Pain/Dysfunction with Movement   Hyper - Zero - Flexion 0- [x]No []Yes 0-8-110 [x]No []Yes     STRENGTH:  L/E MMT Left  2/7/2023 Right  2/7/2023 Goal   Hip Flexion  4/5 4+/5 5/5 B    Knee Extension 4+/5 5/5 5/5 B      Chair Rise Test: 25.5s (no upper extremity assist " required)  FOTO: 50%      Evaluation   Timed Up and Go 32 seconds with rolling walker  < 20 sec safe for independent transfers  < 30 sec safe for dependent transfers/assist required       Table: Population Norms for TUG    Age  Average TUG    60 - 69 years  8.1 seconds    70 - 79 years  9.2 seconds    80 - 99 years  11.3 seconds      Treatment   Radha received following skilled interventions listed below:    PT Intervention Parameters Time   Therapeutic Exercise to develop strength, endurance, ROM, flexibility, posture and core stabilization Bike seated x8 minutes for range of motion and endurance training  Standing hip abduction Red Theraband 3x10  Reassessment-Range of motion and strength  Home exercise program performance and review  Plan of care education and review 30 minutes     Therapeutic Activities to improve functional performance 3x8 mini squats in parallel bars  TUG  Chair rise test  Home exercise program performance and review  Plan of care education and review 25 minutes       Patient Education and Home Exercises   Home Exercises Provided and Patient Education Provided   Patient was educated on the role of PT, POC, treatment plan, discharge goals, HEP.  Patient educated on biomechanical justification for therapeutic exercise and importance of compliance with HEP in order to improve overall impairments and QOL   Patient was educated on all the above exercise prior/during/after for proper posture, positioning, and execution for safe performance with home exercise program.       Written Home Exercises Provided:   Patient instructed to cont prior HEP.   Exercises were reviewed and Radha was able to demonstrate them prior to the end of the session.    Radha demonstrated fair  understanding of the education provided.   See EMR under Patient Instructions for exercises provided during therapy sessions    ASSESSMENT   Patient is making slow, but steady progress with physical therapy despite her objective  values not particularly improving. Quality of life, subjective report of increased home activities, increased silvina, and improved posture all display the positive impact of physical therapy treatment. Patient recently has gotten over her fear of walking at home with her rolling walker. This is a promising improvement.  We will request additional physical therapy to continue progression to improve speed, confidence, and lower extremity strength for another 4 weeks if able. Plan to trend towards discharge and return to independent life after that time.    Radha Is progressing well towards her goals.   Pt prognosis is Fair / Good.    Pt will continue to benefit from skilled outpatient physical therapy to address the deficits listed in the problem list box on initial evaluation, provide pt/family education and to maximize pt's level of independence in the home and community environment.   Pt's spiritual, cultural and educational needs considered and pt agreeable to plan of care and goals.  Anticipated barriers to physical therapy: Lives home alone, age, transportation, Unable to drive    Goals:    Short Term Goals: 3/7/2023-4 weeks Status Date Met   1. Patient to be independent with Home Exercise Program performance to impact knowledge of condition  [x] Met  [] Not Met  [] Progressing  11/8/22   2. Pt to perform TUG 20s or less with RW to display improved mobility safety-32 seconds today with rolling walker [] Met  [x] Not Met  [x] Progressing     3. Pt to ascend/descend x2 steps with UE assistance to impact curb and community navigation   [x] Met  [] Not Met  [] Progressing  11/8/22   4. Pt to display L knee extension to 0 degrees to impact heel strike during gait-no longer applicable unable to obtain Terminal knee extension on the unaffected leg [] Met  [x] Not Met  [] Progressing     5. Patient to improve chair rise test to 20s or less to display increased speed and confidence with transfers [] Met  [] Not Met  []  Progressing    6.           Long Term Goal: 3/7/2023 Status Date Met   1. Pt to ambulate Community distances with least restrictive AD, ascend/descend x5 steps with handrail and get in/out of car without assistance to impact current status, health and quality of life [] Met  [] Not Met  [x] Progressing     2. Patient to report confidence in managing condition upon discharge  [] Met  [] Not Met  [] Progressing    3. Patient to report unrestricted activities of daily living performance at home [] Met  [] Not Met  [] Progressing       PLAN   Plan of care Certification: 8/17/2022 to 1/7/23  Outpatient Physical Therapy 1-3 times weekly for 8-10 weeks to include the following interventions: Cervical/Lumbar Traction, Electrical Stimulation IFC, Gait Training, Manual Therapy, Neuromuscular Re-ed, Patient Education, Self Care, Therapeutic Activities, Therapeutic Exercise and Dry Needling.    New Goals added. Will continue for 4 weeks and prepare for discharge.    Maria Fernanda Mota PT, DPT, SCS, CSCS  Board Certified Sports Clinical Specialist   Certified Dry Needling Provider  2/7/2023

## 2023-02-09 ENCOUNTER — CLINICAL SUPPORT (OUTPATIENT)
Dept: REHABILITATION | Facility: HOSPITAL | Age: 83
End: 2023-02-09
Attending: INTERNAL MEDICINE
Payer: MEDICARE

## 2023-02-09 DIAGNOSIS — M62.81 MUSCLE WEAKNESS: Primary | ICD-10-CM

## 2023-02-09 DIAGNOSIS — Z00.00 ENCOUNTER FOR MEDICARE ANNUAL WELLNESS EXAM: ICD-10-CM

## 2023-02-09 PROCEDURE — 97530 THERAPEUTIC ACTIVITIES: CPT | Mod: HCNC,PN | Performed by: GENERAL ACUTE CARE HOSPITAL

## 2023-02-09 PROCEDURE — 97110 THERAPEUTIC EXERCISES: CPT | Mod: HCNC,PN | Performed by: GENERAL ACUTE CARE HOSPITAL

## 2023-02-09 NOTE — PROGRESS NOTES
[facility]   Physical Therapy Treatment Note     Name: Radha Degroot  Clinic Number: [id number]    Therapy Diagnosis:   Encounter Diagnosis   Name Primary?    Muscle weakness Yes     Physician: Jana Holguin MD  Physician Orders: PT Eval and Treat  Medical Diagnosis from Referral: L femur fracture     Evaluation Date: 8/17/2022  Authorization Period Expiration: 12/31/2022  Plan of Care Expiration: 3/7/23-UPDATED  Progress Note Due: 3/7/23  Visit # / Visits authorized: 24/32 (1/1 Eval)    FOTO:   1 8/17/2022  UPPER LEG   2 11/8/2022 UPPER LEG    3 12/12/2022 UPPER LEG   4. 2/7/2023 UPPER LEG      Precautions: Falls   Visit Date: 2/9/2023    Time In: 1008  Time Out: 1100  Total Billable Time: 40 minutes    PTA Visit #: 0 / 5   SUBJECTIVE   Pt returns to OP PT reporting: Feeling good today. Has been compliant with home exercise program and home walking.  Pain: 0/10  Location: left upper legs    Response to previous treatment: some fatigue  Functional change:improved confidence and mobility with rolling walker - compliance with home exercise program walking  She was compliant with home exercise program.  OBJECTIVE     Objective measures are taking at reassessments unless otherwise noted here    Treatment   Radha received following skilled interventions listed below:    PT Intervention Parameters Time   Therapeutic Exercise to develop strength, endurance, ROM, flexibility, posture and core stabilization UBE x 6 minutes forward  Lat pull downs in standing 10# 3x10 - cueing for postural correction, correct form and core activation during exercise 15 minutes     Therapeutic Activities to improve functional performance 3x8 sit>stand from chair-cueing for attempts without upper extremity assist to rise from chair  Gait and endurance training with quad cane 3x75ft-heavy cueing for silvina and slowed performance for safety  *moderate rest breaks are required throughout both exericses 25 minutes        Patient Education and Home Exercises   Home Exercises Provided and Patient Education Provided   Patient was educated on the role of PT, POC, treatment plan, discharge goals, HEP.  Patient educated on biomechanical justification for therapeutic exercise and importance of compliance with HEP in order to improve overall impairments and QOL   Patient was educated on all the above exercise prior/during/after for proper posture, positioning, and execution for safe performance with home exercise program.       Written Home Exercises Provided:   Patient instructed to cont prior HEP.   Exercises were reviewed and Radha was able to demonstrate them prior to the end of the session.    Radha demonstrated fair  understanding of the education provided.   See EMR under Patient Instructions for exercises provided during therapy sessions    ASSESSMENT   Focus on functional task perform and strengthening in standing positions today to impact endurance and independence. Plan to trend towards discharge and return to independent life after that time.    Radha Is progressing well towards her goals.   Pt prognosis is Fair / Good.    Pt will continue to benefit from skilled outpatient physical therapy to address the deficits listed in the problem list box on initial evaluation, provide pt/family education and to maximize pt's level of independence in the home and community environment.   Pt's spiritual, cultural and educational needs considered and pt agreeable to plan of care and goals.  Anticipated barriers to physical therapy: Lives home alone, age, transportation, Unable to drive    Goals:    Short Term Goals: 3/7/2023-4 weeks Status Date Met   1. Patient to be independent with Home Exercise Program performance to impact knowledge of condition  [x] Met  [] Not Met  [] Progressing  11/8/22   2. Pt to perform TUG 20s or less with RW to display improved mobility safety-32 seconds today with rolling walker [] Met  [x] Not Met  [x]  Progressing     3. Pt to ascend/descend x2 steps with UE assistance to impact curb and community navigation   [x] Met  [] Not Met  [] Progressing  11/8/22   4. Pt to display L knee extension to 0 degrees to impact heel strike during gait-no longer applicable unable to obtain Terminal knee extension on the unaffected leg [] Met  [x] Not Met  [] Progressing     5. Patient to improve chair rise test to 20s or less to display increased speed and confidence with transfers [] Met  [] Not Met  [] Progressing    6.           Long Term Goal: 3/7/2023 Status Date Met   1. Pt to ambulate Community distances with least restrictive AD, ascend/descend x5 steps with handrail and get in/out of car without assistance to impact current status, health and quality of life [] Met  [] Not Met  [x] Progressing     2. Patient to report confidence in managing condition upon discharge  [] Met  [] Not Met  [] Progressing    3. Patient to report unrestricted activities of daily living performance at home [] Met  [] Not Met  [] Progressing       PLAN   Plan of care Certification: 8/17/2022 to 1/7/23  Outpatient Physical Therapy 1-3 times weekly for 8-10 weeks to include the following interventions: Cervical/Lumbar Traction, Electrical Stimulation IFC, Gait Training, Manual Therapy, Neuromuscular Re-ed, Patient Education, Self Care, Therapeutic Activities, Therapeutic Exercise and Dry Needling.    New Goals added. Will continue for 4 weeks and prepare for discharge.    Maria Fernanda Mota PT, DPT, SCS, CSCS  Board Certified Sports Clinical Specialist   Certified Dry Needling Provider  2/9/2023

## 2023-02-24 DIAGNOSIS — E78.2 MIXED HYPERLIPIDEMIA: Chronic | ICD-10-CM

## 2023-02-24 RX ORDER — INSULIN DEGLUDEC 100 U/ML
INJECTION, SOLUTION SUBCUTANEOUS
Qty: 10 PEN | Refills: 1 | Status: SHIPPED | OUTPATIENT
Start: 2023-02-24 | End: 2023-03-20 | Stop reason: SDUPTHER

## 2023-02-24 RX ORDER — ROSUVASTATIN CALCIUM 40 MG/1
40 TABLET, COATED ORAL DAILY
Qty: 90 TABLET | Refills: 3 | Status: SHIPPED | OUTPATIENT
Start: 2023-02-24 | End: 2024-03-19 | Stop reason: SDUPTHER

## 2023-02-24 NOTE — TELEPHONE ENCOUNTER
----- Message from Radha Merida sent at 2/24/2023 12:51 PM CST -----  Contact: Rosa Isela Natarajan is calling to speak to the nurse to request a refill on the medication rosuvastatin (CRESTOR) 40 MG Tab, patient stated she  has been out for 2 days. If any questions please give patient a call back at [phone]    Thanks  YULIANA

## 2023-02-24 NOTE — TELEPHONE ENCOUNTER
Care Due:                  Date            Visit Type   Department     Provider  --------------------------------------------------------------------------------                                NP -                              PRIMARY      [facility] FAM[facility]  Last Visit: 06-      CARE ([facility])   MEDICINE       Jana Holguin  Next Visit: None Scheduled  None         None Found                                                            Last  Test          Frequency    Reason                     Performed    Due Date  --------------------------------------------------------------------------------    Lipid Panel.  12 months..  rosuvastatin.............  03- 03-    [facility] Embedded Care Gaps. Reference number: [id number]. 2/24/2023   1:44:46 PM CST

## 2023-02-28 ENCOUNTER — OFFICE VISIT (OUTPATIENT)
Dept: CARDIOLOGY | Facility: CLINIC | Age: 83
End: 2023-02-28
Payer: MEDICARE

## 2023-02-28 VITALS
WEIGHT: 219.88 LBS | HEART RATE: 64 BPM | SYSTOLIC BLOOD PRESSURE: 162 MMHG | HEIGHT: 68 IN | BODY MASS INDEX: 33.33 KG/M2 | OXYGEN SATURATION: 98 % | DIASTOLIC BLOOD PRESSURE: 64 MMHG

## 2023-02-28 DIAGNOSIS — I50.32 CHRONIC DIASTOLIC CHF (CONGESTIVE HEART FAILURE): Primary | ICD-10-CM

## 2023-02-28 DIAGNOSIS — I48.21 PERMANENT ATRIAL FIBRILLATION: ICD-10-CM

## 2023-02-28 DIAGNOSIS — I70.0 AORTIC ATHEROSCLEROSIS: ICD-10-CM

## 2023-02-28 DIAGNOSIS — I87.2 VENOUS INSUFFICIENCY: ICD-10-CM

## 2023-02-28 DIAGNOSIS — I10 ESSENTIAL HYPERTENSION: Chronic | ICD-10-CM

## 2023-02-28 DIAGNOSIS — N18.4 CHRONIC KIDNEY DISEASE, STAGE IV (SEVERE): Chronic | ICD-10-CM

## 2023-02-28 DIAGNOSIS — I25.10 CORONARY ARTERY DISEASE INVOLVING NATIVE CORONARY ARTERY OF NATIVE HEART WITHOUT ANGINA PECTORIS: ICD-10-CM

## 2023-02-28 PROCEDURE — 3077F PR MOST RECENT SYSTOLIC BLOOD PRESSURE >= 140 MM HG: ICD-10-PCS | Mod: HCNC,CPTII,S$GLB, | Performed by: INTERNAL MEDICINE

## 2023-02-28 PROCEDURE — 3077F SYST BP >= 140 MM HG: CPT | Mod: HCNC,CPTII,S$GLB, | Performed by: INTERNAL MEDICINE

## 2023-02-28 PROCEDURE — 3288F PR FALLS RISK ASSESSMENT DOCUMENTED: ICD-10-PCS | Mod: HCNC,CPTII,S$GLB, | Performed by: INTERNAL MEDICINE

## 2023-02-28 PROCEDURE — 1100F PTFALLS ASSESS-DOCD GE2>/YR: CPT | Mod: HCNC,CPTII,S$GLB, | Performed by: INTERNAL MEDICINE

## 2023-02-28 PROCEDURE — 1126F AMNT PAIN NOTED NONE PRSNT: CPT | Mod: HCNC,CPTII,S$GLB, | Performed by: INTERNAL MEDICINE

## 2023-02-28 PROCEDURE — 1159F MED LIST DOCD IN RCRD: CPT | Mod: HCNC,CPTII,S$GLB, | Performed by: INTERNAL MEDICINE

## 2023-02-28 PROCEDURE — 3078F DIAST BP <80 MM HG: CPT | Mod: HCNC,CPTII,S$GLB, | Performed by: INTERNAL MEDICINE

## 2023-02-28 PROCEDURE — 99999 PR PBB SHADOW E&M-EST. PATIENT-LVL III: ICD-10-PCS | Mod: PBBFAC,HCNC,, | Performed by: INTERNAL MEDICINE

## 2023-02-28 PROCEDURE — 99214 OFFICE O/P EST MOD 30 MIN: CPT | Mod: HCNC,S$GLB,, | Performed by: INTERNAL MEDICINE

## 2023-02-28 PROCEDURE — 3078F PR MOST RECENT DIASTOLIC BLOOD PRESSURE < 80 MM HG: ICD-10-PCS | Mod: HCNC,CPTII,S$GLB, | Performed by: INTERNAL MEDICINE

## 2023-02-28 PROCEDURE — 1159F PR MEDICATION LIST DOCUMENTED IN MEDICAL RECORD: ICD-10-PCS | Mod: HCNC,CPTII,S$GLB, | Performed by: INTERNAL MEDICINE

## 2023-02-28 PROCEDURE — 99999 PR PBB SHADOW E&M-EST. PATIENT-LVL III: CPT | Mod: PBBFAC,HCNC,, | Performed by: INTERNAL MEDICINE

## 2023-02-28 PROCEDURE — 99214 PR OFFICE/OUTPT VISIT, EST, LEVL IV, 30-39 MIN: ICD-10-PCS | Mod: HCNC,S$GLB,, | Performed by: INTERNAL MEDICINE

## 2023-02-28 PROCEDURE — 3288F FALL RISK ASSESSMENT DOCD: CPT | Mod: HCNC,CPTII,S$GLB, | Performed by: INTERNAL MEDICINE

## 2023-02-28 PROCEDURE — 1100F PR PT FALLS ASSESS DOC 2+ FALLS/FALL W/INJURY/YR: ICD-10-PCS | Mod: HCNC,CPTII,S$GLB, | Performed by: INTERNAL MEDICINE

## 2023-02-28 PROCEDURE — 1126F PR PAIN SEVERITY QUANTIFIED, NO PAIN PRESENT: ICD-10-PCS | Mod: HCNC,CPTII,S$GLB, | Performed by: INTERNAL MEDICINE

## 2023-02-28 NOTE — PROGRESS NOTES
Subjective:    Patient ID:  Radha Degroot is a 82 y.o. female who presents for follow-up of Follow-up      HPI82 yo WF with hx of MI in 2006 with LAD and diagonal stent, HTN and permanent AF. Also with CKD with last Cr 2.0. Still has the swelling of leg and SOB but stable and chronic. No CP. Not aware of the AF    Summary    The left ventricle is normal in size with concentric remodeling and normal systolic function. The estimated ejection fraction is 60%.  Normal right ventricular size with normal right ventricular systolic function.  Biatrial enlargement.  Mild tricuspid regurgitation.  The estimated PA systolic pressure is 42 mmHg.  Normal central venous pressure (3 mmHg).  Atrial fibrillation observed.    Review of Systems   Constitutional: Negative for decreased appetite, fever, malaise/fatigue, weight gain and weight loss.   HENT:  Negative for hearing loss and nosebleeds.    Eyes:  Negative for visual disturbance.   Cardiovascular:  Positive for dyspnea on exertion and leg swelling. Negative for chest pain, claudication, cyanosis, irregular heartbeat, near-syncope, orthopnea, palpitations, paroxysmal nocturnal dyspnea and syncope.   Respiratory:  Positive for shortness of breath. Negative for cough, hemoptysis, sleep disturbances due to breathing, snoring and wheezing.    Endocrine: Negative for cold intolerance, heat intolerance, polydipsia and polyuria.   Hematologic/Lymphatic: Negative for adenopathy and bleeding problem. Does not bruise/bleed easily.   Skin:  Negative for color change, itching, poor wound healing, rash and suspicious lesions.   Musculoskeletal:  Negative for arthritis, back pain, falls, joint pain, joint swelling, muscle cramps, muscle weakness and myalgias.   Gastrointestinal:  Negative for bloating, abdominal pain, change in bowel habit, constipation, flatus, heartburn, hematemesis, hematochezia, hemorrhoids, jaundice, melena, nausea and vomiting.   Genitourinary:  Negative for  "bladder incontinence, decreased libido, frequency, hematuria, hesitancy and urgency.   Neurological:  Negative for brief paralysis, difficulty with concentration, excessive daytime sleepiness, dizziness, focal weakness, headaches, light-headedness, loss of balance, numbness, vertigo and weakness.   Psychiatric/Behavioral:  Negative for altered mental status, depression and memory loss. The patient does not have insomnia and is not nervous/anxious.    Allergic/Immunologic: Negative for environmental allergies, hives and persistent infections.      Objective:    Physical Exam  Vitals and nursing note reviewed.   Constitutional:       Appearance: She is well-developed.      Comments: BP (!) 162/64   Pulse 64   Ht 5' 8" (1.727 m)   Wt 99.7 kg (219 lb 14.4 oz)   SpO2 98%   BMI 33.44 kg/m²      HENT:      Head: Normocephalic and atraumatic.      Right Ear: External ear normal.      Left Ear: External ear normal.      Nose: Nose normal.   Eyes:      General: Lids are normal. No scleral icterus.        Right eye: No discharge.         Left eye: No discharge.      Conjunctiva/sclera: Conjunctivae normal.      Right eye: No hemorrhage.     Pupils: Pupils are equal, round, and reactive to light.   Neck:      Thyroid: No thyromegaly.      Vascular: No JVD.      Trachea: No tracheal deviation.   Cardiovascular:      Rate and Rhythm: Normal rate. Rhythm irregularly irregular.      Pulses: Intact distal pulses.      Heart sounds: Normal heart sounds. No murmur heard.    No friction rub. No gallop.   Pulmonary:      Effort: Pulmonary effort is normal. No respiratory distress.      Breath sounds: Normal breath sounds. No wheezing or rales.   Chest:      Chest wall: No tenderness.   Breasts:     Breasts are symmetrical.   Abdominal:      General: Bowel sounds are normal. There is no distension.      Palpations: Abdomen is soft. There is no hepatomegaly or mass.      Tenderness: There is no abdominal tenderness. There is no " guarding or rebound.   Musculoskeletal:         General: No tenderness. Normal range of motion.      Cervical back: Normal range of motion and neck supple.      Left lower leg: Edema present.   Lymphadenopathy:      Cervical: No cervical adenopathy.   Skin:     General: Skin is warm and dry.      Coloration: Skin is not pale.      Findings: No erythema or rash.   Neurological:      Mental Status: She is alert and oriented to person, place, and time.      Cranial Nerves: No cranial nerve deficit.      Coordination: Coordination normal.      Deep Tendon Reflexes: Reflexes are normal and symmetric. Reflexes normal.   Psychiatric:         Behavior: Behavior normal.         Thought Content: Thought content normal.         Judgment: Judgment normal.         Assessment:       1. Chronic diastolic CHF (congestive heart failure)    2. Essential hypertension    3. Aortic atherosclerosis    4. Permanent atrial fibrillation    5. Chronic kidney disease, stage IV (severe)    6. Venous insufficiency    7. Coronary artery disease involving native coronary artery of native heart without angina pectoris         Plan:     Continue current meds   Monitor BP at home and discuss meds with nephrologist next visit   Low salt diet   Compression stockings and leg elevation   Risk of stroke from atrial fib has been discussed as well as bleeding risk from alternative anticoagulation regiments as well as risk and benefits of rate control vs cardioversion    No orders of the defined types were placed in this encounter.    Follow up in about 6 months (around 8/28/2023).

## 2023-03-09 ENCOUNTER — CLINICAL SUPPORT (OUTPATIENT)
Dept: REHABILITATION | Facility: HOSPITAL | Age: 83
End: 2023-03-09
Attending: INTERNAL MEDICINE
Payer: MEDICARE

## 2023-03-09 DIAGNOSIS — M62.81 MUSCLE WEAKNESS: Primary | ICD-10-CM

## 2023-03-09 PROCEDURE — 97110 THERAPEUTIC EXERCISES: CPT | Mod: HCNC,PN | Performed by: GENERAL ACUTE CARE HOSPITAL

## 2023-03-09 PROCEDURE — 97140 MANUAL THERAPY 1/> REGIONS: CPT | Mod: HCNC,PN | Performed by: GENERAL ACUTE CARE HOSPITAL

## 2023-03-09 PROCEDURE — 97530 THERAPEUTIC ACTIVITIES: CPT | Mod: HCNC,PN | Performed by: GENERAL ACUTE CARE HOSPITAL

## 2023-03-09 PROCEDURE — 97112 NEUROMUSCULAR REEDUCATION: CPT | Mod: HCNC,PN | Performed by: GENERAL ACUTE CARE HOSPITAL

## 2023-03-09 NOTE — PROGRESS NOTES
[facility]   Physical Therapy Reassessment & Treatment Note   Name: Radha Degroot  Clinic Number: [id number]    Therapy Diagnosis:   Encounter Diagnosis   Name Primary?    Muscle weakness Yes     Physician: Jana Holguin MD  Physician Orders: PT Eval and Treat  Medical Diagnosis from Referral: L femur fracture     Evaluation Date: 2022  Authorization Period Expiration: 2022  Plan of Care Expiration: 23-UPDATED  Progress Note Due: 23  Visit # / Visits authorized:  ( Eval)    FOTO:   1 2022  UPPER LEG   2 2022 UPPER LEG    3 2022 UPPER LEG   4. 2023 UPPER LEG   5. 3/9/2023 UPPER LEG      Precautions: Falls   Visit Date: 3/9/2023    Time In: 1410  Time Out: 1500  Total Billable Time: 50 minutes    PTA Visit #: 0 / 5   SUBJECTIVE   Pt returns to OP PT reporting: that she has been walking at home with her rolling walker and some with a new quad cane that she purchased.  Pain: 0/10  Location: left upper legs    Response to previous treatment: some fatigue  Functional change:improved confidence and mobility with rolling walker - compliance with home exercise program walking  She was compliant with home exercise program.  OBJECTIVE     Objective measures are taking at reassessments unless otherwise noted here    RANGE OF MOTION:  Hip AROM/PROM Right  3/9/2023 Pain/Dysfunction with Movement Left  3/9/2023 Pain/Dysfunction with Movement   Hip IR (45) 35 [x]No []Yes 25 [x]No []Yes   Hip ER (45) 26 [x]No []Yes 23 [x]No []Yes     Knee AROM/PROM Right  3/9/2023 Pain/Dysfunction with Movement Left  3/9/2023 Pain/Dysfunction with Movement   Hyper - Zero - Flexion 0- [x]No []Yes 0-9-114 [x]No []Yes     STRENGTH:  L/E MMT Left  3/9/2023 Right  3/9/2023 Goal   Hip Flexion  4/5 4/5 5/5 B    Knee Extension 4+/5 4+/5 5/5 B      TUs with large base quad cane       Timed Up and Go < 20 sec safe for independent transfers  < 30 sec safe for dependent  transfers/assist required        Table: Population Norms for TUG        Age  Average TUG    60 - 69 years  8.1 seconds    70 - 79 years  9.2 seconds    80 - 99 years  11.3 seconds      Chair Rise Test: 21s (no upper extremity assist to rise)  Single leg Balance: 1s per leg    Decreased lower leg edema    Treatment   Radha received following skilled interventions listed below:    PT Intervention Parameters Time   Therapeutic Exercise to develop strength, endurance, ROM, flexibility, posture and core stabilization Quad cane fitting  Home exercise program review 15 minutes (1)     Therapeutic Activities to improve functional performance Chair Rise Test test  Stair Gait navigation  Education 15 minutes (1)     Neuromuscular Re-education activities to improve: Balance, Coordination, Kinesthetic, Sense, Proprioception, and Posture  TUG test  Four Square step test  Education 13 minutes (1)      Manual Therapy techniques range of motion, soft tissue assessment, strength testing  Strength, range of motion assessment  Education 10 minutes      Patient Education and Home Exercises   Home Exercises Provided and Patient Education Provided   Patient was educated on the role of PT, POC, treatment plan, discharge goals, HEP.  Patient educated on biomechanical justification for therapeutic exercise and importance of compliance with HEP in order to improve overall impairments and QOL   Patient was educated on all the above exercise prior/during/after for proper posture, positioning, and execution for safe performance with home exercise program.       Written Home Exercises Provided:   Patient instructed to cont prior HEP.   Exercises were reviewed and Radha was able to demonstrate them prior to the end of the session.    Radha demonstrated fair  understanding of the education provided.   See EMR under Patient Instructions for exercises provided during therapy sessions    ASSESSMENT   Patient displays improvements with mobility and  strength since last treatment session. New goals are added and we will continue 1x per week fo the next month with goals to focus on independence and transition to discharge and home management.    Radha Is progressing well towards her goals.   Pt prognosis is Fair / Good.    Pt will continue to benefit from skilled outpatient physical therapy to address the deficits listed in the problem list box on initial evaluation, provide pt/family education and to maximize pt's level of independence in the home and community environment.   Pt's spiritual, cultural and educational needs considered and pt agreeable to plan of care and goals.  Anticipated barriers to physical therapy: Lives home alone, age, transportation, Unable to drive    Goals:    Short Term Goals: 3/7/2023-4 weeks Status Date Met   1. Patient to be independent with Home Exercise Program performance to impact knowledge of condition  [x] Met  [] Not Met  [] Progressing  11/8/22   2. 3. Pt to ascend/descend x2 steps with UE assistance to impact curb and community navigation   [x] Met  [] Not Met  [] Progressing  11/8/22   4. 5. Patient to improve chair rise test to 20s or less to display increased speed and confidence with transfers-21s today  [] Met  [] Not Met  [x] Progressing         Long Term Goal: 3/7/2023 Status Date Met   1. Pt to ambulate Community distances with least restrictive AD, ascend/descend x5 steps with handrail and get in/out of car without assistance to impact current status, health and quality of life [] Met  [] Not Met  [x] Progressing     2. Patient to report confidence in managing condition upon discharge  [] Met  [] Not Met  [x] Progressing    3. Patient to report unrestricted activities of daily living performance at home [] Met  [] Not Met  [x] Progressing    4. Patient to  object off floor without loss of balance safely to impact home life [] Met  [] Not Met  [] Progressing    5. Patient to perform TUG with quad cane in <30s  to display increased silvina and locomotion safety [] Met  [] Not Met  [] Progressing    6. Patient to perform four square step test in 12 seconds or less with least restrictive AD to display improved navigation and foot clearance during gait. [] Met  [] Not Met  [] Progressing       PLAN   Plan of care Certification: 8/17/2022 to 1/7/23  Outpatient Physical Therapy 1-3 times weekly for 8-10 weeks to include the following interventions: Cervical/Lumbar Traction, Electrical Stimulation IFC, Gait Training, Manual Therapy, Neuromuscular Re-ed, Patient Education, Self Care, Therapeutic Activities, Therapeutic Exercise and Dry Needling.    New Goals added. Will continue 1x week for 4 weeks and prepare for discharge and home exercise program maintenance  independently.     Maria Fernanda Mota PT, DPT, SCS, CSCS  Board Certified Sports Clinical Specialist   Certified Dry Needling Provider  3/9/2023

## 2023-03-15 ENCOUNTER — CLINICAL SUPPORT (OUTPATIENT)
Dept: REHABILITATION | Facility: HOSPITAL | Age: 83
End: 2023-03-15
Attending: INTERNAL MEDICINE
Payer: MEDICARE

## 2023-03-15 DIAGNOSIS — M62.81 MUSCLE WEAKNESS: Primary | ICD-10-CM

## 2023-03-15 PROCEDURE — 97530 THERAPEUTIC ACTIVITIES: CPT | Mod: HCNC,PN | Performed by: GENERAL ACUTE CARE HOSPITAL

## 2023-03-15 PROCEDURE — 97110 THERAPEUTIC EXERCISES: CPT | Mod: HCNC,PN | Performed by: GENERAL ACUTE CARE HOSPITAL

## 2023-03-15 PROCEDURE — 97112 NEUROMUSCULAR REEDUCATION: CPT | Mod: HCNC,PN | Performed by: GENERAL ACUTE CARE HOSPITAL

## 2023-03-15 NOTE — PROGRESS NOTES
[facility]   Physical Therapy Treatment Note   Name: Radha Degroot  Clinic Number: [id number]    Therapy Diagnosis:   Encounter Diagnosis   Name Primary?    Muscle weakness Yes     Physician: Jana Holguin MD  Physician Orders: PT Eval and Treat  Medical Diagnosis from Referral: L femur fracture     Evaluation Date: 2022  Authorization Period Expiration: 2022  Plan of Care Expiration: 23-UPDATED  Progress Note Due: 23  Visit # / Visits authorized:  ( Eval)    FOTO:   1 2022  UPPER LEG   2 2022 UPPER LEG    3 2022 UPPER LEG   4. 2023 UPPER LEG   5. 3/9/2023 UPPER LEG      Precautions: Falls   Visit Date: 3/15/2023    Time In: 1410  Time Out: 1500  Total Billable Time: 50 minutes    PTA Visit #: 0 / 5   SUBJECTIVE   Pt returns to OP PT reporting: Continued walking around her home with the rolling walker. She is not using the quad cane independently yet.  Pain: 0/10  Location: left upper legs    Response to previous treatment: some fatigue  Functional change:improved confidence and mobility with rolling walker - compliance with home exercise program walking  She was compliant with home exercise program.  OBJECTIVE     Objective measures are taking at reassessments unless otherwise noted here    RANGE OF MOTION:  Hip AROM/PROM Right  3/15/2023 Pain/Dysfunction with Movement Left  3/15/2023 Pain/Dysfunction with Movement   Hip IR (45) 35 [x]No []Yes 25 [x]No []Yes   Hip ER (45) 26 [x]No []Yes 23 [x]No []Yes     Knee AROM/PROM Right  3/15/2023 Pain/Dysfunction with Movement Left  3/15/2023 Pain/Dysfunction with Movement   Hyper - Zero - Flexion 0- [x]No []Yes 0-9-114 [x]No []Yes     STRENGTH:  L/E MMT Left  3/15/2023 Right  3/15/2023 Goal   Hip Flexion  4/5 4/5 5/5 B    Knee Extension 4+/5 4+/5 5/5 B      TUs with large base quad cane       Timed Up and Go < 20 sec safe for independent transfers  < 30 sec safe for dependent  transfers/assist required        Table: Population Norms for TUG        Age  Average TUG    60 - 69 years  8.1 seconds    70 - 79 years  9.2 seconds    80 - 99 years  11.3 seconds      Chair Rise Test: 21s (no upper extremity assist to rise)  Single leg Balance: 1s per leg    Decreased lower leg edema    Treatment   Radha received following skilled interventions listed below:    PT Intervention Parameters Time   Therapeutic Exercise to develop strength, endurance, ROM, flexibility, posture and core stabilization UBE x 5 minutes forward   Gait around clinic and obstacles with quad cane - several rest breaks required   Home exercise program review 13 minutes (1)     Therapeutic Activities to improve functional performance Sit>stand from chair 7.5# kettlebell 3x5 10 minutes (1)     Neuromuscular Re-education activities to improve: Balance, Coordination, Kinesthetic, Sense, Proprioception, and Posture  Ladder drills R/L forward/backward with stand by assist and quad cane. Verbal cueing for correct sequencing 15 minutes (1)       Patient Education and Home Exercises   Home Exercises Provided and Patient Education Provided   Patient was educated on the role of PT, POC, treatment plan, discharge goals, HEP.  Patient educated on biomechanical justification for therapeutic exercise and importance of compliance with HEP in order to improve overall impairments and QOL   Patient was educated on all the above exercise prior/during/after for proper posture, positioning, and execution for safe performance with home exercise program.       Written Home Exercises Provided:   Patient instructed to cont prior HEP.   Exercises were reviewed and Radha was able to demonstrate them prior to the end of the session.    Radha demonstrated fair  understanding of the education provided.   See EMR under Patient Instructions for exercises provided during therapy sessions    ASSESSMENT   Emphasis on improving endurance at this time as  exercises are limited due to need for rest breaks from SOB.    Radha Is progressing well towards her goals.   Pt prognosis is Fair / Good.    Pt will continue to benefit from skilled outpatient physical therapy to address the deficits listed in the problem list box on initial evaluation, provide pt/family education and to maximize pt's level of independence in the home and community environment.   Pt's spiritual, cultural and educational needs considered and pt agreeable to plan of care and goals.  Anticipated barriers to physical therapy: Lives home alone, age, transportation, Unable to drive    Goals:    Short Term Goals: 3/7/2023-4 weeks Status Date Met   1. Patient to be independent with Home Exercise Program performance to impact knowledge of condition  [x] Met  [] Not Met  [] Progressing  11/8/22   2. 3. Pt to ascend/descend x2 steps with UE assistance to impact curb and community navigation   [x] Met  [] Not Met  [] Progressing  11/8/22   4. 5. Patient to improve chair rise test to 20s or less to display increased speed and confidence with transfers-21s today  [] Met  [] Not Met  [x] Progressing         Long Term Goal: 3/7/2023 Status Date Met   1. Pt to ambulate Community distances with least restrictive AD, ascend/descend x5 steps with handrail and get in/out of car without assistance to impact current status, health and quality of life [] Met  [] Not Met  [x] Progressing     2. Patient to report confidence in managing condition upon discharge  [] Met  [] Not Met  [x] Progressing    3. Patient to report unrestricted activities of daily living performance at home [] Met  [] Not Met  [x] Progressing    4. Patient to  object off floor without loss of balance safely to impact home life [] Met  [] Not Met  [] Progressing    5. Patient to perform TUG with quad cane in <30s to display increased silvina and locomotion safety [] Met  [] Not Met  [] Progressing    6. Patient to perform four square step test  in 12 seconds or less with least restrictive AD to display improved navigation and foot clearance during gait. [] Met  [] Not Met  [] Progressing       PLAN   Plan of care Certification: 8/17/2022 to 1/7/23  Outpatient Physical Therapy 1-3 times weekly for 8-10 weeks to include the following interventions: Cervical/Lumbar Traction, Electrical Stimulation IFC, Gait Training, Manual Therapy, Neuromuscular Re-ed, Patient Education, Self Care, Therapeutic Activities, Therapeutic Exercise and Dry Needling.    New Goals added. Will continue 1x week for 4 weeks and prepare for discharge and home exercise program maintenance  independently.     Maria Fernanda Mota PT, DPT, SCS, CSCS  Board Certified Sports Clinical Specialist   Certified Dry Needling Provider  3/15/2023

## 2023-03-20 DIAGNOSIS — E11.22 DIABETES MELLITUS WITH STAGE 4 CHRONIC KIDNEY DISEASE: ICD-10-CM

## 2023-03-20 DIAGNOSIS — N18.4 DIABETES MELLITUS WITH STAGE 4 CHRONIC KIDNEY DISEASE: ICD-10-CM

## 2023-03-20 RX ORDER — INSULIN DEGLUDEC 100 U/ML
INJECTION, SOLUTION SUBCUTANEOUS
Qty: 10 PEN | Refills: 0 | Status: SHIPPED | OUTPATIENT
Start: 2023-03-20 | End: 2023-06-20

## 2023-03-20 RX ORDER — INSULIN ASPART 100 [IU]/ML
INJECTION, SOLUTION INTRAVENOUS; SUBCUTANEOUS
Qty: 45 ML | Refills: 0 | Status: SHIPPED | OUTPATIENT
Start: 2023-03-20 | End: 2023-06-20

## 2023-03-20 NOTE — TELEPHONE ENCOUNTER
----- Message from Mona Rodriguez sent at 3/20/2023  1:33 PM CDT -----  Contact: Patient, [phone]  Requesting an RX refill or new RX.  Is this a refill or new RX: Refill  RX name and strength (copy/paste from chart):  insulin aspart U-100 (NOVOLOG) 100 unit/mL (3 mL) InPn pen  Is this a 30 day or 90 day RX:   Pharmacy name and phone # (copy/paste from chart):    [id number] [place] [address] AT [facility] [place]  [address] [facility] [address]  Phone: [phone] Fax: [phone]  The doctors have asked that we provide their patients with the following 2 reminders -- prescription refills can take up to 72 hours, and a friendly reminder that in the future you can use your [facility] account to request refills: Yes          Requesting an RX refill or new RX.  Is this a refill or new RX: Refill  RX name and strength (copy/paste from chart):  insulin degludec (TRESIBA FLEXTOUCH U-100) 100 unit/mL (3 mL) insulin pen  Is this a 30 day or 90 day RX:   Pharmacy name and phone # (copy/paste from chart):    [id number] [place] [address] AT [facility] [place]  [address] [facility] [address]  Phone: [phone] Fax: [phone]  The doctors have asked that we provide their patients with the following 2 reminders -- prescription refills can take up to 72 hours, and a friendly reminder that in the future you can use your [facility] account to request refills: Yes

## 2023-03-20 NOTE — TELEPHONE ENCOUNTER
Please add a1c to scheduled labs in April and schedule a follow up with me within the following month after labs.

## 2023-03-20 NOTE — TELEPHONE ENCOUNTER
Care Due:                  Date            Visit Type   Department     Provider  --------------------------------------------------------------------------------                                NP -                              PRIMARY      [facility] FAMILY  Last Visit: 06-      CARE ([facility])   MEDICINE       Jana Holguin  Next Visit: None Scheduled  None         None Found                                                            Last  Test          Frequency    Reason                     Performed    Due Date  --------------------------------------------------------------------------------    HBA1C.......  6 months...  insulin..................  12- 05-    [facility] Embedded Care Gaps. Reference number: [id number]. 3/20/2023   1:45:15 PM CDT

## 2023-04-05 ENCOUNTER — LAB VISIT (OUTPATIENT)
Dept: LAB | Facility: HOSPITAL | Age: 83
End: 2023-04-05
Attending: INTERNAL MEDICINE
Payer: MEDICARE

## 2023-04-05 DIAGNOSIS — N18.4 CHRONIC KIDNEY DISEASE, STAGE IV (SEVERE): ICD-10-CM

## 2023-04-05 PROCEDURE — 84156 ASSAY OF PROTEIN URINE: CPT | Mod: HCNC | Performed by: INTERNAL MEDICINE

## 2023-04-06 ENCOUNTER — CLINICAL SUPPORT (OUTPATIENT)
Dept: REHABILITATION | Facility: HOSPITAL | Age: 83
End: 2023-04-06
Attending: INTERNAL MEDICINE
Payer: MEDICARE

## 2023-04-06 DIAGNOSIS — M62.81 MUSCLE WEAKNESS: Primary | ICD-10-CM

## 2023-04-06 PROCEDURE — 97110 THERAPEUTIC EXERCISES: CPT | Mod: HCNC,PN | Performed by: GENERAL ACUTE CARE HOSPITAL

## 2023-04-06 PROCEDURE — 97530 THERAPEUTIC ACTIVITIES: CPT | Mod: HCNC,PN | Performed by: GENERAL ACUTE CARE HOSPITAL

## 2023-04-06 NOTE — PROGRESS NOTES
[facility]   Physical Therapy Treatment Note   Name: Radha Degroot  Clinic Number: [id number]    Therapy Diagnosis:   Encounter Diagnosis   Name Primary?    Muscle weakness Yes     Physician: Jana Holguin MD  Physician Orders: PT Eval and Treat  Medical Diagnosis from Referral: L femur fracture     Evaluation Date: 2022  Authorization Period Expiration: 2022  Plan of Care Expiration: 23-UPDATED  Progress Note Due: 23  Visit # / Visits authorized:  ( Eval)    FOTO:   1 2022  UPPER LEG   2 2022 UPPER LEG    3 2022 UPPER LEG   4. 2023 UPPER LEG   5. 3/9/2023 UPPER LEG      Precautions: Falls   Visit Date: 2023    Time In: 1407  Time Out: 1145  Total Billable Time: 23 minutes    PTA Visit #: 0 / 5   SUBJECTIVE   Pt returns to OP PT reporting: Little use of the quad cane at home since her last visit. She is using the rolling walker more though. Has been out in the community with her walker as well.   Pain: 0/10  Location: left upper legs    Response to previous treatment: some fatigue  Functional change:improved confidence and mobility with rolling walker - compliance with home exercise program walking  She was compliant with home exercise program.  OBJECTIVE     Objective measures are taking at reassessments unless otherwise noted here    RANGE OF MOTION:  Hip AROM/PROM Right  2023 Pain/Dysfunction with Movement Left  2023 Pain/Dysfunction with Movement   Hip IR (45) 35 [x]No []Yes 25 [x]No []Yes   Hip ER (45) 26 [x]No []Yes 23 [x]No []Yes     Knee AROM/PROM Right  2023 Pain/Dysfunction with Movement Left  2023 Pain/Dysfunction with Movement   Hyper - Zero - Flexion 0- [x]No []Yes 0-9-114 [x]No []Yes     STRENGTH:  L/E MMT Left  2023 Right  2023 Goal   Hip Flexion  4/5 4/5 5/5 B    Knee Extension 4+/5 4+/5 5/5 B      TUs with large base quad cane       Timed Up and Go < 20 sec safe for independent  transfers  < 30 sec safe for dependent transfers/assist required        Table: Population Norms for TUG        Age  Average TUG    60 - 69 years  8.1 seconds    70 - 79 years  9.2 seconds    80 - 99 years  11.3 seconds      Chair Rise Test: 21s (no upper extremity assist to rise)  Single leg Balance: 1s per leg    Decreased lower leg edema    Treatment   Radha received following skilled interventions listed below:    PT Intervention Parameters Time   Therapeutic Exercise to develop strength, endurance, ROM, flexibility, posture and core stabilization UBE x 5 minutes forward   Hip add ball squeeze x45  Seated clams green theraband x45  Setaed hip marches green theraband x45  Home exercise program review 20 minutes (1)     Therapeutic Activities to improve functional performance Sit>stand from chair 7.5# gisselbell 8-6-4 8 minutes (1)       Patient Education and Home Exercises   Home Exercises Provided and Patient Education Provided   Patient was educated on the role of PT, POC, treatment plan, discharge goals, HEP.  Patient educated on biomechanical justification for therapeutic exercise and importance of compliance with HEP in order to improve overall impairments and QOL   Patient was educated on all the above exercise prior/during/after for proper posture, positioning, and execution for safe performance with home exercise program.       Written Home Exercises Provided:   Patient instructed to cont prior HEP.   Exercises were reviewed and Radha was able to demonstrate them prior to the end of the session.    Radha demonstrated fair  understanding of the education provided.   See EMR under Patient Instructions for exercises provided during therapy sessions    ASSESSMENT   Patient has had limited progress. We will continue for one final visit to ensure good performance of home exercise program. Patient had to limit session today due to an incontinence accident.     Radha Is progressing well towards her goals.   Pt  prognosis is Fair / Good.    Pt will continue to benefit from skilled outpatient physical therapy to address the deficits listed in the problem list box on initial evaluation, provide pt/family education and to maximize pt's level of independence in the home and community environment.   Pt's spiritual, cultural and educational needs considered and pt agreeable to plan of care and goals.  Anticipated barriers to physical therapy: Lives home alone, age, transportation, Unable to drive    Goals:    Short Term Goals: 3/7/2023-4 weeks Status Date Met   1. Patient to be independent with Home Exercise Program performance to impact knowledge of condition  [x] Met  [] Not Met  [] Progressing  11/8/22   2. 3. Pt to ascend/descend x2 steps with UE assistance to impact curb and community navigation   [x] Met  [] Not Met  [] Progressing  11/8/22   4. 5. Patient to improve chair rise test to 20s or less to display increased speed and confidence with transfers-21s today  [] Met  [] Not Met  [x] Progressing         Long Term Goal: 3/7/2023 Status Date Met   1. Pt to ambulate Community distances with least restrictive AD, ascend/descend x5 steps with handrail and get in/out of car without assistance to impact current status, health and quality of life [] Met  [] Not Met  [x] Progressing     2. Patient to report confidence in managing condition upon discharge  [] Met  [] Not Met  [x] Progressing    3. Patient to report unrestricted activities of daily living performance at home [] Met  [] Not Met  [x] Progressing    4. Patient to  object off floor without loss of balance safely to impact home life [] Met  [] Not Met  [] Progressing    5. Patient to perform TUG with quad cane in <30s to display increased silvina and locomotion safety [] Met  [] Not Met  [] Progressing    6. Patient to perform four square step test in 12 seconds or less with least restrictive AD to display improved navigation and foot clearance during gait. []  Met  [] Not Met  [] Progressing       PLAN   Plan of care Certification: 8/17/2022 to 1/7/23  Outpatient Physical Therapy 1-3 times weekly for 8-10 weeks to include the following interventions: Cervical/Lumbar Traction, Electrical Stimulation IFC, Gait Training, Manual Therapy, Neuromuscular Re-ed, Patient Education, Self Care, Therapeutic Activities, Therapeutic Exercise and Dry Needling.    New Goals added. Will continue 1x week for 4 weeks and prepare for discharge and home exercise program maintenance  independently.     Maria Fernanda Mota PT, DPT, SCS, CSCS  Board Certified Sports Clinical Specialist   Certified Dry Needling Provider  4/6/2023

## 2023-04-07 LAB
CREAT UR-MCNC: 55 MG/DL (ref 15–325)
PROT UR-MCNC: 23 MG/DL (ref 0–15)
PROT/CREAT UR: 0.42 MG/G{CREAT} (ref 0–0.2)

## 2023-04-13 ENCOUNTER — TELEPHONE (OUTPATIENT)
Dept: NEPHROLOGY | Facility: CLINIC | Age: 83
End: 2023-04-13
Payer: MEDICARE

## 2023-04-13 ENCOUNTER — OFFICE VISIT (OUTPATIENT)
Dept: NEPHROLOGY | Facility: CLINIC | Age: 83
End: 2023-04-13
Payer: MEDICARE

## 2023-04-13 VITALS
OXYGEN SATURATION: 97 % | BODY MASS INDEX: 33.8 KG/M2 | HEART RATE: 60 BPM | DIASTOLIC BLOOD PRESSURE: 68 MMHG | SYSTOLIC BLOOD PRESSURE: 140 MMHG | WEIGHT: 223 LBS | HEIGHT: 68 IN

## 2023-04-13 DIAGNOSIS — D17.71 ANGIOMYOLIPOMA OF RIGHT KIDNEY: ICD-10-CM

## 2023-04-13 DIAGNOSIS — I50.32 CHRONIC DIASTOLIC CHF (CONGESTIVE HEART FAILURE): ICD-10-CM

## 2023-04-13 DIAGNOSIS — N28.1 ACQUIRED CYST OF KIDNEY: ICD-10-CM

## 2023-04-13 DIAGNOSIS — Z79.4 TYPE 2 DIABETES MELLITUS WITH STAGE 4 CHRONIC KIDNEY DISEASE, WITH LONG-TERM CURRENT USE OF INSULIN: ICD-10-CM

## 2023-04-13 DIAGNOSIS — N39.498 OTHER URINARY INCONTINENCE: Primary | ICD-10-CM

## 2023-04-13 DIAGNOSIS — N25.81 SECONDARY RENAL HYPERPARATHYROIDISM: ICD-10-CM

## 2023-04-13 DIAGNOSIS — N18.4 CHRONIC KIDNEY DISEASE, STAGE IV (SEVERE): Primary | ICD-10-CM

## 2023-04-13 DIAGNOSIS — R80.1 PERSISTENT PROTEINURIA: ICD-10-CM

## 2023-04-13 DIAGNOSIS — I87.2 VENOUS INSUFFICIENCY: ICD-10-CM

## 2023-04-13 DIAGNOSIS — E55.9 VITAMIN D DEFICIENCY: ICD-10-CM

## 2023-04-13 DIAGNOSIS — E11.22 TYPE 2 DIABETES MELLITUS WITH STAGE 4 CHRONIC KIDNEY DISEASE, WITH LONG-TERM CURRENT USE OF INSULIN: ICD-10-CM

## 2023-04-13 DIAGNOSIS — N18.4 TYPE 2 DIABETES MELLITUS WITH STAGE 4 CHRONIC KIDNEY DISEASE, WITH LONG-TERM CURRENT USE OF INSULIN: ICD-10-CM

## 2023-04-13 PROCEDURE — 3077F SYST BP >= 140 MM HG: CPT | Mod: HCNC,CPTII,S$GLB, | Performed by: INTERNAL MEDICINE

## 2023-04-13 PROCEDURE — 3288F FALL RISK ASSESSMENT DOCD: CPT | Mod: HCNC,CPTII,S$GLB, | Performed by: INTERNAL MEDICINE

## 2023-04-13 PROCEDURE — 3077F PR MOST RECENT SYSTOLIC BLOOD PRESSURE >= 140 MM HG: ICD-10-PCS | Mod: HCNC,CPTII,S$GLB, | Performed by: INTERNAL MEDICINE

## 2023-04-13 PROCEDURE — 1160F RVW MEDS BY RX/DR IN RCRD: CPT | Mod: HCNC,CPTII,S$GLB, | Performed by: INTERNAL MEDICINE

## 2023-04-13 PROCEDURE — 1126F PR PAIN SEVERITY QUANTIFIED, NO PAIN PRESENT: ICD-10-PCS | Mod: HCNC,CPTII,S$GLB, | Performed by: INTERNAL MEDICINE

## 2023-04-13 PROCEDURE — 1159F MED LIST DOCD IN RCRD: CPT | Mod: HCNC,CPTII,S$GLB, | Performed by: INTERNAL MEDICINE

## 2023-04-13 PROCEDURE — 3288F PR FALLS RISK ASSESSMENT DOCUMENTED: ICD-10-PCS | Mod: HCNC,CPTII,S$GLB, | Performed by: INTERNAL MEDICINE

## 2023-04-13 PROCEDURE — 99215 PR OFFICE/OUTPT VISIT, EST, LEVL V, 40-54 MIN: ICD-10-PCS | Mod: HCNC,S$GLB,, | Performed by: INTERNAL MEDICINE

## 2023-04-13 PROCEDURE — 1126F AMNT PAIN NOTED NONE PRSNT: CPT | Mod: HCNC,CPTII,S$GLB, | Performed by: INTERNAL MEDICINE

## 2023-04-13 PROCEDURE — 1160F PR REVIEW ALL MEDS BY PRESCRIBER/CLIN PHARMACIST DOCUMENTED: ICD-10-PCS | Mod: HCNC,CPTII,S$GLB, | Performed by: INTERNAL MEDICINE

## 2023-04-13 PROCEDURE — 1101F PT FALLS ASSESS-DOCD LE1/YR: CPT | Mod: HCNC,CPTII,S$GLB, | Performed by: INTERNAL MEDICINE

## 2023-04-13 PROCEDURE — 99999 PR PBB SHADOW E&M-EST. PATIENT-LVL IV: ICD-10-PCS | Mod: PBBFAC,HCNC,, | Performed by: INTERNAL MEDICINE

## 2023-04-13 PROCEDURE — 99499 UNLISTED E&M SERVICE: CPT | Mod: HCNC,S$GLB,, | Performed by: INTERNAL MEDICINE

## 2023-04-13 PROCEDURE — 99999 PR PBB SHADOW E&M-EST. PATIENT-LVL IV: CPT | Mod: PBBFAC,HCNC,, | Performed by: INTERNAL MEDICINE

## 2023-04-13 PROCEDURE — 99215 OFFICE O/P EST HI 40 MIN: CPT | Mod: HCNC,S$GLB,, | Performed by: INTERNAL MEDICINE

## 2023-04-13 PROCEDURE — 1159F PR MEDICATION LIST DOCUMENTED IN MEDICAL RECORD: ICD-10-PCS | Mod: HCNC,CPTII,S$GLB, | Performed by: INTERNAL MEDICINE

## 2023-04-13 PROCEDURE — 3078F DIAST BP <80 MM HG: CPT | Mod: HCNC,CPTII,S$GLB, | Performed by: INTERNAL MEDICINE

## 2023-04-13 PROCEDURE — 99499 RISK ADDL DX/OHS AUDIT: ICD-10-PCS | Mod: HCNC,S$GLB,, | Performed by: INTERNAL MEDICINE

## 2023-04-13 PROCEDURE — 3078F PR MOST RECENT DIASTOLIC BLOOD PRESSURE < 80 MM HG: ICD-10-PCS | Mod: HCNC,CPTII,S$GLB, | Performed by: INTERNAL MEDICINE

## 2023-04-13 PROCEDURE — 1101F PR PT FALLS ASSESS DOC 0-1 FALLS W/OUT INJ PAST YR: ICD-10-PCS | Mod: HCNC,CPTII,S$GLB, | Performed by: INTERNAL MEDICINE

## 2023-04-13 NOTE — PROGRESS NOTES
Subjective:       Patient ID: Radha Degroot is a 83 y.o. White female who presents for follow-up evaluation of Chronic Kidney Disease    HPI    Dec 2022:  She is referred by her PCP for CKD, last seen by Dr. Mcmanus of Nephrology 4/2022  DM HTN afib on Eliquis   Staying with dtr in [place]  Stays hydrated, LE edema about he same  as before hospital stay, on daily Lasix   Less cheese, less constipation   Low sodium diet   No NSAIDs,Tylenol for back pain   Lives here now, not  [place]     April 2023: feeling well, chronic LE edema is stable, continues sodium and fluid restriction along with daily Lasix. She has seen urologist in the past for a kidney cyst? Urologist in [place], tells me also also has urinary incontinence, Myrbetric tried in past and helped but increased cost. Wheezing since ?COVID in 2019 Dec illness. Had dinner party yesterday, saw old friends     Review of Systems   Constitutional:  Negative for activity change and appetite change.   HENT:  Negative for facial swelling.    Respiratory:  Positive for shortness of breath (RODRIGUEZ) and wheezing.    Cardiovascular:  Positive for leg swelling.   Genitourinary:  Positive for frequency. Negative for difficulty urinating.   Musculoskeletal:  Positive for gait problem (uses walker).   Allergic/Immunologic: Positive for immunocompromised state (age and CKD and DM2).   Psychiatric/Behavioral:  Negative for confusion and decreased concentration.      Objective:      Physical Exam  Vitals and nursing note reviewed.   Constitutional:       General: She is not in acute distress.     Appearance: She is not ill-appearing.   HENT:      Head: Atraumatic.   Cardiovascular:      Rate and Rhythm: Normal rate.   Pulmonary:      Effort: Pulmonary effort is normal. No respiratory distress.      Breath sounds: Wheezing (RLL) present.   Abdominal:      General: There is no distension.   Musculoskeletal:      Right lower leg: No edema.      Left lower leg: No edema.    Neurological:      Mental Status: She is alert and oriented to person, place, and time.   Psychiatric:         Mood and Affect: Mood normal.       Assessment:       1. Chronic kidney disease, stage IV (severe)    2. Secondary renal hyperparathyroidism    3. Vitamin D deficiency    4. Type 2 diabetes mellitus with stage 4 chronic kidney disease, with long-term current use of insulin    5. Acquired cyst of kidney    6. Persistent proteinuria    7. Venous insufficiency    8. Chronic diastolic CHF (congestive heart failure)    9. Angiomyolipoma of right kidney            Plan:          CKD Stage 4 with baseline creatinine rnaging 2.2-2.8mg/dL  - recent DANDY in March 2022, peak Cr 4  - currently stable kidney function   - good BP control, good glycemic control, low sodium diet     Renal cysts and Angiomyolipoma  - reviewed previous imaging, reassurance provided  - will see urology as well    Secondary Hyperparathyroidism  - most recent PTH at goal  - continue D supplement     DM2  - fair control     HTN  - adequate control     Diastolic CHF  - low sodium diet, BP control, Lasix, BB (Coreg)     Urology  - refer to female in [place] for urinary incontinence     RTC 4mo w labs  50 minutes of total time spent on the encounter, which includes face to face time and non-face to face time preparing to see the patient (eg, review of tests), Obtaining and/or reviewing separately obtained history, Documenting clinical information in the electronic or other health record, Independently interpreting results (not separately reported) and communicating results to the patient/family/caregiver, or Care coordination (not separately reported).

## 2023-04-13 NOTE — TELEPHONE ENCOUNTER
Please refer pt to Urology for urinary incontinence, Dr. Maxwell in [place], pt prefer female doctor. Order in [place]

## 2023-04-17 ENCOUNTER — TELEPHONE (OUTPATIENT)
Dept: FAMILY MEDICINE | Facility: CLINIC | Age: 83
End: 2023-04-17
Payer: MEDICARE

## 2023-04-17 DIAGNOSIS — L02.91 ABSCESS: Primary | ICD-10-CM

## 2023-04-17 RX ORDER — DOXYCYCLINE 100 MG/1
100 CAPSULE ORAL 2 TIMES DAILY
Qty: 14 CAPSULE | Refills: 0 | Status: SHIPPED | OUTPATIENT
Start: 2023-04-17 | End: 2023-04-24

## 2023-04-17 NOTE — TELEPHONE ENCOUNTER
If symptoms do not improve or worsen over the next few days, she need to follow up with GYN or we can refer to general surgery.    I have signed for the following orders AND/OR meds.  Please call the patient and ask the patient to schedule the testing AND/OR inform about any medications that were sent. Medications have been sent to pharmacy listed below      No orders of the defined types were placed in this encounter.      Medications Ordered This Encounter   Medications    doxycycline (MONODOX) 100 MG capsule     Sig: Take 1 capsule (100 mg total) by mouth 2 (two) times daily. for 7 days     Dispense:  14 capsule     Refill:  0         [id number] - [place] - [address] AT [facility] OF [address] [place]  [address] [facility] [place] [address]  Phone: [phone] Fax: [phone]    [facility] #[id number] - [place], [place] - [address] [facility] AT [facility] [address]  [address] [facility]  [place] [address]  Phone: [phone] Fax: [phone]

## 2023-04-17 NOTE — TELEPHONE ENCOUNTER
----- Message from Carmencita Diamond sent at 4/17/2023  2:24 PM CDT -----  Contact: Radha  Patient is requesting for antibiotic or cream to be called in reports having boil on vagina that's swollen and painful when sitting down, please call in to listed pharmacy if possible & call pt to notify               [id number] - [place], [place] - [address] AT [facility] [address] [place] [address] [facility] [address]  Phone: [phone] Fax: [phone]

## 2023-04-17 NOTE — TELEPHONE ENCOUNTER
Attempted to contact patient with recommendations with no response, LVM for patient to return call.

## 2023-05-01 ENCOUNTER — CLINICAL SUPPORT (OUTPATIENT)
Dept: REHABILITATION | Facility: HOSPITAL | Age: 83
End: 2023-05-01
Attending: INTERNAL MEDICINE
Payer: MEDICARE

## 2023-05-01 DIAGNOSIS — M62.81 MUSCLE WEAKNESS: Primary | ICD-10-CM

## 2023-05-01 PROCEDURE — 97110 THERAPEUTIC EXERCISES: CPT | Mod: HCNC,PN | Performed by: GENERAL ACUTE CARE HOSPITAL

## 2023-05-01 PROCEDURE — 97530 THERAPEUTIC ACTIVITIES: CPT | Mod: HCNC,PN | Performed by: GENERAL ACUTE CARE HOSPITAL

## 2023-05-01 NOTE — PROGRESS NOTES
[facility]   Physical Therapy Discharge & Treatment Note   Name: Radha Degroot  Clinic Number: [id number]    Therapy Diagnosis:   Encounter Diagnosis   Name Primary?    Muscle weakness Yes     Physician: Jana Holguin MD  Physician Orders: PT Eval and Treat  Medical Diagnosis from Referral: L femur fracture     Evaluation Date: 2022  Authorization Period Expiration: 2022  Plan of Care Expiration: 23-UPDATED  Progress Note Due: 23  Visit # / Visits authorized:  ( Eval)    FOTO:   1 2022  UPPER LEG   2 2022 UPPER LEG    3 2022 UPPER LEG   4. 2023 UPPER LEG   5. 3/9/2023 UPPER LEG      Precautions: Falls   Visit Date: 2023    Time In: 1510  Time Out: 1600  Total Billable Time: 38 minutes    PTA Visit #: 0    SUBJECTIVE   Pt returns to OP PT reporting: That she is now able to ambulate anywhere she would like to go with the rolling walker at this time. She is prepared for discharge and would like updated home exercise program to continue her strengthening exercises at home.  Pain: 0/10  Location: left upper legs    Response to previous treatment: some fatigue  Functional change:improved confidence and mobility with rolling walker - compliance with home exercise program walking  She was compliant with home exercise program.  OBJECTIVE     Objective measures are taking at reassessments unless otherwise noted here    RANGE OF MOTION:  Hip AROM/PROM Right  2023 Pain/Dysfunction with Movement Left  2023 Pain/Dysfunction with Movement   Hip IR (45) 35 [x]No []Yes 25 [x]No []Yes   Hip ER (45) 26 [x]No []Yes 23 [x]No []Yes     Knee AROM/PROM Right  2023 Pain/Dysfunction with Movement Left  2023 Pain/Dysfunction with Movement   Hyper - Zero - Flexion 0- [x]No []Yes 0-9-114 [x]No []Yes     STRENGTH:  L/E MMT Left  2023 Right  2023 Goal   Hip Flexion  4/5 4/5 5/5 B    Knee Extension 4+/5 4+/5 5/5 B      TUs with  large base quad cane       Timed Up and Go < 20 sec safe for independent transfers  < 30 sec safe for dependent transfers/assist required        Table: Population Norms for TUG        Age  Average TUG    60 - 69 years  8.1 seconds    70 - 79 years  9.2 seconds    80 - 99 years  11.3 seconds      Chair Rise Test: 21s (no upper extremity assist to rise)  Single leg Balance: 1s per leg    Decreased lower leg edema    Treatment   Radha received following skilled interventions listed below:    PT Intervention Parameters Time   Therapeutic Exercise to develop strength, endurance, ROM, flexibility, posture and core stabilization UBE x 5 minutes forward   Hip add ball squeeze x15  Seated clams red theraband x15  Setaed hip marches red theraband x15  Home exercise program review-updated  30 minutes (2)     Therapeutic Activities to improve functional performance Sit>stand from chair  Home exercise program review 8 minutes (1)       Patient Education and Home Exercises   Home Exercises Provided and Patient Education Provided   Patient was educated on the role of PT, POC, treatment plan, discharge goals, HEP.  Patient educated on biomechanical justification for therapeutic exercise and importance of compliance with HEP in order to improve overall impairments and QOL   Patient was educated on all the above exercise prior/during/after for proper posture, positioning, and execution for safe performance with home exercise program.       Written Home Exercises Provided:   Patient instructed to cont prior HEP.   Exercises were reviewed and Radha was able to demonstrate them prior to the end of the session.    Radha demonstrated fair  understanding of the education provided.   See EMR under Patient Instructions for exercises provided during therapy sessions    ASSESSMENT   Patient reports independence with activities of daily living performance at this time. She is prepared for discharge and is able to provide independent  performance of home exercise program.    Radha Is progressing well towards her goals.   Pt prognosis is Fair / Good.    Pt will continue to benefit from skilled outpatient physical therapy to address the deficits listed in the problem list box on initial evaluation, provide pt/family education and to maximize pt's level of independence in the home and community environment.   Pt's spiritual, cultural and educational needs considered and pt agreeable to plan of care and goals.  Anticipated barriers to physical therapy: Lives home alone, age, transportation, Unable to drive    Goals:    Short Term Goals: 3/7/2023-4 weeks Status Date Met   1. Patient to be independent with Home Exercise Program performance to impact knowledge of condition  [x] Met  [] Not Met  [] Progressing  11/8/22   2. 3. Pt to ascend/descend x2 steps with UE assistance to impact curb and community navigation   [x] Met  [] Not Met  [] Progressing  11/8/22   4. 5. Patient to improve chair rise test to 20s or less to display increased speed and confidence with transfers-21s today  [] Met  [x] Not Met  [] Progressing         Long Term Goal: 3/7/2023 Status Date Met   1. Pt to ambulate Community distances with least restrictive AD, ascend/descend x5 steps with handrail and get in/out of car without assistance to impact current status, health and quality of life [x] Met  [] Not Met  [] Progressing  5/1/23   2. Patient to report confidence in managing condition upon discharge  [x] Met  [] Not Met  [] Progressing 5/1/23   3. Patient to report unrestricted activities of daily living performance at home [x] Met  [] Not Met  [] Progressing 5/1/23   4. Patient to  object off floor without loss of balance safely to impact home life [x] Met  [] Not Met  [] Progressing 5/1/23   5. Patient to perform TUG with quad cane in <30s to display increased silvina and locomotion safety [] Met  [x] Not Met  [] Progressing    6. Patient to perform four square step  test in 12 seconds or less with least restrictive AD to display improved navigation and foot clearance during gait. [] Met  [x] Not Met  [] Progressing       PLAN   Plan of care Certification: 8/17/2022 to 1/7/23  Outpatient Physical Therapy 1-3 times weekly for 8-10 weeks to include the following interventions: Cervical/Lumbar Traction, Electrical Stimulation IFC, Gait Training, Manual Therapy, Neuromuscular Re-ed, Patient Education, Self Care, Therapeutic Activities, Therapeutic Exercise and Dry Needling.    Patient is discharged today.    Maria Fernanda Mota PT, DPT, SCS, CSCS  Board Certified Sports Clinical Specialist   Certified Dry Needling Provider  5/1/2023

## 2023-05-01 NOTE — PLAN OF CARE
[facility]  Physical Therapy Discharge Note    Name: Radha Degroot  Clinic Number: [id number]    Therapy Diagnosis:   Encounter Diagnosis   Name Primary?    Muscle weakness Yes     Physician: Jana Holguin MD  Physician Orders: PT Eval and Treat  Medical Diagnosis from Referral: L femur fracture     Evaluation Date: 8/17/2022  Authorization Period Expiration: 12/31/2022  Plan of Care Expiration: 4/19/23-UPDATED  Progress Note Due: 4/19/23  Visit # / Visits authorized: 26/40 (1/1 Eval)    Date of Last visit: 5/1/23  Total Visits Received: 26    ASSESSMENT      Discharge reason: Patient is now asymptomatic, Patient has met all of his/her goals, Patient has reached the maximum rehab potential for the present time, and Patient requested discharge    Discharge FOTO Score: 50%    Patient reports independence with activities of daily living performance at this time. She is prepared for discharge and is able to provide independent performance of home exercise program.    Radha Is progressing well towards her goals.   Pt prognosis is Fair / Good.    Pt will continue to benefit from skilled outpatient physical therapy to address the deficits listed in the problem list box on initial evaluation, provide pt/family education and to maximize pt's level of independence in the home and community environment.   Pt's spiritual, cultural and educational needs considered and pt agreeable to plan of care and goals.  Anticipated barriers to physical therapy: Lives home alone, age, transportation, Unable to drive    Goals:    Short Term Goals: 3/7/2023-4 weeks Status Date Met   1. Patient to be independent with Home Exercise Program performance to impact knowledge of condition  [x] Met  [] Not Met  [] Progressing  11/8/22   2. 3. Pt to ascend/descend x2 steps with UE assistance to impact curb and community navigation   [x] Met  [] Not Met  [] Progressing  11/8/22   4. 5. Patient to improve chair rise test  to 20s or less to display increased speed and confidence with transfers-21s today  [] Met  [x] Not Met  [] Progressing         Long Term Goal: 3/7/2023 Status Date Met   1. Pt to ambulate Community distances with least restrictive AD, ascend/descend x5 steps with handrail and get in/out of car without assistance to impact current status, health and quality of life [x] Met  [] Not Met  [] Progressing  5/1/23   2. Patient to report confidence in managing condition upon discharge  [x] Met  [] Not Met  [] Progressing 5/1/23   3. Patient to report unrestricted activities of daily living performance at home [x] Met  [] Not Met  [] Progressing 5/1/23   4. Patient to  object off floor without loss of balance safely to impact home life [x] Met  [] Not Met  [] Progressing 5/1/23   5. Patient to perform TUG with quad cane in <30s to display increased silvina and locomotion safety [] Met  [x] Not Met  [] Progressing    6. Patient to perform four square step test in 12 seconds or less with least restrictive AD to display improved navigation and foot clearance during gait. [] Met  [x] Not Met  [] Progressing         PLAN   This patient is discharged from Physical Therapy      Maria Fernanda Mota, PT

## 2023-05-26 ENCOUNTER — OFFICE VISIT (OUTPATIENT)
Dept: FAMILY MEDICINE | Facility: CLINIC | Age: 83
End: 2023-05-26
Payer: MEDICARE

## 2023-05-26 ENCOUNTER — PATIENT MESSAGE (OUTPATIENT)
Dept: FAMILY MEDICINE | Facility: CLINIC | Age: 83
End: 2023-05-26

## 2023-05-26 VITALS
SYSTOLIC BLOOD PRESSURE: 168 MMHG | WEIGHT: 209 LBS | BODY MASS INDEX: 31.67 KG/M2 | HEIGHT: 68 IN | DIASTOLIC BLOOD PRESSURE: 58 MMHG | HEART RATE: 66 BPM

## 2023-05-26 DIAGNOSIS — N18.4 CHRONIC KIDNEY DISEASE, STAGE IV (SEVERE): Chronic | ICD-10-CM

## 2023-05-26 DIAGNOSIS — N25.81 SECONDARY RENAL HYPERPARATHYROIDISM: ICD-10-CM

## 2023-05-26 DIAGNOSIS — E78.2 MIXED HYPERLIPIDEMIA: Chronic | ICD-10-CM

## 2023-05-26 DIAGNOSIS — I25.10 CORONARY ARTERY DISEASE INVOLVING NATIVE CORONARY ARTERY OF NATIVE HEART WITHOUT ANGINA PECTORIS: ICD-10-CM

## 2023-05-26 DIAGNOSIS — I50.32 CHRONIC DIASTOLIC CHF (CONGESTIVE HEART FAILURE): ICD-10-CM

## 2023-05-26 DIAGNOSIS — I10 ESSENTIAL HYPERTENSION: Chronic | ICD-10-CM

## 2023-05-26 DIAGNOSIS — I48.21 PERMANENT ATRIAL FIBRILLATION: ICD-10-CM

## 2023-05-26 DIAGNOSIS — Z79.4 TYPE 2 DIABETES MELLITUS WITH STAGE 4 CHRONIC KIDNEY DISEASE, WITH LONG-TERM CURRENT USE OF INSULIN: Primary | ICD-10-CM

## 2023-05-26 DIAGNOSIS — E66.2 CLASS 1 OBESITY WITH ALVEOLAR HYPOVENTILATION, SERIOUS COMORBIDITY, AND BODY MASS INDEX (BMI) OF 33.0 TO 33.9 IN ADULT: ICD-10-CM

## 2023-05-26 DIAGNOSIS — I70.0 AORTIC ATHEROSCLEROSIS: ICD-10-CM

## 2023-05-26 DIAGNOSIS — E11.22 TYPE 2 DIABETES MELLITUS WITH STAGE 4 CHRONIC KIDNEY DISEASE, WITH LONG-TERM CURRENT USE OF INSULIN: Primary | ICD-10-CM

## 2023-05-26 DIAGNOSIS — N18.4 TYPE 2 DIABETES MELLITUS WITH STAGE 4 CHRONIC KIDNEY DISEASE, WITH LONG-TERM CURRENT USE OF INSULIN: Primary | ICD-10-CM

## 2023-05-26 PROBLEM — E11.9 DM (DIABETES MELLITUS): Status: RESOLVED | Noted: 2017-02-20 | Resolved: 2023-05-26

## 2023-05-26 PROCEDURE — 3078F DIAST BP <80 MM HG: CPT | Mod: CPTII,S$GLB,, | Performed by: INTERNAL MEDICINE

## 2023-05-26 PROCEDURE — 1126F AMNT PAIN NOTED NONE PRSNT: CPT | Mod: CPTII,S$GLB,, | Performed by: INTERNAL MEDICINE

## 2023-05-26 PROCEDURE — 3077F PR MOST RECENT SYSTOLIC BLOOD PRESSURE >= 140 MM HG: ICD-10-PCS | Mod: CPTII,S$GLB,, | Performed by: INTERNAL MEDICINE

## 2023-05-26 PROCEDURE — 3288F FALL RISK ASSESSMENT DOCD: CPT | Mod: CPTII,S$GLB,, | Performed by: INTERNAL MEDICINE

## 2023-05-26 PROCEDURE — 1101F PT FALLS ASSESS-DOCD LE1/YR: CPT | Mod: CPTII,S$GLB,, | Performed by: INTERNAL MEDICINE

## 2023-05-26 PROCEDURE — 99999 PR PBB SHADOW E&M-EST. PATIENT-LVL IV: CPT | Mod: PBBFAC,,, | Performed by: INTERNAL MEDICINE

## 2023-05-26 PROCEDURE — 99999 PR PBB SHADOW E&M-EST. PATIENT-LVL IV: ICD-10-PCS | Mod: PBBFAC,,, | Performed by: INTERNAL MEDICINE

## 2023-05-26 PROCEDURE — 3078F PR MOST RECENT DIASTOLIC BLOOD PRESSURE < 80 MM HG: ICD-10-PCS | Mod: CPTII,S$GLB,, | Performed by: INTERNAL MEDICINE

## 2023-05-26 PROCEDURE — 1159F PR MEDICATION LIST DOCUMENTED IN MEDICAL RECORD: ICD-10-PCS | Mod: CPTII,S$GLB,, | Performed by: INTERNAL MEDICINE

## 2023-05-26 PROCEDURE — 3077F SYST BP >= 140 MM HG: CPT | Mod: CPTII,S$GLB,, | Performed by: INTERNAL MEDICINE

## 2023-05-26 PROCEDURE — 1159F MED LIST DOCD IN RCRD: CPT | Mod: CPTII,S$GLB,, | Performed by: INTERNAL MEDICINE

## 2023-05-26 PROCEDURE — 99214 PR OFFICE/OUTPT VISIT, EST, LEVL IV, 30-39 MIN: ICD-10-PCS | Mod: S$GLB,,, | Performed by: INTERNAL MEDICINE

## 2023-05-26 PROCEDURE — 1101F PR PT FALLS ASSESS DOC 0-1 FALLS W/OUT INJ PAST YR: ICD-10-PCS | Mod: CPTII,S$GLB,, | Performed by: INTERNAL MEDICINE

## 2023-05-26 PROCEDURE — 99214 OFFICE O/P EST MOD 30 MIN: CPT | Mod: S$GLB,,, | Performed by: INTERNAL MEDICINE

## 2023-05-26 PROCEDURE — 3288F PR FALLS RISK ASSESSMENT DOCUMENTED: ICD-10-PCS | Mod: CPTII,S$GLB,, | Performed by: INTERNAL MEDICINE

## 2023-05-26 PROCEDURE — 1126F PR PAIN SEVERITY QUANTIFIED, NO PAIN PRESENT: ICD-10-PCS | Mod: CPTII,S$GLB,, | Performed by: INTERNAL MEDICINE

## 2023-05-26 RX ORDER — DICLOFENAC SODIUM 10 MG/G
2 GEL TOPICAL 2 TIMES DAILY PRN
Qty: 150 G | Refills: 1 | Status: SHIPPED | OUTPATIENT
Start: 2023-05-26

## 2023-05-26 NOTE — PROGRESS NOTES
Assessment/Plan:    Problem List Items Addressed This Visit          Cardiac/Vascular    Essential hypertension (Chronic)    Overview     Hypertension Medications               carvediloL (COREG) 12.5 MG tablet Take 1 tablet (12.5 mg total) by mouth 2 (two) times a day.    furosemide (LASIX) 20 MG tablet Take 40 mg of lasix at 8 a.m. and 20 mg at 3 p.m.   -above goal today  -reports normal at home  -will contact for update home readings  -continue lifestyle modification with low sodium diet and exercise   -discussed hypertension disease course and importance of treating high blood pressure  -patient understood and advised of risk of untreated blood pressure.  ER precautions were given   for symptoms of hypertensive urgency and emergency.         Hyperlipidemia (Chronic)    Overview     Hyperlipidemia Medications               rosuvastatin (CRESTOR) 40 MG Tab TAKE 1 TABLET(40 MG) BY MOUTH EVERY DAY   -chronic condition. Currently stable.    -reports compliance with hyperlipidemia treatment as prescribed  -denies any known adverse effects of medications  -most recent labs listed below; due for updated labs  Lab Results   Component Value Date    CHOL 119 (L) 03/17/2022     Lab Results   Component Value Date    HDL 52 03/17/2022     Lab Results   Component Value Date    LDLCALC 45.8 (L) 03/17/2022     Lab Results   Component Value Date    TRIG 106 03/17/2022     Lab Results   Component Value Date    ALT 13 12/01/2022    AST 17 12/01/2022    ALKPHOS 101 12/01/2022    BILITOT 0.6 12/01/2022          Relevant Orders    Lipid Panel    Aortic atherosclerosis    Overview     -remains on statin  -on eliquis due to a fib         Chronic diastolic CHF (congestive heart failure)    Overview     -last TTE June 2021:  The left ventricle is normal in size with concentric remodeling and normal systolic function. The estimated ejection fraction is 60%.  Normal right ventricular size with normal right ventricular systolic  function.  Biatrial enlargement.  Mild tricuspid regurgitation.  The estimated PA systolic pressure is 42 mmHg.  Normal central venous pressure (3 mmHg).  Atrial fibrillation observed.  -remains on lasix 20 mg am   -needs better BP and glucose control           Coronary artery disease involving native coronary artery without angina pectoris    Overview     -hx of STEMI in 2006; PCI of LAD and diagonal  -NSTEM in April 2017; Goal-directed medical therapy; No C 2/2 renal function  -on eliquis 2/2 a fib  -on statin and beta blocker         Permanent atrial fibrillation    Overview     -followed by cardiology  -remains on eliquis for stroke prevention  -on beta blocker for rate control            Renal/    Chronic kidney disease, stage IV (severe) (Chronic)    Overview     -followed by nephrology  -labs remain stable  -renally dose medication  -avoid nephrotoxic agents, including NSAIDs            Endocrine    Secondary renal hyperparathyroidism    Overview     [facility]         Type 2 diabetes mellitus with stage 4 chronic kidney disease, with long-term current use of insulin - Primary    Overview     Diabetes Medications               insulin aspart U-100 (NOVOLOG) 100 unit/mL (3 mL) InPn pen Inject 14 units three times daily before meals    insulin degludec (TRESIBA FLEXTOUCH U-100) 100 unit/mL (3 mL) insulin pen INJECT 32 UNITS UNDER THE SKIN DAILY   -condition is currently uncontrolled but lenient with a1c considering comorbidities   -see diabetic health maintenance listed below  -on statin: Yes  -on ACE-I/ARB: No  -counseling provided on importance of diabetic diet and medication compliance in order to treat diabetes  -discussed diabetes disease course and potential            Other    Class 1 obesity with alveolar hypoventilation, serious comorbidity, and body mass index (BMI) of 33.0 to 33.9 in adult    Overview     General weight loss/lifestyle modification strategies discussed (elicit support  from others; identify saboteurs; non-food rewards, etc).  Informal exercise measures discussed, e.g. taking stairs instead of elevator.  Regular aerobic exercise program discussed.              Follow up in about 6 months (around 11/26/2023) for in office f/u me; add a1c and lipid to labs in Aug with DEXA.    Jana Holguin MD  _____________________________________________________________________________________________________________________________________________________    CC: follow up of chronic medical conditions     HPI:    Patient is in clinic today as an established patient.    HTN: The patient is currently being treated for essential hypertension. This condition is chronic. Elevated BP today in clinic but patient reports that it has been normal at home. The patient is tolerating their medication well with good compliance.  Denies any adverse effects of medications.  Counseling was offered regarding low sodium diet.  The patient has a reduced salt intake. Routine exercise recommended. The patient denies headache, vision changes, chest pain, palpitations, shortness of breath, or lower extremity edema.    DM2: Patient presents for follow up of diabetes. Condition is chronic and uncontrolled. Patient denies symptoms, including foot ulcerations, hypoglycemia , nausea, paresthesia of the feet, polydipsia, polyuria and visual disturbances.  Evaluation to date has been included: fasting blood sugar, fasting lipid panel, hemoglobin A1C and microalbuminuria.  Denies adverse effects of current medications.     Diabetes Management Status    Statin: Taking  ACE/ARB: Not taking    Screening or Prevention Patient's value Goal Complete/Controlled?   HgA1C Testing and Control   Lab Results   Component Value Date    HGBA1C 7.5 (H) 04/05/2023      Annually/Less than 8% Yes   Lipid profile : 03/17/2022 Annually No   LDL control Lab Results   Component Value Date    LDLCALC 45.8 (L) 03/17/2022    Annually/Less than 100  mg/dl  No   Nephropathy screening Lab Results   Component Value Date    LABMICR 64.0 02/11/2015     Lab Results   Component Value Date    PROTEINUA 2+ (A) 03/25/2022    Annually Yes   Blood pressure BP Readings from Last 1 Encounters:   05/26/23 (!) 168/58    Less than 140/90 No   Dilated retinal exam : 08/08/2019 Annually Yes   Foot exam   Most Recent Foot Exam Date: Not Found Annually Yes       No other new complaints today.  Remaining chronic conditions have been reviewed and remain stable. Further detail as stated above.     HM reviewed.     No recent changes to medical/surgical history.    Current Outpatient Medications on File Prior to Visit   Medication Sig Dispense Refill    apixaban (ELIQUIS) 2.5 mg Tab Take 1 tablet (2.5 mg total) by mouth 2 (two) times a day. 180 tablet 3    ascorbic acid, vitamin C, (VITAMIN C) 100 MG tablet Take 100 mg by mouth once daily.      blood sugar diagnostic Strp To check BG 4 times daily, to use with insurance preferred meter; Brand: MyActivityPal strip 11    carvediloL (COREG) 12.5 MG tablet Take 1 tablet (12.5 mg total) by mouth 2 (two) times a day. 180 tablet 3    fluticasone (FLONASE) 50 mcg/actuation nasal spray SHAKE LIQUID AND USE 2 SPRAYS IN EACH NOSTRIL EVERY DAY 48 mL 4    furosemide (LASIX) 20 MG tablet Take 40 mg of lasix at 8 a.m. and 20 mg at 3 p.m. (Patient taking differently: Take 20 mg by mouth once daily.) 270 tablet 3    insulin aspart U-100 (NOVOLOG) 100 unit/mL (3 mL) InPn pen Inject 14 units three times daily before meals 45 mL 0    insulin degludec (TRESIBA FLEXTOUCH U-100) 100 unit/mL (3 mL) insulin pen ADMINISTER 32 UNITS UNDER THE SKIN EVERY DAY 10 pen 0    lancets Misc To check BG 4 times daily, to use with insurance preferred meter 100 each 11    MULTIVIT-MIN/IRON/FOLIC/LUTEIN (CENTRUM SILVER WOMEN ORAL) Take by mouth once daily.       pantoprazole (PROTONIX) 40 MG tablet Take 1 tablet (40 mg total) by mouth once daily. 90 tablet 3     "pen needle, diabetic (BD ULTRA-FINE TIM PEN NEEDLE) 32 gauge x 5/32" Ndle Use as administer medication as prescribed. 100 each 11    rosuvastatin (CRESTOR) 40 MG Tab Take 1 tablet (40 mg total) by mouth once daily. 90 tablet 3    vitamin B complex (B COMPLEX VITAMINS ORAL) Take by mouth once daily.      vitamin D (VITAMIN D3) 1000 units Tab Take 1,000 Units by mouth once daily.      VITAMIN E, BULK, MISC by Misc.(Non-Drug; Combo Route) route once daily.      melatonin 10 mg Cap Take by mouth.      [DISCONTINUED] blood-glucose meter (ACCU-CHEK MELIDA PLUS METER) Misc To check sugars 4 times daily.  Please include control solution 1 each 0     No current facility-administered medications on file prior to visit.       Review of Systems   Constitutional:  Negative for chills, diaphoresis, fatigue and fever.   HENT:  Negative for congestion, ear pain, postnasal drip, sinus pain and sore throat.    Eyes:  Negative for pain and redness.   Respiratory:  Negative for cough, chest tightness and shortness of breath.    Cardiovascular:  Negative for chest pain and leg swelling.   Gastrointestinal:  Negative for abdominal pain, constipation, diarrhea, nausea and vomiting.   Genitourinary:  Negative for dysuria and hematuria.   Musculoskeletal:  Negative for arthralgias and joint swelling.   Skin:  Negative for rash.   Neurological:  Negative for dizziness, syncope and headaches.   Psychiatric/Behavioral:  Negative for dysphoric mood. The patient is not nervous/anxious.      Vitals:    05/26/23 1007   BP: (!) 168/58   Pulse: 66   Weight: 94.8 kg (209 lb)   Height: 5' 8" (1.727 m)       Wt Readings from Last 3 Encounters:   05/26/23 94.8 kg (209 lb)   04/13/23 101.2 kg (223 lb)   02/28/23 99.7 kg (219 lb 14.4 oz)       Physical Exam  Constitutional:       General: She is not in acute distress.     Appearance: Normal appearance. She is well-developed. She is obese.   HENT:      Head: Normocephalic and atraumatic.   Eyes:      " Conjunctiva/sclera: Conjunctivae normal.   Cardiovascular:      Rate and Rhythm: Normal rate and regular rhythm.      Pulses: Normal pulses.      Heart sounds: Normal heart sounds. No murmur heard.  Pulmonary:      Effort: Pulmonary effort is normal. No respiratory distress.      Breath sounds: Normal breath sounds.   Abdominal:      General: Bowel sounds are normal. There is no distension.      Palpations: Abdomen is soft.      Tenderness: There is no abdominal tenderness.   Musculoskeletal:         General: Normal range of motion.      Cervical back: Normal range of motion and neck supple.      Comments: 1+ LE edema bilateral ankles/shins   Skin:     General: Skin is warm and dry.      Findings: No rash.   Neurological:      General: No focal deficit present.      Mental Status: She is alert and oriented to person, place, and time.   Psychiatric:         Mood and Affect: Mood normal.         Behavior: Behavior normal.       Health Maintenance   Topic Date Due    TETANUS VACCINE  10/03/2015    Hemoglobin A1c  10/05/2023    Colonoscopy  06/21/2026    Lipid Panel  Discontinued    Eye Exam  Discontinued    DEXA Scan  Discontinued

## 2023-06-05 ENCOUNTER — TELEPHONE (OUTPATIENT)
Dept: FAMILY MEDICINE | Facility: CLINIC | Age: 83
End: 2023-06-05
Payer: MEDICARE

## 2023-06-05 NOTE — TELEPHONE ENCOUNTER
----- Message from Jana Holguin MD sent at 6/4/2023  8:47 PM CDT -----  Please see if patient has been able to check BP at home.

## 2023-06-19 ENCOUNTER — OFFICE VISIT (OUTPATIENT)
Dept: UROLOGY | Facility: CLINIC | Age: 83
End: 2023-06-19
Payer: MEDICARE

## 2023-06-19 VITALS
RESPIRATION RATE: 18 BRPM | HEIGHT: 68 IN | BODY MASS INDEX: 31.67 KG/M2 | DIASTOLIC BLOOD PRESSURE: 73 MMHG | WEIGHT: 209 LBS | HEART RATE: 78 BPM | SYSTOLIC BLOOD PRESSURE: 127 MMHG

## 2023-06-19 DIAGNOSIS — D17.9 ANGIOMYOLIPOMA: Primary | ICD-10-CM

## 2023-06-19 DIAGNOSIS — N39.46 MIXED INCONTINENCE: ICD-10-CM

## 2023-06-19 DIAGNOSIS — N18.4 DIABETES MELLITUS WITH STAGE 4 CHRONIC KIDNEY DISEASE: ICD-10-CM

## 2023-06-19 DIAGNOSIS — N18.4 TYPE 2 DIABETES MELLITUS WITH STAGE 4 CHRONIC KIDNEY DISEASE, WITH LONG-TERM CURRENT USE OF INSULIN: ICD-10-CM

## 2023-06-19 DIAGNOSIS — N39.498 OTHER URINARY INCONTINENCE: ICD-10-CM

## 2023-06-19 DIAGNOSIS — E11.22 DIABETES MELLITUS WITH STAGE 4 CHRONIC KIDNEY DISEASE: ICD-10-CM

## 2023-06-19 DIAGNOSIS — E11.22 TYPE 2 DIABETES MELLITUS WITH STAGE 4 CHRONIC KIDNEY DISEASE, WITH LONG-TERM CURRENT USE OF INSULIN: ICD-10-CM

## 2023-06-19 DIAGNOSIS — Z79.4 TYPE 2 DIABETES MELLITUS WITH STAGE 4 CHRONIC KIDNEY DISEASE, WITH LONG-TERM CURRENT USE OF INSULIN: ICD-10-CM

## 2023-06-19 PROCEDURE — 99999 PR PBB SHADOW E&M-EST. PATIENT-LVL V: CPT | Mod: PBBFAC,,, | Performed by: UROLOGY

## 2023-06-19 PROCEDURE — 1101F PT FALLS ASSESS-DOCD LE1/YR: CPT | Mod: CPTII,S$GLB,, | Performed by: UROLOGY

## 2023-06-19 PROCEDURE — 1159F MED LIST DOCD IN RCRD: CPT | Mod: CPTII,S$GLB,, | Performed by: UROLOGY

## 2023-06-19 PROCEDURE — 3288F PR FALLS RISK ASSESSMENT DOCUMENTED: ICD-10-PCS | Mod: CPTII,S$GLB,, | Performed by: UROLOGY

## 2023-06-19 PROCEDURE — 3074F PR MOST RECENT SYSTOLIC BLOOD PRESSURE < 130 MM HG: ICD-10-PCS | Mod: CPTII,S$GLB,, | Performed by: UROLOGY

## 2023-06-19 PROCEDURE — 1159F PR MEDICATION LIST DOCUMENTED IN MEDICAL RECORD: ICD-10-PCS | Mod: CPTII,S$GLB,, | Performed by: UROLOGY

## 2023-06-19 PROCEDURE — 3078F DIAST BP <80 MM HG: CPT | Mod: CPTII,S$GLB,, | Performed by: UROLOGY

## 2023-06-19 PROCEDURE — 1126F PR PAIN SEVERITY QUANTIFIED, NO PAIN PRESENT: ICD-10-PCS | Mod: CPTII,S$GLB,, | Performed by: UROLOGY

## 2023-06-19 PROCEDURE — 99214 OFFICE O/P EST MOD 30 MIN: CPT | Mod: S$GLB,,, | Performed by: UROLOGY

## 2023-06-19 PROCEDURE — 99999 PR PBB SHADOW E&M-EST. PATIENT-LVL V: ICD-10-PCS | Mod: PBBFAC,,, | Performed by: UROLOGY

## 2023-06-19 PROCEDURE — 99214 PR OFFICE/OUTPT VISIT, EST, LEVL IV, 30-39 MIN: ICD-10-PCS | Mod: S$GLB,,, | Performed by: UROLOGY

## 2023-06-19 PROCEDURE — 1101F PR PT FALLS ASSESS DOC 0-1 FALLS W/OUT INJ PAST YR: ICD-10-PCS | Mod: CPTII,S$GLB,, | Performed by: UROLOGY

## 2023-06-19 PROCEDURE — 3074F SYST BP LT 130 MM HG: CPT | Mod: CPTII,S$GLB,, | Performed by: UROLOGY

## 2023-06-19 PROCEDURE — 1126F AMNT PAIN NOTED NONE PRSNT: CPT | Mod: CPTII,S$GLB,, | Performed by: UROLOGY

## 2023-06-19 PROCEDURE — 3078F PR MOST RECENT DIASTOLIC BLOOD PRESSURE < 80 MM HG: ICD-10-PCS | Mod: CPTII,S$GLB,, | Performed by: UROLOGY

## 2023-06-19 PROCEDURE — 3288F FALL RISK ASSESSMENT DOCD: CPT | Mod: CPTII,S$GLB,, | Performed by: UROLOGY

## 2023-06-19 RX ORDER — SOLIFENACIN SUCCINATE 5 MG/1
5 TABLET, FILM COATED ORAL DAILY
Qty: 30 TABLET | Refills: 3 | Status: SHIPPED | OUTPATIENT
Start: 2023-06-19 | End: 2023-09-29

## 2023-06-19 RX ORDER — PEN NEEDLE, DIABETIC 30 GX3/16"
NEEDLE, DISPOSABLE MISCELLANEOUS
Qty: 100 EACH | Refills: 11 | Status: SHIPPED | OUTPATIENT
Start: 2023-06-19 | End: 2023-07-31 | Stop reason: SDUPTHER

## 2023-06-19 NOTE — TELEPHONE ENCOUNTER
No care due was identified.  [facility] Embedded Care Due Messages. Reference number: [id number].   6/19/2023 3:02:59 PM CDT

## 2023-06-19 NOTE — TELEPHONE ENCOUNTER
No care due was identified.  [facility] Embedded Care Due Messages. Reference number: [id number].   6/19/2023 2:51:52 PM CDT

## 2023-06-19 NOTE — TELEPHONE ENCOUNTER
"----- Message from Kierra Martinez sent at 6/19/2023  2:58 PM CDT -----  Contact: self  Patient called in this afternoon requesting refills on the following medications insulin aspart U-100 (NOVOLOG) 100 unit/mL (3 mL) InPn pen/insulin degludec (TRESIBA FLEXTOUCH U-100) 100 unit/mL (3 mL) insulin pen/pen needle, diabetic (BD ULTRA-FINE ITM PEN NEEDLE) 32 gauge x 5/32" Ndle. Please call back [phone]. Thanks [id number] - [place] - [address] AT [facility] [address] [place] [address] [address] [facility] [address]  Phone: [phone] Fax: [phone]        "

## 2023-06-19 NOTE — PROGRESS NOTES
Chief Complaint   Patient presents with    Other     Urinary Incontinence       Referring Provider: Dr. Damaso Oseguera      History of Present Illness:   Radha Degroot is a 83 y.o. female here for evaluation of Other (Urinary Incontinence)  .   6/19/23-82yo female, who is new to me, but has been previously seen by [facility] in the past, presents for evaluation of urinary incontinence. According to the past records, she has tried ditropan, but didn't tolerate due to side effects. She has also tried myrbetriq.   According to the records, she also has a h/o R AML. She hasn't had imaging in over a year.     She reports that she broke her femur in an accident last year and still has difficulty walking. She reports that she has stage 4 CKD. She reports that she has urinary incontinence. She reports both stress and Urge incontinence. Wears 4-5 pads per day. She reports that she has a continuous dribble, which isn't always related to stress or urge.     Review of Systems   Respiratory:  Positive for shortness of breath.    Cardiovascular:  Positive for leg swelling. Negative for chest pain.   Gastrointestinal:  Negative for constipation and diarrhea.   Genitourinary:  Positive for urgency. Negative for difficulty urinating.   All other systems reviewed and are negative.      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    Hx of colonic polyps     1/9     Hx of colonic polyps     Hyperlipidemia     Hypertension     Myocardial infarction nov 2006    Persistent atrial fibrillation 4/25/2018    Posterior vitreous detachment of both eyes     Sleep apnea     Type 2 diabetes mellitus with mild nonproliferative diabetic retinopathy with macular edema 2/15/2013       Past Surgical History:   Procedure Laterality Date    APPENDECTOMY      CATARACT EXTRACTION      Right eye    EPIDURAL STEROID INJECTION N/A 11/2/2018    Procedure: Injection, Steroid, Epidural CAUDAL ELISA;  Surgeon: Ирина Peña MD;  Location: [facility];  Service: Pain Management;  Laterality: N/A;    ESOPHAGOGASTRODUODENOSCOPY N/A 3/31/2022    Procedure: EGD (ESOPHAGOGASTRODUODENOSCOPY);  Surgeon: Samm Billings MD;  Location: [facility] ENDO;  Service: Endoscopy;  Laterality: N/A;    heart stent      X 2    HYSTERECTOMY      Fibroids    INTRAMEDULLARY RODDING OF FEMUR Left 3/26/2022    Procedure: INSERTION, INTRAMEDULLARY SARA, FEMUR;  Surgeon: Eduin Avelar MD;  Location: [facility] OR;  Service: Orthopedics;  Laterality: Left;       Family History   Problem Relation Age of Onset    Diabetes Mother     Heart disease Mother     Hypertension Mother     Diabetes Father     Melanoma Father     Kidney failure Father     Mental illness Sister         suicide/schizophrenic    Cancer Maternal Grandfather     Cancer Paternal Grandfather         colon    Psoriasis Neg Hx     Lupus Neg Hx     Eczema Neg Hx        Social History     Tobacco Use    Smoking status: Never    Smokeless tobacco: Never   Substance Use Topics    Alcohol use: No     Alcohol/week: 0.0 standard drinks    Drug use: No       Current Outpatient Medications   Medication Sig Dispense Refill    apixaban (ELIQUIS) 2.5 mg Tab Take 1 tablet (2.5 mg total) by mouth 2 (two) times a day. 180 tablet 3    ascorbic acid, vitamin C, (VITAMIN C) 100 MG tablet Take 100 mg by mouth once daily.      blood sugar diagnostic Strp To check BG 4 times daily, to use with  "insurance preferred meter; Brand: Dovetailucheck 100 strip 11    carvediloL (COREG) 12.5 MG tablet Take 1 tablet (12.5 mg total) by mouth 2 (two) times a day. 180 tablet 3    diclofenac sodium (VOLTAREN) 1 % Gel Apply 2 g topically 2 (two) times daily as needed (joint pain). 150 g 1    fluticasone (FLONASE) 50 mcg/actuation nasal spray SHAKE LIQUID AND USE 2 SPRAYS IN EACH NOSTRIL EVERY DAY 48 mL 4    furosemide (LASIX) 20 MG tablet Take 40 mg of lasix at 8 a.m. and 20 mg at 3 p.m. (Patient taking differently: Take 20 mg by mouth once daily.) 270 tablet 3    insulin aspart U-100 (NOVOLOG) 100 unit/mL (3 mL) InPn pen Inject 14 units three times daily before meals 45 mL 0    insulin degludec (TRESIBA FLEXTOUCH U-100) 100 unit/mL (3 mL) insulin pen ADMINISTER 32 UNITS UNDER THE SKIN EVERY DAY 10 pen 0    lancets Misc To check BG 4 times daily, to use with insurance preferred meter 100 each 11    melatonin 10 mg Cap Take by mouth.      MULTIVIT-MIN/IRON/FOLIC/LUTEIN (CENTRUM SILVER WOMEN ORAL) Take by mouth once daily.       pantoprazole (PROTONIX) 40 MG tablet Take 1 tablet (40 mg total) by mouth once daily. 90 tablet 3    pen needle, diabetic (BD ULTRA-FINE TIM PEN NEEDLE) 32 gauge x 5/32" Ndle Use as administer medication as prescribed. 100 each 11    rosuvastatin (CRESTOR) 40 MG Tab Take 1 tablet (40 mg total) by mouth once daily. 90 tablet 3    vitamin B complex (B COMPLEX VITAMINS ORAL) Take by mouth once daily.      vitamin D (VITAMIN D3) 1000 units Tab Take 1,000 Units by mouth once daily.      VITAMIN E, BULK, MISC by Misc.(Non-Drug; Combo Route) route once daily.      solifenacin (VESICARE) 5 MG tablet Take 1 tablet (5 mg total) by mouth once daily. 30 tablet 3     No current facility-administered medications for this visit.       Review of patient's allergies indicates:   Allergen Reactions    Heparin analogues Other (See Comments)     thrombocytopenia    Ancef [cefazolin]     Keflex [cephalexin] Rash    " Oxybutynin Other (See Comments)     Metallic taste       Physical Exam  Vitals:    06/19/23 1038   BP: 127/73   Pulse: 78   Resp: 18     General: Well-developed, well-nourished, in no acute distress  HEENT: Normocephalic, atraumatic, extraocular movements intact  Neck: Supple, no supraclavicular or cervical lymphadenopathy, trachea midline  Respirations: even and unlabored  Back: midline spine, No CVA tenderness  Abdomen: soft, Non-tender, non-distended, no palpable masses, no rebound or guarding  Extremities: moves all equally, no clubbing, cyanosis, 2+LE edema  Skin: Warm and dry. No lesions  Psych: normal affect  Neuro: Alert and oriented x 3. Cranial nerves II-XII intact    Bladder scan (not PVR): 66cc      Assessment:  1. Angiomyolipoma  US Retroperitoneal Complete      2. Mixed incontinence        3. Other urinary incontinence  Ambulatory referral/consult to Urology            Plan:   Angiomyolipoma  -     US Retroperitoneal Complete; Future; Expected date: 06/19/2023    Mixed incontinence    Other urinary incontinence  -     Ambulatory referral/consult to Urology    Other orders  -     solifenacin (VESICARE) 5 MG tablet; Take 1 tablet (5 mg total) by mouth once daily.  Dispense: 30 tablet; Refill: 3          Follow up in about 3 months (around 9/19/2023).

## 2023-06-20 DIAGNOSIS — E11.22 DIABETES MELLITUS WITH STAGE 4 CHRONIC KIDNEY DISEASE: ICD-10-CM

## 2023-06-20 DIAGNOSIS — N18.4 DIABETES MELLITUS WITH STAGE 4 CHRONIC KIDNEY DISEASE: ICD-10-CM

## 2023-06-20 RX ORDER — INSULIN DEGLUDEC 100 U/ML
32 INJECTION, SOLUTION SUBCUTANEOUS DAILY
Qty: 30 ML | Refills: 3 | Status: SHIPPED | OUTPATIENT
Start: 2023-06-20

## 2023-06-20 RX ORDER — INSULIN ASPART 100 [IU]/ML
INJECTION, SOLUTION INTRAVENOUS; SUBCUTANEOUS
Qty: 45 ML | Refills: 3 | Status: SHIPPED | OUTPATIENT
Start: 2023-06-20 | End: 2024-03-20

## 2023-06-20 RX ORDER — INSULIN DEGLUDEC 100 U/ML
INJECTION, SOLUTION SUBCUTANEOUS
Qty: 30 ML | OUTPATIENT
Start: 2023-06-20

## 2023-06-20 NOTE — TELEPHONE ENCOUNTER
No care due was identified.  [facility] Embedded Care Due Messages. Reference number: [id number].   6/20/2023 2:30:57 PM CDT

## 2023-06-20 NOTE — TELEPHONE ENCOUNTER
Refill Routing Note   Medication(s) are not appropriate for processing by [facility] for the following reason(s):      Required labs abnormal    [facility] action(s):  Defer None identified          Appointments  past 12m or future 3m with PCP    Date Provider   Last Visit   5/26/2023 Jana Holguin MD   Next Visit   6/19/2023 Jana Holguin MD   ED visits in past 90 days: 0        Note composed:11:28 PM 06/19/2023

## 2023-06-21 NOTE — TELEPHONE ENCOUNTER
Sharonda DC. Request already responded to by other means (e.g. phone or fax)   Refill Authorization Note   Radha Dgeroot  is requesting a refill authorization.  Brief Assessment and Rationale for Refill:  Quick Discontinue  Medication Therapy Plan:       Medication Reconciliation Completed:  No      Comments:     Note composed:9:02 PM 06/20/2023

## 2023-06-22 ENCOUNTER — OFFICE VISIT (OUTPATIENT)
Dept: PODIATRY | Facility: CLINIC | Age: 83
End: 2023-06-22
Payer: MEDICARE

## 2023-06-22 DIAGNOSIS — Z79.01 CURRENT USE OF LONG TERM ANTICOAGULATION: ICD-10-CM

## 2023-06-22 DIAGNOSIS — N18.4 TYPE 2 DIABETES MELLITUS WITH STAGE 4 CHRONIC KIDNEY DISEASE, WITH LONG-TERM CURRENT USE OF INSULIN: ICD-10-CM

## 2023-06-22 DIAGNOSIS — Z79.4 TYPE 2 DIABETES MELLITUS WITH STAGE 4 CHRONIC KIDNEY DISEASE, WITH LONG-TERM CURRENT USE OF INSULIN: ICD-10-CM

## 2023-06-22 DIAGNOSIS — Z79.4 TYPE 2 DIABETES MELLITUS WITH DIABETIC POLYNEUROPATHY, WITH LONG-TERM CURRENT USE OF INSULIN: Primary | ICD-10-CM

## 2023-06-22 DIAGNOSIS — E11.22 TYPE 2 DIABETES MELLITUS WITH STAGE 4 CHRONIC KIDNEY DISEASE, WITH LONG-TERM CURRENT USE OF INSULIN: ICD-10-CM

## 2023-06-22 DIAGNOSIS — I48.21 PERMANENT ATRIAL FIBRILLATION: ICD-10-CM

## 2023-06-22 DIAGNOSIS — E11.42 TYPE 2 DIABETES MELLITUS WITH DIABETIC POLYNEUROPATHY, WITH LONG-TERM CURRENT USE OF INSULIN: Primary | ICD-10-CM

## 2023-06-22 DIAGNOSIS — I50.32 CHRONIC DIASTOLIC CHF (CONGESTIVE HEART FAILURE): ICD-10-CM

## 2023-06-22 PROCEDURE — 99214 PR OFFICE/OUTPT VISIT, EST, LEVL IV, 30-39 MIN: ICD-10-PCS | Mod: 25,S$GLB,, | Performed by: PODIATRIST

## 2023-06-22 PROCEDURE — 1160F RVW MEDS BY RX/DR IN RCRD: CPT | Mod: CPTII,S$GLB,, | Performed by: PODIATRIST

## 2023-06-22 PROCEDURE — 3288F FALL RISK ASSESSMENT DOCD: CPT | Mod: CPTII,S$GLB,, | Performed by: PODIATRIST

## 2023-06-22 PROCEDURE — 1159F PR MEDICATION LIST DOCUMENTED IN MEDICAL RECORD: ICD-10-PCS | Mod: CPTII,S$GLB,, | Performed by: PODIATRIST

## 2023-06-22 PROCEDURE — 1159F MED LIST DOCD IN RCRD: CPT | Mod: CPTII,S$GLB,, | Performed by: PODIATRIST

## 2023-06-22 PROCEDURE — 99214 OFFICE O/P EST MOD 30 MIN: CPT | Mod: 25,S$GLB,, | Performed by: PODIATRIST

## 2023-06-22 PROCEDURE — 1160F PR REVIEW ALL MEDS BY PRESCRIBER/CLIN PHARMACIST DOCUMENTED: ICD-10-PCS | Mod: CPTII,S$GLB,, | Performed by: PODIATRIST

## 2023-06-22 PROCEDURE — 99999 PR PBB SHADOW E&M-EST. PATIENT-LVL III: ICD-10-PCS | Mod: PBBFAC,,, | Performed by: PODIATRIST

## 2023-06-22 PROCEDURE — 3288F PR FALLS RISK ASSESSMENT DOCUMENTED: ICD-10-PCS | Mod: CPTII,S$GLB,, | Performed by: PODIATRIST

## 2023-06-22 PROCEDURE — 1101F PR PT FALLS ASSESS DOC 0-1 FALLS W/OUT INJ PAST YR: ICD-10-PCS | Mod: CPTII,S$GLB,, | Performed by: PODIATRIST

## 2023-06-22 PROCEDURE — 1101F PT FALLS ASSESS-DOCD LE1/YR: CPT | Mod: CPTII,S$GLB,, | Performed by: PODIATRIST

## 2023-06-22 PROCEDURE — 99999 PR PBB SHADOW E&M-EST. PATIENT-LVL III: CPT | Mod: PBBFAC,,, | Performed by: PODIATRIST

## 2023-06-22 NOTE — PROGRESS NOTES
Subjective:       Patient ID: Radha Degroot is a 83 y.o. female.    Chief Complaint: Routine Foot Care (Patient in for diabetic feet and nail care, she was last seen by her pcp on 5/26/2023. Patient states her right foot was throbbing all night due to her toenail digging into the skin of the toe next to it. )      HPI: Patient presents to the office today with the chief complaint of elongated, thickened and dystrophic nail plates to the B/L foot. This patient is a Diabetic Type II, complicated with  Peripheral Neuropathy. Patient does follow with Primary Care and/or Endocrinology for management of Diabetes Mellitus. This patient's PMD is Jana Holguin MD. This patient last saw his/her primary care provider on 5/26/2023.     Hemoglobin A1C   Date Value Ref Range Status   04/05/2023 7.5 (H) 4.0 - 5.6 % Final     Comment:     ADA Screening Guidelines:  5.7-6.4%  Consistent with prediabetes  >or=6.5%  Consistent with diabetes    High levels of fetal hemoglobin interfere with the HbA1C  assay. Heterozygous hemoglobin variants (HbS, HgC, etc)do  not significantly interfere with this assay.   However, presence of multiple variants may affect accuracy.     12/01/2022 6.7 (H) 4.0 - 5.6 % Final     Comment:     ADA Screening Guidelines:  5.7-6.4%  Consistent with prediabetes  >or=6.5%  Consistent with diabetes    High levels of fetal hemoglobin interfere with the HbA1C  assay. Heterozygous hemoglobin variants (HbS, HgC, etc)do  not significantly interfere with this assay.   However, presence of multiple variants may affect accuracy.     06/30/2022 6.9 (H) 4.0 - 5.6 % Final     Comment:     ADA Screening Guidelines:  5.7-6.4%  Consistent with prediabetes  >or=6.5%  Consistent with diabetes    High levels of fetal hemoglobin interfere with the HbA1C  assay. Heterozygous hemoglobin variants (HbS, HgC, etc)do  not significantly interfere with this assay.   However, presence of multiple variants may affect accuracy.     .      Review of patient's allergies indicates:   Allergen Reactions    Heparin analogues Other (See Comments)     thrombocytopenia    Ancef [cefazolin]     Keflex [cephalexin] Rash    Oxybutynin Other (See Comments)     Metallic taste       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    Hx of colonic polyps     1/9    Hx of colonic polyps     Hyperlipidemia     Hypertension     Myocardial infarction nov 2006    Persistent atrial fibrillation 4/25/2018    Posterior vitreous detachment of both eyes     Sleep apnea     Type 2 diabetes mellitus with mild nonproliferative diabetic retinopathy with macular edema 2/15/2013       Family History   Problem Relation Age of Onset    Diabetes Mother     Heart disease Mother     Hypertension Mother     Diabetes Father     Melanoma Father     Kidney failure Father     Mental illness Sister         suicide/schizophrenic    Cancer Maternal Grandfather     Cancer Paternal Grandfather         colon    Psoriasis Neg Hx     Lupus Neg Hx     Eczema Neg Hx        Social History     Socioeconomic History    Marital status: Single   Occupational History    Occupation: Realtor   Tobacco Use    Smoking status: Never    Smokeless tobacco: Never   Substance and Sexual Activity    Alcohol use: No     Alcohol/week: 0.0 standard drinks    Drug use: No    Sexual activity: Not Currently   Other Topics Concern    Are you pregnant or think  you may be? No    Breast-feeding No   Social History Narrative    Part time .    Severe back pain.    Retired in 1975       Past Surgical History:   Procedure Laterality Date    APPENDECTOMY      CATARACT EXTRACTION      Right eye    EPIDURAL STEROID INJECTION N/A 11/2/2018    Procedure: Injection, Steroid, Epidural CAUDAL ELISA;  Surgeon: Ирина Peña MD;  Location: [facility] PAIN MGT;  Service: Pain Management;  Laterality: N/A;    ESOPHAGOGASTRODUODENOSCOPY N/A 3/31/2022    Procedure: EGD (ESOPHAGOGASTRODUODENOSCOPY);  Surgeon: Samm Billings MD;  Location: [facility] ENDO;  Service: Endoscopy;  Laterality: N/A;    heart stent      X 2    HYSTERECTOMY      Fibroids    INTRAMEDULLARY RODDING OF FEMUR Left 3/26/2022    Procedure: INSERTION, INTRAMEDULLARY SARA, FEMUR;  Surgeon: Eduin Avelar MD;  Location: [facility] OR;  Service: Orthopedics;  Laterality: Left;       Review of Systems       Objective:   There were no vitals taken for this visit.    Physical Exam  LOWER EXTREMITY PHYSICAL EXAMINATION    VASCULAR:  The right dorsalis pedis pulse 2/4 and the right posterior tibial pulse 1/4.  The left dorsalis pedis pulse 2/4 and posterior tibial pulse on the left is 1/4.  Capillary refill is intact.  Pedal hair growth decreased.     NEUROLOGY: Protective sensation is not intact to the left and right plantar surfaces of the foot and digits, as the patient has no sensation/detection at greater than 4 distinct points of contact with 5.07 [place] Flo monofilament. Sensation to light touch is intact on the left and right foot. Proprioception is intact, bilateral. Sensation to pin prick is reduced to absent. Vibratory sensation is diminished.    DERMATOLOGY:  Skin is thin, shiny, atrophic.  Significant pitting edema present to the bilateral lower extremities.  3+ edema noted.  There is no signs of callusing, ulcerations, other lesions identified to the dorsal or plantar aspect of the right or left foot.  The R1,  2, 5 and left L1,2, 5 are thickened, discolored dystrophic.  There is subungual debris.  Nail plates have area of dark discoloration.  The remaining nails 3-4 on the right foot and the left foot are elongated but of normal color, thickness, and texture.    ORTHOPEDIC:  No gross or structural anatomic deformity present to the right foot left foot.    Assessment:     1. Type 2 diabetes mellitus with diabetic polyneuropathy, with long-term current use of insulin    2. Type 2 diabetes mellitus with stage 4 chronic kidney disease, with long-term current use of insulin    3. Chronic diastolic CHF (congestive heart failure)    4. Permanent atrial fibrillation    5. Current use of long term anticoagulation        Plan:     Type 2 diabetes mellitus with diabetic polyneuropathy, with long-term current use of insulin    Type 2 diabetes mellitus with stage 4 chronic kidney disease, with long-term current use of insulin    Chronic diastolic CHF (congestive heart failure)    Permanent atrial fibrillation    Current use of long term anticoagulation        Thorough discussion is had with the patient this afternoon, concerning the diagnosis, its etiology, and the treatment algorithm at present.  Greater than 50% of this visit spent on counseling and coordination of care. Greater than 15 minutes of a 20 minute appointment spent on education about the diabetic foot, neuropathy, and prevention of limb loss.  Shoe inspection. Diabetic Foot Education. Patient reminded of the importance of good nutrition and blood sugar control to help prevent podiatric complications of diabetes. Patient instructed on proper foot hygeine. We discussed wearing proper and supportive shoe gear, daily foot inspections, never walking barefooted or sock footed, never putting sharp instruments to feet which can cause major complications associated with infection, ulcers, lacerations.        Dystrophic nail plates, as outlined above (R#1,2,5  ; L#1,2,5 ), are  sharply debrided with double action nail nipper, and/or with the assistance of a mechanical rotary martina, with removal of all offending nail and nail border(s), for reduction of pains. Nails are reduced in terms of length, width and girth with removal of subungual debris to facilitate pain free weight bearing and ambulation. The elongated nails as outlined in the objective portion of this note, were trimmed to appropriate length, with a double action nail nipper, for alleviation/reduction of pains as well. Follow up in approx. 3-4 months.        Future Appointments   Date Time Provider Department Center   8/4/2023  9:00 AM [facility] DEXA1 [facility] BMD [place]   8/4/2023  9:50 AM SPECIMEN, [facility] SPECLAB [place]   8/4/2023 10:15 AM LABORATORY, [facility] LAB [place]   8/14/2023 11:00 AM Damaso Oseguera MD [facility] NEPH [place]   8/28/2023 10:30 AM Terry Jesus Jr., MD [facility] CARDIO [place] Card   9/19/2023  8:00 AM [facility] US1 [facility] ULSOUND [place]   10/30/2023 12:15 PM Jayne Maxwell MD [facility] UROLOGY [place]   11/20/2023  1:00 PM Jana Holguin MD [facility] MED [place]

## 2023-07-28 ENCOUNTER — TELEPHONE (OUTPATIENT)
Dept: FAMILY MEDICINE | Facility: CLINIC | Age: 83
End: 2023-07-28
Payer: MEDICARE

## 2023-07-28 DIAGNOSIS — I25.10 CORONARY ARTERY DISEASE INVOLVING NATIVE CORONARY ARTERY OF NATIVE HEART WITHOUT ANGINA PECTORIS: Primary | ICD-10-CM

## 2023-07-28 DIAGNOSIS — N61.1 BREAST ABSCESS: Primary | ICD-10-CM

## 2023-07-28 NOTE — TELEPHONE ENCOUNTER
----- Message from Dia Tarango sent at 7/28/2023 11:18 AM CDT -----  Contact: Radha  .Type:  RX Refill Request  2 medications \    Who Called: Radha  Refill or New Rx:Refill  RX Name and Strength:1.Eliquis  2.Glucose meter  How is the patient currently taking it? (ex. 1XDay):as prescribed  Is this a 30 day or 90 day RX:30  Preferred Pharmacy with phone number:    [id number] - [place] [address] AT [facility] [address] [address]  Phone: [phone] Fax: [phone]        Local or Mail Order:local  Ordering Provider:  Would the patient rather a call back or a response via [facility]? Call back   Best Call Back Number:[phone]   Additional Information: n/a

## 2023-07-28 NOTE — TELEPHONE ENCOUNTER
----- Message from Kassandra Regan sent at 7/28/2023  4:31 PM CDT -----  Patient is requesting the nurse give her a call back, she has questions about a medication. She has another abscess and wants to get the previous medication filled again. Call back at [phone]

## 2023-07-28 NOTE — TELEPHONE ENCOUNTER
I spoke with the patient about this. Pt report she had an abscess on her buttock and was given doxycycline (MONODOX) 100 MG capsule. Pt reports the abscess on her buttock has improved and she now has one on her breast. Pt is requesting a refill on this medication. Please advise

## 2023-07-29 RX ORDER — DOXYCYCLINE 100 MG/1
100 CAPSULE ORAL EVERY 12 HOURS
Qty: 14 CAPSULE | Refills: 0 | Status: SHIPPED | OUTPATIENT
Start: 2023-07-29 | End: 2023-08-05

## 2023-07-29 NOTE — TELEPHONE ENCOUNTER
I have signed for the following orders AND/OR meds.  Please call the patient and ask the patient to schedule the testing AND/OR inform about any medications that were sent. Medications have been sent to pharmacy listed below      No orders of the defined types were placed in this encounter.      Medications Ordered This Encounter   Medications    doxycycline (VIBRAMYCIN) 100 MG Cap     Sig: Take 1 capsule (100 mg total) by mouth every 12 (twelve) hours. for 7 days     Dispense:  14 capsule     Refill:  0         [facility][id number] - [place] - [address] [place] [address] AT [facility] OF [facility] [address] & [address] [address]  Phone: [phone] Fax: [phone]    [id number] - [place], [place] - [id number] [place] [address] OF [address] [facility]  [address] [facility]  [place] [address]  Phone: [phone] Fax: [phone]

## 2023-07-31 DIAGNOSIS — Z79.4 TYPE 2 DIABETES MELLITUS WITH STAGE 4 CHRONIC KIDNEY DISEASE, WITH LONG-TERM CURRENT USE OF INSULIN: ICD-10-CM

## 2023-07-31 DIAGNOSIS — N18.4 TYPE 2 DIABETES MELLITUS WITH STAGE 4 CHRONIC KIDNEY DISEASE, WITH LONG-TERM CURRENT USE OF INSULIN: ICD-10-CM

## 2023-07-31 DIAGNOSIS — E11.22 TYPE 2 DIABETES MELLITUS WITH STAGE 4 CHRONIC KIDNEY DISEASE, WITH LONG-TERM CURRENT USE OF INSULIN: ICD-10-CM

## 2023-07-31 RX ORDER — PEN NEEDLE, DIABETIC 30 GX3/16"
NEEDLE, DISPOSABLE MISCELLANEOUS
Qty: 100 EACH | Refills: 11 | Status: SHIPPED | OUTPATIENT
Start: 2023-07-31

## 2023-07-31 NOTE — TELEPHONE ENCOUNTER
Care Due:                  Date            Visit Type   Department     Provider  --------------------------------------------------------------------------------                                EP -                              PRIMARY      [facility]  Last Visit: 05-      CARE ([facility])   JAZMINE Holguin                              [facility]                              PRIMARY      [facility]  Next Visit: 11-      CARE ([facility])   JAZMINE Holguin                                                            Last  Test          Frequency    Reason                     Performed    Due Date  --------------------------------------------------------------------------------    HBA1C.......  6 months...  insulin..................  04-   10-    [facility] Embedded Care Due Messages. Reference number: [id number].   7/31/2023 1:09:50 PM CDT

## 2023-07-31 NOTE — TELEPHONE ENCOUNTER
"----- Message from Isaura Negron sent at 7/31/2023 12:12 PM CDT -----  Contact: Patient  Type:  Diabetic/Medical Supplies Request    Name of Caller:Radha Degroot   What supplies are needed: blood sugar diagnostic Strp and pen needle, diabetic (BD ULTRA-FINE TIM PEN NEEDLE) 32 gauge x 5/32" Ndle  What is the brand of the supplies:n/a  Refill or New Rx:refill   If checking glucose, how many times do they check it?:4x  Who prescribed the original supplies:Dr. Holguin   Pharmacy/Company Name, Phone #, Location:   [facility] #[id number] [address] AT [facility] OF [facility] & [address] [address]  Phone: [phone] Fax: [phone]  Requesting a Call Back?:yes   Would the patient rather a call back or a response via [facility]?  Call back   Best Call Back Number: [phone]   Additional Information:         "

## 2023-08-07 ENCOUNTER — TELEPHONE (OUTPATIENT)
Dept: NEPHROLOGY | Facility: CLINIC | Age: 83
End: 2023-08-07
Payer: MEDICARE

## 2023-08-07 NOTE — TELEPHONE ENCOUNTER
----- Message from Romero Lubin sent at 8/7/2023 11:28 AM CDT -----  Regarding: pt call back  Name of Who is Calling:ALISSA ANDREW [[id number]]        What is the request in detail: Pt requesting to reschedule upcoming appt on 8-14 to 8-31   Pt mentioned she spoke with staff regarding appt change. I didn't see any changes please advise           Can the clinic reply by [facility]: no        What Number to Call Back if not in [facility]: Telephone Information:  Mobile          [phone]

## 2023-08-10 ENCOUNTER — TELEPHONE (OUTPATIENT)
Dept: UROLOGY | Facility: CLINIC | Age: 83
End: 2023-08-10
Payer: MEDICARE

## 2023-08-10 NOTE — TELEPHONE ENCOUNTER
Attempt to reach out to pt but was unsuccessful. LVM informing the pt that Dr. Maxwell will not be in the office 10/30 and that her appt will be rescheduled.

## 2023-08-25 ENCOUNTER — HOSPITAL ENCOUNTER (OUTPATIENT)
Dept: RADIOLOGY | Facility: HOSPITAL | Age: 83
Discharge: HOME | End: 2023-08-25
Attending: INTERNAL MEDICINE
Payer: MEDICARE

## 2023-08-25 DIAGNOSIS — Z78.0 ASYMPTOMATIC MENOPAUSE: ICD-10-CM

## 2023-08-25 PROCEDURE — 77080 DEXA BONE DENSITY SPINE HIP: ICD-10-PCS | Mod: 26,,, | Performed by: RADIOLOGY

## 2023-08-25 PROCEDURE — 77080 DXA BONE DENSITY AXIAL: CPT | Mod: TC,HCNC,PO

## 2023-08-25 PROCEDURE — 77080 DXA BONE DENSITY AXIAL: CPT | Mod: 26,,, | Performed by: RADIOLOGY

## 2023-08-28 ENCOUNTER — OFFICE VISIT (OUTPATIENT)
Dept: CARDIOLOGY | Facility: CLINIC | Age: 83
End: 2023-08-28
Payer: MEDICARE

## 2023-08-28 VITALS
OXYGEN SATURATION: 97 % | WEIGHT: 209 LBS | BODY MASS INDEX: 31.67 KG/M2 | SYSTOLIC BLOOD PRESSURE: 112 MMHG | HEART RATE: 80 BPM | HEIGHT: 68 IN | DIASTOLIC BLOOD PRESSURE: 52 MMHG

## 2023-08-28 DIAGNOSIS — I70.0 AORTIC ATHEROSCLEROSIS: Primary | ICD-10-CM

## 2023-08-28 DIAGNOSIS — I87.2 VENOUS INSUFFICIENCY: ICD-10-CM

## 2023-08-28 DIAGNOSIS — I10 ESSENTIAL HYPERTENSION: Chronic | ICD-10-CM

## 2023-08-28 DIAGNOSIS — N18.4 CHRONIC KIDNEY DISEASE, STAGE IV (SEVERE): Chronic | ICD-10-CM

## 2023-08-28 DIAGNOSIS — E78.2 MIXED HYPERLIPIDEMIA: Chronic | ICD-10-CM

## 2023-08-28 DIAGNOSIS — I48.21 PERMANENT ATRIAL FIBRILLATION: ICD-10-CM

## 2023-08-28 DIAGNOSIS — I25.10 CORONARY ARTERY DISEASE INVOLVING NATIVE CORONARY ARTERY OF NATIVE HEART WITHOUT ANGINA PECTORIS: ICD-10-CM

## 2023-08-28 DIAGNOSIS — E66.2 CLASS 1 OBESITY WITH ALVEOLAR HYPOVENTILATION, SERIOUS COMORBIDITY, AND BODY MASS INDEX (BMI) OF 33.0 TO 33.9 IN ADULT: ICD-10-CM

## 2023-08-28 PROCEDURE — 1126F AMNT PAIN NOTED NONE PRSNT: CPT | Mod: HCNC,CPTII,S$GLB, | Performed by: INTERNAL MEDICINE

## 2023-08-28 PROCEDURE — 99214 PR OFFICE/OUTPT VISIT, EST, LEVL IV, 30-39 MIN: ICD-10-PCS | Mod: HCNC,S$GLB,, | Performed by: INTERNAL MEDICINE

## 2023-08-28 PROCEDURE — 99999 PR PBB SHADOW E&M-EST. PATIENT-LVL IV: ICD-10-PCS | Mod: PBBFAC,HCNC,, | Performed by: INTERNAL MEDICINE

## 2023-08-28 PROCEDURE — 1101F PR PT FALLS ASSESS DOC 0-1 FALLS W/OUT INJ PAST YR: ICD-10-PCS | Mod: HCNC,CPTII,S$GLB, | Performed by: INTERNAL MEDICINE

## 2023-08-28 PROCEDURE — 3074F SYST BP LT 130 MM HG: CPT | Mod: HCNC,CPTII,S$GLB, | Performed by: INTERNAL MEDICINE

## 2023-08-28 PROCEDURE — 1101F PT FALLS ASSESS-DOCD LE1/YR: CPT | Mod: HCNC,CPTII,S$GLB, | Performed by: INTERNAL MEDICINE

## 2023-08-28 PROCEDURE — 3288F PR FALLS RISK ASSESSMENT DOCUMENTED: ICD-10-PCS | Mod: HCNC,CPTII,S$GLB, | Performed by: INTERNAL MEDICINE

## 2023-08-28 PROCEDURE — 99214 OFFICE O/P EST MOD 30 MIN: CPT | Mod: HCNC,S$GLB,, | Performed by: INTERNAL MEDICINE

## 2023-08-28 PROCEDURE — 1159F MED LIST DOCD IN RCRD: CPT | Mod: HCNC,CPTII,S$GLB, | Performed by: INTERNAL MEDICINE

## 2023-08-28 PROCEDURE — 3288F FALL RISK ASSESSMENT DOCD: CPT | Mod: HCNC,CPTII,S$GLB, | Performed by: INTERNAL MEDICINE

## 2023-08-28 PROCEDURE — 1160F PR REVIEW ALL MEDS BY PRESCRIBER/CLIN PHARMACIST DOCUMENTED: ICD-10-PCS | Mod: HCNC,CPTII,S$GLB, | Performed by: INTERNAL MEDICINE

## 2023-08-28 PROCEDURE — 1126F PR PAIN SEVERITY QUANTIFIED, NO PAIN PRESENT: ICD-10-PCS | Mod: HCNC,CPTII,S$GLB, | Performed by: INTERNAL MEDICINE

## 2023-08-28 PROCEDURE — 99999 PR PBB SHADOW E&M-EST. PATIENT-LVL IV: CPT | Mod: PBBFAC,HCNC,, | Performed by: INTERNAL MEDICINE

## 2023-08-28 PROCEDURE — 3078F PR MOST RECENT DIASTOLIC BLOOD PRESSURE < 80 MM HG: ICD-10-PCS | Mod: HCNC,CPTII,S$GLB, | Performed by: INTERNAL MEDICINE

## 2023-08-28 PROCEDURE — 3074F PR MOST RECENT SYSTOLIC BLOOD PRESSURE < 130 MM HG: ICD-10-PCS | Mod: HCNC,CPTII,S$GLB, | Performed by: INTERNAL MEDICINE

## 2023-08-28 PROCEDURE — 3078F DIAST BP <80 MM HG: CPT | Mod: HCNC,CPTII,S$GLB, | Performed by: INTERNAL MEDICINE

## 2023-08-28 PROCEDURE — 1159F PR MEDICATION LIST DOCUMENTED IN MEDICAL RECORD: ICD-10-PCS | Mod: HCNC,CPTII,S$GLB, | Performed by: INTERNAL MEDICINE

## 2023-08-28 PROCEDURE — 1160F RVW MEDS BY RX/DR IN RCRD: CPT | Mod: HCNC,CPTII,S$GLB, | Performed by: INTERNAL MEDICINE

## 2023-08-28 RX ORDER — FUROSEMIDE 20 MG/1
20 TABLET ORAL DAILY
COMMUNITY

## 2023-08-28 NOTE — PROGRESS NOTES
Subjective:   Patient ID:  Radha Degroot is a 83 y.o. female who presents for follow-up of Follow-up      HPI83 yo WF with hx of MI in 2006 with LAD and diagonal stent, HTN and permanent AF. Also with CKD with last Cr 2.0. Still with SOB and leg edema. Very limited because of back issues. Denies CP and doesn't feel the atrial fib.    Summary     The left ventricle is normal in size with concentric remodeling and normal systolic function. The estimated ejection fraction is 60%.  Normal right ventricular size with normal right ventricular systolic function.  Biatrial enlargement.  Mild tricuspid regurgitation.  The estimated PA systolic pressure is 42 mmHg.  Normal central venous pressure (3 mmHg).  Atrial fibrillation observed.    Review of Systems   Constitutional: Negative for decreased appetite, fever, malaise/fatigue, weight gain and weight loss.   HENT:  Negative for hearing loss and nosebleeds.    Eyes:  Negative for visual disturbance.   Cardiovascular:  Positive for dyspnea on exertion and leg swelling. Negative for chest pain, claudication, cyanosis, irregular heartbeat, near-syncope, orthopnea, palpitations, paroxysmal nocturnal dyspnea and syncope.   Respiratory:  Positive for shortness of breath. Negative for cough, hemoptysis, sleep disturbances due to breathing, snoring and wheezing.    Endocrine: Negative for cold intolerance, heat intolerance, polydipsia and polyuria.   Hematologic/Lymphatic: Negative for adenopathy and bleeding problem. Does not bruise/bleed easily.   Skin:  Negative for color change, itching, poor wound healing, rash and suspicious lesions.   Musculoskeletal:  Positive for arthritis, back pain and muscle weakness. Negative for falls, joint pain, joint swelling, muscle cramps and myalgias.   Gastrointestinal:  Negative for bloating, abdominal pain, change in bowel habit, constipation, flatus, heartburn, hematemesis, hematochezia, hemorrhoids, jaundice, melena, nausea and vomiting.  "  Genitourinary:  Negative for bladder incontinence, decreased libido, frequency, hematuria, hesitancy and urgency.   Neurological:  Negative for brief paralysis, difficulty with concentration, excessive daytime sleepiness, dizziness, focal weakness, headaches, light-headedness, loss of balance, numbness, vertigo and weakness.   Psychiatric/Behavioral:  Negative for altered mental status, depression and memory loss. The patient does not have insomnia and is not nervous/anxious.    Allergic/Immunologic: Negative for environmental allergies, hives and persistent infections.       Objective:   Physical Exam  Vitals and nursing note reviewed.   Constitutional:       Appearance: She is well-developed.      Comments: BP (!) 112/52   Pulse 80   Ht 5' 8" (1.727 m)   Wt 94.8 kg (209 lb)   SpO2 97%   BMI 31.78 kg/m²      HENT:      Head: Normocephalic and atraumatic.      Right Ear: External ear normal.      Left Ear: External ear normal.      Nose: Nose normal.   Eyes:      General: Lids are normal. No scleral icterus.        Right eye: No discharge.         Left eye: No discharge.      Conjunctiva/sclera: Conjunctivae normal.      Right eye: No hemorrhage.     Pupils: Pupils are equal, round, and reactive to light.   Neck:      Thyroid: No thyromegaly.      Vascular: No JVD.      Trachea: No tracheal deviation.   Cardiovascular:      Rate and Rhythm: Normal rate. Rhythm irregularly irregular.      Pulses: Intact distal pulses.      Heart sounds: Normal heart sounds. No murmur heard.     No friction rub. No gallop.   Pulmonary:      Effort: Pulmonary effort is normal. No respiratory distress.      Breath sounds: Normal breath sounds. No wheezing or rales.   Chest:      Chest wall: No tenderness.   Breasts:     Breasts are symmetrical.   Abdominal:      General: Bowel sounds are normal. There is no distension.      Palpations: Abdomen is soft. There is no hepatomegaly or mass.      Tenderness: There is no abdominal " tenderness. There is no guarding or rebound.   Musculoskeletal:         General: No tenderness. Normal range of motion.      Cervical back: Normal range of motion and neck supple.      Right lower leg: Edema present.      Left lower leg: Edema present.   Lymphadenopathy:      Cervical: No cervical adenopathy.   Skin:     General: Skin is warm and dry.      Coloration: Skin is not pale.      Findings: No erythema or rash.   Neurological:      Mental Status: She is alert and oriented to person, place, and time.      Cranial Nerves: No cranial nerve deficit.      Coordination: Coordination normal.      Deep Tendon Reflexes: Reflexes are normal and symmetric. Reflexes normal.   Psychiatric:         Behavior: Behavior normal.         Thought Content: Thought content normal.         Judgment: Judgment normal.         Assessment:      1. Aortic atherosclerosis    2. Coronary artery disease involving native coronary artery of native heart without angina pectoris    3. Essential hypertension    4. Mixed hyperlipidemia    5. Venous insufficiency    6. Chronic kidney disease, stage IV (severe)    7. Class 1 obesity with alveolar hypoventilation, serious comorbidity, and body mass index (BMI) of 33.0 to 33.9 in adult    8. Permanent atrial fibrillation        Plan:   Low salt diet  Support stockings   Leg elevation   Risk of stroke from atrial fib has been discussed as well as bleeding risk from alternative anticoagulation regiments as well as risk and benefits of rate control vs cardioversion    May at times increase lasix for several days if edema gets worse  No orders of the defined types were placed in this encounter.    Follow up in about 6 months (around 2/28/2024).

## 2023-08-30 DIAGNOSIS — K92.0 COFFEE GROUND EMESIS: ICD-10-CM

## 2023-08-30 NOTE — TELEPHONE ENCOUNTER
----- Message from Mercedes Birmingham sent at 8/30/2023 12:18 PM CDT -----  Contact: pt  Pt is calling in regard her med, pantoprazole (PROTONIX) 40 MG tablet and need to know why se doesn't have anymore refills, is she not supposed to take after it runs out.  Please call her back at [phone] jagdish/mpd

## 2023-08-30 NOTE — TELEPHONE ENCOUNTER
No care due was identified.  Health [facility] Embedded Care Due Messages. Reference number: [id number].   8/30/2023 1:23:38 PM CDT

## 2023-08-31 RX ORDER — PANTOPRAZOLE SODIUM 40 MG/1
40 TABLET, DELAYED RELEASE ORAL DAILY
Qty: 90 TABLET | Refills: 3 | Status: SHIPPED | OUTPATIENT
Start: 2023-08-31

## 2023-09-05 DIAGNOSIS — I10 ESSENTIAL HYPERTENSION: Chronic | ICD-10-CM

## 2023-09-05 RX ORDER — CARVEDILOL 12.5 MG/1
12.5 TABLET ORAL 2 TIMES DAILY
Qty: 180 TABLET | Refills: 3 | Status: SHIPPED | OUTPATIENT
Start: 2023-09-05 | End: 2024-08-30

## 2023-09-29 RX ORDER — SOLIFENACIN SUCCINATE 5 MG/1
5 TABLET, FILM COATED ORAL
Qty: 30 TABLET | Refills: 3 | Status: SHIPPED | OUTPATIENT
Start: 2023-09-29 | End: 2023-10-12

## 2023-10-11 ENCOUNTER — TELEPHONE (OUTPATIENT)
Dept: FAMILY MEDICINE | Facility: CLINIC | Age: 83
End: 2023-10-11
Payer: MEDICARE

## 2023-10-11 NOTE — TELEPHONE ENCOUNTER
----- Message from Yudi Santoyo sent at 10/11/2023  2:26 PM CDT -----  Contact: self  Pt is asking for an return call in reference to her apt  states she would like the 11 am apt on 11/12 ,please call back at [phone] Thx CJ

## 2023-10-12 ENCOUNTER — OFFICE VISIT (OUTPATIENT)
Dept: FAMILY MEDICINE | Facility: CLINIC | Age: 83
End: 2023-10-12
Payer: MEDICARE

## 2023-10-12 VITALS
DIASTOLIC BLOOD PRESSURE: 89 MMHG | WEIGHT: 212 LBS | TEMPERATURE: 98 F | SYSTOLIC BLOOD PRESSURE: 139 MMHG | HEIGHT: 68 IN | HEART RATE: 69 BPM | BODY MASS INDEX: 32.13 KG/M2

## 2023-10-12 DIAGNOSIS — N18.4 TYPE 2 DIABETES MELLITUS WITH STAGE 4 CHRONIC KIDNEY DISEASE, WITH LONG-TERM CURRENT USE OF INSULIN: ICD-10-CM

## 2023-10-12 DIAGNOSIS — I87.319 CHRONIC VENOUS HYPERTENSION W ULCERATION: ICD-10-CM

## 2023-10-12 DIAGNOSIS — I25.10 CORONARY ARTERY DISEASE INVOLVING NATIVE CORONARY ARTERY OF NATIVE HEART WITHOUT ANGINA PECTORIS: ICD-10-CM

## 2023-10-12 DIAGNOSIS — I48.21 PERMANENT ATRIAL FIBRILLATION: Primary | ICD-10-CM

## 2023-10-12 DIAGNOSIS — E78.2 MIXED HYPERLIPIDEMIA: Chronic | ICD-10-CM

## 2023-10-12 DIAGNOSIS — Z23 INFLUENZA VACCINE NEEDED: ICD-10-CM

## 2023-10-12 DIAGNOSIS — N39.46 MIXED INCONTINENCE URGE AND STRESS: ICD-10-CM

## 2023-10-12 DIAGNOSIS — I10 ESSENTIAL HYPERTENSION: Chronic | ICD-10-CM

## 2023-10-12 DIAGNOSIS — D17.71 ANGIOMYOLIPOMA OF RIGHT KIDNEY: ICD-10-CM

## 2023-10-12 DIAGNOSIS — Z79.4 TYPE 2 DIABETES MELLITUS WITH STAGE 4 CHRONIC KIDNEY DISEASE, WITH LONG-TERM CURRENT USE OF INSULIN: ICD-10-CM

## 2023-10-12 DIAGNOSIS — L97.909 CHRONIC VENOUS HYPERTENSION W ULCERATION: ICD-10-CM

## 2023-10-12 DIAGNOSIS — I87.2 VENOUS INSUFFICIENCY: ICD-10-CM

## 2023-10-12 DIAGNOSIS — E11.22 TYPE 2 DIABETES MELLITUS WITH STAGE 4 CHRONIC KIDNEY DISEASE, WITH LONG-TERM CURRENT USE OF INSULIN: ICD-10-CM

## 2023-10-12 DIAGNOSIS — N18.4 CHRONIC KIDNEY DISEASE, STAGE IV (SEVERE): Chronic | ICD-10-CM

## 2023-10-12 PROBLEM — K92.0 COFFEE GROUND EMESIS: Status: RESOLVED | Noted: 2022-03-26 | Resolved: 2023-10-12

## 2023-10-12 PROCEDURE — 1159F PR MEDICATION LIST DOCUMENTED IN MEDICAL RECORD: ICD-10-PCS | Mod: HCNC,CPTII,S$GLB, | Performed by: INTERNAL MEDICINE

## 2023-10-12 PROCEDURE — 3079F PR MOST RECENT DIASTOLIC BLOOD PRESSURE 80-89 MM HG: ICD-10-PCS | Mod: HCNC,CPTII,S$GLB, | Performed by: INTERNAL MEDICINE

## 2023-10-12 PROCEDURE — 1159F MED LIST DOCD IN RCRD: CPT | Mod: HCNC,CPTII,S$GLB, | Performed by: INTERNAL MEDICINE

## 2023-10-12 PROCEDURE — G0008 ADMIN INFLUENZA VIRUS VAC: HCPCS | Mod: HCNC,S$GLB,, | Performed by: INTERNAL MEDICINE

## 2023-10-12 PROCEDURE — 3075F PR MOST RECENT SYSTOLIC BLOOD PRESS GE 130-139MM HG: ICD-10-PCS | Mod: HCNC,CPTII,S$GLB, | Performed by: INTERNAL MEDICINE

## 2023-10-12 PROCEDURE — 99214 PR OFFICE/OUTPT VISIT, EST, LEVL IV, 30-39 MIN: ICD-10-PCS | Mod: HCNC,S$GLB,, | Performed by: INTERNAL MEDICINE

## 2023-10-12 PROCEDURE — 3075F SYST BP GE 130 - 139MM HG: CPT | Mod: HCNC,CPTII,S$GLB, | Performed by: INTERNAL MEDICINE

## 2023-10-12 PROCEDURE — 90694 VACC AIIV4 NO PRSRV 0.5ML IM: CPT | Mod: HCNC,S$GLB,, | Performed by: INTERNAL MEDICINE

## 2023-10-12 PROCEDURE — G0008 FLU VACCINE - QUADRIVALENT - ADJUVANTED: ICD-10-PCS | Mod: HCNC,S$GLB,, | Performed by: INTERNAL MEDICINE

## 2023-10-12 PROCEDURE — 99999 PR PBB SHADOW E&M-EST. PATIENT-LVL V: CPT | Mod: PBBFAC,HCNC,, | Performed by: INTERNAL MEDICINE

## 2023-10-12 PROCEDURE — 3288F FALL RISK ASSESSMENT DOCD: CPT | Mod: HCNC,CPTII,S$GLB, | Performed by: INTERNAL MEDICINE

## 2023-10-12 PROCEDURE — 3288F PR FALLS RISK ASSESSMENT DOCUMENTED: ICD-10-PCS | Mod: HCNC,CPTII,S$GLB, | Performed by: INTERNAL MEDICINE

## 2023-10-12 PROCEDURE — 1126F AMNT PAIN NOTED NONE PRSNT: CPT | Mod: HCNC,CPTII,S$GLB, | Performed by: INTERNAL MEDICINE

## 2023-10-12 PROCEDURE — 3079F DIAST BP 80-89 MM HG: CPT | Mod: HCNC,CPTII,S$GLB, | Performed by: INTERNAL MEDICINE

## 2023-10-12 PROCEDURE — 90694 FLU VACCINE - QUADRIVALENT - ADJUVANTED: ICD-10-PCS | Mod: HCNC,S$GLB,, | Performed by: INTERNAL MEDICINE

## 2023-10-12 PROCEDURE — 1126F PR PAIN SEVERITY QUANTIFIED, NO PAIN PRESENT: ICD-10-PCS | Mod: HCNC,CPTII,S$GLB, | Performed by: INTERNAL MEDICINE

## 2023-10-12 PROCEDURE — 1101F PR PT FALLS ASSESS DOC 0-1 FALLS W/OUT INJ PAST YR: ICD-10-PCS | Mod: HCNC,CPTII,S$GLB, | Performed by: INTERNAL MEDICINE

## 2023-10-12 PROCEDURE — 99999 PR PBB SHADOW E&M-EST. PATIENT-LVL V: ICD-10-PCS | Mod: PBBFAC,HCNC,, | Performed by: INTERNAL MEDICINE

## 2023-10-12 PROCEDURE — 99214 OFFICE O/P EST MOD 30 MIN: CPT | Mod: HCNC,S$GLB,, | Performed by: INTERNAL MEDICINE

## 2023-10-12 PROCEDURE — 1101F PT FALLS ASSESS-DOCD LE1/YR: CPT | Mod: HCNC,CPTII,S$GLB, | Performed by: INTERNAL MEDICINE

## 2023-10-12 RX ORDER — CLINDAMYCIN HYDROCHLORIDE 300 MG/1
300 CAPSULE ORAL 3 TIMES DAILY
COMMUNITY
Start: 2023-10-09 | End: 2023-12-31

## 2023-10-12 RX ORDER — SOLIFENACIN SUCCINATE 5 MG/1
5 TABLET, FILM COATED ORAL DAILY
Qty: 30 TABLET | Refills: 5 | Status: SHIPPED | OUTPATIENT
Start: 2023-10-12

## 2023-10-12 NOTE — Clinical Note
Just noticed that patient never had any check out stuff done. Maybe didn't wait for Helga? Please cancel Ann Marie appt in march and sched with Sergio instead, around same time. She wants to change doctors. New referral for cardiology was placed. Also needs to schedule US retroperitoneal (Maxwell order) same day as scheduled labs in Dec.  Also need to add a1c, lipid to those scheduled labs. Cancel f/u with me in Nov and move to January Thx!!

## 2023-10-13 PROCEDURE — G0180 MD CERTIFICATION HHA PATIENT: HCPCS | Mod: ,,, | Performed by: INTERNAL MEDICINE

## 2023-10-13 PROCEDURE — G0180 PR HOME HEALTH MD CERTIFICATION: ICD-10-PCS | Mod: ,,, | Performed by: INTERNAL MEDICINE

## 2023-11-03 NOTE — PROGRESS NOTES
Assessment/Plan:    Problem List Items Addressed This Visit          Cardiac/Vascular    Essential hypertension (Chronic)    Overview     Hypertension Medications               carvediloL (COREG) 12.5 MG tablet Take 1 tablet (12.5 mg total) by mouth 2 (two) times a day.    furosemide (LASIX) 20 MG tablet Take 20 mg by mouth once daily.   -at goal today  -continue lifestyle modification with low sodium diet and exercise   -discussed hypertension disease course and importance of treating high blood pressure  -patient understood and advised of risk of untreated blood pressure.  ER precautions were given   for symptoms of hypertensive urgency and emergency.          Hyperlipidemia (Chronic)    Overview     Hyperlipidemia Medications               rosuvastatin (CRESTOR) 40 MG Tab TAKE 1 TABLET(40 MG) BY MOUTH EVERY DAY   -chronic condition. Currently stable.    -reports compliance with hyperlipidemia treatment as prescribed  -denies any known adverse effects of medications  -most recent labs listed below; due for updated labs  Lab Results   Component Value Date    CHOL 119 (L) 03/17/2022     Lab Results   Component Value Date    HDL 52 03/17/2022     Lab Results   Component Value Date    LDLCALC 45.8 (L) 03/17/2022     Lab Results   Component Value Date    TRIG 106 03/17/2022     Lab Results   Component Value Date    ALT 13 12/01/2022    AST 17 12/01/2022    ALKPHOS 101 12/01/2022    BILITOT 0.6 12/01/2022          Coronary artery disease involving native coronary artery without angina pectoris    Overview     -hx of STEMI in 2006; PCI of LAD and diagonal  -NSTEM in April 2017; Goal-directed medical therapy; No [facility] 2/2 renal function  -on eliquis 2/2 a fib  -on statin and beta blocker         Relevant Orders    Ambulatory referral/consult to Cardiology    Permanent atrial fibrillation - Primary    Overview     -followed by cardiology  -remains on eliquis for stroke prevention  -on beta blocker for rate control          Relevant Orders    Ambulatory referral/consult to Cardiology    Venous insufficiency    Relevant Orders    Ambulatory referral/consult to Home Health       Renal/    Chronic kidney disease, stage IV (severe) (Chronic)    Overview     -followed by nephrology  -labs remain stable but due for updated labs  -renally dose medication  -avoid nephrotoxic agents, including NSAIDs         Relevant Orders    Ambulatory referral/consult to Home Health    Mixed incontinence urge and stress    Overview     -established with urology  -plan at recent visit to start on vesicare but patient reports never receiving medications  -plan to resent in prescription         Relevant Medications    solifenacin (VESICARE) 5 MG tablet       Oncology    Angiomyolipoma of right kidney    Overview     -followed by urology  -repeat renal US ordered and needs to be scheduled            Endocrine    Type 2 diabetes mellitus with stage 4 chronic kidney disease, with long-term current use of insulin    Overview     Diabetes Medications               insulin aspart U-100 (NOVOLOG) 100 unit/mL (3 mL) InPn pen Inject 14 units three times daily before meals    insulin degludec (TRESIBA FLEXTOUCH U-100) 100 unit/mL (3 mL) insulin pen INJECT 32 UNITS UNDER THE SKIN DAILY   -condition is currently uncontrolled but lenient with a1c considering comorbidities   -due for updated a1c  -see diabetic health maintenance listed below  -on statin: Yes  -on ACE-I/ARB: No  -counseling provided on importance of diabetic diet and medication compliance in order to treat diabetes  -discussed diabetes disease course and potential          Other Visit Diagnoses       Influenza vaccine needed        Relevant Orders    Influenza - Quadrivalent (Adjuvanted) (Completed)    Chronic venous hypertension w ulceration        Relevant Orders    Ambulatory referral/consult to Home Health            Follow up for please cancel Ann Marie jane in march and sched with Sergio instead,  around same time.    Jana Holguin MD  _____________________________________________________________________________________________________________________________________________________    CC: follow up of chronic medical conditions     HPI:    Patient is in clinic today as an established patient.    HTN/A fib/CAD: The patient is currently being treated for essential hypertension. This condition is chronic and stable. The patient is tolerating their medication well with good compliance.  Denies any adverse effects of medications.  Counseling was offered regarding low sodium diet.  The patient has a reduced salt intake. Routine exercise recommended. The patient denies headache, vision changes, chest pain, palpitations, shortness of breath. Chronic lower extremity edema present. Reports taking lasix daily with good urine output although she is only taking 20 mg and has pretty low renal function. She is requesting to change to another cardiologist so will place new referral today and have staff schedule.    DM2: Patient presents for follow up of diabetes. Condition is chronic. She did have a recent increase in a1c last labs. She is due for updated a1c. She does have multiple comorbidities so ok for more lax a1c goal but would like for her to stay under 7.5. Patient denies symptoms, including foot ulcerations, hyperglycemia, hypoglycemia , nausea, paresthesia of the feet, polydipsia, polyuria and visual disturbances.  Evaluation to date has been included: fasting blood sugar, fasting lipid panel, hemoglobin A1C and microalbuminuria. Denies adverse effects of current medications.     Diabetes Management Status    Statin: Taking  ACE/ARB: Not taking    Screening or Prevention Patient's value Goal Complete/Controlled?   HgA1C Testing and Control   Lab Results   Component Value Date    HGBA1C 7.5 (H) 04/05/2023      Annually/Less than 8% Yes   Lipid profile : 03/17/2022 Annually No   LDL control Lab Results   Component  Value Date    LDLCALC 45.8 (L) 03/17/2022    Annually/Less than 100 mg/dl  No   Nephropathy screening Lab Results   Component Value Date    LABMICR 64.0 02/11/2015     Lab Results   Component Value Date    PROTEINUA 2+ (A) 03/25/2022    Annually Yes   Blood pressure BP Readings from Last 1 Encounters:   10/12/23 139/89    Less than 140/90 Yes   Dilated retinal exam : 08/08/2019 Annually Yes   Foot exam   Most Recent Foot Exam Date: Not Found Annually Yes     No other new complaints today.  Remaining chronic conditions have been reviewed and remain stable. Further detail as stated above.     HM reviewed. Due for labs soon, scheduled. Flu shot today.    No recent changes to medical/surgical history.    Current Outpatient Medications on File Prior to Visit   Medication Sig Dispense Refill    apixaban (ELIQUIS) 2.5 mg Tab Take 1 tablet (2.5 mg total) by mouth 2 (two) times daily. 180 tablet 3    ascorbic acid, vitamin C, (VITAMIN C) 100 MG tablet Take 100 mg by mouth once daily.      blood sugar diagnostic Strp To check BG 4 times daily, to use with insurance preferred meter; Brand: [employer] Solutions[employer]heck 100 strip 11    carvediloL (COREG) 12.5 MG tablet Take 1 tablet (12.5 mg total) by mouth 2 (two) times a day. 180 tablet 3    clindamycin (CLEOCIN) 300 MG capsule Take 300 mg by mouth 3 (three) times daily.      diclofenac sodium (VOLTAREN) 1 % Gel Apply 2 g topically 2 (two) times daily as needed (joint pain). 150 g 1    furosemide (LASIX) 20 MG tablet Take 20 mg by mouth once daily.      insulin aspart U-100 (NOVOLOG FLEXPEN U-100 INSULIN) 100 unit/mL (3 mL) InPn pen ADMINISTER 14 UNITS UNDER THE SKIN THREE TIMES DAILY BEFORE MEALS 45 mL 3    insulin degludec (TRESIBA FLEXTOUCH U-100) 100 unit/mL (3 mL) insulin pen Inject 32 Units into the skin once daily. 30 mL 3    lancets Misc To check BG 4 times daily, to use with insurance preferred meter 100 each 11    MULTIVIT-MIN/IRON/FOLIC/LUTEIN (CENTRUM SILVER WOMEN ORAL) Take by mouth  "once daily.       pantoprazole (PROTONIX) 40 MG tablet Take 1 tablet (40 mg total) by mouth once daily. 90 tablet 3    pen needle, diabetic (BD ULTRA-FINE TIM PEN NEEDLE) 32 gauge x 5/32" Ndle Use as administer medication as prescribed. 100 each 11    rosuvastatin (CRESTOR) 40 MG Tab Take 1 tablet (40 mg total) by mouth once daily. 90 tablet 3    vitamin B complex (B COMPLEX VITAMINS ORAL) Take by mouth once daily.      vitamin D (VITAMIN D3) 1000 units Tab Take 1,000 Units by mouth once daily.      VITAMIN E, BULK, MISC by Misc.(Non-Drug; Combo Route) route once daily.      melatonin 10 mg Cap Take by mouth.      [DISCONTINUED] blood-glucose meter (ACCU-CHEK MELIDA PLUS METER) Misc To check sugars 4 times daily.  Please include control solution 1 each 0     No current facility-administered medications on file prior to visit.       Review of Systems   Constitutional:  Negative for chills, diaphoresis, fatigue and fever.   HENT:  Negative for congestion, ear pain, postnasal drip, sinus pain and sore throat.    Eyes:  Negative for pain and redness.   Respiratory:  Negative for cough, chest tightness and shortness of breath.    Cardiovascular:  Positive for leg swelling. Negative for chest pain.   Gastrointestinal:  Negative for abdominal pain, constipation, diarrhea, nausea and vomiting.   Genitourinary:  Negative for dysuria and hematuria.   Musculoskeletal:  Negative for arthralgias and joint swelling.   Skin:  Negative for rash.   Neurological:  Negative for dizziness, syncope and headaches.   Psychiatric/Behavioral:  Negative for dysphoric mood. The patient is not nervous/anxious.        Vitals:    10/12/23 1113 10/12/23 1209   BP: (!) 150/64 139/89   Pulse: 69    Temp: 97.9 °F (36.6 °C)    Weight: 96.2 kg (212 lb)    Height: 5' 8" (1.727 m)        Wt Readings from Last 3 Encounters:   10/12/23 96.2 kg (212 lb)   08/28/23 94.8 kg (209 lb)   06/19/23 94.8 kg (209 lb)       Physical Exam  Constitutional:       " General: She is not in acute distress.     Appearance: Normal appearance. She is well-developed.   HENT:      Head: Normocephalic and atraumatic.   Eyes:      Conjunctiva/sclera: Conjunctivae normal.   Cardiovascular:      Rate and Rhythm: Normal rate and regular rhythm.      Pulses: Normal pulses.      Heart sounds: Normal heart sounds. No murmur heard.  Pulmonary:      Effort: Pulmonary effort is normal. No respiratory distress.      Breath sounds: Normal breath sounds.   Abdominal:      General: Bowel sounds are normal. There is no distension.      Palpations: Abdomen is soft.      Tenderness: There is no abdominal tenderness.   Musculoskeletal:         General: Normal range of motion.      Cervical back: Normal range of motion and neck supple.      Right lower leg: Edema present.      Left lower leg: Edema present.   Skin:     General: Skin is warm and dry.      Findings: No rash.   Neurological:      General: No focal deficit present.      Mental Status: She is alert and oriented to person, place, and time.   Psychiatric:         Mood and Affect: Mood normal.         Behavior: Behavior normal.         Health Maintenance   Topic Date Due    Shingles Vaccine (1 of 2) Never done    TETANUS VACCINE  10/03/2015    Hemoglobin A1c  10/05/2023    Colonoscopy  06/21/2026    Lipid Panel  Discontinued    Eye Exam  Discontinued    DEXA Scan  Discontinued

## 2023-11-06 ENCOUNTER — DOCUMENT SCAN (OUTPATIENT)
Dept: HOME HEALTH SERVICES | Facility: HOSPITAL | Age: 83
End: 2023-11-06
Payer: MEDICARE

## 2023-11-07 ENCOUNTER — TELEPHONE (OUTPATIENT)
Dept: FAMILY MEDICINE | Facility: CLINIC | Age: 83
End: 2023-11-07
Payer: MEDICARE

## 2023-11-07 NOTE — TELEPHONE ENCOUNTER
Helga I spoke with pt and she would like to see dr Donohue. Please assist pt with scheduling an appt.Thank you !    Dr Holguin pt just wanted to let you know that the home health that you send to her house is doing an amazing job , and she wanted to thank you

## 2023-11-07 NOTE — TELEPHONE ENCOUNTER
----- Message from Jana Holguin MD sent at 11/7/2023  4:00 PM CST -----  Regarding: FW: Sergio does not have schedule template for March  Please just make patient aware of information below.   ----- Message -----  From: Iveth Virgen LPN  Sent: 11/7/2023   3:34 PM CST  To: Jana Holguin MD  Subject: FW: Sergio does not have schedule template#      ----- Message -----  From: Helga Francis  Sent: 11/7/2023   3:20 PM CST  To: Ezio Bates Staff  Subject: Sergio does not have schedule template for#    I received a message that patient wants to change cardiology specialist from Dr. Jesus to Dr. Hill. Unfortunately I am not able to schedule patient at this time to switch as Dr. Hill does not have a schedule from February through April (he may be taking some time off or not on rotation to [facility] during this time.)    Please let Dr. Holguin know this information, if she would like patient to switch to another provider for cardiology please let me know and I can schedule patient with whomever she may recommend.    Thank you

## 2023-11-08 ENCOUNTER — PATIENT MESSAGE (OUTPATIENT)
Dept: CARDIOLOGY | Facility: HOSPITAL | Age: 83
End: 2023-11-08
Payer: MEDICARE

## 2023-11-17 ENCOUNTER — PATIENT MESSAGE (OUTPATIENT)
Dept: FAMILY MEDICINE | Facility: CLINIC | Age: 83
End: 2023-11-17
Payer: MEDICARE

## 2023-11-20 ENCOUNTER — EXTERNAL HOME HEALTH (OUTPATIENT)
Dept: HOME HEALTH SERVICES | Facility: HOSPITAL | Age: 83
End: 2023-11-20
Payer: MEDICARE

## 2023-11-20 ENCOUNTER — OFFICE VISIT (OUTPATIENT)
Dept: FAMILY MEDICINE | Facility: CLINIC | Age: 83
End: 2023-11-20
Payer: MEDICARE

## 2023-11-20 VITALS
TEMPERATURE: 98 F | HEART RATE: 60 BPM | DIASTOLIC BLOOD PRESSURE: 73 MMHG | HEIGHT: 68 IN | SYSTOLIC BLOOD PRESSURE: 139 MMHG | BODY MASS INDEX: 32.13 KG/M2 | WEIGHT: 212 LBS

## 2023-11-20 DIAGNOSIS — S81.802D OPEN WOUND OF LEFT LOWER LEG, SUBSEQUENT ENCOUNTER: Primary | ICD-10-CM

## 2023-11-20 PROCEDURE — 1101F PT FALLS ASSESS-DOCD LE1/YR: CPT | Mod: HCNC,CPTII,S$GLB, | Performed by: PHYSICIAN ASSISTANT

## 2023-11-20 PROCEDURE — 1101F PR PT FALLS ASSESS DOC 0-1 FALLS W/OUT INJ PAST YR: ICD-10-PCS | Mod: HCNC,CPTII,S$GLB, | Performed by: PHYSICIAN ASSISTANT

## 2023-11-20 PROCEDURE — 3288F FALL RISK ASSESSMENT DOCD: CPT | Mod: HCNC,CPTII,S$GLB, | Performed by: PHYSICIAN ASSISTANT

## 2023-11-20 PROCEDURE — 3078F PR MOST RECENT DIASTOLIC BLOOD PRESSURE < 80 MM HG: ICD-10-PCS | Mod: HCNC,CPTII,S$GLB, | Performed by: PHYSICIAN ASSISTANT

## 2023-11-20 PROCEDURE — 99214 PR OFFICE/OUTPT VISIT, EST, LEVL IV, 30-39 MIN: ICD-10-PCS | Mod: HCNC,S$GLB,, | Performed by: PHYSICIAN ASSISTANT

## 2023-11-20 PROCEDURE — 1159F PR MEDICATION LIST DOCUMENTED IN MEDICAL RECORD: ICD-10-PCS | Mod: HCNC,CPTII,S$GLB, | Performed by: PHYSICIAN ASSISTANT

## 2023-11-20 PROCEDURE — 1126F PR PAIN SEVERITY QUANTIFIED, NO PAIN PRESENT: ICD-10-PCS | Mod: HCNC,CPTII,S$GLB, | Performed by: PHYSICIAN ASSISTANT

## 2023-11-20 PROCEDURE — 3075F SYST BP GE 130 - 139MM HG: CPT | Mod: HCNC,CPTII,S$GLB, | Performed by: PHYSICIAN ASSISTANT

## 2023-11-20 PROCEDURE — 3288F PR FALLS RISK ASSESSMENT DOCUMENTED: ICD-10-PCS | Mod: HCNC,CPTII,S$GLB, | Performed by: PHYSICIAN ASSISTANT

## 2023-11-20 PROCEDURE — 99999 PR PBB SHADOW E&M-EST. PATIENT-LVL V: CPT | Mod: PBBFAC,HCNC,, | Performed by: PHYSICIAN ASSISTANT

## 2023-11-20 PROCEDURE — 3078F DIAST BP <80 MM HG: CPT | Mod: HCNC,CPTII,S$GLB, | Performed by: PHYSICIAN ASSISTANT

## 2023-11-20 PROCEDURE — 99214 OFFICE O/P EST MOD 30 MIN: CPT | Mod: HCNC,S$GLB,, | Performed by: PHYSICIAN ASSISTANT

## 2023-11-20 PROCEDURE — 1159F MED LIST DOCD IN RCRD: CPT | Mod: HCNC,CPTII,S$GLB, | Performed by: PHYSICIAN ASSISTANT

## 2023-11-20 PROCEDURE — 1160F RVW MEDS BY RX/DR IN RCRD: CPT | Mod: HCNC,CPTII,S$GLB, | Performed by: PHYSICIAN ASSISTANT

## 2023-11-20 PROCEDURE — 99999 PR PBB SHADOW E&M-EST. PATIENT-LVL V: ICD-10-PCS | Mod: PBBFAC,HCNC,, | Performed by: PHYSICIAN ASSISTANT

## 2023-11-20 PROCEDURE — 1160F PR REVIEW ALL MEDS BY PRESCRIBER/CLIN PHARMACIST DOCUMENTED: ICD-10-PCS | Mod: HCNC,CPTII,S$GLB, | Performed by: PHYSICIAN ASSISTANT

## 2023-11-20 PROCEDURE — 1126F AMNT PAIN NOTED NONE PRSNT: CPT | Mod: HCNC,CPTII,S$GLB, | Performed by: PHYSICIAN ASSISTANT

## 2023-11-20 PROCEDURE — 3075F PR MOST RECENT SYSTOLIC BLOOD PRESS GE 130-139MM HG: ICD-10-PCS | Mod: HCNC,CPTII,S$GLB, | Performed by: PHYSICIAN ASSISTANT

## 2023-11-20 RX ORDER — MUPIROCIN 20 MG/G
OINTMENT TOPICAL 2 TIMES DAILY
Qty: 30 G | Refills: 2 | Status: SHIPPED | OUTPATIENT
Start: 2023-11-20

## 2023-11-20 RX ORDER — DOXYCYCLINE 100 MG/1
100 CAPSULE ORAL 2 TIMES DAILY
COMMUNITY
Start: 2023-11-17 | End: 2023-11-27

## 2023-11-20 NOTE — PROGRESS NOTES
Assessment/Plan:    Problem List Items Addressed This Visit    None  Visit Diagnoses       Open wound of left lower leg, subsequent encounter    -  Primary    Relevant Medications    mupirocin (BACTROBAN) 2 % ointment    Other Relevant Orders    SUBSEQUENT HOME HEALTH ORDERS        -large wound of L posterior lower leg  -continue topical and oral antibiotics as prescribed  -followed by home health wound care  -additional wound care instructions provided  -ER precautions for severe or worsening of symptoms    Follow up in about 1 month (around 12/20/2023), or if symptoms worsen or fail to improve, for wound check.    Deyanira John PA-C  _____________________________________________________________________________________________________________________________________________________    CC: ER follow up     HPI: Patient is in clinic today as an established patient here for ER follow up. Patient was seen in the ED at [facility] on 11/17/23 for LLE wound/cellulitis. She reports noticing pain to her L posterior lower leg about 1 month ago in which she noticed a small wound. She stated that the wound eventually grew larger and turned into a large blister. She stated that the blister popped last week and she noticed redness, swelling, and tenderness to the surrounding area so she was instructed to go to the ED per home health nurse. She was started on oral antibiotics (Doxycyline) in the ED and was instructed to follow up with wound care. Today, she stated that she continues to have pain and weeping of the wound. Denies fever. She is continuing to take oral antibiotics as prescribed. She stated that wound care came to her house yesterday and changed her dressing. She does have a history of chronic venous insuffiencey and type 2 DM. No other complaints today.    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    Hx of colonic polyps     1/9    Hx of colonic polyps     Hyperlipidemia     Hypertension     Myocardial infarction nov 2006    Persistent atrial fibrillation 4/25/2018    Posterior vitreous detachment of both eyes     Sleep apnea     Type 2 diabetes mellitus with mild nonproliferative diabetic retinopathy with macular edema 2/15/2013     Past Surgical History:   Procedure Laterality Date    APPENDECTOMY      CATARACT EXTRACTION      Right eye    EPIDURAL STEROID INJECTION N/A 11/02/2018    Procedure: Injection, Steroid, Epidural CAUDAL ELISA;  Surgeon: Ирина Peña MD;  Location: [facility] PAIN MGT;  Service: Pain Management;  Laterality: N/A;    ESOPHAGOGASTRODUODENOSCOPY N/A 03/31/2022    Procedure: EGD (ESOPHAGOGASTRODUODENOSCOPY);  Surgeon: Samm Billings MD;  Location: [facility] ENDO;  Service: Endoscopy;  Laterality: N/A;    EYE SURGERY      FRACTURE SURGERY  2022    Broken femur    heart stent      X 2    HYSTERECTOMY      Fibroids    INTRAMEDULLARY RODDING OF FEMUR Left 03/26/2022    Procedure: INSERTION, INTRAMEDULLARY SARA, FEMUR;  Surgeon: Eduin Avelar MD;  Location: [facility] OR;  Service: Orthopedics;  Laterality: Left;    TONSILLECTOMY  1944     Review of patient's allergies indicates:   Allergen Reactions    Heparin analogues Other (See Comments)     thrombocytopenia    Ancef [cefazolin]     Keflex [cephalexin] Rash    Heparin     Oxybutynin Other (See Comments)     Metallic taste     Social History     Tobacco Use    Smoking status: Never    Smokeless tobacco: Never  "  Substance Use Topics    Alcohol use: No    Drug use: No     Family History   Problem Relation Age of Onset    Diabetes Mother     Heart disease Mother     Hypertension Mother     Diabetes Father     Melanoma Father     Kidney failure Father     Kidney disease Father     Mental illness Sister         suicide/schizophrenic    Cancer Maternal Grandfather     Cancer Paternal Grandfather         colon    Psoriasis Neg Hx     Lupus Neg Hx     Eczema Neg Hx      Current Outpatient Medications on File Prior to Visit   Medication Sig Dispense Refill    apixaban (ELIQUIS) 2.5 mg Tab Take 1 tablet (2.5 mg total) by mouth 2 (two) times daily. 180 tablet 3    ascorbic acid, vitamin C, (VITAMIN C) 100 MG tablet Take 100 mg by mouth once daily.      blood sugar diagnostic Strp To check BG 4 times daily, to use with insurance preferred meter; Brand: [employer] strip 11    carvediloL (COREG) 12.5 MG tablet Take 1 tablet (12.5 mg total) by mouth 2 (two) times a day. 180 tablet 3    diclofenac sodium (VOLTAREN) 1 % Gel Apply 2 g topically 2 (two) times daily as needed (joint pain). 150 g 1    doxycycline (VIBRAMYCIN) 100 MG Cap Take 100 mg by mouth 2 (two) times daily.      furosemide (LASIX) 20 MG tablet Take 20 mg by mouth once daily.      insulin aspart U-100 (NOVOLOG FLEXPEN U-100 INSULIN) 100 unit/mL (3 mL) InPn pen ADMINISTER 14 UNITS UNDER THE SKIN THREE TIMES DAILY BEFORE MEALS 45 mL 3    insulin degludec (TRESIBA FLEXTOUCH U-100) 100 unit/mL (3 mL) insulin pen Inject 32 Units into the skin once daily. 30 mL 3    lancets Misc To check BG 4 times daily, to use with insurance preferred meter 100 each 11    MULTIVIT-MIN/IRON/FOLIC/LUTEIN (CENTRUM SILVER WOMEN ORAL) Take by mouth once daily.       pantoprazole (PROTONIX) 40 MG tablet Take 1 tablet (40 mg total) by mouth once daily. 90 tablet 3    pen needle, diabetic (BD ULTRA-FINE TIM PEN NEEDLE) 32 gauge x 5/32" Ndle Use as administer medication as prescribed. 100 each 11 " "   solifenacin (VESICARE) 5 MG tablet Take 1 tablet (5 mg total) by mouth once daily. 30 tablet 5    vitamin B complex (B COMPLEX VITAMINS ORAL) Take by mouth once daily.      vitamin D (VITAMIN D3) 1000 units Tab Take 1,000 Units by mouth once daily.      VITAMIN E, BULK, MISC by Misc.(Non-Drug; Combo Route) route once daily.      clindamycin (CLEOCIN) 300 MG capsule Take 300 mg by mouth 3 (three) times daily.      melatonin 10 mg Cap Take by mouth.      rosuvastatin (CRESTOR) 40 MG Tab Take 1 tablet (40 mg total) by mouth once daily. (Patient not taking: Reported on 11/20/2023) 90 tablet 3    [DISCONTINUED] blood-glucose meter (ACCU-CHEK MELIDA PLUS METER) Misc To check sugars 4 times daily.  Please include control solution 1 each 0     No current facility-administered medications on file prior to visit.       Review of Systems   Constitutional:  Negative for chills, diaphoresis, fatigue and fever.   HENT:  Negative for congestion, ear pain, postnasal drip, sinus pain and sore throat.    Eyes:  Negative for pain and redness.   Respiratory:  Negative for cough, chest tightness and shortness of breath.    Cardiovascular:  Negative for chest pain and leg swelling.   Gastrointestinal:  Negative for abdominal pain, constipation, diarrhea, nausea and vomiting.   Genitourinary:  Negative for dysuria and hematuria.   Musculoskeletal:  Negative for arthralgias and joint swelling.   Skin:  Positive for wound. Negative for rash.   Neurological:  Negative for dizziness, syncope and headaches.   Psychiatric/Behavioral:  Negative for dysphoric mood. The patient is not nervous/anxious.        Vitals:    11/20/23 1449   BP: 139/73   Pulse: 60   Temp: 97.9 °F (36.6 °C)   TempSrc: Temporal   Weight: 96.2 kg (212 lb)   Height: 5' 8" (1.727 m)       Wt Readings from Last 3 Encounters:   11/20/23 96.2 kg (212 lb)   10/12/23 96.2 kg (212 lb)   08/28/23 94.8 kg (209 lb)       Physical Exam  Constitutional:       General: She is not in " acute distress.     Appearance: Normal appearance. She is well-developed.   HENT:      Head: Normocephalic and atraumatic.   Eyes:      Conjunctiva/sclera: Conjunctivae normal.   Cardiovascular:      Rate and Rhythm: Normal rate and regular rhythm.      Pulses: Normal pulses.      Heart sounds: Normal heart sounds. No murmur heard.  Pulmonary:      Effort: Pulmonary effort is normal. No respiratory distress.      Breath sounds: Normal breath sounds.   Abdominal:      General: Bowel sounds are normal. There is no distension.      Palpations: Abdomen is soft.      Tenderness: There is no abdominal tenderness.   Musculoskeletal:         General: Normal range of motion.      Cervical back: Normal range of motion and neck supple.   Skin:     General: Skin is warm and dry.      Findings: Wound present. No rash.   Neurological:      General: No focal deficit present.      Mental Status: She is alert and oriented to person, place, and time.   Psychiatric:         Mood and Affect: Mood normal.         Behavior: Behavior normal.             Health Maintenance   Topic Date Due    Shingles Vaccine (1 of 2) Never done    TETANUS VACCINE  10/03/2015    Hemoglobin A1c  10/05/2023    Colonoscopy  06/21/2026    Lipid Panel  Discontinued    Eye Exam  Discontinued    DEXA Scan  Discontinued

## 2023-12-01 ENCOUNTER — DOCUMENT SCAN (OUTPATIENT)
Dept: HOME HEALTH SERVICES | Facility: HOSPITAL | Age: 83
End: 2023-12-01
Payer: MEDICARE

## 2023-12-05 ENCOUNTER — DOCUMENT SCAN (OUTPATIENT)
Dept: HOME HEALTH SERVICES | Facility: HOSPITAL | Age: 83
End: 2023-12-05
Payer: MEDICARE

## 2023-12-05 ENCOUNTER — TELEPHONE (OUTPATIENT)
Dept: FAMILY MEDICINE | Facility: CLINIC | Age: 83
End: 2023-12-05
Payer: MEDICARE

## 2023-12-05 NOTE — TELEPHONE ENCOUNTER
----- Message from Eulalio Juarez sent at 12/5/2023  9:21 AM CST -----  Contact: [phone]  Patient is calling in regards to her 12/11 appt. Pt stated that she would prefer seeing Dr villanueva for her visit. Please call pt back at [phone], to inform if provider has anything available for that day. Thanks KB

## 2023-12-05 NOTE — TELEPHONE ENCOUNTER
Spoke with pt, cancelled appt on 12/11 with Deyanira. Pt stated that she only wanted to see Dr. Holguin. Pt informed to call on Thursday to try to schedule on Monday.

## 2023-12-06 ENCOUNTER — DOCUMENT SCAN (OUTPATIENT)
Dept: HOME HEALTH SERVICES | Facility: HOSPITAL | Age: 83
End: 2023-12-06
Payer: MEDICARE

## 2023-12-12 PROCEDURE — G0179 MD RECERTIFICATION HHA PT: HCPCS | Mod: ,,, | Performed by: INTERNAL MEDICINE

## 2023-12-27 ENCOUNTER — TELEPHONE (OUTPATIENT)
Dept: FAMILY MEDICINE | Facility: CLINIC | Age: 83
End: 2023-12-27
Payer: MEDICARE

## 2023-12-27 DIAGNOSIS — R07.89 CHEST DISCOMFORT: Primary | ICD-10-CM

## 2023-12-27 NOTE — TELEPHONE ENCOUNTER
I have signed for the following orders AND/OR meds.  Please call the patient and ask the patient to schedule the testing AND/OR inform about any medications that were sent. Medications have been sent to pharmacy listed below      Orders Placed This Encounter   Procedures    X-Ray Chest PA And Lateral     Standing Status:   Future     Standing Expiration Date:   12/27/2024     Order Specific Question:   Reason for Exam:     Answer:   chest discomfort     Order Specific Question:   May the Radiologist modify the order per protocol to meet the clinical needs of the patient?     Answer:   Yes     Order Specific Question:   Release to patient     Answer:   Immediate    X-Ray Chest AP Portable     Standing Status:   Future     Standing Expiration Date:   12/27/2024     Order Specific Question:   May the Radiologist modify the order per protocol to meet the clinical needs of the patient?     Answer:   Yes     Order Specific Question:   Release to patient     Answer:   Immediate              [id number] [place] [facility] [address]  [address] [facility] [address]  Phone: [phone] Fax: [phone]    [id number] - [place], [place] - [address] [facility] AT [address] OF  [address]  [address] [facility]  [place] [address]  Phone: [phone] Fax: [phone]

## 2023-12-27 NOTE — TELEPHONE ENCOUNTER
Spoke with home health nurse the order , must say portable x ray .    Pt aware to take lasix twice a day for the next 3 days

## 2023-12-27 NOTE — TELEPHONE ENCOUNTER
----- Message from Isaura Negron sent at 12/27/2023  9:34 AM CST -----  Contact: Marquita- [facility] Marquita Would like a call back at [phone] , in regards to the pt having a 5 pound weight gain within a week, and the pt stating she feel heaviness in chest. She states the pt does not have a ride to the ER. She would like to know if an order for a mobile chest x-ray can be approved.

## 2023-12-27 NOTE — TELEPHONE ENCOUNTER
"Spoke with zenon  nurse , she states " I saw her last week and she was 212 lb and nor she is 217lb , she feels a little bit of pressure on her chest , I listened to her lung sounds and they are clear . She refused to go to the ER , since her daughter is not in town . I was wondering if dr villanueva would approve an order for a mobil x ray on this pt . " Please advise   "

## 2023-12-27 NOTE — TELEPHONE ENCOUNTER
OK to get chest xray. Please have home health let us know when it is done so we can be looking for results.    Also, please make sure she is still taking lasix daily and urinating within an hour of taking. If she has been taking daily, then increase to twice daily for the next 3 days.    I have signed for the following orders AND/OR meds.  Please call the patient and ask the patient to schedule the testing AND/OR inform about any medications that were sent. Medications have been sent to pharmacy listed below      Orders Placed This Encounter   Procedures    X-Ray Chest PA And Lateral     Standing Status:   Future     Standing Expiration Date:   12/27/2024     Order Specific Question:   Reason for Exam:     Answer:   chest discomfort     Order Specific Question:   May the Radiologist modify the order per protocol to meet the clinical needs of the patient?     Answer:   Yes     Order Specific Question:   Release to patient     Answer:   Immediate              [facility] #[id number] - [place] - [address]  [address] [facility] [address]  Phone: [phone] Fax: [phone]    [id number] - [place] - [address] [facility] AT [address] OF  [address]  [address] [facility] [address]  [place] [address]  Phone: [phone] Fax: [phone]

## 2023-12-29 ENCOUNTER — EXTERNAL HOME HEALTH (OUTPATIENT)
Dept: HOME HEALTH SERVICES | Facility: HOSPITAL | Age: 83
End: 2023-12-29
Payer: MEDICARE

## 2023-12-29 ENCOUNTER — TELEPHONE (OUTPATIENT)
Dept: NEPHROLOGY | Facility: CLINIC | Age: 83
End: 2023-12-29
Payer: MEDICARE

## 2023-12-29 DIAGNOSIS — N18.4 TYPE 2 DIABETES MELLITUS WITH STAGE 4 CHRONIC KIDNEY DISEASE, WITH LONG-TERM CURRENT USE OF INSULIN: Primary | ICD-10-CM

## 2023-12-29 DIAGNOSIS — E11.22 TYPE 2 DIABETES MELLITUS WITH STAGE 4 CHRONIC KIDNEY DISEASE, WITH LONG-TERM CURRENT USE OF INSULIN: Primary | ICD-10-CM

## 2023-12-29 DIAGNOSIS — Z79.4 TYPE 2 DIABETES MELLITUS WITH STAGE 4 CHRONIC KIDNEY DISEASE, WITH LONG-TERM CURRENT USE OF INSULIN: Primary | ICD-10-CM

## 2023-12-29 NOTE — TELEPHONE ENCOUNTER
I have signed for the following orders AND/OR meds.  Please call the patient and ask the patient to schedule the testing AND/OR inform about any medications that were sent. Medications have been sent to pharmacy listed below      Orders Placed This Encounter   Procedures    Ambulatory referral/consult to Diabetic Advanced Practice Providers (Medical Management)     Standing Status:   Future     Standing Expiration Date:   1/29/2025     Referral Priority:   Routine     Referral Type:   Consultation     Referral Reason:   Specialty Services Required     Requested Specialty:   Endocrinology     Number of Visits Requested:   1              [id number] [place] - [address] AT [facility] [address] [address]  Phone: [phone] Fax: [phone]    [facility] #[id number] - [place] - [address] [facility] [address]  [address]  [address] [facility]  [place] [address]  Phone: [phone] Fax: [phone]

## 2023-12-29 NOTE — TELEPHONE ENCOUNTER
Patient was called to reschedule an appointment with Dr. Oseguera. She requested an appointment with endocrinology in [place] due to transportation issues. I assured her [facility] doesn't have that option but [facility] does. She is is asking for a referral for this since she has been managing her diabetes for 2 years. I have cc'd her former practitioner Ms. Alvarado for updates.  Thank you for your help in this matter.

## 2023-12-31 RX ORDER — LEVOFLOXACIN 500 MG/1
500 TABLET, FILM COATED ORAL DAILY
Qty: 10 TABLET | Refills: 0 | Status: SHIPPED | OUTPATIENT
Start: 2023-12-31 | End: 2024-01-10

## 2024-01-02 ENCOUNTER — TELEPHONE (OUTPATIENT)
Dept: DIABETES | Facility: CLINIC | Age: 84
End: 2024-01-02
Payer: MEDICARE

## 2024-01-02 NOTE — TELEPHONE ENCOUNTER
Called patient to assist with scheduling a new patient appointment Appointment is scheduled 02/08/2024

## 2024-01-05 ENCOUNTER — DOCUMENT SCAN (OUTPATIENT)
Dept: HOME HEALTH SERVICES | Facility: HOSPITAL | Age: 84
End: 2024-01-05
Payer: MEDICARE

## 2024-01-16 ENCOUNTER — DOCUMENT SCAN (OUTPATIENT)
Dept: HOME HEALTH SERVICES | Facility: HOSPITAL | Age: 84
End: 2024-01-16
Payer: MEDICARE

## 2024-01-18 ENCOUNTER — DOCUMENT SCAN (OUTPATIENT)
Dept: HOME HEALTH SERVICES | Facility: HOSPITAL | Age: 84
End: 2024-01-18
Payer: MEDICARE

## 2024-01-19 ENCOUNTER — PATIENT MESSAGE (OUTPATIENT)
Dept: ADMINISTRATIVE | Facility: HOSPITAL | Age: 84
End: 2024-01-19
Payer: MEDICARE

## 2024-02-08 ENCOUNTER — LAB VISIT (OUTPATIENT)
Dept: LAB | Facility: HOSPITAL | Age: 84
End: 2024-02-08
Attending: NURSE PRACTITIONER
Payer: MEDICARE

## 2024-02-08 ENCOUNTER — OFFICE VISIT (OUTPATIENT)
Dept: DIABETES | Facility: CLINIC | Age: 84
End: 2024-02-08
Payer: MEDICARE

## 2024-02-08 VITALS
HEART RATE: 69 BPM | SYSTOLIC BLOOD PRESSURE: 126 MMHG | HEIGHT: 68 IN | WEIGHT: 219 LBS | BODY MASS INDEX: 33.19 KG/M2 | DIASTOLIC BLOOD PRESSURE: 60 MMHG

## 2024-02-08 DIAGNOSIS — Z79.4 TYPE 2 DIABETES MELLITUS WITH STAGE 4 CHRONIC KIDNEY DISEASE, WITH LONG-TERM CURRENT USE OF INSULIN: Primary | ICD-10-CM

## 2024-02-08 DIAGNOSIS — E11.22 TYPE 2 DIABETES MELLITUS WITH STAGE 4 CHRONIC KIDNEY DISEASE, WITH LONG-TERM CURRENT USE OF INSULIN: ICD-10-CM

## 2024-02-08 DIAGNOSIS — N18.4 TYPE 2 DIABETES MELLITUS WITH STAGE 4 CHRONIC KIDNEY DISEASE, WITH LONG-TERM CURRENT USE OF INSULIN: Primary | ICD-10-CM

## 2024-02-08 DIAGNOSIS — N18.4 TYPE 2 DIABETES MELLITUS WITH STAGE 4 CHRONIC KIDNEY DISEASE, WITH LONG-TERM CURRENT USE OF INSULIN: ICD-10-CM

## 2024-02-08 DIAGNOSIS — E11.22 TYPE 2 DIABETES MELLITUS WITH STAGE 4 CHRONIC KIDNEY DISEASE, WITH LONG-TERM CURRENT USE OF INSULIN: Primary | ICD-10-CM

## 2024-02-08 DIAGNOSIS — Z79.4 TYPE 2 DIABETES MELLITUS WITH STAGE 4 CHRONIC KIDNEY DISEASE, WITH LONG-TERM CURRENT USE OF INSULIN: ICD-10-CM

## 2024-02-08 LAB
ANION GAP SERPL CALC-SCNC: 10 MMOL/L (ref 8–16)
BUN SERPL-MCNC: 33 MG/DL (ref 8–23)
CALCIUM SERPL-MCNC: 10.1 MG/DL (ref 8.7–10.5)
CHLORIDE SERPL-SCNC: 107 MMOL/L (ref 95–110)
CO2 SERPL-SCNC: 27 MMOL/L (ref 23–29)
CREAT SERPL-MCNC: 2 MG/DL (ref 0.5–1.4)
EST. GFR  (NO RACE VARIABLE): 24.2 ML/MIN/1.73 M^2
ESTIMATED AVG GLUCOSE: 143 MG/DL (ref 68–131)
GLUCOSE SERPL-MCNC: 104 MG/DL (ref 70–110)
GLUCOSE SERPL-MCNC: 93 MG/DL (ref 70–110)
HBA1C MFR BLD: 6.6 % (ref 4–5.6)
POTASSIUM SERPL-SCNC: 4 MMOL/L (ref 3.5–5.1)
SODIUM SERPL-SCNC: 144 MMOL/L (ref 136–145)

## 2024-02-08 PROCEDURE — 1101F PT FALLS ASSESS-DOCD LE1/YR: CPT | Mod: HCNC,CPTII,S$GLB, | Performed by: NURSE PRACTITIONER

## 2024-02-08 PROCEDURE — 82962 GLUCOSE BLOOD TEST: CPT | Mod: HCNC,S$GLB,, | Performed by: NURSE PRACTITIONER

## 2024-02-08 PROCEDURE — 99999 PR PBB SHADOW E&M-EST. PATIENT-LVL IV: CPT | Mod: PBBFAC,HCNC,, | Performed by: NURSE PRACTITIONER

## 2024-02-08 PROCEDURE — 3288F FALL RISK ASSESSMENT DOCD: CPT | Mod: HCNC,CPTII,S$GLB, | Performed by: NURSE PRACTITIONER

## 2024-02-08 PROCEDURE — 83036 HEMOGLOBIN GLYCOSYLATED A1C: CPT | Mod: HCNC | Performed by: NURSE PRACTITIONER

## 2024-02-08 PROCEDURE — 80048 BASIC METABOLIC PNL TOTAL CA: CPT | Mod: HCNC | Performed by: NURSE PRACTITIONER

## 2024-02-08 PROCEDURE — 36415 COLL VENOUS BLD VENIPUNCTURE: CPT | Mod: HCNC,PO | Performed by: NURSE PRACTITIONER

## 2024-02-08 PROCEDURE — 1159F MED LIST DOCD IN RCRD: CPT | Mod: HCNC,CPTII,S$GLB, | Performed by: NURSE PRACTITIONER

## 2024-02-08 PROCEDURE — 99204 OFFICE O/P NEW MOD 45 MIN: CPT | Mod: HCNC,S$GLB,, | Performed by: NURSE PRACTITIONER

## 2024-02-08 PROCEDURE — 3074F SYST BP LT 130 MM HG: CPT | Mod: HCNC,CPTII,S$GLB, | Performed by: NURSE PRACTITIONER

## 2024-02-08 PROCEDURE — 1126F AMNT PAIN NOTED NONE PRSNT: CPT | Mod: HCNC,CPTII,S$GLB, | Performed by: NURSE PRACTITIONER

## 2024-02-08 PROCEDURE — 3078F DIAST BP <80 MM HG: CPT | Mod: HCNC,CPTII,S$GLB, | Performed by: NURSE PRACTITIONER

## 2024-02-08 RX ORDER — BLOOD-GLUCOSE,RECEIVER,CONT
1 EACH MISCELLANEOUS DAILY
Qty: 1 EACH | Refills: 0 | Status: SHIPPED | OUTPATIENT
Start: 2024-02-08 | End: 2024-05-15

## 2024-02-08 RX ORDER — BLOOD-GLUCOSE SENSOR
1 EACH MISCELLANEOUS
Qty: 3 EACH | Refills: 11 | Status: SHIPPED | OUTPATIENT
Start: 2024-02-08 | End: 2024-05-15

## 2024-02-08 NOTE — PROGRESS NOTES
"Radha Degroot is a 83 y.o. 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     CHIEF COMPLAINT: Diabetes Consultation    PCP: Jana Holguin MD     The patient was initially diagnosed with diabetes over 30 years ago.     Previous failed treatments include:  Metformin    Social Documentation:  Patient lives in [place].   Occupation: Retired.  Exercise: limited.     Current monitoring regimen: capillary blood glucose monitoring with finger sticks.  Checking before each meal.     Current Diabetes related symptoms/problems include hyperglycemia and paresthesia of the feet and have been stable.     Diabetes related complications:   nephropathy, retinopathy, peripheral neuropathy, cardiovascular disease, cerebrovascular disease, and peripheral vascular disease.   denies Pancreatitis  denies Gastroparesis  denies DKA  denies Hx/family Hx of MEN2/MTC  denies Frequent UTIs/yeast infections  But does have "leaky bladder"    Cardiovascular Risk Factors: advanced age (>55 for men, >65 for women), dyslipidemia, hypertension, obesity (BMI >30 kg/m2), and sedentary " "lifestyle.    Patient currently taking insulin or sulfonylurea?  Yes. Emergency Glucagon Prescription current? no    Any episodes of hypoglycemia? Yes. Hypoglycemia treatment reviewed with patient and education to be provided in AVS.   Hypoglycemia Unawareness? No  Severe hypoglycemia requiring 3rd party? No    Last seen by Diabetes Education: none    Diabetes Medications               insulin aspart U-100 (NOVOLOG FLEXPEN U-100 INSULIN) 100 unit/mL (3 mL) InPn pen ADMINISTER 14 UNITS UNDER THE SKIN THREE TIMES DAILY BEFORE MEALS    Target 200  ISF 50      insulin degludec (TRESIBA FLEXTOUCH U-100) 100 unit/mL (3 mL) insulin pen Inject 32 Units into the skin once daily.      DIABETES DISEASE MANAGEMENT STATUS  Statin: Taking  ACE/ARB: Not taking  Screening or Prevention Patient's value Goal Complete/Controlled?   HgA1C Testing and Control   Lab Results   Component Value Date    HGBA1C 7.5 (H) 04/05/2023      Annually/Less than 8% Yes   Lipid profile : 03/17/2022 Annually No   LDL control Lab Results   Component Value Date    LDLCALC 45.8 (L) 03/17/2022    Annually/Less than 100 mg/dl  No   Nephropathy screening Lab Results   Component Value Date    LABMICR 64.0 02/11/2015     Lab Results   Component Value Date    PROTEINUA 2+ (A) 03/25/2022     Lab Results   Component Value Date    UTPCR 0.42 (H) 04/06/2023      Annually Yes   Blood pressure BP Readings from Last 1 Encounters:   02/08/24 126/60    Less than 140/90 Yes   Dilated retinal exam : 08/08/2019 Annually No   Foot exam   Most Recent Foot Exam Date: Not Found Annually No   Patient's medications, allergies, past medical, surgical, social and family histories were reviewed and updated as appropriate.     ROS     Physical Exam     Blood pressure 126/60, pulse 69, height 5' 8" (1.727 m), weight 99.3 kg (219 lb), unknown if currently breastfeeding.  Wt Readings from Last 3 Encounters:   02/08/24 99.3 kg (219 lb)   11/20/23 96.2 kg (212 lb)   10/12/23 96.2 kg (212 " lb)       LAB REVIEW  Lab Results   Component Value Date     11/17/2023    K 4.3 11/17/2023     04/05/2023    CO2 23 11/17/2023    BUN 30 (H) 11/17/2023    CREATININE 1.88 (H) 11/17/2023    CALCIUM 8.5 (L) 11/17/2023    ANIONGAP 10 11/17/2023    EGFRNORACEVR 20.4 (A) 04/05/2023     Lab Results   Component Value Date    CPEPTIDE 1.09 12/07/2021    GLUTAMICACID 0.00 05/19/2017     Hemoglobin A1C   Date Value Ref Range Status   04/05/2023 7.5 (H) 4.0 - 5.6 % Final     Comment:     ADA Screening Guidelines:  5.7-6.4%  Consistent with prediabetes  >or=6.5%  Consistent with diabetes    High levels of fetal hemoglobin interfere with the HbA1C  assay. Heterozygous hemoglobin variants (HbS, HgC, etc)do  not significantly interfere with this assay.   However, presence of multiple variants may affect accuracy.     12/01/2022 6.7 (H) 4.0 - 5.6 % Final     Comment:     ADA Screening Guidelines:  5.7-6.4%  Consistent with prediabetes  >or=6.5%  Consistent with diabetes    High levels of fetal hemoglobin interfere with the HbA1C  assay. Heterozygous hemoglobin variants (HbS, HgC, etc)do  not significantly interfere with this assay.   However, presence of multiple variants may affect accuracy.     06/30/2022 6.9 (H) 4.0 - 5.6 % Final     Comment:     ADA Screening Guidelines:  5.7-6.4%  Consistent with prediabetes  >or=6.5%  Consistent with diabetes    High levels of fetal hemoglobin interfere with the HbA1C  assay. Heterozygous hemoglobin variants (HbS, HgC, etc)do  not significantly interfere with this assay.   However, presence of multiple variants may affect accuracy.         Lab Results   Component Value Date    POCGLU 104 02/08/2024       ASSESSMENT    ICD-10-CM ICD-9-CM   1. Type 2 diabetes mellitus with stage 4 chronic kidney disease, with long-term current use of insulin  E11.22 250.40    N18.4 585.4    Z79.4 V58.67        PLAN  Diagnoses and all orders for this visit:    Type 2 diabetes mellitus with stage 4  chronic kidney disease, with long-term current use of insulin  -     Ambulatory referral/consult to Diabetic Advanced Practice Providers (Medical Management)  -     POCT Glucose, Hand-Held Device        Reviewed pathophysiology of diabetes, complications related to the disease, importance of annual dilated eye exam and daily foot examination. Explained MOA, SE, dosage of medications. Written instructions given and reviewed with patient and patient verbalizes understanding. Discussed importance of A1c less than 7% to reduce risk of micro and macro complications, including nephropathy, neuropathy, retinopathy, heart attack and stroke. Reviewed the importance of controlled Blood Pressure and Cholesterol Lab Values in preventing disease.     PATIENT INSTRUCTIONS    Labs today.     G7 CGM ordered. Patient is on an intensive insulin regimen with MDI. Patient has demonstrated an understanding of technology and is motivated to use the device correctly. Patient is expected to adhere to a diabetes treatment plan and is capable of recognizing alerts and alarms. Patient has history, unexplained, severe (BG less than 55) hypoglycemia.   Send message in [facility] portal or call our office to schedule training once you receive monitoring equipment.     Continue Tresiba 32 units subcutaneously daily.   Continue Novolog 14 units three times daily subcutaneously before meals using your correction scale.   Target: 200  Sensitivity: 50    Blood Sugar Goals:       Fastin-150.       1-2 hours after a meal: Less than 200.    No follow-ups on file.    Portions of this note were prepared with [employer] voice recognition transcription software. Grammatical errors, including garbled syntax, mangle pronouns, and other bizarre constructions may be attributed to that software system.

## 2024-02-08 NOTE — PATIENT INSTRUCTIONS
PATIENT INSTRUCTIONS    Labs today.     G7 CGM ordered. Patient is on an intensive insulin regimen with MDI. Patient has demonstrated an understanding of technology and is motivated to use the device correctly. Patient is expected to adhere to a diabetes treatment plan and is capable of recognizing alerts and alarms. Patient has history, unexplained, severe (BG less than 55) hypoglycemia.   Send message in [facility] portal or call our office to schedule training once you receive monitoring equipment.     Continue Tresiba 32 units subcutaneously daily.   Continue Novolog 14 units three times daily subcutaneously before meals using your correction scale.   Target: 200  Sensitivity: 50    Blood Sugar Goals:       Fastin-150.       1-2 hours after a meal: Less than 200.

## 2024-03-01 ENCOUNTER — TELEPHONE (OUTPATIENT)
Dept: OPHTHALMOLOGY | Facility: CLINIC | Age: 84
End: 2024-03-01
Payer: MEDICARE

## 2024-03-01 NOTE — TELEPHONE ENCOUNTER
----- Message from Arti Jefferson sent at 3/1/2024 11:37 AM CST -----  Type: Needs Medical Advice  Who Called:  pt  Best Call Back Number: [phone]  Additional Information: pt is calling in regards to needing to speak to the office about some information. Pt is looking for a new retina specialist and to discuss eye glasses. Please call back and advise. Thanks!

## 2024-03-07 ENCOUNTER — TELEPHONE (OUTPATIENT)
Dept: FAMILY MEDICINE | Facility: CLINIC | Age: 84
End: 2024-03-07
Payer: MEDICARE

## 2024-03-07 NOTE — TELEPHONE ENCOUNTER
----- Message from Mercedes Birmingham sent at 3/7/2024 10:03 AM CST -----  Contact: pt  Type:  Sooner Apoointment Request    Caller is requesting a sooner appointment.  Caller declined first available appointment listed below.  Caller will not accept being placed on the waitlist and is requesting a message be sent to doctor.  Name of Caller: pt  When is the first available appointment? 5/1  Symptoms: ck her legs  Would the patient rather a call back or a response via [facility]?  phone  Best Call Back Number: [phone]  Additional Information:  pt only wants to see dr villanueva

## 2024-03-07 NOTE — TELEPHONE ENCOUNTER
Scheduled pt to see Dr Holguin for 5/1/24. She is upset that she can't see her sooner, pt on waiting list.

## 2024-03-19 DIAGNOSIS — E78.2 MIXED HYPERLIPIDEMIA: Chronic | ICD-10-CM

## 2024-03-19 RX ORDER — ROSUVASTATIN CALCIUM 40 MG/1
40 TABLET, COATED ORAL DAILY
Qty: 90 TABLET | Refills: 3 | Status: SHIPPED | OUTPATIENT
Start: 2024-03-19

## 2024-03-19 NOTE — TELEPHONE ENCOUNTER
Care Due:                  Date            Visit Type   Department     Provider  --------------------------------------------------------------------------------                                EP -                              PRIMARY      [facility] FAM[facility]  Last Visit: 10-      CARE ([facility])   JAZMINE Holguin                              EP -                              PRIMARY      [facility]  Next Visit: 05-      CARE ([facility])   MEDICINE       Jana Holguin                                                            Last  Test          Frequency    Reason                     Performed    Due Date  --------------------------------------------------------------------------------    CBC.........  12 months..  diclofenac...............  12- 11-    [facility] Embedded Care Due Messages. Reference number: [id number].   3/19/2024 11:38:01 AM GERMAINE

## 2024-03-19 NOTE — TELEPHONE ENCOUNTER
----- Message from Harmony Page sent at 3/19/2024 11:22 AM CDT -----  Pt is asking for a call back . Tried to get refill on cholesterol meds and pharmacy is stated that dr villanueva is refusing to refill it and she wants to know why because they dont want to die full of cholesterol.  Please advise

## 2024-03-20 DIAGNOSIS — N18.4 DIABETES MELLITUS WITH STAGE 4 CHRONIC KIDNEY DISEASE: ICD-10-CM

## 2024-03-20 DIAGNOSIS — E11.22 DIABETES MELLITUS WITH STAGE 4 CHRONIC KIDNEY DISEASE: ICD-10-CM

## 2024-03-20 RX ORDER — INSULIN ASPART 100 [IU]/ML
INJECTION, SOLUTION INTRAVENOUS; SUBCUTANEOUS
Qty: 45 ML | Refills: 1 | Status: SHIPPED | OUTPATIENT
Start: 2024-03-20 | End: 2024-04-08 | Stop reason: SDUPTHER

## 2024-03-20 NOTE — TELEPHONE ENCOUNTER
Refill Routing Note   Medication(s) are not appropriate for processing by [facility] for the following reason(s):        Required labs abnormal    [facility] action(s):  Defer             Pharmacist review requested: Yes     Appointments  past 12m or future 3m with PCP    Date Provider   Last Visit   10/12/2023 Jana Holguin MD   Next Visit   5/9/2024 Jana Holguin MD   ED visits in past 90 days: 0        Note composed:4:20 PM 03/20/2024

## 2024-03-20 NOTE — TELEPHONE ENCOUNTER
----- Message from [employer] sent at 3/20/2024  8:47 AM CDT -----  Regarding: self      Type: RX Refill Request      Who Called: self       Have you contacted your pharmacy: yes       Refill or New Rx:refill       RX Name and Strength:insulin aspart U-100 (NOVOLOG FLEXPEN U-100 INSULIN) 100 unit/mL (3 mL) InPn pen        Preferred Pharmacy with phone number:   [id number] - [place] [address] AT [facility] OF [address] to [facility] [address] [address]  Phone: [phone] Fax: [phone]             Local or Mail Order:local         Would the patient rather a call back or a response via [facility]? Call back       Best Call Back Number:[phone]

## 2024-03-20 NOTE — TELEPHONE ENCOUNTER
Refill Routing Note   Medication(s) are not appropriate for processing by [facility] for the following reason(s):        Required labs abnormal    [facility] action(s):  Defer           Pharmacist review requested: Yes     Appointments  past 12m or future 3m with PCP    Date Provider   Last Visit   10/12/2023 Jana Holguin MD   Next Visit   5/9/2024 Jana Holguin MD   ED visits in past 90 days: 0        Note composed:2:42 PM 03/20/2024

## 2024-03-20 NOTE — TELEPHONE ENCOUNTER
No care due was identified.  [facility] Embedded Care Due Messages. Reference number: [id number].   3/20/2024 8:44:23 AM CDT

## 2024-03-21 NOTE — TELEPHONE ENCOUNTER
I just happened to come across this telephone encounter from 3/7 while I was working on something else for her.     In regards to her being upset about not getting an appt prior to her May appt, are you guys letting patients know that they could likely get a sooner appointment if they just call back the following Monday since I will have spots open up during that week? It doesn't look like this was explained to her.

## 2024-03-21 NOTE — TELEPHONE ENCOUNTER
Spoke with pt, offered earlier appt for Tuesday at [facility], pt declined. Pt also informed that she could call same day to see if earlier appts are available. Pt stated that that was too confusing and she would just keep appt that she already has scheduled. Pt stated that she also wanted to thank you for helping her out with getting set up with home health.

## 2024-04-08 ENCOUNTER — TELEPHONE (OUTPATIENT)
Dept: FAMILY MEDICINE | Facility: CLINIC | Age: 84
End: 2024-04-08
Payer: MEDICARE

## 2024-04-08 DIAGNOSIS — N18.4 DIABETES MELLITUS WITH STAGE 4 CHRONIC KIDNEY DISEASE: ICD-10-CM

## 2024-04-08 DIAGNOSIS — E11.22 DIABETES MELLITUS WITH STAGE 4 CHRONIC KIDNEY DISEASE: ICD-10-CM

## 2024-04-08 RX ORDER — INSULIN ASPART 100 [IU]/ML
14 INJECTION, SOLUTION INTRAVENOUS; SUBCUTANEOUS
Qty: 45 ML | Refills: 1 | Status: SHIPPED | OUTPATIENT
Start: 2024-04-08

## 2024-04-08 NOTE — TELEPHONE ENCOUNTER
[employer] denied PA and appeal for insulin aspart U-100. Appeal form states that they will cover Novolog Flexpen U-100 insulin. Stated that the generic drug isn't listed in pt's formulary.

## 2024-04-08 NOTE — TELEPHONE ENCOUNTER
I have signed for the following orders AND/OR meds.  Please call the patient and ask the patient to schedule the testing AND/OR inform about any medications that were sent. Medications have been sent to pharmacy listed below      No orders of the defined types were placed in this encounter.      Medications Ordered This Encounter   Medications    insulin aspart U-100 (NOVOLOG) 100 unit/mL (3 mL) InPn pen     Sig: Inject 14 Units into the skin 3 (three) times daily with meals.     Dispense:  45 mL     Refill:  1     Brand name only Novolog         [id number] [place] - [address] AT [facility] [address] [place]  [address] [facility] [address]  Phone: [phone] Fax: [phone]    [facility] #[id number] - [place] - [address] [facility] AT [facility] OF  [address]  [address] [facility]  [place] [address]  Phone: [phone] Fax: [phone]

## 2024-05-14 ENCOUNTER — TELEPHONE (OUTPATIENT)
Dept: FAMILY MEDICINE | Facility: CLINIC | Age: 84
End: 2024-05-14
Payer: MEDICARE

## 2024-05-14 NOTE — TELEPHONE ENCOUNTER
----- Message from Anant Jose sent at 5/14/2024  2:07 PM CDT -----  Contact: self  Type:  Needs Medical Advice    Who Called: Radha Degroot  Symptoms (please be specific): Lower legs and feet swollen/inflamed/irritated  - sores on right foot  How long has patient had these symptoms:  couples days  Pharmacy name and phone #:    [facility] #[id number] - [place], [place] - [address]  AT [facility] [address] [place]  [address] [facility] [address]  Phone: [phone] Fax: [phone]    Would the patient rather a call back or a response via [facility]? Please Call back  Best Call Back Number: [phone]  Additional Information: n/a

## 2024-05-15 ENCOUNTER — OFFICE VISIT (OUTPATIENT)
Dept: PRIMARY CARE CLINIC | Facility: CLINIC | Age: 84
End: 2024-05-15
Payer: MEDICARE

## 2024-05-15 VITALS
BODY MASS INDEX: 32.58 KG/M2 | DIASTOLIC BLOOD PRESSURE: 82 MMHG | SYSTOLIC BLOOD PRESSURE: 132 MMHG | WEIGHT: 215 LBS | OXYGEN SATURATION: 96 % | HEART RATE: 65 BPM | HEIGHT: 68 IN | TEMPERATURE: 99 F

## 2024-05-15 DIAGNOSIS — I89.0 LYMPHEDEMA: Primary | ICD-10-CM

## 2024-05-15 DIAGNOSIS — N25.81 SECONDARY RENAL HYPERPARATHYROIDISM: ICD-10-CM

## 2024-05-15 DIAGNOSIS — Z79.4 TYPE 2 DIABETES MELLITUS WITH MILD NONPROLIFERATIVE RETINOPATHY AND MACULAR EDEMA, WITH LONG-TERM CURRENT USE OF INSULIN, UNSPECIFIED LATERALITY: ICD-10-CM

## 2024-05-15 DIAGNOSIS — I48.21 PERMANENT ATRIAL FIBRILLATION: ICD-10-CM

## 2024-05-15 DIAGNOSIS — R60.0 LOWER EXTREMITY EDEMA: ICD-10-CM

## 2024-05-15 DIAGNOSIS — N18.4 CHRONIC KIDNEY DISEASE, STAGE IV (SEVERE): Chronic | ICD-10-CM

## 2024-05-15 DIAGNOSIS — E66.2 CLASS 1 OBESITY WITH ALVEOLAR HYPOVENTILATION, SERIOUS COMORBIDITY, AND BODY MASS INDEX (BMI) OF 33.0 TO 33.9 IN ADULT: ICD-10-CM

## 2024-05-15 DIAGNOSIS — R53.81 PHYSICAL DEBILITY: ICD-10-CM

## 2024-05-15 DIAGNOSIS — L03.119 CELLULITIS OF LOWER EXTREMITY, UNSPECIFIED LATERALITY: ICD-10-CM

## 2024-05-15 DIAGNOSIS — S81.802D OPEN WOUND OF LEFT LOWER LEG, SUBSEQUENT ENCOUNTER: ICD-10-CM

## 2024-05-15 DIAGNOSIS — I10 ESSENTIAL HYPERTENSION: Chronic | ICD-10-CM

## 2024-05-15 DIAGNOSIS — I70.0 AORTIC ATHEROSCLEROSIS: ICD-10-CM

## 2024-05-15 DIAGNOSIS — E11.3219 TYPE 2 DIABETES MELLITUS WITH MILD NONPROLIFERATIVE RETINOPATHY AND MACULAR EDEMA, WITH LONG-TERM CURRENT USE OF INSULIN, UNSPECIFIED LATERALITY: ICD-10-CM

## 2024-05-15 PROCEDURE — 3288F FALL RISK ASSESSMENT DOCD: CPT | Mod: HCNC,CPTII,S$GLB, | Performed by: INTERNAL MEDICINE

## 2024-05-15 PROCEDURE — 99999 PR PBB SHADOW E&M-EST. PATIENT-LVL V: CPT | Mod: PBBFAC,HCNC,, | Performed by: INTERNAL MEDICINE

## 2024-05-15 PROCEDURE — 3075F SYST BP GE 130 - 139MM HG: CPT | Mod: HCNC,CPTII,S$GLB, | Performed by: INTERNAL MEDICINE

## 2024-05-15 PROCEDURE — 3079F DIAST BP 80-89 MM HG: CPT | Mod: HCNC,CPTII,S$GLB, | Performed by: INTERNAL MEDICINE

## 2024-05-15 PROCEDURE — 99214 OFFICE O/P EST MOD 30 MIN: CPT | Mod: HCNC,S$GLB,, | Performed by: INTERNAL MEDICINE

## 2024-05-15 PROCEDURE — 1101F PT FALLS ASSESS-DOCD LE1/YR: CPT | Mod: HCNC,CPTII,S$GLB, | Performed by: INTERNAL MEDICINE

## 2024-05-15 PROCEDURE — 1160F RVW MEDS BY RX/DR IN RCRD: CPT | Mod: HCNC,CPTII,S$GLB, | Performed by: INTERNAL MEDICINE

## 2024-05-15 PROCEDURE — 1159F MED LIST DOCD IN RCRD: CPT | Mod: HCNC,CPTII,S$GLB, | Performed by: INTERNAL MEDICINE

## 2024-05-15 PROCEDURE — G2211 COMPLEX E/M VISIT ADD ON: HCPCS | Mod: HCNC,S$GLB,, | Performed by: INTERNAL MEDICINE

## 2024-05-15 RX ORDER — FUROSEMIDE 20 MG/1
20 TABLET ORAL 2 TIMES DAILY PRN
Qty: 60 TABLET | Refills: 11 | Status: SHIPPED | OUTPATIENT
Start: 2024-05-15

## 2024-05-15 RX ORDER — DOXYCYCLINE 100 MG/1
100 CAPSULE ORAL 2 TIMES DAILY
Qty: 20 CAPSULE | Refills: 0 | Status: SHIPPED | OUTPATIENT
Start: 2024-05-15 | End: 2024-05-25

## 2024-05-15 RX ORDER — MUPIROCIN 20 MG/G
OINTMENT TOPICAL 2 TIMES DAILY
Qty: 30 G | Refills: 2 | Status: SHIPPED | OUTPATIENT
Start: 2024-05-15

## 2024-05-28 ENCOUNTER — TELEPHONE (OUTPATIENT)
Dept: FAMILY MEDICINE | Facility: CLINIC | Age: 84
End: 2024-05-28
Payer: MEDICARE

## 2024-05-28 NOTE — TELEPHONE ENCOUNTER
----- Message from Abhishek Solorio sent at 5/28/2024 12:26 PM CDT -----  Contact: self   .Type: Patient Call Back        Who called:      Patient  What is the request in detail:  Patient states that she was suppose to get  set up with home health and hasn't heard anything back     Can the clinic reply by [facility]?      n/a     Would the patient rather a call back or a response via [facility]?      call back   Best call back number:  [phone]

## 2024-06-03 NOTE — PROGRESS NOTES
Assessment/Plan:    Problem List Items Addressed This Visit          Cardiac/Vascular    Essential hypertension (Chronic)    Overview     Hypertension Medications               carvediloL (COREG) 12.5 MG tablet Take 1 tablet (12.5 mg total) by mouth 2 (two) times a day.    furosemide (LASIX) 20 MG tablet Take 20 mg by mouth once daily.        -at goal today  -continue lifestyle modification with low sodium diet and exercise   -discussed hypertension disease course and importance of treating high blood pressure  -patient understood and advised of risk of untreated blood pressure.  ER precautions were given   for symptoms of hypertensive urgency and emergency.          Permanent atrial fibrillation    Overview     -followed by cardiology  -remains on eliquis for stroke prevention  -on beta blocker for rate control         Aortic atherosclerosis    Overview     -remains on statin  -on eliquis due to a fib            Renal/    Chronic kidney disease, stage IV (severe) (Chronic)    Overview     -followed by nephrology  -labs remain stable but due for updated labs  -renally dose medication  -avoid nephrotoxic agents, including NSAIDs         Relevant Orders    CBC Auto Differential    Protein/Creatinine Ratio, Urine    PTH, Intact    Renal Function Panel    Vitamin D       Endocrine    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    insulin degludec (TRESIBA FLEXTOUCH U-100) 100 unit/mL (3 mL) insulin pen Inject 32 Units into the skin once daily.     -condition is currently controlled  -plan to repeat a1c  -see diabetic health maintenance listed below  -on statin: Yes  -on ACE-I/ARB: No  -counseling provided on importance of diabetic diet and medication compliance in order to treat diabetes  -discussed  diabetes disease course and potential complications           Relevant Orders    Hemoglobin A1C    Secondary renal hyperparathyroidism    Overview     [facility]         Class 1 obesity with alveolar hypoventilation, serious comorbidity, and body mass index (BMI) of 33.0 to 33.9 in adult    Overview     General weight loss/lifestyle modification strategies discussed (elicit support from others; identify saboteurs; non-food rewards, etc).  Informal exercise measures discussed, e.g. taking stairs instead of elevator.  Regular aerobic exercise program discussed.              Other    Lymphedema - Primary    Overview     -condition previously doing much better with lymphedema therapy but this was stopped  -now having worsening LE swelling with concern for developing cellulitis  -plan to start PO abx for possible SSTI  -referral to home health to resume lymphedema treatment         Relevant Medications    furosemide (LASIX) 20 MG tablet    Other Relevant Orders    Ambulatory referral/consult to Home Health     Other Visit Diagnoses       Cellulitis of lower extremity, unspecified laterality        Relevant Orders    Ambulatory referral/consult to Home Health    Lower extremity edema        Relevant Medications    furosemide (LASIX) 20 MG tablet    Open wound of left lower leg, subsequent encounter        Relevant Medications    mupirocin (BACTROBAN) 2 % ointment    Physical debility        Relevant Orders    Ambulatory referral/consult to Home Health        Visit today included increased complexity associated with the care of the episodic problem(s) addressed and managing the longitudinal care of the patient due to the serious and/or complex managed problem(s). See above assessment/plan.    Follow up if symptoms worsen or fail to improve.    Jana Holguin,  MD  _____________________________________________________________________________________________________________________________________________________    CC: follow up of chronic medical conditions     Patient is in clinic today as an established patient.    History of Present Illness  The patient presents for evaluation of multiple medical concerns. She is accompanied by her granddaughter.    The patient has been receiving home health services for the past few months. She reports a lesion on the posterior aspect of her leg which she is concerned about. Area remains erythematous with a purplish hue. The anterior aspect of her leg is also tender. She is concerned about a possible developing infection. A registered nurse applied an ointment and a bandage to her leg last Friday, which unfortunately did not yield any improvement. The patient was informed that lymphatic treatments were necessary but the nurse that was previously doing these has not been to do any treatments in quite some time. Her leg swelling has significantly worsened since stopping. She contemplated seeking emergency care if her condition did not improve. Upon examination by a registered nurse on Monday, it was observed that her condition had improved. The patient was also advised to wear compression garments. She is seeking advice on whether to wear her stockings in bed at night. She has been without furosemide 20 mg for several months. She does not like to take lasix because it induces frequent urination. She was previously prescribed Vesicare, but discontinued its use due to it being ineffective. She was previously prescribed Lasix 40 mg, taking 2 tablets in the morning and 2 at night. Despite consistent use of Lasix, she continued to experience swelling, albeit less severe. Her shoe size has increased one size.      No other new complaints today.  Remaining chronic conditions have been reviewed and remain stable. Further detail as stated  "above.    HM reviewed.      No recent changes to medical/surgical history.    Current Outpatient Medications on File Prior to Visit   Medication Sig Dispense Refill    apixaban (ELIQUIS) 2.5 mg Tab Take 1 tablet (2.5 mg total) by mouth 2 (two) times daily. 180 tablet 3    ascorbic acid, vitamin C, (VITAMIN C) 100 MG tablet Take 100 mg by mouth once daily.      blood sugar diagnostic Strp To check BG 4 times daily, to use with insurance preferred meter; Brand: Accucheck 100 strip 11    carvediloL (COREG) 12.5 MG tablet Take 1 tablet (12.5 mg total) by mouth 2 (two) times a day. 180 tablet 3    diclofenac sodium (VOLTAREN) 1 % Gel Apply 2 g topically 2 (two) times daily as needed (joint pain). 150 g 1    insulin aspart U-100 (NOVOLOG) 100 unit/mL (3 mL) InPn pen Inject 14 Units into the skin 3 (three) times daily with meals. 45 mL 1    insulin degludec (TRESIBA FLEXTOUCH U-100) 100 unit/mL (3 mL) insulin pen Inject 32 Units into the skin once daily. 30 mL 3    lancets Misc To check BG 4 times daily, to use with insurance preferred meter 100 each 11    MULTIVIT-MIN/IRON/FOLIC/LUTEIN (CENTRUM SILVER WOMEN ORAL) Take by mouth once daily.       pantoprazole (PROTONIX) 40 MG tablet Take 1 tablet (40 mg total) by mouth once daily. 90 tablet 3    pen needle, diabetic (BD ULTRA-FINE TIM PEN NEEDLE) 32 gauge x 5/32" Ndle Use as administer medication as prescribed. 100 each 11    rosuvastatin (CRESTOR) 40 MG Tab Take 1 tablet (40 mg total) by mouth once daily. 90 tablet 3    solifenacin (VESICARE) 5 MG tablet Take 1 tablet (5 mg total) by mouth once daily. 30 tablet 5    vitamin B complex (B COMPLEX VITAMINS ORAL) Take by mouth once daily.      vitamin D (VITAMIN D3) 1000 units Tab Take 1,000 Units by mouth once daily.      VITAMIN E, BULK, MISC by Misc.(Non-Drug; Combo Route) route once daily.      [DISCONTINUED] blood-glucose meter (ACCU-CHEK MELIDA PLUS METER) Misc To check sugars 4 times daily.  Please " "include control solution 1 each 0     No current facility-administered medications on file prior to visit.       Review of Systems   Constitutional:  Negative for chills, diaphoresis, fatigue and fever.   HENT:  Negative for congestion, ear pain, postnasal drip, sinus pain and sore throat.    Eyes:  Negative for pain and redness.   Respiratory:  Negative for cough, chest tightness and shortness of breath.    Cardiovascular:  Positive for leg swelling. Negative for chest pain.   Gastrointestinal:  Negative for abdominal pain, constipation, diarrhea, nausea and vomiting.   Genitourinary:  Negative for dysuria and hematuria.   Musculoskeletal:  Negative for arthralgias and joint swelling.   Skin:  Positive for color change and wound. Negative for rash.   Neurological:  Negative for dizziness, syncope and headaches.   Psychiatric/Behavioral:  Negative for dysphoric mood. The patient is not nervous/anxious.      Vitals:    05/15/24 1104   BP: 132/82   Pulse: 65   Temp: 98.9 °F (37.2 °C)   TempSrc: Temporal   SpO2: 96%   Weight: 97.5 kg (215 lb)   Height: 5' 8" (1.727 m)       Wt Readings from Last 3 Encounters:   05/15/24 97.5 kg (215 lb)   02/08/24 99.3 kg (219 lb)   11/20/23 96.2 kg (212 lb)       Physical Exam  Constitutional:       General: She is not in acute distress.     Appearance: Normal appearance. She is well-developed.   HENT:      Head: Normocephalic and atraumatic.   Eyes:      Conjunctiva/sclera: Conjunctivae normal.   Cardiovascular:      Rate and Rhythm: Normal rate and regular rhythm.      Pulses: Normal pulses.      Heart sounds: Normal heart sounds. No murmur heard.  Pulmonary:      Effort: Pulmonary effort is normal. No respiratory distress.      Breath sounds: Normal breath sounds.   Abdominal:      General: Bowel sounds are normal. There is no distension.      Palpations: Abdomen is soft.      Tenderness: There is no abdominal tenderness.   Musculoskeletal:         General: Normal range of motion. "      Cervical back: Normal range of motion and neck supple.      Right lower leg: Edema present.      Left lower leg: Edema present.   Skin:     General: Skin is warm and dry.      Findings: Erythema present. No rash.   Neurological:      General: No focal deficit present.      Mental Status: She is alert and oriented to person, place, and time.   Psychiatric:         Mood and Affect: Mood normal.         Behavior: Behavior normal.     Health Maintenance   Topic Date Due    Shingles Vaccine (1 of 2) Never done    TETANUS VACCINE  10/03/2015    Hemoglobin A1c  08/08/2024    Colonoscopy  06/21/2026    Lipid Panel  Discontinued    Eye Exam  Discontinued    DEXA Scan  Discontinued       DISCLAIMER: This note was compiled by using a speech recognition dictation system and therefore please be aware that typographical / speech recognition errors can and do occur.  Please contact me if you see any errors specifically.  Consent was obtained for JUDY recording system prior to the visit.

## 2024-06-05 PROCEDURE — G0180 MD CERTIFICATION HHA PATIENT: HCPCS | Mod: ,,, | Performed by: INTERNAL MEDICINE

## 2024-06-13 ENCOUNTER — OFFICE VISIT (OUTPATIENT)
Dept: DIABETES | Facility: CLINIC | Age: 84
End: 2024-06-13
Payer: MEDICARE

## 2024-06-13 VITALS
HEIGHT: 68 IN | HEART RATE: 60 BPM | DIASTOLIC BLOOD PRESSURE: 61 MMHG | BODY MASS INDEX: 32.69 KG/M2 | SYSTOLIC BLOOD PRESSURE: 158 MMHG

## 2024-06-13 DIAGNOSIS — Z79.4 TYPE 2 DIABETES MELLITUS WITH STAGE 4 CHRONIC KIDNEY DISEASE, WITH LONG-TERM CURRENT USE OF INSULIN: Primary | ICD-10-CM

## 2024-06-13 DIAGNOSIS — N18.4 TYPE 2 DIABETES MELLITUS WITH STAGE 4 CHRONIC KIDNEY DISEASE, WITH LONG-TERM CURRENT USE OF INSULIN: Primary | ICD-10-CM

## 2024-06-13 DIAGNOSIS — E11.22 TYPE 2 DIABETES MELLITUS WITH STAGE 4 CHRONIC KIDNEY DISEASE, WITH LONG-TERM CURRENT USE OF INSULIN: Primary | ICD-10-CM

## 2024-06-13 LAB — GLUCOSE SERPL-MCNC: 110 MG/DL (ref 70–110)

## 2024-06-13 PROCEDURE — 3077F SYST BP >= 140 MM HG: CPT | Mod: HCNC,CPTII,S$GLB, | Performed by: NURSE PRACTITIONER

## 2024-06-13 PROCEDURE — 82962 GLUCOSE BLOOD TEST: CPT | Mod: HCNC,S$GLB,, | Performed by: NURSE PRACTITIONER

## 2024-06-13 PROCEDURE — 3288F FALL RISK ASSESSMENT DOCD: CPT | Mod: HCNC,CPTII,S$GLB, | Performed by: NURSE PRACTITIONER

## 2024-06-13 PROCEDURE — 1126F AMNT PAIN NOTED NONE PRSNT: CPT | Mod: HCNC,CPTII,S$GLB, | Performed by: NURSE PRACTITIONER

## 2024-06-13 PROCEDURE — 99214 OFFICE O/P EST MOD 30 MIN: CPT | Mod: HCNC,S$GLB,, | Performed by: NURSE PRACTITIONER

## 2024-06-13 PROCEDURE — 99999 PR PBB SHADOW E&M-EST. PATIENT-LVL III: CPT | Mod: PBBFAC,HCNC,, | Performed by: NURSE PRACTITIONER

## 2024-06-13 PROCEDURE — 3078F DIAST BP <80 MM HG: CPT | Mod: HCNC,CPTII,S$GLB, | Performed by: NURSE PRACTITIONER

## 2024-06-13 PROCEDURE — 1101F PT FALLS ASSESS-DOCD LE1/YR: CPT | Mod: HCNC,CPTII,S$GLB, | Performed by: NURSE PRACTITIONER

## 2024-06-13 PROCEDURE — 1159F MED LIST DOCD IN RCRD: CPT | Mod: HCNC,CPTII,S$GLB, | Performed by: NURSE PRACTITIONER

## 2024-06-13 RX ORDER — LANCETS
1 EACH MISCELLANEOUS 4 TIMES DAILY
Qty: 200 EACH | Refills: 11 | Status: SHIPPED | OUTPATIENT
Start: 2024-06-13

## 2024-06-13 RX ORDER — BLOOD-GLUCOSE,RECEIVER,CONT
1 EACH MISCELLANEOUS DAILY
Qty: 1 EACH | Refills: 0 | Status: SHIPPED | OUTPATIENT
Start: 2024-06-13 | End: 2025-06-13

## 2024-06-13 RX ORDER — INSULIN PUMP SYRINGE, 3 ML
EACH MISCELLANEOUS
Qty: 1 EACH | Refills: 0 | Status: SHIPPED | OUTPATIENT
Start: 2024-06-13

## 2024-06-13 RX ORDER — BLOOD-GLUCOSE SENSOR
1 EACH MISCELLANEOUS DAILY
Qty: 2 EACH | Refills: 11 | Status: SHIPPED | OUTPATIENT
Start: 2024-06-13 | End: 2025-06-13

## 2024-06-13 NOTE — PROGRESS NOTES
"Radha Degroot is a 84 y.o. 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 and presents for a follow up evaluation of Type 2 diabetes mellitus.     CHIEF COMPLAINT: Diabetes Consultation    PCP: Jana Holguin MD     Initial visit with me - 2/8/2024    The patient was initially diagnosed with diabetes over 30 years ago.      Previous failed treatments include:  Metformin     Social Documentation:  Patient lives in [place].   Occupation: Retired.  Exercise: limited.       Diabetes related complications:   nephropathy, retinopathy, peripheral neuropathy, cardiovascular disease, cerebrovascular disease, and peripheral vascular disease.   denies Pancreatitis  denies Gastroparesis  denies DKA  denies Hx/family Hx of MEN2/MTC  denies Frequent UTIs/yeast infections  But does have "leaky bladder"     Cardiovascular Risk Factors: advanced age (>55 for men, >65 for women), dyslipidemia, hypertension, obesity (BMI >30 kg/m2), and sedentary lifestyle.    Diabetes Medications               insulin aspart U-100 (NOVOLOG) 100 unit/mL (3 mL) InPn pen Inject 14 Units into the skin 3 (three) times daily with meals.    insulin degludec (TRESIBA FLEXTOUCH " U-100) 100 unit/mL (3 mL) insulin pen Inject 32 Units into the skin once daily.     Current monitoring regimen: capillary blood glucose monitoring with finger sticks.  Checking TID.   States running a little higher than usual in last few weeks, 200 fasting.   Did not bring logs to review.    Patient currently taking insulin or sulfonylurea?  Yes. Emergency Glucagon Prescription current? no  Recent hypoglycemic episodes: No.     Patient compliant with glucose checks and medication administration? Yes    DIABETES MANAGEMENT STATUS  Statin: Taking  ACE/ARB: Not taking  Screening or Prevention Patient's value Goal Complete/Controlled?   HgA1C Testing and Control   Lab Results   Component Value Date    HGBA1C 6.6 (H) 02/08/2024      Annually/Less than 8% Yes   Lipid profile : 03/17/2022 Annually No   LDL control Lab Results   Component Value Date    LDLCALC 45.8 (L) 03/17/2022    Annually/Less than 100 mg/dl  No   Nephropathy screening Lab Results   Component Value Date    LABMICR 64.0 02/11/2015     Lab Results   Component Value Date    PROTEINUA 2+ (A) 03/25/2022     Lab Results   Component Value Date    UTPCR 0.42 (H) 04/06/2023      Annually No   Blood pressure BP Readings from Last 1 Encounters:   06/13/24 (!) 158/61    Less than 140/90 Yes   Dilated retinal exam : 08/08/2019 Annually No   Foot exam   Most Recent Foot Exam Date: Not Found Annually No   Patient's medications, allergies, surgical, social and family histories were reviewed and updated as appropriate.     Review of Systems   Constitutional:  Negative for weight loss.   Eyes:  Negative for blurred vision and double vision.   Cardiovascular:  Negative for chest pain.   Gastrointestinal:  Negative for nausea and vomiting.   Genitourinary:  Negative for frequency.   Musculoskeletal:  Negative for falls.   Neurological:  Negative for dizziness and weakness.   Endo/Heme/Allergies:  Negative for polydipsia.   Psychiatric/Behavioral:  Negative for depression.   "  All other systems reviewed and are negative.       Physical Exam  Constitutional:       Appearance: Normal appearance.   HENT:      Head: Normocephalic and atraumatic.   Eyes:      Extraocular Movements: Extraocular movements intact.      Pupils: Pupils are equal, round, and reactive to light.   Cardiovascular:      Rate and Rhythm: Normal rate and regular rhythm.      Heart sounds: Normal heart sounds.   Pulmonary:      Effort: Pulmonary effort is normal. No respiratory distress.      Breath sounds: Normal breath sounds.   Musculoskeletal:         General: No swelling.      Cervical back: Normal range of motion.   Skin:     General: Skin is warm and dry.   Neurological:      Mental Status: She is alert and oriented to person, place, and time.   Psychiatric:         Mood and Affect: Mood normal.         Behavior: Behavior normal.        Blood pressure (!) 158/61, pulse 60, height 5' 8" (1.727 m), unknown if currently breastfeeding.  Wt Readings from Last 3 Encounters:   05/15/24 97.5 kg (215 lb)   02/08/24 99.3 kg (219 lb)   11/20/23 96.2 kg (212 lb)       LAB REVIEW  Lab Results   Component Value Date     02/08/2024    K 4.0 02/08/2024     02/08/2024    CO2 27 02/08/2024    BUN 33 (H) 02/08/2024    CREATININE 2.0 (H) 02/08/2024    CALCIUM 10.1 02/08/2024    ANIONGAP 10 02/08/2024    EGFRNORACEVR 24.2 (A) 02/08/2024     Lab Results   Component Value Date    CPEPTIDE 1.09 12/07/2021    GLUTAMICACID 0.00 05/19/2017     Hemoglobin A1C   Date Value Ref Range Status   02/08/2024 6.6 (H) 4.0 - 5.6 % Final     Comment:     ADA Screening Guidelines:  5.7-6.4%  Consistent with prediabetes  >or=6.5%  Consistent with diabetes    High levels of fetal hemoglobin interfere with the HbA1C  assay. Heterozygous hemoglobin variants (HbS, HgC, etc)do  not significantly interfere with this assay.   However, presence of multiple variants may affect accuracy.     04/05/2023 7.5 (H) 4.0 - 5.6 % Final     Comment:     ADA " Screening Guidelines:  5.7-6.4%  Consistent with prediabetes  >or=6.5%  Consistent with diabetes    High levels of fetal hemoglobin interfere with the HbA1C  assay. Heterozygous hemoglobin variants (HbS, HgC, etc)do  not significantly interfere with this assay.   However, presence of multiple variants may affect accuracy.     12/01/2022 6.7 (H) 4.0 - 5.6 % Final     Comment:     ADA Screening Guidelines:  5.7-6.4%  Consistent with prediabetes  >or=6.5%  Consistent with diabetes    High levels of fetal hemoglobin interfere with the HbA1C  assay. Heterozygous hemoglobin variants (HbS, HgC, etc)do  not significantly interfere with this assay.   However, presence of multiple variants may affect accuracy.         Lab Results   Component Value Date    POCGLU 110 06/13/2024       ASSESSMENT    ICD-10-CM ICD-9-CM   1. Type 2 diabetes mellitus with stage 4 chronic kidney disease, with long-term current use of insulin  E11.22 250.40    N18.4 585.4    Z79.4 V58.67       PLAN  Diagnoses and all orders for this visit:    Type 2 diabetes mellitus with stage 4 chronic kidney disease, with long-term current use of insulin  -     POCT Glucose, Hand-Held Device  -     blood sugar diagnostic (BLOOD GLUCOSE TEST) Strp; 1 strip by Misc.(Non-Drug; Combo Route) route 4 (four) times daily.  -     lancets Misc; 1 each by Misc.(Non-Drug; Combo Route) route 4 (four) times daily.  -     blood-glucose meter kit; Use as instructed  -     blood-glucose sensor (FREESTYLE MAXIM 3 SENSOR) Shani; 1 each by Misc.(Non-Drug; Combo Route) route once daily.  -     blood-glucose meter,continuous (FREESTYLE MAXIM 3 READER) Misc; 1 Device by Misc.(Non-Drug; Combo Route) route once daily.        Reviewed pathophysiology of diabetes, complications related to the disease, importance of annual dilated eye exam and daily foot examination. Explained MOA, SE, dosage of medications. Written instructions given and reviewed with patient and patient verbalizes  understanding.     2024 - patient agrees to try Tiarra 3 CGM with reader. They will call once they have received and we will refer for training. She denies any glucoses less than 90.     PATIENT INSTRUCTIONS       Continue Tresiba 32 units subcutaneously daily.   Continue Novolog 14 units three times daily subcutaneously before meals using your correction scale.   Target: 200  Sensitivity: 50     Blood Sugar Goals:       Fastin-150.       1-2 hours after a meal: Less than 200.    Tiarra 3 CGM sent to [facility] to check price.     Follow up in about 3 months (around 2024) for Tiarra.

## 2024-06-13 NOTE — PATIENT INSTRUCTIONS
PATIENT INSTRUCTIONS       Continue Tresiba 32 units subcutaneously daily.   Continue Novolog 14 units three times daily subcutaneously before meals using your correction scale.   Target: 200  Sensitivity: 50     Blood Sugar Goals:       Fastin-150.       1-2 hours after a meal: Less than 200.    Tiarra 3 CGM sent to [facility] to check price.

## 2024-06-20 ENCOUNTER — EXTERNAL HOME HEALTH (OUTPATIENT)
Dept: HOME HEALTH SERVICES | Facility: HOSPITAL | Age: 84
End: 2024-06-20
Payer: MEDICARE

## 2024-06-20 ENCOUNTER — TELEPHONE (OUTPATIENT)
Dept: FAMILY MEDICINE | Facility: CLINIC | Age: 84
End: 2024-06-20
Payer: MEDICARE

## 2024-06-20 NOTE — TELEPHONE ENCOUNTER
----- Message from Odalis Obrien sent at 6/20/2024 12:55 PM CDT -----  Contact: Quinten/Ingris [phone]  Pt busted a blood vessel in her eye. She is unaware how she did it but Quinten told her to report if it worsens. Please call Inrgis back if needed. Thanks

## 2024-06-20 NOTE — TELEPHONE ENCOUNTER
HH nurse just wanted to make you aware that pt busted a blood vessel in her eye yesterday. She reports not pain or issues with vision, just wanted to make you aware.

## 2024-06-26 ENCOUNTER — DOCUMENT SCAN (OUTPATIENT)
Dept: HOME HEALTH SERVICES | Facility: HOSPITAL | Age: 84
End: 2024-06-26
Payer: MEDICARE

## 2024-07-02 DIAGNOSIS — E11.22 TYPE 2 DIABETES MELLITUS WITH STAGE 4 CHRONIC KIDNEY DISEASE, WITH LONG-TERM CURRENT USE OF INSULIN: ICD-10-CM

## 2024-07-02 DIAGNOSIS — N18.4 TYPE 2 DIABETES MELLITUS WITH STAGE 4 CHRONIC KIDNEY DISEASE, WITH LONG-TERM CURRENT USE OF INSULIN: ICD-10-CM

## 2024-07-02 DIAGNOSIS — Z79.4 TYPE 2 DIABETES MELLITUS WITH STAGE 4 CHRONIC KIDNEY DISEASE, WITH LONG-TERM CURRENT USE OF INSULIN: ICD-10-CM

## 2024-07-02 RX ORDER — PEN NEEDLE, DIABETIC 32GX 5/32"
NEEDLE, DISPOSABLE MISCELLANEOUS
Qty: 400 EACH | Refills: 3 | Status: SHIPPED | OUTPATIENT
Start: 2024-07-02

## 2024-07-02 NOTE — TELEPHONE ENCOUNTER
Provider Staff:  Action required for this patient    Requires labs      Please see care gap opportunities below in Care Due Message.    Thanks!  [facility]     Appointments      Date Provider   Last Visit   5/15/2024 Jana Holguin MD   Next Visit   Visit date not found Jana Holguin MD     Refill Decision Note   Radha Degroot  is requesting a refill authorization.  Brief Assessment and Rationale for Refill:  Approve     Medication Therapy Plan:        Comments:     Note composed:5:58 AM 07/02/2024

## 2024-07-02 NOTE — TELEPHONE ENCOUNTER
Care Due:                  Date            Visit Type   Department     Provider  --------------------------------------------------------------------------------                                EP -                              PRIMARY  Last Visit: 05-      CARE ([facility])   None Found     Jana Holguin  Next Visit: None Scheduled  None         None Found                                                            Last  Test          Frequency    Reason                     Performed    Due Date  --------------------------------------------------------------------------------    CBC.........  12 months..  diclofenac...............  12- 11-    HBA1C.......  6 months...  insulin..................  02- 08-    [employer] Embedded Care Due Messages. Reference number: [id number].   7/02/2024 4:01:43 AM GERMAINE

## 2024-07-03 ENCOUNTER — TELEPHONE (OUTPATIENT)
Dept: FAMILY MEDICINE | Facility: CLINIC | Age: 84
End: 2024-07-03
Payer: MEDICARE

## 2024-07-03 ENCOUNTER — TELEPHONE (OUTPATIENT)
Dept: DIABETES | Facility: CLINIC | Age: 84
End: 2024-07-03
Payer: MEDICARE

## 2024-07-03 NOTE — TELEPHONE ENCOUNTER
Spoke with home health nurse Ingris regarding patient. Advised home health nurse that because blisters are forming and areas with previous blisters are warm to the touch after healing process has begun, we recommend that the patient go to the emergency room to ensure infection is not setting in.

## 2024-07-03 NOTE — TELEPHONE ENCOUNTER
Patient called regarding test strips to check sugars, patient never received them on 6/13 with other prescriptions. Pharmacy was called, they ran strips through insurance and it was approved. They will be getting strips ready for patient, patient informed to  today. Patient voiced understanding.  ----- Message from Lorene Gifford sent at 7/3/2024 10:58 AM CDT -----  Contact: ALISSA ANDREW [[id number]]  ..Type:  Patient Requesting Call    Who Called: ALISSA ANDREW [[id number]]  Does the patient know what this is regarding?: pt wants to speak with the nurse regarding diabetic supplies, pt needs the diabetic strips sent to pharmacy   Would the patient rather a call back or a response via [facility]?  call  Best Call Back Number: .[phone] (home)   Additional Information:     .  [id number] - [facility] [address] AT [facility] [address]  [address] [facility] [address]  Phone: [phone] Fax: [phone]

## 2024-07-03 NOTE — TELEPHONE ENCOUNTER
----- Message from Shameka Cancino sent at 7/3/2024  3:55 PM CDT -----  Contact: [facility] speak with the nurse regarding concerns  . Reports having more lymphedema than normal and weight is up. Ingris states the patient has blisters on the leg and one scabbed over and warm to touch  . Please give Ingris  a call back at [phone].

## 2024-07-06 ENCOUNTER — DOCUMENT SCAN (OUTPATIENT)
Dept: HOME HEALTH SERVICES | Facility: HOSPITAL | Age: 84
End: 2024-07-06
Payer: MEDICARE

## 2024-07-09 ENCOUNTER — DOCUMENT SCAN (OUTPATIENT)
Dept: HOME HEALTH SERVICES | Facility: HOSPITAL | Age: 84
End: 2024-07-09
Payer: MEDICARE

## 2024-07-10 ENCOUNTER — TELEPHONE (OUTPATIENT)
Dept: OPHTHALMOLOGY | Facility: CLINIC | Age: 84
End: 2024-07-10
Payer: MEDICARE

## 2024-07-10 NOTE — TELEPHONE ENCOUNTER
Returned patients call to let her know that we were just her to confirm her appt with Dr Gutierrez tomorrow, she said she will be there.        ----- Message from Adelia Rothman sent at 7/10/2024  7:58 AM CDT -----  .Type:  Patient Returning Call    Who Called:.Radha Degroot   Who Left Message for Patient:  Does the patient know what this is regarding?:  Would the patient rather a call back or a response via [facility]? Call back  Best Call Back Number:.[phone]   Additional Information:

## 2024-07-11 ENCOUNTER — OFFICE VISIT (OUTPATIENT)
Dept: OPHTHALMOLOGY | Facility: CLINIC | Age: 84
End: 2024-07-11
Payer: MEDICARE

## 2024-07-11 DIAGNOSIS — H25.12 NUCLEAR SCLEROSIS OF LEFT EYE: ICD-10-CM

## 2024-07-11 DIAGNOSIS — H53.002 AMBLYOPIA OF LEFT EYE: ICD-10-CM

## 2024-07-11 DIAGNOSIS — Z96.1 PSEUDOPHAKIA, RIGHT EYE: ICD-10-CM

## 2024-07-11 DIAGNOSIS — E11.9 DIABETES MELLITUS TYPE 2 WITHOUT RETINOPATHY: Primary | ICD-10-CM

## 2024-07-11 PROCEDURE — 99999 PR PBB SHADOW E&M-EST. PATIENT-LVL I: CPT | Mod: PBBFAC,HCNC,, | Performed by: OPHTHALMOLOGY

## 2024-07-11 NOTE — PROGRESS NOTES
HPI     Diabetic Eye Exam     Additional comments: Lab Results       Component                Value               Date                       HGBA1C                   6.6 (H)             02/08/2024                       Comments    Eye exam     Pt unsure of date of last eye exam   Pt states she saw Dr in the past           Last edited by Susana Carter, COA on 7/11/2024  3:53 PM.            Assessment /Plan     For exam results, see Encounter Report.    Diabetes mellitus type 2 without retinopathy  History of retinopathy with macular edema. None on exam today. Patient reports improved BG control. Last A1c 6.6 . Diabetes controlled with no diabetic retinopathy on dilated exam. Reviewed diabetic eye precautions including excellent blood sugar control, and importance of regular follow up.     Pseudophakia of right eye  Stable, monitor yearly. Mrx provided.    Nuclear sclerosis left eye  Patient unable to see out of OS due to amblyopia. Will monitor cataract at this time as not likely visually significant.    Amblyopia of left eye  Poor vision OS from childhood. Monitor.    RTC 1 year, sooner prn

## 2024-07-17 ENCOUNTER — TELEPHONE (OUTPATIENT)
Dept: DIABETES | Facility: CLINIC | Age: 84
End: 2024-07-17
Payer: MEDICARE

## 2024-07-17 NOTE — TELEPHONE ENCOUNTER
----- Message from Radha Merida sent at 7/17/2024  3:49 PM CDT -----  Contact: Radha Garcia is calling to speak  to the nurse regarding questions about the chilo system, please give her a call at [phone]    Thanks  LJ

## 2024-07-17 NOTE — TELEPHONE ENCOUNTER
Spoke with patient and she states that she received her chilo and home health assisted her with putting but she doesn't know how to use it. Looked at for an appointment with education in [place] and didn't see anything available until 8/22/24. Patient states she has questions but she doesn't really know what to ask. Please advise

## 2024-07-18 ENCOUNTER — PATIENT OUTREACH (OUTPATIENT)
Dept: DIABETES | Facility: CLINIC | Age: 84
End: 2024-07-18
Payer: MEDICARE

## 2024-07-18 NOTE — PROGRESS NOTES
Called patient as requested.  Patient unable to be seen today for opening at 2:30 pm.  Patient unsure if she can come next week since it is her daughter's birthday.  Patient very upset the  nurse placed Tiarra without education.  Answered many questions for patient about Tiarra.  She is unable to access [facility] account due to difficulties she associated with the keshia so I am unable to send videos on device nor my contact information.   She asked what to do when alarms go off.  Recommended increased water of 8-16 oz every 30 minutes to flush out extra sugar.  She is unable to do so due to renal function.  Explained she will use this device just like BG checking. Explained this device is measuring fluid glucose that is slower changing the BG and it will not be the same reading as a BG reading. She will continue to take Novolog insulin as prescribed 5-15 minutes before her meal.  Do not administer Novolog for BG less than 80 mg/dl.

## 2024-07-22 ENCOUNTER — PATIENT MESSAGE (OUTPATIENT)
Dept: DIABETES | Facility: CLINIC | Age: 84
End: 2024-07-22
Payer: MEDICARE

## 2024-07-22 ENCOUNTER — PATIENT OUTREACH (OUTPATIENT)
Dept: DIABETES | Facility: CLINIC | Age: 84
End: 2024-07-22
Payer: MEDICARE

## 2024-07-22 NOTE — PROGRESS NOTES
Called patient last Thursday to come in for Tiarra training, but she did not have available transportation.  She could not take down my contact information so I promised to call Monday 7/22/24 to see if her daughter will be available to provide transportation this Thursday, but she is unable to because it is her birthday.  Reviewed steps for Tiarra sensor placement with [employer] and patient wrote them down.  Issues arose in ability to show her videos on [employer] to view the steps.  She is contacting [facility] to get password reset since videos were unable to be found on [employer] website. Sent [facility] message with pictorial tutorial with using the [employer] reader and a video for placement of the sensor.  Provided direct phone line for questions.

## 2024-07-23 ENCOUNTER — PATIENT MESSAGE (OUTPATIENT)
Dept: DIABETES | Facility: CLINIC | Age: 84
End: 2024-07-23
Payer: MEDICARE

## 2024-07-23 ENCOUNTER — TELEPHONE (OUTPATIENT)
Dept: DIABETES | Facility: CLINIC | Age: 84
End: 2024-07-23
Payer: MEDICARE

## 2024-07-23 NOTE — TELEPHONE ENCOUNTER
----- Message from Asha Neri sent at 7/23/2024 10:56 AM CDT -----  Contact: Radha Natarajan is needing call back regarding needing instruction on the X-Scan ImagingE 3 SENSOR. Please call back at [phone] if you already haven't.

## 2024-08-07 DIAGNOSIS — N18.4 DIABETES MELLITUS WITH STAGE 4 CHRONIC KIDNEY DISEASE: ICD-10-CM

## 2024-08-07 DIAGNOSIS — E11.22 DIABETES MELLITUS WITH STAGE 4 CHRONIC KIDNEY DISEASE: ICD-10-CM

## 2024-08-07 RX ORDER — INSULIN DEGLUDEC 100 U/ML
INJECTION, SOLUTION SUBCUTANEOUS
Qty: 30 ML | Refills: 0 | Status: SHIPPED | OUTPATIENT
Start: 2024-08-07

## 2024-08-14 ENCOUNTER — EXTERNAL HOME HEALTH (OUTPATIENT)
Dept: HOME HEALTH SERVICES | Facility: HOSPITAL | Age: 84
End: 2024-08-14
Payer: MEDICARE

## 2024-08-17 ENCOUNTER — DOCUMENT SCAN (OUTPATIENT)
Dept: HOME HEALTH SERVICES | Facility: HOSPITAL | Age: 84
End: 2024-08-17
Payer: MEDICARE

## 2024-08-27 ENCOUNTER — TELEPHONE (OUTPATIENT)
Dept: FAMILY MEDICINE | Facility: CLINIC | Age: 84
End: 2024-08-27
Payer: MEDICARE

## 2024-08-27 DIAGNOSIS — M47.816 OSTEOARTHRITIS OF LUMBAR SPINE, UNSPECIFIED SPINAL OSTEOARTHRITIS COMPLICATION STATUS: Primary | ICD-10-CM

## 2024-08-27 DIAGNOSIS — R53.81 PHYSICAL DEBILITY: ICD-10-CM

## 2024-08-27 NOTE — TELEPHONE ENCOUNTER
----- Message from Yudy JOCELYN Gifford sent at 8/27/2024 11:44 AM CDT -----  Contact: Fatou/[facility]  Fatou is calling regards to her being discharge from home health on 8/28/24 but she still needs help with her Dexcom and Fatou wants to know if the office wants to continue home health with her, please call her at [phone](office) or fax it to [phone] and you can call her at  [phone](cell).    Thanks  Td

## 2024-08-28 NOTE — TELEPHONE ENCOUNTER
I have signed for the following orders AND/OR meds.  Please call the patient and ask the patient to schedule the testing AND/OR inform about any medications that were sent. Medications have been sent to pharmacy listed below      Orders Placed This Encounter   Procedures    Ambulatory referral/consult to Home Health     Standing Status:   Future     Standing Expiration Date:   9/28/2025     Referral Priority:   Routine     Referral Type:   Home Health     Referral Reason:   Specialty Services Required     Requested Specialty:   Home Health Services     Number of Visits Requested:   1              [facility] #[id number] - [place] - [address] AT [facility] [address] [address]  Phone: [phone] Fax: [phone]    [facility] #[id number] - [place], [place] - [address] [facility] OF  [address]  [address] [facility]  [place] [address]  Phone: [phone] Fax: [phone]

## 2024-08-29 ENCOUNTER — TELEPHONE (OUTPATIENT)
Dept: FAMILY MEDICINE | Facility: CLINIC | Age: 84
End: 2024-08-29
Payer: MEDICARE

## 2024-08-29 ENCOUNTER — NURSE TRIAGE (OUTPATIENT)
Dept: ADMINISTRATIVE | Facility: CLINIC | Age: 84
End: 2024-08-29
Payer: MEDICARE

## 2024-08-29 NOTE — TELEPHONE ENCOUNTER
----- Message from Abhishek Solorio sent at 8/29/2024  3:15 PM CDT -----  Contact: self   .Type: Patient Call Back        Who called:      Patient   What is the request in detail:    Called in concerning blood pressure dropping. Patient will not be going to the ER .Can the clinic reply by [facility]?           Would the patient rather a call back or a response via [facility]?   n/a     Best call back number:  Patient just wanted to let the provider know but if you want to calll  .[phone]

## 2024-08-29 NOTE — TELEPHONE ENCOUNTER
Spoke with pt, refusing ED, asking if she can take another Carvedilol. Pt states that she has also not contacted Dr. Jesus. Pt advised to report to ER regarding symptoms.

## 2024-08-29 NOTE — TELEPHONE ENCOUNTER
Spoke with pt,  stated that her blood pressure has went down and is now 182/61. Pt reports that she has been urinating non stop since she spoke with us this morning. Pt still refuses ED.

## 2024-08-29 NOTE — TELEPHONE ENCOUNTER
Informed pt the importance of going to the ER or calling 911 immediately. Pt states she wants to wait and she will continue to keep a log of her b/p and pulse. Again, I advised on the importance of going to the ER now and not to wait, she still declined. Instructed pt to contact us for any further advice, pt verbalized understanding.

## 2024-08-29 NOTE — TELEPHONE ENCOUNTER
Pt calling with c/o HA and b/p is 190/65. Pt said that is it lower than it was this am. 205/72. Pt said that she has been having a headache and she said that she is in a wheelchair and that she has gained about 4-5 lbs over the last 3-4 days. Pt triaged and care advice is to be seen in the ED. Pt told this advice and she refused and she was told that I can only offer the ED. Pt said that she is at home alone and everyone is at work. Pt told that I will route message to provider and ask for a response. She is wondering if there is anything that she can do to assist at home but not going to the ED. Please advise              Reason for Disposition   Systolic BP >= 160 OR Diastolic >= 100, and any cardiac (e.g., breathing difficulty, chest pain) or neurologic symptoms (e.g., new-onset blurred or double vision)    Additional Information   Negative: Sounds like a life-threatening emergency to the triager     195/62    Protocols used: Blood Pressure - High-A-OH

## 2024-08-29 NOTE — TELEPHONE ENCOUNTER
08/29/2024 1:01 PM   I spoke with the patient about this. Pt stated her pulse was 59bpm at 12:40 with BP of 210/81. Pt stated she found her HH book and saw tips to decrease HTN.

## 2024-09-03 ENCOUNTER — TELEPHONE (OUTPATIENT)
Dept: CARDIOLOGY | Facility: CLINIC | Age: 84
End: 2024-09-03
Payer: MEDICARE

## 2024-09-03 ENCOUNTER — TELEPHONE (OUTPATIENT)
Dept: FAMILY MEDICINE | Facility: CLINIC | Age: 84
End: 2024-09-03
Payer: MEDICARE

## 2024-09-03 DIAGNOSIS — I25.10 CORONARY ARTERY DISEASE INVOLVING NATIVE CORONARY ARTERY OF NATIVE HEART WITHOUT ANGINA PECTORIS: ICD-10-CM

## 2024-09-03 NOTE — TELEPHONE ENCOUNTER
----- Message from Shameka Cancino sent at 9/3/2024  2:31 PM CDT -----  Contact: Radha  .Type:  Patient Returning Call    Who Called:Radha   Who Left Message for Patient:nurse   Does the patient know what this is regarding?:call back   Would the patient rather a call back or a response via [facility]? Call   Best Call Back Number:[phone]   Additional Information: pt requesting a call back

## 2024-09-03 NOTE — TELEPHONE ENCOUNTER
Spoke with pt, stated that she has hosp f/u appt scheduled next week. Pt stated that she was given Amlodipine 5 mg to add to her daily Coreg rx. Pt states that the lowest her bp has gotten was 148/52 during the day and the highest was 214/89 last night. Pt asking if she should take an additional Amlodipine.

## 2024-09-03 NOTE — TELEPHONE ENCOUNTER
Attempted unsuccessfully to reach patient. Left voicemail for patient to call back to discuss  appointment request

## 2024-09-03 NOTE — TELEPHONE ENCOUNTER
Followed up with Radha, rescheduled appointment for patient. Patient verbalized understanding of date, time, and location of appointment.

## 2024-09-03 NOTE — TELEPHONE ENCOUNTER
----- Message from Abhishek Solorio sent at 9/3/2024  1:54 PM CDT -----  Contact: self   .Type: Patient Call Back        Who called:Patient         What is the request in detail:    Called in concerning high blood pressure . Patient pcp advised patient that she needs to do a follow up appt because her blood pressure has been high   Can the clinic reply by [facility]?           Would the patient rather a call back or a response via [facility]?   call      Best call back number:  [phone]

## 2024-09-03 NOTE — TELEPHONE ENCOUNTER
----- Message from Mona Rodriguez sent at 9/3/2024 11:36 AM CDT -----  Contact: Patient, [phone]  Calling to speak with the nurse regarding the emergency room visit. Please call her. Thanks.

## 2024-09-03 NOTE — TELEPHONE ENCOUNTER
----- Message from Hung Sarah sent at 9/3/2024  3:08 PM CDT -----  Contact: Radha  Type:  RX Refill Request    Who Called: Radha  Refill or New Rx:refill  RX Name and Strength:apixaban (ELIQUIS) 2.5 mg Tab   How is the patient currently taking it? (ex. 1XDay):  Is this a 30 day or 90 day RX:  Preferred Pharmacy with phone number:  [id number] - [place] [address] AT [facility] OF [facility] [address] [address]  Phone: [phone] Fax: [phone]    Local or Mail Order:local  Ordering Provider:rich  Would the patient rather a call back or a response via [facility]? call  Best Call Back Number:[phone]   Additional Information: Pt stated she needs the medication sent in today if possible

## 2024-09-10 ENCOUNTER — TELEPHONE (OUTPATIENT)
Dept: FAMILY MEDICINE | Facility: CLINIC | Age: 84
End: 2024-09-10
Payer: MEDICARE

## 2024-09-10 DIAGNOSIS — E11.22 TYPE 2 DIABETES MELLITUS WITH STAGE 4 CHRONIC KIDNEY DISEASE, WITH LONG-TERM CURRENT USE OF INSULIN: ICD-10-CM

## 2024-09-10 DIAGNOSIS — Z79.4 TYPE 2 DIABETES MELLITUS WITH STAGE 4 CHRONIC KIDNEY DISEASE, WITH LONG-TERM CURRENT USE OF INSULIN: ICD-10-CM

## 2024-09-10 DIAGNOSIS — N18.4 TYPE 2 DIABETES MELLITUS WITH STAGE 4 CHRONIC KIDNEY DISEASE, WITH LONG-TERM CURRENT USE OF INSULIN: ICD-10-CM

## 2024-09-10 RX ORDER — BLOOD SUGAR DIAGNOSTIC
STRIP MISCELLANEOUS
Qty: 400 STRIP | Refills: 3 | Status: SHIPPED | OUTPATIENT
Start: 2024-09-10

## 2024-09-10 RX ORDER — BLOOD-GLUCOSE SENSOR
EACH MISCELLANEOUS
Qty: 3 EACH | Refills: 11 | Status: SHIPPED | OUTPATIENT
Start: 2024-09-10

## 2024-09-10 RX ORDER — BLOOD SUGAR DIAGNOSTIC
STRIP MISCELLANEOUS
Qty: 100 STRIP | OUTPATIENT
Start: 2024-09-10

## 2024-09-10 NOTE — TELEPHONE ENCOUNTER
Spoke with pt, stated that [facility] in unable to fill rx for Tiarra sensor due to it being out of stock. States that she is not calling around to multiple pharmacies to see if they have it in stock. Pt states that she doesn't want the Tiarra any longer due to issues with it not being available. Pls advise.

## 2024-09-10 NOTE — TELEPHONE ENCOUNTER
----- Message from Adelia Rothman sent at 9/10/2024 11:33 AM CDT -----  Name of Caller:.Radha Degroot   When is the first available appointment?today  Best Call Back Number:.[phone]  Additional Information: Patient is requesting a call back regarding her chilo sensor  She stated the pharmacy told her they had some in and when she got there they were out. She stated hers will be out in the morning.

## 2024-09-10 NOTE — TELEPHONE ENCOUNTER
Refill Routing Note   Medication(s) are not appropriate for processing by [facility] for the following reason(s):        No active prescription written by provider    [facility] action(s):  Defer     Requires labs : Yes             Appointments  past 12m or future 3m with PCP    Date Provider   Last Visit   5/15/2024 Jana Holguin MD   Next Visit   9/10/2024 Jana Holguin MD   ED visits in past 90 days: 0        Note composed:3:24 PM 09/10/2024

## 2024-09-10 NOTE — TELEPHONE ENCOUNTER
No care due was identified.  [facility] Embedded Care Due Messages. Reference number: [id number].   9/10/2024 12:04:45 PM CDT

## 2024-09-10 NOTE — TELEPHONE ENCOUNTER
Spoke with pt regarding rx sent to pharmacy. Pt states her glucose this morning was 282  and her bp was 162/79. Pls advise.

## 2024-09-10 NOTE — TELEPHONE ENCOUNTER
Care Due:                  Date            Visit Type   Department     Provider  --------------------------------------------------------------------------------                                EP -                              PRIMARY  Last Visit: 05-      CARE ([facility])   None Found     Jana Holguin  Next Visit: None Scheduled  None         None Found                                                            Last  Test          Frequency    Reason                     Performed    Due Date  --------------------------------------------------------------------------------    CBC.........  12 months..  diclofenac...............  12- 11-    HBA1C.......  6 months...  insulin..................  02- 08-    [employer] Embedded Care Due Messages. Reference number: [id number].   9/10/2024 11:57:46 AM CDT

## 2024-09-10 NOTE — TELEPHONE ENCOUNTER
I sent it to [facility] in [place] to see if they have it in stock.    I have signed for the following orders AND/OR meds.  Please call the patient and ask the patient to schedule the testing AND/OR inform about any medications that were sent. Medications have been sent to pharmacy listed below      No orders of the defined types were placed in this encounter.      Medications Ordered This Encounter   Medications    blood-glucose sensor (FREESTYLE MAXIM 3 SENSOR) Shani     Sig: Use to check glucose 4 times daily     Dispense:  3 each     Refill:  11         [id number] [place] [address] AT [facility] OF [address] [place]  [address] [facility] [address]  Phone: [phone] Fax: [phone]    [facility] #[id number] - [place] - [address] [facility] OF  [address]  [address] [facility] [address]  Phone: [phone] Fax: [phone]    [id number] - [place], [place] - [address] [address]  Phone: [phone] Fax: [phone]

## 2024-09-12 DIAGNOSIS — Z76.89 ENCOUNTER TO ESTABLISH CARE: Primary | ICD-10-CM

## 2024-09-12 DIAGNOSIS — Z00.00 ROUTINE ADULT HEALTH MAINTENANCE: ICD-10-CM

## 2024-09-13 NOTE — TELEPHONE ENCOUNTER
Spoke with pt, stated that her bp and glucose has been running good since she last spoke to us. Stated that she would call back next week to schedule.

## 2024-09-18 ENCOUNTER — TELEPHONE (OUTPATIENT)
Dept: PRIMARY CARE CLINIC | Facility: CLINIC | Age: 84
End: 2024-09-18
Payer: MEDICARE

## 2024-09-18 NOTE — TELEPHONE ENCOUNTER
----- Message from Candis Quijano sent at 9/18/2024  1:55 PM CDT -----  Contact: Radha Romeo is calling in regards to getting an follow up apt for 09/23.he daughter is off.please call back at .[phone]         Thanks  RENNY

## 2024-09-19 ENCOUNTER — PATIENT MESSAGE (OUTPATIENT)
Dept: CARDIAC REHAB | Facility: HOSPITAL | Age: 84
End: 2024-09-19
Payer: MEDICARE

## 2024-09-19 ENCOUNTER — HOSPITAL ENCOUNTER (OUTPATIENT)
Dept: CARDIOLOGY | Facility: HOSPITAL | Age: 84
Discharge: HOME | End: 2024-09-19
Attending: INTERNAL MEDICINE
Payer: MEDICARE

## 2024-09-19 ENCOUNTER — OFFICE VISIT (OUTPATIENT)
Dept: CARDIOLOGY | Facility: CLINIC | Age: 84
End: 2024-09-19
Payer: MEDICARE

## 2024-09-19 VITALS
SYSTOLIC BLOOD PRESSURE: 120 MMHG | DIASTOLIC BLOOD PRESSURE: 66 MMHG | HEIGHT: 68 IN | HEART RATE: 47 BPM | OXYGEN SATURATION: 96 % | BODY MASS INDEX: 32.58 KG/M2 | WEIGHT: 214.94 LBS

## 2024-09-19 DIAGNOSIS — Z79.4 TYPE 2 DIABETES MELLITUS WITH OTHER SPECIFIED COMPLICATION, WITH LONG-TERM CURRENT USE OF INSULIN: ICD-10-CM

## 2024-09-19 DIAGNOSIS — R06.09 OTHER FORM OF DYSPNEA: ICD-10-CM

## 2024-09-19 DIAGNOSIS — I48.21 PERMANENT ATRIAL FIBRILLATION: Primary | ICD-10-CM

## 2024-09-19 DIAGNOSIS — I89.0 LYMPHEDEMA: ICD-10-CM

## 2024-09-19 DIAGNOSIS — I70.0 AORTIC ATHEROSCLEROSIS: ICD-10-CM

## 2024-09-19 DIAGNOSIS — E66.2 CLASS 1 OBESITY WITH ALVEOLAR HYPOVENTILATION, SERIOUS COMORBIDITY, AND BODY MASS INDEX (BMI) OF 33.0 TO 33.9 IN ADULT: ICD-10-CM

## 2024-09-19 DIAGNOSIS — E78.2 MIXED HYPERLIPIDEMIA: ICD-10-CM

## 2024-09-19 DIAGNOSIS — I87.2 VENOUS INSUFFICIENCY: ICD-10-CM

## 2024-09-19 DIAGNOSIS — I50.32 CHRONIC DIASTOLIC CHF (CONGESTIVE HEART FAILURE): ICD-10-CM

## 2024-09-19 DIAGNOSIS — I25.118 CORONARY ARTERY DISEASE OF NATIVE ARTERY OF NATIVE HEART WITH STABLE ANGINA PECTORIS: ICD-10-CM

## 2024-09-19 DIAGNOSIS — N18.4 CHRONIC KIDNEY DISEASE, STAGE IV (SEVERE): ICD-10-CM

## 2024-09-19 DIAGNOSIS — E11.69 TYPE 2 DIABETES MELLITUS WITH OTHER SPECIFIED COMPLICATION, WITH LONG-TERM CURRENT USE OF INSULIN: ICD-10-CM

## 2024-09-19 DIAGNOSIS — Z00.00 ROUTINE ADULT HEALTH MAINTENANCE: ICD-10-CM

## 2024-09-19 DIAGNOSIS — Z76.89 ENCOUNTER TO ESTABLISH CARE: ICD-10-CM

## 2024-09-19 DIAGNOSIS — R60.0 LOWER EXTREMITY EDEMA: ICD-10-CM

## 2024-09-19 DIAGNOSIS — I10 ESSENTIAL HYPERTENSION: ICD-10-CM

## 2024-09-19 LAB
OHS QRS DURATION: 90 MS
OHS QTC CALCULATION: 441 MS

## 2024-09-19 PROCEDURE — 93005 ELECTROCARDIOGRAM TRACING: CPT

## 2024-09-19 PROCEDURE — 93010 ELECTROCARDIOGRAM REPORT: CPT | Mod: ,,, | Performed by: INTERNAL MEDICINE

## 2024-09-19 PROCEDURE — 99999 PR PBB SHADOW E&M-EST. PATIENT-LVL IV: CPT | Mod: PBBFAC,,, | Performed by: INTERNAL MEDICINE

## 2024-09-19 RX ORDER — SEMAGLUTIDE 0.68 MG/ML
0.25 INJECTION, SOLUTION SUBCUTANEOUS
Qty: 3 ML | Refills: 1 | Status: SHIPPED | OUTPATIENT
Start: 2024-09-19

## 2024-09-19 RX ORDER — NITROGLYCERIN 0.4 MG/1
0.4 TABLET SUBLINGUAL EVERY 5 MIN PRN
Qty: 30 TABLET | Refills: 0 | Status: SHIPPED | OUTPATIENT
Start: 2024-09-19 | End: 2025-09-19

## 2024-09-19 RX ORDER — FUROSEMIDE 40 MG/1
40 TABLET ORAL DAILY
Qty: 90 TABLET | Refills: 1 | Status: SHIPPED | OUTPATIENT
Start: 2024-09-19

## 2024-09-19 NOTE — PROGRESS NOTES
Subjective:   Patient ID:  Radha Degroot is a 84 y.o. female who presents for cardiac consult of No chief complaint on file.      Referral by: No referring provider defined for this encounter.     Reason for consult:       HPI  The patient came in today for cardiac consult of No chief complaint on file.    9/19/24  Radha Degroot is a 84 y.o. female hx of MI in 2006 with LAD and diagonal stent, HTN and permanent AF here for CV follow up.     Visit with Dr. Jesus   HPI83 yo WF with hx of MI in 2006 with LAD and diagonal stent, HTN and permanent AF. Also with CKD with last Cr 2.0. Still with SOB and leg edema. Very limited because of back issues. Denies CP and doesn't feel the atrial fib.     9/19/24  Pt has seen Dr. Jesus last year.   BP and HR stable today.   She had MI in 2006 - had throat pain/swallowing issues had 2 stents.     ECG - Afib,  V rate 67,     Benefits of Glp1 agonist like medication prescribed reviewed with patient including improvement of insulin resistance which is the underlying hormonal dysfunction causing and leading to diabetes. This improves hormonal balance and can lower blood sugars if patient sugars are elevated. The patient was explained how the medicine works. I also reviewed side effects of medication including loss of appetite , heartburn/GERD, constipation, diarrhea, nausea (usually low blood sugar if before or between meals ), vomiting bloating , headache and risk of cancer if patient has personal or family history of MEN syndrome.         Results for orders placed during the hospital encounter of 06/21/21    Echo Color Flow Doppler? Yes    Interpretation Summary  · The left ventricle is normal in size with concentric remodeling and normal systolic function. The estimated ejection fraction is 60%.  · Normal right ventricular size with normal right ventricular systolic function.  · Biatrial enlargement.  · Mild tricuspid regurgitation.  · The estimated PA systolic pressure is  42 mmHg.  · Normal central venous pressure (3 mmHg).  · Atrial fibrillation observed.      No results found for this or any previous visit.      No results found for this or any previous visit.      No cardiac monitor results found for the past 12 months         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    Hx of colonic polyps     1/9    Hx of colonic polyps     Hyperlipidemia     Hypertension     Myocardial infarction nov 2006    Persistent atrial fibrillation 4/25/2018    Posterior vitreous detachment of both eyes     Sleep apnea     Type 2 diabetes mellitus with mild nonproliferative diabetic retinopathy with macular edema 2/15/2013       Past Surgical History:   Procedure Laterality Date    APPENDECTOMY      CATARACT EXTRACTION      Right eye    EPIDURAL STEROID INJECTION N/A 11/02/2018    Procedure: Injection, Steroid, Epidural CAUDAL ELISA;  Surgeon: Ирина Peña MD;  Location: [facility];  Service: Pain Management;  Laterality: N/A;    ESOPHAGOGASTRODUODENOSCOPY N/A 03/31/2022    Procedure: EGD (ESOPHAGOGASTRODUODENOSCOPY);  Surgeon: Samm Billings MD;  Location: [facility];  Service: Endoscopy;  Laterality: N/A;    EYE SURGERY      FRACTURE SURGERY  2022    Broken femur    heart stent      X 2    HYSTERECTOMY      Fibroids    INTRAMEDULLARY RODDING OF FEMUR Left 03/26/2022    Procedure: INSERTION, INTRAMEDULLARY SARA, FEMUR;  Surgeon: Eduin  "ANTONINA Avelar MD;  Location: [facility] OR;  Service: Orthopedics;  Laterality: Left;    TONSILLECTOMY  1944       Social History     Tobacco Use    Smoking status: Never    Smokeless tobacco: Never   Substance Use Topics    Alcohol use: No    Drug use: No       Family History   Problem Relation Name Age of Onset    Diabetes Mother Evelia Castellano     Heart disease Mother Evelia Castellano     Hypertension Mother Evelia Castellano     Diabetes Father Drew Fernández     Melanoma Father Drew Fernández     Kidney failure Father Drew Fernández     Kidney disease Father Drew Fernández     Mental illness Sister Yuko Castellano         suicide/schizophrenic    Cancer Maternal Grandfather Law Cordero     Cancer Paternal Grandfather          colon    Psoriasis Neg Hx      Lupus Neg Hx      Eczema Neg Hx         Patient's Medications   New Prescriptions    NITROGLYCERIN (NITROSTAT) 0.4 MG SL TABLET    Place 1 tablet (0.4 mg total) under the tongue every 5 (five) minutes as needed for Chest pain.   Previous Medications    ACCU-CHEK MELIDA PLUS TEST STRP STRP    USE TO CHECK BLOOD SUGAR LEVELS FOUR TIMES DAILY    APIXABAN (ELIQUIS) 2.5 MG TAB    Take 1 tablet (2.5 mg total) by mouth 2 (two) times daily.    ASCORBIC ACID, VITAMIN C, (VITAMIN C) 100 MG TABLET    Take 100 mg by mouth once daily.    BD TIM 2ND GEN PEN NEEDLE 32 GAUGE X 5/32" NDLE    USE TO ADMINISTER MEDICATION AS PRESCRIBED FOUR TIMES DAILY    BLOOD-GLUCOSE METER KIT    Use as instructed    BLOOD-GLUCOSE METER,CONTINUOUS (FREESTYLE MAXIM 3 READER) MISC    1 Device by Misc.(Non-Drug; Combo Route) route once daily.    BLOOD-GLUCOSE SENSOR (FREESTYLE MAXIM 3 SENSOR) TASHA    Use to check glucose 4 times daily    CARVEDILOL (COREG) 12.5 MG TABLET    Take 1 tablet (12.5 mg total) by mouth 2 (two) times a day.    DICLOFENAC SODIUM (VOLTAREN) 1 % GEL    Apply 2 g topically 2 (two) times daily as needed (joint pain).    INSULIN ASPART U-100 (NOVOLOG) 100 UNIT/ML " (3 ML) INPN PEN    Inject 14 Units into the skin 3 (three) times daily with meals.    LANCETS MISC    1 each by Misc.(Non-Drug; Combo Route) route 4 (four) times daily.    MULTIVIT-MIN/IRON/FOLIC/LUTEIN (CENTRUM SILVER WOMEN ORAL)    Take by mouth once daily.     MUPIROCIN (BACTROBAN) 2 % OINTMENT    Apply topically 2 (two) times daily.    PANTOPRAZOLE (PROTONIX) 40 MG TABLET    Take 1 tablet (40 mg total) by mouth once daily.    ROSUVASTATIN (CRESTOR) 40 MG TAB    Take 1 tablet (40 mg total) by mouth once daily.    SOLIFENACIN (VESICARE) 5 MG TABLET    Take 1 tablet (5 mg total) by mouth once daily.    TRESIBA FLEXTOUCH U-100 100 UNIT/ML (3 ML) INSULIN PEN    ADMINISTER 32 UNITS UNDER THE SKIN EVERY DAY    VITAMIN B COMPLEX (B COMPLEX VITAMINS ORAL)    Take by mouth once daily.    VITAMIN D (VITAMIN D3) 1000 UNITS TAB    Take 1,000 Units by mouth once daily.    VITAMIN E, BULK, MISC    by Misc.(Non-Drug; Combo Route) route once daily.   Modified Medications    Modified Medication Previous Medication    FUROSEMIDE (LASIX) 40 MG TABLET furosemide (LASIX) 20 MG tablet       Take 1 tablet (40 mg total) by mouth once daily. Do not take if BP is below 110/70, or feeling dry    Take 1 tablet (20 mg total) by mouth 2 (two) times daily as needed (swelling).   Discontinued Medications    No medications on file       Review of Systems   Constitutional:  Positive for malaise/fatigue.   HENT: Negative.     Eyes: Negative.    Respiratory:  Positive for shortness of breath.    Cardiovascular:  Positive for chest pain, palpitations and leg swelling.   Gastrointestinal: Negative.    Genitourinary: Negative.    Musculoskeletal:  Positive for joint pain.   Skin: Negative.    Neurological: Negative.    Endo/Heme/Allergies: Negative.    Psychiatric/Behavioral: Negative.     All 12 systems otherwise negative.      Wt Readings from Last 3 Encounters:   09/19/24 97.5 kg (214 lb 15.2 oz)   05/15/24 97.5 kg (215 lb)   02/08/24 99.3 kg (219  "lb)     Temp Readings from Last 3 Encounters:   05/15/24 98.9 °F (37.2 °C) (Temporal)   11/20/23 97.9 °F (36.6 °C) (Temporal)   10/12/23 97.9 °F (36.6 °C)     BP Readings from Last 3 Encounters:   09/19/24 120/66   06/13/24 (!) 158/61   05/15/24 132/82     Pulse Readings from Last 3 Encounters:   09/19/24 (!) 47   06/13/24 60   05/15/24 65       /66 (BP Location: Left arm, Patient Position: Sitting, BP Method: Large (Manual))   Pulse (!) 47   Ht 5' 8" (1.727 m)   Wt 97.5 kg (214 lb 15.2 oz)   SpO2 96%   BMI 32.68 kg/m²     Objective:   Physical Exam  Vitals and nursing note reviewed.   Constitutional:       General: She is not in acute distress.     Appearance: She is well-developed. She is obese. She is not diaphoretic.   HENT:      Head: Normocephalic and atraumatic.      Nose: Nose normal.   Eyes:      General: No scleral icterus.     Conjunctiva/sclera: Conjunctivae normal.   Neck:      Thyroid: No thyromegaly.      Vascular: No JVD.   Cardiovascular:      Rate and Rhythm: Normal rate. Rhythm irregular.      Heart sounds: S1 normal and S2 normal. Murmur heard.      No friction rub. No gallop. No S3 or S4 sounds.   Pulmonary:      Effort: Pulmonary effort is normal. No respiratory distress.      Breath sounds: Normal breath sounds. No stridor. No wheezing or rales.   Chest:      Chest wall: No tenderness.   Abdominal:      General: Bowel sounds are normal. There is no distension.      Palpations: Abdomen is soft. There is no mass.      Tenderness: There is no abdominal tenderness. There is no rebound.   Genitourinary:     Comments: Deferred  Musculoskeletal:         General: No tenderness or deformity. Normal range of motion.      Cervical back: Normal range of motion and neck supple.      Right lower leg: Edema present.      Left lower leg: Edema present.   Lymphadenopathy:      Cervical: No cervical adenopathy.   Skin:     General: Skin is warm and dry.      Coloration: Skin is not pale.      " Findings: No erythema or rash.   Neurological:      Mental Status: She is alert and oriented to person, place, and time.      Motor: No abnormal muscle tone.      Coordination: Coordination normal.   Psychiatric:         Behavior: Behavior normal.         Thought Content: Thought content normal.         Judgment: Judgment normal.         Lab Results   Component Value Date     08/30/2024     02/08/2024    K 4 08/30/2024    K 4.0 02/08/2024     02/08/2024    CO2 24 08/30/2024    CO2 27 02/08/2024    BUN 36 (H) 08/30/2024    BUN 33 (H) 02/08/2024    CREATININE 1.68 (H) 08/30/2024    CREATININE 2.0 (H) 02/08/2024    GLU 93 02/08/2024    HGBA1C 6.6 (H) 02/08/2024    MG 2.3 12/01/2022    AST 12 08/30/2024    AST 17 12/01/2022    ALT 12 08/30/2024    ALT 13 12/01/2022    ALBUMIN 4.3 08/30/2024    ALBUMIN 3.8 04/05/2023    PROT 7 08/30/2024    PROT 7.3 12/01/2022    BILITOT 0.6 08/30/2024    BILITOT 0.6 12/01/2022    WBC 8.28 12/01/2022    HGB 12.0 12/01/2022    HCT 38.2 12/01/2022    MCV 91 12/01/2022     12/01/2022    INR 1.2 03/26/2022    INR 1.0 01/22/2007    TSH 3.213 03/08/2019    CHOL 119 (L) 03/17/2022    HDL 52 03/17/2022    LDLCALC 45.8 (L) 03/17/2022    TRIG 106 03/17/2022     (H) 03/14/2018         BNP (pg/mL)   Date Value   03/14/2018 188 (H)   03/20/2017 213 (H)   05/02/2016 431 (H)     Coumadin Monitoring INR (no units)   Date Value   01/22/2007 1.0   01/15/2007 1.9 (H)   01/03/2007 1.9 (H)     INR (no units)   Date Value   03/26/2022 1.2   05/15/2018 1.1   04/25/2018 1.1   03/22/2017 1.1          Assessment:      1. Permanent atrial fibrillation    2. Coronary artery disease of native artery of native heart with stable angina pectoris    3. Aortic atherosclerosis    4. Essential hypertension    5. Mixed hyperlipidemia    6. Chronic kidney disease, stage IV (severe)    7. Venous insufficiency    8. Chronic diastolic CHF (congestive heart failure)    9. Class 1 obesity with  alveolar hypoventilation, serious comorbidity, and body mass index (BMI) of 33.0 to 33.9 in adult    10. Lymphedema    11. Lower extremity edema        Plan:     CAD s/p PCI LAD/Diag 2006  - cont BB, statin, ELiquis  - has occ R sided chest pain; start PRN NTG   - order pharm nuclear stress test, pt cannot walk on treadmill  - order ECHO  - will discuss further workup as needed     2. HTN  - titrate meds    3. Afib, chronic  - cont rate control  - cont ELiquis 2.5 mg BID    4. HFPEF with CVI/Lymphedema   - cont lasix and monitor    5. Obesity  - cont weight loss    6. DM2 A1c 6.6  - cont tx and monitor  - start Ozempic 0.25 mg and increase as needed     7. HLD with aortic athero  - cont statin    Visit today included increased complexity associated with the care of the episodic problem angina addressed and managing the longitudinal care of the patient due to the serious and/or complex managed problem(s) .      Thank you for allowing me to participate in this patient's care. Please do not hesitate to contact me with any questions or concerns. Consult note has been forwarded to the referral physician.

## 2024-09-23 ENCOUNTER — TELEPHONE (OUTPATIENT)
Dept: FAMILY MEDICINE | Facility: CLINIC | Age: 84
End: 2024-09-23
Payer: MEDICARE

## 2024-09-23 ENCOUNTER — PATIENT MESSAGE (OUTPATIENT)
Dept: FAMILY MEDICINE | Facility: CLINIC | Age: 84
End: 2024-09-23
Payer: MEDICARE

## 2024-09-23 ENCOUNTER — OFFICE VISIT (OUTPATIENT)
Dept: FAMILY MEDICINE | Facility: CLINIC | Age: 84
End: 2024-09-23
Payer: MEDICARE

## 2024-09-23 VITALS
BODY MASS INDEX: 33.34 KG/M2 | SYSTOLIC BLOOD PRESSURE: 139 MMHG | HEIGHT: 68 IN | HEART RATE: 60 BPM | DIASTOLIC BLOOD PRESSURE: 69 MMHG | WEIGHT: 220 LBS | TEMPERATURE: 98 F

## 2024-09-23 DIAGNOSIS — E11.22 TYPE 2 DIABETES MELLITUS WITH STAGE 4 CHRONIC KIDNEY DISEASE, WITH LONG-TERM CURRENT USE OF INSULIN: Primary | ICD-10-CM

## 2024-09-23 DIAGNOSIS — I10 ESSENTIAL HYPERTENSION: Primary | Chronic | ICD-10-CM

## 2024-09-23 DIAGNOSIS — Z79.4 TYPE 2 DIABETES MELLITUS WITH STAGE 4 CHRONIC KIDNEY DISEASE, WITH LONG-TERM CURRENT USE OF INSULIN: ICD-10-CM

## 2024-09-23 DIAGNOSIS — N18.4 TYPE 2 DIABETES MELLITUS WITH STAGE 4 CHRONIC KIDNEY DISEASE, WITH LONG-TERM CURRENT USE OF INSULIN: Primary | ICD-10-CM

## 2024-09-23 DIAGNOSIS — N18.4 TYPE 2 DIABETES MELLITUS WITH STAGE 4 CHRONIC KIDNEY DISEASE, WITH LONG-TERM CURRENT USE OF INSULIN: ICD-10-CM

## 2024-09-23 DIAGNOSIS — N18.4 CHRONIC KIDNEY DISEASE, STAGE IV (SEVERE): Chronic | ICD-10-CM

## 2024-09-23 DIAGNOSIS — E11.22 TYPE 2 DIABETES MELLITUS WITH STAGE 4 CHRONIC KIDNEY DISEASE, WITH LONG-TERM CURRENT USE OF INSULIN: ICD-10-CM

## 2024-09-23 DIAGNOSIS — Z79.4 TYPE 2 DIABETES MELLITUS WITH STAGE 4 CHRONIC KIDNEY DISEASE, WITH LONG-TERM CURRENT USE OF INSULIN: Primary | ICD-10-CM

## 2024-09-23 PROCEDURE — 1101F PT FALLS ASSESS-DOCD LE1/YR: CPT | Mod: HCNC,CPTII,S$GLB, | Performed by: INTERNAL MEDICINE

## 2024-09-23 PROCEDURE — 99999 PR PBB SHADOW E&M-EST. PATIENT-LVL V: CPT | Mod: PBBFAC,HCNC,, | Performed by: INTERNAL MEDICINE

## 2024-09-23 PROCEDURE — 3288F FALL RISK ASSESSMENT DOCD: CPT | Mod: HCNC,CPTII,S$GLB, | Performed by: INTERNAL MEDICINE

## 2024-09-23 PROCEDURE — 1159F MED LIST DOCD IN RCRD: CPT | Mod: HCNC,CPTII,S$GLB, | Performed by: INTERNAL MEDICINE

## 2024-09-23 PROCEDURE — 1126F AMNT PAIN NOTED NONE PRSNT: CPT | Mod: HCNC,CPTII,S$GLB, | Performed by: INTERNAL MEDICINE

## 2024-09-23 PROCEDURE — 3078F DIAST BP <80 MM HG: CPT | Mod: HCNC,CPTII,S$GLB, | Performed by: INTERNAL MEDICINE

## 2024-09-23 PROCEDURE — 3075F SYST BP GE 130 - 139MM HG: CPT | Mod: HCNC,CPTII,S$GLB, | Performed by: INTERNAL MEDICINE

## 2024-09-23 PROCEDURE — 99214 OFFICE O/P EST MOD 30 MIN: CPT | Mod: HCNC,S$GLB,, | Performed by: INTERNAL MEDICINE

## 2024-09-23 RX ORDER — AMLODIPINE BESYLATE 5 MG/1
5 TABLET ORAL DAILY
Qty: 90 TABLET | Refills: 3 | Status: SHIPPED | OUTPATIENT
Start: 2024-09-23

## 2024-09-23 RX ORDER — AMLODIPINE BESYLATE 5 MG/1
5 TABLET ORAL DAILY
COMMUNITY
End: 2024-09-23 | Stop reason: SDUPTHER

## 2024-09-24 RX ORDER — TIRZEPATIDE 2.5 MG/.5ML
2.5 INJECTION, SOLUTION SUBCUTANEOUS
Qty: 4 PEN | Refills: 1 | Status: SHIPPED | OUTPATIENT
Start: 2024-09-24

## 2024-09-24 NOTE — TELEPHONE ENCOUNTER
I have signed for the following orders AND/OR meds.  Please call the patient and ask the patient to schedule the testing AND/OR inform about any medications that were sent. Medications have been sent to pharmacy listed below      No orders of the defined types were placed in this encounter.      Medications Ordered This Encounter   Medications    tirzepatide (MOUNJARO) 2.5 mg/0.5 mL PnIj     Sig: Inject 2.5 mg into the skin every 7 days.     Dispense:  4 Pen     Refill:  1         [id number] - [place] - [address] AT [facility] OF [facility] [address] [address]  Phone: [phone] Fax: [phone]    [facility] #[id number] - [place], [place] - [address] [address] [facility] [address]  [address]  [address] [facility] [address]  Phone: [phone] Fax: [phone]    [facility]/pharmacy #[id number] - [place] - [address] [address]  Phone: [phone] Fax: [phone]

## 2024-09-29 DIAGNOSIS — I10 ESSENTIAL HYPERTENSION: Chronic | ICD-10-CM

## 2024-09-30 RX ORDER — CARVEDILOL 12.5 MG/1
12.5 TABLET ORAL 2 TIMES DAILY
Qty: 180 TABLET | Refills: 3 | Status: SHIPPED | OUTPATIENT
Start: 2024-09-30

## 2024-10-07 ENCOUNTER — PATIENT MESSAGE (OUTPATIENT)
Dept: RESEARCH | Facility: HOSPITAL | Age: 84
End: 2024-10-07
Payer: MEDICARE

## 2024-10-14 ENCOUNTER — PATIENT MESSAGE (OUTPATIENT)
Dept: CARDIOLOGY | Facility: HOSPITAL | Age: 84
End: 2024-10-14
Payer: MEDICARE

## 2024-10-15 ENCOUNTER — PATIENT MESSAGE (OUTPATIENT)
Dept: CARDIOLOGY | Facility: HOSPITAL | Age: 84
End: 2024-10-15
Payer: MEDICARE

## 2024-10-15 ENCOUNTER — PATIENT MESSAGE (OUTPATIENT)
Dept: FAMILY MEDICINE | Facility: CLINIC | Age: 84
End: 2024-10-15
Payer: MEDICARE

## 2024-10-16 ENCOUNTER — PATIENT MESSAGE (OUTPATIENT)
Dept: CARDIOLOGY | Facility: HOSPITAL | Age: 84
End: 2024-10-16
Payer: MEDICARE

## 2024-10-17 DIAGNOSIS — K92.0 COFFEE GROUND EMESIS: ICD-10-CM

## 2024-10-17 RX ORDER — PANTOPRAZOLE SODIUM 40 MG/1
40 TABLET, DELAYED RELEASE ORAL
Qty: 90 TABLET | Refills: 3 | Status: SHIPPED | OUTPATIENT
Start: 2024-10-17

## 2024-10-17 NOTE — TELEPHONE ENCOUNTER
Refill Decision Note   Radha Degroot  is requesting a refill authorization.  Brief Assessment and Rationale for Refill:  Approve     Medication Therapy Plan:         Comments:     Note composed:4:42 PM 10/17/2024

## 2024-10-17 NOTE — TELEPHONE ENCOUNTER
----- Message from [employer] sent at 10/17/2024  2:24 PM CDT -----  Contact: [phone]  Would like to receive medical advice.    Pt calling to follow up on message that was sent through the portal. Pt stated that she having issues with her portal.    Would they like a call back or a response via [facility]:  call    Additional information:  Please call to advise.

## 2024-10-17 NOTE — TELEPHONE ENCOUNTER
No care due was identified.  [facility] Embedded Care Due Messages. Reference number: [id number].   10/17/2024 9:06:28 AM CDT

## 2024-10-18 ENCOUNTER — OFFICE VISIT (OUTPATIENT)
Dept: PRIMARY CARE CLINIC | Facility: CLINIC | Age: 84
End: 2024-10-18
Payer: MEDICARE

## 2024-10-18 DIAGNOSIS — I89.0 LYMPHEDEMA: ICD-10-CM

## 2024-10-18 DIAGNOSIS — L03.90 CELLULITIS, UNSPECIFIED CELLULITIS SITE: Primary | ICD-10-CM

## 2024-10-18 RX ORDER — DOXYCYCLINE 100 MG/1
100 CAPSULE ORAL 2 TIMES DAILY
Qty: 14 CAPSULE | Refills: 0 | Status: SHIPPED | OUTPATIENT
Start: 2024-10-18 | End: 2024-10-25

## 2024-10-18 NOTE — PROGRESS NOTES
"Primary Care Telemedicine Note  The patient location is:  Patient Home   The chief complaint leading to consultation is: leg swelling and redness  Total time spent with patient: 15 minutes     Visit type: Virtual visit with synchronous audio and video  Each patient to whom he or she provides medical services by telemedicine is:  (1) informed of the relationship between the physician and patient and the respective role of any other health care provider with respect to management of the patient; and (2) notified that he or she may decline to receive medical services by telemedicine and may withdraw from such care at any time.      Assessment/Plan:    Problem List Items Addressed This Visit       Lymphedema    Overview     -condition previously doing much better with lymphedema therapy but this was stopped  -now having worsening LE swelling with concern for developing cellulitis  -plan to start PO abx for possible SSTI  -referral to home health to resume lymphedema treatment         Relevant Orders    Ambulatory referral/consult to Home Health     Other Visit Diagnoses       Cellulitis, unspecified cellulitis site    -  Primary    Relevant Medications    doxycycline (MONODOX) 100 MG capsule    Other Relevant Orders    Ambulatory referral/consult to Home Health            Follow up if symptoms worsen or fail to improve.    Rosalie Merida, TATYANA    _____________________________________________________________________________________________________________________________________________________      History of Present Illness    CHIEF COMPLAINT:  Ms. Degroot presents today for follow up on leg swelling and redness.    LYMPHEDEMA AND CELLULITIS:  She reports worsening bilateral leg swelling and redness, with one leg more severely affected. The condition has significantly deteriorated since initial photographs were taken on Tuesday, with both legs now appearing "pretty red." She notes the development of blisters and " "expresses concern about potential progression to open wounds. She has a history of lymphedema with intermittent flare-ups. She recalls a previous episode of cellulitis treated effectively with doxycycline prescribed by Dr. Holguin.    PREVIOUS TREATMENTS:  She reports receiving home health care twice in the past, with the last visit in August. One therapist was particularly knowledgeable about lymphedema management. She has compression stockings but reports difficulty using them due to arthritis in her hands, describing the process as "very painful" and "a nightmare."      Past Medical History:  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    Hx of colonic polyps     1/9    Hx of colonic polyps     Hyperlipidemia     Hypertension     Myocardial infarction nov 2006    Persistent atrial fibrillation 4/25/2018    Posterior vitreous detachment of both eyes     Sleep apnea     Type 2 diabetes mellitus with mild nonproliferative diabetic retinopathy with macular edema 2/15/2013     Past Surgical History:   Procedure Laterality Date    APPENDECTOMY      CATARACT EXTRACTION      Right eye    EPIDURAL STEROID INJECTION N/A 11/02/2018    Procedure: Injection, Steroid, Epidural CAUDAL ELISA;  Surgeon: Ирина Peña MD;  Location: [facility];  Service: Pain Management;  Laterality: N/A;    ESOPHAGOGASTRODUODENOSCOPY N/A 03/31/2022    Procedure: EGD " "(ESOPHAGOGASTRODUODENOSCOPY);  Surgeon: Samm Billings MD;  Location: [facility] ENDO;  Service: Endoscopy;  Laterality: N/A;    EYE SURGERY      FRACTURE SURGERY  2022    Broken femur    heart stent      X 2    HYSTERECTOMY      Fibroids    INTRAMEDULLARY RODDING OF FEMUR Left 03/26/2022    Procedure: INSERTION, INTRAMEDULLARY SARA, FEMUR;  Surgeon: Eduin Avelar MD;  Location: [facility] OR;  Service: Orthopedics;  Laterality: Left;    TONSILLECTOMY  1944     Review of patient's allergies indicates:   Allergen Reactions    Heparin analogues Other (See Comments)     thrombocytopenia    Ancef [cefazolin]     Keflex [cephalexin] Rash    Heparin     Oxybutynin Other (See Comments)     Metallic taste     Social History     Tobacco Use    Smoking status: Never    Smokeless tobacco: Never   Substance Use Topics    Alcohol use: No    Drug use: No     Family History   Problem Relation Name Age of Onset    Diabetes Mother Evelia Castellano     Heart disease Mother Evelia Castellano     Hypertension Mother Evelia Castellano     Diabetes Father Drew Fernández     Melanoma Father Drew Fernández     Kidney failure Father Drew Fernández     Kidney disease Father Drew Fernández     Mental illness Sister Yuko Castellano         suicide/schizophrenic    Cancer Maternal Grandfather Law Cordero     Cancer Paternal Grandfather          colon    Psoriasis Neg Hx      Lupus Neg Hx      Eczema Neg Hx       Current Outpatient Medications on File Prior to Visit   Medication Sig Dispense Refill    ACCU-CHEK MELIDA PLUS TEST STRP Strp USE TO CHECK BLOOD SUGAR LEVELS FOUR TIMES DAILY 400 strip 3    amLODIPine (NORVASC) 5 MG tablet Take 1 tablet (5 mg total) by mouth once daily. 90 tablet 3    apixaban (ELIQUIS) 2.5 mg Tab Take 1 tablet (2.5 mg total) by mouth 2 (two) times daily. 180 tablet 3    ascorbic acid, vitamin C, (VITAMIN C) 100 MG tablet Take 100 mg by mouth once daily.      BD TIM 2ND GEN PEN NEEDLE 32 gauge x 5/32" Ndle USE " TO ADMINISTER MEDICATION AS PRESCRIBED FOUR TIMES DAILY 400 each 3    blood-glucose meter kit Use as instructed 1 each 0    blood-glucose meter,continuous (FREESTYLE MAXIM 3 READER) Misc 1 Device by Misc.(Non-Drug; Combo Route) route once daily. 1 each 0    blood-glucose sensor (FREESTYLE MAXIM 3 SENSOR) Shani Use to check glucose 4 times daily 3 each 11    carvediloL (COREG) 12.5 MG tablet TAKE 1 TABLET(12.5 MG) BY MOUTH TWICE DAILY 180 tablet 3    diclofenac sodium (VOLTAREN) 1 % Gel Apply 2 g topically 2 (two) times daily as needed (joint pain). 150 g 1    furosemide (LASIX) 40 MG tablet Take 1 tablet (40 mg total) by mouth once daily. Do not take if BP is below 110/70, or feeling dry 90 tablet 1    insulin aspart U-100 (NOVOLOG) 100 unit/mL (3 mL) InPn pen Inject 14 Units into the skin 3 (three) times daily with meals. 45 mL 1    lancets Misc 1 each by Misc.(Non-Drug; Combo Route) route 4 (four) times daily. 200 each 11    MULTIVIT-MIN/IRON/FOLIC/LUTEIN (CENTRUM SILVER WOMEN ORAL) Take by mouth once daily.       mupirocin (BACTROBAN) 2 % ointment Apply topically 2 (two) times daily. (Patient not taking: Reported on 9/19/2024) 30 g 2    nitroGLYCERIN (NITROSTAT) 0.4 MG SL tablet Place 1 tablet (0.4 mg total) under the tongue every 5 (five) minutes as needed for Chest pain. 30 tablet 0    pantoprazole (PROTONIX) 40 MG tablet TAKE 1 TABLET(40 MG) BY MOUTH EVERY DAY 90 tablet 3    rosuvastatin (CRESTOR) 40 MG Tab Take 1 tablet (40 mg total) by mouth once daily. 90 tablet 3    tirzepatide (MOUNJARO) 2.5 mg/0.5 mL PnIj Inject 2.5 mg into the skin every 7 days. 4 Pen 1    TRESIBA FLEXTOUCH U-100 100 unit/mL (3 mL) insulin pen ADMINISTER 32 UNITS UNDER THE SKIN EVERY DAY 30 mL 0    vitamin B complex (B COMPLEX VITAMINS ORAL) Take by mouth once daily.      vitamin D (VITAMIN D3) 1000 units Tab Take 1,000 Units by mouth once daily.      VITAMIN E, BULK, MISC by Misc.(Non-Drug; Combo Route) route once daily.       No  current facility-administered medications on file prior to visit.       Review of Systems   Constitutional:  Negative for fever.   HENT:  Negative for congestion and sore throat.    Eyes:  Negative for pain.   Respiratory:  Negative for cough and shortness of breath.    Cardiovascular:  Positive for leg swelling.   Gastrointestinal:  Negative for diarrhea and vomiting.   Musculoskeletal:  Negative for joint pain.   Skin:  Negative for rash.         Physical Exam   @thptenteredbppulse@  @thptenteredglucose@    Physical Exam  Vitals and nursing note reviewed.   Constitutional:       Appearance: She is well-developed.   HENT:      Head: Normocephalic and atraumatic.   Pulmonary:      Effort: Pulmonary effort is normal. No respiratory distress.   Musculoskeletal:      Cervical back: Normal range of motion.      Right lower leg: Edema present.      Left lower leg: Edema present.   Skin:     Findings: Erythema (bilateral LEs) present.   Neurological:      Mental Status: She is alert and oriented to person, place, and time.   Psychiatric:         Behavior: Behavior normal.         Thought Content: Thought content normal.         Judgment: Judgment normal.         This note was generated with the assistance of ambient listening technology. Verbal consent was obtained by the patient and accompanying visitor(s) for the recording of patient appointment to facilitate this note. I attest to having reviewed and edited the generated note for accuracy, though some syntax or spelling errors may persist. Please contact the author of this note for any clarification.       The patient's Health Maintenance was reviewed and the following appears to be due at this time:  Health Maintenance Due   Topic Date Due    Shingles Vaccine (1 of 2) Never done    RSV Vaccine (Age 60+ and Pregnant patients) (1 - 1-dose 75+ series) Never done    TETANUS VACCINE  10/03/2015    Hemoglobin A1c  08/08/2024    Influenza Vaccine (1) 09/01/2024    COVID-19  Vaccine (4 - 2024-25 season) 09/01/2024

## 2024-10-18 NOTE — PROGRESS NOTES
Assessment/Plan:    Problem List Items Addressed This Visit          Cardiac/Vascular    Essential hypertension - Primary (Chronic)    Overview     Hypertension Medications               amLODIPine (NORVASC) 5 MG tablet Take 1 tablet (5 mg total) by mouth once daily.    carvediloL (COREG) 12.5 MG tablet TAKE 1 TABLET(12.5 MG) BY MOUTH TWICE DAILY    furosemide (LASIX) 40 MG tablet Take 1 tablet (40 mg total) by mouth once daily. Do not take if BP is below 110/70, or feeling dry    nitroGLYCERIN (NITROSTAT) 0.4 MG SL tablet Place 1 tablet (0.4 mg total) under the tongue every 5 (five) minutes as needed for Chest pain.     -at goal today  -continue lifestyle modification with low sodium diet and exercise   -discussed hypertension disease course and importance of treating high blood pressure  -patient understood and advised of risk of untreated blood pressure.  ER precautions were given   for symptoms of hypertensive urgency and emergency.          Relevant Medications    amLODIPine (NORVASC) 5 MG tablet       Renal/    Chronic kidney disease, stage IV (severe) (Chronic)    Overview     -followed by nephrology  -labs remain stable but due for follow up nephrology appt; will reach out to their staff to assist in scheduling  -renally dose medication  -avoid nephrotoxic agents, including NSAIDs            Endocrine    Type 2 diabetes mellitus with stage 4 chronic kidney disease, with long-term current use of insulin    Overview     Diabetes Medications               insulin aspart U-100 (NOVOLOG) 100 unit/mL (3 mL) InPn pen Inject 14 units three times daily before meals    insulin degludec (TRESIBA FLEXTOUCH U-100) 100 unit/mL (3 mL) insulin pen INJECT 32 UNITS UNDER THE SKIN DAILY        -condition is currently controlled   -followed by diabetes clinic  -attempting to start on GLP1a to assist with weight loss  -see diabetic health maintenance listed below  -on statin: Yes  -on ACE-I/ARB: No  -counseling provided on  "importance of diabetic diet and medication compliance in order to treat diabetes  -discussed diabetes disease course and potential            Jana Holguin MD  _____________________________________________________________________________________________________________________________________________________    CC: follow up of chronic medical conditions     Patient is in clinic today as an established patient.    History of Present Illness  The patient presents for evaluation of multiple medical concerns.    She reports an improvement in her blood pressure, attributing it to the use of amlodipine 5 mg, which was prescribed during a recent emergency room visit. She is seeking a refill of this medication. Additionally, she mentions that her cardiologist has recommended a nuclear stress test.    She expresses concern about weight gain, which she attributes to her sedentary lifestyle due to wheelchair use. Her cardiologist has suggested the use of Ozempic, but she is hesitant due to its high cost.     No other new complaints today.  Remaining chronic conditions have been reviewed and remain stable. Further detail as stated above.    Current Outpatient Medications on File Prior to Visit   Medication Sig Dispense Refill    ACCU-CHEK MELIDA PLUS TEST STRP Strp USE TO CHECK BLOOD SUGAR LEVELS FOUR TIMES DAILY 400 strip 3    apixaban (ELIQUIS) 2.5 mg Tab Take 1 tablet (2.5 mg total) by mouth 2 (two) times daily. 180 tablet 3    ascorbic acid, vitamin C, (VITAMIN C) 100 MG tablet Take 100 mg by mouth once daily.      BD TIM 2ND GEN PEN NEEDLE 32 gauge x 5/32" Ndle USE TO ADMINISTER MEDICATION AS PRESCRIBED FOUR TIMES DAILY 400 each 3    blood-glucose meter kit Use as instructed 1 each 0    blood-glucose meter,continuous (FREESTYLE MAXIM 3 READER) Misc 1 Device by Misc.(Non-Drug; Combo Route) route once daily. 1 each 0    blood-glucose sensor (FREESTYLE MAXIM 3 SENSOR) Shani Use to check glucose 4 times daily 3 each " 11    diclofenac sodium (VOLTAREN) 1 % Gel Apply 2 g topically 2 (two) times daily as needed (joint pain). 150 g 1    furosemide (LASIX) 40 MG tablet Take 1 tablet (40 mg total) by mouth once daily. Do not take if BP is below 110/70, or feeling dry 90 tablet 1    insulin aspart U-100 (NOVOLOG) 100 unit/mL (3 mL) InPn pen Inject 14 Units into the skin 3 (three) times daily with meals. 45 mL 1    lancets Misc 1 each by Misc.(Non-Drug; Combo Route) route 4 (four) times daily. 200 each 11    MULTIVIT-MIN/IRON/FOLIC/LUTEIN (CENTRUM SILVER WOMEN ORAL) Take by mouth once daily.       nitroGLYCERIN (NITROSTAT) 0.4 MG SL tablet Place 1 tablet (0.4 mg total) under the tongue every 5 (five) minutes as needed for Chest pain. 30 tablet 0    rosuvastatin (CRESTOR) 40 MG Tab Take 1 tablet (40 mg total) by mouth once daily. 90 tablet 3    TRESIBA FLEXTOUCH U-100 100 unit/mL (3 mL) insulin pen ADMINISTER 32 UNITS UNDER THE SKIN EVERY DAY 30 mL 0    vitamin B complex (B COMPLEX VITAMINS ORAL) Take by mouth once daily.      vitamin D (VITAMIN D3) 1000 units Tab Take 1,000 Units by mouth once daily.      VITAMIN E, BULK, MISC by Misc.(Non-Drug; Combo Route) route once daily.      mupirocin (BACTROBAN) 2 % ointment Apply topically 2 (two) times daily. (Patient not taking: Reported on 9/19/2024) 30 g 2     No current facility-administered medications on file prior to visit.       Review of Systems   Constitutional:  Negative for chills, diaphoresis, fatigue and fever.   HENT:  Negative for congestion, ear pain, postnasal drip, sinus pain and sore throat.    Eyes:  Negative for pain and redness.   Respiratory:  Negative for cough, chest tightness and shortness of breath.    Cardiovascular:  Negative for chest pain and leg swelling.   Gastrointestinal:  Negative for abdominal pain, constipation, diarrhea, nausea and vomiting.   Genitourinary:  Negative for dysuria and hematuria.   Musculoskeletal:  Negative for arthralgias and  "joint swelling.   Skin:  Negative for rash.   Neurological:  Negative for dizziness, syncope and headaches.   Psychiatric/Behavioral:  Negative for dysphoric mood. The patient is not nervous/anxious.      Vitals:    09/23/24 1328   BP: 139/69   Pulse: 60   Temp: 98.4 °F (36.9 °C)   Weight: 99.8 kg (220 lb)   Height: 5' 8" (1.727 m)       Wt Readings from Last 3 Encounters:   09/23/24 99.8 kg (220 lb)   09/19/24 97.5 kg (214 lb 15.2 oz)   05/15/24 97.5 kg (215 lb)       Physical Exam  Constitutional:       General: She is not in acute distress.     Appearance: Normal appearance. She is well-developed. She is obese.   HENT:      Head: Normocephalic and atraumatic.   Eyes:      Conjunctiva/sclera: Conjunctivae normal.   Cardiovascular:      Rate and Rhythm: Normal rate and regular rhythm.      Pulses: Normal pulses.      Heart sounds: Normal heart sounds. No murmur heard.  Pulmonary:      Effort: Pulmonary effort is normal. No respiratory distress.      Breath sounds: Normal breath sounds.   Abdominal:      General: Bowel sounds are normal. There is no distension.      Palpations: Abdomen is soft.      Tenderness: There is no abdominal tenderness.   Musculoskeletal:         General: Normal range of motion.      Cervical back: Normal range of motion and neck supple.      Right lower leg: Edema present.      Left lower leg: Edema present.   Skin:     General: Skin is warm and dry.      Findings: No rash.   Neurological:      General: No focal deficit present.      Mental Status: She is alert and oriented to person, place, and time.   Psychiatric:         Mood and Affect: Mood normal.         Behavior: Behavior normal.     DISCLAIMER: This note was compiled by using a speech recognition dictation system and therefore please be aware that typographical / speech recognition errors can and do occur.  Please contact me if you see any errors specifically.  Consent was obtained for JUDY recording system prior to the visit.  "

## 2024-10-24 ENCOUNTER — OFFICE VISIT (OUTPATIENT)
Dept: DIABETES | Facility: CLINIC | Age: 84
End: 2024-10-24
Payer: MEDICARE

## 2024-10-24 ENCOUNTER — LAB VISIT (OUTPATIENT)
Dept: LAB | Facility: HOSPITAL | Age: 84
End: 2024-10-24
Payer: MEDICARE

## 2024-10-24 VITALS
BODY MASS INDEX: 33.34 KG/M2 | DIASTOLIC BLOOD PRESSURE: 74 MMHG | HEIGHT: 68 IN | WEIGHT: 220 LBS | HEART RATE: 67 BPM | SYSTOLIC BLOOD PRESSURE: 140 MMHG

## 2024-10-24 DIAGNOSIS — E11.22 TYPE 2 DIABETES MELLITUS WITH STAGE 4 CHRONIC KIDNEY DISEASE, WITH LONG-TERM CURRENT USE OF INSULIN: Primary | ICD-10-CM

## 2024-10-24 DIAGNOSIS — N18.4 TYPE 2 DIABETES MELLITUS WITH STAGE 4 CHRONIC KIDNEY DISEASE, WITH LONG-TERM CURRENT USE OF INSULIN: ICD-10-CM

## 2024-10-24 DIAGNOSIS — N18.4 TYPE 2 DIABETES MELLITUS WITH STAGE 4 CHRONIC KIDNEY DISEASE, WITH LONG-TERM CURRENT USE OF INSULIN: Primary | ICD-10-CM

## 2024-10-24 DIAGNOSIS — E11.22 TYPE 2 DIABETES MELLITUS WITH STAGE 4 CHRONIC KIDNEY DISEASE, WITH LONG-TERM CURRENT USE OF INSULIN: ICD-10-CM

## 2024-10-24 DIAGNOSIS — Z79.4 TYPE 2 DIABETES MELLITUS WITH STAGE 4 CHRONIC KIDNEY DISEASE, WITH LONG-TERM CURRENT USE OF INSULIN: Primary | ICD-10-CM

## 2024-10-24 DIAGNOSIS — Z79.4 TYPE 2 DIABETES MELLITUS WITH STAGE 4 CHRONIC KIDNEY DISEASE, WITH LONG-TERM CURRENT USE OF INSULIN: ICD-10-CM

## 2024-10-24 LAB — GLUCOSE SERPL-MCNC: 160 MG/DL (ref 70–110)

## 2024-10-24 PROCEDURE — G2211 COMPLEX E/M VISIT ADD ON: HCPCS | Mod: HCNC,S$GLB,, | Performed by: NURSE PRACTITIONER

## 2024-10-24 PROCEDURE — 3077F SYST BP >= 140 MM HG: CPT | Mod: HCNC,CPTII,S$GLB, | Performed by: NURSE PRACTITIONER

## 2024-10-24 PROCEDURE — 1126F AMNT PAIN NOTED NONE PRSNT: CPT | Mod: HCNC,CPTII,S$GLB, | Performed by: NURSE PRACTITIONER

## 2024-10-24 PROCEDURE — 36415 COLL VENOUS BLD VENIPUNCTURE: CPT | Mod: HCNC,PO | Performed by: NURSE PRACTITIONER

## 2024-10-24 PROCEDURE — 99214 OFFICE O/P EST MOD 30 MIN: CPT | Mod: HCNC,S$GLB,, | Performed by: NURSE PRACTITIONER

## 2024-10-24 PROCEDURE — 82962 GLUCOSE BLOOD TEST: CPT | Mod: HCNC,S$GLB,, | Performed by: NURSE PRACTITIONER

## 2024-10-24 PROCEDURE — 3288F FALL RISK ASSESSMENT DOCD: CPT | Mod: HCNC,CPTII,S$GLB, | Performed by: NURSE PRACTITIONER

## 2024-10-24 PROCEDURE — 83036 HEMOGLOBIN GLYCOSYLATED A1C: CPT | Mod: HCNC | Performed by: NURSE PRACTITIONER

## 2024-10-24 PROCEDURE — 3078F DIAST BP <80 MM HG: CPT | Mod: HCNC,CPTII,S$GLB, | Performed by: NURSE PRACTITIONER

## 2024-10-24 PROCEDURE — 1159F MED LIST DOCD IN RCRD: CPT | Mod: HCNC,CPTII,S$GLB, | Performed by: NURSE PRACTITIONER

## 2024-10-24 PROCEDURE — 1101F PT FALLS ASSESS-DOCD LE1/YR: CPT | Mod: HCNC,CPTII,S$GLB, | Performed by: NURSE PRACTITIONER

## 2024-10-24 PROCEDURE — 99999 PR PBB SHADOW E&M-EST. PATIENT-LVL IV: CPT | Mod: PBBFAC,HCNC,, | Performed by: NURSE PRACTITIONER

## 2024-10-24 RX ORDER — HYDROCHLOROTHIAZIDE 12.5 MG/1
1 CAPSULE ORAL
Qty: 2 EACH | Refills: 11 | Status: SHIPPED | OUTPATIENT
Start: 2024-10-24 | End: 2024-10-30

## 2024-10-24 RX ORDER — INSULIN DEGLUDEC 100 U/ML
28 INJECTION, SOLUTION SUBCUTANEOUS DAILY
Qty: 25.2 ML | Refills: 3 | Status: SHIPPED | OUTPATIENT
Start: 2024-10-24 | End: 2025-10-24

## 2024-10-25 ENCOUNTER — TELEPHONE (OUTPATIENT)
Dept: DIABETES | Facility: CLINIC | Age: 84
End: 2024-10-25
Payer: MEDICARE

## 2024-10-25 LAB
ESTIMATED AVG GLUCOSE: 160 MG/DL (ref 68–131)
HBA1C MFR BLD: 7.2 % (ref 4–5.6)

## 2024-10-25 NOTE — TELEPHONE ENCOUNTER
----- Message from Mercedes sent at 10/25/2024  1:36 PM CDT -----  Contact: pt  Type:  Request a callback      Name of Caller: pt  Best Call Back Number: [phone]  Additional Information:  pt is calling to advise she cannot use the chilo b/c she don't have the sensor, she threw it away.

## 2024-10-25 NOTE — TELEPHONE ENCOUNTER
Reached out to pt, pt states that she threw away the reader while getting frustrated because she didn't know how to work it. Pt also states that she never got the training on how to use the Tiarra. Pt is inquiring on how to receive a new reader if possible.   pt currently in pain, spoke mostly with pts family member

## 2024-10-30 ENCOUNTER — TELEPHONE (OUTPATIENT)
Dept: DIABETES | Facility: CLINIC | Age: 84
End: 2024-10-30
Payer: MEDICARE

## 2024-10-30 DIAGNOSIS — E11.22 TYPE 2 DIABETES MELLITUS WITH STAGE 4 CHRONIC KIDNEY DISEASE, WITH LONG-TERM CURRENT USE OF INSULIN: ICD-10-CM

## 2024-10-30 DIAGNOSIS — N18.4 TYPE 2 DIABETES MELLITUS WITH STAGE 4 CHRONIC KIDNEY DISEASE, WITH LONG-TERM CURRENT USE OF INSULIN: ICD-10-CM

## 2024-10-30 DIAGNOSIS — Z79.4 TYPE 2 DIABETES MELLITUS WITH STAGE 4 CHRONIC KIDNEY DISEASE, WITH LONG-TERM CURRENT USE OF INSULIN: ICD-10-CM

## 2024-10-30 RX ORDER — HYDROCHLOROTHIAZIDE 12.5 MG/1
1 CAPSULE ORAL
Qty: 2 EACH | Refills: 11 | Status: SHIPPED | OUTPATIENT
Start: 2024-10-30 | End: 2025-10-30

## 2024-11-11 NOTE — TELEPHONE ENCOUNTER
Discussed with patient on the phone regarding diabetes instead of an in-person visit d/t COVID19 risk. Reviewed importance of DM control to avoid severe COVID-19 infection and death.    States she was sick a month ago and BGs mitesh high but BGs are now back to baseline. BG yesterday was 124. Tests BG 4x/day and most BGs are in the 100s. Denies BG < 80.   Advised pt to continue treatment and rtc in 3 mo with labs prior. She voiced understanding.     
No

## 2024-11-22 DIAGNOSIS — N18.4 DIABETES MELLITUS WITH STAGE 4 CHRONIC KIDNEY DISEASE: ICD-10-CM

## 2024-11-22 DIAGNOSIS — E11.22 DIABETES MELLITUS WITH STAGE 4 CHRONIC KIDNEY DISEASE: ICD-10-CM

## 2024-11-22 NOTE — TELEPHONE ENCOUNTER
Care Due:                  Date            Visit Type   Department     Provider  --------------------------------------------------------------------------------                                EP -                              PRIMARY      [facility] FAMILY  Last Visit: 09-      CARE ([facility])   MEDICINE       Jana Holguin  Next Visit: None Scheduled  None         None Found                                                            Last  Test          Frequency    Reason                     Performed    Due Date  --------------------------------------------------------------------------------    CBC.........  12 months..  diclofenac...............  12- 11-    Cr..........  12 months..  diclofenac, insulin......  02- 02-    Health [facility] Embedded Care Due Messages. Reference number: [id number].   11/22/2024 1:27:56 PM CST

## 2024-11-22 NOTE — TELEPHONE ENCOUNTER
----- Message from German sent at 11/22/2024  1:20 PM CST -----  Contact: Radha  Type:  RX Refill Request    Who Called: Patient   Refill or New Rx:Refill   RX Name and Strength:insulin aspart U-100 (NOVOLOG) 100 unit/mL (3 mL) InPn pen  How is the patient currently taking it? (ex. 1XDay):As Prescribed  Is this a 30 day or 90 day RX:  Preferred Pharmacy with phone number:  [id number] - [facility] [address]  AT [facility] [address]  [address] [facility] [address]  Phone: [phone] Fax: [phone]  Local or Mail Order:Local   Ordering Provider:   Would the patient rather a call back or a response via [facility]? Call Back   Best Call Back Number:[phone]  Additional Information: She is asking for a call back after it is sent to Pharmacy

## 2024-11-23 NOTE — TELEPHONE ENCOUNTER
Refill Routing Note   Medication(s) are not appropriate for processing by [facility] for the following reason(s):        Required labs abnormal    [facility] action(s):  Defer     Requires labs : Yes             Appointments  past 12m or future 3m with PCP    Date Provider   Last Visit   9/23/2024 Jana Holguin MD   Next Visit   Visit date not found Jana Holguin MD   ED visits in past 90 days: 0        Note composed:9:34 PM 11/22/2024

## 2024-11-25 DIAGNOSIS — E11.22 DIABETES MELLITUS WITH STAGE 4 CHRONIC KIDNEY DISEASE: ICD-10-CM

## 2024-11-25 DIAGNOSIS — N18.4 DIABETES MELLITUS WITH STAGE 4 CHRONIC KIDNEY DISEASE: ICD-10-CM

## 2024-11-25 RX ORDER — INSULIN ASPART 100 [IU]/ML
14 INJECTION, SOLUTION INTRAVENOUS; SUBCUTANEOUS
Qty: 45 ML | Refills: 0 | Status: SHIPPED | OUTPATIENT
Start: 2024-11-25 | End: 2024-11-26 | Stop reason: SDUPTHER

## 2024-11-25 RX ORDER — INSULIN ASPART 100 [IU]/ML
INJECTION, SOLUTION INTRAVENOUS; SUBCUTANEOUS
Qty: 45 ML | Refills: 1 | OUTPATIENT
Start: 2024-11-25

## 2024-11-25 NOTE — TELEPHONE ENCOUNTER
No care due was identified.  Health [facility] Embedded Care Due Messages. Reference number: [id number].   11/25/2024 11:20:28 AM CST

## 2024-11-25 NOTE — TELEPHONE ENCOUNTER
----- Message from Shameka sent at 11/25/2024 11:25 AM CST -----  Contact: Radha    .Type:  RX Refill Request    Who Called: Radha   Refill or New Rx:refill   RX Name and Strength:insulin aspart U-100 (NOVOLOG) 100 unit/mL (3 mL) InPn pen   How is the patient currently taking it? (ex. 1XDay):as prescribed   Is this a 30 day or 90 day RX:30 days   Preferred Pharmacy with phone number:.  [id number] - [facility] [address] AT [facility] OF [facility] [address]  [address] [facility] [address]  Phone: [phone] Fax: [phone]  Local or Mail Order:Local   Ordering Provider:Dr. Haas   Would the patient rather a call back or a response via [facility]? Call   Best Call Back Number:.[phone]   Additional Information: Pt requesting medication refill and needing it sent in today due to only having one way to the pharmacy to  medication

## 2024-11-25 NOTE — TELEPHONE ENCOUNTER
Refill Decision Note   Radha Degroot  is requesting a refill authorization.    Brief Assessment and Rationale for Refill:   Quick Discontinue       Medication Therapy Plan:   E-Prescribing Status: Receipt confirmed by pharmacy (11/25/2024  2:05 PM CST)      Comments:     Note composed:4:47 PM 11/25/2024

## 2024-11-26 ENCOUNTER — TELEPHONE (OUTPATIENT)
Dept: FAMILY MEDICINE | Facility: CLINIC | Age: 84
End: 2024-11-26
Payer: MEDICARE

## 2024-11-26 DIAGNOSIS — E11.22 DIABETES MELLITUS WITH STAGE 4 CHRONIC KIDNEY DISEASE: Primary | ICD-10-CM

## 2024-11-26 DIAGNOSIS — N18.4 DIABETES MELLITUS WITH STAGE 4 CHRONIC KIDNEY DISEASE: Primary | ICD-10-CM

## 2024-11-26 RX ORDER — INSULIN ASPART 100 [IU]/ML
INJECTION, SOLUTION INTRAVENOUS; SUBCUTANEOUS
Qty: 45 ML | Refills: 3 | Status: SHIPPED | OUTPATIENT
Start: 2024-11-26

## 2024-11-26 RX ORDER — INSULIN ASPART 100 [IU]/ML
14 INJECTION, SOLUTION INTRAVENOUS; SUBCUTANEOUS
Qty: 45 ML | Refills: 3 | Status: SHIPPED | OUTPATIENT
Start: 2024-11-26 | End: 2024-11-26

## 2024-11-26 NOTE — TELEPHONE ENCOUNTER
I added notes in Rx.     I have signed for the following orders AND/OR meds.  Please call the patient and ask the patient to schedule the testing AND/OR inform about any medications that were sent. Medications have been sent to pharmacy listed below      No orders of the defined types were placed in this encounter.      Medications Ordered This Encounter   Medications    insulin aspart U-100 (NOVOLOG) 100 unit/mL (3 mL) InPn pen     Sig: Inject 14 Units into the skin 3 (three) times daily with meals.     Dispense:  45 mL     Refill:  3     Novolog 14 units three times daily subcutaneously before meals using your correction scale         [id number] - [place] - [address] AT [facility] [address]  [address] [facility] [place] [address]  Phone: [phone] Fax: [phone]    [id number] [place] [place] - [address] [facility] AT [facility] OF  [address]  [address] [facility]  [place] [address]  Phone: [phone] Fax: [phone]    [id number] - [place] - [address]  [place] [address]  Phone: [phone] Fax: [phone]

## 2024-11-26 NOTE — TELEPHONE ENCOUNTER
hello I sent a message on this pt , the problem is that pharamacy will not fill her medication without a sliding scle attach to the order . Please advise as np luc kennedy is the one that has been managing this for the pt .    Pt daughter will be leaving today and wont be back until a few weeks , leaving pt with no insuline . Pt does not drive and has no friends that can get her to places. Insuline is not a med that can be send to the mail to this pt , per pharmacy

## 2024-11-26 NOTE — TELEPHONE ENCOUNTER
----- Message from Clementina sent at 11/26/2024 11:39 AM CST -----  Type:  Pharmacy Calling to Clarify an RX    Name of Caller:pharmacy  Pharmacy Name:  [id number] - [place] - [address] AT [facility] OF [address] [place]  [address] [facility] [place] [address]  Phone: [phone] Fax: [phone]  Prescription Name: Disp Refills Start End BERENICE  insulin aspart U-100 (NOVOLOG) 100 unit/mL (3 mL) InPn pen 45 mL 0 11/25/2024 - No  Sig - Route: Inject 14 Units into the skin 3 (three) times daily with meals. - Subcutaneous  Sent to pharmacy as: insulin aspart U-100 (NOVOLOG) 100 unit/mL (3 mL) InPn pen  Class: Normal  Notes to Pharmacy: Brand name only Novolog  Order: [id number]  Date/Time Signed: 11/25/2024 14:05      E-Prescribing Status: Receipt confirmed by pharmacy (11/25/2024  2:05 PM CST)  Prior authorization: Denied      What do they need to clarify?:amount  Best Call Back Number:[phone]  Additional Information: requesting to get additional information on sliding scale as pt is running out of medication early and pharmacy can not release medication as pt is taking more than what is suggested to the 14 units 3 times a day. Requesting a call back asap. Patient's daughter is leaving out of town needs call back before so that she can get medication.

## 2024-11-26 NOTE — TELEPHONE ENCOUNTER
"Spoke to pt , pt states " [facility] does not want to give me my insuline because they said that they dispense enough for me to take 14 units 3 times a day with meals but this is incorrect because I sometimes take more insuline . Depending on my readings ." Please advise,  in the order it says that she should be taking 14 units 3 times a day . Nothing about sliding scale .    please advise     Disp Refills Start End BERENICE    insulin aspart U-100 (NOVOLOG) 100 unit/mL (3 mL) InPn pen 45 mL 0 11/25/2024 -- No   Sig - Route: Inject 14 Units into the skin 3 (three) times daily with meals. - Subcutaneous   Sent to pharmacy as: insulin aspart U-100 (NOVOLOG) 100 unit/mL (3 mL) InPn pen   Class: Normal   Notes to Pharmacy: Brand name only Novolog   Order: [id number]   Date/Time Signed: 11/25/2024 14:05       E-Prescribing Status: Receipt confirmed by pharmacy (11/25/2024  2:05 PM CST)   Prior authorization: Denied     Pharmacy    [facility][id number] - [place] - [address] AT [facility] [address]    Associated Diagnoses    Diabetes mellitus with stage 4 chronic kidney disease             "

## 2024-11-26 NOTE — TELEPHONE ENCOUNTER
----- Message from Bryan sent at 11/26/2024  4:54 PM CST -----  Contact: [phone]  Type:  RX Refill Request    Who Called: pharmacy  Refill or New Rx:refill  RX Name and Strength:insulin aspart U-100 (NOVOLOG) 100 unit/mL (3 mL) InPn pen  How is the patient currently taking it? (ex. 1XDay):  Is this a 30 day or 90 day RX:  Preferred Pharmacy with phone number: [phone] Fax: [phone]    Local or Mail Order:local  Ordering Provider:  Would the patient rather a call back or a response via [facility]? Call back  Best Call Back Number: [phone]  Additional Information: [id number]... NEED CLARIFICATION        [facility] #[id number] - [place], [place] - [address] AT [facility] OF [facility] [address] [place]  [address] [facility] [address]  Phone: [phone] Fax: [phone]

## 2024-11-26 NOTE — TELEPHONE ENCOUNTER
New Rx sent to pharmacy. It looks like there was a note to pharmacist on the previous Rx to allow use with sliding scale but I added this statement on the sig of the rx as well.    I have signed for the following orders AND/OR meds.  Please call the patient and ask the patient to schedule the testing AND/OR inform about any medications that were sent. Medications have been sent to pharmacy listed below      No orders of the defined types were placed in this encounter.      Medications Ordered This Encounter   Medications    insulin aspart U-100 (NOVOLOG) 100 unit/mL (3 mL) InPn pen     Sig: Novolog 14 units three times daily subcutaneously before meals, along with additional units based on your correction scale     Dispense:  45 mL     Refill:  3     Novolog 14 units three times daily subcutaneously before meals using your correction scale         [facility] #[id number] - [place] - [address] AT [facility] OF [facility] [address] [place]  [address] [facility] [address]  Phone: [phone] Fax: [phone]    [facility] #[id number] - [place] - [address] [facility] AT [facility] OF  & [address]  [address] [facility] [address]  Phone: [phone] Fax: [phone]    [facility] #[id number] - [place] - [address] [address]  Phone: [phone] Fax: [phone]

## 2024-11-26 NOTE — TELEPHONE ENCOUNTER
Phone was disconnected after waiting on hold. Called back. They would like to know what the max amount of insulin/day is for Novalog for this pt.

## 2024-11-26 NOTE — TELEPHONE ENCOUNTER
"Spoke with sam BANKS ([facility] ) and he said " the medication is being process right now and it should be ready this afternoon after 3:30 or so . PT WILL ALSO GET A MESSAGE ON HER PHONE once is ready  "     Pt will be calling the [facility] to make sure is getting processed   "

## 2024-11-26 NOTE — TELEPHONE ENCOUNTER
----- Message from Bryan sent at 11/26/2024  4:54 PM CST -----  Contact: [phone]  Type:  RX Refill Request    Who Called: pharmacy  Refill or New Rx:refill  RX Name and Strength:insulin aspart U-100 (NOVOLOG) 100 unit/mL (3 mL) InPn pen  How is the patient currently taking it? (ex. 1XDay):  Is this a 30 day or 90 day RX:  Preferred Pharmacy with phone number: [phone] Fax: [phone]    Local or Mail Order:local  Ordering Provider:  Would the patient rather a call back or a response via [facility]? Call back  Best Call Back Number: [phone]  Additional Information: [id number]... NEED CLARIFICATION        [facility] #[id number] - [place], [place] - [address] AT [facility] OF [facility] [address] [place]  [address] [facility] [address]  Phone: [phone] Fax: [phone]

## 2024-11-26 NOTE — TELEPHONE ENCOUNTER
----- Message from Madison sent at 11/26/2024 10:35 AM CST -----  Contact: Mom [phone]  Would like to receive medical advice.    Pharmacy name/number (copy/paste from chart):      [facility] #[id number] [facility] [address] AT [facility] OF [facility] [address] [place] [address] [address] [facility] [address]  Phone: [phone] Fax: [phone]       Would they like a call back or a response via [facility]:  call back    Additional information: Calling to speak with the office about the status of her refill on her insulin the pt states that she has put in a few request and neither has she or the pharm heard anything from the  office about the refill.

## 2024-12-17 DIAGNOSIS — N18.4 TYPE 2 DIABETES MELLITUS WITH STAGE 4 CHRONIC KIDNEY DISEASE, WITH LONG-TERM CURRENT USE OF INSULIN: Primary | ICD-10-CM

## 2024-12-17 DIAGNOSIS — E11.22 TYPE 2 DIABETES MELLITUS WITH STAGE 4 CHRONIC KIDNEY DISEASE, WITH LONG-TERM CURRENT USE OF INSULIN: Primary | ICD-10-CM

## 2024-12-17 DIAGNOSIS — Z79.4 TYPE 2 DIABETES MELLITUS WITH STAGE 4 CHRONIC KIDNEY DISEASE, WITH LONG-TERM CURRENT USE OF INSULIN: Primary | ICD-10-CM

## 2025-01-08 DIAGNOSIS — E78.2 MIXED HYPERLIPIDEMIA: Chronic | ICD-10-CM

## 2025-01-08 RX ORDER — ROSUVASTATIN CALCIUM 40 MG/1
40 TABLET, COATED ORAL DAILY
Qty: 90 TABLET | Refills: 3 | Status: SHIPPED | OUTPATIENT
Start: 2025-01-08

## 2025-01-08 NOTE — TELEPHONE ENCOUNTER
----- Message from Logan sent at 1/8/2025 11:11 AM CST -----  Contact: self  .Type:  RX Refill Request    Who Called: .Radha Degroot  Refill or New Rx:refill  RX Name and Strength:rosuvastatin (CRESTOR) 40 MG Tab  How is the patient currently taking it? (ex. 1XDay):1xday  Is this a 30 day or 90 day RX:30 day  Preferred Pharmacy with phone number:.  [facility] #[id number] - [place] [address] AT [facility] [address] [place] [address] [address] [facility] [address]  Phone: [phone] Fax: [phone]  Local or Mail Order:local  Ordering Provider:Junito   Would the patient rather a call back or a response via [facility]? Call back  Best Call Back Number:.[phone]  Additional Information: Pharmacy Just faxed it over, she is needing it today with her other delivery .

## 2025-01-08 NOTE — TELEPHONE ENCOUNTER
Care Due:                  Date            Visit Type   Department     Provider  --------------------------------------------------------------------------------                                EP -                              PRIMARY      [facility] FAMILY  Last Visit: 09-      CARE ([facility])   MEDICINE       Jana Holguin  Next Visit: None Scheduled  None         None Found                                                            Last  Test          Frequency    Reason                     Performed    Due Date  --------------------------------------------------------------------------------    CBC.........  12 months..  diclofenac...............  Not Found    Overdue    [employer] Embedded Care Due Messages. Reference number: [id number].   1/08/2025 11:19:54 AM CST

## 2025-01-15 ENCOUNTER — DOCUMENT SCAN (OUTPATIENT)
Dept: HOME HEALTH SERVICES | Facility: HOSPITAL | Age: 85
End: 2025-01-15
Payer: MEDICARE

## 2025-01-30 DIAGNOSIS — Z00.00 ENCOUNTER FOR MEDICARE ANNUAL WELLNESS EXAM: ICD-10-CM

## 2025-04-11 ENCOUNTER — TELEPHONE (OUTPATIENT)
Dept: FAMILY MEDICINE | Facility: CLINIC | Age: 85
End: 2025-04-11
Payer: MEDICARE

## 2025-04-11 DIAGNOSIS — R53.81 PHYSICAL DEBILITY: ICD-10-CM

## 2025-04-11 DIAGNOSIS — L03.119 CELLULITIS OF LOWER EXTREMITY, UNSPECIFIED LATERALITY: Primary | ICD-10-CM

## 2025-04-11 RX ORDER — DOXYCYCLINE 100 MG/1
100 CAPSULE ORAL 2 TIMES DAILY
Qty: 20 CAPSULE | Refills: 0 | Status: SHIPPED | OUTPATIENT
Start: 2025-04-11 | End: 2025-04-21

## 2025-04-11 NOTE — TELEPHONE ENCOUNTER
I have signed for the following orders AND/OR meds.  Please call the patient and ask the patient to schedule the testing AND/OR inform about any medications that were sent. Medications have been sent to pharmacy listed below      Orders Placed This Encounter   Procedures    Ambulatory referral/consult to Home Health     Standing Status:   Future     Expected Date:   4/12/2025     Expiration Date:   5/11/2026     Referral Priority:   Routine     Referral Type:   Home Health     Referral Reason:   Specialty Services Required     Requested Specialty:   Home Health Services     Number of Visits Requested:   1       Medications Ordered This Encounter   Medications    doxycycline (VIBRAMYCIN) 100 MG Cap     Sig: Take 1 capsule (100 mg total) by mouth 2 (two) times daily. for 10 days     Dispense:  20 capsule     Refill:  0         [facility] #[id number] - [place] - [address] [facility] AT [facility] OF [facility] [address] [place]  [address] [facility] [address]  Phone: [phone] Fax: [phone]    [facility] #[id number] - [place] - [address] [facility] OF  [address]  [address] [facility] [address]  Phone: [phone] Fax: [phone]    [id number] - [place] - [address] [place] [address]  Phone: [phone] Fax: [phone]

## 2025-04-11 NOTE — TELEPHONE ENCOUNTER
----- Message from Ricardo sent at 4/11/2025 11:09 AM CDT -----  Contact: ALISSA ANDREW [[id number]]  .Type:  Patient Requesting CallWho Called:ALISSA ANDREW [[id number]]Does the patient know what this is regarding?:wants to speak with nurseWould the patient rather a call back or a response via [facility]? callBest Call Back Number:[phone] Information:

## 2025-04-11 NOTE — TELEPHONE ENCOUNTER
I have sent in antibiotics for treatment. I can also put in orders for home health if needed. Please let me know. I would recommend going to the ER if symptoms worsen over the weekend. She can call 911 for an ambulance if she does not have transportation.     I have signed for the following orders AND/OR meds.  Please call the patient and ask the patient to schedule the testing AND/OR inform about any medications that were sent. Medications have been sent to pharmacy listed below      No orders of the defined types were placed in this encounter.      Medications Ordered This Encounter   Medications    doxycycline (VIBRAMYCIN) 100 MG Cap     Sig: Take 1 capsule (100 mg total) by mouth 2 (two) times daily. for 10 days     Dispense:  20 capsule     Refill:  0         [email or web address] #[id number] - [place] - [address] [place] [address] AT [facility] OF [facility] [address] [place]  [address] [facility] [address]  Phone: [phone] Fax: [phone]    [facility][id number] - [place] - [address] [facility] OF [address]  [address] [facility] [address]  Phone: [phone] Fax: [phone]    [id number] - [place] - [address] [address]  Phone: [phone] Fax: [phone]

## 2025-04-11 NOTE — TELEPHONE ENCOUNTER
----- Message from Asha Billings sent at 4/11/2025  2:42 PM CDT -----  Contact: Radha  .Type:  Patient Returning CallWho Called: Keila Virk Message for Patient: Trinh the patient know what this is regarding?: Would the patient rather a call back or a response via [facility]?  Call [facility] Call Back Number: .[phone] (home) Additional Information:  Patient returning call. Lost Signal call disconnected. Please Call back

## 2025-04-11 NOTE — TELEPHONE ENCOUNTER
Spoke with pt, stated that the swelling/blisters on her legs have started to worsen. States that last time this happened she was sent in an antibiotic for treatment. Pt states home health ended months to help w/ Lymphedema treatment. Pt states that she is unable to to an urgent care/hospital because she lives alone. Pls advise.

## 2025-04-11 NOTE — TELEPHONE ENCOUNTER
04/11/2025 4:15 PM   I spoke with the patient about this. Pt notified and verbalized understanding. ER precautions discussed with pt. She asked for HH orders to be placed.

## 2025-04-14 ENCOUNTER — TELEPHONE (OUTPATIENT)
Dept: FAMILY MEDICINE | Facility: CLINIC | Age: 85
End: 2025-04-14
Payer: MEDICARE

## 2025-04-14 NOTE — TELEPHONE ENCOUNTER
----- Message from Genia sent at 4/14/2025 12:53 PM CDT -----  Regarding: Sooner Appointment  Contact: patient  Type:  Sooner Apoointment RequestCaller is requesting a sooner appointment.  Caller declined first available appointment listed below.  Caller will not accept being placed on the waitlist and is requesting a message be sent to doctor.Name of Caller:PaulineDemetricenish is the first available appointment?06/26/2025Symptoms:limps in legs Would the patient rather a call back or a response via [facility]?  Call back Best Call Back Number: [phone] (home) Additional Information: need to be seen in 30 days

## 2025-04-15 ENCOUNTER — OFFICE VISIT (OUTPATIENT)
Dept: PRIMARY CARE CLINIC | Facility: CLINIC | Age: 85
End: 2025-04-15
Payer: MEDICARE

## 2025-04-15 VITALS
WEIGHT: 220 LBS | DIASTOLIC BLOOD PRESSURE: 72 MMHG | TEMPERATURE: 98 F | HEIGHT: 68 IN | OXYGEN SATURATION: 97 % | HEART RATE: 80 BPM | BODY MASS INDEX: 33.34 KG/M2 | SYSTOLIC BLOOD PRESSURE: 132 MMHG

## 2025-04-15 DIAGNOSIS — E11.3219 TYPE 2 DIABETES MELLITUS WITH MILD NONPROLIFERATIVE RETINOPATHY AND MACULAR EDEMA, WITH LONG-TERM CURRENT USE OF INSULIN, UNSPECIFIED LATERALITY: ICD-10-CM

## 2025-04-15 DIAGNOSIS — N18.4 TYPE 2 DIABETES MELLITUS WITH STAGE 4 CHRONIC KIDNEY DISEASE, WITH LONG-TERM CURRENT USE OF INSULIN: ICD-10-CM

## 2025-04-15 DIAGNOSIS — I89.0 LYMPHEDEMA: ICD-10-CM

## 2025-04-15 DIAGNOSIS — E66.2 CLASS 1 OBESITY WITH ALVEOLAR HYPOVENTILATION, SERIOUS COMORBIDITY, AND BODY MASS INDEX (BMI) OF 33.0 TO 33.9 IN ADULT: ICD-10-CM

## 2025-04-15 DIAGNOSIS — I48.21 PERMANENT ATRIAL FIBRILLATION: ICD-10-CM

## 2025-04-15 DIAGNOSIS — M19.90 OSTEOARTHRITIS, UNSPECIFIED OSTEOARTHRITIS TYPE, UNSPECIFIED SITE: Primary | ICD-10-CM

## 2025-04-15 DIAGNOSIS — Z79.4 TYPE 2 DIABETES MELLITUS WITH STAGE 4 CHRONIC KIDNEY DISEASE, WITH LONG-TERM CURRENT USE OF INSULIN: ICD-10-CM

## 2025-04-15 DIAGNOSIS — Z79.4 TYPE 2 DIABETES MELLITUS WITH MILD NONPROLIFERATIVE RETINOPATHY AND MACULAR EDEMA, WITH LONG-TERM CURRENT USE OF INSULIN, UNSPECIFIED LATERALITY: ICD-10-CM

## 2025-04-15 DIAGNOSIS — L03.119 CELLULITIS OF LOWER EXTREMITY, UNSPECIFIED LATERALITY: ICD-10-CM

## 2025-04-15 DIAGNOSIS — E66.811 CLASS 1 OBESITY WITH ALVEOLAR HYPOVENTILATION, SERIOUS COMORBIDITY, AND BODY MASS INDEX (BMI) OF 33.0 TO 33.9 IN ADULT: ICD-10-CM

## 2025-04-15 DIAGNOSIS — I50.32 CHRONIC DIASTOLIC CHF (CONGESTIVE HEART FAILURE): ICD-10-CM

## 2025-04-15 DIAGNOSIS — E11.22 TYPE 2 DIABETES MELLITUS WITH STAGE 4 CHRONIC KIDNEY DISEASE, WITH LONG-TERM CURRENT USE OF INSULIN: ICD-10-CM

## 2025-04-15 PROCEDURE — G2211 COMPLEX E/M VISIT ADD ON: HCPCS | Mod: HCNC,S$GLB,, | Performed by: INTERNAL MEDICINE

## 2025-04-15 PROCEDURE — 3288F FALL RISK ASSESSMENT DOCD: CPT | Mod: HCNC,CPTII,S$GLB, | Performed by: INTERNAL MEDICINE

## 2025-04-15 PROCEDURE — 99999 PR PBB SHADOW E&M-EST. PATIENT-LVL V: CPT | Mod: PBBFAC,HCNC,, | Performed by: INTERNAL MEDICINE

## 2025-04-15 PROCEDURE — 1159F MED LIST DOCD IN RCRD: CPT | Mod: HCNC,CPTII,S$GLB, | Performed by: INTERNAL MEDICINE

## 2025-04-15 PROCEDURE — 1101F PT FALLS ASSESS-DOCD LE1/YR: CPT | Mod: HCNC,CPTII,S$GLB, | Performed by: INTERNAL MEDICINE

## 2025-04-15 PROCEDURE — 1126F AMNT PAIN NOTED NONE PRSNT: CPT | Mod: HCNC,CPTII,S$GLB, | Performed by: INTERNAL MEDICINE

## 2025-04-15 PROCEDURE — 99214 OFFICE O/P EST MOD 30 MIN: CPT | Mod: HCNC,S$GLB,, | Performed by: INTERNAL MEDICINE

## 2025-04-15 RX ORDER — INSULIN DEGLUDEC 100 U/ML
34 INJECTION, SOLUTION SUBCUTANEOUS DAILY
Qty: 33 ML | Refills: 3 | Status: SHIPPED | OUTPATIENT
Start: 2025-04-15 | End: 2026-05-08

## 2025-04-15 RX ORDER — DICLOFENAC SODIUM 10 MG/G
2 GEL TOPICAL 2 TIMES DAILY PRN
Qty: 450 G | Refills: 1 | Status: SHIPPED | OUTPATIENT
Start: 2025-04-15

## 2025-04-28 NOTE — PROGRESS NOTES
Assessment/Plan:    1. Osteoarthritis, unspecified osteoarthritis type, unspecified site  -     diclofenac sodium (VOLTAREN) 1 % Gel; Apply 2 g topically 2 (two) times daily as needed (joint pain).  Dispense: 450 g; Refill: 1    2. Type 2 diabetes mellitus with stage 4 chronic kidney disease, with long-term current use of insulin  Overview:  Diabetes Medications               insulin aspart U-100 (NOVOLOG) 100 unit/mL (3 mL) InPn pen Inject 14 units three times daily before meals    insulin degludec (TRESIBA FLEXTOUCH U-100) 100 unit/mL (3 mL) insulin pen INJECT 32 UNITS UNDER THE SKIN DAILY     -condition is currently fairly well controlled   -followed by diabetes clinic  -attempted to start on GLP1a to assist with weight loss but experienced AE  -also attempted to start CGM but experienced sensor malfunction on multiple occasions  -see diabetic health maintenance listed below  -on statin: Yes  -on ACE-I/ARB: No  -counseling provided on importance of diabetic diet and medication compliance in order to treat diabetes  -discussed diabetes disease course and potential    Orders:  -     insulin degludec (TRESIBA FLEXTOUCH U-100) 100 unit/mL (3 mL) insulin pen; Inject 34 Units into the skin once daily.  Dispense: 33 mL; Refill: 3    3. Class 1 obesity with alveolar hypoventilation, serious comorbidity, and body mass index (BMI) of 33.0 to 33.9 in adult  Overview:  General weight loss/lifestyle modification strategies discussed (elicit support from others; identify saboteurs; non-food rewards, etc).  Informal exercise measures discussed, e.g. taking stairs instead of elevator.  Regular aerobic exercise program discussed.        4. Permanent atrial fibrillation  Overview:  -followed by cardiology but due for follow up  -remains on eliquis for stroke prevention  -on beta blocker for rate control      5. 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 Chronic diastolic CHF (congestive heart failure)  Overview:  -TTE June 2021:  The left ventricle is normal in size with concentric remodeling and normal systolic function. The estimated ejection fraction is 60%.  Normal right ventricular size with normal right ventricular systolic function.  Biatrial enlargement.  Mild tricuspid regurgitation.  The estimated PA systolic pressure is 42 mmHg.  Normal central venous pressure (3 mmHg).  Atrial fibrillation observed.  -remains on lasix 40 mg am   -NM stress test ordered by cardiology last year but never completed        7. Cellulitis of lower extremity, unspecified laterality    8. Lymphedema  Overview:  -previously had success with lymphedema therapy but worsening since this has stopped  -attempting to restart via home health; will reach out to home health team  -now having worsening LE swelling with concern for developing cellulitis  -started on antibiotics several days ago and does appear to be approving  -patient instructed to go to ER immediately if symptoms do worsen          Visit today included increased complexity associated with the care of the episodic problem(s) addressed and managing the longitudinal care of the patient due to the serious and/or complex managed problem(s). See above assessment/plan.    Follow up if symptoms worsen or fail to improve.    Jana Holguin MD  _____________________________________________________________________________________________________________________________________________________    CC: follow up of chronic medical conditions     Patient is in clinic today as an established patient.    History of Present Illness  The patient is coming in for a routine follow-up. She is accompanied by her daughter.    She reports a slight improvement in her leg condition since starting doxycycline on Friday night, 04/11/2025. She describes a change in the color of her legs  from maroon to purple, with occasional crusting. She is uncertain about the potential benefits of topical treatments and is not currently experiencing any pain. Sensation in her feet and legs is retained, including the ability to perceive touch and pain, but stiffness is experienced. Frustration is expressed over the lack of available specialists for lymphedema management. She finds it challenging to apply compression stockings and is considering scheduling regular visits with a therapist once her current outbreak subsides. A scheduled appointment with a home health nurse is set for tomorrow, but assistance with blood pressure monitoring or note-taking is not required, per patient.    Her blood glucose levels have been slightly elevated since the onset of her leg issues, which she attributes to the infection. Currently, she is on a regimen of Tresiba 32 units, administered in the morning, and monitors her blood glucose levels upon waking. This morning, her reading was 184. A previous attempt to use a continuous glucose monitor was unsuccessful due to operational difficulties and sensor availability issues at [facility]. She was prescribed Ozempic by a cardiologist but discontinued its use due to cost and adverse effects. Episodes of hypoglycemia at night led to a temporary reduction in her Tresiba dose to 29 units, but she resumed the 32-unit dose when her blood glucose levels began to rise again.    Voltaren gel has been used for stiffness and pain in her fingers, which she finds very effective. Another tube of this medication is requested.     No other new complaints today.  Remaining chronic conditions have been reviewed and remain stable. Further detail as stated above.    Health Maintenance reviewed - plan to order labs via home health.    No recent changes to medical/surgical history.    Medications Ordered Prior to Encounter[1]    Review of Systems   Constitutional:  Negative for chills, diaphoresis, fatigue  "and fever.   HENT:  Negative for congestion, ear pain, postnasal drip, sinus pain and sore throat.    Eyes:  Negative for pain and redness.   Respiratory:  Negative for cough, chest tightness and shortness of breath.    Cardiovascular:  Positive for leg swelling. Negative for chest pain.   Gastrointestinal:  Negative for abdominal pain, constipation, diarrhea, nausea and vomiting.   Genitourinary:  Negative for dysuria and hematuria.   Musculoskeletal:  Negative for arthralgias and joint swelling.   Skin:  Positive for color change. Negative for rash.   Neurological:  Negative for dizziness, syncope and headaches.   Psychiatric/Behavioral:  Negative for dysphoric mood. The patient is not nervous/anxious.        Vitals:    04/15/25 1046   BP: 132/72   Patient Position: Sitting   Pulse: 80   Temp: 97.8 °F (36.6 °C)   SpO2: 97%   Weight: 99.8 kg (220 lb)   Height: 5' 8" (1.727 m)       Wt Readings from Last 3 Encounters:   04/15/25 99.8 kg (220 lb)   10/24/24 99.8 kg (220 lb 0.3 oz)   09/23/24 99.8 kg (220 lb)       Physical Exam  Constitutional:       General: She is not in acute distress.     Appearance: Normal appearance. She is well-developed.   HENT:      Head: Normocephalic and atraumatic.   Eyes:      Conjunctiva/sclera: Conjunctivae normal.   Cardiovascular:      Rate and Rhythm: Normal rate and regular rhythm.      Pulses: Normal pulses.      Heart sounds: Normal heart sounds. No murmur heard.  Pulmonary:      Effort: Pulmonary effort is normal. No respiratory distress.      Breath sounds: Normal breath sounds.   Abdominal:      General: Bowel sounds are normal. There is no distension.      Palpations: Abdomen is soft.      Tenderness: There is no abdominal tenderness.   Musculoskeletal:         General: Normal range of motion.      Cervical back: Normal range of motion and neck supple.      Right lower leg: Edema present.      Left lower leg: Edema present.   Skin:     General: Skin is warm and dry.      " "Findings: No rash.      Comments: Bilateral LE edema with diffuse erythema of both leg, extending up to knees   Neurological:      General: No focal deficit present.      Mental Status: She is alert and oriented to person, place, and time.   Psychiatric:         Mood and Affect: Mood normal.         Behavior: Behavior normal.         DISCLAIMER: This note was compiled by using a speech recognition dictation system and therefore please be aware that typographical / speech recognition errors can and do occur.  Please contact me if you see any errors specifically.  Consent was obtained for JUDY recording system prior to the visit.       [1]   Current Outpatient Medications on File Prior to Visit   Medication Sig Dispense Refill    ACCU-CHEK MELIDA PLUS TEST STRP Strp USE TO CHECK BLOOD SUGAR LEVELS FOUR TIMES DAILY 400 strip 3    amLODIPine (NORVASC) 5 MG tablet Take 1 tablet (5 mg total) by mouth once daily. 90 tablet 3    apixaban (ELIQUIS) 2.5 mg Tab Take 1 tablet (2.5 mg total) by mouth 2 (two) times daily. 180 tablet 3    ascorbic acid, vitamin C, (VITAMIN C) 100 MG tablet Take 100 mg by mouth once daily.      BD TIM 2ND GEN PEN NEEDLE 32 gauge x 5/32" Ndle USE TO ADMINISTER MEDICATION AS PRESCRIBED FOUR TIMES DAILY 400 each 3    blood-glucose meter kit Use as instructed 1 each 0    blood-glucose meter,continuous (FREESTYLE MAXIM 3 READER) Misc 1 Device by Misc.(Non-Drug; Combo Route) route once daily. 1 each 0    blood-glucose sensor (FREESTYLE MAXIM 3 PLUS SENSOR) Shani 1 Device by Misc.(Non-Drug; Combo Route) route Every 15 days. 2 each 11    carvediloL (COREG) 12.5 MG tablet TAKE 1 TABLET(12.5 MG) BY MOUTH TWICE DAILY 180 tablet 3    furosemide (LASIX) 40 MG tablet Take 1 tablet (40 mg total) by mouth once daily. Do not take if BP is below 110/70, or feeling dry 90 tablet 1    insulin aspart U-100 (NOVOLOG) 100 unit/mL (3 mL) InPn pen Novolog 14 units three times daily subcutaneously before meals, along with " additional units based on your correction scale 45 mL 3    lancets Misc 1 each by Misc.(Non-Drug; Combo Route) route 4 (four) times daily. 200 each 11    MULTIVIT-MIN/IRON/FOLIC/LUTEIN (CENTRUM SILVER WOMEN ORAL) Take by mouth once daily.       mupirocin (BACTROBAN) 2 % ointment Apply topically 2 (two) times daily. 30 g 2    nitroGLYCERIN (NITROSTAT) 0.4 MG SL tablet Place 1 tablet (0.4 mg total) under the tongue every 5 (five) minutes as needed for Chest pain. 30 tablet 0    pantoprazole (PROTONIX) 40 MG tablet TAKE 1 TABLET(40 MG) BY MOUTH EVERY DAY 90 tablet 3    rosuvastatin (CRESTOR) 40 MG Tab Take 1 tablet (40 mg total) by mouth once daily. 90 tablet 3    vitamin B complex (B COMPLEX VITAMINS ORAL) Take by mouth once daily.      vitamin D (VITAMIN D3) 1000 units Tab Take 1,000 Units by mouth once daily.      VITAMIN E, BULK, MISC by Misc.(Non-Drug; Combo Route) route once daily.       No current facility-administered medications on file prior to visit.

## 2025-05-06 ENCOUNTER — TELEPHONE (OUTPATIENT)
Dept: FAMILY MEDICINE | Facility: CLINIC | Age: 85
End: 2025-05-06
Payer: MEDICARE

## 2025-05-06 DIAGNOSIS — I89.0 LYMPHEDEMA: Primary | ICD-10-CM

## 2025-05-06 NOTE — TELEPHONE ENCOUNTER
----- Message from [employer] sent at 5/6/2025 11:44 AM CDT -----  Contact: ALISSA ANDREW [[id number]]  ..Type:  Patient Requesting CallWho Called:ALISSA ANDREW [[id number]]Would the patient rather a call back or a response via [facility]? [facility] Call Back Number:.[phone] (home) Additional Information: Patient called to discuss extending home health. Patient says that therapist has told her that her home health is ending.

## 2025-05-12 NOTE — TELEPHONE ENCOUNTER
I have signed for the following orders AND/OR meds.  Please call the patient and ask the patient to schedule the testing AND/OR inform about any medications that were sent. Medications have been sent to pharmacy listed below      Orders Placed This Encounter   Procedures    SUBSEQUENT HOME HEALTH ORDERS     What Home Health Agency is the patient currently using?:   [id number] - [place] - [address] AT [facility] [address]  [address] [facility] [address]  Phone: [phone] Fax: [phone]    [id number] - [place], [place] - [address] [facility] AT [facility] OF  [address]  [address] [address] [facility]  [place] [address]  Phone: [phone] Fax: [phone]    [id number] - [place] - [address]  [place] [address]  Phone: [phone] Fax: [phone]

## 2025-05-13 ENCOUNTER — TELEPHONE (OUTPATIENT)
Dept: FAMILY MEDICINE | Facility: CLINIC | Age: 85
End: 2025-05-13
Payer: MEDICARE

## 2025-05-13 NOTE — TELEPHONE ENCOUNTER
----- Message from Asha Billings sent at 5/13/2025 10:34 AM CDT -----  Contact: Rosa Isela  .Type: Patient  Requesting Call BackWhgabe Called: Alberto is this regarding?:  Can't stand on leg/ Pain in leg/ Previously had a broken FemurWould the patient rather a call back or a response via [facility]?  Call [facility] Call Back Number: .[phone] (home) Additional Information:  Patient requesting a call back for advice.

## 2025-05-22 ENCOUNTER — TELEPHONE (OUTPATIENT)
Dept: PRIMARY CARE CLINIC | Facility: CLINIC | Age: 85
End: 2025-05-22
Payer: MEDICARE

## 2025-05-29 DIAGNOSIS — I89.0 LYMPHEDEMA: ICD-10-CM

## 2025-05-29 DIAGNOSIS — R60.0 LOWER EXTREMITY EDEMA: ICD-10-CM

## 2025-05-29 RX ORDER — FUROSEMIDE 40 MG/1
40 TABLET ORAL DAILY
Qty: 90 TABLET | Refills: 0 | Status: SHIPPED | OUTPATIENT
Start: 2025-05-29

## 2025-06-05 ENCOUNTER — PATIENT MESSAGE (OUTPATIENT)
Dept: PRIMARY CARE CLINIC | Facility: CLINIC | Age: 85
End: 2025-06-05
Payer: MEDICARE

## 2025-06-05 ENCOUNTER — TELEPHONE (OUTPATIENT)
Dept: PRIMARY CARE CLINIC | Facility: CLINIC | Age: 85
End: 2025-06-05
Payer: MEDICARE

## 2025-07-04 DIAGNOSIS — Z79.4 TYPE 2 DIABETES MELLITUS WITH STAGE 4 CHRONIC KIDNEY DISEASE, WITH LONG-TERM CURRENT USE OF INSULIN: ICD-10-CM

## 2025-07-04 DIAGNOSIS — N18.4 TYPE 2 DIABETES MELLITUS WITH STAGE 4 CHRONIC KIDNEY DISEASE, WITH LONG-TERM CURRENT USE OF INSULIN: ICD-10-CM

## 2025-07-04 DIAGNOSIS — E11.22 TYPE 2 DIABETES MELLITUS WITH STAGE 4 CHRONIC KIDNEY DISEASE, WITH LONG-TERM CURRENT USE OF INSULIN: ICD-10-CM

## 2025-07-07 ENCOUNTER — EXTERNAL HOME HEALTH (OUTPATIENT)
Dept: HOME HEALTH SERVICES | Facility: HOSPITAL | Age: 85
End: 2025-07-07
Payer: MEDICARE

## 2025-07-07 RX ORDER — CALCIUM CITRATE/VITAMIN D3 200MG-6.25
TABLET ORAL
Qty: 200 STRIP | Refills: 0 | Status: SHIPPED | OUTPATIENT
Start: 2025-07-07

## 2025-07-23 ENCOUNTER — PATIENT MESSAGE (OUTPATIENT)
Dept: ADMINISTRATIVE | Facility: HOSPITAL | Age: 85
End: 2025-07-23
Payer: MEDICARE

## 2025-07-23 ENCOUNTER — DOCUMENT SCAN (OUTPATIENT)
Dept: HOME HEALTH SERVICES | Facility: HOSPITAL | Age: 85
End: 2025-07-23
Payer: MEDICARE

## 2025-08-15 ENCOUNTER — DOCUMENT SCAN (OUTPATIENT)
Dept: HOME HEALTH SERVICES | Facility: HOSPITAL | Age: 85
End: 2025-08-15
Payer: MEDICARE

## 2025-08-22 ENCOUNTER — TELEPHONE (OUTPATIENT)
Dept: FAMILY MEDICINE | Facility: CLINIC | Age: 85
End: 2025-08-22
Payer: MEDICARE

## 2025-08-22 DIAGNOSIS — N30.00 ACUTE CYSTITIS WITHOUT HEMATURIA: Primary | ICD-10-CM

## 2025-08-22 RX ORDER — CIPROFLOXACIN 500 MG/1
500 TABLET, FILM COATED ORAL 2 TIMES DAILY
Qty: 14 TABLET | Refills: 0 | Status: SHIPPED | OUTPATIENT
Start: 2025-08-22 | End: 2025-08-29

## 2025-09-02 ENCOUNTER — TELEPHONE (OUTPATIENT)
Dept: CARDIOLOGY | Facility: CLINIC | Age: 85
End: 2025-09-02
Payer: MEDICARE

## 2025-09-02 DIAGNOSIS — R60.0 LOWER EXTREMITY EDEMA: ICD-10-CM

## 2025-09-02 DIAGNOSIS — I89.0 LYMPHEDEMA: ICD-10-CM

## 2025-09-02 RX ORDER — FUROSEMIDE 40 MG/1
40 TABLET ORAL DAILY
Qty: 90 TABLET | Refills: 0 | OUTPATIENT
Start: 2025-09-02

## 2025-09-03 ENCOUNTER — TELEPHONE (OUTPATIENT)
Dept: CARDIOLOGY | Facility: CLINIC | Age: 85
End: 2025-09-03
Payer: MEDICARE

## 2025-09-04 ENCOUNTER — TELEPHONE (OUTPATIENT)
Dept: FAMILY MEDICINE | Facility: CLINIC | Age: 85
End: 2025-09-04
Payer: MEDICARE

## (undated) DEVICE — BIT DRILL T2 4.2 X 180MM L

## (undated) DEVICE — CLIPPER BLADE MOD 4406 (CAREF)

## (undated) DEVICE — DRESSING AQUACEL AG ADV 3.5X6

## (undated) DEVICE — DRESSING COVER AQUACEL AG SURG

## (undated) DEVICE — SEE MEDLINE ITEM 146292

## (undated) DEVICE — GUIDEWIRE 3.2 X 450
Type: IMPLANTABLE DEVICE | Site: HIP | Status: NON-FUNCTIONAL
Removed: 2022-03-26

## (undated) DEVICE — KIT PT CARE HANA PROFX SSXT

## (undated) DEVICE — BANDAGE ADHESIVE

## (undated) DEVICE — GLOVE BIOGEL ORTHOPEDIC 8

## (undated) DEVICE — STAPLER SKIN PROXIMATE WIDE

## (undated) DEVICE — WIRE KIRSCHNER T2 3X285MM SS
Type: IMPLANTABLE DEVICE | Site: HIP | Status: NON-FUNCTIONAL
Removed: 2022-03-26

## (undated) DEVICE — SOL 9P NACL IRR PIC IL

## (undated) DEVICE — CANISTER SUCTION 2 LTR

## (undated) DEVICE — APPLICATOR CHLORAPREP ORN 26ML

## (undated) DEVICE — GUIDE WIRE 3.0X1000MM BALL TIP
Type: IMPLANTABLE DEVICE | Site: HIP | Status: NON-FUNCTIONAL
Removed: 2022-03-26

## (undated) DEVICE — GOWN XX-LARGE

## (undated) DEVICE — Device

## (undated) DEVICE — COVER OVERHEAD SURG LT BLUE

## (undated) DEVICE — SUPPORT ULNA NERVE PROTECTOR

## (undated) DEVICE — GLOVE SURGICAL LATEX SZ 6.5

## (undated) DEVICE — SEE MEDLINE ITEM 157110

## (undated) DEVICE — UNDERGLOVE BIOGEL PI SZ 6.5 LF

## (undated) DEVICE — DRESSING LEUKOPLAST FLEX 1X3IN

## (undated) DEVICE — SEE MEDLINE ITEM 107746

## (undated) DEVICE — GLOVE SURGICAL LATEX SZ 8

## (undated) DEVICE — DRAPE STERI-DRAPE 83X125 IOBAN

## (undated) DEVICE — SUT VICRYL 2-0 36 CT-1

## (undated) DEVICE — ELECTRODE REM PLYHSV RETURN 9

## (undated) DEVICE — DRESSING GAUZE XEROFORM 5X9

## (undated) DEVICE — BLANKET UPPER BODY 78.7X29.9IN

## (undated) DEVICE — PAD TRACTION BOOT

## (undated) DEVICE — PAD ABD 8X10 STERILE

## (undated) DEVICE — TOWEL OR DISP STRL BLUE 4/PK